# Patient Record
Sex: FEMALE | Race: WHITE | NOT HISPANIC OR LATINO | Employment: OTHER | URBAN - METROPOLITAN AREA
[De-identification: names, ages, dates, MRNs, and addresses within clinical notes are randomized per-mention and may not be internally consistent; named-entity substitution may affect disease eponyms.]

---

## 2017-02-21 DIAGNOSIS — I48.0 PAF (PAROXYSMAL ATRIAL FIBRILLATION) (HCC): ICD-10-CM

## 2017-02-21 RX ORDER — DILTIAZEM HYDROCHLORIDE 120 MG/1
120 CAPSULE, COATED, EXTENDED RELEASE ORAL DAILY
Qty: 30 CAP | Refills: 11 | OUTPATIENT
Start: 2017-02-21 | End: 2017-06-28 | Stop reason: SDUPTHER

## 2017-02-28 ENCOUNTER — HOSPITAL ENCOUNTER (OUTPATIENT)
Dept: LAB | Facility: MEDICAL CENTER | Age: 82
End: 2017-02-28
Attending: FAMILY MEDICINE
Payer: MEDICARE

## 2017-02-28 LAB
ALBUMIN SERPL BCP-MCNC: 4.1 G/DL (ref 3.2–4.9)
ALBUMIN/GLOB SERPL: 1.1 G/DL
ALP SERPL-CCNC: 90 U/L (ref 30–99)
ALT SERPL-CCNC: 12 U/L (ref 2–50)
ANION GAP SERPL CALC-SCNC: 9 MMOL/L (ref 0–11.9)
AST SERPL-CCNC: 19 U/L (ref 12–45)
BASOPHILS # BLD AUTO: 0.1 K/UL (ref 0–0.12)
BASOPHILS NFR BLD AUTO: 1.2 % (ref 0–1.8)
BILIRUB SERPL-MCNC: 1 MG/DL (ref 0.1–1.5)
BUN SERPL-MCNC: 22 MG/DL (ref 8–22)
CALCIUM SERPL-MCNC: 9.9 MG/DL (ref 8.5–10.5)
CHLORIDE SERPL-SCNC: 101 MMOL/L (ref 96–112)
CHOLEST SERPL-MCNC: 188 MG/DL (ref 100–199)
CO2 SERPL-SCNC: 27 MMOL/L (ref 20–33)
CREAT SERPL-MCNC: 1.16 MG/DL (ref 0.5–1.4)
CREAT UR-MCNC: 134.4 MG/DL
EOSINOPHIL # BLD: 0.27 K/UL (ref 0–0.51)
EOSINOPHIL NFR BLD AUTO: 3.3 % (ref 0–6.9)
ERYTHROCYTE [DISTWIDTH] IN BLOOD BY AUTOMATED COUNT: 44.7 FL (ref 35.9–50)
EST. AVERAGE GLUCOSE BLD GHB EST-MCNC: 128 MG/DL
GLOBULIN SER CALC-MCNC: 3.6 G/DL (ref 1.9–3.5)
GLUCOSE SERPL-MCNC: 121 MG/DL (ref 65–99)
HBA1C MFR BLD: 6.1 % (ref 0–5.6)
HCT VFR BLD AUTO: 46.2 % (ref 37–47)
HDLC SERPL-MCNC: 54 MG/DL
HGB BLD-MCNC: 15 G/DL (ref 12–16)
IMM GRANULOCYTES # BLD AUTO: 0.04 K/UL (ref 0–0.11)
IMM GRANULOCYTES NFR BLD AUTO: 0.5 % (ref 0–0.9)
LDLC SERPL CALC-MCNC: 108 MG/DL
LYMPHOCYTES # BLD: 1.66 K/UL (ref 1–4.8)
LYMPHOCYTES NFR BLD AUTO: 20.4 % (ref 22–41)
MCH RBC QN AUTO: 28.9 PG (ref 27–33)
MCHC RBC AUTO-ENTMCNC: 32.5 G/DL (ref 33.6–35)
MCV RBC AUTO: 89 FL (ref 81.4–97.8)
MICROALBUMIN UR-MCNC: 1.5 MG/DL
MICROALBUMIN/CREAT UR: 11 MG/G (ref 0–30)
MONOCYTES # BLD: 0.54 K/UL (ref 0–0.85)
MONOCYTES NFR BLD AUTO: 6.6 % (ref 0–13.4)
NEUTROPHILS # BLD: 5.52 K/UL (ref 2–7.15)
NEUTROPHILS NFR BLD AUTO: 68 % (ref 44–72)
NRBC # BLD AUTO: 0 K/UL
NRBC BLD-RTO: 0 /100 WBC
PLATELET # BLD AUTO: 270 K/UL (ref 164–446)
PMV BLD AUTO: 9 FL (ref 9–12.9)
POTASSIUM SERPL-SCNC: 4.8 MMOL/L (ref 3.6–5.5)
PROT SERPL-MCNC: 7.7 G/DL (ref 6–8.2)
RBC # BLD AUTO: 5.19 M/UL (ref 4.2–5.4)
SODIUM SERPL-SCNC: 137 MMOL/L (ref 135–145)
TRIGL SERPL-MCNC: 130 MG/DL (ref 0–149)
TSH SERPL DL<=0.005 MIU/L-ACNC: 1.04 UIU/ML (ref 0.3–3.7)
WBC # BLD AUTO: 8.1 K/UL (ref 4.8–10.8)

## 2017-02-28 PROCEDURE — 82570 ASSAY OF URINE CREATININE: CPT

## 2017-02-28 PROCEDURE — 80053 COMPREHEN METABOLIC PANEL: CPT

## 2017-02-28 PROCEDURE — 82043 UR ALBUMIN QUANTITATIVE: CPT

## 2017-02-28 PROCEDURE — 80061 LIPID PANEL: CPT

## 2017-02-28 PROCEDURE — 36415 COLL VENOUS BLD VENIPUNCTURE: CPT

## 2017-02-28 PROCEDURE — 84443 ASSAY THYROID STIM HORMONE: CPT

## 2017-02-28 PROCEDURE — 83036 HEMOGLOBIN GLYCOSYLATED A1C: CPT

## 2017-02-28 PROCEDURE — 85025 COMPLETE CBC W/AUTO DIFF WBC: CPT

## 2017-04-04 ENCOUNTER — HOSPITAL ENCOUNTER (OUTPATIENT)
Dept: RADIOLOGY | Facility: MEDICAL CENTER | Age: 82
End: 2017-04-04
Attending: FAMILY MEDICINE
Payer: MEDICARE

## 2017-04-04 DIAGNOSIS — Z12.31 VISIT FOR SCREENING MAMMOGRAM: ICD-10-CM

## 2017-04-04 PROCEDURE — 77063 BREAST TOMOSYNTHESIS BI: CPT

## 2017-05-04 ENCOUNTER — HOSPITAL ENCOUNTER (OUTPATIENT)
Dept: LAB | Facility: MEDICAL CENTER | Age: 82
End: 2017-05-04
Attending: FAMILY MEDICINE
Payer: MEDICARE

## 2017-05-04 ENCOUNTER — HOSPITAL ENCOUNTER (OUTPATIENT)
Dept: LAB | Facility: MEDICAL CENTER | Age: 82
End: 2017-05-04
Attending: INTERNAL MEDICINE
Payer: MEDICARE

## 2017-05-04 LAB
EST. AVERAGE GLUCOSE BLD GHB EST-MCNC: 123 MG/DL
GFR SERPL CREATININE-BSD FRML MDRD: 55 ML/MIN/1.73 M 2
HBA1C MFR BLD: 5.9 % (ref 0–5.6)
IRON SERPL-MCNC: 40 UG/DL (ref 40–170)

## 2017-05-04 PROCEDURE — 85025 COMPLETE CBC W/AUTO DIFF WBC: CPT

## 2017-05-04 PROCEDURE — 80061 LIPID PANEL: CPT

## 2017-05-04 PROCEDURE — 83036 HEMOGLOBIN GLYCOSYLATED A1C: CPT

## 2017-05-04 PROCEDURE — 82306 VITAMIN D 25 HYDROXY: CPT

## 2017-05-04 PROCEDURE — 83540 ASSAY OF IRON: CPT

## 2017-05-04 PROCEDURE — 80053 COMPREHEN METABOLIC PANEL: CPT

## 2017-05-04 PROCEDURE — 81001 URINALYSIS AUTO W/SCOPE: CPT

## 2017-05-04 PROCEDURE — 84550 ASSAY OF BLOOD/URIC ACID: CPT

## 2017-05-04 PROCEDURE — 83735 ASSAY OF MAGNESIUM: CPT

## 2017-05-04 PROCEDURE — 84156 ASSAY OF PROTEIN URINE: CPT

## 2017-05-04 PROCEDURE — 82728 ASSAY OF FERRITIN: CPT

## 2017-05-04 PROCEDURE — 83550 IRON BINDING TEST: CPT

## 2017-05-04 PROCEDURE — 83970 ASSAY OF PARATHORMONE: CPT

## 2017-05-04 PROCEDURE — 82570 ASSAY OF URINE CREATININE: CPT

## 2017-05-04 PROCEDURE — 84100 ASSAY OF PHOSPHORUS: CPT

## 2017-05-05 LAB
25(OH)D3 SERPL-MCNC: 10 NG/ML (ref 30–100)
ALBUMIN SERPL BCP-MCNC: 3.7 G/DL (ref 3.2–4.9)
ALBUMIN/GLOB SERPL: 0.9 G/DL
ALP SERPL-CCNC: 95 U/L (ref 30–99)
ALT SERPL-CCNC: 7 U/L (ref 2–50)
ANION GAP SERPL CALC-SCNC: 9 MMOL/L (ref 0–11.9)
APPEARANCE UR: CLEAR
AST SERPL-CCNC: 12 U/L (ref 12–45)
BACTERIA #/AREA URNS HPF: ABNORMAL /HPF
BASOPHILS # BLD AUTO: 0.7 % (ref 0–1.8)
BASOPHILS # BLD: 0.06 K/UL (ref 0–0.12)
BILIRUB SERPL-MCNC: 0.7 MG/DL (ref 0.1–1.5)
BILIRUB UR QL STRIP.AUTO: NEGATIVE
BUN SERPL-MCNC: 14 MG/DL (ref 8–22)
CALCIUM SERPL-MCNC: 9.4 MG/DL (ref 8.5–10.5)
CHLORIDE SERPL-SCNC: 99 MMOL/L (ref 96–112)
CHOLEST SERPL-MCNC: 144 MG/DL (ref 100–199)
CO2 SERPL-SCNC: 27 MMOL/L (ref 20–33)
COLOR UR: YELLOW
CREAT SERPL-MCNC: 0.96 MG/DL (ref 0.5–1.4)
CREAT UR-MCNC: 112.8 MG/DL
EOSINOPHIL # BLD AUTO: 0.16 K/UL (ref 0–0.51)
EOSINOPHIL NFR BLD: 1.8 % (ref 0–6.9)
EPI CELLS #/AREA URNS HPF: ABNORMAL /HPF
ERYTHROCYTE [DISTWIDTH] IN BLOOD BY AUTOMATED COUNT: 43.3 FL (ref 35.9–50)
EST. AVERAGE GLUCOSE BLD GHB EST-MCNC: 123 MG/DL
FERRITIN SERPL-MCNC: 125 NG/ML (ref 10–291)
GLOBULIN SER CALC-MCNC: 3.9 G/DL (ref 1.9–3.5)
GLUCOSE SERPL-MCNC: 101 MG/DL (ref 65–99)
GLUCOSE UR STRIP.AUTO-MCNC: NEGATIVE MG/DL
HBA1C MFR BLD: 5.9 % (ref 0–5.6)
HCT VFR BLD AUTO: 40.7 % (ref 37–47)
HDLC SERPL-MCNC: 42 MG/DL
HGB BLD-MCNC: 13.1 G/DL (ref 12–16)
IMM GRANULOCYTES # BLD AUTO: 0.07 K/UL (ref 0–0.11)
IMM GRANULOCYTES NFR BLD AUTO: 0.8 % (ref 0–0.9)
IRON SATN MFR SERPL: 11 % (ref 15–55)
IRON SERPL-MCNC: 35 UG/DL (ref 40–170)
KETONES UR STRIP.AUTO-MCNC: NEGATIVE MG/DL
LDLC SERPL CALC-MCNC: 84 MG/DL
LEUKOCYTE ESTERASE UR QL STRIP.AUTO: ABNORMAL
LYMPHOCYTES # BLD AUTO: 1.18 K/UL (ref 1–4.8)
LYMPHOCYTES NFR BLD: 13.3 % (ref 22–41)
MAGNESIUM SERPL-MCNC: 1.9 MG/DL (ref 1.5–2.5)
MCH RBC QN AUTO: 28.6 PG (ref 27–33)
MCHC RBC AUTO-ENTMCNC: 32.2 G/DL (ref 33.6–35)
MCV RBC AUTO: 88.9 FL (ref 81.4–97.8)
MICRO URNS: ABNORMAL
MONOCYTES # BLD AUTO: 0.6 K/UL (ref 0–0.85)
MONOCYTES NFR BLD AUTO: 6.8 % (ref 0–13.4)
NEUTROPHILS # BLD AUTO: 6.79 K/UL (ref 2–7.15)
NEUTROPHILS NFR BLD: 76.6 % (ref 44–72)
NITRITE UR QL STRIP.AUTO: NEGATIVE
NRBC # BLD AUTO: 0 K/UL
NRBC BLD AUTO-RTO: 0 /100 WBC
PH UR STRIP.AUTO: 6 [PH]
PHOSPHATE SERPL-MCNC: 2.9 MG/DL (ref 2.5–4.5)
PLATELET # BLD AUTO: 450 K/UL (ref 164–446)
PMV BLD AUTO: 9 FL (ref 9–12.9)
POTASSIUM SERPL-SCNC: 4 MMOL/L (ref 3.6–5.5)
PROT SERPL-MCNC: 7.6 G/DL (ref 6–8.2)
PROT UR QL STRIP: NEGATIVE MG/DL
PROT UR-MCNC: 11.1 MG/DL (ref 0–15)
PROT/CREAT UR: 98 MG/G (ref 10–107)
PTH-INTACT SERPL-MCNC: 61 PG/ML (ref 14–72)
RBC # BLD AUTO: 4.58 M/UL (ref 4.2–5.4)
RBC # URNS HPF: ABNORMAL /HPF
RBC UR QL AUTO: NEGATIVE
SODIUM SERPL-SCNC: 135 MMOL/L (ref 135–145)
SP GR UR STRIP.AUTO: 1.01
TIBC SERPL-MCNC: 305 UG/DL (ref 250–450)
TRIGL SERPL-MCNC: 91 MG/DL (ref 0–149)
URATE SERPL-MCNC: 7.3 MG/DL (ref 1.9–8.2)
WBC # BLD AUTO: 8.9 K/UL (ref 4.8–10.8)
WBC #/AREA URNS HPF: ABNORMAL /HPF

## 2017-05-25 ENCOUNTER — OFFICE VISIT (OUTPATIENT)
Dept: CARDIOLOGY | Facility: MEDICAL CENTER | Age: 82
End: 2017-05-25
Payer: MEDICARE

## 2017-05-25 VITALS
HEIGHT: 62 IN | DIASTOLIC BLOOD PRESSURE: 74 MMHG | BODY MASS INDEX: 38.64 KG/M2 | HEART RATE: 82 BPM | WEIGHT: 210 LBS | SYSTOLIC BLOOD PRESSURE: 130 MMHG

## 2017-05-25 DIAGNOSIS — R06.02 SOB (SHORTNESS OF BREATH): ICD-10-CM

## 2017-05-25 DIAGNOSIS — E11.9 TYPE 2 DIABETES MELLITUS WITHOUT COMPLICATION, UNSPECIFIED LONG TERM INSULIN USE STATUS: ICD-10-CM

## 2017-05-25 DIAGNOSIS — I48.91 ATRIAL FIBRILLATION, UNSPECIFIED TYPE (HCC): ICD-10-CM

## 2017-05-25 DIAGNOSIS — I48.0 PAROXYSMAL ATRIAL FIBRILLATION (HCC): ICD-10-CM

## 2017-05-25 DIAGNOSIS — I10 ESSENTIAL HYPERTENSION: ICD-10-CM

## 2017-05-25 DIAGNOSIS — R06.09 DYSPNEA ON EXERTION: ICD-10-CM

## 2017-05-25 PROCEDURE — 99214 OFFICE O/P EST MOD 30 MIN: CPT | Performed by: INTERNAL MEDICINE

## 2017-05-25 PROCEDURE — 1101F PT FALLS ASSESS-DOCD LE1/YR: CPT | Mod: 8P | Performed by: INTERNAL MEDICINE

## 2017-05-25 PROCEDURE — G8419 CALC BMI OUT NRM PARAM NOF/U: HCPCS | Performed by: INTERNAL MEDICINE

## 2017-05-25 PROCEDURE — G8432 DEP SCR NOT DOC, RNG: HCPCS | Performed by: INTERNAL MEDICINE

## 2017-05-25 PROCEDURE — 1036F TOBACCO NON-USER: CPT | Performed by: INTERNAL MEDICINE

## 2017-05-25 PROCEDURE — 4040F PNEUMOC VAC/ADMIN/RCVD: CPT | Mod: 8P | Performed by: INTERNAL MEDICINE

## 2017-05-25 RX ORDER — WARFARIN SODIUM 5 MG/1
5 TABLET ORAL DAILY
Qty: 30 TAB | Refills: 3 | Status: SHIPPED | OUTPATIENT
Start: 2017-05-25 | End: 2017-06-30

## 2017-05-25 NOTE — MR AVS SNAPSHOT
"        Saumya Lopez Soo   2017 10:30 AM   Office Visit   MRN: [id number]    Department:  [facility]   Dept Phone:  [phone]    Description:  Female : 1930   Provider:  Grant Woodard M.D.           Reason for Visit     Follow-Up           Allergies as of 2017     Allergen Noted Reactions    Pcn [Penicillins] 2014         You were diagnosed with     Dyspnea on exertion   [337238]       Paroxysmal atrial fibrillation (CMS-HCC)   [277245]       Essential hypertension   [5679524]       Type 2 diabetes mellitus without complication, unspecified long term insulin use status (CMS-HCC)   [7770886]         Vital Signs     Blood Pressure Pulse Height Weight Body Mass Index Smoking Status    130/74 mmHg 82 1.575 m (5' 2\") 95.255 kg (210 lb) 38.40 kg/m2 Never Smoker       Basic Information     Date Of Birth Sex Race Ethnicity Preferred Language    1930 Female White Non- English      Problem List              ICD-10-CM Priority Class Noted - Resolved    DM2 (diabetes mellitus, type 2) (CMS-HCC) E11.9   2014 - Present    SOB (shortness of breath) R06.02   2014 - Present    AF (atrial fibrillation) (CMS-HCC) I48.91   2014 - Present    HTN (hypertension) I10   2014 - Present    Dyspnea on exertion R06.09   2016 - Present      Health Maintenance        Date Due Completion Dates    DIABETES MONOFILAMENT / LE EXAM 1931 ---    RETINAL SCREENING 1948 ---    IMM DTaP/Tdap/Td Vaccine (1 - Tdap) 1949 ---    PAP SMEAR 1951 ---    COLONOSCOPY 1980 ---    IMM ZOSTER VACCINE 1990 ---    IMM PNEUMOCOCCAL 65+ (ADULT) LOW/MEDIUM RISK SERIES (1 of 2 - PCV13) 1995 ---    A1C SCREENING 2017, 2017, 2017, 10/25/2016, 2016, 3/30/2016, 10/17/2015, 2015, 10/15/2014, 4/10/2014, 2013, 2013, 2012, 2012, 2012, 2012, 10/5/2011, 2011    URINE ACR / MICROALBUMIN 2018 " 2/28/2017, 10/25/2016, 10/17/2015, 10/15/2014, 11/22/2013    BONE DENSITY 3/21/2018 3/21/2013    MAMMOGRAM 4/4/2018 4/4/2017, 3/16/2016, 3/12/2015, 3/10/2014, 3/4/2013, 2/14/2012, 2/7/2011    FASTING LIPID PROFILE 5/4/2018 5/4/2017, 2/28/2017, 10/25/2016, 3/30/2016, 10/17/2015, 10/15/2014, 11/22/2013, 11/30/2012, 10/5/2011    SERUM CREATININE 5/4/2018 5/4/2017, 2/28/2017, 10/25/2016, 10/25/2016, 3/30/2016, 3/30/2016, 10/17/2015, 10/17/2015, 9/1/2015, 5/1/2015, 4/2/2015, 10/15/2014, 4/10/2014, 11/22/2013, 11/30/2012, 9/5/2012, 10/5/2011, 6/8/2011            Current Immunizations     No immunizations on file.      Below and/or attached are the medications your provider expects you to take. Review all of your home medications and newly ordered medications with your provider and/or pharmacist. Follow medication instructions as directed by your provider and/or pharmacist. Please keep your medication list with you and share with your provider. Update the information when medications are discontinued, doses are changed, or new medications (including over-the-counter products) are added; and carry medication information at all times in the event of emergency situations     Allergies:  PCN - (reactions not documented)               Medications  Valid as of: May 25, 2017 - 11:10 AM    Generic Name Brand Name Tablet Size Instructions for use    Atorvastatin Calcium (Tab) LIPITOR 20 MG         Clobetasol Propionate (Ointment) TEMOVATE 0.05 %         Cyclobenzaprine HCl (Tab) FLEXERIL 5 MG Take 1 Tab by mouth 3 times a day as needed for Muscle Spasms.        DilTIAZem HCl Coated Beads (CAPSULE SR 24 HR) CARDIZEM  MG Take 1 Cap by mouth every day.        GlipiZIDE (TABLET SR 24 HR) GLUCOTROL 2.5 MG         Glucose Blood (Strip) ONE TOUCH ULTRA TEST          Latanoprost (Solution) XALATAN 0.005 %         Levothyroxine Sodium (Tab) SYNTHROID 150 MCG Take 150 mcg by mouth every day.        Losartan Potassium (Tab) COZAAR 100  MG Take 100 mg by mouth every day.        Neomycin-Polymyxin-Dexameth (Ointment) MAXITROL 3.5-95457-3.1         Omeprazole   Take  by mouth.        Spironolactone-HCTZ (Tab) ALDACTAZIDE 25-25 MG Take  by mouth. Taking 1/2 tablet in the am        Warfarin Sodium (Tab) COUMADIN 5 MG Take 1 Tab by mouth every day.        .                 Medicines prescribed today were sent to:     [facility] [address] [place] [address] - [address] [address] [facility] [address]    Phone: [phone] Fax: [phone]    Open 24 Hours?: No      Medication refill instructions:       If your prescription bottle indicates you have medication refills left, it is not necessary to call your provider’s office. Please contact your pharmacy and they will refill your medication.    If your prescription bottle indicates you do not have any refills left, you may request refills at any time through one of the following ways: The online [employer] system (except Urgent Care), by calling your provider’s office, or by asking your pharmacy to contact your provider’s office with a refill request. Medication refills are processed only during regular business hours and may not be available until the next business day. Your provider may request additional information or to have a follow-up visit with you prior to refilling your medication.   *Please Note: Medication refills are assigned a new Rx number when refilled electronically. Your pharmacy may indicate that no refills were authorized even though a new prescription for the same medication is available at the pharmacy. Please request the medicine by name with the pharmacy before contacting your provider for a refill.        Your To Do List     Future Labs/Procedures Complete By Expires    HOLTER MONITOR STUDY  As directed 5/25/2018      Referral     A referral request has been sent to our patient care coordination department. Please allow 3-5 business days for us to process this  request and contact you either by phone or mail. If you do not hear from us by the 5th business day, please call us at [phone].           MyChart Status: Patient Declined

## 2017-05-25 NOTE — PROGRESS NOTES
Subjective:   Saumya Vann is a 89 y.o. female who presents today for followup of exertional dyspnea and PAF. She has had exertional dyspnea over the past 3 years associated with fatigue. She has a history of HTN, diabetes mellitus, CKD . Her echo is relatively unremarkable aside from LVH and grade 1 diastolic function. EKG from 4/2014 showed sinus rhythm at 58 beats per minute. Last stress test was 2 years ago and was unremarkable. PFTs per her primary doctor are unremarkable. She denies any exertional chest pain, PND or orthopnea.     She continues to have episodic paroxysms of atrial fibrillation, now much more frequent. She is taking her Cardizem every other day. She has declined anticoagulation despite education. She was tried on amiodarone which caused profound fatigue. She's been off it for some time now and feels slightly better with regard to both her dyspnea and fatigue. We discussed stress testing for her exertional dyspnea but she declined this . States she feels about the same over the past 6 months but continues to have NYHA class II dyspnea on exertion as well as more frequent palpitations.    Past medical history:  HTN  HLP  Type II diabetes mellitus  Chronic kidney  LVH  Hypothyroidism on replacement  PAF    Family History   Problem Relation Age of Onset   • Cancer Other      History   Smoking status   • Never Smoker    Smokeless tobacco   • Not on file     Allergies   Allergen Reactions   • Pcn [Penicillins]      Outpatient Encounter Prescriptions as of 5/25/2017   Medication Sig Dispense Refill   • OMEPRAZOLE PO Take  by mouth.     • warfarin (COUMADIN) 5 MG Tab Take 1 Tab by mouth every day. 30 Tab 3   • diltiazem CD (CARDIZEM CD) 120 MG CAPSULE SR 24 HR Take 1 Cap by mouth every day. 30 Cap 11   • clobetasol (TEMOVATE) 0.05 % Ointment      • ONE TOUCH ULTRA TEST strip      • latanoprost (XALATAN) 0.005 % Solution      • spironolactone/hctz (ALDACTAZIDE) 25-25 MG TABS Take  by mouth.  "Taking 1/2 tablet in the am     • levothyroxine (SYNTHROID) 150 MCG TABS Take 150 mcg by mouth every day.     • losartan (COZAAR) 100 MG TABS Take 100 mg by mouth every day.     • cyclobenzaprine (FLEXERIL) 5 MG tablet Take 1 Tab by mouth 3 times a day as needed for Muscle Spasms. 30 Tab 0   • atorvastatin (LIPITOR) 20 MG Tab      • glipiZIDE SR (GLUCOTROL) 2.5 MG TABLET SR 24 HR      • neomycin-polymixin-dexamethasone (MAXITROL) 3.5-14479-2.1 Ointment ophthalmic ointment      • [DISCONTINUED] aspirin 81 MG EC tablet Take  by mouth.       No facility-administered encounter medications on file as of 5/25/2017.     Review of Systems   All other systems reviewed and are negative.       Objective:   /74 mmHg  Pulse 82  Ht 1.575 m (5' 2\")  Wt 95.255 kg (210 lb)  BMI 38.40 kg/m2    Physical Exam   Constitutional: She is oriented to person, place, and time. She appears well-developed and well-nourished. No distress.   HENT:   Head: Normocephalic and atraumatic.   Mouth/Throat: Oropharynx is clear and moist. No oropharyngeal exudate.   Eyes: Conjunctivae are normal. Pupils are equal, round, and reactive to light. No scleral icterus.   Neck: Normal range of motion. Neck supple. No JVD present. No thyromegaly present.   Cardiovascular: Normal rate, normal heart sounds and intact distal pulses.  An irregular rhythm present. Frequent extrasystoles are present. PMI is not displaced.  Exam reveals no gallop and no friction rub.    No murmur heard.  Pulses:       Carotid pulses are 2+ on the right side, and 2+ on the left side.       Femoral pulses are 2+ on the right side, and 2+ on the left side.       Popliteal pulses are 1+ on the right side, and 1+ on the left side.        Dorsalis pedis pulses are 2+ on the right side, and 2+ on the left side.        Posterior tibial pulses are 2+ on the right side, and 2+ on the left side.   Pulmonary/Chest: Effort normal and breath sounds normal. She has no wheezes. She has no " rales.   Abdominal: Soft. Bowel sounds are normal. She exhibits no distension. There is no tenderness.   Musculoskeletal: She exhibits no edema or tenderness.   Neurological: She is alert and oriented to person, place, and time. No cranial nerve deficit.   Skin: Skin is warm and dry. No rash noted. She is not diaphoretic. No erythema.   Psychiatric: She has a normal mood and affect. Her behavior is normal.   ECHO CONCLUSIONS (4/27/2016):  Normal left ventricular size and systolic function.  Left ventricular ejection fraction is visually estimated to be 55%.  Moderate concentric left ventricular hypertrophy.  Normal regional wall motion.  Mild mitral regurgitation.  Mild tricuspid regurgitation.  Estimated right ventricular systolic pressure is 25 mmHg.  Normal inferior vena cava size and inspiratory collapse.    ECHO (11/10/2014):  I have personally reviewed the echocardiogram this visit and reviewed the findings with the patient. It shows:   Grade 1 diastolic dysfunction, EF 60-65%, moderate LVH, left atrial enlargement    PFTs as above      Assessment:     1. Dyspnea on exertion  PET SCAN MYOCARDIAL PERFUSION    HOLTER MONITOR STUDY    REFERRAL TO ANTICOAGULATION MONITORING   2. Paroxysmal atrial fibrillation (CMS-HCC)  HOLTER MONITOR STUDY    warfarin (COUMADIN) 5 MG Tab    REFERRAL TO ANTICOAGULATION MONITORING   3. Essential hypertension     4. Type 2 diabetes mellitus without complication, unspecified long term insulin use status (CMS-HCC)  PET SCAN MYOCARDIAL PERFUSION         Medical Decision Making:  Today's Assessment / Status / Plan:     Stress and echo are normal aside from grade 1 diastolic dysfunction. We decreased her Cardizem to every other day to see if this would help her exertional dyspnea if it was due to a chronotropic. She felt no different but has had less good control of her paroxysmal A. fib. She brings logs with heart rates in the 125 is intermittently especially after the days she does not  take her Cardizem.     Plan:    1. Xarelto 20 mg daily for elevated stroke risk. She cannot afford this on her Coumadin. She is willing to do this. Enroll in Coumadin clinic.  2. Increase Cardizem to daily from every other day  3. Pharmacologic PET stress test. This would be a more accurate test on a regular MPI 2nd due to her body habitus.  4. 48 hour Holter monitor  5. Should her stress test be unremarkable and she continued to have PAF, flecainide would be recommended      Follow-up 6 months

## 2017-06-05 ENCOUNTER — ANTICOAGULATION VISIT (OUTPATIENT)
Dept: MEDICAL GROUP | Facility: PHYSICIAN GROUP | Age: 82
End: 2017-06-05
Payer: MEDICARE

## 2017-06-05 ENCOUNTER — TELEPHONE (OUTPATIENT)
Dept: VASCULAR LAB | Facility: MEDICAL CENTER | Age: 82
End: 2017-06-05

## 2017-06-05 VITALS — DIASTOLIC BLOOD PRESSURE: 80 MMHG | SYSTOLIC BLOOD PRESSURE: 116 MMHG | HEART RATE: 88 BPM

## 2017-06-05 DIAGNOSIS — Z79.01 CHRONIC ANTICOAGULATION: ICD-10-CM

## 2017-06-05 DIAGNOSIS — I48.91 ATRIAL FIBRILLATION, UNSPECIFIED TYPE (HCC): ICD-10-CM

## 2017-06-05 LAB — INR PPP: 1.3 (ref 2–3.5)

## 2017-06-05 PROCEDURE — G8419 CALC BMI OUT NRM PARAM NOF/U: HCPCS | Performed by: NURSE PRACTITIONER

## 2017-06-05 PROCEDURE — 4040F PNEUMOC VAC/ADMIN/RCVD: CPT | Mod: 8P | Performed by: NURSE PRACTITIONER

## 2017-06-05 PROCEDURE — 99999 PR NO CHARGE: CPT | Performed by: NURSE PRACTITIONER

## 2017-06-05 PROCEDURE — 85610 PROTHROMBIN TIME: CPT | Performed by: NURSE PRACTITIONER

## 2017-06-05 NOTE — PROGRESS NOTES
Anticoagulation Summary as of 6/5/2017     INR goal 2.0-3.0   Selected INR 1.3! (6/5/2017)   Maintenance plan 2.5 mg (5 mg x 0.5) every day   Weekly total 17.5 mg   Plan last modified Rizwan Lipscomb, PHARMD (6/5/2017)   Next INR check 6/9/2017   Target end date Indefinite    Indications   Chronic anticoagulation [Z79.01]  AF (atrial fibrillation) (CMS-HCC) [I48.91]         Anticoagulation Episode Summary     INR check location     Preferred lab     Send INR reminders to     Comments       Anticoagulation Care Providers     Provider Role Specialty Phone number    Grant Woodard M.D. Referring Interventional Cardiology [phone]    [facility] Responsible  [phone]        Current Outpatient Prescriptions on File Prior to Visit   Medication Sig Dispense Refill   • OMEPRAZOLE PO Take  by mouth.     • warfarin (COUMADIN) 5 MG Tab Take 1 Tab by mouth every day. 30 Tab 3   • diltiazem CD (CARDIZEM CD) 120 MG CAPSULE SR 24 HR Take 1 Cap by mouth every day. 30 Cap 11   • cyclobenzaprine (FLEXERIL) 5 MG tablet Take 1 Tab by mouth 3 times a day as needed for Muscle Spasms. 30 Tab 0   • atorvastatin (LIPITOR) 20 MG Tab      • clobetasol (TEMOVATE) 0.05 % Ointment      • ONE TOUCH ULTRA TEST strip      • glipiZIDE SR (GLUCOTROL) 2.5 MG TABLET SR 24 HR      • latanoprost (XALATAN) 0.005 % Solution      • neomycin-polymixin-dexamethasone (MAXITROL) 3.5-35197-1.1 Ointment ophthalmic ointment      • spironolactone/hctz (ALDACTAZIDE) 25-25 MG TABS Take  by mouth. Taking 1/2 tablet in the am     • levothyroxine (SYNTHROID) 150 MCG TABS Take 150 mcg by mouth every day.     • losartan (COZAAR) 100 MG TABS Take 100 mg by mouth every day.       No current facility-administered medications on file prior to visit.     Lab Results   Component Value Date/Time    SODIUM 135 05/04/2017 10:08 AM    POTASSIUM 4.0 05/04/2017 10:08 AM    CHLORIDE 99 05/04/2017 10:08 AM    CO2 27 05/04/2017 10:08 AM    GLUCOSE 101*  05/04/2017 10:08 AM    BUN 14 05/04/2017 10:08 AM    CREATININE 0.96 05/04/2017 10:08 AM      No past medical history on file.    Anticoagulation Patient Findings    Pt is new to warfarin and new to [facility].  Discussed indication for warfarin therapy and INR goal range. Explained our services, hours of operation, warfarin therapy, potential SE, potential DI.. Discussed diet at length, with an emphasis on foods rich in vitamin K.  Discussed monitoring parameters, such as blood in urine, blood in stool, discussed what to do if a dose is missed, or suspected as missed.  Emphasized importance of compliance.  Discussed lifestyle choices of ETOH & smoking and its impact on therapy.  Added [facility] to care team    Patient new referral with history of nonvalvular atrial fibrillation.   [id number] = at least 5 (>75, female, HTN, DM).  Denies history of VTE or stroke.  Currently taking ASA daily, instructed her to continue until INR >2.0, then will discontinue.  Verified current warfarin regimen. Med rec completed.  INR subtherapeutic at 1.3.  Instructed patient to take 5mg X 3, then resume current warfarin regimen.  Follow up in 4 days per availability.    Rizwan Lipscomb, PHARMD    CC Dr Michael Bloch

## 2017-06-05 NOTE — MR AVS SNAPSHOT
Saumya Vann   2017 11:15 AM   Anticoagulation Visit   MRN: [id number]    Department:  [facility]   Dept Phone:  [phone]    Description:  Female : 1930   Provider:  Rizwan Lipscomb, PHARMD           Allergies as of 2017     Allergen Noted Reactions    Pcn [Penicillins] 2014         Vital Signs     Blood Pressure Pulse Smoking Status             116/80 mmHg 88 Never Smoker          Basic Information     Date Of Birth Sex Race Ethnicity Preferred Language    1930 Female White Non- English      Your appointments     2017 10:00 AM   HOLTER MONITOR 48 HRS with [facility]   [facility] (--)    [address]  [facility] [id number] [phone]            2017  2:15 PM   Anti-Coag Routine with [place] PHARMACIST   [facility] ([address] [id number] [phone]            2017  9:00 AM   Nm [address] with [facility] NM CARDIAC PET   [facility] ([facility])    [address] [facility] [id number] [phone]            2017 10:30 AM   FOLLOW UP with Grant Woodard M.D.   [facility] (--)    [address]  [facility] [id number] [phone]              Problem List              ICD-10-CM Priority Class Noted - Resolved    DM2 (diabetes mellitus, type 2) (CMS-HCC) E11.9   2014 - Present    SOB (shortness of breath) R06.02   2014 - Present    AF (atrial fibrillation) (CMS-HCC) I48.91   2014 - Present    HTN (hypertension) I10   2014 - Present    Dyspnea on exertion R06.09   2016 - Present      Health Maintenance        Date Due Completion Dates    DIABETES MONOFILAMENT / LE EXAM 1931 ---    RETINAL SCREENING 1948 ---    IMM DTaP/Tdap/Td Vaccine (1 - Tdap) 1949 ---    PAP SMEAR 1951 ---    COLONOSCOPY 12/22/1980 ---    IMM ZOSTER VACCINE 12/22/1990 ---    IMM PNEUMOCOCCAL 65+ (ADULT) LOW/MEDIUM RISK SERIES (1 of 2 - PCV13) 12/22/1995 ---    A1C SCREENING 11/4/2017 5/4/2017, 5/4/2017, 2/28/2017, 10/25/2016, 8/26/2016, 3/30/2016, 10/17/2015, 4/2/2015, 10/15/2014, 4/10/2014, 11/22/2013, 6/19/2013, 11/30/2012, 8/1/2012, 4/13/2012, 1/24/2012, 10/5/2011, 7/8/2011    URINE ACR / MICROALBUMIN 2/28/2018 2/28/2017, 10/25/2016, 10/17/2015, 10/15/2014, 11/22/2013    BONE DENSITY 3/21/2018 3/21/2013    MAMMOGRAM 4/4/2018 4/4/2017, 3/16/2016, 3/12/2015, 3/10/2014, 3/4/2013, 2/14/2012, 2/7/2011    FASTING LIPID PROFILE 5/4/2018 5/4/2017, 2/28/2017, 10/25/2016, 3/30/2016, 10/17/2015, 10/15/2014, 11/22/2013, 11/30/2012, 10/5/2011    SERUM CREATININE 5/4/2018 5/4/2017, 2/28/2017, 10/25/2016, 10/25/2016, 3/30/2016, 3/30/2016, 10/17/2015, 10/17/2015, 9/1/2015, 5/1/2015, 4/2/2015, 10/15/2014, 4/10/2014, 11/22/2013, 11/30/2012, 9/5/2012, 10/5/2011, 6/8/2011            Results     POCT Protime      Component    INR    1.3    Comment:     [id number] 3/2018 ic valid                        Current Immunizations     No immunizations on file.      Below and/or attached are the medications your provider expects you to take. Review all of your home medications and newly ordered medications with your provider and/or pharmacist. Follow medication instructions as directed by your provider and/or pharmacist. Please keep your medication list with you and share with your provider. Update the information when medications are discontinued, doses are changed, or new medications (including over-the-counter products) are added; and carry medication information at all times in the event of emergency situations     Allergies:  PCN - (reactions not documented)               Medications  Valid as of: June 05, 2017 - 11:38 AM    Generic Name Brand Name Tablet Size Instructions for use    Atorvastatin Calcium (Tab) LIPITOR 20 MG         Clobetasol  Propionate (Ointment) TEMOVATE 0.05 %         Cyclobenzaprine HCl (Tab) FLEXERIL 5 MG Take 1 Tab by mouth 3 times a day as needed for Muscle Spasms.        DilTIAZem HCl Coated Beads (CAPSULE SR 24 HR) CARDIZEM  MG Take 1 Cap by mouth every day.        GlipiZIDE (TABLET SR 24 HR) GLUCOTROL 2.5 MG         Glucose Blood (Strip) ONE TOUCH ULTRA TEST          Latanoprost (Solution) XALATAN 0.005 %         Levothyroxine Sodium (Tab) SYNTHROID 150 MCG Take 150 mcg by mouth every day.        Losartan Potassium (Tab) COZAAR 100 MG Take 100 mg by mouth every day.        Neomycin-Polymyxin-Dexameth (Ointment) MAXITROL 3.5-74513-8.1         Omeprazole   Take  by mouth.        Spironolactone-HCTZ (Tab) ALDACTAZIDE 25-25 MG Take  by mouth. Taking 1/2 tablet in the am        Warfarin Sodium (Tab) COUMADIN 5 MG Take 1 Tab by mouth every day.        .                 Medicines prescribed today were sent to:     [facility] [address] [place] [address]    Phone: [phone] Fax: [phone]    Open 24 Hours?: No      Medication refill instructions:       If your prescription bottle indicates you have medication refills left, it is not necessary to call your provider’s office. Please contact your pharmacy and they will refill your medication.    If your prescription bottle indicates you do not have any refills left, you may request refills at any time through one of the following ways: The online [employer] system (except Urgent Care), by calling your provider’s office, or by asking your pharmacy to contact your provider’s office with a refill request. Medication refills are processed only during regular business hours and may not be available until the next business day. Your provider may request additional information or to have a follow-up visit with you prior to refilling your medication.   *Please Note: Medication refills are assigned a new Rx number when  refilled electronically. Your pharmacy may indicate that no refills were authorized even though a new prescription for the same medication is available at the pharmacy. Please request the medicine by name with the pharmacy before contacting your provider for a refill.        Warfarin Dosing Calendar   June 2017 Details    Sun Mon Tue Wed Thu Fri Sat         1               2               3                 4               5   1.3   5 mg   See details      6      5 mg         7      5 mg         8      2.5 mg         9      2.5 mg         10                 11               12               13               14               15               16               17                 18               19               20               21               22               23               24                 25               26               27               28               29               30                 Date Details   06/05 This INR check   INR: 1.3   [id number] 3/2018 ic valid       Date of next INR:  6/9/2017         How to take your warfarin dose     To take:  2.5 mg Take 0.5 of a 5 mg tablet.    To take:  5 mg Take 1 of the 5 mg tablets.              MyChart Status: Patient Declined

## 2017-06-06 NOTE — TELEPHONE ENCOUNTER
Initial anticoagulation clinic note and most recent cardiology note reviewed  Patient with atrial fibrillation andCHADS-VASC score = approximately 5    Will continue with indefinite anticoagulation with warfarin as recommended by cardiology.  Agree with plans for anticoagulation as outlined by anticoagulation clinical staff.  Will defer management of rhythm and rate control as well as all other cardiovascular issues to cardiology.  Will defer management of all other medical issues to cardiologist and PCP    Pending any further recommendations from cardiology, we will plan to discontinue aspirin once INR therapeutic    Michael J Bloch, MD  Anticoagulation Clinic    cc: Bebe Santamaria

## 2017-06-07 ENCOUNTER — NON-PROVIDER VISIT (OUTPATIENT)
Dept: CARDIOLOGY | Facility: MEDICAL CENTER | Age: 82
End: 2017-06-07
Payer: MEDICARE

## 2017-06-07 DIAGNOSIS — I48.0 PAROXYSMAL ATRIAL FIBRILLATION (HCC): ICD-10-CM

## 2017-06-07 DIAGNOSIS — I49.3 PVC (PREMATURE VENTRICULAR CONTRACTION): ICD-10-CM

## 2017-06-07 DIAGNOSIS — R06.09 DYSPNEA ON EXERTION: ICD-10-CM

## 2017-06-09 ENCOUNTER — ANTICOAGULATION VISIT (OUTPATIENT)
Dept: MEDICAL GROUP | Facility: PHYSICIAN GROUP | Age: 82
End: 2017-06-09
Payer: MEDICARE

## 2017-06-09 VITALS — DIASTOLIC BLOOD PRESSURE: 81 MMHG | HEART RATE: 83 BPM | SYSTOLIC BLOOD PRESSURE: 119 MMHG

## 2017-06-09 DIAGNOSIS — I48.91 ATRIAL FIBRILLATION, UNSPECIFIED TYPE (HCC): ICD-10-CM

## 2017-06-09 DIAGNOSIS — Z79.01 CHRONIC ANTICOAGULATION: ICD-10-CM

## 2017-06-09 LAB — INR PPP: 2.4 (ref 2–3.5)

## 2017-06-09 PROCEDURE — 99999 PR NO CHARGE: CPT | Performed by: NURSE PRACTITIONER

## 2017-06-09 PROCEDURE — 85610 PROTHROMBIN TIME: CPT | Performed by: NURSE PRACTITIONER

## 2017-06-09 NOTE — PROGRESS NOTES
Anticoagulation Summary as of 6/9/2017     INR goal 2.0-3.0   Selected INR 2.4 (6/9/2017)   Maintenance plan 5 mg (5 mg x 1) on Tue, Thu, Sat; 2.5 mg (5 mg x 0.5) all other days   Weekly total 25 mg   Plan last modified Rizwan Lipscomb PHARMD (6/9/2017)   Next INR check 6/16/2017   Target end date Indefinite    Indications   Chronic anticoagulation [Z79.01]  AF (atrial fibrillation) (CMS-HCC) [I48.91]         Anticoagulation Episode Summary     INR check location     Preferred lab     Send INR reminders to     Comments       Anticoagulation Care Providers     Provider Role Specialty Phone number    Grant Woodard M.D. Referring Interventional Cardiology [phone]    [facility] Responsible  [phone]        Anticoagulation Patient Findings   Negatives Missed Doses, Extra Doses, Medication Changes, Antibiotic Use, Diet Changes, Dental/Other Procedures, Hospitalization, Bleeding Gums, Nose Bleeds, Blood in Urine, Blood in Stool, Any Bruising, Other Complaints        Patient is therapeutic today with INR of 2.4.  Pt denies any unusual s/s of bleeding, bruising, clotting or any changes to diet or medications.  Instructed patient to increase weekly warfarin regimen as detailed above.  Follow up in 1 weeks.    Rizwan Lipscomb, LISHAD

## 2017-06-09 NOTE — MR AVS SNAPSHOT
Saumyaayanna Vann   2017 2:15 PM   Anticoagulation Visit   MRN: [id number]    Department:  [facility]   Dept Phone:  [phone]    Description:  Female : 1930   Provider:  Rizwan Lipscomb, LISHAD           Allergies as of 2017     Allergen Noted Reactions    Pcn [Penicillins] 2014         You were diagnosed with     Chronic anticoagulation   [434703]       Atrial fibrillation, unspecified type (CMS-HCC)   [8093911]         Vital Signs     Blood Pressure Pulse Smoking Status             119/81 mmHg 83 Never Smoker          Basic Information     Date Of Birth Sex Race Ethnicity Preferred Language    1930 Female White Non- English      Your appointments     2017 11:15 AM   Anti-Coag Routine with [place] PHARMACIST   [facility] [address] [address]   [phone]            2017  9:00 AM   Nm 60 with [facility] NM CARDIAC PET   [facility]    [address] [facility] [address]   [phone]            2017 10:30 AM   FOLLOW UP with Grant Woodard M.D.   [facility] (--)    [address], [address]  [facility] [address] [phone]              Problem List              ICD-10-CM Priority Class Noted - Resolved    DM2 (diabetes mellitus, type 2) (CMS-HCC) E11.9   2014 - Present    SOB (shortness of breath) R06.02   2014 - Present    AF (atrial fibrillation) (CMS-HCC) I48.91   2014 - Present    HTN (hypertension) I10   2014 - Present    Dyspnea on exertion R06.09   2016 - Present    Chronic anticoagulation Z79.01   2017 - Present      Health Maintenance        Date Due Completion Dates    DIABETES MONOFILAMENT / LE EXAM 1931 ---    RETINAL SCREENING 1948 ---    IMM DTaP/Tdap/Td Vaccine (1 - Tdap) 1949 ---    PAP SMEAR 1951 ---    COLONOSCOPY 12/22/1980 ---    IMM ZOSTER VACCINE 12/22/1990 ---    IMM PNEUMOCOCCAL 65+ (ADULT) LOW/MEDIUM RISK SERIES (1 of 2 - PCV13) 12/22/1995 ---    A1C SCREENING 11/4/2017 5/4/2017, 5/4/2017, 2/28/2017, 10/25/2016, 8/26/2016, 3/30/2016, 10/17/2015, 4/2/2015, 10/15/2014, 4/10/2014, 11/22/2013, 6/19/2013, 11/30/2012, 8/1/2012, 4/13/2012, 1/24/2012, 10/5/2011, 7/8/2011    URINE ACR / MICROALBUMIN 2/28/2018 2/28/2017, 10/25/2016, 10/17/2015, 10/15/2014, 11/22/2013    BONE DENSITY 3/21/2018 3/21/2013    MAMMOGRAM 4/4/2018 4/4/2017, 3/16/2016, 3/12/2015, 3/10/2014, 3/4/2013, 2/14/2012, 2/7/2011    FASTING LIPID PROFILE 5/4/2018 5/4/2017, 2/28/2017, 10/25/2016, 3/30/2016, 10/17/2015, 10/15/2014, 11/22/2013, 11/30/2012, 10/5/2011    SERUM CREATININE 5/4/2018 5/4/2017, 2/28/2017, 10/25/2016, 10/25/2016, 3/30/2016, 3/30/2016, 10/17/2015, 10/17/2015, 9/1/2015, 5/1/2015, 4/2/2015, 10/15/2014, 4/10/2014, 11/22/2013, 11/30/2012, 9/5/2012, 10/5/2011, 6/8/2011            Results     POCT Protime      Component    INR    2.4    Comment:     [id number] 3/2018 ic valid                        Current Immunizations     No immunizations on file.      Below and/or attached are the medications your provider expects you to take. Review all of your home medications and newly ordered medications with your provider and/or pharmacist. Follow medication instructions as directed by your provider and/or pharmacist. Please keep your medication list with you and share with your provider. Update the information when medications are discontinued, doses are changed, or new medications (including over-the-counter products) are added; and carry medication information at all times in the event of emergency situations     Allergies:  PCN - (reactions not documented)               Medications  Valid as of: June 09, 2017 -  2:33 PM    Generic Name Brand Name Tablet Size Instructions for use    Atorvastatin Calcium (Tab) LIPITOR 20 MG         Clobetasol  Propionate (Ointment) TEMOVATE 0.05 %         Cyclobenzaprine HCl (Tab) FLEXERIL 5 MG Take 1 Tab by mouth 3 times a day as needed for Muscle Spasms.        DilTIAZem HCl Coated Beads (CAPSULE SR 24 HR) CARDIZEM  MG Take 1 Cap by mouth every day.        GlipiZIDE (TABLET SR 24 HR) GLUCOTROL 2.5 MG         Glucose Blood (Strip) ONE TOUCH ULTRA TEST          Latanoprost (Solution) XALATAN 0.005 %         Levothyroxine Sodium (Tab) SYNTHROID 150 MCG Take 150 mcg by mouth every day.        Losartan Potassium (Tab) COZAAR 100 MG Take 100 mg by mouth every day.        Neomycin-Polymyxin-Dexameth (Ointment) MAXITROL 3.5-40536-3.1         Omeprazole   Take  by mouth.        Spironolactone-HCTZ (Tab) ALDACTAZIDE 25-25 MG Take  by mouth. Taking 1/2 tablet in the am        Warfarin Sodium (Tab) COUMADIN 5 MG Take 1 Tab by mouth every day.        .                 Medicines prescribed today were sent to:     [facility] [address] [place] [address]    Phone: [phone] Fax: [phone]    Open 24 Hours?: No      Medication refill instructions:       If your prescription bottle indicates you have medication refills left, it is not necessary to call your provider’s office. Please contact your pharmacy and they will refill your medication.    If your prescription bottle indicates you do not have any refills left, you may request refills at any time through one of the following ways: The online [employer] system (except Urgent Care), by calling your provider’s office, or by asking your pharmacy to contact your provider’s office with a refill request. Medication refills are processed only during regular business hours and may not be available until the next business day. Your provider may request additional information or to have a follow-up visit with you prior to refilling your medication.   *Please Note: Medication refills are assigned a new Rx number when  refilled electronically. Your pharmacy may indicate that no refills were authorized even though a new prescription for the same medication is available at the pharmacy. Please request the medicine by name with the pharmacy before contacting your provider for a refill.        Warfarin Dosing Calendar   June 2017 Details    Sun Mon Tue Wed Thu Fri Sat         1               2               3                 4               5               6               7               8               9   2.4   2.5 mg   See details      10      5 mg           11      2.5 mg         12      2.5 mg         13      5 mg         14      2.5 mg         15      5 mg         16      2.5 mg         17                 18               19               20               21               22               23               24                 25               26               27               28               29               30                 Date Details   06/09 This INR check   INR: 2.4   [id number] 3/2018 ic valid       Date of next INR:  6/16/2017         How to take your warfarin dose     To take:  2.5 mg Take 0.5 of a 5 mg tablet.    To take:  5 mg Take 1 of the 5 mg tablets.              MyChart Status: Patient Declined

## 2017-06-13 DIAGNOSIS — I48.0 PAROXYSMAL ATRIAL FIBRILLATION (HCC): ICD-10-CM

## 2017-06-13 DIAGNOSIS — R06.09 DYSPNEA ON EXERTION: ICD-10-CM

## 2017-06-14 LAB — EKG IMPRESSION: NORMAL

## 2017-06-14 PROCEDURE — 93224 XTRNL ECG REC UP TO 48 HRS: CPT | Performed by: INTERNAL MEDICINE

## 2017-06-15 ENCOUNTER — TELEPHONE (OUTPATIENT)
Dept: CARDIOLOGY | Facility: MEDICAL CENTER | Age: 82
End: 2017-06-15

## 2017-06-15 NOTE — TELEPHONE ENCOUNTER
S/w pt about holter monitor results. She reports that she currently is taking Cardizem 120mg daily as well as her coumadin. Informed her to please increase Cardizem to twice daily per Dr. Woodard. Pt states understanding. She will get PET scan done on Monday 6/19 and we will FU after. Pt will call if any issues with increasing medication.   She was very appreciative of call.     TANA Mathew RN to FU next week. Thank you.

## 2017-06-15 NOTE — TELEPHONE ENCOUNTER
----- Message from Bridget Marie R.N. sent at 6/15/2017  8:54 AM PDT -----      ----- Message -----     From: Grant Woodard M.D.     Sent: 6/15/2017   8:30 AM       To: Quincy Mckenzie L.P.N.    She was in AF for the entire holter, likely why she has been feeling unwell. Rates are only not well controlled. I suggest she complete her testing and consider antiarrythmic therapy with cardioversion.     Please make sure she is taking her anticoagulation and her Cardizem every day. Whatever she is taking for cardizem, have her increase it (if qod then increase to daily, if daily make it BID, etc). Shuoldnt proceed with cardioversion until all other testing is completed so appropriate antiarrythmic therapy can be discussed. She could f/u with EP if im still gone.    Thanks    TW  ----- Message -----     From: Quincy Mckenzie L.P.N.     Sent: 6/13/2017   5:07 PM       To: Grant Woodard M.D.    Please interp Holter   jl

## 2017-06-16 ENCOUNTER — ANTICOAGULATION VISIT (OUTPATIENT)
Dept: MEDICAL GROUP | Facility: PHYSICIAN GROUP | Age: 82
End: 2017-06-16
Payer: MEDICARE

## 2017-06-16 VITALS — HEART RATE: 82 BPM | DIASTOLIC BLOOD PRESSURE: 87 MMHG | SYSTOLIC BLOOD PRESSURE: 116 MMHG

## 2017-06-16 DIAGNOSIS — Z79.01 CHRONIC ANTICOAGULATION: ICD-10-CM

## 2017-06-16 DIAGNOSIS — I48.0 PAROXYSMAL ATRIAL FIBRILLATION (HCC): ICD-10-CM

## 2017-06-16 LAB — INR PPP: 2.2 (ref 2–3.5)

## 2017-06-16 PROCEDURE — 85610 PROTHROMBIN TIME: CPT | Performed by: FAMILY MEDICINE

## 2017-06-16 PROCEDURE — 99999 PR NO CHARGE: CPT | Performed by: FAMILY MEDICINE

## 2017-06-16 NOTE — PROGRESS NOTES
Anticoagulation Summary as of 6/16/2017     INR goal 2.0-3.0   Selected INR 2.2 (6/16/2017)   Maintenance plan 5 mg (5 mg x 1) on Tue, Thu, Sat; 2.5 mg (5 mg x 0.5) all other days   Weekly total 25 mg   Plan last modified Rizwan Lipscomb PHARMD (6/9/2017)   Next INR check 6/30/2017   Target end date Indefinite    Indications   Chronic anticoagulation [Z79.01]  AF (atrial fibrillation) (CMS-HCC) [I48.91]         Anticoagulation Episode Summary     INR check location     Preferred lab     Send INR reminders to     Comments       Anticoagulation Care Providers     Provider Role Specialty Phone number    Grant Woodard M.D. Referring Interventional Cardiology [phone]    [facility] Responsible  [phone]        Anticoagulation Patient Findings   Negatives Missed Doses, Extra Doses, Medication Changes, Antibiotic Use, Diet Changes, Dental/Other Procedures, Hospitalization, Bleeding Gums, Nose Bleeds, Blood in Urine, Blood in Stool, Any Bruising, Other Complaints        Patient is therapeutic today with INR of 2.2.  Pt denies any unusual s/s of bleeding, bruising, clotting or any changes to diet or medications.  Pt is to continue with current warfarin dosing regimen.  Follow up in 2 weeks.    Rizwan Lipscomb, LISHAD

## 2017-06-16 NOTE — MR AVS SNAPSHOT
Saumyaayanna Vann   2017 11:15 AM   Anticoagulation Visit   MRN: [id number]    Department:  [facility]   Dept Phone:  [phone]    Description:  Female : 1930   Provider:  Rizwan Lipscomb, LISHAD           Allergies as of 2017     Allergen Noted Reactions    Pcn [Penicillins] 2014         You were diagnosed with     Chronic anticoagulation   [418604]       Paroxysmal atrial fibrillation (CMS-HCC)   [731939]         Vital Signs     Blood Pressure Pulse Smoking Status             116/87 mmHg 82 Never Smoker          Basic Information     Date Of Birth Sex Race Ethnicity Preferred Language    1930 Female White Non- English      Your appointments     2017  9:00 AM   Nm 60 with [facility] NM CARDIAC PET   [facility] [place] [facility]    [address] [facility] [address] [phone]            2017  1:30 PM   Anti-Coag Routine with [place] PHARMACIST   [facility]    [address] [facility] [address] [phone]            2017 10:30 AM   FOLLOW UP with Grant Woodard M.D.   [facility] (--)    [address]  [facility] [address] [phone]              Problem List              ICD-10-CM Priority Class Noted - Resolved    DM2 (diabetes mellitus, type 2) (CMS-HCC) E11.9   2014 - Present    SOB (shortness of breath) R06.02   2014 - Present    AF (atrial fibrillation) (CMS-HCC) I48.91   2014 - Present    HTN (hypertension) I10   2014 - Present    Dyspnea on exertion R06.09   2016 - Present    Chronic anticoagulation Z79.01   2017 - Present      Health Maintenance        Date Due Completion Dates    DIABETES MONOFILAMENT / LE EXAM 1931 ---    RETINAL SCREENING 1948 ---    IMM DTaP/Tdap/Td Vaccine (1 - Tdap) 1949 ---    PAP SMEAR 1951 ---    COLONOSCOPY  12/22/1980 ---    IMM ZOSTER VACCINE 12/22/1990 ---    IMM PNEUMOCOCCAL 65+ (ADULT) LOW/MEDIUM RISK SERIES (1 of 2 - PCV13) 12/22/1995 ---    A1C SCREENING 11/4/2017 5/4/2017, 5/4/2017, 2/28/2017, 10/25/2016, 8/26/2016, 3/30/2016, 10/17/2015, 4/2/2015, 10/15/2014, 4/10/2014, 11/22/2013, 6/19/2013, 11/30/2012, 8/1/2012, 4/13/2012, 1/24/2012, 10/5/2011, 7/8/2011    URINE ACR / MICROALBUMIN 2/28/2018 2/28/2017, 10/25/2016, 10/17/2015, 10/15/2014, 11/22/2013    BONE DENSITY 3/21/2018 3/21/2013    MAMMOGRAM 4/4/2018 4/4/2017, 3/16/2016, 3/12/2015, 3/10/2014, 3/4/2013, 2/14/2012, 2/7/2011    FASTING LIPID PROFILE 5/4/2018 5/4/2017, 2/28/2017, 10/25/2016, 3/30/2016, 10/17/2015, 10/15/2014, 11/22/2013, 11/30/2012, 10/5/2011    SERUM CREATININE 5/4/2018 5/4/2017, 2/28/2017, 10/25/2016, 10/25/2016, 3/30/2016, 3/30/2016, 10/17/2015, 10/17/2015, 9/1/2015, 5/1/2015, 4/2/2015, 10/15/2014, 4/10/2014, 11/22/2013, 11/30/2012, 9/5/2012, 10/5/2011, 6/8/2011            Results     POCT Protime      Component    INR    2.2    Comment:     [id number] 3/2018 ic valid                        Current Immunizations     No immunizations on file.      Below and/or attached are the medications your provider expects you to take. Review all of your home medications and newly ordered medications with your provider and/or pharmacist. Follow medication instructions as directed by your provider and/or pharmacist. Please keep your medication list with you and share with your provider. Update the information when medications are discontinued, doses are changed, or new medications (including over-the-counter products) are added; and carry medication information at all times in the event of emergency situations     Allergies:  PCN - (reactions not documented)               Medications  Valid as of: June 16, 2017 - 11:25 AM    Generic Name Brand Name Tablet Size Instructions for use    Atorvastatin Calcium (Tab) LIPITOR 20 MG         Clobetasol Propionate  (Ointment) TEMOVATE 0.05 %         Cyclobenzaprine HCl (Tab) FLEXERIL 5 MG Take 1 Tab by mouth 3 times a day as needed for Muscle Spasms.        DilTIAZem HCl Coated Beads (CAPSULE SR 24 HR) CARDIZEM  MG Take 1 Cap by mouth every day.        GlipiZIDE (TABLET SR 24 HR) GLUCOTROL 2.5 MG         Glucose Blood (Strip) ONE TOUCH ULTRA TEST          Latanoprost (Solution) XALATAN 0.005 %         Levothyroxine Sodium (Tab) SYNTHROID 150 MCG Take 150 mcg by mouth every day.        Losartan Potassium (Tab) COZAAR 100 MG Take 100 mg by mouth every day.        Neomycin-Polymyxin-Dexameth (Ointment) MAXITROL 3.5-17080-5.1         Omeprazole   Take  by mouth.        Spironolactone-HCTZ (Tab) ALDACTAZIDE 25-25 MG Take  by mouth. Taking 1/2 tablet in the am        Warfarin Sodium (Tab) COUMADIN 5 MG Take 1 Tab by mouth every day.        .                 Medicines prescribed today were sent to:     [facility] [address] [place] [address]    Phone: [phone] Fax: [phone]    Open 24 Hours?: No      Medication refill instructions:       If your prescription bottle indicates you have medication refills left, it is not necessary to call your provider’s office. Please contact your pharmacy and they will refill your medication.    If your prescription bottle indicates you do not have any refills left, you may request refills at any time through one of the following ways: The online [employer] system (except Urgent Care), by calling your provider’s office, or by asking your pharmacy to contact your provider’s office with a refill request. Medication refills are processed only during regular business hours and may not be available until the next business day. Your provider may request additional information or to have a follow-up visit with you prior to refilling your medication.   *Please Note: Medication refills are assigned a new Rx number when refilled  electronically. Your pharmacy may indicate that no refills were authorized even though a new prescription for the same medication is available at the pharmacy. Please request the medicine by name with the pharmacy before contacting your provider for a refill.        Warfarin Dosing Calendar   June 2017 Details    Sun Mon Tue Wed Thu Fri Sat         1               2               3                 4               5               6               7               8               9               10                 11               12               13               14               15               16   2.2   2.5 mg   See details      17      5 mg           18      2.5 mg         19      2.5 mg         20      5 mg         21      2.5 mg         22      5 mg         23      2.5 mg         24      5 mg           25      2.5 mg         26      2.5 mg         27      5 mg         28      2.5 mg         29      5 mg         30      2.5 mg           Date Details   06/16 This INR check   INR: 2.2   [id number] 3/2018 ic valid       Date of next INR:  6/30/2017         How to take your warfarin dose     To take:  2.5 mg Take 0.5 of a 5 mg tablet.    To take:  5 mg Take 1 of the 5 mg tablets.              MyChart Status: Patient Declined

## 2017-06-19 ENCOUNTER — HOSPITAL ENCOUNTER (OUTPATIENT)
Dept: RADIOLOGY | Facility: MEDICAL CENTER | Age: 82
End: 2017-06-19
Attending: INTERNAL MEDICINE
Payer: MEDICARE

## 2017-06-19 DIAGNOSIS — R06.09 DYSPNEA ON EXERTION: ICD-10-CM

## 2017-06-19 DIAGNOSIS — R06.02 SOB (SHORTNESS OF BREATH): ICD-10-CM

## 2017-06-19 DIAGNOSIS — I48.91 ATRIAL FIBRILLATION, UNSPECIFIED TYPE (HCC): ICD-10-CM

## 2017-06-19 DIAGNOSIS — I10 ESSENTIAL HYPERTENSION: ICD-10-CM

## 2017-06-19 PROCEDURE — 700111 HCHG RX REV CODE 636 W/ 250 OVERRIDE (IP)

## 2017-06-19 PROCEDURE — A9555 RB82 RUBIDIUM: HCPCS

## 2017-06-20 NOTE — PROCEDURES
REFERRING PHYSICIAN:  Grant Woodard MD    AGE:  86    GENDER:  Female   HEIGHT:  62 inches     WEIGHT:  210 pounds      BMI:  38    INDICATIONS:  Paroxysmal atrial fibrillation.    MEDICATIONS:      PROCEDURE:  The patient reviewed and signed the acknowledgement for testing   form.  The patient was in a fasting state and was properly prepared for   testing.  An intravenous line was inserted and a flush of normal saline   followed to insure line patency.    A transmission scan was acquired for attenuation correction using the internal   Germanium sources.  The patient was then administered 30.0 mCi at 9:25 of   Rubidium-82.  Approximately 90 seconds after the infusion, resting imagine   were obtained with ECG-gating.  Following the resting series, the patient   administered 50.0 mg at 9:35 of dipyridamole over four minutes.  The blood   pressure, heart rate and ECG were monitored and recorded.  After the   dipyridamole infusion was completed, another transmission scan for attenuation   correction was obtained.  The patient was then administered 30.1 mCi at 9:40   of Rubidium-82.  Approximately 90 seconds after the infusion, Peak stress   images were obtained with ECG-gating.    CLINICAL RESPONSE:  Resting blood pressure was 110/63 with a heart rate of 90.    Immediately post-dipyridamole infusion the blood pressure was 108/62 with a   heart rate of 102.  After a recovery period the blood pressure was 110/43 with   a heart rate of 90.    The patient experienced headache.    Aminophylline 100 mg injected at 9:50 IV site right hand was administered   following the scan.    Resting ejection fraction 60%.    Stress ejection fraction 69%.    ELECTROCARDIOGRAPHIC FINDINGS:      SCINTOGRAPHIC FINDINGS:  The QC data for the scan was reviewed and was within   acceptable limits.    GATED WALL MOTION FINDINGS:      CONCLUSIONS AND IMPRESSIONS:  Overall, there is no evidence of myocardial   ischemia based on EKG tracing  along with myocardial PET scan images.  The   rhythm is atrial fibrillation throughout the stress test.  No malignant   arrhythmias noted.       ____________________________________     MD BONG Aleman / CRISTI    DD:  06/19/2017 17:40:35  DT:  06/19/2017 19:57:12    D#:  [id number]  Job#:  [id number]

## 2017-06-28 DIAGNOSIS — I48.0 PAF (PAROXYSMAL ATRIAL FIBRILLATION) (HCC): ICD-10-CM

## 2017-06-28 RX ORDER — DILTIAZEM HYDROCHLORIDE 120 MG/1
120 CAPSULE, COATED, EXTENDED RELEASE ORAL 2 TIMES DAILY
Qty: 60 CAP | Refills: 11 | OUTPATIENT
Start: 2017-06-28 | End: 2018-06-18 | Stop reason: SDUPTHER

## 2017-06-30 ENCOUNTER — ANTICOAGULATION VISIT (OUTPATIENT)
Dept: MEDICAL GROUP | Facility: PHYSICIAN GROUP | Age: 82
End: 2017-06-30
Payer: MEDICARE

## 2017-06-30 VITALS — HEART RATE: 66 BPM | SYSTOLIC BLOOD PRESSURE: 103 MMHG | DIASTOLIC BLOOD PRESSURE: 77 MMHG

## 2017-06-30 DIAGNOSIS — I48.0 PAROXYSMAL ATRIAL FIBRILLATION (HCC): ICD-10-CM

## 2017-06-30 DIAGNOSIS — Z79.01 CHRONIC ANTICOAGULATION: ICD-10-CM

## 2017-06-30 LAB — INR PPP: 3.5 (ref 2–3.5)

## 2017-06-30 PROCEDURE — 99999 PR NO CHARGE: CPT | Performed by: FAMILY MEDICINE

## 2017-06-30 PROCEDURE — 85610 PROTHROMBIN TIME: CPT | Performed by: FAMILY MEDICINE

## 2017-06-30 RX ORDER — WARFARIN SODIUM 5 MG/1
TABLET ORAL
Qty: 90 TAB | Refills: 1 | Status: SHIPPED | OUTPATIENT
Start: 2017-06-30 | End: 2018-06-26

## 2017-06-30 NOTE — MR AVS SNAPSHOT
Saumya Jessica Vann   2017 1:30 PM   Anticoagulation Visit   MRN: [id number]    Department:  [facility]   Dept Phone:  [phone]    Description:  Female : 1930   Provider:  Rizwan Lipscomb PHARMD           Allergies as of 2017     Allergen Noted Reactions    Pcn [Penicillins] 2014         You were diagnosed with     Chronic anticoagulation   [505025]       Paroxysmal atrial fibrillation (CMS-HCC)   [081985]         Vital Signs     Blood Pressure Pulse Smoking Status             103/77 mmHg 66 Never Smoker          Basic Information     Date Of Birth Sex Race Ethnicity Preferred Language    1930 Female White Non- English      Your appointments     2017  1:30 PM   Anti-Coag Routine with Rizwan Lipscomb PHARMD   [facility]    [address] [facility] [address]   [phone]            2017 11:30 AM   Anti-Coag Routine with Coila PHARMACIST   [facility]    [address] [facility] [address]   [phone]            2017 10:30 AM   FOLLOW UP with Grant Woodard M.D.   [facility] (--)    [address] , [facility] [address]  [facility] [address]   [phone]              Problem List              ICD-10-CM Priority Class Noted - Resolved    DM2 (diabetes mellitus, type 2) (CMS-HCC) E11.9   2014 - Present    SOB (shortness of breath) R06.02   2014 - Present    AF (atrial fibrillation) (CMS-HCC) I48.91   2014 - Present    HTN (hypertension) I10   2014 - Present    Dyspnea on exertion R06.09   2016 - Present    Chronic anticoagulation Z79.01   2017 - Present      Health Maintenance        Date Due Completion Dates    DIABETES MONOFILAMENT / LE EXAM 1931 ---    RETINAL SCREENING 1948 ---    IMM DTaP/Tdap/Td Vaccine (1 - Tdap) 1949 ---    PAP SMEAR 1951 ---    COLONOSCOPY  12/22/1980 ---    IMM ZOSTER VACCINE 12/22/1990 ---    IMM PNEUMOCOCCAL 65+ (ADULT) LOW/MEDIUM RISK SERIES (1 of 2 - PCV13) 12/22/1995 ---    A1C SCREENING 11/4/2017 5/4/2017, 5/4/2017, 2/28/2017, 10/25/2016, 8/26/2016, 3/30/2016, 10/17/2015, 4/2/2015, 10/15/2014, 4/10/2014, 11/22/2013, 6/19/2013, 11/30/2012, 8/1/2012, 4/13/2012, 1/24/2012, 10/5/2011, 7/8/2011    BONE DENSITY 3/21/2018 3/21/2013    MAMMOGRAM 4/4/2018 4/4/2017, 3/16/2016, 3/12/2015, 3/10/2014, 3/4/2013, 2/14/2012, 2/7/2011    FASTING LIPID PROFILE 5/4/2018 5/4/2017, 2/28/2017, 10/25/2016, 3/30/2016, 10/17/2015, 10/15/2014, 11/22/2013, 11/30/2012, 10/5/2011    URINE ACR / MICROALBUMIN 5/4/2018 5/4/2017, 2/28/2017, 10/25/2016, 3/30/2016, 10/17/2015, 10/17/2015, 9/1/2015, 10/15/2014, 11/22/2013, 11/16/2011, 6/8/2011    SERUM CREATININE 5/4/2018 5/4/2017, 2/28/2017, 10/25/2016, 10/25/2016, 3/30/2016, 3/30/2016, 10/17/2015, 10/17/2015, 9/1/2015, 5/1/2015, 4/2/2015, 10/15/2014, 4/10/2014, 11/22/2013, 11/30/2012, 9/5/2012, 10/5/2011, 6/8/2011            Results     POCT Protime      Component    INR    3.5    Comment:     [id number] 3/2018 ic valid                        Current Immunizations     No immunizations on file.      Below and/or attached are the medications your provider expects you to take. Review all of your home medications and newly ordered medications with your provider and/or pharmacist. Follow medication instructions as directed by your provider and/or pharmacist. Please keep your medication list with you and share with your provider. Update the information when medications are discontinued, doses are changed, or new medications (including over-the-counter products) are added; and carry medication information at all times in the event of emergency situations     Allergies:  PCN - (reactions not documented)               Medications  Valid as of: June 30, 2017 -  1:27 PM    Generic Name Brand Name Tablet Size Instructions for use     Atorvastatin Calcium (Tab) LIPITOR 20 MG         Clobetasol Propionate (Ointment) TEMOVATE 0.05 %         Cyclobenzaprine HCl (Tab) FLEXERIL 5 MG Take 1 Tab by mouth 3 times a day as needed for Muscle Spasms.        DilTIAZem HCl Coated Beads (CAPSULE SR 24 HR) CARDIZEM  MG Take 1 Cap by mouth 2 Times a Day.        GlipiZIDE (TABLET SR 24 HR) GLUCOTROL 2.5 MG         Glucose Blood (Strip) ONE TOUCH ULTRA TEST          Latanoprost (Solution) XALATAN 0.005 %         Levothyroxine Sodium (Tab) SYNTHROID 150 MCG Take 150 mcg by mouth every day.        Losartan Potassium (Tab) COZAAR 100 MG Take 100 mg by mouth every day.        Neomycin-Polymyxin-Dexameth (Ointment) MAXITROL 3.5-03521-7.1         Omeprazole   Take  by mouth.        Spironolactone-HCTZ (Tab) ALDACTAZIDE 25-25 MG Take  by mouth. Taking 1/2 tablet in the am        Warfarin Sodium (Tab) COUMADIN 5 MG Take 1 Tab by mouth every day.        .                 Medicines prescribed today were sent to:     [facility] [address] [place] [address]    Phone: [phone] Fax: [phone]    Open 24 Hours?: No      Medication refill instructions:       If your prescription bottle indicates you have medication refills left, it is not necessary to call your provider’s office. Please contact your pharmacy and they will refill your medication.    If your prescription bottle indicates you do not have any refills left, you may request refills at any time through one of the following ways: The online [employer] system (except Urgent Care), by calling your provider’s office, or by asking your pharmacy to contact your provider’s office with a refill request. Medication refills are processed only during regular business hours and may not be available until the next business day. Your provider may request additional information or to have a follow-up visit with you prior to refilling your medication.   *Please  Note: Medication refills are assigned a new Rx number when refilled electronically. Your pharmacy may indicate that no refills were authorized even though a new prescription for the same medication is available at the pharmacy. Please request the medicine by name with the pharmacy before contacting your provider for a refill.        Warfarin Dosing Calendar   June 2017 Details    Sun Mon Tue Wed Thu Fri Sat         1               2               3                 4               5               6               7               8               9               10                 11               12               13               14               15               16               17                 18               19               20               21               22               23               24                 25               26               27               28               29               30   3.5   Hold   See details        Date Details   06/30 This INR check   INR: 3.5   [id number] 3/2018 ic valid               How to take your warfarin dose     Hold Do not take your warfarin dose. See the Details table to the right for additional instructions.                Warfarin Dosing Calendar   July 2017 Details    Sun Mon Tue Wed Thu Fri Sat           1      5 mg           2      2.5 mg         3      2.5 mg         4      5 mg         5      2.5 mg         6      5 mg         7      2.5 mg         8                 9               10               11               12               13               14               15                 16               17               18               19               20               21               22                 23               24               25               26               27               28               29                 30               31                     Date Details   No additional details    Date of next INR:  7/7/2017         How to take your warfarin dose      To take:  2.5 mg Take 0.5 of a 5 mg tablet.    To take:  5 mg Take 1 of the 5 mg tablets.              MyChart Status: Patient Declined

## 2017-06-30 NOTE — PROGRESS NOTES
Anticoagulation Summary as of 6/30/2017     INR goal 2.0-3.0   Selected INR 3.5! (6/30/2017)   Maintenance plan 5 mg (5 mg x 1) on Tue, Thu, Sat; 2.5 mg (5 mg x 0.5) all other days   Weekly total 25 mg   Plan last modified Rizwan Lipscomb PHARMD (6/9/2017)   Next INR check 7/7/2017   Target end date Indefinite    Indications   Chronic anticoagulation [Z79.01]  AF (atrial fibrillation) (CMS-HCC) [I48.91]         Anticoagulation Episode Summary     INR check location     Preferred lab     Send INR reminders to     Comments       Anticoagulation Care Providers     Provider Role Specialty Phone number    Grant Woodard M.D. Referring Interventional Cardiology [phone]    [facility] Anticoagulation Services Responsible  [phone]        Anticoagulation Patient Findings   Negatives Missed Doses, Extra Doses, Medication Changes, Antibiotic Use, Diet Changes, Dental/Other Procedures, Hospitalization, Bleeding Gums, Nose Bleeds, Blood in Urine, Blood in Stool, Any Bruising, Other Complaints        Patient is supratherapeutic today with INR of 3.5.  Pt denies any unusual s/s of bleeding, bruising, clotting or any changes to diet or medications.  Instructed patient to HOLD X 1, then continue with current warfarin dosing regimen.  Follow up in 1 weeks.    Rizwan Lipscomb PHARMD

## 2017-07-07 ENCOUNTER — ANTICOAGULATION VISIT (OUTPATIENT)
Dept: MEDICAL GROUP | Facility: PHYSICIAN GROUP | Age: 82
End: 2017-07-07
Payer: MEDICARE

## 2017-07-07 VITALS — DIASTOLIC BLOOD PRESSURE: 77 MMHG | HEART RATE: 85 BPM | SYSTOLIC BLOOD PRESSURE: 118 MMHG

## 2017-07-07 DIAGNOSIS — Z79.01 CHRONIC ANTICOAGULATION: ICD-10-CM

## 2017-07-07 DIAGNOSIS — I48.0 PAROXYSMAL ATRIAL FIBRILLATION (HCC): ICD-10-CM

## 2017-07-07 LAB — INR PPP: 2.7 (ref 2–3.5)

## 2017-07-07 PROCEDURE — 85610 PROTHROMBIN TIME: CPT | Performed by: FAMILY MEDICINE

## 2017-07-07 PROCEDURE — 99999 PR NO CHARGE: CPT | Performed by: FAMILY MEDICINE

## 2017-07-07 NOTE — PROGRESS NOTES
Anticoagulation Summary as of 7/7/2017     INR goal 2.0-3.0   Selected INR 2.7 (7/7/2017)   Maintenance plan 5 mg (5 mg x 1) on Tue, Thu; 2.5 mg (5 mg x 0.5) all other days   Weekly total 22.5 mg   Plan last modified Rizwan Lipscomb PHARMD (7/7/2017)   Next INR check 7/21/2017   Target end date Indefinite    Indications   Chronic anticoagulation [Z79.01]  AF (atrial fibrillation) (CMS-HCC) [I48.91]         Anticoagulation Episode Summary     INR check location     Preferred lab     Send INR reminders to     Comments       Anticoagulation Care Providers     Provider Role Specialty Phone number    Grant Woodard M.D. Referring Interventional Cardiology [phone]    [facility]ticoagulation [facility] Responsible  [phone]        Anticoagulation Patient Findings   Negatives Missed Doses, Extra Doses, Medication Changes, Antibiotic Use, Diet Changes, Dental/Other Procedures, Hospitalization, Bleeding Gums, Nose Bleeds, Blood in Urine, Blood in Stool, Any Bruising, Other Complaints        Patient is therapeutic today with INR of 2.7.  Pt denies any unusual s/s of bleeding, bruising, clotting or any changes to diet or medications.  INR rebounded to high end of range despite held dose last week, instructed patient to decrease weekly warfarin regimen slightly to reflect.  Follow up in 2 weeks.    LISHA RomanoD

## 2017-07-07 NOTE — MR AVS SNAPSHOT
Saumya Vann   2017 11:30 AM   Anticoagulation Visit   MRN: [id number]    Department:  [facility]   Dept Phone:  [phone]    Description:  Female : 1930   Provider:  Rizwan Lipscomb, LISHAD           Allergies as of 2017     Allergen Noted Reactions    Pcn [Penicillins] 2014         You were diagnosed with     Chronic anticoagulation   [651120]       Paroxysmal atrial fibrillation (CMS-[facility])   [918044]         Vital Signs     Blood Pressure Pulse Smoking Status             118/77 mmHg 85 Never Smoker          Basic Information     Date Of Birth Sex Race Ethnicity Preferred Language    1930 Female White Non- English      Your appointments     2017 11:30 AM   Anti-Coag Routine with Lawrence PHARMACIST   [facility]    [address] [place] [address]   [phone]            2017 10:30 AM   FOLLOW UP with Grant Woodard M.D.   [facility] (--)    [address]  [facility] [address] [phone]              Problem List              ICD-10-CM Priority Class Noted - Resolved    DM2 (diabetes mellitus, type 2) (CMS-[facility]) E11.9   2014 - Present    SOB (shortness of breath) R06.02   2014 - Present    AF (atrial fibrillation) (CMS-HCC) I48.91   2014 - Present    HTN (hypertension) I10   2014 - Present    Dyspnea on exertion R06.09   2016 - Present    Chronic anticoagulation Z79.01   2017 - Present      Health Maintenance        Date Due Completion Dates    DIABETES MONOFILAMENT / LE EXAM 1931 ---    RETINAL SCREENING 1948 ---    IMM DTaP/Tdap/Td Vaccine (1 - Tdap) 1949 ---    PAP SMEAR 1951 ---    COLONOSCOPY 1980 ---    IMM ZOSTER VACCINE 1990 ---    IMM PNEUMOCOCCAL 65+ (ADULT) LOW/MEDIUM RISK SERIES (1 of 2 - PCV13) 1995 ---    IMM INFLUENZA (1) 2017 ---    A1C SCREENING  11/4/2017 5/4/2017, 5/4/2017, 2/28/2017, 10/25/2016, 8/26/2016, 3/30/2016, 10/17/2015, 4/2/2015, 10/15/2014, 4/10/2014, 11/22/2013, 6/19/2013, 11/30/2012, 8/1/2012, 4/13/2012, 1/24/2012, 10/5/2011, 7/8/2011    BONE DENSITY 3/21/2018 3/21/2013    MAMMOGRAM 4/4/2018 4/4/2017, 3/16/2016, 3/12/2015, 3/10/2014, 3/4/2013, 2/14/2012, 2/7/2011    FASTING LIPID PROFILE 5/4/2018 5/4/2017, 2/28/2017, 10/25/2016, 3/30/2016, 10/17/2015, 10/15/2014, 11/22/2013, 11/30/2012, 10/5/2011    URINE ACR / MICROALBUMIN 5/4/2018 5/4/2017, 2/28/2017, 10/25/2016, 3/30/2016, 10/17/2015, 10/17/2015, 9/1/2015, 10/15/2014, 11/22/2013, 11/16/2011, 6/8/2011    SERUM CREATININE 5/4/2018 5/4/2017, 2/28/2017, 10/25/2016, 10/25/2016, 3/30/2016, 3/30/2016, 10/17/2015, 10/17/2015, 9/1/2015, 5/1/2015, 4/2/2015, 10/15/2014, 4/10/2014, 11/22/2013, 11/30/2012, 9/5/2012, 10/5/2011, 6/8/2011            Results     POCT Protime      Component    INR    2.7    Comment:     [id number] 5/2018 ic valid                        Current Immunizations     No immunizations on file.      Below and/or attached are the medications your provider expects you to take. Review all of your home medications and newly ordered medications with your provider and/or pharmacist. Follow medication instructions as directed by your provider and/or pharmacist. Please keep your medication list with you and share with your provider. Update the information when medications are discontinued, doses are changed, or new medications (including over-the-counter products) are added; and carry medication information at all times in the event of emergency situations     Allergies:  PCN - (reactions not documented)               Medications  Valid as of: July 07, 2017 - 11:31 AM    Generic Name Brand Name Tablet Size Instructions for use    Atorvastatin Calcium (Tab) LIPITOR 20 MG         Clobetasol Propionate (Ointment) TEMOVATE 0.05 %         Cyclobenzaprine HCl (Tab) FLEXERIL 5 MG Take 1 Tab by mouth  3 times a day as needed for Muscle Spasms.        DilTIAZem HCl Coated Beads (CAPSULE SR 24 HR) CARDIZEM  MG Take 1 Cap by mouth 2 Times a Day.        GlipiZIDE (TABLET SR 24 HR) GLUCOTROL 2.5 MG         Glucose Blood (Strip) ONE TOUCH ULTRA TEST          Latanoprost (Solution) XALATAN 0.005 %         Levothyroxine Sodium (Tab) SYNTHROID 150 MCG Take 150 mcg by mouth every day.        Losartan Potassium (Tab) COZAAR 100 MG Take 100 mg by mouth every day.        Neomycin-Polymyxin-Dexameth (Ointment) MAXITROL 3.5-83495-9.1         Omeprazole   Take  by mouth.        Spironolactone-HCTZ (Tab) ALDACTAZIDE 25-25 MG Take  by mouth. Taking 1/2 tablet in the am        Warfarin Sodium (Tab) COUMADIN 5 MG Take one-half to one tablet by mouth one time daily or as directed by coumadin clinic        .                 Medicines prescribed today were sent to:     [facility] [address] [place] [address]    Phone: [phone] Fax: [phone]    Open 24 Hours?: No      Medication refill instructions:       If your prescription bottle indicates you have medication refills left, it is not necessary to call your provider’s office. Please contact your pharmacy and they will refill your medication.    If your prescription bottle indicates you do not have any refills left, you may request refills at any time through one of the following ways: The online [employer] system (except Urgent Care), by calling your provider’s office, or by asking your pharmacy to contact your provider’s office with a refill request. Medication refills are processed only during regular business hours and may not be available until the next business day. Your provider may request additional information or to have a follow-up visit with you prior to refilling your medication.   *Please Note: Medication refills are assigned a new Rx number when refilled electronically. Your pharmacy may indicate  that no refills were authorized even though a new prescription for the same medication is available at the pharmacy. Please request the medicine by name with the pharmacy before contacting your provider for a refill.        Warfarin Dosing Calendar   July 2017 Details    Sun Mon Tue Wed Thu Fri Sat           1                 2               3               4               5               6               7   2.7   2.5 mg   See details      8      2.5 mg           9      2.5 mg         10      2.5 mg         11      5 mg         12      2.5 mg         13      5 mg         14      2.5 mg         15      2.5 mg           16      2.5 mg         17      2.5 mg         18      5 mg         19      2.5 mg         20      5 mg         21      2.5 mg         22                 23               24               25               26               27               28               29                 30               31                     Date Details   07/07 This INR check   INR: 2.7   [id number] 5/2018 ic valid       Date of next INR:  7/21/2017         How to take your warfarin dose     To take:  2.5 mg Take 0.5 of a 5 mg tablet.    To take:  5 mg Take 1 of the 5 mg tablets.              MyChart Status: Patient Declined

## 2017-07-08 NOTE — TELEPHONE ENCOUNTER
Dr. Woodard reviews and advises Normal PET scan.  Should f/u in clinic to discuss cardioversion or schedule with EP if not available.   Appt made with Dr. Woodard on 7/26/17.  Patient is thankful for the call and help. mandy

## 2017-07-21 ENCOUNTER — ANTICOAGULATION VISIT (OUTPATIENT)
Dept: MEDICAL GROUP | Facility: PHYSICIAN GROUP | Age: 82
End: 2017-07-21
Payer: MEDICARE

## 2017-07-21 VITALS — HEART RATE: 85 BPM | DIASTOLIC BLOOD PRESSURE: 88 MMHG | SYSTOLIC BLOOD PRESSURE: 118 MMHG

## 2017-07-21 DIAGNOSIS — I48.0 PAROXYSMAL ATRIAL FIBRILLATION (HCC): ICD-10-CM

## 2017-07-21 DIAGNOSIS — Z79.01 CHRONIC ANTICOAGULATION: ICD-10-CM

## 2017-07-21 LAB — INR PPP: 2.7 (ref 2–3.5)

## 2017-07-21 PROCEDURE — 85610 PROTHROMBIN TIME: CPT | Performed by: FAMILY MEDICINE

## 2017-07-21 NOTE — PROGRESS NOTES
Anticoagulation Summary as of 7/21/2017     INR goal 2.0-3.0   Selected INR 2.7 (7/21/2017)   Maintenance plan 5 mg (5 mg x 1) on Tue; 2.5 mg (5 mg x 0.5) all other days   Weekly total 20 mg   Plan last modified Rizwan Lipscomb PHARMD (7/21/2017)   Next INR check 8/11/2017   Target end date Indefinite    Indications   Chronic anticoagulation [Z79.01]  AF (atrial fibrillation) (CMS-[facility]) [I48.91]         Anticoagulation Episode Summary     INR check location     Preferred lab     Send INR reminders to     Comments       Anticoagulation Care Providers     Provider Role Specialty Phone number    Grant Woodard M.D. Referring Interventional Cardiology [phone]    [facility] Anticoagulation Services Responsible  [phone]        Anticoagulation Patient Findings   Negatives Missed Doses, Extra Doses, Medication Changes, Antibiotic Use, Diet Changes, Dental/Other Procedures, Hospitalization, Bleeding Gums, Nose Bleeds, Blood in Urine, Blood in Stool, Any Bruising, Other Complaints        Patient is therapeutic today with INR of 2.7.  Pt denies any unusual s/s of bleeding, bruising, clotting or any changes to diet or medications.  Pt is to continue with current warfarin dosing regimen.  Follow up in 2 weeks.    Rizwan Lipscomb, GIUSEPPE

## 2017-07-21 NOTE — MR AVS SNAPSHOT
Saumya Vann   2017 11:30 AM   Anticoagulation Visit   MRN: [id number]    Department:  [facility]   Dept Phone:  [phone]    Description:  Female : 1930   Provider:  Rizwan Lipscomb PHARMD           Allergies as of 2017     Allergen Noted Reactions    Pcn [Penicillins] 2014         You were diagnosed with     Chronic anticoagulation   [339760]       Paroxysmal atrial fibrillation (CMS-[facility])   [728741]         Vital Signs     Blood Pressure Pulse Smoking Status             118/88 mmHg 85 Never Smoker          Basic Information     Date Of Birth Sex Race Ethnicity Preferred Language    1930 Female White Non- English      Your appointments     2017 11:30 AM   Anti-Coag Routine with Rizwan Lipscomb PHARMD   [address] [address]   [phone]            2017 10:00 AM   FOLLOW UP with Grant Woodard M.D.   [facility] (--)    [address], [address]  [place] [address] [phone]            Aug 11, 2017 11:00 AM   Anti-Coag Routine with [place] PHARMACIST   [address] [address]   [phone]            2017 10:30 AM   FOLLOW UP with Grant Woodard M.D.   [facility] (--)    [address], [address]  [place] [address]   [phone]              Problem List              ICD-10-CM Priority Class Noted - Resolved    DM2 (diabetes mellitus, type 2) (CMS-[facility]) E11.9   2014 - Present    SOB (shortness of breath) R06.02   2014 - Present    AF (atrial fibrillation) (CMS-HCC) I48.91   2014 - Present    HTN (hypertension) I10   2014 - Present    Dyspnea on exertion R06.09   2016 - Present    Chronic anticoagulation Z79.01   2017 - Present      Health Maintenance        Date Due  Completion Dates    DIABETES MONOFILAMENT / LE EXAM 6/22/1931 ---    RETINAL SCREENING 12/22/1948 ---    IMM DTaP/Tdap/Td Vaccine (1 - Tdap) 12/22/1949 ---    PAP SMEAR 12/22/1951 ---    COLONOSCOPY 12/22/1980 ---    IMM ZOSTER VACCINE 12/22/1990 ---    IMM PNEUMOCOCCAL 65+ (ADULT) LOW/MEDIUM RISK SERIES (1 of 2 - PCV13) 12/22/1995 ---    IMM INFLUENZA (1) 9/1/2017 ---    A1C SCREENING 11/4/2017 5/4/2017, 5/4/2017, 2/28/2017, 10/25/2016, 8/26/2016, 3/30/2016, 10/17/2015, 4/2/2015, 10/15/2014, 4/10/2014, 11/22/2013, 6/19/2013, 11/30/2012, 8/1/2012, 4/13/2012, 1/24/2012, 10/5/2011, 7/8/2011    BONE DENSITY 3/21/2018 3/21/2013    MAMMOGRAM 4/4/2018 4/4/2017, 3/16/2016, 3/12/2015, 3/10/2014, 3/4/2013, 2/14/2012, 2/7/2011    FASTING LIPID PROFILE 5/4/2018 5/4/2017, 2/28/2017, 10/25/2016, 3/30/2016, 10/17/2015, 10/15/2014, 11/22/2013, 11/30/2012, 10/5/2011    URINE ACR / MICROALBUMIN 5/4/2018 5/4/2017, 2/28/2017, 10/25/2016, 3/30/2016, 10/17/2015, 10/17/2015, 9/1/2015, 10/15/2014, 11/22/2013, 11/16/2011, 6/8/2011    SERUM CREATININE 5/4/2018 5/4/2017, 2/28/2017, 10/25/2016, 10/25/2016, 3/30/2016, 3/30/2016, 10/17/2015, 10/17/2015, 9/1/2015, 5/1/2015, 4/2/2015, 10/15/2014, 4/10/2014, 11/22/2013, 11/30/2012, 9/5/2012, 10/5/2011, 6/8/2011            Results     POCT Protime      Component    INR    2.7    Comment:     [id number] 5/2018 ic valid                        Current Immunizations     No immunizations on file.      Below and/or attached are the medications your provider expects you to take. Review all of your home medications and newly ordered medications with your provider and/or pharmacist. Follow medication instructions as directed by your provider and/or pharmacist. Please keep your medication list with you and share with your provider. Update the information when medications are discontinued, doses are changed, or new medications (including over-the-counter products) are added; and carry medication information at  all times in the event of emergency situations     Allergies:  PCN - (reactions not documented)               Medications  Valid as of: July 21, 2017 - 11:07 AM    Generic Name Brand Name Tablet Size Instructions for use    Atorvastatin Calcium (Tab) LIPITOR 20 MG         Clobetasol Propionate (Ointment) TEMOVATE 0.05 %         Cyclobenzaprine HCl (Tab) FLEXERIL 5 MG Take 1 Tab by mouth 3 times a day as needed for Muscle Spasms.        DilTIAZem HCl Coated Beads (CAPSULE SR 24 HR) CARDIZEM  MG Take 1 Cap by mouth 2 Times a Day.        GlipiZIDE (TABLET SR 24 HR) GLUCOTROL 2.5 MG         Glucose Blood (Strip) ONE TOUCH ULTRA TEST          Latanoprost (Solution) XALATAN 0.005 %         Levothyroxine Sodium (Tab) SYNTHROID 150 MCG Take 150 mcg by mouth every day.        Losartan Potassium (Tab) COZAAR 100 MG Take 100 mg by mouth every day.        Neomycin-Polymyxin-Dexameth (Ointment) MAXITROL 3.5-45239-9.1         Omeprazole   Take  by mouth.        Spironolactone-HCTZ (Tab) ALDACTAZIDE 25-25 MG Take  by mouth. Taking 1/2 tablet in the am        Warfarin Sodium (Tab) COUMADIN 5 MG Take one-half to one tablet by mouth one time daily or as directed by coumadin clinic        .                 Medicines prescribed today were sent to:     [facility] [address] [place] [address]    Phone: [phone] Fax: [phone]    Open 24 Hours?: No      Medication refill instructions:       If your prescription bottle indicates you have medication refills left, it is not necessary to call your provider’s office. Please contact your pharmacy and they will refill your medication.    If your prescription bottle indicates you do not have any refills left, you may request refills at any time through one of the following ways: The online [employer] system (except Urgent Care), by calling your provider’s office, or by asking your pharmacy to contact your provider’s office  with a refill request. Medication refills are processed only during regular business hours and may not be available until the next business day. Your provider may request additional information or to have a follow-up visit with you prior to refilling your medication.   *Please Note: Medication refills are assigned a new Rx number when refilled electronically. Your pharmacy may indicate that no refills were authorized even though a new prescription for the same medication is available at the pharmacy. Please request the medicine by name with the pharmacy before contacting your provider for a refill.        Warfarin Dosing Calendar   July 2017 Details    Sun Mon Tue Wed Thu Fri Sat           1                 2               3               4               5               6               7               8                 9               10               11               12               13               14               15                 16               17               18               19               20               21   2.7   2.5 mg   See details      22      2.5 mg           23      2.5 mg         24      2.5 mg         25      5 mg         26      2.5 mg         27      2.5 mg         28      2.5 mg         29      2.5 mg           30      2.5 mg         31      2.5 mg               Date Details   07/21 This INR check   INR: 2.7   [id number] 5/2018 ic valid               How to take your warfarin dose     To take:  2.5 mg Take 0.5 of a 5 mg tablet.    To take:  5 mg Take 1 of the 5 mg tablets.           Warfarin Dosing Calendar   August 2017 Details    Sun Mon Tue Wed Thu Fri Sat       1      5 mg         2      2.5 mg         3      2.5 mg         4      2.5 mg         5      2.5 mg           6      2.5 mg         7      2.5 mg         8      5 mg         9      2.5 mg         10      2.5 mg         11      2.5 mg         12                 13               14               15               16               17                18               19                 20               21               22               23               24               25               26                 27               28               29               30               31                  Date Details   No additional details    Date of next INR:  8/11/2017         How to take your warfarin dose     To take:  2.5 mg Take 0.5 of a 5 mg tablet.    To take:  5 mg Take 1 of the 5 mg tablets.              MyChart Status: Patient Declined

## 2017-07-26 ENCOUNTER — OFFICE VISIT (OUTPATIENT)
Dept: CARDIOLOGY | Facility: MEDICAL CENTER | Age: 82
End: 2017-07-26
Payer: MEDICARE

## 2017-07-26 VITALS
HEART RATE: 86 BPM | BODY MASS INDEX: 37.73 KG/M2 | HEIGHT: 62 IN | WEIGHT: 205 LBS | DIASTOLIC BLOOD PRESSURE: 70 MMHG | SYSTOLIC BLOOD PRESSURE: 120 MMHG

## 2017-07-26 DIAGNOSIS — Z79.01 CHRONIC ANTICOAGULATION: ICD-10-CM

## 2017-07-26 DIAGNOSIS — I48.0 PAROXYSMAL ATRIAL FIBRILLATION (HCC): ICD-10-CM

## 2017-07-26 DIAGNOSIS — I10 ESSENTIAL HYPERTENSION: ICD-10-CM

## 2017-07-26 DIAGNOSIS — R06.02 SOB (SHORTNESS OF BREATH): ICD-10-CM

## 2017-07-26 DIAGNOSIS — E11.9 TYPE 2 DIABETES MELLITUS WITHOUT COMPLICATION, UNSPECIFIED LONG TERM INSULIN USE STATUS: ICD-10-CM

## 2017-07-26 PROCEDURE — 99215 OFFICE O/P EST HI 40 MIN: CPT | Performed by: INTERNAL MEDICINE

## 2017-07-26 NOTE — PROGRESS NOTES
Subjective:   Saumya Vnan is a 89 y.o. female who presents today for followup of exertional dyspnea and PAF. She has had exertional dyspnea over the past 3 years associated with fatigue. She has a history of HTN, diabetes mellitus, CKD . Her echo is relatively unremarkable aside from LVH and grade 1 diastolic function. PFTs per her primary doctor are unremarkable. She denies any exertional chest pain, PND or orthopnea.     She had continued symptomatic PAF with poorly controlled rates. She underwent PET stress test which was normal. We increased her diltiazem to twice daily which has dramatically improved her symptomology. She feels much improved and is still having paroxysms of A. fib but they are not as bothersome. We discussed suppressive therapy with a rhythm control strategy and she would like to hold off on this. She started Coumadin and is tolerating this well.    Past medical history:  HTN  HLP  Type II diabetes mellitus  Chronic kidney  LVH  Hypothyroidism on replacement  PAF    Family History   Problem Relation Age of Onset   • Cancer Other      History   Smoking status   • Never Smoker    Smokeless tobacco   • Not on file     Allergies   Allergen Reactions   • Pcn [Penicillins]      Outpatient Encounter Prescriptions as of 7/26/2017   Medication Sig Dispense Refill   • warfarin (COUMADIN) 5 MG Tab Take one-half to one tablet by mouth one time daily or as directed by coumadin clinic 90 Tab 1   • diltiazem CD (CARDIZEM CD) 120 MG CAPSULE SR 24 HR Take 1 Cap by mouth 2 Times a Day. 60 Cap 11   • OMEPRAZOLE PO Take  by mouth.     • cyclobenzaprine (FLEXERIL) 5 MG tablet Take 1 Tab by mouth 3 times a day as needed for Muscle Spasms. 30 Tab 0   • atorvastatin (LIPITOR) 20 MG Tab      • clobetasol (TEMOVATE) 0.05 % Ointment      • ONE TOUCH ULTRA TEST strip      • glipiZIDE SR (GLUCOTROL) 2.5 MG TABLET SR 24 HR      • latanoprost (XALATAN) 0.005 % Solution      • neomycin-polymixin-dexamethasone  "(MAXITROL) 3.5-69897-1.1 Ointment ophthalmic ointment      • spironolactone/hctz (ALDACTAZIDE) 25-25 MG TABS Take  by mouth. Taking 1/2 tablet in the am     • levothyroxine (SYNTHROID) 150 MCG TABS Take 150 mcg by mouth every day.     • losartan (COZAAR) 100 MG TABS Take 100 mg by mouth every day.       No facility-administered encounter medications on file as of 7/26/2017.     Review of Systems   All other systems reviewed and are negative.       Objective:   /70 mmHg  Pulse 86  Ht 1.575 m (5' 2\")  Wt 92.987 kg (205 lb)  BMI 37.49 kg/m2    Physical Exam   Constitutional: She is oriented to person, place, and time. She appears well-developed and well-nourished. No distress.   HENT:   Head: Normocephalic and atraumatic.   Mouth/Throat: Oropharynx is clear and moist. No oropharyngeal exudate.   Eyes: Conjunctivae are normal. Pupils are equal, round, and reactive to light. No scleral icterus.   Neck: Normal range of motion. Neck supple. No JVD present. No thyromegaly present.   Cardiovascular: Normal rate, normal heart sounds and intact distal pulses.  An irregularly irregular rhythm present. Frequent extrasystoles are present. PMI is not displaced.  Exam reveals no gallop and no friction rub.    No murmur heard.  Pulses:       Carotid pulses are 2+ on the right side, and 2+ on the left side.       Femoral pulses are 2+ on the right side, and 2+ on the left side.       Popliteal pulses are 1+ on the right side, and 1+ on the left side.        Dorsalis pedis pulses are 2+ on the right side, and 2+ on the left side.        Posterior tibial pulses are 2+ on the right side, and 2+ on the left side.   Pulmonary/Chest: Effort normal and breath sounds normal. She has no wheezes. She has no rales.   Abdominal: Soft. Bowel sounds are normal. She exhibits no distension. There is no tenderness.   Musculoskeletal: She exhibits no edema or tenderness.   Neurological: She is alert and oriented to person, place, and time. " No cranial nerve deficit.   Skin: Skin is warm and dry. No rash noted. She is not diaphoretic. No erythema.   Psychiatric: She has a normal mood and affect. Her behavior is normal.     LABS:  Lab Results   Component Value Date/Time    CHOLESTEROL, 05/04/2017 10:08 AM    LDL 84 05/04/2017 10:08 AM    HDL 42 05/04/2017 10:08 AM    TRIGLYCERIDES 91 05/04/2017 10:08 AM       Lab Results   Component Value Date/Time    WBC 8.9 05/04/2017 10:08 AM    RBC 4.58 05/04/2017 10:08 AM    HEMOGLOBIN 13.1 05/04/2017 10:08 AM    HEMATOCRIT 40.7 05/04/2017 10:08 AM    MCV 88.9 05/04/2017 10:08 AM    NEUTROPHILS-POLYS 76.60* 05/04/2017 10:08 AM    LYMPHOCYTES 13.30* 05/04/2017 10:08 AM    MONOCYTES 6.80 05/04/2017 10:08 AM    EOSINOPHILS 1.80 05/04/2017 10:08 AM    BASOPHILS 0.70 05/04/2017 10:08 AM    HYPOCHROMIA 1+ 04/10/2014 03:03 PM     Lab Results   Component Value Date/Time    SODIUM 135 05/04/2017 10:08 AM    POTASSIUM 4.0 05/04/2017 10:08 AM    CHLORIDE 99 05/04/2017 10:08 AM    CO2 27 05/04/2017 10:08 AM    GLUCOSE 101* 05/04/2017 10:08 AM    BUN 14 05/04/2017 10:08 AM    CREATININE 0.96 05/04/2017 10:08 AM     Lab Results   Component Value Date    HBA1C 5.9* 05/04/2017      Lab Results   Component Value Date/Time    ALKALINE PHOSPHATASE 95 05/04/2017 10:08 AM    AST(SGOT) 12 05/04/2017 10:08 AM    ALT(SGPT) 7 05/04/2017 10:08 AM    TOTAL BILIRUBIN 0.7 05/04/2017 10:08 AM      No results found for: BNPBTYPENAT   No results found for: TSH  Lab Results   Component Value Date/Time    INR 2.7 07/21/2017 11:01 AM        STRESS (6/2017):  CONCLUSIONS AND IMPRESSIONS:  Overall, there is no evidence of myocardial    ischemia based on EKG tracing along with myocardial PET scan images.  The    rhythm is atrial fibrillation throughout the stress test.  No malignant    arrhythmias noted.    ECHO CONCLUSIONS (4/27/2016):  Normal left ventricular size and systolic function.  Left ventricular ejection fraction is visually estimated  to be 55%.  Moderate concentric left ventricular hypertrophy.  Normal regional wall motion.  Mild mitral regurgitation.  Mild tricuspid regurgitation.  Estimated right ventricular systolic pressure is 25 mmHg.  Normal inferior vena cava size and inspiratory collapse.    ECHO (11/10/2014):  I have personally reviewed the echocardiogram this visit and reviewed the findings with the patient. It shows:   Grade 1 diastolic dysfunction, EF 60-65%, moderate LVH, left atrial enlargement    PFTs as above      Assessment:     1. Paroxysmal atrial fibrillation (CMS-HCC)     2. Essential hypertension     3. Chronic anticoagulation     4. SOB (shortness of breath)     5. Type 2 diabetes mellitus without complication, unspecified long term insulin use status (CMS-HCC)           Medical Decision Making:  Today's Assessment / Status / Plan:     She is doing well. Rate control is better and she remains in atrial fibrillation. We discussed rhythm control strategy with flecainide now that her stress is normal and she has an intolerance to amiodarone. She would like to continue with the current strategy of rate control as she feels so much better. She is tolerating her oral anti-coagulation well.    Plan:    1. Continue rate control strategy with twice a day calcium channel blocker  2. Continue anticoagulation  3. We discussed flecainide with cardioversion and she would like to continue with a rate control strategy for the time being.      Follow-up 6 months

## 2017-07-26 NOTE — MR AVS SNAPSHOT
"        Saumya Lopez Soo   2017 10:00 AM   Office Visit   MRN: [id number]    Department:  [facility]   Dept Phone:  [phone]    Description:  Female : 1930   Provider:  Grant Woodard M.D.           Reason for Visit     Follow-Up           Allergies as of 2017     Allergen Noted Reactions    Pcn [Penicillins] 2014         Vital Signs     Blood Pressure Pulse Height Weight Body Mass Index Smoking Status    120/70 mmHg 86 1.575 m (5' 2\") 92.987 kg (205 lb) 37.49 kg/m2 Never Smoker       Basic Information     Date Of Birth Sex Race Ethnicity Preferred Language    1930 Female White Non- English      Your appointments     Aug 11, 2017 11:00 AM   Anti-Coag Routine with [place] PHARMACIST   [facility] [place]    [address] [facility] [address]   [phone]            2017 10:30 AM   FOLLOW UP with Grant Woodard M.D.   [facility] (--)    [address], [facility] [address]  [facility] [id number] [phone]              Problem List              ICD-10-CM Priority Class Noted - Resolved    DM2 (diabetes mellitus, type 2) (CMS-HCC) E11.9   2014 - Present    SOB (shortness of breath) R06.02   2014 - Present    AF (atrial fibrillation) (CMS-HCC) I48.91   2014 - Present    HTN (hypertension) I10   2014 - Present    Dyspnea on exertion R06.09   2016 - Present    Chronic anticoagulation Z79.01   2017 - Present      Health Maintenance        Date Due Completion Dates    DIABETES MONOFILAMENT / LE EXAM 1931 ---    RETINAL SCREENING 1948 ---    IMM DTaP/Tdap/Td Vaccine (1 - Tdap) 1949 ---    PAP SMEAR 1951 ---    COLONOSCOPY 1980 ---    IMM ZOSTER VACCINE 1990 ---    IMM PNEUMOCOCCAL 65+ (ADULT) LOW/MEDIUM RISK SERIES (1 of 2 - PCV13) 1995 ---    IMM INFLUENZA (1) 2017 ---    A1C SCREENING 2017, 2017, " 2/28/2017, 10/25/2016, 8/26/2016, 3/30/2016, 10/17/2015, 4/2/2015, 10/15/2014, 4/10/2014, 11/22/2013, 6/19/2013, 11/30/2012, 8/1/2012, 4/13/2012, 1/24/2012, 10/5/2011, 7/8/2011    BONE DENSITY 3/21/2018 3/21/2013    MAMMOGRAM 4/4/2018 4/4/2017, 3/16/2016, 3/12/2015, 3/10/2014, 3/4/2013, 2/14/2012, 2/7/2011    FASTING LIPID PROFILE 5/4/2018 5/4/2017, 2/28/2017, 10/25/2016, 3/30/2016, 10/17/2015, 10/15/2014, 11/22/2013, 11/30/2012, 10/5/2011    URINE ACR / MICROALBUMIN 5/4/2018 5/4/2017, 2/28/2017, 10/25/2016, 3/30/2016, 10/17/2015, 10/17/2015, 9/1/2015, 10/15/2014, 11/22/2013, 11/16/2011, 6/8/2011    SERUM CREATININE 5/4/2018 5/4/2017, 2/28/2017, 10/25/2016, 10/25/2016, 3/30/2016, 3/30/2016, 10/17/2015, 10/17/2015, 9/1/2015, 5/1/2015, 4/2/2015, 10/15/2014, 4/10/2014, 11/22/2013, 11/30/2012, 9/5/2012, 10/5/2011, 6/8/2011            Current Immunizations     No immunizations on file.      Below and/or attached are the medications your provider expects you to take. Review all of your home medications and newly ordered medications with your provider and/or pharmacist. Follow medication instructions as directed by your provider and/or pharmacist. Please keep your medication list with you and share with your provider. Update the information when medications are discontinued, doses are changed, or new medications (including over-the-counter products) are added; and carry medication information at all times in the event of emergency situations     Allergies:  PCN - (reactions not documented)               Medications  Valid as of: July 26, 2017 - 10:16 AM    Generic Name Brand Name Tablet Size Instructions for use    Atorvastatin Calcium (Tab) LIPITOR 20 MG         Clobetasol Propionate (Ointment) TEMOVATE 0.05 %         Cyclobenzaprine HCl (Tab) FLEXERIL 5 MG Take 1 Tab by mouth 3 times a day as needed for Muscle Spasms.        DilTIAZem HCl Coated Beads (CAPSULE SR 24 HR) CARDIZEM  MG Take 1 Cap by mouth 2 Times a  Day.        GlipiZIDE (TABLET SR 24 HR) GLUCOTROL 2.5 MG         Glucose Blood (Strip) ONE TOUCH ULTRA TEST          Latanoprost (Solution) XALATAN 0.005 %         Levothyroxine Sodium (Tab) SYNTHROID 150 MCG Take 150 mcg by mouth every day.        Losartan Potassium (Tab) COZAAR 100 MG Take 100 mg by mouth every day.        Neomycin-Polymyxin-Dexameth (Ointment) MAXITROL 3.5-34244-4.1         Omeprazole   Take  by mouth.        Spironolactone-HCTZ (Tab) ALDACTAZIDE 25-25 MG Take  by mouth. Taking 1/2 tablet in the am        Warfarin Sodium (Tab) COUMADIN 5 MG Take one-half to one tablet by mouth one time daily or as directed by coumadin clinic        .                 Medicines prescribed today were sent to:     [facility] [address] [place] [address]    Phone: [phone] Fax: [phone]    Open 24 Hours?: No      Medication refill instructions:       If your prescription bottle indicates you have medication refills left, it is not necessary to call your provider’s office. Please contact your pharmacy and they will refill your medication.    If your prescription bottle indicates you do not have any refills left, you may request refills at any time through one of the following ways: The online [employer] system (except Urgent Care), by calling your provider’s office, or by asking your pharmacy to contact your provider’s office with a refill request. Medication refills are processed only during regular business hours and may not be available until the next business day. Your provider may request additional information or to have a follow-up visit with you prior to refilling your medication.   *Please Note: Medication refills are assigned a new Rx number when refilled electronically. Your pharmacy may indicate that no refills were authorized even though a new prescription for the same medication is available at the pharmacy. Please request the medicine by  name with the pharmacy before contacting your provider for a refill.           MyChart Status: Patient Declined

## 2017-08-11 ENCOUNTER — ANTICOAGULATION VISIT (OUTPATIENT)
Dept: MEDICAL GROUP | Facility: PHYSICIAN GROUP | Age: 82
End: 2017-08-11
Payer: MEDICARE

## 2017-08-11 DIAGNOSIS — Z79.01 CHRONIC ANTICOAGULATION: ICD-10-CM

## 2017-08-11 DIAGNOSIS — I48.0 PAROXYSMAL ATRIAL FIBRILLATION (HCC): ICD-10-CM

## 2017-08-11 LAB — INR PPP: 1.3 (ref 2–3.5)

## 2017-08-11 PROCEDURE — 85610 PROTHROMBIN TIME: CPT | Performed by: FAMILY MEDICINE

## 2017-08-11 NOTE — PROGRESS NOTES
Anticoagulation Summary as of 8/11/2017     INR goal 2.0-3.0   Selected INR 1.3! (8/11/2017)   Maintenance plan 5 mg (5 mg x 1) on Tue; 2.5 mg (5 mg x 0.5) all other days   Weekly total 20 mg   Plan last modified LISHA RomanoD (7/21/2017)   Next INR check 8/18/2017   Target end date Indefinite    Indications   Chronic anticoagulation [Z79.01]  AF (atrial fibrillation) (CMS-HCC) [I48.91]         Anticoagulation Episode Summary     INR check location     Preferred lab     Send INR reminders to     Comments       Anticoagulation Care Providers     Provider Role Specialty Phone number    Grant Woodard M.D. Referring Interventional Cardiology [phone]    [facility] Responsible  [phone]        Anticoagulation Patient Findings   Positives Missed Doses    Negatives Extra Doses, Medication Changes, Antibiotic Use, Diet Changes, Dental/Other Procedures, Hospitalization, Bleeding Gums, Nose Bleeds, Blood in Urine, Blood in Stool, Any Bruising, Other Complaints        Patient is subtherapeutic today with INR of 1.3.  Pt denies any unusual s/s of bleeding, bruising, clotting or any changes to diet or medications.  Missed dose on Tuesday.  Instructed patient to bolus with 7.5mg X 1, then continue with current warfarin dosing regimen.  Patient declines BP/vitals check today.  Follow up in 1 weeks.    Rizwan Lipscomb, LISHAD

## 2017-08-11 NOTE — MR AVS SNAPSHOT
Saumya Vann   2017 11:00 AM   Anticoagulation Visit   MRN: [id number]    Department:  [facility]   Dept Phone:  [phone]    Description:  Female : 1930   Provider:  Rizwan Lipscomb, PHARMD           Allergies as of 2017     Allergen Noted Reactions    Pcn [Penicillins] 2014         You were diagnosed with     Chronic anticoagulation   [054092]       Paroxysmal atrial fibrillation (CMS-HCC)   [566966]         Vital Signs     Smoking Status                   Never Smoker            Basic Information     Date Of Birth Sex Race Ethnicity Preferred Language    1930 Female White Non- English      Your appointments     Aug 18, 2017 11:00 AM   Anti-Coag Routine with [place] PHARMACIST   [facility] ([address] [id number] [phone]            2018 11:00 AM   FOLLOW UP with Grant Woodard M.D.   [facility] (--)    [address] , [facility] [address]  [facility] [id number] [phone]              Problem List              ICD-10-CM Priority Class Noted - Resolved    DM2 (diabetes mellitus, type 2) (CMS-HCC) E11.9   2014 - Present    SOB (shortness of breath) R06.02   2014 - Present    AF (atrial fibrillation) (CMS-HCC) I48.91   2014 - Present    HTN (hypertension) I10   2014 - Present    Dyspnea on exertion R06.09   2016 - Present    Chronic anticoagulation Z79.01   2017 - Present      Health Maintenance        Date Due Completion Dates    DIABETES MONOFILAMENT / LE EXAM 1931 ---    RETINAL SCREENING 1948 ---    IMM DTaP/Tdap/Td Vaccine (1 - Tdap) 1949 ---    PAP SMEAR 1951 ---    COLONOSCOPY 1980 ---    IMM ZOSTER VACCINE 1990 ---    IMM PNEUMOCOCCAL 65+ (ADULT) LOW/MEDIUM RISK SERIES (1 of 2 - PCV13) 1995 ---    IMM INFLUENZA (1) 2017 ---    A1C SCREENING 2017,  5/4/2017, 2/28/2017, 10/25/2016, 8/26/2016, 3/30/2016, 10/17/2015, 4/2/2015, 10/15/2014, 4/10/2014, 11/22/2013, 6/19/2013, 11/30/2012, 8/1/2012, 4/13/2012, 1/24/2012, 10/5/2011, 7/8/2011    BONE DENSITY 3/21/2018 3/21/2013    MAMMOGRAM 4/4/2018 4/4/2017, 3/16/2016, 3/12/2015, 3/10/2014, 3/4/2013, 2/14/2012, 2/7/2011    FASTING LIPID PROFILE 5/4/2018 5/4/2017, 2/28/2017, 10/25/2016, 3/30/2016, 10/17/2015, 10/15/2014, 11/22/2013, 11/30/2012, 10/5/2011    URINE ACR / MICROALBUMIN 5/4/2018 5/4/2017, 2/28/2017, 10/25/2016, 3/30/2016, 10/17/2015, 10/17/2015, 9/1/2015, 10/15/2014, 11/22/2013, 11/16/2011, 6/8/2011    SERUM CREATININE 5/4/2018 5/4/2017, 2/28/2017, 10/25/2016, 10/25/2016, 3/30/2016, 3/30/2016, 10/17/2015, 10/17/2015, 9/1/2015, 5/1/2015, 4/2/2015, 10/15/2014, 4/10/2014, 11/22/2013, 11/30/2012, 9/5/2012, 10/5/2011, 6/8/2011            Results     POCT Protime      Component    INR    1.3    Comment:     [id number] 5/2018 ic valid                        Current Immunizations     No immunizations on file.      Below and/or attached are the medications your provider expects you to take. Review all of your home medications and newly ordered medications with your provider and/or pharmacist. Follow medication instructions as directed by your provider and/or pharmacist. Please keep your medication list with you and share with your provider. Update the information when medications are discontinued, doses are changed, or new medications (including over-the-counter products) are added; and carry medication information at all times in the event of emergency situations     Allergies:  PCN - (reactions not documented)               Medications  Valid as of: August 11, 2017 - 11:08 AM    Generic Name Brand Name Tablet Size Instructions for use    Atorvastatin Calcium (Tab) LIPITOR 20 MG         Clobetasol Propionate (Ointment) TEMOVATE 0.05 %         Cyclobenzaprine HCl (Tab) FLEXERIL 5 MG Take 1 Tab by mouth 3 times a day as  needed for Muscle Spasms.        DilTIAZem HCl Coated Beads (CAPSULE SR 24 HR) CARDIZEM  MG Take 1 Cap by mouth 2 Times a Day.        GlipiZIDE (TABLET SR 24 HR) GLUCOTROL 2.5 MG         Glucose Blood (Strip) ONE TOUCH ULTRA TEST          Latanoprost (Solution) XALATAN 0.005 %         Levothyroxine Sodium (Tab) SYNTHROID 150 MCG Take 150 mcg by mouth every day.        Losartan Potassium (Tab) COZAAR 100 MG Take 100 mg by mouth every day.        Neomycin-Polymyxin-Dexameth (Ointment) MAXITROL 3.5-86185-9.1         Omeprazole   Take  by mouth.        Spironolactone-HCTZ (Tab) ALDACTAZIDE 25-25 MG Take  by mouth. Taking 1/2 tablet in the am        Warfarin Sodium (Tab) COUMADIN 5 MG Take one-half to one tablet by mouth one time daily or as directed by coumadin clinic        .                 Medicines prescribed today were sent to:     [facility] [address] [place] [address]    Phone: [phone] Fax: [phone]    Open 24 Hours?: No      Medication refill instructions:       If your prescription bottle indicates you have medication refills left, it is not necessary to call your provider’s office. Please contact your pharmacy and they will refill your medication.    If your prescription bottle indicates you do not have any refills left, you may request refills at any time through one of the following ways: The online [employer] system (except Urgent Care), by calling your provider’s office, or by asking your pharmacy to contact your provider’s office with a refill request. Medication refills are processed only during regular business hours and may not be available until the next business day. Your provider may request additional information or to have a follow-up visit with you prior to refilling your medication.   *Please Note: Medication refills are assigned a new Rx number when refilled electronically. Your pharmacy may indicate that no refills  were authorized even though a new prescription for the same medication is available at the pharmacy. Please request the medicine by name with the pharmacy before contacting your provider for a refill.        Warfarin Dosing Calendar   August 2017 Details    Sun Mon Tue Wed Thu Fri Sat       1               2               3               4               5                 6               7               8               9               10               11   1.3   7.5 mg   See details      12      2.5 mg           13      2.5 mg         14      2.5 mg         15      5 mg         16      2.5 mg         17      2.5 mg         18      2.5 mg         19                 20               21               22               23               24               25               26                 27               28               29               30               31                  Date Details   08/11 This INR check   INR: 1.3   [id number] 5/2018 ic valid       Date of next INR:  8/18/2017         How to take your warfarin dose     To take:  2.5 mg Take 0.5 of a 5 mg tablet.    To take:  5 mg Take 1 of the 5 mg tablets.    To take:  7.5 mg Take 1.5 of the 5 mg tablets.              MyChart Status: Patient Declined

## 2017-08-18 ENCOUNTER — ANTICOAGULATION VISIT (OUTPATIENT)
Dept: MEDICAL GROUP | Facility: PHYSICIAN GROUP | Age: 82
End: 2017-08-18
Payer: MEDICARE

## 2017-08-18 DIAGNOSIS — Z79.01 CHRONIC ANTICOAGULATION: ICD-10-CM

## 2017-08-18 DIAGNOSIS — I48.0 PAROXYSMAL ATRIAL FIBRILLATION (HCC): ICD-10-CM

## 2017-08-18 LAB — INR PPP: 1.6 (ref 2–3.5)

## 2017-08-18 PROCEDURE — 85610 PROTHROMBIN TIME: CPT | Performed by: FAMILY MEDICINE

## 2017-08-18 PROCEDURE — 99999 PR NO CHARGE: CPT | Performed by: FAMILY MEDICINE

## 2017-08-18 NOTE — MR AVS SNAPSHOT
Saumya Vann   2017 11:00 AM   Anticoagulation Visit   MRN: [id number]    Department:  [facility]   Dept Phone:  [phone]    Description:  Female : 1930   Provider:  Rizwan Lipscomb, LISHAD           Allergies as of 2017     Allergen Noted Reactions    Pcn [Penicillins] 2014         You were diagnosed with     Chronic anticoagulation   [082510]       Paroxysmal atrial fibrillation (CMS-HCC)   [601566]         Vital Signs     Smoking Status                   Never Smoker            Basic Information     Date Of Birth Sex Race Ethnicity Preferred Language    1930 Female White Non- English      Your appointments     Aug 25, 2017  1:15 PM   Anti-Coag Routine with [place] PHARMACIST   [facility] ([address] [id number] [phone]            2018 11:00 AM   FOLLOW UP with Grant Woodard M.D.   [facility] (--)    [address] , [facility] [address]  [facility] [id number] [phone]              Problem List              ICD-10-CM Priority Class Noted - Resolved    DM2 (diabetes mellitus, type 2) (CMS-HCC) E11.9   2014 - Present    SOB (shortness of breath) R06.02   2014 - Present    AF (atrial fibrillation) (CMS-HCC) I48.91   2014 - Present    HTN (hypertension) I10   2014 - Present    Dyspnea on exertion R06.09   2016 - Present    Chronic anticoagulation Z79.01   2017 - Present      Health Maintenance        Date Due Completion Dates    DIABETES MONOFILAMENT / LE EXAM 1931 ---    RETINAL SCREENING 1948 ---    IMM DTaP/Tdap/Td Vaccine (1 - Tdap) 1949 ---    PAP SMEAR 1951 ---    COLONOSCOPY 1980 ---    IMM ZOSTER VACCINE 1990 ---    IMM PNEUMOCOCCAL 65+ (ADULT) LOW/MEDIUM RISK SERIES (1 of 2 - PCV13) 1995 ---    IMM INFLUENZA (1) 2017 ---    A1C SCREENING 2017,  5/4/2017, 2/28/2017, 10/25/2016, 8/26/2016, 3/30/2016, 10/17/2015, 4/2/2015, 10/15/2014, 4/10/2014, 11/22/2013, 6/19/2013, 11/30/2012, 8/1/2012, 4/13/2012, 1/24/2012, 10/5/2011, 7/8/2011    BONE DENSITY 3/21/2018 3/21/2013    MAMMOGRAM 4/4/2018 4/4/2017, 3/16/2016, 3/12/2015, 3/10/2014, 3/4/2013, 2/14/2012, 2/7/2011    FASTING LIPID PROFILE 5/4/2018 5/4/2017, 2/28/2017, 10/25/2016, 3/30/2016, 10/17/2015, 10/15/2014, 11/22/2013, 11/30/2012, 10/5/2011    URINE ACR / MICROALBUMIN 5/4/2018 5/4/2017, 2/28/2017, 10/25/2016, 3/30/2016, 10/17/2015, 10/17/2015, 9/1/2015, 10/15/2014, 11/22/2013, 11/16/2011, 6/8/2011    SERUM CREATININE 5/4/2018 5/4/2017, 2/28/2017, 10/25/2016, 10/25/2016, 3/30/2016, 3/30/2016, 10/17/2015, 10/17/2015, 9/1/2015, 5/1/2015, 4/2/2015, 10/15/2014, 4/10/2014, 11/22/2013, 11/30/2012, 9/5/2012, 10/5/2011, 6/8/2011            Results     POCT Protime      Component    INR    1.6    Comment:     [id number] 6/2018 ic valid                        Current Immunizations     No immunizations on file.      Below and/or attached are the medications your provider expects you to take. Review all of your home medications and newly ordered medications with your provider and/or pharmacist. Follow medication instructions as directed by your provider and/or pharmacist. Please keep your medication list with you and share with your provider. Update the information when medications are discontinued, doses are changed, or new medications (including over-the-counter products) are added; and carry medication information at all times in the event of emergency situations     Allergies:  PCN - (reactions not documented)               Medications  Valid as of: August 18, 2017 - 11:05 AM    Generic Name Brand Name Tablet Size Instructions for use    Atorvastatin Calcium (Tab) LIPITOR 20 MG         Clobetasol Propionate (Ointment) TEMOVATE 0.05 %         Cyclobenzaprine HCl (Tab) FLEXERIL 5 MG Take 1 Tab by mouth 3 times a day as  needed for Muscle Spasms.        DilTIAZem HCl Coated Beads (CAPSULE SR 24 HR) CARDIZEM  MG Take 1 Cap by mouth 2 Times a Day.        GlipiZIDE (TABLET SR 24 HR) GLUCOTROL 2.5 MG         Glucose Blood (Strip) ONE TOUCH ULTRA TEST          Latanoprost (Solution) XALATAN 0.005 %         Levothyroxine Sodium (Tab) SYNTHROID 150 MCG Take 150 mcg by mouth every day.        Losartan Potassium (Tab) COZAAR 100 MG Take 100 mg by mouth every day.        Neomycin-Polymyxin-Dexameth (Ointment) MAXITROL 3.5-83479-4.1         Omeprazole   Take  by mouth.        Spironolactone-HCTZ (Tab) ALDACTAZIDE 25-25 MG Take  by mouth. Taking 1/2 tablet in the am        Warfarin Sodium (Tab) COUMADIN 5 MG Take one-half to one tablet by mouth one time daily or as directed by coumadin clinic        .                 Medicines prescribed today were sent to:     [facility] [address] [place] [address]    Phone: [phone] Fax: [phone]    Open 24 Hours?: No      Medication refill instructions:       If your prescription bottle indicates you have medication refills left, it is not necessary to call your provider’s office. Please contact your pharmacy and they will refill your medication.    If your prescription bottle indicates you do not have any refills left, you may request refills at any time through one of the following ways: The online [employer] system (except Urgent Care), by calling your provider’s office, or by asking your pharmacy to contact your provider’s office with a refill request. Medication refills are processed only during regular business hours and may not be available until the next business day. Your provider may request additional information or to have a follow-up visit with you prior to refilling your medication.   *Please Note: Medication refills are assigned a new Rx number when refilled electronically. Your pharmacy may indicate that no refills  were authorized even though a new prescription for the same medication is available at the pharmacy. Please request the medicine by name with the pharmacy before contacting your provider for a refill.        Warfarin Dosing Calendar   August 2017 Details    Sun Mon Tue Wed Thu Fri Sat       1               2               3               4               5                 6               7               8               9               10               11               12                 13               14               15               16               17               18   1.6   7.5 mg   See details      19      5 mg           20      2.5 mg         21      2.5 mg         22      5 mg         23      2.5 mg         24      2.5 mg         25      2.5 mg         26                 27               28               29               30               31                  Date Details   08/18 This INR check   INR: 1.6   [id number] 6/2018 ic valid       Date of next INR:  8/25/2017         How to take your warfarin dose     To take:  2.5 mg Take 0.5 of a 5 mg tablet.    To take:  5 mg Take 1 of the 5 mg tablets.    To take:  7.5 mg Take 1.5 of the 5 mg tablets.              MyChart Status: Patient Declined

## 2017-08-18 NOTE — PROGRESS NOTES
Anticoagulation Summary as of 8/18/2017     INR goal 2.0-3.0   Selected INR 1.6! (8/18/2017)   Maintenance plan 5 mg (5 mg x 1) on Tue, Sat; 2.5 mg (5 mg x 0.5) all other days   Weekly total 22.5 mg   Plan last modified Rizwan Lipscomb PHARMD (8/18/2017)   Next INR check 8/25/2017   Target end date Indefinite    Indications   Chronic anticoagulation [Z79.01]  AF (atrial fibrillation) (CMS-HCC) [I48.91]         Anticoagulation Episode Summary     INR check location     Preferred lab     Send INR reminders to     Comments       Anticoagulation Care Providers     Provider Role Specialty Phone number    Grant Woodard M.D. Referring Interventional Cardiology [phone]    [facility] Responsible  [phone]        Anticoagulation Patient Findings   Negatives Missed Doses, Extra Doses, Medication Changes, Antibiotic Use, Diet Changes, Dental/Other Procedures, Hospitalization, Bleeding Gums, Nose Bleeds, Blood in Urine, Blood in Stool, Any Bruising, Other Complaints        Patient is remains subtherapeutic today with INR of 1.6.  Pt denies any unusual s/s of bleeding, bruising, clotting or any changes to diet or medications.  Instructed patient to bolus with 7.5mg X 1, then increase weekly warfarin regimen as detailed above.  Patient declines BP/vitals check today.  Follow up in 1 weeks.    Rizwan Lipscomb PHARMD

## 2017-08-25 ENCOUNTER — ANTICOAGULATION VISIT (OUTPATIENT)
Dept: MEDICAL GROUP | Facility: PHYSICIAN GROUP | Age: 82
End: 2017-08-25
Payer: MEDICARE

## 2017-08-25 DIAGNOSIS — I48.0 PAROXYSMAL ATRIAL FIBRILLATION (HCC): ICD-10-CM

## 2017-08-25 DIAGNOSIS — Z79.01 CHRONIC ANTICOAGULATION: ICD-10-CM

## 2017-08-25 LAB — INR PPP: 2.2 (ref 2–3.5)

## 2017-08-25 PROCEDURE — 85610 PROTHROMBIN TIME: CPT | Performed by: FAMILY MEDICINE

## 2017-08-25 PROCEDURE — 99999 PR NO CHARGE: CPT | Performed by: FAMILY MEDICINE

## 2017-08-25 NOTE — MR AVS SNAPSHOT
Saumyaayanna Vann   2017 1:15 PM   Anticoagulation Visit   MRN: [id number]    Department:  [facility]   Dept Phone:  [phone]    Description:  Female : 1930   Provider:  Rizwan Lipscomb PHARMD           Allergies as of 2017     Allergen Noted Reactions    Pcn [Penicillins] 2014         You were diagnosed with     Chronic anticoagulation   [057822]       Paroxysmal atrial fibrillation (CMS-HCC)   [133128]         Vital Signs     Smoking Status                   Never Smoker            Basic Information     Date Of Birth Sex Race Ethnicity Preferred Language    1930 Female White Non- English      Your appointments     Aug 25, 2017  1:15 PM   Anti-Coag Routine with Rizwan Lipscomb PHARMD   [facility]    [address] [facility] [address]   [phone]            Sep 01, 2017 10:45 AM   Anti-Coag Routine with [place] PHARMACIST   [facility]    [address] [facility] [address]   [phone]            2018 11:00 AM   FOLLOW UP with Grant Woodard M.D.   [facility] (--)    [address], [facility] [address]  [facility] [address] [phone]              Problem List              ICD-10-CM Priority Class Noted - Resolved    DM2 (diabetes mellitus, type 2) (CMS-HCC) E11.9   2014 - Present    SOB (shortness of breath) R06.02   2014 - Present    AF (atrial fibrillation) (CMS-HCC) I48.91   2014 - Present    HTN (hypertension) I10   2014 - Present    Dyspnea on exertion R06.09   2016 - Present    Chronic anticoagulation Z79.01   2017 - Present      Health Maintenance        Date Due Completion Dates    DIABETES MONOFILAMENT / LE EXAM 1931 ---    RETINAL SCREENING 1948 ---    IMM DTaP/Tdap/Td Vaccine (1 - Tdap) 1949 ---    PAP SMEAR 1951 ---    COLONOSCOPY 1980 ---    IMM ZOSTER  VACCINE 12/22/1990 ---    IMM PNEUMOCOCCAL 65+ (ADULT) LOW/MEDIUM RISK SERIES (1 of 2 - PCV13) 12/22/1995 ---    IMM INFLUENZA (1) 9/1/2017 ---    A1C SCREENING 11/4/2017 5/4/2017, 5/4/2017, 2/28/2017, 10/25/2016, 8/26/2016, 3/30/2016, 10/17/2015, 4/2/2015, 10/15/2014, 4/10/2014, 11/22/2013, 6/19/2013, 11/30/2012, 8/1/2012, 4/13/2012, 1/24/2012, 10/5/2011, 7/8/2011    BONE DENSITY 3/21/2018 3/21/2013    MAMMOGRAM 4/4/2018 4/4/2017, 3/16/2016, 3/12/2015, 3/10/2014, 3/4/2013, 2/14/2012, 2/7/2011    FASTING LIPID PROFILE 5/4/2018 5/4/2017, 2/28/2017, 10/25/2016, 3/30/2016, 10/17/2015, 10/15/2014, 11/22/2013, 11/30/2012, 10/5/2011    URINE ACR / MICROALBUMIN 5/4/2018 5/4/2017, 2/28/2017, 10/25/2016, 3/30/2016, 10/17/2015, 10/17/2015, 9/1/2015, 10/15/2014, 11/22/2013, 11/16/2011, 6/8/2011    SERUM CREATININE 5/4/2018 5/4/2017, 2/28/2017, 10/25/2016, 10/25/2016, 3/30/2016, 3/30/2016, 10/17/2015, 10/17/2015, 9/1/2015, 5/1/2015, 4/2/2015, 10/15/2014, 4/10/2014, 11/22/2013, 11/30/2012, 9/5/2012, 10/5/2011, 6/8/2011            Results     POCT Protime      Component    INR    2.2    Comment:     [id number] 6/2018 ic valid                        Current Immunizations     No immunizations on file.      Below and/or attached are the medications your provider expects you to take. Review all of your home medications and newly ordered medications with your provider and/or pharmacist. Follow medication instructions as directed by your provider and/or pharmacist. Please keep your medication list with you and share with your provider. Update the information when medications are discontinued, doses are changed, or new medications (including over-the-counter products) are added; and carry medication information at all times in the event of emergency situations     Allergies:  PCN - (reactions not documented)               Medications  Valid as of: August 25, 2017 -  1:11 PM    Generic Name Brand Name Tablet Size Instructions for use     Atorvastatin Calcium (Tab) LIPITOR 20 MG         Clobetasol Propionate (Ointment) TEMOVATE 0.05 %         Cyclobenzaprine HCl (Tab) FLEXERIL 5 MG Take 1 Tab by mouth 3 times a day as needed for Muscle Spasms.        DilTIAZem HCl Coated Beads (CAPSULE SR 24 HR) CARDIZEM  MG Take 1 Cap by mouth 2 Times a Day.        GlipiZIDE (TABLET SR 24 HR) GLUCOTROL 2.5 MG         Glucose Blood (Strip) ONE TOUCH ULTRA TEST          Latanoprost (Solution) XALATAN 0.005 %         Levothyroxine Sodium (Tab) SYNTHROID 150 MCG Take 150 mcg by mouth every day.        Losartan Potassium (Tab) COZAAR 100 MG Take 100 mg by mouth every day.        Neomycin-Polymyxin-Dexameth (Ointment) MAXITROL 3.5-50931-0.1         Omeprazole   Take  by mouth.        Spironolactone-HCTZ (Tab) ALDACTAZIDE 25-25 MG Take  by mouth. Taking 1/2 tablet in the am        Warfarin Sodium (Tab) COUMADIN 5 MG Take one-half to one tablet by mouth one time daily or as directed by coumadin clinic        .                 Medicines prescribed today were sent to:     [facility] [address] [place] [address] [address] [address]    Phone: [phone] Fax: [phone]    Open 24 Hours?: No      Medication refill instructions:       If your prescription bottle indicates you have medication refills left, it is not necessary to call your provider’s office. Please contact your pharmacy and they will refill your medication.    If your prescription bottle indicates you do not have any refills left, you may request refills at any time through one of the following ways: The online [employer] system (except Urgent Care), by calling your provider’s office, or by asking your pharmacy to contact your provider’s office with a refill request. Medication refills are processed only during regular business hours and may not be available until the next business day. Your provider may request additional information or to have a follow-up visit  with you prior to refilling your medication.   *Please Note: Medication refills are assigned a new Rx number when refilled electronically. Your pharmacy may indicate that no refills were authorized even though a new prescription for the same medication is available at the pharmacy. Please request the medicine by name with the pharmacy before contacting your provider for a refill.        Warfarin Dosing Calendar   August 2017 Details    Sun Mon Tue Wed Thu Fri Sat       1               2               3               4               5                 6               7               8               9               10               11               12                 13               14               15               16               17               18               19                 20               21               22               23               24               25   2.2   2.5 mg   See details      26      5 mg           27      2.5 mg         28      2.5 mg         29      5 mg         30      2.5 mg         31      2.5 mg            Date Details   08/25 This INR check   INR: 2.2   [id number] 6/2018 ic valid               How to take your warfarin dose     To take:  2.5 mg Take 0.5 of a 5 mg tablet.    To take:  5 mg Take 1 of the 5 mg tablets.           Warfarin Dosing Calendar   September 2017 Details    Sun Mon Tue Wed Thu Fri Sat          1      2.5 mg         2                 3               4               5               6               7               8               9                 10               11               12               13               14               15               16                 17               18               19               20               21               22               23                 24               25               26               27               28               29               30                Date Details   No additional details    Date of next INR:   9/1/2017         How to take your warfarin dose     To take:  2.5 mg Take 0.5 of a 5 mg tablet.              MyChart Status: Patient Declined

## 2017-08-25 NOTE — PROGRESS NOTES
Anticoagulation Summary as of 8/25/2017     INR goal 2.0-3.0   Selected INR 2.2 (8/25/2017)   Maintenance plan 5 mg (5 mg x 1) on Tue, Sat; 2.5 mg (5 mg x 0.5) all other days   Weekly total 22.5 mg   Plan last modified Rizwan Lipscomb PHARMD (8/18/2017)   Next INR check 9/1/2017   Target end date Indefinite    Indications   Chronic anticoagulation [Z79.01]  AF (atrial fibrillation) (CMS-HCC) [I48.91]         Anticoagulation Episode Summary     INR check location     Preferred lab     Send INR reminders to     Comments       Anticoagulation Care Providers     Provider Role Specialty Phone number    Grant Woodard M.D. Referring Interventional Cardiology [phone]    [facility]ticoagulation [facility] Responsible  [phone]        Anticoagulation Patient Findings   Negatives Missed Doses, Extra Doses, Medication Changes, Antibiotic Use, Diet Changes, Dental/Other Procedures, Hospitalization, Bleeding Gums, Nose Bleeds, Blood in Urine, Blood in Stool, Any Bruising, Other Complaints        Patient is therapeutic today with INR of 2.2.  Pt denies any unusual s/s of bleeding, bruising, clotting or any changes to diet or medications.  Pt is to continue with current warfarin dosing regimen.  Patient declines BP/vitals check today.  Follow up in 1 weeks.    Rizwan Lipscomb PHARMD

## 2017-09-01 ENCOUNTER — ANTICOAGULATION VISIT (OUTPATIENT)
Dept: MEDICAL GROUP | Facility: PHYSICIAN GROUP | Age: 82
End: 2017-09-01
Payer: MEDICARE

## 2017-09-01 VITALS — HEART RATE: 78 BPM | DIASTOLIC BLOOD PRESSURE: 75 MMHG | SYSTOLIC BLOOD PRESSURE: 113 MMHG

## 2017-09-01 DIAGNOSIS — I48.0 PAROXYSMAL ATRIAL FIBRILLATION (HCC): ICD-10-CM

## 2017-09-01 DIAGNOSIS — Z79.01 CHRONIC ANTICOAGULATION: ICD-10-CM

## 2017-09-01 LAB — INR PPP: 2 (ref 2–3.5)

## 2017-09-01 PROCEDURE — 85610 PROTHROMBIN TIME: CPT | Performed by: FAMILY MEDICINE

## 2017-09-01 PROCEDURE — 99999 PR NO CHARGE: CPT | Performed by: FAMILY MEDICINE

## 2017-09-01 NOTE — PROGRESS NOTES
Anticoagulation Summary  As of 9/1/2017    INR goal:   2.0-3.0   TTR:   59.3 % (2.6 mo)   Today's INR:   2.0   Maintenance plan:   5 mg (5 mg x 1) on Tue, Sat; 2.5 mg (5 mg x 0.5) all other days   Weekly total:   22.5 mg   Plan last modified:   Rizwan Lipscomb PharmD (8/18/2017)   Next INR check:   9/15/2017   Target end date:   Indefinite    Indications    Chronic anticoagulation [Z79.01]  AF (atrial fibrillation) (CMS-[facility]) [I48.91]             Anticoagulation Episode Summary     INR check location:       Preferred lab:       Send INR reminders to:       Comments:         Anticoagulation Care Providers     Provider Role Specialty Phone number    Grant Woodard M.D. Referring Interventional Cardiology [phone]    [facility] Anticoagulation Services Responsible  [phone]        Anticoagulation Patient Findings  Patient Findings     Negatives:   Signs/symptoms of thrombosis, Signs/symptoms of bleeding, Laboratory test error suspected, Change in health, Change in alcohol use, Change in activity, Upcoming invasive procedure, Emergency department visit, Upcoming dental procedure, Missed doses, Extra doses, Change in medications, Change in diet/appetite, Hospital admission, Bruising, Other complaints        Patient is therapeutic today with INR of 2.0.  Pt denies any unusual s/s of bleeding, bruising, clotting or any changes to diet or medications.  Pt is to continue with current warfarin dosing regimen.  Follow up in 2 weeks.    Rizwan Lipscomb PharmD

## 2017-09-15 ENCOUNTER — ANTICOAGULATION VISIT (OUTPATIENT)
Dept: MEDICAL GROUP | Facility: PHYSICIAN GROUP | Age: 82
End: 2017-09-15
Payer: MEDICARE

## 2017-09-15 DIAGNOSIS — I48.0 PAROXYSMAL ATRIAL FIBRILLATION (HCC): ICD-10-CM

## 2017-09-15 DIAGNOSIS — Z79.01 CHRONIC ANTICOAGULATION: ICD-10-CM

## 2017-09-15 LAB — INR PPP: 1.9 (ref 2–3.5)

## 2017-09-15 PROCEDURE — 85610 PROTHROMBIN TIME: CPT | Performed by: FAMILY MEDICINE

## 2017-09-15 PROCEDURE — 99211 OFF/OP EST MAY X REQ PHY/QHP: CPT | Performed by: FAMILY MEDICINE

## 2017-09-15 NOTE — PROGRESS NOTES
Anticoagulation Summary  As of 9/15/2017    INR goal:   2.0-3.0   TTR:   50.3 % (3.1 mo)   Today's INR:   1.9!   Maintenance plan:   5 mg (5 mg x 1) on Mon, Wed, Fri; 2.5 mg (5 mg x 0.5) all other days   Weekly total:   25 mg   Plan last modified:   Rizwan Lipscomb PharmD (9/15/2017)   Next INR check:   9/29/2017   Target end date:   Indefinite    Indications    Chronic anticoagulation [Z79.01]  AF (atrial fibrillation) (CMS-[facility]) [I48.91]             Anticoagulation Episode Summary     INR check location:       Preferred lab:       Send INR reminders to:       Comments:         Anticoagulation Care Providers     Provider Role Specialty Phone number    Grant Woodard M.D. Referring Interventional Cardiology [phone]    [facility]ticoagulation [facility] Responsible  [phone]        Anticoagulation Patient Findings  Patient Findings     Negatives:   Signs/symptoms of thrombosis, Signs/symptoms of bleeding, Laboratory test error suspected, Change in health, Change in alcohol use, Change in activity, Upcoming invasive procedure, Emergency department visit, Upcoming dental procedure, Missed doses, Extra doses, Change in medications, Change in diet/appetite, Hospital admission, Bruising, Other complaints        HPI:   Saumya Vann seen in clinic today, on anticoagulation therapy with warfarin for atrial fibrillation.  Changes to current medical/health status since last appt: NONE  Denies signs/symptoms of bleeding and/or thrombosis since the last appt.    Denies any interval changes to diet  Denies any interval changes to medications since last appt.   Denies any complications or cost restrictions with current therapy.      Vitals:  /71  HR 87      Asssessment:  INR slighlty subtherapeutic at 1.9      Plan:  INR has been trending downward, will have patient increase weekly warfarin regimen as detailed above.     Follow up in 2 weeks due to clinic availability.    Rizwan Lipscomb PharmD

## 2017-09-29 ENCOUNTER — ANTICOAGULATION VISIT (OUTPATIENT)
Dept: MEDICAL GROUP | Facility: PHYSICIAN GROUP | Age: 82
End: 2017-09-29
Payer: MEDICARE

## 2017-09-29 VITALS — HEART RATE: 88 BPM | SYSTOLIC BLOOD PRESSURE: 122 MMHG | DIASTOLIC BLOOD PRESSURE: 82 MMHG

## 2017-09-29 DIAGNOSIS — I48.0 PAROXYSMAL ATRIAL FIBRILLATION (HCC): ICD-10-CM

## 2017-09-29 DIAGNOSIS — Z79.01 CHRONIC ANTICOAGULATION: ICD-10-CM

## 2017-09-29 LAB — INR PPP: 2.6 (ref 2–3.5)

## 2017-09-29 PROCEDURE — 85610 PROTHROMBIN TIME: CPT | Performed by: FAMILY MEDICINE

## 2017-09-29 PROCEDURE — 99999 PR NO CHARGE: CPT | Performed by: FAMILY MEDICINE

## 2017-09-29 NOTE — PROGRESS NOTES
Anticoagulation Summary  As of 9/29/2017    INR goal:   2.0-3.0   TTR:   55.0 % (3.5 mo)   Today's INR:   2.6   Maintenance plan:   5 mg (5 mg x 1) on Mon, Wed, Fri; 2.5 mg (5 mg x 0.5) all other days   Weekly total:   25 mg   Plan last modified:   Rizwan Lipscomb, Bernadette (9/15/2017)   Next INR check:   10/20/2017   Target end date:   Indefinite    Indications    Chronic anticoagulation [Z79.01]  AF (atrial fibrillation) (CMS-[facility]) [I48.91]             Anticoagulation Episode Summary     INR check location:       Preferred lab:       Send INR reminders to:       Comments:         Anticoagulation Care Providers     Provider Role Specialty Phone number    Grant Woodard M.D. Referring Interventional Cardiology [phone]    [facility] Anticoagulation Services Responsible  [phone]        Anticoagulation Patient Findings  Patient Findings     Negatives:   Signs/symptoms of thrombosis, Signs/symptoms of bleeding, Laboratory test error suspected, Change in health, Change in alcohol use, Change in activity, Upcoming invasive procedure, Emergency department visit, Upcoming dental procedure, Missed doses, Extra doses, Change in medications, Change in diet/appetite, Hospital admission, Bruising, Other complaints        HPI:   Saumya Vann seen in clinic today, on anticoagulation therapy with warfarin for atrial fibrillation  Changes to current medical/health status since last appt: NONE  Denies signs/symptoms of bleeding and/or thrombosis since the last appt.    Denies any interval changes to diet  Denies any interval changes to medications since last appt.   Denies any complications or cost restrictions with current therapy.      Vitals:  /82  HR 88      Asssessment:  INR therapeutic at 2.6      Plan:  Pt is to continue with current warfarin dosing regimen.     Follow up in 3 weeks.    Rizwan Lipscomb, MaynorD

## 2017-10-20 ENCOUNTER — ANTICOAGULATION VISIT (OUTPATIENT)
Dept: MEDICAL GROUP | Facility: PHYSICIAN GROUP | Age: 82
End: 2017-10-20
Payer: MEDICARE

## 2017-10-20 VITALS — HEART RATE: 81 BPM | SYSTOLIC BLOOD PRESSURE: 113 MMHG | DIASTOLIC BLOOD PRESSURE: 71 MMHG

## 2017-10-20 DIAGNOSIS — Z79.01 CHRONIC ANTICOAGULATION: ICD-10-CM

## 2017-10-20 DIAGNOSIS — I48.0 PAROXYSMAL ATRIAL FIBRILLATION (HCC): ICD-10-CM

## 2017-10-20 LAB — INR PPP: 2.4 (ref 2–3.5)

## 2017-10-20 PROCEDURE — 99211 OFF/OP EST MAY X REQ PHY/QHP: CPT | Performed by: FAMILY MEDICINE

## 2017-10-20 PROCEDURE — 85610 PROTHROMBIN TIME: CPT | Performed by: FAMILY MEDICINE

## 2017-10-20 NOTE — PROGRESS NOTES
Anticoagulation Summary  As of 10/20/2017    INR goal:   2.0-3.0   TTR:   62.4 % (4.2 mo)   Today's INR:   2.4   Maintenance plan:   5 mg (5 mg x 1) on Mon, Wed, Fri; 2.5 mg (5 mg x 0.5) all other days   Weekly total:   25 mg   Plan last modified:   Rizwan Lipscomb, Bernadette (9/15/2017)   Next INR check:   11/17/2017   Target end date:   Indefinite    Indications    Chronic anticoagulation [Z79.01]  AF (atrial fibrillation) (CMS-[facility]) [I48.91]             Anticoagulation Episode Summary     INR check location:       Preferred lab:       Send INR reminders to:       Comments:         Anticoagulation Care Providers     Provider Role Specialty Phone number    Grant Woodard M.D. Referring Interventional Cardiology [phone]    [facility] Anticoagulation [facility] Responsible  [phone]        Anticoagulation Patient Findings  Patient Findings     Negatives:   Signs/symptoms of thrombosis, Signs/symptoms of bleeding, Laboratory test error suspected, Change in health, Change in alcohol use, Change in activity, Upcoming invasive procedure, Emergency department visit, Upcoming dental procedure, Missed doses, Extra doses, Change in medications, Change in diet/appetite, Hospital admission, Bruising, Other complaints        HPI:   Saumya Vann seen in clinic today, on anticoagulation therapy with warfarin for atrial fibrillation  Changes to current medical/health status since last appt: NONE  Denies signs/symptoms of bleeding and/or thrombosis since the last appt.    Denies any interval changes to diet  Denies any interval changes to medications since last appt.   Denies any complications or cost restrictions with current therapy.      Vitals:  /71  HR 81      Asssessment:  INR therapeutic at 2.4      Plan:  Pt is to continue with current warfarin dosing regimen.     Follow up in 4 weeks.    Rizwan Lipscomb, MaynorD

## 2017-10-22 ENCOUNTER — OFFICE VISIT (OUTPATIENT)
Dept: URGENT CARE | Facility: PHYSICIAN GROUP | Age: 82
End: 2017-10-22
Payer: MEDICARE

## 2017-10-22 ENCOUNTER — HOSPITAL ENCOUNTER (OUTPATIENT)
Dept: RADIOLOGY | Facility: MEDICAL CENTER | Age: 82
End: 2017-10-22
Attending: FAMILY MEDICINE
Payer: MEDICARE

## 2017-10-22 VITALS
HEIGHT: 62 IN | RESPIRATION RATE: 16 BRPM | OXYGEN SATURATION: 96 % | DIASTOLIC BLOOD PRESSURE: 62 MMHG | TEMPERATURE: 97.7 F | SYSTOLIC BLOOD PRESSURE: 114 MMHG | HEART RATE: 83 BPM | WEIGHT: 215 LBS | BODY MASS INDEX: 39.56 KG/M2

## 2017-10-22 DIAGNOSIS — M79.671 RIGHT FOOT PAIN: ICD-10-CM

## 2017-10-22 DIAGNOSIS — S93.401A SPRAIN OF RIGHT ANKLE, UNSPECIFIED LIGAMENT, INITIAL ENCOUNTER: ICD-10-CM

## 2017-10-22 PROCEDURE — 99203 OFFICE O/P NEW LOW 30 MIN: CPT | Performed by: FAMILY MEDICINE

## 2017-10-22 PROCEDURE — 73610 X-RAY EXAM OF ANKLE: CPT | Mod: RT

## 2017-10-22 PROCEDURE — 73630 X-RAY EXAM OF FOOT: CPT | Mod: RT

## 2017-10-22 ASSESSMENT — ENCOUNTER SYMPTOMS
SORE THROAT: 0
PALPITATIONS: 0
DIARRHEA: 0
CHILLS: 0
VOMITING: 0
SHORTNESS OF BREATH: 0
EYE DISCHARGE: 0
SPUTUM PRODUCTION: 0
NAUSEA: 0
ABDOMINAL PAIN: 0
FEVER: 0
TREMORS: 0
COUGH: 0

## 2017-10-22 NOTE — PATIENT INSTRUCTIONS
Ankle Sprain  An ankle sprain is an injury to the strong, fibrous tissues (ligaments) that hold the bones of your ankle joint together.   CAUSES  An ankle sprain is usually caused by a fall or by twisting your ankle. Ankle sprains most commonly occur when you step on the outer edge of your foot, and your ankle turns inward. People who participate in sports are more prone to these types of injuries.   SYMPTOMS   · Pain in your ankle. The pain may be present at rest or only when you are trying to stand or walk.  · Swelling.  · Bruising. Bruising may develop immediately or within 1 to 2 days after your injury.  · Difficulty standing or walking, particularly when turning corners or changing directions.  DIAGNOSIS   Your caregiver will ask you details about your injury and perform a physical exam of your ankle to determine if you have an ankle sprain. During the physical exam, your caregiver will press on and apply pressure to specific areas of your foot and ankle. Your caregiver will try to move your ankle in certain ways. An X-ray exam may be done to be sure a bone was not broken or a ligament did not separate from one of the bones in your ankle (avulsion fracture).   TREATMENT   Certain types of braces can help stabilize your ankle. Your caregiver can make a recommendation for this. Your caregiver may recommend the use of medicine for pain. If your sprain is severe, your caregiver may refer you to a surgeon who helps to restore function to parts of your skeletal system (orthopedist) or a physical therapist.  HOME CARE INSTRUCTIONS   · Apply ice to your injury for 1-2 days or as directed by your caregiver. Applying ice helps to reduce inflammation and pain.  ¨ Put ice in a plastic bag.  ¨ Place a towel between your skin and the bag.  ¨ Leave the ice on for 15-20 minutes at a time, every 2 hours while you are awake.  · Only take over-the-counter or prescription medicines for pain, discomfort, or fever as directed by  your caregiver.  · Elevate your injured ankle above the level of your heart as much as possible for 2-3 days.  · If your caregiver recommends crutches, use them as instructed. Gradually put weight on the affected ankle. Continue to use crutches or a cane until you can walk without feeling pain in your ankle.  · If you have a plaster splint, wear the splint as directed by your caregiver. Do not rest it on anything harder than a pillow for the first 24 hours. Do not put weight on it. Do not get it wet. You may take it off to take a shower or bath.  · You may have been given an elastic bandage to wear around your ankle to provide support. If the elastic bandage is too tight (you have numbness or tingling in your foot or your foot becomes cold and blue), adjust the bandage to make it comfortable.  · If you have an air splint, you may blow more air into it or let air out to make it more comfortable. You may take your splint off at night and before taking a shower or bath. Wiggle your toes in the splint several times per day to decrease swelling.  SEEK MEDICAL CARE IF:   · You have rapidly increasing bruising or swelling.  · Your toes feel extremely cold or you lose feeling in your foot.  · Your pain is not relieved with medicine.  SEEK IMMEDIATE MEDICAL CARE IF:  · Your toes are numb or blue.  · You have severe pain that is increasing.  MAKE SURE YOU:   · Understand these instructions.  · Will watch your condition.  · Will get help right away if you are not doing well or get worse.     This information is not intended to replace advice given to you by your health care provider. Make sure you discuss any questions you have with your health care provider.     Document Released: 12/18/2006 Document Revised: 01/08/2016 Document Reviewed: 12/29/2012  [employer] Interactive Patient Education ©2016 [employer]

## 2017-10-22 NOTE — PROGRESS NOTES
"Subjective:      Saumya Vann is a 86 y.o. female who presents with Ankle Pain (right foot/ankle pain x2 days; no injury, poss gout)            Subjective:    Saumya Vann is a 86 y.o. female who presents with right foot pain. Onset of the symptoms was 2 days ago. Inciting event: none known. Current symptoms include unable to bear weight swellingworse with activity. Aggravating factors: standing, walking, any weight bearing. Symptoms have gradually worsened. Patient has had no prior foot problems. Evaluation to date: none. Treatment to date: none.      }          Review of Systems   Constitutional: Negative for chills, fever and malaise/fatigue.   HENT: Positive for congestion. Negative for ear pain and sore throat.    Eyes: Negative for discharge.   Respiratory: Negative for cough, sputum production and shortness of breath.    Cardiovascular: Negative for chest pain and palpitations.   Gastrointestinal: Negative for abdominal pain, diarrhea, nausea and vomiting.   Genitourinary: Negative for dysuria.   Musculoskeletal: Positive for joint pain.        Right foot and ankle pain with selling    Skin: Negative for itching and rash.   Neurological: Negative for tremors.          Objective:     /62   Pulse 83   Temp 36.5 °C (97.7 °F)   Resp 16   Ht 1.575 m (5' 2\")   Wt 97.5 kg (215 lb)   SpO2 96%   BMI 39.32 kg/m²      Physical Exam   Constitutional: She is oriented to person, place, and time. She appears well-developed and well-nourished.   HENT:   Head: Normocephalic.   Right Ear: External ear normal.   Left Ear: External ear normal.   Eyes: EOM are normal. Pupils are equal, round, and reactive to light. Right eye exhibits no discharge. Left eye exhibits no discharge.   Neck: Normal range of motion. Neck supple.   Cardiovascular: Normal rate, regular rhythm and normal heart sounds.    Pulmonary/Chest: Effort normal and breath sounds normal. No respiratory distress.   Abdominal: Bowel sounds are " normal.   Lymphadenopathy:     She has no cervical adenopathy.   Neurological: She is alert and oriented to person, place, and time. She has normal reflexes.   Skin: Skin is warm and dry.          {Right foot:  soft tissue swelling and tenderness over the lateral malleolus, reduced range of motion, no tenderness of the medial malleolus, ankle joint is intact, remainder of foot and ankle exam is normal  Left foot:  normal exam, no swelling, tenderness, instability; ligaments intact, FROM all ankle/foot joints       Assessment/Plan:     Imaging:  X-ray Foot: No evidence of fracture or dislocation.  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    Xray : Ankle  No evidence of fracture or dislocation.  Lateral soft tissue swelling is noted.    Assessment:    Right ankle sprain     Plan:    Natural history and expected course discussed. Questions answered.  RICE therapy.  OTC analgesics as needed.  Night-time foot splint prescribed.  Follow-up in 1 weeks.

## 2017-11-08 ENCOUNTER — HOSPITAL ENCOUNTER (OUTPATIENT)
Dept: LAB | Facility: MEDICAL CENTER | Age: 82
End: 2017-11-08
Attending: FAMILY MEDICINE
Payer: MEDICARE

## 2017-11-08 LAB
EST. AVERAGE GLUCOSE BLD GHB EST-MCNC: 143 MG/DL
HBA1C MFR BLD: 6.6 % (ref 0–5.6)

## 2017-11-08 PROCEDURE — 36415 COLL VENOUS BLD VENIPUNCTURE: CPT

## 2017-11-08 PROCEDURE — 83036 HEMOGLOBIN GLYCOSYLATED A1C: CPT

## 2017-11-17 ENCOUNTER — ANTICOAGULATION VISIT (OUTPATIENT)
Dept: MEDICAL GROUP | Facility: PHYSICIAN GROUP | Age: 82
End: 2017-11-17
Payer: MEDICARE

## 2017-11-17 DIAGNOSIS — I48.0 PAROXYSMAL ATRIAL FIBRILLATION (HCC): ICD-10-CM

## 2017-11-17 DIAGNOSIS — Z79.01 CHRONIC ANTICOAGULATION: ICD-10-CM

## 2017-11-17 LAB — INR PPP: 2.5 (ref 2–3.5)

## 2017-11-17 PROCEDURE — 85610 PROTHROMBIN TIME: CPT | Performed by: FAMILY MEDICINE

## 2017-11-17 PROCEDURE — 99999 PR NO CHARGE: CPT | Performed by: FAMILY MEDICINE

## 2017-11-17 NOTE — PROGRESS NOTES
Anticoagulation Summary  As of 11/17/2017    INR goal:   2.0-3.0   TTR:   69.1 % (5.2 mo)   Today's INR:   2.5   Maintenance plan:   5 mg (5 mg x 1) on Mon, Wed, Fri; 2.5 mg (5 mg x 0.5) all other days   Weekly total:   25 mg   Plan last modified:   Rizwan Lipscomb, PharmD (9/15/2017)   Next INR check:   12/29/2017   Target end date:   Indefinite    Indications    Chronic anticoagulation [Z79.01]  AF (atrial fibrillation) (CMS-HCC) [I48.91]             Anticoagulation Episode Summary     INR check location:       Preferred lab:       Send INR reminders to:       Comments:         Anticoagulation Care Providers     Provider Role Specialty Phone number    Grant Woodard M.D. Referring Interventional Cardiology [phone]    [facility] Anticoagulation Services Responsible  [phone]        Anticoagulation Patient Findings  Patient Findings     Negatives:   Signs/symptoms of thrombosis, Signs/symptoms of bleeding, Laboratory test error suspected, Change in health, Change in alcohol use, Change in activity, Upcoming invasive procedure, Emergency department visit, Upcoming dental procedure, Missed doses, Extra doses, Change in medications, Change in diet/appetite, Hospital admission, Bruising, Other complaints        HPI:   Saumya Vann seen in clinic today, on anticoagulation therapy with warfarin for stroke prevention due to history of atrial fibrillation  CHADS-VASc = at least 5  (unadjusted ischemic stroke risk = 7.2%)  Does patient have any changes to current medical/health status since last appt (Y/N):  NO  Does patient have any signs/symptoms of bleeding and/or thrombosis since the last appt (Y/N):  NO  Does patient have any interval changes to diet or medications since last appt (Y/N):  NO  Are there any complications or cost restrictions with current therapy (Y/N):  NO      Vitals: Patient declines BP/vitals check today    Asssessment:  INR therapeutic at 2.5, therefore decreasing her risk of stroke secondary  to a-fib.      Plan:  Pt is to continue with current warfarin dosing regimen as this has kept INR in therapeutic range for some time, thus reducing her risk of stroke or bleeding event.     Follow up:  Because warfarin is a high risk medication and current CHEST guidelines recommend regular monitoring intervals (few days up to 12 weeks), will have patient return to clinic in 6 weeks to recheck INR.    Rizwan Lipscomb, PharmD

## 2017-12-29 ENCOUNTER — ANTICOAGULATION VISIT (OUTPATIENT)
Dept: MEDICAL GROUP | Facility: PHYSICIAN GROUP | Age: 82
End: 2017-12-29
Payer: MEDICARE

## 2017-12-29 VITALS — HEART RATE: 89 BPM | SYSTOLIC BLOOD PRESSURE: 107 MMHG | DIASTOLIC BLOOD PRESSURE: 75 MMHG

## 2017-12-29 DIAGNOSIS — Z79.01 CHRONIC ANTICOAGULATION: ICD-10-CM

## 2017-12-29 DIAGNOSIS — I48.0 PAROXYSMAL ATRIAL FIBRILLATION (HCC): ICD-10-CM

## 2017-12-29 LAB — INR PPP: 2.9 (ref 2–3.5)

## 2017-12-29 PROCEDURE — 99999 PR NO CHARGE: CPT | Performed by: FAMILY MEDICINE

## 2017-12-29 PROCEDURE — 85610 PROTHROMBIN TIME: CPT | Performed by: FAMILY MEDICINE

## 2017-12-29 NOTE — PROGRESS NOTES
Anticoagulation Summary  As of 12/29/2017    INR goal:   2.0-3.0   TTR:   75.7 % (6.6 mo)   Today's INR:   2.9   Maintenance plan:   5 mg (5 mg x 1) on Mon, Wed, Fri; 2.5 mg (5 mg x 0.5) all other days   Weekly total:   25 mg   Plan last modified:   Rizwan Lipscomb, PharmD (9/15/2017)   Next INR check:   2/23/2018   Target end date:   Indefinite    Indications    Chronic anticoagulation [Z79.01]  AF (atrial fibrillation) (CMS-[facility]) [I48.91]             Anticoagulation Episode Summary     INR check location:       Preferred lab:       Send INR reminders to:       Comments:         Anticoagulation Care Providers     Provider Role Specialty Phone number    Grant Woodard M.D. Referring Interventional Cardiology [phone]    [facility] Anticoagulation Services Responsible  [phone]        Anticoagulation Patient Findings  Patient Findings     Negatives:   Signs/symptoms of thrombosis, Signs/symptoms of bleeding, Laboratory test error suspected, Change in health, Change in alcohol use, Change in activity, Upcoming invasive procedure, Emergency department visit, Upcoming dental procedure, Missed doses, Extra doses, Change in medications, Change in diet/appetite, Hospital admission, Bruising, Other complaints        HPI:   Saumya Vann seen in clinic today, on anticoagulation therapy with warfarin for stroke prevention due to history of atrial fibrillation    Patient's previous INR was therapeutic at 2.5 on 11-17-17, at which time patient was instructed to continue with current warfarin regimen.  She returns to clinic today to recheck INR to ensure it is therapeutic and thus preventing possible clotting and/or bleeding/bruising complications.    CHADS-VASc = at least 5  (unadjusted ischemic stroke risk/year:  7.2%, which is moderate/high risk)    Does patient have any changes to current medical/health status since last appt (Y/N):  NO  Does patient have any signs/symptoms of bleeding and/or thrombosis since the last  "appt (Y/N):  NO  Does patient have any interval changes to diet or medications since last appt (Y/N):  NO  Are there any complications or cost restrictions with current therapy (Y/N):  NO      Vitals:  /75  HR 89    Weight  declines   Height   5' 2\"     Asssessment:      INR remains therapeutic at 2.9, therefore decreasing risk of stroke secondary to a-fib and/or bleeding complications due to warfarin   Reason(s) for out of range INR today:  n/a      Plan:  Pt is to continue with current warfarin dosing regimen as this dose has maintained patient in therapeutic range for some time     Follow up:  Because warfarin is a high risk medication and current CHEST guidelines recommend regular monitoring intervals (few days up to 12 weeks), will have patient return to clinic in 8 weeks to recheck INR.    Rizwan Lipscomb, PharmD    "

## 2018-01-09 ENCOUNTER — HOSPITAL ENCOUNTER (OUTPATIENT)
Dept: LAB | Facility: MEDICAL CENTER | Age: 83
End: 2018-01-09
Attending: INTERNAL MEDICINE
Payer: MEDICARE

## 2018-01-09 LAB
APPEARANCE UR: CLEAR
BACTERIA #/AREA URNS HPF: ABNORMAL /HPF
BASOPHILS # BLD AUTO: 0.6 % (ref 0–1.8)
BASOPHILS # BLD: 0.05 K/UL (ref 0–0.12)
BILIRUB UR QL STRIP.AUTO: NEGATIVE
COLOR UR: ABNORMAL
EOSINOPHIL # BLD AUTO: 0.15 K/UL (ref 0–0.51)
EOSINOPHIL NFR BLD: 1.7 % (ref 0–6.9)
EPI CELLS #/AREA URNS HPF: ABNORMAL /HPF
ERYTHROCYTE [DISTWIDTH] IN BLOOD BY AUTOMATED COUNT: 46.4 FL (ref 35.9–50)
GLUCOSE UR STRIP.AUTO-MCNC: NEGATIVE MG/DL
HCT VFR BLD AUTO: 43.5 % (ref 37–47)
HGB BLD-MCNC: 14.1 G/DL (ref 12–16)
HYALINE CASTS #/AREA URNS LPF: ABNORMAL /LPF
IMM GRANULOCYTES # BLD AUTO: 0.06 K/UL (ref 0–0.11)
IMM GRANULOCYTES NFR BLD AUTO: 0.7 % (ref 0–0.9)
KETONES UR STRIP.AUTO-MCNC: NEGATIVE MG/DL
LEUKOCYTE ESTERASE UR QL STRIP.AUTO: ABNORMAL
LYMPHOCYTES # BLD AUTO: 1.01 K/UL (ref 1–4.8)
LYMPHOCYTES NFR BLD: 11.3 % (ref 22–41)
MCH RBC QN AUTO: 29 PG (ref 27–33)
MCHC RBC AUTO-ENTMCNC: 32.4 G/DL (ref 33.6–35)
MCV RBC AUTO: 89.5 FL (ref 81.4–97.8)
MICRO URNS: ABNORMAL
MONOCYTES # BLD AUTO: 0.61 K/UL (ref 0–0.85)
MONOCYTES NFR BLD AUTO: 6.8 % (ref 0–13.4)
NEUTROPHILS # BLD AUTO: 7.04 K/UL (ref 2–7.15)
NEUTROPHILS NFR BLD: 78.9 % (ref 44–72)
NITRITE UR QL STRIP.AUTO: NEGATIVE
NRBC # BLD AUTO: 0 K/UL
NRBC BLD-RTO: 0 /100 WBC
PH UR STRIP.AUTO: 6 [PH]
PLATELET # BLD AUTO: 330 K/UL (ref 164–446)
PMV BLD AUTO: 9 FL (ref 9–12.9)
PROT UR QL STRIP: NEGATIVE MG/DL
RBC # BLD AUTO: 4.86 M/UL (ref 4.2–5.4)
RBC # URNS HPF: ABNORMAL /HPF
RBC UR QL AUTO: NEGATIVE
SP GR UR STRIP.AUTO: 1.02
UROBILINOGEN UR STRIP.AUTO-MCNC: 0.2 MG/DL
WBC # BLD AUTO: 8.9 K/UL (ref 4.8–10.8)
WBC #/AREA URNS HPF: ABNORMAL /HPF

## 2018-01-09 PROCEDURE — 82570 ASSAY OF URINE CREATININE: CPT

## 2018-01-09 PROCEDURE — 84100 ASSAY OF PHOSPHORUS: CPT

## 2018-01-09 PROCEDURE — 85025 COMPLETE CBC W/AUTO DIFF WBC: CPT

## 2018-01-09 PROCEDURE — 81001 URINALYSIS AUTO W/SCOPE: CPT

## 2018-01-09 PROCEDURE — 84156 ASSAY OF PROTEIN URINE: CPT

## 2018-01-09 PROCEDURE — 36415 COLL VENOUS BLD VENIPUNCTURE: CPT

## 2018-01-09 PROCEDURE — 80053 COMPREHEN METABOLIC PANEL: CPT

## 2018-01-10 LAB
ALBUMIN SERPL BCP-MCNC: 3.9 G/DL (ref 3.2–4.9)
ALBUMIN/GLOB SERPL: 1.2 G/DL
ALP SERPL-CCNC: 83 U/L (ref 30–99)
ALT SERPL-CCNC: 8 U/L (ref 2–50)
ANION GAP SERPL CALC-SCNC: 9 MMOL/L (ref 0–11.9)
AST SERPL-CCNC: 16 U/L (ref 12–45)
BILIRUB SERPL-MCNC: 0.8 MG/DL (ref 0.1–1.5)
BUN SERPL-MCNC: 17 MG/DL (ref 8–22)
CALCIUM SERPL-MCNC: 9.5 MG/DL (ref 8.5–10.5)
CHLORIDE SERPL-SCNC: 101 MMOL/L (ref 96–112)
CO2 SERPL-SCNC: 27 MMOL/L (ref 20–33)
CREAT SERPL-MCNC: 0.98 MG/DL (ref 0.5–1.4)
CREAT UR-MCNC: 196.7 MG/DL
GLOBULIN SER CALC-MCNC: 3.3 G/DL (ref 1.9–3.5)
GLUCOSE SERPL-MCNC: 100 MG/DL (ref 65–99)
PHOSPHATE SERPL-MCNC: 2.7 MG/DL (ref 2.5–4.5)
POTASSIUM SERPL-SCNC: 4 MMOL/L (ref 3.6–5.5)
PROT SERPL-MCNC: 7.2 G/DL (ref 6–8.2)
PROT UR-MCNC: 16.2 MG/DL (ref 0–15)
PROT/CREAT UR: 82 MG/G (ref 10–107)
SODIUM SERPL-SCNC: 137 MMOL/L (ref 135–145)

## 2018-02-02 ENCOUNTER — OFFICE VISIT (OUTPATIENT)
Dept: CARDIOLOGY | Facility: MEDICAL CENTER | Age: 83
End: 2018-02-02
Payer: MEDICARE

## 2018-02-02 VITALS
OXYGEN SATURATION: 95 % | BODY MASS INDEX: 38.83 KG/M2 | HEART RATE: 94 BPM | SYSTOLIC BLOOD PRESSURE: 124 MMHG | WEIGHT: 211 LBS | DIASTOLIC BLOOD PRESSURE: 82 MMHG | HEIGHT: 62 IN

## 2018-02-02 DIAGNOSIS — I48.0 PAROXYSMAL ATRIAL FIBRILLATION (HCC): ICD-10-CM

## 2018-02-02 DIAGNOSIS — R06.09 DYSPNEA ON EXERTION: ICD-10-CM

## 2018-02-02 DIAGNOSIS — E11.9 TYPE 2 DIABETES MELLITUS WITHOUT COMPLICATION, UNSPECIFIED LONG TERM INSULIN USE STATUS: ICD-10-CM

## 2018-02-02 DIAGNOSIS — Z79.01 CHRONIC ANTICOAGULATION: ICD-10-CM

## 2018-02-02 PROCEDURE — 99215 OFFICE O/P EST HI 40 MIN: CPT | Performed by: INTERNAL MEDICINE

## 2018-02-02 RX ORDER — OMEPRAZOLE 20 MG/1
20 CAPSULE, DELAYED RELEASE ORAL EVERY EVENING
COMMUNITY

## 2018-02-02 NOTE — PROGRESS NOTES
Subjective:   Saumya Vann is a 89 y.o. female who presents today for followup of exertional dyspnea and PAF. She has had exertional dyspnea over the past 3 years associated with fatigue. She has a history of HTN, diabetes mellitus, CKD . Her echo is relatively unremarkable aside from LVH and grade 1 diastolic function. PFTs per her primary doctor are unremarkable. She denies any exertional chest pain, PND or orthopnea.     She had continued symptomatic PAF with poorly controlled rates. She underwent PET stress test which was normal. We increased her diltiazem to twice daily which has dramatically improved her symptomology. She feels much improved and is still having paroxysms of A. fib but they are not as bothersome. We discussed suppressive therapy with a rhythm control strategy and she would like to hold off on this. She started Coumadin and is tolerating this well.    In the interim she has had a severe cold/flulike illness and is currently on antibiotics is recovering slowly from this. She has intermittent palpitations persist but are not significantly burdensome. We discussed digoxin therapy. Blood pressures are on the low side and heart rates are reasonable    Family History   Problem Relation Age of Onset   • Cancer Other      History   Smoking Status   • Never Smoker   Smokeless Tobacco   • Never Used     Allergies   Allergen Reactions   • Pcn [Penicillins]      Outpatient Encounter Prescriptions as of 2/2/2018   Medication Sig Dispense Refill   • omeprazole (PRILOSEC) 20 MG delayed-release capsule Take 20 mg by mouth every day.     • Bevacizumab (AVASTIN IV) by Intravenous route.     • warfarin (COUMADIN) 5 MG Tab Take one-half to one tablet by mouth one time daily or as directed by coumadin clinic 90 Tab 1   • diltiazem CD (CARDIZEM CD) 120 MG CAPSULE SR 24 HR Take 1 Cap by mouth 2 Times a Day. 60 Cap 11   • ONE TOUCH ULTRA TEST strip      • latanoprost (XALATAN) 0.005 % Solution      •  "spironolactone/hctz (ALDACTAZIDE) 25-25 MG TABS Take  by mouth. Taking 1/2 tablet in the am     • levothyroxine (SYNTHROID) 150 MCG TABS Take 150 mcg by mouth every day.     • losartan (COZAAR) 100 MG TABS Take 100 mg by mouth every day.     • OMEPRAZOLE PO Take  by mouth.     • cyclobenzaprine (FLEXERIL) 5 MG tablet Take 1 Tab by mouth 3 times a day as needed for Muscle Spasms. 30 Tab 0   • atorvastatin (LIPITOR) 20 MG Tab      • clobetasol (TEMOVATE) 0.05 % Ointment      • glipiZIDE SR (GLUCOTROL) 2.5 MG TABLET SR 24 HR      • neomycin-polymixin-dexamethasone (MAXITROL) 3.5-51687-0.1 Ointment ophthalmic ointment        No facility-administered encounter medications on file as of 2/2/2018.      Review of Systems   All other systems reviewed and are negative.       Objective:   /82   Pulse 94   Ht 1.575 m (5' 2\")   Wt 95.7 kg (211 lb)   SpO2 95%   BMI 38.59 kg/m²     Physical Exam   Constitutional: She is oriented to person, place, and time. She appears well-developed and well-nourished. No distress.   HENT:   Head: Normocephalic and atraumatic.   Mouth/Throat: Oropharynx is clear and moist. No oropharyngeal exudate.   Eyes: Conjunctivae are normal. Pupils are equal, round, and reactive to light. No scleral icterus.   Neck: Normal range of motion. Neck supple. No JVD present. No thyromegaly present.   Cardiovascular: Normal rate, normal heart sounds and intact distal pulses.  An irregularly irregular rhythm present. Frequent extrasystoles are present. PMI is not displaced.  Exam reveals no gallop and no friction rub.    No murmur heard.  Pulses:       Carotid pulses are 2+ on the right side, and 2+ on the left side.       Femoral pulses are 2+ on the right side, and 2+ on the left side.       Popliteal pulses are 1+ on the right side, and 1+ on the left side.        Dorsalis pedis pulses are 2+ on the right side, and 2+ on the left side.        Posterior tibial pulses are 2+ on the right side, and 2+ on " the left side.   Pulmonary/Chest: Effort normal and breath sounds normal. She has no wheezes. She has no rales.   Abdominal: Soft. Bowel sounds are normal. She exhibits no distension. There is no tenderness.   Musculoskeletal: She exhibits no edema or tenderness.   Neurological: She is alert and oriented to person, place, and time. No cranial nerve deficit.   Skin: Skin is warm and dry. No rash noted. She is not diaphoretic. No erythema.   Psychiatric: She has a normal mood and affect. Her behavior is normal.     LABS:  Lab Results   Component Value Date/Time    CHOLSTRLTOT 144 05/04/2017 10:08 AM    LDL 84 05/04/2017 10:08 AM    HDL 42 05/04/2017 10:08 AM    TRIGLYCERIDE 91 05/04/2017 10:08 AM       Lab Results   Component Value Date/Time    WBC 8.9 01/09/2018 11:12 AM    RBC 4.86 01/09/2018 11:12 AM    HEMOGLOBIN 14.1 01/09/2018 11:12 AM    HEMATOCRIT 43.5 01/09/2018 11:12 AM    MCV 89.5 01/09/2018 11:12 AM    NEUTSPOLYS 78.90 (H) 01/09/2018 11:12 AM    LYMPHOCYTES 11.30 (L) 01/09/2018 11:12 AM    MONOCYTES 6.80 01/09/2018 11:12 AM    EOSINOPHILS 1.70 01/09/2018 11:12 AM    BASOPHILS 0.60 01/09/2018 11:12 AM    HYPOCHROMIA 1+ 04/10/2014 03:03 PM     Lab Results   Component Value Date/Time    SODIUM 137 01/09/2018 11:12 AM    POTASSIUM 4.0 01/09/2018 11:12 AM    CHLORIDE 101 01/09/2018 11:12 AM    CO2 27 01/09/2018 11:12 AM    GLUCOSE 100 (H) 01/09/2018 11:12 AM    BUN 17 01/09/2018 11:12 AM    CREATININE 0.98 01/09/2018 11:12 AM     Lab Results   Component Value Date    HBA1C 6.6 (H) 11/08/2017      Lab Results   Component Value Date/Time    ALKPHOSPHAT 83 01/09/2018 11:12 AM    ASTSGOT 16 01/09/2018 11:12 AM    ALTSGPT 8 01/09/2018 11:12 AM    TBILIRUBIN 0.8 01/09/2018 11:12 AM      No results found for: BNPBTYPENAT   No results found for: TSH  Lab Results   Component Value Date/Time    INR 2.9 12/29/2017 11:45 AM        STRESS (6/2017):  CONCLUSIONS AND IMPRESSIONS:  Overall, there is no evidence of myocardial     ischemia based on EKG tracing along with myocardial PET scan images.  The    rhythm is atrial fibrillation throughout the stress test.  No malignant    arrhythmias noted.    ECHO CONCLUSIONS (4/27/2016):  Normal left ventricular size and systolic function.  Left ventricular ejection fraction is visually estimated to be 55%.  Moderate concentric left ventricular hypertrophy.  Normal regional wall motion.  Mild mitral regurgitation.  Mild tricuspid regurgitation.  Estimated right ventricular systolic pressure is 25 mmHg.  Normal inferior vena cava size and inspiratory collapse.    ECHO (11/10/2014):  I have personally reviewed the echocardiogram this visit and reviewed the findings with the patient. It shows:   Grade 1 diastolic dysfunction, EF 60-65%, moderate LVH, left atrial enlargement    PFTs as above      Assessment:     1. Paroxysmal atrial fibrillation (CMS-HCC)     2. Dyspnea on exertion     3. Chronic anticoagulation     4. Type 2 diabetes mellitus without complication, unspecified long term insulin use status (CMS-HCC)           Medical Decision Making:  Today's Assessment / Status / Plan:     Doing well. Recovering from her viral respiratory illness. Palpitations are under control. Junction would be the next BX therapy 0.125 mg every other day followed by daily if tolerable. She would like to hold off on this for now.    Plan:    1. Continue rate control strategy with twice a day calcium channel blocker  2. Continue anticoagulation  3. We discussed flecainide with cardioversion and she would like to continue with a rate control strategy for the time being.      Follow-up 6 months

## 2018-02-23 ENCOUNTER — ANTICOAGULATION VISIT (OUTPATIENT)
Dept: MEDICAL GROUP | Facility: PHYSICIAN GROUP | Age: 83
End: 2018-02-23
Payer: MEDICARE

## 2018-02-23 DIAGNOSIS — Z79.01 CHRONIC ANTICOAGULATION: ICD-10-CM

## 2018-02-23 LAB — INR PPP: 3 (ref 2–3.5)

## 2018-02-23 PROCEDURE — 85610 PROTHROMBIN TIME: CPT | Performed by: FAMILY MEDICINE

## 2018-02-23 PROCEDURE — 99999 PR NO CHARGE: CPT | Performed by: FAMILY MEDICINE

## 2018-02-23 NOTE — PROGRESS NOTES
Anticoagulation Summary  As of 2/23/2018    INR goal:   2.0-3.0   TTR:   81.1 % (8.4 mo)   Today's INR:   3.0   Maintenance plan:   5 mg (5 mg x 1) on Mon, Wed, Fri; 2.5 mg (5 mg x 0.5) all other days   Weekly total:   25 mg   Plan last modified:   Rizwan Lipscomb, PharmD (9/15/2017)   Next INR check:   4/20/2018   Target end date:   Indefinite    Indications    Chronic anticoagulation [Z79.01]  AF (atrial fibrillation) (CMS-[facility]) [I48.91]             Anticoagulation Episode Summary     INR check location:       Preferred lab:       Send INR reminders to:       Comments:         Anticoagulation Care Providers     Provider Role Specialty Phone number    Grant Woodard M.D. Referring Interventional Cardiology [phone]    [facility] Anticoagulation Services Responsible  [phone]        Anticoagulation Patient Findings  Patient Findings     Negatives:   Signs/symptoms of thrombosis, Signs/symptoms of bleeding, Laboratory test error suspected, Change in health, Change in alcohol use, Change in activity, Upcoming invasive procedure, Emergency department visit, Upcoming dental procedure, Missed doses, Extra doses, Change in medications, Change in diet/appetite, Hospital admission, Bruising, Other complaints        HPI:   Saumya Vann seen in clinic today, on anticoagulation therapy with warfarin for stroke prevention due to history of atrial fibrillation.    Patient's previous INR was therapeutic at 2.9 on 12-29-17, at which time patient was instructed to continue with current warfarin regimen.  She returns to clinic today to recheck INR to ensure it is therapeutic and thus preventing possible clotting and/or bleeding/bruising complications.    CHADS-VASc = at least 5  (unadjusted ischemic stroke risk/year:  7.2%, which is moderate risk)    Does patient have any changes to current medical/health status since last appt (Y/N):  NO  Does patient have any signs/symptoms of bleeding and/or thrombosis since the last appt  (Y/N):  No  Does patient have any interval changes to diet or medications since last appt (Y/N):  NO  Are there any complications or cost restrictions with current therapy (Y/N):  NO      Vitals:  Patient declines vitals at this visit, she is cold and does not wish to remove layers.    Asssessment:      INR remains therapeutic at 3.0, therefore decreasing patient's risk of stroke and/or bleeding complications.   Reason(s) for out of range INR today:  n/a      Plan:  Pt is to continue with current warfarin dosing regimen as this has maintained INR in therapeutic range for some time.     Follow up:  Because warfarin is a high risk medication and current CHEST guidelines recommend regular monitoring intervals (few days up to 12 weeks), will have patient return to clinic in 8 weeks to recheck INR.    Rizwan Lipscomb, PharmD

## 2018-04-06 ENCOUNTER — APPOINTMENT (OUTPATIENT)
Dept: RADIOLOGY | Facility: MEDICAL CENTER | Age: 83
End: 2018-04-06
Attending: FAMILY MEDICINE
Payer: MEDICARE

## 2018-04-18 ENCOUNTER — HOSPITAL ENCOUNTER (OUTPATIENT)
Dept: RADIOLOGY | Facility: MEDICAL CENTER | Age: 83
End: 2018-04-18
Attending: FAMILY MEDICINE
Payer: MEDICARE

## 2018-04-18 DIAGNOSIS — Z12.31 VISIT FOR SCREENING MAMMOGRAM: ICD-10-CM

## 2018-04-18 PROCEDURE — 77063 BREAST TOMOSYNTHESIS BI: CPT

## 2018-04-20 ENCOUNTER — ANTICOAGULATION VISIT (OUTPATIENT)
Dept: MEDICAL GROUP | Facility: PHYSICIAN GROUP | Age: 83
End: 2018-04-20
Payer: MEDICARE

## 2018-04-20 VITALS — DIASTOLIC BLOOD PRESSURE: 84 MMHG | SYSTOLIC BLOOD PRESSURE: 108 MMHG | HEART RATE: 85 BPM

## 2018-04-20 DIAGNOSIS — Z79.01 CHRONIC ANTICOAGULATION: ICD-10-CM

## 2018-04-20 LAB — INR PPP: 3.5 (ref 2–3.5)

## 2018-04-20 PROCEDURE — 85610 PROTHROMBIN TIME: CPT | Performed by: INTERNAL MEDICINE

## 2018-04-20 PROCEDURE — 99211 OFF/OP EST MAY X REQ PHY/QHP: CPT | Performed by: INTERNAL MEDICINE

## 2018-04-20 NOTE — PROGRESS NOTES
Anticoagulation Summary  As of 4/20/2018    INR goal:   2.0-3.0   TTR:   66.4 % (10.3 mo)   Today's INR:   3.5!   Maintenance plan:   5 mg (5 mg x 1) on Mon, Wed; 2.5 mg (5 mg x 0.5) all other days   Weekly total:   22.5 mg   Plan last modified:   Rizwan Lipscomb, PharmD (4/20/2018)   Next INR check:   5/11/2018   Target end date:   Indefinite    Indications    Chronic anticoagulation [Z79.01]  AF (atrial fibrillation) (CMS-[facility]) [I48.91]             Anticoagulation Episode Summary     INR check location:       Preferred lab:       Send INR reminders to:       Comments:         Anticoagulation Care Providers     Provider Role Specialty Phone number    Grant Woodard M.D. Referring Interventional Cardiology [phone]    [facility] Anticoagulation Services Responsible  [phone]        Anticoagulation Patient Findings  Patient Findings     Negatives:   Signs/symptoms of thrombosis, Signs/symptoms of bleeding, Laboratory test error suspected, Change in health, Change in alcohol use, Change in activity, Upcoming invasive procedure, Emergency department visit, Upcoming dental procedure, Missed doses, Extra doses, Change in medications, Change in diet/appetite, Hospital admission, Bruising, Other complaints        HPI:   Saumya Vann seen in clinic today, on anticoagulation therapy with warfarin for stroke prevention due to history of atrial fibrillation.    Patient's previous INR was therapeutic at 3.0 on 2-23-18, at which time patient was instructed to continue with current warfarin regimen.  She returns to clinic today to recheck INR to ensure it is therapeutic and thus preventing possible clotting and/or bleeding/bruising complications.    CHADS-VASc = at least 5  (unadjusted ischemic stroke risk/year:  7.2%, which is moderate risk)    Does patient have any changes to current medical/health status since last appt (Y/N):  NO  Does patient have any signs/symptoms of bleeding and/or thrombosis since the last appt  "(Y/N):  NO  Does patient have any interval changes to diet or medications since last appt (Y/N):  NO  Are there any complications or cost restrictions with current therapy (Y/N):  NO      Vitals:  /84  HR 85    Weight  declines   Height   5' 2\"     Asssessment:      INR supratherapeutic at 3.5, therefore increasing patient's risk of bleeding complications due to warfarin   Reason(s) for out of range INR today:  No identifiable causes for high INR      Plan:  INR's having been trending up with each visit, will have patient decrease weekly warfarin regimen by ~10% as detailed above in order to bring INR back to therapeutic range.     Follow up:  Because warfarin is a high risk medication and current CHEST guidelines recommend regular monitoring intervals (few days up to 12 weeks), will have patient return to clinic in 3 weeks to recheck INR.    Rizwan Lipscomb, PharmD    "

## 2018-05-11 ENCOUNTER — ANTICOAGULATION VISIT (OUTPATIENT)
Dept: MEDICAL GROUP | Facility: PHYSICIAN GROUP | Age: 83
End: 2018-05-11
Payer: MEDICARE

## 2018-05-11 DIAGNOSIS — Z79.01 CHRONIC ANTICOAGULATION: Primary | ICD-10-CM

## 2018-05-11 LAB — INR PPP: 2.8 (ref 2–3.5)

## 2018-05-11 PROCEDURE — 85610 PROTHROMBIN TIME: CPT | Performed by: INTERNAL MEDICINE

## 2018-05-11 NOTE — PROGRESS NOTES
Anticoagulation Summary  As of 5/11/2018    INR goal:   2.0-3.0   TTR:   64.0 % (11 mo)   Today's INR:   2.8   Warfarin maintenance plan:   5 mg (5 mg x 1) on Mon, Wed; 2.5 mg (5 mg x 0.5) all other days   Weekly warfarin total:   22.5 mg   Plan last modified:   Rizwan Lipscomb, PharmD (4/20/2018)   Next INR check:   6/4/2018   Target end date:   Indefinite    Indications    Chronic anticoagulation [Z79.01]  AF (atrial fibrillation) ([facility]) [I48.91]             Anticoagulation Episode Summary     INR check location:       Preferred lab:       Send INR reminders to:       Comments:         Anticoagulation Care Providers     Provider Role Specialty Phone number    Grant Woodard M.D. Referring Interventional Cardiology [phone]    [facility] Anticoagulation Services Responsible  [phone]        Anticoagulation Patient Findings  Patient Findings     Negatives:   Signs/symptoms of thrombosis, Signs/symptoms of bleeding, Laboratory test error suspected, Change in health, Change in alcohol use, Change in activity, Upcoming invasive procedure, Emergency department visit, Upcoming dental procedure, Missed doses, Extra doses, Change in medications, Change in diet/appetite, Hospital admission, Bruising, Other complaints        HPI:   Saumya Vann seen in clinic today, on anticoagulation therapy with warfarin for stroke prevention due to history of atrial fibrillation.    Patient's previous INR was supratherapeutic at 3.5 on 4-20-18, at which time patient was instructed to decrease weekly warfarin regimen.  She returns to clinic today to recheck INR to ensure it is therapeutic and thus preventing possible clotting and/or bleeding/bruising complications.    CHADS-VASc = at least 5  (unadjusted ischemic stroke risk/year:  7.2%, which is moderate risk)    Does patient have any changes to current medical/health status since last appt (Y/N):  NO  Does patient have any signs/symptoms of bleeding and/or thrombosis since the last  "appt (Y/N):  NO  Does patient have any interval changes to diet or medications since last appt (Y/N):  NO  Are there any complications or cost restrictions with current therapy (Y/N):  NO      Vitals:  /78  HR 84    Weight  declines   Height   5' 2\"     Asssessment:      INR therapeutic at 2.8, therefore decreasing patient's risk of stroke and/or bleeding complications.   Reason(s) for out of range INR today:  n/a      Plan:  Pt is to continue with current warfarin dosing regimen in order to maintain INR in therapeutic range.     Follow up:  Because warfarin is a high risk medication and current CHEST guidelines recommend regular monitoring intervals (few days up to 12 weeks), will have patient return to clinic in 3 weeks to recheck INR.    Rizwan Lipscomb, PharmD    "

## 2018-06-04 ENCOUNTER — ANTICOAGULATION VISIT (OUTPATIENT)
Dept: MEDICAL GROUP | Facility: PHYSICIAN GROUP | Age: 83
End: 2018-06-04
Payer: MEDICARE

## 2018-06-04 VITALS — SYSTOLIC BLOOD PRESSURE: 121 MMHG | HEART RATE: 85 BPM | DIASTOLIC BLOOD PRESSURE: 80 MMHG

## 2018-06-04 DIAGNOSIS — Z79.01 CHRONIC ANTICOAGULATION: Primary | ICD-10-CM

## 2018-06-04 DIAGNOSIS — I48.0 PAROXYSMAL ATRIAL FIBRILLATION (HCC): ICD-10-CM

## 2018-06-04 LAB — INR PPP: 2.7 (ref 2–3.5)

## 2018-06-04 PROCEDURE — 85610 PROTHROMBIN TIME: CPT | Performed by: INTERNAL MEDICINE

## 2018-06-04 PROCEDURE — 99999 PR NO CHARGE: CPT | Performed by: INTERNAL MEDICINE

## 2018-06-04 NOTE — PROGRESS NOTES
Anticoagulation Summary  As of 6/4/2018    INR goal:   2.0-3.0   TTR:   66.4 % (11.8 mo)   Today's INR:   2.7   Warfarin maintenance plan:   5 mg (5 mg x 1) on Mon, Wed; 2.5 mg (5 mg x 0.5) all other days   Weekly warfarin total:   22.5 mg   Plan last modified:   Rizwan Lipscomb, PharmD (4/20/2018)   Next INR check:   7/2/2018   Target end date:   Indefinite    Indications    Chronic anticoagulation [Z79.01]  AF (atrial fibrillation) ([facility]) [I48.91]             Anticoagulation Episode Summary     INR check location:       Preferred lab:       Send INR reminders to:       Comments:         Anticoagulation Care Providers     Provider Role Specialty Phone number    Grant Woodard M.D. Referring Interventional Cardiology [phone]    [facility] Anticoagulation Services Responsible  [phone]        Anticoagulation Patient Findings  Patient Findings     Negatives:   Signs/symptoms of thrombosis, Signs/symptoms of bleeding, Laboratory test error suspected, Change in health, Change in alcohol use, Change in activity, Upcoming invasive procedure, Emergency department visit, Upcoming dental procedure, Missed doses, Extra doses, Change in medications, Change in diet/appetite, Hospital admission, Bruising, Other complaints        HPI:   Saumya Vann seen in clinic today, on anticoagulation therapy with warfarin for stroke prevention due to history of atrial fibrillation.    Patient's previous INR was therapeutic at 2.8 on 5-11-18, at which time patient was instructed to continue with current warfarin regimen.  She returns to clinic today to recheck INR to ensure it is therapeutic and thus preventing possible clotting and/or bleeding/bruising complications.    CHADS-VASc = at least 5  (unadjusted ischemic stroke risk/year:  7.2%, which is moderate risk)    Does patient have any changes to current medical/health status since last appt (Y/N):  No  Does patient have any signs/symptoms of bleeding and/or thrombosis since the  "last appt (Y/N):  NO  Does patient have any interval changes to diet or medications since last appt (Y/N):  NO  Are there any complications or cost restrictions with current therapy (Y/N):  NO      Vitals:  /80  HR 85    Weight  declines   Height   5' 2\"     Asssessment:      INR remains therapeutic at 2.7, therefore decreasing patient's risk of stroke and/or bleeding complications.   Reason(s) for out of range INR today:  n/a      Plan:  Pt is to continue with current warfarin dosing regimen in order to maintain INR in therapeutic range.     Follow up:  Because warfarin is a high risk medication and current CHEST guidelines recommend regular monitoring intervals (few days up to 12 weeks), will have patient return to clinic in 4 weeks to recheck INR.    Rizwan Lipscomb, PharmD    "

## 2018-06-08 ENCOUNTER — HOSPITAL ENCOUNTER (OUTPATIENT)
Dept: LAB | Facility: MEDICAL CENTER | Age: 83
End: 2018-06-08
Attending: FAMILY MEDICINE
Payer: MEDICARE

## 2018-06-08 LAB
ALBUMIN SERPL BCP-MCNC: 4.1 G/DL (ref 3.2–4.9)
ALBUMIN/GLOB SERPL: 1.2 G/DL
ALP SERPL-CCNC: 88 U/L (ref 30–99)
ALT SERPL-CCNC: 10 U/L (ref 2–50)
ANION GAP SERPL CALC-SCNC: 9 MMOL/L (ref 0–11.9)
AST SERPL-CCNC: 10 U/L (ref 12–45)
BASOPHILS # BLD AUTO: 0.7 % (ref 0–1.8)
BASOPHILS # BLD: 0.06 K/UL (ref 0–0.12)
BILIRUB SERPL-MCNC: 1.1 MG/DL (ref 0.1–1.5)
BUN SERPL-MCNC: 17 MG/DL (ref 8–22)
CALCIUM SERPL-MCNC: 9.4 MG/DL (ref 8.5–10.5)
CHLORIDE SERPL-SCNC: 100 MMOL/L (ref 96–112)
CHOLEST SERPL-MCNC: 202 MG/DL (ref 100–199)
CO2 SERPL-SCNC: 29 MMOL/L (ref 20–33)
CREAT SERPL-MCNC: 0.98 MG/DL (ref 0.5–1.4)
CREAT UR-MCNC: 178 MG/DL
EOSINOPHIL # BLD AUTO: 0.11 K/UL (ref 0–0.51)
EOSINOPHIL NFR BLD: 1.4 % (ref 0–6.9)
ERYTHROCYTE [DISTWIDTH] IN BLOOD BY AUTOMATED COUNT: 45.4 FL (ref 35.9–50)
EST. AVERAGE GLUCOSE BLD GHB EST-MCNC: 134 MG/DL
GLOBULIN SER CALC-MCNC: 3.4 G/DL (ref 1.9–3.5)
GLUCOSE SERPL-MCNC: 110 MG/DL (ref 65–99)
HBA1C MFR BLD: 6.3 % (ref 0–5.6)
HCT VFR BLD AUTO: 44.5 % (ref 37–47)
HDLC SERPL-MCNC: 49 MG/DL
HGB BLD-MCNC: 14.4 G/DL (ref 12–16)
IMM GRANULOCYTES # BLD AUTO: 0.06 K/UL (ref 0–0.11)
IMM GRANULOCYTES NFR BLD AUTO: 0.7 % (ref 0–0.9)
LDLC SERPL CALC-MCNC: 120 MG/DL
LYMPHOCYTES # BLD AUTO: 1.31 K/UL (ref 1–4.8)
LYMPHOCYTES NFR BLD: 16.1 % (ref 22–41)
MCH RBC QN AUTO: 28.7 PG (ref 27–33)
MCHC RBC AUTO-ENTMCNC: 32.4 G/DL (ref 33.6–35)
MCV RBC AUTO: 88.8 FL (ref 81.4–97.8)
MICROALBUMIN UR-MCNC: 1.4 MG/DL
MICROALBUMIN/CREAT UR: 8 MG/G (ref 0–30)
MONOCYTES # BLD AUTO: 0.71 K/UL (ref 0–0.85)
MONOCYTES NFR BLD AUTO: 8.7 % (ref 0–13.4)
NEUTROPHILS # BLD AUTO: 5.89 K/UL (ref 2–7.15)
NEUTROPHILS NFR BLD: 72.4 % (ref 44–72)
NRBC # BLD AUTO: 0 K/UL
NRBC BLD-RTO: 0 /100 WBC
PLATELET # BLD AUTO: 283 K/UL (ref 164–446)
PMV BLD AUTO: 9.3 FL (ref 9–12.9)
POTASSIUM SERPL-SCNC: 3.8 MMOL/L (ref 3.6–5.5)
PROT SERPL-MCNC: 7.5 G/DL (ref 6–8.2)
RBC # BLD AUTO: 5.01 M/UL (ref 4.2–5.4)
SODIUM SERPL-SCNC: 138 MMOL/L (ref 135–145)
TRIGL SERPL-MCNC: 164 MG/DL (ref 0–149)
TSH SERPL DL<=0.005 MIU/L-ACNC: 1.42 UIU/ML (ref 0.38–5.33)
WBC # BLD AUTO: 8.1 K/UL (ref 4.8–10.8)

## 2018-06-08 PROCEDURE — 83036 HEMOGLOBIN GLYCOSYLATED A1C: CPT

## 2018-06-08 PROCEDURE — 80053 COMPREHEN METABOLIC PANEL: CPT

## 2018-06-08 PROCEDURE — 82570 ASSAY OF URINE CREATININE: CPT

## 2018-06-08 PROCEDURE — 80061 LIPID PANEL: CPT

## 2018-06-08 PROCEDURE — 84443 ASSAY THYROID STIM HORMONE: CPT

## 2018-06-08 PROCEDURE — 82043 UR ALBUMIN QUANTITATIVE: CPT

## 2018-06-08 PROCEDURE — 85025 COMPLETE CBC W/AUTO DIFF WBC: CPT

## 2018-06-08 PROCEDURE — 36415 COLL VENOUS BLD VENIPUNCTURE: CPT

## 2018-06-18 DIAGNOSIS — I48.0 PAF (PAROXYSMAL ATRIAL FIBRILLATION) (HCC): ICD-10-CM

## 2018-06-18 RX ORDER — DILTIAZEM HYDROCHLORIDE 120 MG/1
120 CAPSULE, COATED, EXTENDED RELEASE ORAL 2 TIMES DAILY
Qty: 180 CAP | Refills: 3 | Status: SHIPPED | OUTPATIENT
Start: 2018-06-18 | End: 2019-06-18 | Stop reason: SDUPTHER

## 2018-06-20 DIAGNOSIS — I48.0 PAROXYSMAL ATRIAL FIBRILLATION (HCC): ICD-10-CM

## 2018-06-26 DIAGNOSIS — I48.0 PAROXYSMAL ATRIAL FIBRILLATION (HCC): ICD-10-CM

## 2018-06-26 RX ORDER — WARFARIN SODIUM 5 MG/1
TABLET ORAL
Qty: 90 TAB | Refills: 1 | Status: SHIPPED | OUTPATIENT
Start: 2018-06-26 | End: 2019-01-21

## 2018-07-02 ENCOUNTER — ANTICOAGULATION VISIT (OUTPATIENT)
Dept: MEDICAL GROUP | Facility: PHYSICIAN GROUP | Age: 83
End: 2018-07-02
Payer: MEDICARE

## 2018-07-02 VITALS — SYSTOLIC BLOOD PRESSURE: 126 MMHG | HEART RATE: 78 BPM | DIASTOLIC BLOOD PRESSURE: 76 MMHG

## 2018-07-02 DIAGNOSIS — Z79.01 CHRONIC ANTICOAGULATION: Primary | ICD-10-CM

## 2018-07-02 DIAGNOSIS — I48.0 PAROXYSMAL ATRIAL FIBRILLATION (HCC): ICD-10-CM

## 2018-07-02 LAB — INR PPP: 2.1 (ref 2–3.5)

## 2018-07-02 PROCEDURE — 99999 PR NO CHARGE: CPT | Performed by: NURSE PRACTITIONER

## 2018-07-02 PROCEDURE — 85610 PROTHROMBIN TIME: CPT | Performed by: NURSE PRACTITIONER

## 2018-07-02 NOTE — PROGRESS NOTES
Anticoagulation Summary  As of 7/2/2018    INR goal:   2.0-3.0   TTR:   68.9 % (1 y)   Today's INR:   2.1   Warfarin maintenance plan:   5 mg (5 mg x 1) on Mon, Wed; 2.5 mg (5 mg x 0.5) all other days   Weekly warfarin total:   22.5 mg   Plan last modified:   Rizwan Lipscomb, PharmD (4/20/2018)   Next INR check:   8/13/2018   Target end date:   Indefinite    Indications    Chronic anticoagulation [Z79.01]  AF (atrial fibrillation) ([facility]) [I48.91]             Anticoagulation Episode Summary     INR check location:       Preferred lab:       Send INR reminders to:       Comments:         Anticoagulation Care Providers     Provider Role Specialty Phone number    Grant Woodard M.D. Referring Interventional Cardiology [phone]    [facility] Anticoagulation Services Responsible  [phone]        Anticoagulation Patient Findings  Patient Findings     Negatives:   Signs/symptoms of thrombosis, Signs/symptoms of bleeding, Laboratory test error suspected, Change in health, Change in alcohol use, Change in activity, Upcoming invasive procedure, Emergency department visit, Upcoming dental procedure, Missed doses, Extra doses, Change in medications, Change in diet/appetite, Hospital admission, Bruising, Other complaints        HPI:   Saumya Vann seen in clinic today, on anticoagulation therapy with warfarin for stroke prevention due to history of atrial fibrillation.    Patient's previous INR was therapeutic at 2.7 on 6-4-18, at which time patient was instructed to continue with current warfarin regimen.  She returns to clinic today to recheck INR to ensure it is therapeutic and thus preventing possible clotting and/or bleeding/bruising complications.    CHADS-VASc = at least 5  (unadjusted ischemic stroke risk/year:  7.2%, which is moderate risk)    Does patient have any changes to current medical/health status since last appt (Y/N):  NO  Does patient have any signs/symptoms of bleeding and/or thrombosis since the last  "appt (Y/N):  NO  Does patient have any interval changes to diet or medications since last appt (Y/N):  NO  Are there any complications or cost restrictions with current therapy (Y/N):  NO      Vitals:  /76  HR 78    Weight  declines   Height   5' 2\"     Asssessment:      INR remains therapeutic at 2.1, therefore decreasing patient's risk of stroke and/or bleeding complications.   Reason(s) for out of range INR today:  n/a      Plan:  Pt is to continue with current warfarin dosing regimen in order to maintain INR in therapeutic range.     Follow up:  Because warfarin is a high risk medication and current CHEST guidelines recommend regular monitoring intervals (few days up to 12 weeks), will have patient return to clinic in 6 weeks to recheck INR.    Rizwan Lipscomb, PharmD    "

## 2018-08-13 ENCOUNTER — ANTICOAGULATION VISIT (OUTPATIENT)
Dept: MEDICAL GROUP | Facility: PHYSICIAN GROUP | Age: 83
End: 2018-08-13
Payer: MEDICARE

## 2018-08-13 VITALS — HEART RATE: 98 BPM | DIASTOLIC BLOOD PRESSURE: 75 MMHG | SYSTOLIC BLOOD PRESSURE: 98 MMHG

## 2018-08-13 DIAGNOSIS — I48.0 PAROXYSMAL ATRIAL FIBRILLATION (HCC): ICD-10-CM

## 2018-08-13 DIAGNOSIS — Z79.01 CHRONIC ANTICOAGULATION: Primary | ICD-10-CM

## 2018-08-13 LAB — INR PPP: 1.7 (ref 2–3.5)

## 2018-08-13 PROCEDURE — 85610 PROTHROMBIN TIME: CPT | Performed by: INTERNAL MEDICINE

## 2018-08-13 PROCEDURE — 99211 OFF/OP EST MAY X REQ PHY/QHP: CPT | Performed by: INTERNAL MEDICINE

## 2018-08-13 NOTE — PROGRESS NOTES
Anticoagulation Summary  As of 8/13/2018    INR goal:   2.0-3.0   TTR:   64.6 % (1.2 y)   Today's INR:   1.7!   Warfarin maintenance plan:   5 mg (5 mg x 1) on Mon, Wed; 2.5 mg (5 mg x 0.5) all other days   Weekly warfarin total:   22.5 mg   Plan last modified:   Rizwan Lipscomb, PharmD (4/20/2018)   Next INR check:   8/27/2018   Target end date:   Indefinite    Indications    Chronic anticoagulation [Z79.01]  AF (atrial fibrillation) ([facility]) [I48.91]             Anticoagulation Episode Summary     INR check location:       Preferred lab:       Send INR reminders to:       Comments:         Anticoagulation Care Providers     Provider Role Specialty Phone number    Grant Woodard M.D. Referring Interventional Cardiology [phone]    [facility] Anticoagulation Services Responsible  [phone]        Anticoagulation Patient Findings  Patient Findings     Negatives:   Signs/symptoms of thrombosis, Signs/symptoms of bleeding, Laboratory test error suspected, Change in health, Change in alcohol use, Change in activity, Upcoming invasive procedure, Emergency department visit, Upcoming dental procedure, Missed doses, Extra doses, Change in medications, Change in diet/appetite, Hospital admission, Bruising, Other complaints        HPI:   Saumya Vann seen in clinic today, on anticoagulation therapy with warfarin for stroke prevention due to history of atrial fibrillation.    Patient's previous INR was therapeutic at 2.1 on 7-2-18, at which time patient was instructed to continue with current warfarin regimen.  She returns to clinic today to recheck INR to ensure it is therapeutic and thus preventing possible clotting and/or bleeding/bruising complications.    CHADS-VASc = at least 5  (unadjusted ischemic stroke risk/year:  7.2%, which is moderate risk)    Does patient have any changes to current medical/health status since last appt (Y/N):  NO  Does patient have any signs/symptoms of bleeding and/or thrombosis since the  "last appt (Y/N):  NO  Does patient have any interval changes to diet or medications since last appt (Y/N):  NO  Are there any complications or cost restrictions with current therapy (Y/N):  NO      Vitals:  BP 98/75  HR 98    Weight  declines   Height   5' 2\"     Asssessment:      INR subtherapeutic at 1.7, therefore increasing patient's risk of stroke due to a-fib   Reason(s) for out of range INR today:  May have taken lower than instructed dose last week.      Plan:  Instructed patient to bolus with 7.5mg X 1, then resume current warfarin regimen in order to bring INR to therapeutic range.     Follow up:  Because warfarin is a high risk medication and current CHEST guidelines recommend regular monitoring intervals (few days up to 12 weeks), will have patient return to clinic in 2 weeks to recheck INR.    Rizwan Lipscomb, PharmD    "

## 2018-08-14 ENCOUNTER — PATIENT OUTREACH (OUTPATIENT)
Dept: HEALTH INFORMATION MANAGEMENT | Facility: OTHER | Age: 83
End: 2018-08-14

## 2018-08-14 ENCOUNTER — OFFICE VISIT (OUTPATIENT)
Dept: CARDIOLOGY | Facility: MEDICAL CENTER | Age: 83
End: 2018-08-14
Payer: MEDICARE

## 2018-08-14 VITALS
HEART RATE: 96 BPM | HEIGHT: 62 IN | OXYGEN SATURATION: 94 % | BODY MASS INDEX: 38.87 KG/M2 | SYSTOLIC BLOOD PRESSURE: 110 MMHG | DIASTOLIC BLOOD PRESSURE: 62 MMHG | WEIGHT: 211.2 LBS

## 2018-08-14 DIAGNOSIS — I48.19 PERSISTENT ATRIAL FIBRILLATION (HCC): ICD-10-CM

## 2018-08-14 DIAGNOSIS — R53.83 OTHER FATIGUE: ICD-10-CM

## 2018-08-14 DIAGNOSIS — Z79.01 CHRONIC ANTICOAGULATION: ICD-10-CM

## 2018-08-14 DIAGNOSIS — E11.9 TYPE 2 DIABETES MELLITUS WITHOUT COMPLICATION, UNSPECIFIED WHETHER LONG TERM INSULIN USE (HCC): ICD-10-CM

## 2018-08-14 DIAGNOSIS — I10 ESSENTIAL HYPERTENSION: ICD-10-CM

## 2018-08-14 PROCEDURE — 99215 OFFICE O/P EST HI 40 MIN: CPT | Performed by: INTERNAL MEDICINE

## 2018-08-14 NOTE — PROGRESS NOTES
Subjective:   Saumya Vann is a 87 y.o. female who presents today for followup of PAF chronic anticoagulation. She has had exertional dyspnea over the past 3 years associated with fatigue. She has a history of HTN, diabetes mellitus, CKD . Her echo is relatively unremarkable aside from LVH and grade 1 diastolic function. PFTs per her primary doctor are unremarkable. She denies any exertional chest pain, PND or orthopnea.     She had continued symptomatic PAF with poorly controlled rates. She underwent PET stress test which was normal. We increased her diltiazem to twice daily which has dramatically improved her symptomology. She feels much improved and is still having paroxysms of A. fib but they are not as bothersome.  She now complains of some fatigue associated with low blood pressures.  She brings a log showing some blood pressures in the 90s systolic.  Her atrial for ablation has not been bothering her and she has been taking her medications as directed.      Family History   Problem Relation Age of Onset   • Cancer Other      History   Smoking Status   • Never Smoker   Smokeless Tobacco   • Never Used     Allergies   Allergen Reactions   • Pcn [Penicillins]      Outpatient Encounter Prescriptions as of 8/14/2018   Medication Sig Dispense Refill   • warfarin (COUMADIN) 5 MG Tab Take 1/2-1 tablet by mouth daily as directed by Anticoagulation Program 90 Tab 1   • DILTIAZem CD (CARDIZEM CD) 120 MG CAPSULE SR 24 HR Take 1 Cap by mouth 2 Times a Day. 180 Cap 3   • omeprazole (PRILOSEC) 20 MG delayed-release capsule Take 20 mg by mouth every day.     • Bevacizumab (AVASTIN IV) by Intravenous route.     • clobetasol (TEMOVATE) 0.05 % Ointment      • ONE TOUCH ULTRA TEST strip      • latanoprost (XALATAN) 0.005 % Solution      • neomycin-polymixin-dexamethasone (MAXITROL) 3.5-52904-9.1 Ointment ophthalmic ointment      • spironolactone/hctz (ALDACTAZIDE) 25-25 MG TABS Take  by mouth. Taking 1/2 tablet in the am    "  • levothyroxine (SYNTHROID) 150 MCG TABS Take 150 mcg by mouth every day.     • losartan (COZAAR) 100 MG TABS Take 100 mg by mouth every day.     • OMEPRAZOLE PO Take  by mouth.     • cyclobenzaprine (FLEXERIL) 5 MG tablet Take 1 Tab by mouth 3 times a day as needed for Muscle Spasms. 30 Tab 0   • atorvastatin (LIPITOR) 20 MG Tab      • glipiZIDE SR (GLUCOTROL) 2.5 MG TABLET SR 24 HR        No facility-administered encounter medications on file as of 8/14/2018.      Review of Systems   All other systems reviewed and are negative.       Objective:   /62   Pulse 96   Ht 1.575 m (5' 2\")   Wt 95.8 kg (211 lb 3.2 oz)   SpO2 94%   BMI 38.63 kg/m²     Physical Exam   Constitutional: She is oriented to person, place, and time. She appears well-developed and well-nourished. No distress.   HENT:   Head: Normocephalic and atraumatic.   Mouth/Throat: Oropharynx is clear and moist. No oropharyngeal exudate.   Eyes: Pupils are equal, round, and reactive to light. Conjunctivae are normal. No scleral icterus.   Neck: Normal range of motion. Neck supple. No JVD present. No thyromegaly present.   Cardiovascular: Normal rate, normal heart sounds and intact distal pulses.  An irregularly irregular rhythm present. Frequent extrasystoles are present. PMI is not displaced.  Exam reveals no gallop and no friction rub.    No murmur heard.  Pulses:       Carotid pulses are 2+ on the right side, and 2+ on the left side.       Femoral pulses are 2+ on the right side, and 2+ on the left side.       Popliteal pulses are 1+ on the right side, and 1+ on the left side.        Dorsalis pedis pulses are 2+ on the right side, and 2+ on the left side.        Posterior tibial pulses are 2+ on the right side, and 2+ on the left side.   Pulmonary/Chest: Effort normal and breath sounds normal. She has no wheezes. She has no rales.   Abdominal: Soft. Bowel sounds are normal. She exhibits no distension. There is no tenderness.   Musculoskeletal: " She exhibits no edema or tenderness.   Neurological: She is alert and oriented to person, place, and time. No cranial nerve deficit.   Skin: Skin is warm and dry. No rash noted. She is not diaphoretic. No erythema.   Psychiatric: She has a normal mood and affect. Her behavior is normal.   No changes since prior physical exam dated 2/2/2018    LABS:  Lab Results   Component Value Date/Time    CHOLSTRLTOT 202 (H) 06/08/2018 10:34 AM     (H) 06/08/2018 10:34 AM    HDL 49 06/08/2018 10:34 AM    TRIGLYCERIDE 164 (H) 06/08/2018 10:34 AM       Lab Results   Component Value Date/Time    WBC 8.1 06/08/2018 10:34 AM    RBC 5.01 06/08/2018 10:34 AM    HEMOGLOBIN 14.4 06/08/2018 10:34 AM    HEMATOCRIT 44.5 06/08/2018 10:34 AM    MCV 88.8 06/08/2018 10:34 AM    NEUTSPOLYS 72.40 (H) 06/08/2018 10:34 AM    LYMPHOCYTES 16.10 (L) 06/08/2018 10:34 AM    MONOCYTES 8.70 06/08/2018 10:34 AM    EOSINOPHILS 1.40 06/08/2018 10:34 AM    BASOPHILS 0.70 06/08/2018 10:34 AM    HYPOCHROMIA 1+ 04/10/2014 03:03 PM     Lab Results   Component Value Date/Time    SODIUM 138 06/08/2018 10:34 AM    POTASSIUM 3.8 06/08/2018 10:34 AM    CHLORIDE 100 06/08/2018 10:34 AM    CO2 29 06/08/2018 10:34 AM    GLUCOSE 110 (H) 06/08/2018 10:34 AM    BUN 17 06/08/2018 10:34 AM    CREATININE 0.98 06/08/2018 10:34 AM     Lab Results   Component Value Date    HBA1C 6.3 (H) 06/08/2018      Lab Results   Component Value Date/Time    ALKPHOSPHAT 88 06/08/2018 10:34 AM    ASTSGOT 10 (L) 06/08/2018 10:34 AM    ALTSGPT 10 06/08/2018 10:34 AM    TBILIRUBIN 1.1 06/08/2018 10:34 AM      No results found for: BNPBTYPENAT   No results found for: TSH  Lab Results   Component Value Date/Time    INR 1.7 08/13/2018 11:02 AM        STRESS (6/2017):  CONCLUSIONS AND IMPRESSIONS:  Overall, there is no evidence of myocardial    ischemia based on EKG tracing along with myocardial PET scan images.  The    rhythm is atrial fibrillation throughout the stress test.  No malignant     arrhythmias noted.    ECHO CONCLUSIONS (4/27/2016):  Normal left ventricular size and systolic function.  Left ventricular ejection fraction is visually estimated to be 55%.  Moderate concentric left ventricular hypertrophy.  Normal regional wall motion.  Mild mitral regurgitation.  Mild tricuspid regurgitation.  Estimated right ventricular systolic pressure is 25 mmHg.  Normal inferior vena cava size and inspiratory collapse.    ECHO (11/10/2014):  I have personally reviewed the echocardiogram this visit and reviewed the findings with the patient. It shows:   Grade 1 diastolic dysfunction, EF 60-65%, moderate LVH, left atrial enlargement    PFTs as above    Today I reviewed the medical, surgical, social and family histories with the patient. As per HPI, otherwise noncontributory.      Assessment:     1. Persistent atrial fibrillation (HCC)     2. Chronic anticoagulation     3. Type 2 diabetes mellitus without complication, unspecified whether long term insulin use (HCC)     4. Essential hypertension     5. Other fatigue           Medical Decision Making:  Today's Assessment / Status / Plan:     Doing well.  Related to relatively lower blood pressures.  Atrial for ablation is well-controlled on her twice daily calcium channel blocker.  She is on high-dose losartan as well.  As her blood pressures are quite low will recommend cutting her losartan in half for several weeks monitoring her blood pressure.  If this is improving the situation and her blood pressures are not too high than cutting it out altogether would be reasonable.  She should continue her anticoagulation and other medications.    Plan:    1. Continue rate control strategy with twice a day calcium channel blocker  2. Continue anticoagulation  3. We discussed flecainide with cardioversion as well as digoxin and she would like to continue with a rate control strategy for the time being.  4.  Decrease losartan to 50 mg daily and titrate further as  above.      Follow-up 6 months      Physical Exam   Constitutional: She is oriented to person, place, and time. She appears well-developed and well-nourished. No distress.   HENT:   Head: Normocephalic and atraumatic.   Mouth/Throat: Oropharynx is clear and moist. No oropharyngeal exudate.   Eyes: Pupils are equal, round, and reactive to light. Conjunctivae are normal. No scleral icterus.   Neck: Normal range of motion. Neck supple. No JVD present. No thyromegaly present.   Cardiovascular: Normal rate, normal heart sounds and intact distal pulses.  An irregularly irregular rhythm present. Frequent extrasystoles are present. PMI is not displaced.  Exam reveals no gallop and no friction rub.    No murmur heard.  Pulses:       Carotid pulses are 2+ on the right side, and 2+ on the left side.       Femoral pulses are 2+ on the right side, and 2+ on the left side.       Popliteal pulses are 1+ on the right side, and 1+ on the left side.        Dorsalis pedis pulses are 2+ on the right side, and 2+ on the left side.        Posterior tibial pulses are 2+ on the right side, and 2+ on the left side.   Pulmonary/Chest: Effort normal and breath sounds normal. She has no wheezes. She has no rales.   Abdominal: Soft. Bowel sounds are normal. She exhibits no distension. There is no tenderness.   Musculoskeletal: She exhibits no edema or tenderness.   Neurological: She is alert and oriented to person, place, and time. No cranial nerve deficit.   Skin: Skin is warm and dry. No rash noted. She is not diaphoretic. No erythema.   Psychiatric: She has a normal mood and affect. Her behavior is normal.   No changes as above

## 2018-08-14 NOTE — PROGRESS NOTES
Outcome: No answer/ No VM    Please transfer to Patient Outreach Team at [phone] when patient returns call.    Web[employer] Checked & Epic Updated:  yes    [facility] Verified: yes    Attempt # 1

## 2018-08-27 ENCOUNTER — HOSPITAL ENCOUNTER (OUTPATIENT)
Dept: LAB | Facility: MEDICAL CENTER | Age: 83
End: 2018-08-27
Attending: INTERNAL MEDICINE
Payer: MEDICARE

## 2018-08-27 ENCOUNTER — ANTICOAGULATION VISIT (OUTPATIENT)
Dept: MEDICAL GROUP | Facility: PHYSICIAN GROUP | Age: 83
End: 2018-08-27
Payer: MEDICARE

## 2018-08-27 VITALS — DIASTOLIC BLOOD PRESSURE: 86 MMHG | SYSTOLIC BLOOD PRESSURE: 124 MMHG | HEART RATE: 89 BPM

## 2018-08-27 DIAGNOSIS — I48.19 PERSISTENT ATRIAL FIBRILLATION (HCC): ICD-10-CM

## 2018-08-27 DIAGNOSIS — Z79.01 CHRONIC ANTICOAGULATION: Primary | ICD-10-CM

## 2018-08-27 LAB
25(OH)D3 SERPL-MCNC: 14 NG/ML (ref 30–100)
ALBUMIN SERPL BCP-MCNC: 4 G/DL (ref 3.2–4.9)
ALBUMIN/GLOB SERPL: 1.3 G/DL
ALP SERPL-CCNC: 84 U/L (ref 30–99)
ALT SERPL-CCNC: 11 U/L (ref 2–50)
ANION GAP SERPL CALC-SCNC: 11 MMOL/L (ref 0–11.9)
APPEARANCE UR: ABNORMAL
AST SERPL-CCNC: 19 U/L (ref 12–45)
BACTERIA #/AREA URNS HPF: ABNORMAL /HPF
BASOPHILS # BLD AUTO: 1 % (ref 0–1.8)
BASOPHILS # BLD: 0.08 K/UL (ref 0–0.12)
BILIRUB SERPL-MCNC: 1 MG/DL (ref 0.1–1.5)
BILIRUB UR QL STRIP.AUTO: NEGATIVE
BUN SERPL-MCNC: 16 MG/DL (ref 8–22)
CALCIUM SERPL-MCNC: 9.5 MG/DL (ref 8.5–10.5)
CHLORIDE SERPL-SCNC: 99 MMOL/L (ref 96–112)
CHOLEST SERPL-MCNC: 192 MG/DL (ref 100–199)
CO2 SERPL-SCNC: 27 MMOL/L (ref 20–33)
COLOR UR: YELLOW
CREAT SERPL-MCNC: 1.15 MG/DL (ref 0.5–1.4)
CREAT UR-MCNC: 421.7 MG/DL
EOSINOPHIL # BLD AUTO: 0.09 K/UL (ref 0–0.51)
EOSINOPHIL NFR BLD: 1.1 % (ref 0–6.9)
EPI CELLS #/AREA URNS HPF: ABNORMAL /HPF
ERYTHROCYTE [DISTWIDTH] IN BLOOD BY AUTOMATED COUNT: 47.2 FL (ref 35.9–50)
EST. AVERAGE GLUCOSE BLD GHB EST-MCNC: 134 MG/DL
FERRITIN SERPL-MCNC: 58.1 NG/ML (ref 10–291)
GLOBULIN SER CALC-MCNC: 3.2 G/DL (ref 1.9–3.5)
GLUCOSE SERPL-MCNC: 115 MG/DL (ref 65–99)
GLUCOSE UR STRIP.AUTO-MCNC: NEGATIVE MG/DL
HBA1C MFR BLD: 6.3 % (ref 0–5.6)
HCT VFR BLD AUTO: 45.3 % (ref 37–47)
HDLC SERPL-MCNC: 50 MG/DL
HGB BLD-MCNC: 14.9 G/DL (ref 12–16)
HYALINE CASTS #/AREA URNS LPF: >20 /LPF
IMM GRANULOCYTES # BLD AUTO: 0.03 K/UL (ref 0–0.11)
IMM GRANULOCYTES NFR BLD AUTO: 0.4 % (ref 0–0.9)
INR PPP: 2.7 (ref 2–3.5)
IRON SATN MFR SERPL: 31 % (ref 15–55)
IRON SERPL-MCNC: 111 UG/DL (ref 40–170)
KETONES UR STRIP.AUTO-MCNC: ABNORMAL MG/DL
LDLC SERPL CALC-MCNC: 113 MG/DL
LEUKOCYTE ESTERASE UR QL STRIP.AUTO: ABNORMAL
LYMPHOCYTES # BLD AUTO: 1.27 K/UL (ref 1–4.8)
LYMPHOCYTES NFR BLD: 16.2 % (ref 22–41)
MAGNESIUM SERPL-MCNC: 1.8 MG/DL (ref 1.5–2.5)
MCH RBC QN AUTO: 29.6 PG (ref 27–33)
MCHC RBC AUTO-ENTMCNC: 32.9 G/DL (ref 33.6–35)
MCV RBC AUTO: 89.9 FL (ref 81.4–97.8)
MICRO URNS: ABNORMAL
MONOCYTES # BLD AUTO: 0.54 K/UL (ref 0–0.85)
MONOCYTES NFR BLD AUTO: 6.9 % (ref 0–13.4)
NEUTROPHILS # BLD AUTO: 5.83 K/UL (ref 2–7.15)
NEUTROPHILS NFR BLD: 74.4 % (ref 44–72)
NITRITE UR QL STRIP.AUTO: NEGATIVE
NRBC # BLD AUTO: 0 K/UL
NRBC BLD-RTO: 0 /100 WBC
PH UR STRIP.AUTO: 7 [PH]
PHOSPHATE SERPL-MCNC: 3 MG/DL (ref 2.5–4.5)
PLATELET # BLD AUTO: 290 K/UL (ref 164–446)
PMV BLD AUTO: 9.4 FL (ref 9–12.9)
POTASSIUM SERPL-SCNC: 3.6 MMOL/L (ref 3.6–5.5)
PROT SERPL-MCNC: 7.2 G/DL (ref 6–8.2)
PROT UR QL STRIP: 30 MG/DL
PROT UR-MCNC: 21.2 MG/DL (ref 0–15)
PROT/CREAT UR: 50 MG/G (ref 10–107)
PTH-INTACT SERPL-MCNC: 69.2 PG/ML (ref 14–72)
RBC # BLD AUTO: 5.04 M/UL (ref 4.2–5.4)
RBC # URNS HPF: ABNORMAL /HPF
RBC UR QL AUTO: NEGATIVE
RENAL EPI CELLS #/AREA URNS HPF: ABNORMAL /HPF
SODIUM SERPL-SCNC: 137 MMOL/L (ref 135–145)
SP GR UR STRIP.AUTO: 1.02
TIBC SERPL-MCNC: 363 UG/DL (ref 250–450)
TRIGL SERPL-MCNC: 143 MG/DL (ref 0–149)
URATE SERPL-MCNC: 8 MG/DL (ref 1.9–8.2)
UROBILINOGEN UR STRIP.AUTO-MCNC: 0.2 MG/DL
WBC # BLD AUTO: 7.8 K/UL (ref 4.8–10.8)
WBC #/AREA URNS HPF: ABNORMAL /HPF

## 2018-08-27 PROCEDURE — 80061 LIPID PANEL: CPT

## 2018-08-27 PROCEDURE — 82728 ASSAY OF FERRITIN: CPT

## 2018-08-27 PROCEDURE — 84156 ASSAY OF PROTEIN URINE: CPT

## 2018-08-27 PROCEDURE — 83036 HEMOGLOBIN GLYCOSYLATED A1C: CPT

## 2018-08-27 PROCEDURE — 82306 VITAMIN D 25 HYDROXY: CPT

## 2018-08-27 PROCEDURE — 99999 PR NO CHARGE: CPT | Performed by: NURSE PRACTITIONER

## 2018-08-27 PROCEDURE — 81001 URINALYSIS AUTO W/SCOPE: CPT

## 2018-08-27 PROCEDURE — 82570 ASSAY OF URINE CREATININE: CPT

## 2018-08-27 PROCEDURE — 83970 ASSAY OF PARATHORMONE: CPT

## 2018-08-27 PROCEDURE — 36415 COLL VENOUS BLD VENIPUNCTURE: CPT

## 2018-08-27 PROCEDURE — 85025 COMPLETE CBC W/AUTO DIFF WBC: CPT

## 2018-08-27 PROCEDURE — 85610 PROTHROMBIN TIME: CPT | Performed by: NURSE PRACTITIONER

## 2018-08-27 PROCEDURE — 83735 ASSAY OF MAGNESIUM: CPT

## 2018-08-27 PROCEDURE — 83550 IRON BINDING TEST: CPT

## 2018-08-27 PROCEDURE — 84550 ASSAY OF BLOOD/URIC ACID: CPT

## 2018-08-27 PROCEDURE — 83540 ASSAY OF IRON: CPT

## 2018-08-27 PROCEDURE — 80053 COMPREHEN METABOLIC PANEL: CPT

## 2018-08-27 PROCEDURE — 84100 ASSAY OF PHOSPHORUS: CPT

## 2018-08-27 NOTE — PROGRESS NOTES
Anticoagulation Summary  As of 8/27/2018    INR goal:   2.0-3.0   TTR:   64.7 % (1.2 y)   Today's INR:   2.7   Warfarin maintenance plan:   5 mg (5 mg x 1) on Mon, Wed; 2.5 mg (5 mg x 0.5) all other days   Weekly warfarin total:   22.5 mg   Plan last modified:   Rizwan Lipscomb, PharmD (4/20/2018)   Next INR check:   9/24/2018   Target end date:   Indefinite    Indications    Chronic anticoagulation [Z79.01]  AF (atrial fibrillation) ([facility]) [I48.91]             Anticoagulation Episode Summary     INR check location:       Preferred lab:       Send INR reminders to:       Comments:         Anticoagulation Care Providers     Provider Role Specialty Phone number    Grant Woodard M.D. Referring Interventional Cardiology [phone]    [facility] Anticoagulation Services Responsible  [phone]        Anticoagulation Patient Findings  Patient Findings     Negatives:   Signs/symptoms of thrombosis, Signs/symptoms of bleeding, Laboratory test error suspected, Change in health, Change in alcohol use, Change in activity, Upcoming invasive procedure, Emergency department visit, Upcoming dental procedure, Missed doses, Extra doses, Change in medications, Change in diet/appetite, Hospital admission, Bruising, Other complaints        HPI:   Saumya Vann seen in clinic today, on anticoagulation therapy with warfarin for stroke prevention due to history of atrial fibrillation.    Patient's previous INR was subtherapeutic at 1.7 on 8-13-18, at which time patient was instructed to bolus with one dose, then resume current warfarin regimen.  She returns to clinic today to recheck INR to ensure it is therapeutic and thus preventing possible clotting and/or bleeding/bruising complications.    CHADS-VASc = at least 5  (unadjusted ischemic stroke risk/year:  7.2%, which is moderate risk)    Does patient have any changes to current medical/health status since last appt (Y/N):  NO  Does patient have any signs/symptoms of bleeding and/or  "thrombosis since the last appt (Y/N):  NO  Does patient have any interval changes to diet or medications since last appt (Y/N):  NO  Are there any complications or cost restrictions with current therapy (Y/N):  NO      Vitals:  /86  HR 89    Weight  declines   Height   5' 2\"     Asssessment:      INR therapeutic at 2.7, therefore decreasing patient's risk of stroke and/or bleeding complications   Reason(s) for out of range INR today:  n/a      Plan:  Pt is to continue with current warfarin dosing regimen in order maintain INR in therapeutic range.     Follow up:  Because warfarin is a high risk medication and current CHEST guidelines recommend regular monitoring intervals (few days up to 12 weeks), will have patient return to clinic in 4 weeks to recheck INR.    Rizwan Lipscomb, PharmD    "

## 2018-09-24 ENCOUNTER — ANTICOAGULATION VISIT (OUTPATIENT)
Dept: MEDICAL GROUP | Facility: PHYSICIAN GROUP | Age: 83
End: 2018-09-24
Payer: MEDICARE

## 2018-09-24 VITALS — SYSTOLIC BLOOD PRESSURE: 115 MMHG | DIASTOLIC BLOOD PRESSURE: 78 MMHG | HEART RATE: 75 BPM

## 2018-09-24 DIAGNOSIS — Z79.01 CHRONIC ANTICOAGULATION: Primary | ICD-10-CM

## 2018-09-24 DIAGNOSIS — I48.19 PERSISTENT ATRIAL FIBRILLATION (HCC): ICD-10-CM

## 2018-09-24 LAB — INR PPP: 3.7 (ref 2–3.5)

## 2018-09-24 PROCEDURE — 99211 OFF/OP EST MAY X REQ PHY/QHP: CPT | Performed by: INTERNAL MEDICINE

## 2018-09-24 PROCEDURE — 85610 PROTHROMBIN TIME: CPT | Performed by: INTERNAL MEDICINE

## 2018-09-24 NOTE — PROGRESS NOTES
Anticoagulation Summary  As of 9/24/2018    INR goal:   2.0-3.0   TTR:   62.6 % (1.3 y)   Today's INR:   3.7!   Warfarin maintenance plan:   5 mg (5 mg x 1) on Mon, Wed; 2.5 mg (5 mg x 0.5) all other days   Weekly warfarin total:   22.5 mg   Plan last modified:   Rizwan Lipscomb, PharmD (4/20/2018)   Next INR check:   10/8/2018   Target end date:   Indefinite    Indications    Chronic anticoagulation [Z79.01]  AF (atrial fibrillation) ([facility]) [I48.91]             Anticoagulation Episode Summary     INR check location:       Preferred lab:       Send INR reminders to:       Comments:         Anticoagulation Care Providers     Provider Role Specialty Phone number    Grant Woodard M.D. Referring Interventional Cardiology [phone]    [facility] Anticoagulation Services Responsible  [phone]        Anticoagulation Patient Findings  Patient Findings     Negatives:   Signs/symptoms of thrombosis, Signs/symptoms of bleeding, Laboratory test error suspected, Change in health, Change in alcohol use, Change in activity, Upcoming invasive procedure, Emergency department visit, Upcoming dental procedure, Missed doses, Extra doses, Change in medications, Change in diet/appetite, Hospital admission, Bruising, Other complaints        HPI:   Saumya Vann seen in clinic today, on anticoagulation therapy with warfarin for stroke prevention due to history of atrial fibrillation.    Patient's previous INR was therapeutic at 2.7 on 8-27-18, at which time patient was instructed to continue with current warfarin regimen.  She returns to clinic today to recheck INR to ensure it is therapeutic and thus preventing possible clotting and/or bleeding/bruising complications.    CHADS-VASc = at least 5  (unadjusted ischemic stroke risk/year:  7.2%, which is moderate risk)    Does patient have any changes to current medical/health status since last appt (Y/N):  NO  Does patient have any signs/symptoms of bleeding and/or thrombosis since the  last appt (Y/N):  No  Does patient have any interval changes to diet or medications since last appt (Y/N):  NO  Are there any complications or cost restrictions with current therapy (Y/N):  NO      Vitals:  /78  HR 75     Asssessment:      INR supratherapeutic at 3.7, therefore increasing patient's risk of bleeding complications due to warfarin.   Reason(s) for out of range INR today:  No identifiable causes for high INR      Plan:  Instructed patient to HOLD X 1, then resume current warfarin regimen in order to bring INR back to therapeutic range.     Follow up:  Because warfarin is a high risk medication and current CHEST guidelines recommend regular monitoring intervals (few days up to 12 weeks), will have patient return to clinic in 2 weeks to recheck INR.    Rizwan Lipscomb, PharmD

## 2018-10-08 ENCOUNTER — ANTICOAGULATION VISIT (OUTPATIENT)
Dept: MEDICAL GROUP | Facility: PHYSICIAN GROUP | Age: 83
End: 2018-10-08
Payer: MEDICARE

## 2018-10-08 VITALS — HEART RATE: 75 BPM | DIASTOLIC BLOOD PRESSURE: 84 MMHG | SYSTOLIC BLOOD PRESSURE: 109 MMHG

## 2018-10-08 DIAGNOSIS — I48.19 PERSISTENT ATRIAL FIBRILLATION (HCC): ICD-10-CM

## 2018-10-08 DIAGNOSIS — Z79.01 CHRONIC ANTICOAGULATION: Primary | ICD-10-CM

## 2018-10-08 LAB — INR PPP: 2.6 (ref 2–3.5)

## 2018-10-08 PROCEDURE — 99999 PR NO CHARGE: CPT | Performed by: INTERNAL MEDICINE

## 2018-10-08 PROCEDURE — 85610 PROTHROMBIN TIME: CPT | Performed by: INTERNAL MEDICINE

## 2018-10-08 NOTE — PROGRESS NOTES
"Anticoagulation Summary  As of 10/8/2018    INR goal:   2.0-3.0   TTR:   62.6 % (1.3 y)   Today's INR:      Warfarin maintenance plan:   5 mg (5 mg x 1) on Mon, Wed; 2.5 mg (5 mg x 0.5) all other days   Weekly warfarin total:   22.5 mg   Plan last modified:   Rizwan Lipscomb, PharmD (4/20/2018)   Next INR check:      Target end date:   Indefinite    Indications    Chronic anticoagulation [Z79.01]  AF (atrial fibrillation) ([facility]) [I48.91]             Anticoagulation Episode Summary     INR check location:       Preferred lab:       Send INR reminders to:       Comments:         Anticoagulation Care Providers     Provider Role Specialty Phone number    Grant Woodard M.D. Referring Interventional Cardiology [phone]    [facility] Anticoagulation Services Responsible  [phone]        Anticoagulation Patient Findings    HPI:   Saumya Vann seen in clinic today, on anticoagulation therapy with warfarin for stroke prevention due to history of atrial fibrillation.    Patient's previous INR was supratherapeutic at 3.7 on 9-24-18, at which time patient was instructed to hold one dose, then resume current warfarin regimen.  She returns to clinic today to recheck INR to ensure it is therapeutic and thus preventing possible clotting and/or bleeding/bruising complications.    CHADS-VASc = at least 5  (unadjusted ischemic stroke risk/year:  7.2%, which is moderate risk)    Does patient have any changes to current medical/health status since last appt (Y/N):  NO  Does patient have any signs/symptoms of bleeding and/or thrombosis since the last appt (Y/N):  NO  Does patient have any interval changes to diet or medications since last appt (Y/N):  NO  Are there any complications or cost restrictions with current therapy (Y/N):  NO      Vitals:  /84  HR 75    Weight  declines   Height   5' 2\"     Asssessment:      INR therapeutic at 2.6, therefore decreasing patient's risk of stroke and/or bleeding complications. "   Reason(s) for out of range INR today:  n/a      Plan:  Pt is to continue with current warfarin dosing regimen in order to maintain INR in therapeutic range.     Follow up:  Because warfarin is a high risk medication and current CHEST guidelines recommend regular monitoring intervals (few days up to 12 weeks), will have patient return to clinic in 2 weeks to recheck INR.    Rizwan Lipscomb, PharmD

## 2018-10-22 ENCOUNTER — ANTICOAGULATION VISIT (OUTPATIENT)
Dept: MEDICAL GROUP | Facility: PHYSICIAN GROUP | Age: 83
End: 2018-10-22
Payer: MEDICARE

## 2018-10-22 VITALS — DIASTOLIC BLOOD PRESSURE: 93 MMHG | HEART RATE: 83 BPM | SYSTOLIC BLOOD PRESSURE: 112 MMHG

## 2018-10-22 DIAGNOSIS — I48.19 PERSISTENT ATRIAL FIBRILLATION (HCC): ICD-10-CM

## 2018-10-22 DIAGNOSIS — Z79.01 CHRONIC ANTICOAGULATION: Primary | ICD-10-CM

## 2018-10-22 LAB — INR PPP: 2 (ref 2–3.5)

## 2018-10-22 PROCEDURE — 99999 PR NO CHARGE: CPT | Performed by: INTERNAL MEDICINE

## 2018-10-22 PROCEDURE — 85610 PROTHROMBIN TIME: CPT | Performed by: INTERNAL MEDICINE

## 2018-10-22 NOTE — PROGRESS NOTES
Anticoagulation Summary  As of 10/22/2018    INR goal:   2.0-3.0   TTR:   63.0 % (1.4 y)   Today's INR:   2.0   Warfarin maintenance plan:   5 mg (5 mg x 1) on Mon, Wed; 2.5 mg (5 mg x 0.5) all other days   Weekly warfarin total:   22.5 mg   Plan last modified:   Rizwan Lipscomb, PharmD (4/20/2018)   Next INR check:   11/19/2018   Target end date:   Indefinite    Indications    Chronic anticoagulation [Z79.01]  AF (atrial fibrillation) ([facility]) [I48.91]             Anticoagulation Episode Summary     INR check location:       Preferred lab:       Send INR reminders to:       Comments:         Anticoagulation Care Providers     Provider Role Specialty Phone number    Grant Woodard M.D. Referring Interventional Cardiology [phone]    [facility] Anticoagulation Services Responsible  [phone]        Anticoagulation Patient Findings  Patient Findings     Negatives:   Signs/symptoms of thrombosis, Signs/symptoms of bleeding, Laboratory test error suspected, Change in health, Change in alcohol use, Change in activity, Upcoming invasive procedure, Emergency department visit, Upcoming dental procedure, Missed doses, Extra doses, Change in medications, Change in diet/appetite, Hospital admission, Bruising, Other complaints        HPI:   Saumya Vann seen in clinic today, on anticoagulation therapy with warfarin for stroke prevention due to history of atrial fibrillation.    Patient's previous INR was therapeutic at 2.6 on 10-8-18, at which time patient was instructed to continue with current warfarin regimen.  She returns to clinic today to recheck INR to ensure it is therapeutic and thus preventing possible clotting and/or bleeding/bruising complications.    CHADS-VASc = at least 5  (unadjusted ischemic stroke risk/year:  7.2%, which is moderate risk)    Does patient have any changes to current medical/health status since last appt (Y/N):  NO  Does patient have any signs/symptoms of bleeding and/or thrombosis since the  "last appt (Y/N):  NO  Does patient have any interval changes to diet or medications since last appt (Y/N):  NO  Are there any complications or cost restrictions with current therapy (Y/N):  NO      Vitals:  /93  HR 83    Weight  declines   Height   5' 2\"     Asssessment:      INR remains therapeutic at 2.0, therefore decreasing patient's risk of stroke and/or bleeding complications.   Reason(s) for out of range INR today:  n/a      Plan:  Pt is to continue with current warfarin dosing regimen in order to maintain INR in therapeutic range.     Follow up:  Because warfarin is a high risk medication and current CHEST guidelines recommend regular monitoring intervals (few days up to 12 weeks), will have patient return to clinic in 4 weeks to recheck INR.    Rizwan Lipscomb, PharmD    "

## 2018-11-07 ENCOUNTER — HOSPITAL ENCOUNTER (OUTPATIENT)
Dept: LAB | Facility: MEDICAL CENTER | Age: 83
End: 2018-11-07
Attending: FAMILY MEDICINE
Payer: MEDICARE

## 2018-11-07 LAB
ANION GAP SERPL CALC-SCNC: 10 MMOL/L (ref 0–11.9)
BUN SERPL-MCNC: 23 MG/DL (ref 8–22)
CALCIUM SERPL-MCNC: 10 MG/DL (ref 8.5–10.5)
CHLORIDE SERPL-SCNC: 98 MMOL/L (ref 96–112)
CO2 SERPL-SCNC: 29 MMOL/L (ref 20–33)
CREAT SERPL-MCNC: 1.03 MG/DL (ref 0.5–1.4)
EST. AVERAGE GLUCOSE BLD GHB EST-MCNC: 146 MG/DL
GLUCOSE SERPL-MCNC: 107 MG/DL (ref 65–99)
HBA1C MFR BLD: 6.7 % (ref 0–5.6)
POTASSIUM SERPL-SCNC: 3.4 MMOL/L (ref 3.6–5.5)
SODIUM SERPL-SCNC: 137 MMOL/L (ref 135–145)

## 2018-11-07 PROCEDURE — 83036 HEMOGLOBIN GLYCOSYLATED A1C: CPT

## 2018-11-07 PROCEDURE — 36415 COLL VENOUS BLD VENIPUNCTURE: CPT

## 2018-11-07 PROCEDURE — 80048 BASIC METABOLIC PNL TOTAL CA: CPT

## 2018-11-19 ENCOUNTER — ANTICOAGULATION VISIT (OUTPATIENT)
Dept: MEDICAL GROUP | Facility: PHYSICIAN GROUP | Age: 83
End: 2018-11-19
Payer: MEDICARE

## 2018-11-19 VITALS — HEART RATE: 76 BPM | DIASTOLIC BLOOD PRESSURE: 82 MMHG | SYSTOLIC BLOOD PRESSURE: 126 MMHG

## 2018-11-19 DIAGNOSIS — I48.19 PERSISTENT ATRIAL FIBRILLATION (HCC): ICD-10-CM

## 2018-11-19 DIAGNOSIS — Z79.01 CHRONIC ANTICOAGULATION: Primary | ICD-10-CM

## 2018-11-19 LAB — INR PPP: 3.3 (ref 2–3.5)

## 2018-11-19 PROCEDURE — 99211 OFF/OP EST MAY X REQ PHY/QHP: CPT | Performed by: INTERNAL MEDICINE

## 2018-11-19 PROCEDURE — 85610 PROTHROMBIN TIME: CPT | Performed by: INTERNAL MEDICINE

## 2018-11-19 NOTE — PROGRESS NOTES
Anticoagulation Summary  As of 11/19/2018    INR goal:   2.0-3.0   TTR:   63.7 % (1.4 y)   Today's INR:   3.3!   Warfarin maintenance plan:   5 mg (5 mg x 1) on Mon, Wed; 2.5 mg (5 mg x 0.5) all other days   Weekly warfarin total:   22.5 mg   Plan last modified:   Rizwan Lipscomb, PharmD (4/20/2018)   Next INR check:   12/3/2018   Target end date:   Indefinite    Indications    Chronic anticoagulation [Z79.01]  AF (atrial fibrillation) ([facility]) [I48.91]             Anticoagulation Episode Summary     INR check location:       Preferred lab:       Send INR reminders to:       Comments:         Anticoagulation Care Providers     Provider Role Specialty Phone number    Grant Woodard M.D. Referring Interventional Cardiology [phone]    [facility] Anticoagulation Services Responsible  [phone]        Anticoagulation Patient Findings  Patient Findings     Negatives:   Signs/symptoms of thrombosis, Signs/symptoms of bleeding, Laboratory test error suspected, Change in health, Change in alcohol use, Change in activity, Upcoming invasive procedure, Emergency department visit, Upcoming dental procedure, Missed doses, Extra doses, Change in medications, Change in diet/appetite, Hospital admission, Bruising, Other complaints        HPI:   Saumya Vann seen in clinic today, on anticoagulation therapy with warfarin for stroke prevention due to history of atrial fibrillation.    Patient's previous INR was therapeutic at 2.0 on 10-22-18, at which time patient was instructed to continue with current warfarin regimen.  She returns to clinic today to recheck INR to ensure it is therapeutic and thus preventing possible clotting and/or bleeding/bruising complications.    CHADS-VASc = at least 5  (unadjusted ischemic stroke risk/year:  7.2%, which is moderate risk)    Does patient have any changes to current medical/health status since last appt (Y/N):  NO  Does patient have any signs/symptoms of bleeding and/or thrombosis since the  "last appt (Y/N):  NO  Does patient have any interval changes to diet or medications since last appt (Y/N):  NO  Are there any complications or cost restrictions with current therapy (Y/N):  NO      Vitals:  /82  HR 76    Weight  declines   Height   5' 2\"     Asssessment:      INR supratherapeutic at 3.3, therefore increasing patient's risk of bleeding complications due to warfarin.   Reason(s) for out of range INR today:  No identifiable causes for high INR.      Plan:  Instructed patient to decrease today's dose to 2.5mg, then resume current warfarin regimen in order to bring INR to therapeutic range.     Follow up:  Because warfarin is a high risk medication and current CHEST guidelines recommend regular monitoring intervals (few days up to 12 weeks), will have patient return to clinic in 2 weeks to recheck INR.    Rizwan Lipscomb, PharmD    "

## 2018-12-03 ENCOUNTER — ANTICOAGULATION VISIT (OUTPATIENT)
Dept: MEDICAL GROUP | Facility: PHYSICIAN GROUP | Age: 83
End: 2018-12-03
Payer: MEDICARE

## 2018-12-03 VITALS — DIASTOLIC BLOOD PRESSURE: 80 MMHG | HEART RATE: 77 BPM | SYSTOLIC BLOOD PRESSURE: 106 MMHG

## 2018-12-03 DIAGNOSIS — Z79.01 CHRONIC ANTICOAGULATION: Primary | ICD-10-CM

## 2018-12-03 DIAGNOSIS — I48.19 PERSISTENT ATRIAL FIBRILLATION (HCC): ICD-10-CM

## 2018-12-03 LAB — INR PPP: 2.1 (ref 2–3.5)

## 2018-12-03 PROCEDURE — 85610 PROTHROMBIN TIME: CPT | Performed by: INTERNAL MEDICINE

## 2018-12-03 PROCEDURE — 99999 PR NO CHARGE: CPT | Performed by: INTERNAL MEDICINE

## 2018-12-03 NOTE — PROGRESS NOTES
Anticoagulation Summary  As of 12/3/2018    INR goal:   2.0-3.0   TTR:   64.0 % (1.5 y)   Today's INR:   2.1   Warfarin maintenance plan:   5 mg (5 mg x 1) on Mon, Wed; 2.5 mg (5 mg x 0.5) all other days   Weekly warfarin total:   22.5 mg   Plan last modified:   Rizwan Lipscomb, PharmD (4/20/2018)   Next INR check:   12/24/2018   Target end date:   Indefinite    Indications    Chronic anticoagulation [Z79.01]  AF (atrial fibrillation) ([facility]) [I48.91]             Anticoagulation Episode Summary     INR check location:       Preferred lab:       Send INR reminders to:       Comments:         Anticoagulation Care Providers     Provider Role Specialty Phone number    Grant Woodard M.D. Referring Interventional Cardiology [phone]    [facility] Anticoagulation Services Responsible  [phone]        Anticoagulation Patient Findings  Patient Findings     Negatives:   Signs/symptoms of thrombosis, Signs/symptoms of bleeding, Laboratory test error suspected, Change in health, Change in alcohol use, Change in activity, Upcoming invasive procedure, Emergency department visit, Upcoming dental procedure, Missed doses, Extra doses, Change in medications, Change in diet/appetite, Hospital admission, Bruising, Other complaints        HPI:   Saumya Vann seen in clinic today, on anticoagulation therapy with warfarin for stroke prevention due to history of atrial fibrillation.    Patient's previous INR was supratherapeutic at 3.3 on 11-19-18, at which time patient was instructed to decrease one dose, then resume current warfarin regimen.  She returns to clinic today to recheck INR to ensure it is therapeutic and thus preventing possible clotting and/or bleeding/bruising complications.    CHADS-VASc = at least 5  (unadjusted ischemic stroke risk/year:  7.2%, which is moderate risk)    Does patient have any changes to current medical/health status since last appt (Y/N):  NO  Does patient have any signs/symptoms of bleeding and/or  "thrombosis since the last appt (Y/N):  NO  Does patient have any interval changes to diet or medications since last appt (Y/N):  NO  Are there any complications or cost restrictions with current therapy (Y/N):  NO      Vitals:  /80  HR 77    Weight  declines   Height   5' 2\"     Asssessment:      INR therapeutic at 2.1, therefore decreasing patient's stroke and bleeding risk   Reason(s) for out of range INR today:  n/a      Plan:  Pt is to continue with current warfarin dosing regimen in order to maintain INR in therapeutic range.     Follow up:  Because warfarin is a high risk medication and current CHEST guidelines recommend regular monitoring intervals (few days up to 12 weeks), will have patient return to clinic in 3 weeks to recheck INR.    Rizwan Lipscomb, PharmD    "

## 2018-12-20 ENCOUNTER — HOSPITAL ENCOUNTER (OUTPATIENT)
Dept: LAB | Facility: MEDICAL CENTER | Age: 83
End: 2018-12-20
Attending: FAMILY MEDICINE
Payer: MEDICARE

## 2018-12-20 LAB — POTASSIUM SERPL-SCNC: 3.7 MMOL/L (ref 3.6–5.5)

## 2018-12-20 PROCEDURE — 36415 COLL VENOUS BLD VENIPUNCTURE: CPT

## 2018-12-20 PROCEDURE — 84132 ASSAY OF SERUM POTASSIUM: CPT

## 2018-12-24 ENCOUNTER — ANTICOAGULATION VISIT (OUTPATIENT)
Dept: MEDICAL GROUP | Facility: PHYSICIAN GROUP | Age: 83
End: 2018-12-24
Payer: MEDICARE

## 2018-12-24 VITALS — DIASTOLIC BLOOD PRESSURE: 81 MMHG | SYSTOLIC BLOOD PRESSURE: 113 MMHG | HEART RATE: 61 BPM

## 2018-12-24 DIAGNOSIS — Z79.01 CHRONIC ANTICOAGULATION: Primary | ICD-10-CM

## 2018-12-24 DIAGNOSIS — I48.19 PERSISTENT ATRIAL FIBRILLATION (HCC): ICD-10-CM

## 2018-12-24 LAB — INR PPP: 2.3 (ref 2–3.5)

## 2018-12-24 PROCEDURE — 85610 PROTHROMBIN TIME: CPT | Performed by: INTERNAL MEDICINE

## 2018-12-24 NOTE — PROGRESS NOTES
"Anticoagulation Summary  As of 12/24/2018    INR goal:   2.0-3.0   TTR:   65.4 % (1.5 y)   Today's INR:   2.3   Warfarin maintenance plan:   5 mg (5 mg x 1) on Mon, Wed; 2.5 mg (5 mg x 0.5) all other days   Weekly warfarin total:   22.5 mg   Plan last modified:   Rizwan Lipscomb, PharmD (4/20/2018)   Next INR check:   1/21/2019   Target end date:   Indefinite    Indications    Chronic anticoagulation [Z79.01]  AF (atrial fibrillation) ([facility]) [I48.91]             Anticoagulation Episode Summary     INR check location:       Preferred lab:       Send INR reminders to:       Comments:         Anticoagulation Care Providers     Provider Role Specialty Phone number    Grant Woodard M.D. Referring Interventional Cardiology [phone]    [facility] Anticoagulation Services Responsible  [phone]        Anticoagulation Patient Findings    HPI:   Saumya Vann seen in clinic today, on anticoagulation therapy with warfarin for stroke prevention due to history of atrial fibrillation.    Patient's previous INR was therapeutic at 2.1 on 12-3-18, at which time patient was instructed to continue with current warfarin regimen.  She returns to clinic today to recheck INR to ensure it is therapeutic and thus preventing possible clotting and/or bleeding/bruising complications.    CHADS-VASc = at least 5  (unadjusted ischemic stroke risk/year:  7.2%, which is moderate risk)    Does patient have any changes to current medical/health status since last appt (Y/N):  NO  Does patient have any signs/symptoms of bleeding and/or thrombosis since the last appt (Y/N):  NO  Does patient have any interval changes to diet or medications since last appt (Y/N):  NO  Are there any complications or cost restrictions with current therapy (Y/N):  NO      Vitals:  /81  HR 61    Weight  declines   Height   5' 2\"     Asssessment:      INR remains therapeutic at 2.3, therefore decreasing patient's risk of stroke and/or bleeding complications. "   Reason(s) for out of range INR today:  n/a      Plan:  Pt is to continue with current warfarin dosing regimen in order to maintain INR in therapeutic range.     Follow up:  Because warfarin is a high risk medication and current CHEST guidelines recommend regular monitoring intervals (few days up to 12 weeks), will have patient return to clinic in 4 weeks to recheck INR.    Rizwan Lipscomb, PharmD

## 2019-01-21 ENCOUNTER — ANTICOAGULATION VISIT (OUTPATIENT)
Dept: MEDICAL GROUP | Facility: PHYSICIAN GROUP | Age: 84
End: 2019-01-21
Payer: MEDICARE

## 2019-01-21 ENCOUNTER — OFFICE VISIT (OUTPATIENT)
Dept: URGENT CARE | Facility: PHYSICIAN GROUP | Age: 84
End: 2019-01-21
Payer: MEDICARE

## 2019-01-21 VITALS — DIASTOLIC BLOOD PRESSURE: 89 MMHG | SYSTOLIC BLOOD PRESSURE: 123 MMHG | HEART RATE: 100 BPM

## 2019-01-21 VITALS
TEMPERATURE: 97.4 F | WEIGHT: 202 LBS | RESPIRATION RATE: 20 BRPM | OXYGEN SATURATION: 95 % | BODY MASS INDEX: 37.17 KG/M2 | SYSTOLIC BLOOD PRESSURE: 124 MMHG | HEART RATE: 99 BPM | HEIGHT: 62 IN | DIASTOLIC BLOOD PRESSURE: 82 MMHG

## 2019-01-21 DIAGNOSIS — R05.9 COUGH: ICD-10-CM

## 2019-01-21 DIAGNOSIS — I48.19 PERSISTENT ATRIAL FIBRILLATION (HCC): ICD-10-CM

## 2019-01-21 DIAGNOSIS — Z79.01 CHRONIC ANTICOAGULATION: Primary | ICD-10-CM

## 2019-01-21 DIAGNOSIS — J01.00 ACUTE NON-RECURRENT MAXILLARY SINUSITIS: ICD-10-CM

## 2019-01-21 LAB — INR PPP: 2.1 (ref 2–3.5)

## 2019-01-21 PROCEDURE — 85610 PROTHROMBIN TIME: CPT | Performed by: INTERNAL MEDICINE

## 2019-01-21 PROCEDURE — 99211 OFF/OP EST MAY X REQ PHY/QHP: CPT | Performed by: INTERNAL MEDICINE

## 2019-01-21 PROCEDURE — 99214 OFFICE O/P EST MOD 30 MIN: CPT | Performed by: FAMILY MEDICINE

## 2019-01-21 RX ORDER — WARFARIN SODIUM 5 MG/1
TABLET ORAL
Qty: 90 TAB | Refills: 1 | Status: SHIPPED | OUTPATIENT
Start: 2019-01-21 | End: 2019-09-03

## 2019-01-21 RX ORDER — DOXYCYCLINE HYCLATE 100 MG
100 TABLET ORAL 2 TIMES DAILY
Qty: 20 TAB | Refills: 0 | Status: SHIPPED | OUTPATIENT
Start: 2019-01-21 | End: 2019-01-31

## 2019-01-21 ASSESSMENT — ENCOUNTER SYMPTOMS
SHORTNESS OF BREATH: 0
NAUSEA: 1
ABDOMINAL PAIN: 0
SENSORY CHANGE: 0
EYE DISCHARGE: 0
DIARRHEA: 0
FOCAL WEAKNESS: 0
WEIGHT LOSS: 0
VOMITING: 0
EYE REDNESS: 0
WHEEZING: 0

## 2019-01-21 NOTE — PROGRESS NOTES
"Subjective:      Saumya Vann is a 88 y.o. female who presents with Sinus Problem (headche, sinus pressure, nausea, runny nose x 4days)            3-4 days sinus pressure/drainage. Mild cough. ST. No fever/chills. No myalgia. No PMH sinusitis. OTC robitussind DM with some improvement of moderate to severe sx's. No other aggravating or alleviating factors.          Review of Systems   Constitutional: Negative for malaise/fatigue and weight loss.   HENT: Positive for ear pain. Negative for ear discharge.    Eyes: Negative for discharge and redness.   Respiratory: Negative for shortness of breath and wheezing.    Cardiovascular: Negative for chest pain and leg swelling.   Gastrointestinal: Positive for nausea. Negative for abdominal pain, diarrhea and vomiting.   Musculoskeletal: Negative for joint pain.   Skin: Negative for itching and rash.   Neurological: Negative for sensory change and focal weakness.       .  Medications, Allergies, and current problem list reviewed today in Epic     Objective:     /82 (BP Location: Left arm, Patient Position: Sitting, BP Cuff Size: Adult)   Pulse 99   Temp 36.3 °C (97.4 °F) (Temporal)   Resp 20   Ht 1.575 m (5' 2\")   Wt 91.6 kg (202 lb)   SpO2 95%   BMI 36.95 kg/m²      Physical Exam   Constitutional: She is oriented to person, place, and time. She appears well-developed and well-nourished. No distress.   HENT:   Head: Normocephalic and atraumatic.   Tender maxillary sinus bilateral, +PND   Eyes: Conjunctivae are normal.   Neck: Neck supple.   Cardiovascular: Normal rate, regular rhythm and normal heart sounds.    Pulmonary/Chest: Effort normal and breath sounds normal. She has no wheezes.   Lymphadenopathy:     She has no cervical adenopathy.   Neurological: She is alert and oriented to person, place, and time. She exhibits normal muscle tone.   Skin: Skin is warm and dry. No rash noted.               Assessment/Plan:     1. Cough  doxycycline (VIBRAMYCIN) 100 " MG Tab   2. Acute non-recurrent maxillary sinusitis  doxycycline (VIBRAMYCIN) 100 MG Tab     Differential diagnosis, natural history, supportive care, and indications for immediate follow-up discussed at length.     Nasal saline, decongestant, nasal CS    Frequent INR checks if start abx.

## 2019-01-21 NOTE — PROGRESS NOTES
Anticoagulation Summary  As of 1/21/2019    INR goal:   2.0-3.0   TTR:   67.0 % (1.6 y)   Today's INR:   2.1   Warfarin maintenance plan:   5 mg (5 mg x 1) on Mon, Wed; 2.5 mg (5 mg x 0.5) all other days   Weekly warfarin total:   22.5 mg   Plan last modified:   Rizwan Lipscomb, PharmD (4/20/2018)   Next INR check:   3/4/2019   Target end date:   Indefinite    Indications    Chronic anticoagulation [Z79.01]  AF (atrial fibrillation) ([facility]) [I48.91]             Anticoagulation Episode Summary     INR check location:       Preferred lab:       Send INR reminders to:       Comments:         Anticoagulation Care Providers     Provider Role Specialty Phone number    Grant Woodard M.D. Referring Interventional Cardiology [phone]    [facility] Anticoagulation Services Responsible  [phone]        Anticoagulation Patient Findings  Patient Findings     Negatives:   Signs/symptoms of thrombosis, Signs/symptoms of bleeding, Laboratory test error suspected, Change in health, Change in alcohol use, Change in activity, Upcoming invasive procedure, Emergency department visit, Upcoming dental procedure, Missed doses, Extra doses, Change in medications, Change in diet/appetite, Hospital admission, Bruising, Other complaints        HPI:   Saumya Vann seen in clinic today, on anticoagulation therapy with warfarin for stroke prevention due to history of atrial fibrillation.    Patient's previous INR was therapeutic at 2.3 on 12-24-18, at which time patient was instructed to continue with current warfarin regimen.  She returns to clinic today to recheck INR to ensure it is therapeutic and thus preventing possible clotting and/or bleeding/bruising complications.    CHADS-VASc = at least 5  (unadjusted ischemic stroke risk/year:  7.2%, which is moderate risk)    Does patient have any changes to current medical/health status since last appt (Y/N):  NO  Does patient have any signs/symptoms of bleeding and/or thrombosis since the  "last appt (Y/N):  NO  Does patient have any interval changes to diet or medications since last appt (Y/N):  NO  Are there any complications or cost restrictions with current therapy (Y/N):  NO      Vitals:  /89      Weight  declines   Height   5' 2\"     Asssessment:      INR remains therapeutic at 2.1, therefore decreasing patient's risk of stroke and/or bleeding complications.   Reason(s) for out of range INR today:  n/a      Plan:  Pt is to continue with current warfarin dosing regimen in order to maintain INR in therapeutic range.     Follow up:  Because warfarin is a high risk medication and current CHEST guidelines recommend regular monitoring intervals (few days up to 12 weeks), will have patient return to clinic in 6 weeks to recheck INR.    Rizwan Lipscomb, PharmD    "

## 2019-01-29 ENCOUNTER — HOSPITAL ENCOUNTER (OUTPATIENT)
Dept: LAB | Facility: MEDICAL CENTER | Age: 84
End: 2019-01-29
Attending: FAMILY MEDICINE
Payer: MEDICARE

## 2019-01-29 LAB
ALBUMIN SERPL BCP-MCNC: 3.8 G/DL (ref 3.2–4.9)
ALBUMIN/GLOB SERPL: 0.9 G/DL
ALP SERPL-CCNC: 88 U/L (ref 30–99)
ALT SERPL-CCNC: 8 U/L (ref 2–50)
ANION GAP SERPL CALC-SCNC: 12 MMOL/L (ref 0–11.9)
APPEARANCE UR: ABNORMAL
AST SERPL-CCNC: 16 U/L (ref 12–45)
BACTERIA #/AREA URNS HPF: ABNORMAL /HPF
BASOPHILS # BLD AUTO: 0.7 % (ref 0–1.8)
BASOPHILS # BLD: 0.07 K/UL (ref 0–0.12)
BILIRUB SERPL-MCNC: 0.4 MG/DL (ref 0.1–1.5)
BILIRUB UR QL STRIP.AUTO: NEGATIVE
BUN SERPL-MCNC: 18 MG/DL (ref 8–22)
CALCIUM SERPL-MCNC: 9.1 MG/DL (ref 8.5–10.5)
CAOX CRY #/AREA URNS HPF: ABNORMAL /HPF
CHLORIDE SERPL-SCNC: 98 MMOL/L (ref 96–112)
CO2 SERPL-SCNC: 27 MMOL/L (ref 20–33)
COLOR UR: ABNORMAL
CREAT SERPL-MCNC: 1.18 MG/DL (ref 0.5–1.4)
EOSINOPHIL # BLD AUTO: 0.05 K/UL (ref 0–0.51)
EOSINOPHIL NFR BLD: 0.5 % (ref 0–6.9)
EPI CELLS #/AREA URNS HPF: ABNORMAL /HPF
ERYTHROCYTE [DISTWIDTH] IN BLOOD BY AUTOMATED COUNT: 46.1 FL (ref 35.9–50)
FASTING STATUS PATIENT QL REPORTED: NORMAL
GLOBULIN SER CALC-MCNC: 4.1 G/DL (ref 1.9–3.5)
GLUCOSE SERPL-MCNC: 99 MG/DL (ref 65–99)
GLUCOSE UR STRIP.AUTO-MCNC: NEGATIVE MG/DL
HCT VFR BLD AUTO: 46.9 % (ref 37–47)
HGB BLD-MCNC: 15.1 G/DL (ref 12–16)
HYALINE CASTS #/AREA URNS LPF: ABNORMAL /LPF
IMM GRANULOCYTES # BLD AUTO: 0.21 K/UL (ref 0–0.11)
IMM GRANULOCYTES NFR BLD AUTO: 2 % (ref 0–0.9)
KETONES UR STRIP.AUTO-MCNC: ABNORMAL MG/DL
LEUKOCYTE ESTERASE UR QL STRIP.AUTO: ABNORMAL
LYMPHOCYTES # BLD AUTO: 1.17 K/UL (ref 1–4.8)
LYMPHOCYTES NFR BLD: 11.1 % (ref 22–41)
MCH RBC QN AUTO: 29 PG (ref 27–33)
MCHC RBC AUTO-ENTMCNC: 32.2 G/DL (ref 33.6–35)
MCV RBC AUTO: 90.2 FL (ref 81.4–97.8)
MICRO URNS: ABNORMAL
MONOCYTES # BLD AUTO: 0.62 K/UL (ref 0–0.85)
MONOCYTES NFR BLD AUTO: 5.9 % (ref 0–13.4)
NEUTROPHILS # BLD AUTO: 8.42 K/UL (ref 2–7.15)
NEUTROPHILS NFR BLD: 79.8 % (ref 44–72)
NITRITE UR QL STRIP.AUTO: NEGATIVE
NRBC # BLD AUTO: 0 K/UL
NRBC BLD-RTO: 0 /100 WBC
PH UR STRIP.AUTO: 5 [PH]
PLATELET # BLD AUTO: 456 K/UL (ref 164–446)
PMV BLD AUTO: 9.2 FL (ref 9–12.9)
POTASSIUM SERPL-SCNC: 3.2 MMOL/L (ref 3.6–5.5)
PROT SERPL-MCNC: 7.9 G/DL (ref 6–8.2)
PROT UR QL STRIP: 30 MG/DL
RBC # BLD AUTO: 5.2 M/UL (ref 4.2–5.4)
RBC # URNS HPF: ABNORMAL /HPF
RBC UR QL AUTO: NEGATIVE
SODIUM SERPL-SCNC: 137 MMOL/L (ref 135–145)
SP GR UR STRIP.AUTO: 1.03
UROBILINOGEN UR STRIP.AUTO-MCNC: 0.2 MG/DL
WBC # BLD AUTO: 10.5 K/UL (ref 4.8–10.8)
WBC #/AREA URNS HPF: ABNORMAL /HPF

## 2019-01-29 PROCEDURE — 80053 COMPREHEN METABOLIC PANEL: CPT

## 2019-01-29 PROCEDURE — 81001 URINALYSIS AUTO W/SCOPE: CPT

## 2019-01-29 PROCEDURE — 87086 URINE CULTURE/COLONY COUNT: CPT

## 2019-01-29 PROCEDURE — 85025 COMPLETE CBC W/AUTO DIFF WBC: CPT

## 2019-01-29 PROCEDURE — 36415 COLL VENOUS BLD VENIPUNCTURE: CPT

## 2019-01-31 LAB
BACTERIA UR CULT: NORMAL
SIGNIFICANT IND 70042: NORMAL
SITE SITE: NORMAL
SOURCE SOURCE: NORMAL

## 2019-02-12 ENCOUNTER — OFFICE VISIT (OUTPATIENT)
Dept: CARDIOLOGY | Facility: MEDICAL CENTER | Age: 84
End: 2019-02-12
Payer: MEDICARE

## 2019-02-12 VITALS
HEART RATE: 90 BPM | WEIGHT: 205.4 LBS | BODY MASS INDEX: 37.8 KG/M2 | SYSTOLIC BLOOD PRESSURE: 120 MMHG | OXYGEN SATURATION: 96 % | HEIGHT: 62 IN | DIASTOLIC BLOOD PRESSURE: 64 MMHG

## 2019-02-12 DIAGNOSIS — Z79.01 CHRONIC ANTICOAGULATION: ICD-10-CM

## 2019-02-12 DIAGNOSIS — I48.19 PERSISTENT ATRIAL FIBRILLATION (HCC): ICD-10-CM

## 2019-02-12 DIAGNOSIS — E11.9 TYPE 2 DIABETES MELLITUS WITHOUT COMPLICATION, UNSPECIFIED WHETHER LONG TERM INSULIN USE (HCC): ICD-10-CM

## 2019-02-12 DIAGNOSIS — I10 ESSENTIAL HYPERTENSION: ICD-10-CM

## 2019-02-12 PROCEDURE — 99214 OFFICE O/P EST MOD 30 MIN: CPT | Performed by: INTERNAL MEDICINE

## 2019-02-12 RX ORDER — SPIRONOLACTONE 25 MG/1
25 TABLET ORAL DAILY
COMMUNITY
Start: 2019-01-30 | End: 2020-08-24 | Stop reason: SDUPTHER

## 2019-02-12 RX ORDER — CIPROFLOXACIN 250 MG/1
TABLET, FILM COATED ORAL
COMMUNITY
Start: 2019-01-31 | End: 2019-02-12

## 2019-02-12 NOTE — PROGRESS NOTES
Subjective:   Saumya Vann is a 87 y.o. female who presents today for followup of PAF chronic anticoagulation. She has had exertional dyspnea over the past 3 years associated with fatigue. She has a history of HTN, diabetes mellitus, CKD . Her echo is relatively unremarkable aside from LVH and grade 1 diastolic function. PFTs per her primary doctor are unremarkable. She denies any exertional chest pain, PND or orthopnea.     She had continued symptomatic PAF with poorly controlled rates. She underwent PET stress test which was normal. We increased her diltiazem to twice daily which has dramatically improved her symptomology.  She returns today without any issues regarding her cardiac status.  Heart rates are well controlled in atrial fibrillation.  Tolerating her anticoagulation well.  She complains mostly of a tremor not feeling well after a recent upper respiratory infection.      Family History   Problem Relation Age of Onset   • Cancer Other      History   Smoking Status   • Never Smoker   Smokeless Tobacco   • Never Used     Allergies   Allergen Reactions   • Pcn [Penicillins]      Outpatient Encounter Prescriptions as of 2/12/2019   Medication Sig Dispense Refill   • spironolactone (ALDACTONE) 25 MG Tab      • warfarin (COUMADIN) 5 MG Tab Take one-half to one tablet by mouth one time daily or as directed by coumadin clinic 90 Tab 1   • DILTIAZem CD (CARDIZEM CD) 120 MG CAPSULE SR 24 HR Take 1 Cap by mouth 2 Times a Day. 180 Cap 3   • omeprazole (PRILOSEC) 20 MG delayed-release capsule Take 20 mg by mouth every day.     • Bevacizumab (AVASTIN IV) by Intravenous route.     • clobetasol (TEMOVATE) 0.05 % Ointment      • latanoprost (XALATAN) 0.005 % Solution      • neomycin-polymixin-dexamethasone (MAXITROL) 3.5-71688-4.1 Ointment ophthalmic ointment      • levothyroxine (SYNTHROID) 150 MCG TABS Take 150 mcg by mouth every day.     • [DISCONTINUED] ciprofloxacin (CIPRO) 250 MG Tab      • [DISCONTINUED]  "OMEPRAZOLE PO Take  by mouth.     • [DISCONTINUED] cyclobenzaprine (FLEXERIL) 5 MG tablet Take 1 Tab by mouth 3 times a day as needed for Muscle Spasms. (Patient not taking: Reported on 1/21/2019) 30 Tab 0   • [DISCONTINUED] atorvastatin (LIPITOR) 20 MG Tab      • [DISCONTINUED] ONE TOUCH ULTRA TEST strip      • [DISCONTINUED] glipiZIDE SR (GLUCOTROL) 2.5 MG TABLET SR 24 HR      • [DISCONTINUED] spironolactone/hctz (ALDACTAZIDE) 25-25 MG TABS Take  by mouth. Taking 1/2 tablet in the am     • [DISCONTINUED] losartan (COZAAR) 100 MG TABS Take 100 mg by mouth every day.       No facility-administered encounter medications on file as of 2/12/2019.      Review of Systems   All other systems reviewed and are negative.       Objective:   /64 (BP Location: Left arm, Patient Position: Sitting, BP Cuff Size: Adult)   Pulse 90   Ht 1.575 m (5' 2\")   Wt 93.2 kg (205 lb 6.4 oz)   SpO2 96%   BMI 37.57 kg/m²     Physical Exam   Constitutional: She is oriented to person, place, and time. She appears well-developed and well-nourished. No distress.   HENT:   Head: Normocephalic and atraumatic.   Mouth/Throat: Oropharynx is clear and moist. No oropharyngeal exudate.   Eyes: Pupils are equal, round, and reactive to light. Conjunctivae are normal. No scleral icterus.   Neck: Normal range of motion. Neck supple. No JVD present. No thyromegaly present.   Cardiovascular: Normal rate, normal heart sounds and intact distal pulses.  An irregularly irregular rhythm present. Frequent extrasystoles are present. PMI is not displaced.  Exam reveals no gallop and no friction rub.    No murmur heard.  Pulses:       Carotid pulses are 2+ on the right side, and 2+ on the left side.       Femoral pulses are 2+ on the right side, and 2+ on the left side.       Popliteal pulses are 1+ on the right side, and 1+ on the left side.        Dorsalis pedis pulses are 2+ on the right side, and 2+ on the left side.        Posterior tibial pulses are 2+ " on the right side, and 2+ on the left side.   Pulmonary/Chest: Effort normal and breath sounds normal. She has no wheezes. She has no rales.   Abdominal: Soft. Bowel sounds are normal. She exhibits no distension. There is no tenderness.   Musculoskeletal: She exhibits no edema or tenderness.   Neurological: She is alert and oriented to person, place, and time. No cranial nerve deficit.   Skin: Skin is warm and dry. No rash noted. She is not diaphoretic. No erythema.   Psychiatric: She has a normal mood and affect. Her behavior is normal.   No changes since prior evaluation 8/14/2018    LABS:  Lab Results   Component Value Date/Time    CHOLSTRLTOT 192 08/27/2018 11:17 AM     (H) 08/27/2018 11:17 AM    HDL 50 08/27/2018 11:17 AM    TRIGLYCERIDE 143 08/27/2018 11:17 AM       Lab Results   Component Value Date/Time    WBC 10.5 01/29/2019 12:13 PM    RBC 5.20 01/29/2019 12:13 PM    HEMOGLOBIN 15.1 01/29/2019 12:13 PM    HEMATOCRIT 46.9 01/29/2019 12:13 PM    MCV 90.2 01/29/2019 12:13 PM    NEUTSPOLYS 79.80 (H) 01/29/2019 12:13 PM    LYMPHOCYTES 11.10 (L) 01/29/2019 12:13 PM    MONOCYTES 5.90 01/29/2019 12:13 PM    EOSINOPHILS 0.50 01/29/2019 12:13 PM    BASOPHILS 0.70 01/29/2019 12:13 PM    HYPOCHROMIA 1+ 04/10/2014 03:03 PM     Lab Results   Component Value Date/Time    SODIUM 137 01/29/2019 12:13 PM    POTASSIUM 3.2 (L) 01/29/2019 12:13 PM    CHLORIDE 98 01/29/2019 12:13 PM    CO2 27 01/29/2019 12:13 PM    GLUCOSE 99 01/29/2019 12:13 PM    BUN 18 01/29/2019 12:13 PM    CREATININE 1.18 01/29/2019 12:13 PM     Lab Results   Component Value Date    HBA1C 6.7 (H) 11/07/2018      Lab Results   Component Value Date/Time    ALKPHOSPHAT 88 01/29/2019 12:13 PM    ASTSGOT 16 01/29/2019 12:13 PM    ALTSGPT 8 01/29/2019 12:13 PM    TBILIRUBIN 0.4 01/29/2019 12:13 PM      No results found for: BNPBTYPENAT   No results found for: TSH  Lab Results   Component Value Date/Time    INR 2.1 01/21/2019 11:36 AM        STRESS  (6/2017):  CONCLUSIONS AND IMPRESSIONS:  Overall, there is no evidence of myocardial    ischemia based on EKG tracing along with myocardial PET scan images.  The    rhythm is atrial fibrillation throughout the stress test.  No malignant    arrhythmias noted.    ECHO CONCLUSIONS (4/27/2016):  Normal left ventricular size and systolic function.  Left ventricular ejection fraction is visually estimated to be 55%.  Moderate concentric left ventricular hypertrophy.  Normal regional wall motion.  Mild mitral regurgitation.  Mild tricuspid regurgitation.  Estimated right ventricular systolic pressure is 25 mmHg.  Normal inferior vena cava size and inspiratory collapse.    ECHO (11/10/2014):  I have personally reviewed the echocardiogram this visit and reviewed the findings with the patient. It shows:   Grade 1 diastolic dysfunction, EF 60-65%, moderate LVH, left atrial enlargement    PFTs as above    Today I reviewed the medical, surgical, social and family histories with the patient. As per HPI, otherwise noncontributory.      Assessment:     1. Persistent atrial fibrillation (HCC)     2. Essential hypertension     3. Type 2 diabetes mellitus without complication, unspecified whether long term insulin use (HCC)     4. Chronic anticoagulation           Medical Decision Making:  Today's Assessment / Status / Plan:     Off losartan on the higher dose of diltiazem with excellent blood pressures and heart rate control.  She has no cardiovascular symptoms at this time.  Tolerating her anticoagulation well.  Recommend she continue this for stroke prevention.  Most of her concerns today were with tremor and I recommend she follow-up with her PCP regarding this.    Continue current oral anticoagulation and cardiac medications  Follow-up with PCP regarding tremor    Follow-up 6 months      Physical Exam   Constitutional: She is oriented to person, place, and time. She appears well-developed and well-nourished. No distress.   HENT:    Head: Normocephalic and atraumatic.   Mouth/Throat: Oropharynx is clear and moist. No oropharyngeal exudate.   Eyes: Pupils are equal, round, and reactive to light. Conjunctivae are normal. No scleral icterus.   Neck: Normal range of motion. Neck supple. No JVD present. No thyromegaly present.   Cardiovascular: Normal rate, normal heart sounds and intact distal pulses.  An irregularly irregular rhythm present. Frequent extrasystoles are present. PMI is not displaced.  Exam reveals no gallop and no friction rub.    No murmur heard.  Pulses:       Carotid pulses are 2+ on the right side, and 2+ on the left side.       Femoral pulses are 2+ on the right side, and 2+ on the left side.       Popliteal pulses are 1+ on the right side, and 1+ on the left side.        Dorsalis pedis pulses are 2+ on the right side, and 2+ on the left side.        Posterior tibial pulses are 2+ on the right side, and 2+ on the left side.   Pulmonary/Chest: Effort normal and breath sounds normal. She has no wheezes. She has no rales.   Abdominal: Soft. Bowel sounds are normal. She exhibits no distension. There is no tenderness.   Musculoskeletal: She exhibits no edema or tenderness.   Neurological: She is alert and oriented to person, place, and time. No cranial nerve deficit.   Skin: Skin is warm and dry. No rash noted. She is not diaphoretic. No erythema.   Psychiatric: She has a normal mood and affect. Her behavior is normal.   No changes since prior evaluation 8/14/2018

## 2019-02-27 ENCOUNTER — HOSPITAL ENCOUNTER (OUTPATIENT)
Dept: LAB | Facility: MEDICAL CENTER | Age: 84
End: 2019-02-27
Attending: NURSE PRACTITIONER
Payer: MEDICARE

## 2019-02-27 LAB
25(OH)D3 SERPL-MCNC: 19 NG/ML (ref 30–100)
ALBUMIN SERPL BCP-MCNC: 3.9 G/DL (ref 3.2–4.9)
ALBUMIN/GLOB SERPL: 1.2 G/DL
ALP SERPL-CCNC: 87 U/L (ref 30–99)
ALT SERPL-CCNC: 6 U/L (ref 2–50)
ANION GAP SERPL CALC-SCNC: 9 MMOL/L (ref 0–11.9)
APPEARANCE UR: CLEAR
AST SERPL-CCNC: 14 U/L (ref 12–45)
BACTERIA #/AREA URNS HPF: NEGATIVE /HPF
BASOPHILS # BLD AUTO: 0.9 % (ref 0–1.8)
BASOPHILS # BLD: 0.07 K/UL (ref 0–0.12)
BILIRUB SERPL-MCNC: 0.8 MG/DL (ref 0.1–1.5)
BILIRUB UR QL STRIP.AUTO: NEGATIVE
BUN SERPL-MCNC: 16 MG/DL (ref 8–22)
CALCIUM SERPL-MCNC: 9.3 MG/DL (ref 8.5–10.5)
CHLORIDE SERPL-SCNC: 101 MMOL/L (ref 96–112)
CO2 SERPL-SCNC: 27 MMOL/L (ref 20–33)
COLOR UR: YELLOW
CREAT SERPL-MCNC: 1.05 MG/DL (ref 0.5–1.4)
CREAT UR-MCNC: 29.6 MG/DL
EOSINOPHIL # BLD AUTO: 0.08 K/UL (ref 0–0.51)
EOSINOPHIL NFR BLD: 1 % (ref 0–6.9)
EPI CELLS #/AREA URNS HPF: NEGATIVE /HPF
ERYTHROCYTE [DISTWIDTH] IN BLOOD BY AUTOMATED COUNT: 50.4 FL (ref 35.9–50)
FASTING STATUS PATIENT QL REPORTED: NORMAL
GLOBULIN SER CALC-MCNC: 3.3 G/DL (ref 1.9–3.5)
GLUCOSE SERPL-MCNC: 113 MG/DL (ref 65–99)
GLUCOSE UR STRIP.AUTO-MCNC: NEGATIVE MG/DL
HCT VFR BLD AUTO: 47.1 % (ref 37–47)
HGB BLD-MCNC: 14.6 G/DL (ref 12–16)
HYALINE CASTS #/AREA URNS LPF: NORMAL /LPF
IMM GRANULOCYTES # BLD AUTO: 0.05 K/UL (ref 0–0.11)
IMM GRANULOCYTES NFR BLD AUTO: 0.6 % (ref 0–0.9)
KETONES UR STRIP.AUTO-MCNC: NEGATIVE MG/DL
LEUKOCYTE ESTERASE UR QL STRIP.AUTO: ABNORMAL
LYMPHOCYTES # BLD AUTO: 1.3 K/UL (ref 1–4.8)
LYMPHOCYTES NFR BLD: 16 % (ref 22–41)
MAGNESIUM SERPL-MCNC: 1.8 MG/DL (ref 1.5–2.5)
MCH RBC QN AUTO: 28.5 PG (ref 27–33)
MCHC RBC AUTO-ENTMCNC: 31 G/DL (ref 33.6–35)
MCV RBC AUTO: 91.8 FL (ref 81.4–97.8)
MICRO URNS: ABNORMAL
MONOCYTES # BLD AUTO: 0.54 K/UL (ref 0–0.85)
MONOCYTES NFR BLD AUTO: 6.6 % (ref 0–13.4)
NEUTROPHILS # BLD AUTO: 6.1 K/UL (ref 2–7.15)
NEUTROPHILS NFR BLD: 74.9 % (ref 44–72)
NITRITE UR QL STRIP.AUTO: NEGATIVE
NRBC # BLD AUTO: 0 K/UL
NRBC BLD-RTO: 0 /100 WBC
PH UR STRIP.AUTO: 6.5 [PH]
PHOSPHATE SERPL-MCNC: 3 MG/DL (ref 2.5–4.5)
PLATELET # BLD AUTO: 318 K/UL (ref 164–446)
PMV BLD AUTO: 9.2 FL (ref 9–12.9)
POTASSIUM SERPL-SCNC: 3.7 MMOL/L (ref 3.6–5.5)
PROT SERPL-MCNC: 7.2 G/DL (ref 6–8.2)
PROT UR QL STRIP: NEGATIVE MG/DL
PROT UR-MCNC: <4 MG/DL (ref 0–15)
PROT/CREAT UR: NORMAL MG/G (ref 10–107)
RBC # BLD AUTO: 5.13 M/UL (ref 4.2–5.4)
RBC # URNS HPF: NORMAL /HPF
RBC UR QL AUTO: NEGATIVE
SODIUM SERPL-SCNC: 137 MMOL/L (ref 135–145)
SP GR UR STRIP.AUTO: 1.01
UROBILINOGEN UR STRIP.AUTO-MCNC: 0.2 MG/DL
WBC # BLD AUTO: 8.1 K/UL (ref 4.8–10.8)
WBC #/AREA URNS HPF: NORMAL /HPF

## 2019-02-27 PROCEDURE — 82306 VITAMIN D 25 HYDROXY: CPT

## 2019-02-27 PROCEDURE — 83735 ASSAY OF MAGNESIUM: CPT

## 2019-02-27 PROCEDURE — 80053 COMPREHEN METABOLIC PANEL: CPT

## 2019-02-27 PROCEDURE — 82570 ASSAY OF URINE CREATININE: CPT

## 2019-02-27 PROCEDURE — 36415 COLL VENOUS BLD VENIPUNCTURE: CPT

## 2019-02-27 PROCEDURE — 85025 COMPLETE CBC W/AUTO DIFF WBC: CPT

## 2019-02-27 PROCEDURE — 84100 ASSAY OF PHOSPHORUS: CPT

## 2019-02-27 PROCEDURE — 81001 URINALYSIS AUTO W/SCOPE: CPT

## 2019-02-27 PROCEDURE — 84156 ASSAY OF PROTEIN URINE: CPT

## 2019-03-04 ENCOUNTER — ANTICOAGULATION VISIT (OUTPATIENT)
Dept: MEDICAL GROUP | Facility: PHYSICIAN GROUP | Age: 84
End: 2019-03-04
Payer: MEDICARE

## 2019-03-04 VITALS — SYSTOLIC BLOOD PRESSURE: 110 MMHG | DIASTOLIC BLOOD PRESSURE: 87 MMHG | HEART RATE: 81 BPM

## 2019-03-04 DIAGNOSIS — I48.19 PERSISTENT ATRIAL FIBRILLATION (HCC): ICD-10-CM

## 2019-03-04 DIAGNOSIS — Z79.01 CHRONIC ANTICOAGULATION: ICD-10-CM

## 2019-03-04 LAB — INR PPP: 3 (ref 2–3.5)

## 2019-03-04 PROCEDURE — 85610 PROTHROMBIN TIME: CPT | Performed by: NURSE PRACTITIONER

## 2019-03-04 PROCEDURE — 93793 ANTICOAG MGMT PT WARFARIN: CPT | Performed by: INTERNAL MEDICINE

## 2019-03-04 NOTE — PROGRESS NOTES
Anticoagulation Summary  As of 3/4/2019    INR goal:   2.0-3.0   TTR:   69.2 % (1.7 y)   INR used for dosing:   3.0 (3/4/2019)   Warfarin maintenance plan:   5 mg (5 mg x 1) every Mon, Wed; 2.5 mg (5 mg x 0.5) all other days   Weekly warfarin total:   22.5 mg   Plan last modified:   Rizwan Lipscomb, PharmD (4/20/2018)   Next INR check:   4/29/2019   Target end date:   Indefinite    Indications    Chronic anticoagulation [Z79.01]  AF (atrial fibrillation) ([facility]) [I48.91]             Anticoagulation Episode Summary     INR check location:       Preferred lab:       Send INR reminders to:       Comments:         Anticoagulation Care Providers     Provider Role Specialty Phone number    Grant Woodard M.D. Referring Interventional Cardiology [phone]    [facility] Anticoagulation Services Responsible  [phone]        Anticoagulation Patient Findings  Patient Findings     Negatives:   Signs/symptoms of thrombosis, Signs/symptoms of bleeding, Laboratory test error suspected, Change in health, Change in alcohol use, Change in activity, Upcoming invasive procedure, Emergency department visit, Upcoming dental procedure, Missed doses, Extra doses, Change in medications, Change in diet/appetite, Hospital admission, Bruising, Other complaints        HPI:   Saumya Vann seen in clinic today, on anticoagulation therapy with warfarin for stroke prevention due to history of atrial fibrillation.    Patient's previous INR was therapeutic at 2.1 on 1-21-19, at which time patient was instructed to continue with current warfarin regimen.  She returns to clinic today to recheck INR to ensure it is therapeutic and thus preventing possible clotting and/or bleeding/bruising complications.    CHADS-VASc = at least 5  (unadjusted ischemic stroke risk/year:  7.2%, which is moderate risk)    Does patient have any changes to current medical/health status since last appt (Y/N):  NO  Does patient have any signs/symptoms of bleeding and/or  "thrombosis since the last appt (Y/N):  NO  Does patient have any interval changes to diet or medications since last appt (Y/N):  NO  Are there any complications or cost restrictions with current therapy (Y/N):  NO      Vitals:  /87  HR 81    Weight  declines   Height   5' 2\"     Asssessment:      INR therapeutic at 3.0, therefore decreasing patient's risk of stroke and/or bleeding complications.   Reason(s) for out of range INR today:  n/a      Plan:  Pt is to continue with current warfarin dosing regimen in order to maintain INR in therapeutic range.     Follow up:  Because warfarin is a high risk medication and current CHEST guidelines recommend regular monitoring intervals (few days up to 12 weeks), will have patient return to clinic in 8 weeks to recheck INR.    Rizwan Lipscomb, PharmD    "

## 2019-04-19 ENCOUNTER — HOSPITAL ENCOUNTER (OUTPATIENT)
Dept: RADIOLOGY | Facility: MEDICAL CENTER | Age: 84
End: 2019-04-19
Attending: FAMILY MEDICINE
Payer: MEDICARE

## 2019-04-19 DIAGNOSIS — Z12.31 VISIT FOR SCREENING MAMMOGRAM: ICD-10-CM

## 2019-04-19 PROCEDURE — 77063 BREAST TOMOSYNTHESIS BI: CPT

## 2019-04-29 ENCOUNTER — ANTICOAGULATION VISIT (OUTPATIENT)
Dept: MEDICAL GROUP | Facility: PHYSICIAN GROUP | Age: 84
End: 2019-04-29
Payer: MEDICARE

## 2019-04-29 VITALS — DIASTOLIC BLOOD PRESSURE: 80 MMHG | SYSTOLIC BLOOD PRESSURE: 114 MMHG | HEART RATE: 78 BPM

## 2019-04-29 DIAGNOSIS — I48.19 PERSISTENT ATRIAL FIBRILLATION (HCC): ICD-10-CM

## 2019-04-29 DIAGNOSIS — Z79.01 CHRONIC ANTICOAGULATION: ICD-10-CM

## 2019-04-29 LAB — INR PPP: 2.1 (ref 2–3.5)

## 2019-04-29 PROCEDURE — 85610 PROTHROMBIN TIME: CPT | Performed by: INTERNAL MEDICINE

## 2019-04-29 PROCEDURE — 93793 ANTICOAG MGMT PT WARFARIN: CPT | Performed by: INTERNAL MEDICINE

## 2019-04-29 NOTE — PROGRESS NOTES
Anticoagulation Summary  As of 2019    INR goal:   2.0-3.0   TTR:   71.7 % (1.9 y)   INR used for dosin.10 (2019)   Warfarin maintenance plan:   5 mg (5 mg x 1) every Mon, Wed; 2.5 mg (5 mg x 0.5) all other days   Weekly warfarin total:   22.5 mg   Plan last modified:   Rizwan Lipscomb, PharmD (2018)   Next INR check:   2019   Target end date:   Indefinite    Indications    Chronic anticoagulation [Z79.01]  AF (atrial fibrillation) ([facility]) [I48.91]             Anticoagulation Episode Summary     INR check location:       Preferred lab:       Send INR reminders to:       Comments:         Anticoagulation Care Providers     Provider Role Specialty Phone number    Grant Woodard M.D. Referring Interventional Cardiology [phone]    [facility] Anticoagulation Services Responsible  [phone]        Anticoagulation Patient Findings  Patient Findings     Negatives:   Signs/symptoms of thrombosis, Signs/symptoms of bleeding, Laboratory test error suspected, Change in health, Change in alcohol use, Change in activity, Upcoming invasive procedure, Emergency department visit, Upcoming dental procedure, Missed doses, Extra doses, Change in medications, Change in diet/appetite, Hospital admission, Bruising, Other complaints        HPI:   Saumya Vann seen in clinic today, on anticoagulation therapy with warfarin for stroke prevention due to history of atrial fibrillation.    Patient's previous INR was therapeutic at 3.0 on 3-8-19, at which time patient was instructed to continue with current warfarin regimen.  Sh returns to clinic today to recheck INR to ensure it is therapeutic and thus preventing possible clotting and/or bleeding/bruising complications.    CHADS-VASc = at least 5  (unadjusted ischemic stroke risk/year:  7.2%, which is moderate risk)    Does patient have any changes to current medical/health status since last appt (Y/N):  NO  Does patient have any signs/symptoms of bleeding and/or  "thrombosis since the last appt (Y/N):  NO  Does patient have any interval changes to diet or medications since last appt (Y/N):  NO  Are there any complications or cost restrictions with current therapy (Y/N):  NO      Vitals:  /80  HR 78    Weight  declines   Height   5' 2\"     Asssessment:      INR remains therapeutic at 2.1, therefore decreasing patient's risk of stroke and/or bleeding complications.   Reason(s) for out of range INR today:  n/a      Plan:  Pt is to continue with current warfarin dosing regimen in order to maintain INR in therapeutic range.     Follow up:  Because warfarin is a high risk medication and current CHEST guidelines recommend regular monitoring intervals (few days up to 12 weeks), will have patient return to clinic in 10 weeks to recheck INR.    Rizwan Lipscomb, PharmD    "

## 2019-05-16 ENCOUNTER — HOSPITAL ENCOUNTER (OUTPATIENT)
Dept: LAB | Facility: MEDICAL CENTER | Age: 84
End: 2019-05-16
Attending: FAMILY MEDICINE
Payer: MEDICARE

## 2019-05-16 LAB
ALBUMIN SERPL BCP-MCNC: 4.1 G/DL (ref 3.2–4.9)
ALBUMIN/GLOB SERPL: 1.2 G/DL
ALP SERPL-CCNC: 85 U/L (ref 30–99)
ALT SERPL-CCNC: 11 U/L (ref 2–50)
ANION GAP SERPL CALC-SCNC: 7 MMOL/L (ref 0–11.9)
AST SERPL-CCNC: 17 U/L (ref 12–45)
BASOPHILS # BLD AUTO: 0.7 % (ref 0–1.8)
BASOPHILS # BLD: 0.07 K/UL (ref 0–0.12)
BILIRUB SERPL-MCNC: 0.6 MG/DL (ref 0.1–1.5)
BUN SERPL-MCNC: 14 MG/DL (ref 8–22)
CALCIUM SERPL-MCNC: 9.2 MG/DL (ref 8.5–10.5)
CHLORIDE SERPL-SCNC: 102 MMOL/L (ref 96–112)
CHOLEST SERPL-MCNC: 180 MG/DL (ref 100–199)
CO2 SERPL-SCNC: 29 MMOL/L (ref 20–33)
CREAT SERPL-MCNC: 1.01 MG/DL (ref 0.5–1.4)
CREAT UR-MCNC: 186.2 MG/DL
EOSINOPHIL # BLD AUTO: 0.09 K/UL (ref 0–0.51)
EOSINOPHIL NFR BLD: 0.9 % (ref 0–6.9)
ERYTHROCYTE [DISTWIDTH] IN BLOOD BY AUTOMATED COUNT: 49 FL (ref 35.9–50)
EST. AVERAGE GLUCOSE BLD GHB EST-MCNC: 137 MG/DL
FASTING STATUS PATIENT QL REPORTED: NORMAL
GLOBULIN SER CALC-MCNC: 3.3 G/DL (ref 1.9–3.5)
GLUCOSE SERPL-MCNC: 98 MG/DL (ref 65–99)
HBA1C MFR BLD: 6.4 % (ref 0–5.6)
HCT VFR BLD AUTO: 48.3 % (ref 37–47)
HDLC SERPL-MCNC: 55 MG/DL
HGB BLD-MCNC: 15.6 G/DL (ref 12–16)
IMM GRANULOCYTES # BLD AUTO: 0.06 K/UL (ref 0–0.11)
IMM GRANULOCYTES NFR BLD AUTO: 0.6 % (ref 0–0.9)
LDLC SERPL CALC-MCNC: 97 MG/DL
LYMPHOCYTES # BLD AUTO: 1.31 K/UL (ref 1–4.8)
LYMPHOCYTES NFR BLD: 12.9 % (ref 22–41)
MCH RBC QN AUTO: 29.5 PG (ref 27–33)
MCHC RBC AUTO-ENTMCNC: 32.3 G/DL (ref 33.6–35)
MCV RBC AUTO: 91.5 FL (ref 81.4–97.8)
MICROALBUMIN UR-MCNC: 1.3 MG/DL
MICROALBUMIN/CREAT UR: 7 MG/G (ref 0–30)
MONOCYTES # BLD AUTO: 0.69 K/UL (ref 0–0.85)
MONOCYTES NFR BLD AUTO: 6.8 % (ref 0–13.4)
NEUTROPHILS # BLD AUTO: 7.93 K/UL (ref 2–7.15)
NEUTROPHILS NFR BLD: 78.1 % (ref 44–72)
NRBC # BLD AUTO: 0 K/UL
NRBC BLD-RTO: 0 /100 WBC
PLATELET # BLD AUTO: 291 K/UL (ref 164–446)
PMV BLD AUTO: 9 FL (ref 9–12.9)
POTASSIUM SERPL-SCNC: 4.2 MMOL/L (ref 3.6–5.5)
PROT SERPL-MCNC: 7.4 G/DL (ref 6–8.2)
RBC # BLD AUTO: 5.28 M/UL (ref 4.2–5.4)
SODIUM SERPL-SCNC: 138 MMOL/L (ref 135–145)
TRIGL SERPL-MCNC: 138 MG/DL (ref 0–149)
TSH SERPL DL<=0.005 MIU/L-ACNC: 1.53 UIU/ML (ref 0.38–5.33)
WBC # BLD AUTO: 10.2 K/UL (ref 4.8–10.8)

## 2019-05-16 PROCEDURE — 80053 COMPREHEN METABOLIC PANEL: CPT

## 2019-05-16 PROCEDURE — 80061 LIPID PANEL: CPT

## 2019-05-16 PROCEDURE — 36415 COLL VENOUS BLD VENIPUNCTURE: CPT

## 2019-05-16 PROCEDURE — 84443 ASSAY THYROID STIM HORMONE: CPT

## 2019-05-16 PROCEDURE — 82570 ASSAY OF URINE CREATININE: CPT

## 2019-05-16 PROCEDURE — 83036 HEMOGLOBIN GLYCOSYLATED A1C: CPT

## 2019-05-16 PROCEDURE — 85025 COMPLETE CBC W/AUTO DIFF WBC: CPT

## 2019-05-16 PROCEDURE — 82043 UR ALBUMIN QUANTITATIVE: CPT

## 2019-06-18 DIAGNOSIS — I48.0 PAF (PAROXYSMAL ATRIAL FIBRILLATION) (HCC): ICD-10-CM

## 2019-06-18 DIAGNOSIS — Z79.01 CHRONIC ANTICOAGULATION: ICD-10-CM

## 2019-06-19 RX ORDER — DILTIAZEM HYDROCHLORIDE 120 MG/1
CAPSULE, EXTENDED RELEASE ORAL
Qty: 200 CAP | Refills: 2 | Status: SHIPPED | OUTPATIENT
Start: 2019-06-19 | End: 2019-11-15

## 2019-07-01 ENCOUNTER — HOSPITAL ENCOUNTER (OUTPATIENT)
Dept: RADIOLOGY | Facility: MEDICAL CENTER | Age: 84
End: 2019-07-01
Attending: FAMILY MEDICINE
Payer: MEDICARE

## 2019-07-01 DIAGNOSIS — N95.8 POSTARTIFICIAL MENOPAUSAL SYNDROME: ICD-10-CM

## 2019-07-01 DIAGNOSIS — M81.0 SENILE OSTEOPOROSIS: ICD-10-CM

## 2019-07-01 PROCEDURE — 77080 DXA BONE DENSITY AXIAL: CPT

## 2019-07-17 ENCOUNTER — TELEPHONE (OUTPATIENT)
Dept: VASCULAR LAB | Facility: MEDICAL CENTER | Age: 84
End: 2019-07-17

## 2019-07-17 NOTE — TELEPHONE ENCOUNTER
Attempted to reach patient to reschedule upcoming appt from med group to regional as patient does not have [facility]-PCP (appt placed by central scheduling).  No answer to call, no VM.  Will attempt to reach at a later time.  Rizwan Lipscomb, PharmD, BCACP

## 2019-07-19 ENCOUNTER — ANTICOAGULATION VISIT (OUTPATIENT)
Dept: MEDICAL GROUP | Facility: PHYSICIAN GROUP | Age: 84
End: 2019-07-19
Payer: MEDICARE

## 2019-07-19 ENCOUNTER — TELEPHONE (OUTPATIENT)
Dept: VASCULAR LAB | Facility: MEDICAL CENTER | Age: 84
End: 2019-07-19

## 2019-07-19 DIAGNOSIS — Z79.01 CHRONIC ANTICOAGULATION: ICD-10-CM

## 2019-07-19 DIAGNOSIS — I48.19 PERSISTENT ATRIAL FIBRILLATION (HCC): ICD-10-CM

## 2019-07-19 LAB — INR PPP: 2.7 (ref 2–3.5)

## 2019-07-19 PROCEDURE — 85610 PROTHROMBIN TIME: CPT | Performed by: INTERNAL MEDICINE

## 2019-07-19 PROCEDURE — 93793 ANTICOAG MGMT PT WARFARIN: CPT | Performed by: FAMILY MEDICINE

## 2019-07-19 RX ORDER — ALENDRONATE SODIUM 70 MG/1
70 TABLET ORAL
COMMUNITY

## 2019-07-19 NOTE — PROGRESS NOTES
Anticoagulation Summary  As of 2019    INR goal:   2.0-3.0   TTR:   74.7 % (2.1 y)   INR used for dosin.70 (2019)   Warfarin maintenance plan:   5 mg (5 mg x 1) every Mon, Wed; 2.5 mg (5 mg x 0.5) all other days   Weekly warfarin total:   22.5 mg   Plan last modified:   Rizwan Lipscomb, PharmD (2018)   Next INR check:   2019   Target end date:   Indefinite    Indications    Chronic anticoagulation [Z79.01]  AF (atrial fibrillation) ([facility]) [I48.91]             Anticoagulation Episode Summary     INR check location:       Preferred lab:       Send INR reminders to:       Comments:         Anticoagulation Care Providers     Provider Role Specialty Phone number    Grant Woodard M.D. Referring Interventional Cardiology [phone]    [facility] Anticoagulation Services Responsible  [phone]        Anticoagulation Patient Findings  Patient Findings     Negatives:   Signs/symptoms of thrombosis, Signs/symptoms of bleeding, Laboratory test error suspected, Change in health, Change in alcohol use, Change in activity, Upcoming invasive procedure, Emergency department visit, Upcoming dental procedure, Missed doses, Extra doses, Change in medications, Change in diet/appetite, Hospital admission, Bruising, Other complaints        HPI:   Saumya Vann seen in clinic today, on anticoagulation therapy with warfarin for stroke prevention due to history of atrial fibrillation.    Patient's previous INR was therapeutic at 2.1 on 19, at which time patient was instructed to continue with current warfarin regimen.  She returns to clinic today to recheck INR to ensure it is therapeutic and thus preventing possible clotting and/or bleeding/bruising complications.    CHADS-VASc = at least 5  (unadjusted ischemic stroke risk/year:  7.2%, which is moderate risk)    Does patient have any changes to current medical/health status since last appt (Y/N):  NO  Does patient have any signs/symptoms of bleeding and/or  "thrombosis since the last appt (Y/N):  NO  Does patient have any interval changes to diet or medications since last appt (Y/N):  NO  Are there any complications or cost restrictions with current therapy (Y/N):  NO     Does patient have [facility] PCP? NO, Dr Bebe Santamaria (If not, please document discussion that patient must be seen at [facility])       Vitals:  /83  HR 80    Weight  declines   Height   5' 2\"     Asssessment:      INR remains therapeutic at 2.7, therefore decreasing patient's risk of stroke and/or bleeding complications.   Reason(s) for out of range INR today:  n/a      Plan:  Pt is to continue with current warfarin dosing regimen in order to maintain INR in therapeutic range.  Patient informed that she can no longer be seen in medical groups as she does not have [facility] based PCP.  [employer] home meter application sent out per patient request.     Follow up:  Because warfarin is a high risk medication and current CHEST guidelines recommend regular monitoring intervals (few days up to 12 weeks), will have patient return to clinic in 12 weeks to recheck INR.    Rizwan Lipscomb, PharmD, BCACP    "

## 2019-07-19 NOTE — TELEPHONE ENCOUNTER
Home INR monitoring application sent to [employer].    Luis Yu. [facility]  [place]

## 2019-08-12 ENCOUNTER — HOSPITAL ENCOUNTER (OUTPATIENT)
Dept: LAB | Facility: MEDICAL CENTER | Age: 84
End: 2019-08-12
Attending: INTERNAL MEDICINE
Payer: MEDICARE

## 2019-08-12 LAB
25(OH)D3 SERPL-MCNC: 12 NG/ML (ref 30–100)
ALBUMIN SERPL BCP-MCNC: 4.1 G/DL (ref 3.2–4.9)
ALBUMIN/GLOB SERPL: 1.1 G/DL
ALP SERPL-CCNC: 104 U/L (ref 30–99)
ALT SERPL-CCNC: 11 U/L (ref 2–50)
ANION GAP SERPL CALC-SCNC: 11 MMOL/L (ref 0–11.9)
AST SERPL-CCNC: 18 U/L (ref 12–45)
BILIRUB SERPL-MCNC: 1 MG/DL (ref 0.1–1.5)
BUN SERPL-MCNC: 18 MG/DL (ref 8–22)
CALCIUM SERPL-MCNC: 9.6 MG/DL (ref 8.5–10.5)
CHLORIDE SERPL-SCNC: 100 MMOL/L (ref 96–112)
CO2 SERPL-SCNC: 27 MMOL/L (ref 20–33)
CREAT SERPL-MCNC: 1.1 MG/DL (ref 0.5–1.4)
EST. AVERAGE GLUCOSE BLD GHB EST-MCNC: 123 MG/DL
FASTING STATUS PATIENT QL REPORTED: NORMAL
GLOBULIN SER CALC-MCNC: 3.6 G/DL (ref 1.9–3.5)
GLUCOSE SERPL-MCNC: 111 MG/DL (ref 65–99)
HBA1C MFR BLD: 5.9 % (ref 0–5.6)
POTASSIUM SERPL-SCNC: 4.2 MMOL/L (ref 3.6–5.5)
PROT SERPL-MCNC: 7.7 G/DL (ref 6–8.2)
SODIUM SERPL-SCNC: 138 MMOL/L (ref 135–145)

## 2019-08-12 PROCEDURE — 36415 COLL VENOUS BLD VENIPUNCTURE: CPT

## 2019-08-12 PROCEDURE — 83036 HEMOGLOBIN GLYCOSYLATED A1C: CPT

## 2019-08-12 PROCEDURE — 82306 VITAMIN D 25 HYDROXY: CPT

## 2019-08-12 PROCEDURE — 80053 COMPREHEN METABOLIC PANEL: CPT

## 2019-08-19 ENCOUNTER — OFFICE VISIT (OUTPATIENT)
Dept: CARDIOLOGY | Facility: MEDICAL CENTER | Age: 84
End: 2019-08-19
Payer: MEDICARE

## 2019-08-19 VITALS
BODY MASS INDEX: 36.95 KG/M2 | HEIGHT: 62 IN | OXYGEN SATURATION: 95 % | SYSTOLIC BLOOD PRESSURE: 130 MMHG | HEART RATE: 92 BPM | WEIGHT: 200.8 LBS | DIASTOLIC BLOOD PRESSURE: 72 MMHG

## 2019-08-19 DIAGNOSIS — Z79.01 CHRONIC ANTICOAGULATION: ICD-10-CM

## 2019-08-19 DIAGNOSIS — E11.9 TYPE 2 DIABETES MELLITUS WITHOUT COMPLICATION, UNSPECIFIED WHETHER LONG TERM INSULIN USE (HCC): ICD-10-CM

## 2019-08-19 DIAGNOSIS — I48.19 PERSISTENT ATRIAL FIBRILLATION (HCC): ICD-10-CM

## 2019-08-19 PROCEDURE — 99215 OFFICE O/P EST HI 40 MIN: CPT | Performed by: INTERNAL MEDICINE

## 2019-08-19 RX ORDER — BLOOD-GLUCOSE METER
KIT MISCELLANEOUS
Refills: 2 | COMMUNITY
Start: 2019-05-25 | End: 2019-11-15

## 2019-08-19 NOTE — PROGRESS NOTES
Subjective:   Saumya Vann is a 88 y.o. female who presents today for followup of PAF chronic anticoagulation. She has had exertional dyspnea over the past 3 years associated with fatigue. She has a history of HTN, diabetes mellitus, CKD . Her echo is relatively unremarkable aside from LVH and grade 1 diastolic function. PFTs per her primary doctor are unremarkable. She denies any exertional chest pain, PND or orthopnea.     Her symptoms have improved with improved rate control and she has had a normal PET scan.  She is concerned about losing her ability to check her home INRs and is planning on switching to a direct oral anticoagulant for this reason.  She has no bleeding issues however.  Her renal function is stable.      Family History   Problem Relation Age of Onset   • Cancer Other      Social History     Tobacco Use   Smoking Status Never Smoker   Smokeless Tobacco Never Used     Allergies   Allergen Reactions   • Pcn [Penicillins]      Outpatient Encounter Medications as of 8/19/2019   Medication Sig Dispense Refill   • FREESTYLE LITE strip USE 1 STRIP ONCE DAILY TO CHECK GLUCOSE  2   • alendronate (FOSAMAX) 70 MG Tab Take 70 mg by mouth every 7 days.     • CARTIA  MG CAPSULE SR 24 HR TAKE 1 CAPSULE BY MOUTH TWICE DAILY 200 Cap 2   • spironolactone (ALDACTONE) 25 MG Tab      • warfarin (COUMADIN) 5 MG Tab Take one-half to one tablet by mouth one time daily or as directed by coumadin clinic 90 Tab 1   • omeprazole (PRILOSEC) 20 MG delayed-release capsule Take 20 mg by mouth every day.     • Bevacizumab (AVASTIN IV) by Intravenous route.     • clobetasol (TEMOVATE) 0.05 % Ointment      • latanoprost (XALATAN) 0.005 % Solution      • levothyroxine (SYNTHROID) 150 MCG TABS Take 150 mcg by mouth every day.     • neomycin-polymixin-dexamethasone (MAXITROL) 3.5-20367-9.1 Ointment ophthalmic ointment        No facility-administered encounter medications on file as of 8/19/2019.      Review of Systems  "  All other systems reviewed and are negative.       Objective:   /72 (BP Location: Left arm, Patient Position: Sitting, BP Cuff Size: Adult)   Pulse 92   Ht 1.575 m (5' 2\")   Wt 91.1 kg (200 lb 12.8 oz)   SpO2 95%   BMI 36.73 kg/m²     Physical Exam   Constitutional: She is oriented to person, place, and time. She appears well-developed and well-nourished. No distress.   HENT:   Head: Normocephalic and atraumatic.   Mouth/Throat: Oropharynx is clear and moist. No oropharyngeal exudate.   Eyes: Pupils are equal, round, and reactive to light. Conjunctivae are normal. No scleral icterus.   Neck: Normal range of motion. Neck supple. No JVD present. No thyromegaly present.   Cardiovascular: Normal rate, normal heart sounds and intact distal pulses. An irregularly irregular rhythm present. Frequent extrasystoles are present. PMI is not displaced. Exam reveals no gallop and no friction rub.   No murmur heard.  Pulses:       Carotid pulses are 2+ on the right side, and 2+ on the left side.       Femoral pulses are 2+ on the right side, and 2+ on the left side.       Popliteal pulses are 1+ on the right side, and 1+ on the left side.        Dorsalis pedis pulses are 2+ on the right side, and 2+ on the left side.        Posterior tibial pulses are 2+ on the right side, and 2+ on the left side.   Pulmonary/Chest: Effort normal and breath sounds normal. She has no wheezes. She has no rales.   Abdominal: Soft. Bowel sounds are normal. She exhibits no distension. There is no tenderness.   Musculoskeletal: She exhibits no edema or tenderness.   Neurological: She is alert and oriented to person, place, and time. No cranial nerve deficit.   Skin: Skin is warm and dry. No rash noted. She is not diaphoretic. No erythema.   Psychiatric: She has a normal mood and affect. Her behavior is normal.   No changes since prior evaluation 2/12/2019    LABS:  Lab Results   Component Value Date/Time    CHOLSTRLTOT 180 05/16/2019 10:37 " AM    LDL 97 05/16/2019 10:37 AM    HDL 55 05/16/2019 10:37 AM    TRIGLYCERIDE 138 05/16/2019 10:37 AM       Lab Results   Component Value Date/Time    WBC 10.2 05/16/2019 10:37 AM    RBC 5.28 05/16/2019 10:37 AM    HEMOGLOBIN 15.6 05/16/2019 10:37 AM    HEMATOCRIT 48.3 (H) 05/16/2019 10:37 AM    MCV 91.5 05/16/2019 10:37 AM    NEUTSPOLYS 78.10 (H) 05/16/2019 10:37 AM    LYMPHOCYTES 12.90 (L) 05/16/2019 10:37 AM    MONOCYTES 6.80 05/16/2019 10:37 AM    EOSINOPHILS 0.90 05/16/2019 10:37 AM    BASOPHILS 0.70 05/16/2019 10:37 AM    HYPOCHROMIA 1+ 04/10/2014 03:03 PM     Lab Results   Component Value Date/Time    SODIUM 138 08/12/2019 10:08 AM    POTASSIUM 4.2 08/12/2019 10:08 AM    CHLORIDE 100 08/12/2019 10:08 AM    CO2 27 08/12/2019 10:08 AM    GLUCOSE 111 (H) 08/12/2019 10:08 AM    BUN 18 08/12/2019 10:08 AM    CREATININE 1.10 08/12/2019 10:08 AM     Lab Results   Component Value Date    HBA1C 5.9 (H) 08/12/2019      Lab Results   Component Value Date/Time    ALKPHOSPHAT 104 (H) 08/12/2019 10:08 AM    ASTSGOT 18 08/12/2019 10:08 AM    ALTSGPT 11 08/12/2019 10:08 AM    TBILIRUBIN 1.0 08/12/2019 10:08 AM      No results found for: BNPBTYPENAT   No results found for: TSH  Lab Results   Component Value Date/Time    INR 2.70 07/19/2019 10:17 AM        STRESS (6/2017):  CONCLUSIONS AND IMPRESSIONS:  Overall, there is no evidence of myocardial    ischemia based on EKG tracing along with myocardial PET scan images.  The    rhythm is atrial fibrillation throughout the stress test.  No malignant    arrhythmias noted.    ECHO CONCLUSIONS (4/27/2016):  Normal left ventricular size and systolic function.  Left ventricular ejection fraction is visually estimated to be 55%.  Moderate concentric left ventricular hypertrophy.  Normal regional wall motion.  Mild mitral regurgitation.  Mild tricuspid regurgitation.  Estimated right ventricular systolic pressure is 25 mmHg.  Normal inferior vena cava size and inspiratory  collapse.    ECHO (11/10/2014):  I have personally reviewed the echocardiogram this visit and reviewed the findings with the patient. It shows:   Grade 1 diastolic dysfunction, EF 60-65%, moderate LVH, left atrial enlargement    PFTs as above    Today I reviewed the medical, surgical, social and family histories with the patient. As per HPI, otherwise noncontributory.      Assessment:     1. Persistent atrial fibrillation (HCC)     2. Chronic anticoagulation     3. Type 2 diabetes mellitus without complication, unspecified whether long term insulin use (HCC)           Medical Decision Making:  Today's Assessment / Status / Plan:     Excellent blood pressure and heart rate control.  Tolerating her anticoagulate and well.  She would be an excellent candidate for direct oral anticoagulant should this be affordable to her and she plans on switching for the above reasons.  She will choose between Xarelto and Eliquis based on price.  We discussed the renal function monitoring requirements and attendant bleeding risks and benefits.  Recommend she continue her other medical therapy.    Follow-up 6 months  Continue oral anti-coagulation and transition to direct oral anticoagulant of her choice in the near term.

## 2019-09-03 ENCOUNTER — TELEPHONE (OUTPATIENT)
Dept: VASCULAR LAB | Facility: MEDICAL CENTER | Age: 84
End: 2019-09-03

## 2019-09-03 ENCOUNTER — TELEPHONE (OUTPATIENT)
Dept: CARDIOLOGY | Facility: MEDICAL CENTER | Age: 84
End: 2019-09-03

## 2019-09-03 DIAGNOSIS — I48.19 PERSISTENT ATRIAL FIBRILLATION (HCC): ICD-10-CM

## 2019-09-03 NOTE — TELEPHONE ENCOUNTER
RICKIE/Adrian      Patient told Dr. Woodard she would call him back about starting Xarelto or Eliquis. She wants to discuss starting Xarelto. She can be reached at [phone].

## 2019-09-03 NOTE — TELEPHONE ENCOUNTER
Called pt. She would like to start Xarelto. She is asking how she should transition from coumadin to Xarelto. Advised pt to contact coumadin clinic to discuss best way to transition. Rx sent to [facility] as requested. Message sent to coumadin clinic as well.

## 2019-09-09 ENCOUNTER — ANTICOAGULATION MONITORING (OUTPATIENT)
Dept: VASCULAR LAB | Facility: MEDICAL CENTER | Age: 84
End: 2019-09-09

## 2019-09-09 ENCOUNTER — HOSPITAL ENCOUNTER (OUTPATIENT)
Dept: LAB | Facility: MEDICAL CENTER | Age: 84
End: 2019-09-09
Attending: NURSE PRACTITIONER
Payer: MEDICARE

## 2019-09-09 DIAGNOSIS — Z79.01 CHRONIC ANTICOAGULATION: ICD-10-CM

## 2019-09-09 LAB
INR PPP: 2.17 (ref 0.87–1.13)
PROTHROMBIN TIME: 24.9 SEC (ref 12–14.6)

## 2019-09-09 PROCEDURE — 36415 COLL VENOUS BLD VENIPUNCTURE: CPT

## 2019-09-09 PROCEDURE — 85610 PROTHROMBIN TIME: CPT

## 2019-09-09 NOTE — PROGRESS NOTES
Received call from patient who wants to transition from warfarin to Xarelto and needs further instructions. Pt unable to be seen in clinic because she has a non-[facility] PCP. I put in a standing PT/INR order. Instructed pt to get labs drawn today and if INR < 3 she can switch to Xarelto.    Will f/u as soon as we receive results.    Mandy Clark, PharmD

## 2019-09-10 ENCOUNTER — ANTICOAGULATION MONITORING (OUTPATIENT)
Dept: VASCULAR LAB | Facility: MEDICAL CENTER | Age: 84
End: 2019-09-10

## 2019-09-10 DIAGNOSIS — I48.91 ATRIAL FIBRILLATION, UNSPECIFIED TYPE (HCC): ICD-10-CM

## 2019-09-10 DIAGNOSIS — Z79.01 CHRONIC ANTICOAGULATION: ICD-10-CM

## 2019-09-12 NOTE — PROGRESS NOTES
Anticoagulation Summary  As of 9/10/2019    INR goal:   2.0-3.0   TTR:   76.3 % (2.2 y)   INR used for dosing:   No new INR was available at the time of this encounter.   Warfarin maintenance plan:   No maintenance plan   Plan last modified:   Tatum Lewis, PharmD (9/12/2019)   Next INR check:   12/5/2019   Target end date:   Indefinite    Indications    Chronic anticoagulation [Z79.01]  AF (atrial fibrillation) (HCC) [I48.91]             Anticoagulation Episode Summary     INR check location:       Preferred lab:       Send INR reminders to:       Comments:         Anticoagulation Care Providers     Provider Role Specialty Phone number    Grant Woodard M.D. Referring Interventional Cardiology [phone]    [facility] Anticoagulation Services Responsible  [phone]        Anticoagulation Patient Findings      Spoke with patient.  INR is therapeutic.   Pt denies any unusual s/s of bleeding, bruising, clotting or any changes to diet or medications. Denies any etoh, cranberries, supplements, or illness.     Pt will STOP warfarin tonight and start Xarelto 20mg daily tonight with/after dinner.    Lab Results   Component Value Date/Time    CREATININE 1.10 08/12/2019 10:08 AM      CrCl ~ 51ml/min.    Pt agrees to repeat CBC and BMP prior to Xarelto refill and to call clinic if any issues.     Tatum Lewis, MaynorD

## 2019-10-12 ENCOUNTER — APPOINTMENT (OUTPATIENT)
Dept: RADIOLOGY | Facility: MEDICAL CENTER | Age: 84
End: 2019-10-12
Attending: EMERGENCY MEDICINE
Payer: MEDICARE

## 2019-10-12 ENCOUNTER — HOSPITAL ENCOUNTER (EMERGENCY)
Facility: MEDICAL CENTER | Age: 84
End: 2019-10-12
Attending: EMERGENCY MEDICINE
Payer: MEDICARE

## 2019-10-12 VITALS
BODY MASS INDEX: 37.76 KG/M2 | OXYGEN SATURATION: 99 % | WEIGHT: 200 LBS | TEMPERATURE: 97 F | DIASTOLIC BLOOD PRESSURE: 73 MMHG | RESPIRATION RATE: 16 BRPM | HEART RATE: 95 BPM | HEIGHT: 61 IN | SYSTOLIC BLOOD PRESSURE: 143 MMHG

## 2019-10-12 DIAGNOSIS — S42.292A OTHER CLOSED DISPLACED FRACTURE OF PROXIMAL END OF LEFT HUMERUS, INITIAL ENCOUNTER: ICD-10-CM
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
## 2019-10-12 PROCEDURE — 96374 THER/PROPH/DIAG INJ IV PUSH: CPT

## 2019-10-12 PROCEDURE — 73030 X-RAY EXAM OF SHOULDER: CPT | Mod: LT

## 2019-10-12 PROCEDURE — 96375 TX/PRO/DX INJ NEW DRUG ADDON: CPT

## 2019-10-12 PROCEDURE — 99285 EMERGENCY DEPT VISIT HI MDM: CPT

## 2019-10-12 PROCEDURE — 700111 HCHG RX REV CODE 636 W/ 250 OVERRIDE (IP): Performed by: EMERGENCY MEDICINE

## 2019-10-12 PROCEDURE — 73200 CT UPPER EXTREMITY W/O DYE: CPT | Mod: LT

## 2019-10-12 RX ORDER — OXYCODONE HYDROCHLORIDE AND ACETAMINOPHEN 5; 325 MG/1; MG/1
1 TABLET ORAL EVERY 4 HOURS PRN
Qty: 12 TAB | Refills: 0 | Status: SHIPPED | OUTPATIENT
Start: 2019-10-12 | End: 2019-10-15

## 2019-10-12 RX ORDER — ONDANSETRON 2 MG/ML
4 INJECTION INTRAMUSCULAR; INTRAVENOUS ONCE
Status: COMPLETED | OUTPATIENT
Start: 2019-10-12 | End: 2019-10-12

## 2019-10-12 RX ORDER — ONDANSETRON 4 MG/1
4 TABLET, ORALLY DISINTEGRATING ORAL EVERY 8 HOURS PRN
Qty: 10 TAB | Refills: 0 | Status: SHIPPED | OUTPATIENT
Start: 2019-10-12 | End: 2019-11-15

## 2019-10-12 RX ADMIN — ONDANSETRON 4 MG: 2 INJECTION INTRAMUSCULAR; INTRAVENOUS at 18:52

## 2019-10-12 RX ADMIN — FENTANYL CITRATE 50 MCG: 50 INJECTION INTRAMUSCULAR; INTRAVENOUS at 18:55

## 2019-10-12 NOTE — ED TRIAGE NOTES
Name and  verified for triage    Pt here via medic from home with c/o mechanical fall. States she was walking into living room when she tripped on the carpet- landed on bilateral knees and then fell onto left shoulder. C/o severe pain to shoulder. States was laying on floor for one hour until son called EMS. Denies hitting head or LOC. Takes xarelto

## 2019-10-13 NOTE — ED PROVIDER NOTES
"ED Provider Note    CHIEF COMPLAINT  Chief Complaint   Patient presents with   • Shoulder Injury   • Fall       HPI  Saumya Vann is a 88 y.o. female who presents for evaluation of a left shoulder injury, status post ground-level trip and fall, landing on her knees and ultimately onto her left arm.  Pain is localized to the left shoulder.  She did not strike her head, no headache, no neck pain, no back pain, chest pain or abdominal pain.  She has no weakness, numbness or tingling.  The patient has history of A. fib and diabetes, she is anticoagulated on Xarelto.    REVIEW OF SYSTEMS  Negative for headache, neck pain, focal weakness, focal numbness, focal tingling, chest pain, back pain, abdominal pain. All other systems are negative.     PAST MEDICAL HISTORY  Past Medical History:   Diagnosis Date   • A-fib (HCC)    • Diabetes (HCC)    • Hypertension    • Hypothyroid        FAMILY HISTORY  Family History   Problem Relation Age of Onset   • Cancer Other        SOCIAL HISTORY  Social History     Tobacco Use   • Smoking status: Never Smoker   • Smokeless tobacco: Never Used   Substance Use Topics   • Alcohol use: No   • Drug use: No       SURGICAL HISTORY  Past Surgical History:   Procedure Laterality Date   • APPENDECTOMY     • CHOLECYSTECTOMY     • HYSTERECTOMY, TOTAL ABDOMINAL     • KNEE REPLACEMENT, TOTAL Bilateral    • OTHER ORTHOPEDIC SURGERY     • US-NEEDLE CORE BX-BREAST PANEL         CURRENT MEDICATIONS  I personally reviewed the medication list in the charting documentation.     ALLERGIES  Allergies   Allergen Reactions   • Pcn [Penicillins]        MEDICAL RECORD  I have reviewed patient's medical record and pertinent results are listed above.      PHYSICAL EXAM  VITAL SIGNS: /85   Pulse 68   Temp 36.1 °C (97 °F) (Temporal)   Resp 16   Ht 1.549 m (5' 1\")   Wt 90.7 kg (200 lb)   SpO2 93%   BMI 37.79 kg/m²    Constitutional: Elderly and frail-appearing, lying still in no acute distress  HENT: " Mucus membranes moist.  Oropharynx is clear. Head is atraumatic.  Neck: Nontender, comfortable full range of motion.  Cardiovascular: Regular heart rate and rhythm.   Thorax & Lungs: Chest is nontender.    Abdomen: Soft, with no tenderness  Skin: Warm, dry. No rash appreciated  Extremities/Musculoskeletal: Tenderness of the left proximal humeral region with obvious edema.  There is essentially no active range of motion secondary to pain, distally she is neurovascularly intact with a normal radial pulse as well as sensation and motor function in the median, ulnar and radial nerve distributions.  Neurologic: Alert & oriented. CN II-XII grossly intact. No focal deficits observed.   Psychiatric: Normal affect appropriate for the clinical situation.    DIAGNOSTIC STUDIES / PROCEDURES    RADIOLOGY  CT-SHOULDER W/O LEFT   Final Result      Acute comminuted impacted mildly angulated fracture of the left humeral neck.      DX-SHOULDER 2+ LEFT   Final Result      Acute impacted fracture of the left proximal humerus is noted on limited images with deformity at the fracture site.            COURSE & MEDICAL DECISION MAKING  I have reviewed any medical record information, laboratory studies and radiographic results as noted above.    Encounter Summary: This is a 88 y.o. female with ground-level fall, isolated left shoulder injury, x-ray reveals a proximal humerus fracture, neurovascularly intact, will consult orthopedics, patient states her pain is well controlled if she does not attempt to move her arm. ------- x-ray confirms the presence of a proximal humerus fracture.  Due to the patient's lack of mobility on the shoulder the x-rays were not adequate to exclude a dislocation of the humeral head, I discussed the case with orthopedic surgeon Dr. Kingston who does request additional imaging to help rule out a dislocated humeral head, with the inability to move her into an appropriate position for more adequate x-rays, a CT scan  was then obtained which revealed a located humeral head.  Patient will be placed in a sling and will follow up with Dr. Kingston, will prescribe Percocet and Zofran, strict return instructions have been discussed.  I also discussed the need for stool softener and/or laxatives while using Percocet to avoid constipation peer    In prescribing controlled substances to this patient, I certify that I have obtained and reviewed the medical history of Saumya Vann. I have also made a good dean effort to obtain applicable records from other providers who have treated the patient.    I have conducted a physical exam and documented it. I have reviewed Ms. Vann’s prescription history as maintained by the [place] Prescription Monitoring Program.     I have assessed the patient’s risk for abuse, dependency, and addiction using the validated Opioid Risk Tool    Given the above, I believe the benefits of controlled substance therapy outweigh the risks. The reasons for prescribing controlled substances include my professional opinion that controlled substances are a reasonable choice for this patient in the event other methods are ineffective inadequately controlling pain. Accordingly, I have discussed the risk and benefits, treatment plan, and alternative therapies with the patient.         DISPOSITION: Discharged home in stable condition      FINAL IMPRESSION  1. Other closed displaced fracture of proximal end of left humerus, initial encounter    2. Fall, initial encounter           This dictation was created using voice recognition software. The accuracy of the dictation is limited to the abilities of the software. I expect there may be some errors of grammar and possibly content. The nursing notes were reviewed and certain aspects of this information were incorporated into this note.    Electronically signed by: Alban Salomon, 10/12/2019 5:59 PM

## 2019-10-13 NOTE — ED NOTES
Pt given discharge instructions. CMS intact in sling. Medication education provided. Pt aware not to drive after taking narcotics. PIV removed, dressing applied. Signed copy in chart. Pt states all belongings in possession. Pt off unit via wheelchair.

## 2019-11-15 ENCOUNTER — HOSPITAL ENCOUNTER (OUTPATIENT)
Facility: MEDICAL CENTER | Age: 84
End: 2019-11-18
Attending: EMERGENCY MEDICINE | Admitting: INTERNAL MEDICINE
Payer: MEDICARE

## 2019-11-15 ENCOUNTER — APPOINTMENT (OUTPATIENT)
Dept: RADIOLOGY | Facility: MEDICAL CENTER | Age: 84
End: 2019-11-15
Attending: EMERGENCY MEDICINE
Payer: MEDICARE

## 2019-11-15 DIAGNOSIS — R53.1 WEAKNESS: ICD-10-CM

## 2019-11-15 DIAGNOSIS — M48.56XA NON-TRAUMATIC COMPRESSION FRACTURE OF FOURTH LUMBAR VERTEBRA, INITIAL ENCOUNTER: ICD-10-CM

## 2019-11-15 LAB
ANION GAP SERPL CALC-SCNC: 12 MMOL/L (ref 0–11.9)
APPEARANCE UR: ABNORMAL
BACTERIA #/AREA URNS HPF: ABNORMAL /HPF
BASOPHILS # BLD AUTO: 0.5 % (ref 0–1.8)
BASOPHILS # BLD: 0.06 K/UL (ref 0–0.12)
BILIRUB UR QL STRIP.AUTO: NEGATIVE
BUN SERPL-MCNC: 16 MG/DL (ref 8–22)
CALCIUM SERPL-MCNC: 8.7 MG/DL (ref 8.5–10.5)
CHLORIDE SERPL-SCNC: 100 MMOL/L (ref 96–112)
CO2 SERPL-SCNC: 28 MMOL/L (ref 20–33)
COLOR UR: ABNORMAL
CREAT SERPL-MCNC: 0.96 MG/DL (ref 0.5–1.4)
EOSINOPHIL # BLD AUTO: 0.01 K/UL (ref 0–0.51)
EOSINOPHIL NFR BLD: 0.1 % (ref 0–6.9)
EPI CELLS #/AREA URNS HPF: ABNORMAL /HPF
ERYTHROCYTE [DISTWIDTH] IN BLOOD BY AUTOMATED COUNT: 51.9 FL (ref 35.9–50)
GLUCOSE SERPL-MCNC: 115 MG/DL (ref 65–99)
GLUCOSE UR STRIP.AUTO-MCNC: NEGATIVE MG/DL
HCT VFR BLD AUTO: 43.3 % (ref 37–47)
HGB BLD-MCNC: 13.8 G/DL (ref 12–16)
HYALINE CASTS #/AREA URNS LPF: ABNORMAL /LPF
IMM GRANULOCYTES # BLD AUTO: 0.08 K/UL (ref 0–0.11)
IMM GRANULOCYTES NFR BLD AUTO: 0.7 % (ref 0–0.9)
INR PPP: 2.16 (ref 0.87–1.13)
KETONES UR STRIP.AUTO-MCNC: 15 MG/DL
LEUKOCYTE ESTERASE UR QL STRIP.AUTO: ABNORMAL
LYMPHOCYTES # BLD AUTO: 1.04 K/UL (ref 1–4.8)
LYMPHOCYTES NFR BLD: 8.7 % (ref 22–41)
MCH RBC QN AUTO: 29.2 PG (ref 27–33)
MCHC RBC AUTO-ENTMCNC: 31.9 G/DL (ref 33.6–35)
MCV RBC AUTO: 91.5 FL (ref 81.4–97.8)
MICRO URNS: ABNORMAL
MONOCYTES # BLD AUTO: 0.92 K/UL (ref 0–0.85)
MONOCYTES NFR BLD AUTO: 7.7 % (ref 0–13.4)
NEUTROPHILS # BLD AUTO: 9.81 K/UL (ref 2–7.15)
NEUTROPHILS NFR BLD: 82.3 % (ref 44–72)
NITRITE UR QL STRIP.AUTO: NEGATIVE
NRBC # BLD AUTO: 0 K/UL
NRBC BLD-RTO: 0 /100 WBC
PH UR STRIP.AUTO: 5 [PH] (ref 5–8)
PLATELET # BLD AUTO: 309 K/UL (ref 164–446)
PMV BLD AUTO: 8.6 FL (ref 9–12.9)
POTASSIUM SERPL-SCNC: 4 MMOL/L (ref 3.6–5.5)
PROT UR QL STRIP: 30 MG/DL
PROTHROMBIN TIME: 24.8 SEC (ref 12–14.6)
RBC # BLD AUTO: 4.73 M/UL (ref 4.2–5.4)
RBC # URNS HPF: ABNORMAL /HPF
RBC UR QL AUTO: NEGATIVE
SODIUM SERPL-SCNC: 140 MMOL/L (ref 135–145)
SP GR UR STRIP.AUTO: 1.03
UROBILINOGEN UR STRIP.AUTO-MCNC: 1 MG/DL
WBC # BLD AUTO: 11.9 K/UL (ref 4.8–10.8)
WBC #/AREA URNS HPF: ABNORMAL /HPF

## 2019-11-15 PROCEDURE — 87086 URINE CULTURE/COLONY COUNT: CPT

## 2019-11-15 PROCEDURE — 72192 CT PELVIS W/O DYE: CPT

## 2019-11-15 PROCEDURE — 99220 PR INITIAL OBSERVATION CARE,LEVL III: CPT | Performed by: INTERNAL MEDICINE

## 2019-11-15 PROCEDURE — 96375 TX/PRO/DX INJ NEW DRUG ADDON: CPT

## 2019-11-15 PROCEDURE — 82306 VITAMIN D 25 HYDROXY: CPT

## 2019-11-15 PROCEDURE — 85025 COMPLETE CBC W/AUTO DIFF WBC: CPT

## 2019-11-15 PROCEDURE — 96365 THER/PROPH/DIAG IV INF INIT: CPT

## 2019-11-15 PROCEDURE — G0378 HOSPITAL OBSERVATION PER HR: HCPCS

## 2019-11-15 PROCEDURE — 85610 PROTHROMBIN TIME: CPT

## 2019-11-15 PROCEDURE — 99285 EMERGENCY DEPT VISIT HI MDM: CPT

## 2019-11-15 PROCEDURE — 80048 BASIC METABOLIC PNL TOTAL CA: CPT

## 2019-11-15 PROCEDURE — 72131 CT LUMBAR SPINE W/O DYE: CPT

## 2019-11-15 PROCEDURE — 700111 HCHG RX REV CODE 636 W/ 250 OVERRIDE (IP): Performed by: EMERGENCY MEDICINE

## 2019-11-15 PROCEDURE — 700105 HCHG RX REV CODE 258: Performed by: EMERGENCY MEDICINE

## 2019-11-15 PROCEDURE — 81001 URINALYSIS AUTO W/SCOPE: CPT

## 2019-11-15 RX ORDER — DILTIAZEM HYDROCHLORIDE 120 MG/1
120 CAPSULE, COATED, EXTENDED RELEASE ORAL 2 TIMES DAILY
Status: DISCONTINUED | OUTPATIENT
Start: 2019-11-15 | End: 2019-11-18 | Stop reason: HOSPADM

## 2019-11-15 RX ORDER — ERGOCALCIFEROL 1.25 MG/1
50000 CAPSULE ORAL
Refills: 11 | COMMUNITY
Start: 2019-09-03 | End: 2022-04-10 | Stop reason: ALTCHOICE

## 2019-11-15 RX ORDER — DIAZEPAM 2 MG/1
2 TABLET ORAL DAILY
Status: ON HOLD | COMMUNITY
End: 2019-11-18

## 2019-11-15 RX ORDER — POLYETHYLENE GLYCOL 3350 17 G/17G
1 POWDER, FOR SOLUTION ORAL
Status: DISCONTINUED | OUTPATIENT
Start: 2019-11-15 | End: 2019-11-18 | Stop reason: HOSPADM

## 2019-11-15 RX ORDER — ACETAMINOPHEN 325 MG/1
650 TABLET ORAL EVERY 6 HOURS PRN
Status: DISCONTINUED | OUTPATIENT
Start: 2019-11-15 | End: 2019-11-18 | Stop reason: HOSPADM

## 2019-11-15 RX ORDER — ENALAPRILAT 1.25 MG/ML
1.25 INJECTION INTRAVENOUS EVERY 6 HOURS PRN
Status: DISCONTINUED | OUTPATIENT
Start: 2019-11-15 | End: 2019-11-18 | Stop reason: HOSPADM

## 2019-11-15 RX ORDER — AMOXICILLIN 250 MG
2 CAPSULE ORAL 2 TIMES DAILY
Status: DISCONTINUED | OUTPATIENT
Start: 2019-11-15 | End: 2019-11-18 | Stop reason: HOSPADM

## 2019-11-15 RX ORDER — BISACODYL 10 MG
10 SUPPOSITORY, RECTAL RECTAL
Status: DISCONTINUED | OUTPATIENT
Start: 2019-11-15 | End: 2019-11-18 | Stop reason: HOSPADM

## 2019-11-15 RX ORDER — OMEPRAZOLE 20 MG/1
20 CAPSULE, DELAYED RELEASE ORAL DAILY
Status: DISCONTINUED | OUTPATIENT
Start: 2019-11-16 | End: 2019-11-18 | Stop reason: HOSPADM

## 2019-11-15 RX ORDER — ONDANSETRON 4 MG/1
4 TABLET, ORALLY DISINTEGRATING ORAL PRN
COMMUNITY
End: 2022-01-28

## 2019-11-15 RX ORDER — ACETAMINOPHEN 500 MG
500-1000 TABLET ORAL EVERY 6 HOURS PRN
Qty: 30 TAB | Refills: 0 | Status: SHIPPED | OUTPATIENT
Start: 2019-11-15 | End: 2019-11-15 | Stop reason: CLARIF

## 2019-11-15 RX ORDER — ONDANSETRON 2 MG/ML
4 INJECTION INTRAMUSCULAR; INTRAVENOUS EVERY 4 HOURS PRN
Status: DISCONTINUED | OUTPATIENT
Start: 2019-11-15 | End: 2019-11-18 | Stop reason: HOSPADM

## 2019-11-15 RX ORDER — HYDROMORPHONE HYDROCHLORIDE 1 MG/ML
0.5 INJECTION, SOLUTION INTRAMUSCULAR; INTRAVENOUS; SUBCUTANEOUS
Status: DISCONTINUED | OUTPATIENT
Start: 2019-11-15 | End: 2019-11-18 | Stop reason: HOSPADM

## 2019-11-15 RX ORDER — DILTIAZEM HYDROCHLORIDE 120 MG/1
120 CAPSULE, COATED, EXTENDED RELEASE ORAL 2 TIMES DAILY
COMMUNITY
End: 2020-06-17

## 2019-11-15 RX ORDER — ALENDRONATE SODIUM 70 MG/1
70 TABLET ORAL
Status: DISCONTINUED | OUTPATIENT
Start: 2019-11-15 | End: 2019-11-18 | Stop reason: HOSPADM

## 2019-11-15 RX ORDER — LEVOTHYROXINE SODIUM 0.15 MG/1
150 TABLET ORAL DAILY
Status: DISCONTINUED | OUTPATIENT
Start: 2019-11-16 | End: 2019-11-18 | Stop reason: HOSPADM

## 2019-11-15 RX ORDER — LATANOPROST 50 UG/ML
1 SOLUTION/ DROPS OPHTHALMIC EVERY EVENING
Status: DISCONTINUED | OUTPATIENT
Start: 2019-11-15 | End: 2019-11-18 | Stop reason: HOSPADM

## 2019-11-15 RX ORDER — ONDANSETRON 4 MG/1
4 TABLET, ORALLY DISINTEGRATING ORAL EVERY 4 HOURS PRN
Status: DISCONTINUED | OUTPATIENT
Start: 2019-11-15 | End: 2019-11-18 | Stop reason: HOSPADM

## 2019-11-15 RX ORDER — OXYCODONE HYDROCHLORIDE 10 MG/1
10 TABLET ORAL
Status: DISCONTINUED | OUTPATIENT
Start: 2019-11-15 | End: 2019-11-18 | Stop reason: HOSPADM

## 2019-11-15 RX ORDER — SPIRONOLACTONE 25 MG/1
25 TABLET ORAL
Status: DISCONTINUED | OUTPATIENT
Start: 2019-11-16 | End: 2019-11-18 | Stop reason: HOSPADM

## 2019-11-15 RX ORDER — TRAMADOL HYDROCHLORIDE 50 MG/1
50 TABLET ORAL EVERY 6 HOURS PRN
Status: DISCONTINUED | OUTPATIENT
Start: 2019-11-15 | End: 2019-11-18 | Stop reason: HOSPADM

## 2019-11-15 RX ORDER — OXYCODONE HYDROCHLORIDE 5 MG/1
5 TABLET ORAL
Status: DISCONTINUED | OUTPATIENT
Start: 2019-11-15 | End: 2019-11-18 | Stop reason: HOSPADM

## 2019-11-15 RX ADMIN — FENTANYL CITRATE 25 MCG: 50 INJECTION, SOLUTION INTRAMUSCULAR; INTRAVENOUS at 16:14

## 2019-11-15 RX ADMIN — CEFTRIAXONE SODIUM 1 G: 1 INJECTION, POWDER, FOR SOLUTION INTRAMUSCULAR; INTRAVENOUS at 20:00

## 2019-11-15 ASSESSMENT — ENCOUNTER SYMPTOMS
NAUSEA: 0
SHORTNESS OF BREATH: 0
VOMITING: 0
MYALGIAS: 0
DIARRHEA: 0
DEPRESSION: 0
HEADACHES: 0
PALPITATIONS: 0
DIZZINESS: 0
BACK PAIN: 1
SPUTUM PRODUCTION: 0
CONSTIPATION: 0
FEVER: 0
FALLS: 0
CHILLS: 0
LOSS OF CONSCIOUSNESS: 0
WEAKNESS: 1
COUGH: 0
ABDOMINAL PAIN: 0
TINGLING: 0
STRIDOR: 0

## 2019-11-15 ASSESSMENT — LIFESTYLE VARIABLES
EVER_SMOKED: NEVER
DO YOU DRINK ALCOHOL: NO

## 2019-11-15 NOTE — ED NOTES
Patient assisted into a hospital gown, provided with warm blankets, given her call light   Vital Signs stable   A&Ox4  Skin wpd  GCS 15  Respirations even and unlabored   No acute distress noted at this time   Triage completed, awaiting ED MD evaluation   Hourly rounding completed  Patient is up to date on current plan of care  Patient is resting comfortably in bed   Patient denies any need for use of the restroom, denies need for repositioning, denies need for any comfort care at this time

## 2019-11-15 NOTE — ED TRIAGE NOTES
87 y/o female bib wheelchair to triage with c/o bilateral hip pain R>L along with low back pain. Pt states she has had the pain for about one week. She denies any new injury but states that she fell and broke her arm 4 weeks ago.

## 2019-11-15 NOTE — ED PROVIDER NOTES
ED Provider Note    Scribed for Rodrigo Mack M.D. by Rodrigo Mack. 11/15/2019,  3:58 PM.    CHIEF COMPLAINT  Chief Complaint   Patient presents with   • Hip Pain   • Low Back Pain       HPI  Saumya Vann is a 88 y.o. female who presents to the Emergency Department for about 5 days of right-sided low back pain and bilateral hip pain.  She says that she fell and broke her arm 3 weeks ago, and is being followed by the [facility].  This pain that she presents for today, and the back and her hips, is a new pain, and was not present after the ground-level fall when she broke her arm.  Rather, she says that last weekend, she bent over to  a piece of paper, as she straightened up, felt the pain in the right side of her back.  It has been persistent, and is worse after periods of stillness, such as waking up in the morning.  She has had a very hard time getting around at home, and only has help at home in the very early morning hours.  She has not had any bowel or bladder incontinence.  She does not have any saddle anesthesia, history of cancer, and has been afebrile.  There are no red flags for back pain.    REVIEW OF SYSTEMS  See HPI for further details. All other systems are negative.     PAST MEDICAL HISTORY   has a past medical history of A-fib (HCC), Diabetes (HCC), Hypertension, and Hypothyroid.    SOCIAL HISTORY  Social History     Tobacco Use   • Smoking status: Never Smoker   • Smokeless tobacco: Never Used   Substance and Sexual Activity   • Alcohol use: No   • Drug use: No   • Sexual activity: Not on file     Social History     Substance and Sexual Activity   Drug Use No       SURGICAL HISTORY   has a past surgical history that includes us-needle core bx-breast panel; appendectomy; cholecystectomy; hysterectomy, total abdominal; other orthopedic surgery; and knee replacement, total (Bilateral).    CURRENT MEDICATIONS  Home Medications     Reviewed by Chayito Sidhu  "([facility]) on 11/15/19 at 2002  Med List Status: Complete   Medication Last Dose Status   alendronate (FOSAMAX) 70 MG Tab 11/7/2019 Active   B Complex Vitamins (VITAMIN B COMPLEX PO) 11/15/2019 Active   diazePAM (VALIUM) 2 MG Tab 11/15/2019 Active   DILTIAZem CD (CARTIA XT) 120 MG CAPSULE SR 24 HR 11/15/2019 Active   latanoprost (XALATAN) 0.005 % Solution 11/14/2019 Active   levothyroxine (SYNTHROID) 150 MCG TABS 11/15/2019 Active   Multiple Vitamins-Minerals (ICAPS AREDS 2) Cap 11/15/2019 Active   omeprazole (PRILOSEC) 20 MG delayed-release capsule 11/15/2019 Active   ondansetron (ZOFRAN ODT) 4 MG TABLET DISPERSIBLE 11/15/2019 Active   rivaroxaban (XARELTO) 20 MG Tab tablet 11/14/2019 Active   spironolactone (ALDACTONE) 25 MG Tab 11/15/2019 Active   vitamin D, Ergocalciferol, (DRISDOL) 65215 units Cap capsule 11/1/2019 Active                ALLERGIES  Allergies   Allergen Reactions   • Pcn [Penicillins] Rash     Rash         PHYSICAL EXAM  VITAL SIGNS: /79   Pulse 91   Temp 36.3 °C (97.3 °F) (Temporal)   Resp 16   Ht 1.575 m (5' 2\")   Wt 90.7 kg (200 lb)   SpO2 (!) 86%   BMI 36.58 kg/m²   Pulse ox interpretation: I interpret this pulse ox as normal.  Constitutional: Alert in no apparent distress.  HENT: No signs of trauma, Bilateral external ears normal, Nose normal.   Eyes: Conjunctiva normal, Non-icteric.   Neck: Normal range of motion, Supple, No stridor.   Lymphatic: No lymphadenopathy noted.   Cardiovascular: Regular rate and rhythm, no murmurs.   Thorax & Lungs: Normal breath sounds, No respiratory distress, No wheezing, No chest tenderness.   Abdomen: Bowel sounds normal, Soft, No tenderness, No masses, No pulsatile masses. No peritoneal signs.  Skin: Warm, Dry, No erythema, No rash.   Back: No midline bony tenderness.  There is right-sided lumbar paraspinous muscle tenderness.  Extremities: Intact distal pulses, No edema, No cyanosis.  Musculoskeletal: Good range of motion in all major " joints. No or major deformities noted.   Neurologic: Alert , Normal motor function, Normal sensory function, No focal deficits noted.   Psychiatric: Affect normal, Judgment normal, Mood normal.     DIAGNOSTIC STUDIES / PROCEDURES      LABS  Labs Reviewed   URINALYSIS,CULTURE IF INDICATED - Abnormal; Notable for the following components:       Result Value    Character Cloudy (*)     Ketones 15 (*)     Protein 30 (*)     Leukocyte Esterase Moderate (*)     All other components within normal limits   CBC WITH DIFFERENTIAL - Abnormal; Notable for the following components:    WBC 11.9 (*)     MCHC 31.9 (*)     RDW 51.9 (*)     MPV 8.6 (*)     Neutrophils-Polys 82.30 (*)     Lymphocytes 8.70 (*)     Neutrophils (Absolute) 9.81 (*)     Monos (Absolute) 0.92 (*)     All other components within normal limits    Narrative:     Indicate which anticoagulants the patient is on:->UNKNOWN   BASIC METABOLIC PANEL - Abnormal; Notable for the following components:    Glucose 115 (*)     Anion Gap 12.0 (*)     All other components within normal limits    Narrative:     Indicate which anticoagulants the patient is on:->UNKNOWN   PROTHROMBIN TIME - Abnormal; Notable for the following components:    PT 24.8 (*)     INR 2.16 (*)     All other components within normal limits    Narrative:     Indicate which anticoagulants the patient is on:->UNKNOWN   ESTIMATED GFR - Abnormal; Notable for the following components:    GFR If Non  55 (*)     All other components within normal limits    Narrative:     Indicate which anticoagulants the patient is on:->UNKNOWN   URINE MICROSCOPIC (W/UA) - Abnormal; Notable for the following components:    WBC  (*)     Bacteria Few (*)     Hyaline Cast 3-5 (*)     All other components within normal limits   URINE CULTURE(NEW)     All labs reviewed by me.    RADIOLOGY  CT-LSPINE W/O PLUS RECONS   Final Result      1.  Superior endplate mild comminuted compression fracture involving the L4  vertebral body is identified. Fracture line does not extend to the spinal canal or posterior elements. No spinal canal narrowing is caused by the fracture.      2.  No other fracture identified.      3.  Mild anterolisthesis at L4-L5.      4.  Severe degenerative disc disease and facet arthropathy causing spinal stenosis and neural foraminal narrowing.      CT-PELVIS W/O PLUS RECONS   Final Result      Mild compression fracture of the superior endplate of L4 is again noted. Degenerative changes in the lower lumbar spine.      Degenerative changes of the hips bilaterally.      Demineralization which limits evaluation.      Atherosclerotic plaque.      Colonic diverticulosis.           The radiologist's interpretation of all radiological studies have been reviewed by me.    COURSE & MEDICAL DECISION MAKING  Nursing notes, VS, PMSFHx reviewed in chart.     3:58 PM Patient seen and examined at bedside.  This patient does not have midline tenderness, and has been ambulatory, and has not fallen, despite onset of pain while straightening up from a bent over position.  However, with her ground-level fall recently, and advanced age, she is at risk for injury from minimal or occult trauma.  Differential diagnosis includes but is not limited to lumbar spine fracture, disc rupture, low back strain, hip fracture, musculoskeletal pain. Ordered for CTs of the lumbar spine and pelvis, and screening laboratory tests in case of a fracture requiring surgery to evaluate. Patient will be treated with 25 mcg of fentanyl, every 15 minutes as needed for up to 3 doses for her symptoms.     6:32 PM this patient's imaging results show what is likely a subacute mild compression fracture of the superior endplate of L4.  This is in addition to severe degenerative disc disease and facet arthropathy causing spinal stenosis and neural foraminal narrowing.  There is no acute hip fracture, though there is degenerative change bilaterally.  Given these  findings, the patient has been medicated, she would be appropriate for discharge if she can ambulate with minimal assistance, since she does have family at home.  We will try to walk her in the hallway.    7:09 PM this patient is having a hard time even rising to a seated position to get out of bed.  She cannot walk independently.  Given her advanced age, severe pain, limited home resources, and small acute fracture, I think she needs to be admitted for pain control, and I paged the hospitalist.  Her urinalysis also shows evidence of infection, so I will order a dose of ceftriaxone as we await urinary culture results.    7:20 PM I paged and spoken with Dr. Taylor, who agrees to admit for pain control, consideration of TLSO bracing, and likely PT/OT.    DISPOSITION:  Patient will be admitted to Dr. Taylor in stable condition.      FINAL IMPRESSION  1. Lumbar compression fracture  2. Low back pain  3. Unable to walk

## 2019-11-16 PROBLEM — K21.9 GERD (GASTROESOPHAGEAL REFLUX DISEASE): Status: ACTIVE | Noted: 2019-11-16

## 2019-11-16 PROBLEM — E55.9 VITAMIN D DEFICIENCY: Status: ACTIVE | Noted: 2019-11-16

## 2019-11-16 PROBLEM — E03.9 HYPOTHYROIDISM: Status: ACTIVE | Noted: 2019-11-16

## 2019-11-16 PROBLEM — M48.56XA COMPRESSION FRACTURE OF LUMBAR SPINE, NON-TRAUMATIC (HCC): Status: ACTIVE | Noted: 2019-11-16

## 2019-11-16 LAB
25(OH)D3 SERPL-MCNC: 14 NG/ML (ref 30–100)
ANION GAP SERPL CALC-SCNC: 8 MMOL/L (ref 0–11.9)
BUN SERPL-MCNC: 15 MG/DL (ref 8–22)
CALCIUM SERPL-MCNC: 8.1 MG/DL (ref 8.5–10.5)
CHLORIDE SERPL-SCNC: 102 MMOL/L (ref 96–112)
CO2 SERPL-SCNC: 28 MMOL/L (ref 20–33)
CREAT SERPL-MCNC: 0.8 MG/DL (ref 0.5–1.4)
ERYTHROCYTE [DISTWIDTH] IN BLOOD BY AUTOMATED COUNT: 51.7 FL (ref 35.9–50)
GLUCOSE SERPL-MCNC: 113 MG/DL (ref 65–99)
HCT VFR BLD AUTO: 39.5 % (ref 37–47)
HGB BLD-MCNC: 12.7 G/DL (ref 12–16)
MCH RBC QN AUTO: 28.9 PG (ref 27–33)
MCHC RBC AUTO-ENTMCNC: 32.2 G/DL (ref 33.6–35)
MCV RBC AUTO: 89.8 FL (ref 81.4–97.8)
PLATELET # BLD AUTO: 275 K/UL (ref 164–446)
PMV BLD AUTO: 8.9 FL (ref 9–12.9)
POTASSIUM SERPL-SCNC: 3.5 MMOL/L (ref 3.6–5.5)
RBC # BLD AUTO: 4.4 M/UL (ref 4.2–5.4)
SODIUM SERPL-SCNC: 138 MMOL/L (ref 135–145)
WBC # BLD AUTO: 9.6 K/UL (ref 4.8–10.8)

## 2019-11-16 PROCEDURE — 80048 BASIC METABOLIC PNL TOTAL CA: CPT

## 2019-11-16 PROCEDURE — A9270 NON-COVERED ITEM OR SERVICE: HCPCS | Performed by: HOSPITALIST

## 2019-11-16 PROCEDURE — 85027 COMPLETE CBC AUTOMATED: CPT

## 2019-11-16 PROCEDURE — G0378 HOSPITAL OBSERVATION PER HR: HCPCS

## 2019-11-16 PROCEDURE — 700102 HCHG RX REV CODE 250 W/ 637 OVERRIDE(OP): Performed by: HOSPITALIST

## 2019-11-16 PROCEDURE — 99226 PR SUBSEQUENT OBSERVATION CARE,LEVEL III: CPT | Performed by: HOSPITALIST

## 2019-11-16 PROCEDURE — 700102 HCHG RX REV CODE 250 W/ 637 OVERRIDE(OP): Performed by: INTERNAL MEDICINE

## 2019-11-16 PROCEDURE — 97535 SELF CARE MNGMENT TRAINING: CPT

## 2019-11-16 PROCEDURE — 97162 PT EVAL MOD COMPLEX 30 MIN: CPT

## 2019-11-16 PROCEDURE — A9270 NON-COVERED ITEM OR SERVICE: HCPCS | Performed by: INTERNAL MEDICINE

## 2019-11-16 PROCEDURE — L0458 TLSO 2MOD SYMPHIS-XIPHO PRE: HCPCS

## 2019-11-16 PROCEDURE — L0150 CERV SEMI-RIG ADJ MOLDED CHN: HCPCS

## 2019-11-16 PROCEDURE — 306598 HCHG EXT PANELS 5.5 BACK SUPP

## 2019-11-16 RX ORDER — POTASSIUM CHLORIDE 20 MEQ/1
20 TABLET, EXTENDED RELEASE ORAL ONCE
Status: COMPLETED | OUTPATIENT
Start: 2019-11-16 | End: 2019-11-16

## 2019-11-16 RX ORDER — ERGOCALCIFEROL 1.25 MG/1
50000 CAPSULE ORAL
Status: DISCONTINUED | OUTPATIENT
Start: 2019-11-16 | End: 2019-11-18 | Stop reason: HOSPADM

## 2019-11-16 RX ADMIN — LEVOTHYROXINE SODIUM 150 MCG: 150 TABLET ORAL at 06:13

## 2019-11-16 RX ADMIN — DILTIAZEM HYDROCHLORIDE 120 MG: 120 CAPSULE, COATED, EXTENDED RELEASE ORAL at 06:13

## 2019-11-16 RX ADMIN — POLYETHYLENE GLYCOL 3350 1 PACKET: 17 POWDER, FOR SOLUTION ORAL at 00:23

## 2019-11-16 RX ADMIN — OMEPRAZOLE 20 MG: 20 CAPSULE, DELAYED RELEASE ORAL at 06:14

## 2019-11-16 RX ADMIN — SENNOSIDES AND DOCUSATE SODIUM 2 TABLET: 8.6; 5 TABLET ORAL at 06:14

## 2019-11-16 RX ADMIN — LATANOPROST 1 DROP: 50 SOLUTION OPHTHALMIC at 17:10

## 2019-11-16 RX ADMIN — ERGOCALCIFEROL 50000 UNITS: 1.25 CAPSULE ORAL at 17:09

## 2019-11-16 RX ADMIN — RIVAROXABAN 20 MG: 20 TABLET, FILM COATED ORAL at 17:10

## 2019-11-16 RX ADMIN — DILTIAZEM HYDROCHLORIDE 120 MG: 120 CAPSULE, COATED, EXTENDED RELEASE ORAL at 17:09

## 2019-11-16 RX ADMIN — POTASSIUM CHLORIDE 20 MEQ: 1500 TABLET, EXTENDED RELEASE ORAL at 09:08

## 2019-11-16 RX ADMIN — OXYCODONE HYDROCHLORIDE 10 MG: 10 TABLET ORAL at 00:23

## 2019-11-16 RX ADMIN — SENNOSIDES AND DOCUSATE SODIUM 2 TABLET: 8.6; 5 TABLET ORAL at 17:09

## 2019-11-16 RX ADMIN — SPIRONOLACTONE 25 MG: 25 TABLET ORAL at 06:13

## 2019-11-16 ASSESSMENT — ENCOUNTER SYMPTOMS
HEADACHES: 0
PHOTOPHOBIA: 0
SPUTUM PRODUCTION: 0
ORTHOPNEA: 0
DEPRESSION: 0
HEMOPTYSIS: 0
DOUBLE VISION: 0
NAUSEA: 0
HEARTBURN: 0
CHILLS: 0
FEVER: 0
SINUS PAIN: 0
COUGH: 0
DIZZINESS: 0
VOMITING: 0
STRIDOR: 0
BLURRED VISION: 0
PALPITATIONS: 0
TINGLING: 0
NECK PAIN: 0
BRUISES/BLEEDS EASILY: 0
MYALGIAS: 0
BACK PAIN: 0

## 2019-11-16 ASSESSMENT — LIFESTYLE VARIABLES
ON A TYPICAL DAY WHEN YOU DRINK ALCOHOL HOW MANY DRINKS DO YOU HAVE: 0
TOTAL SCORE: 0
TOTAL SCORE: 0
HAVE PEOPLE ANNOYED YOU BY CRITICIZING YOUR DRINKING: NO
CONSUMPTION TOTAL: NEGATIVE
ALCOHOL_USE: NO
DOES PATIENT WANT TO STOP DRINKING: NO
HOW MANY TIMES IN THE PAST YEAR HAVE YOU HAD 5 OR MORE DRINKS IN A DAY: 0
HAVE YOU EVER FELT YOU SHOULD CUT DOWN ON YOUR DRINKING: NO
TOTAL SCORE: 0
EVER HAD A DRINK FIRST THING IN THE MORNING TO STEADY YOUR NERVES TO GET RID OF A HANGOVER: NO
AVERAGE NUMBER OF DAYS PER WEEK YOU HAVE A DRINK CONTAINING ALCOHOL: 0
SUBSTANCE_ABUSE: 0
EVER FELT BAD OR GUILTY ABOUT YOUR DRINKING: NO

## 2019-11-16 ASSESSMENT — COGNITIVE AND FUNCTIONAL STATUS - GENERAL
MOVING FROM LYING ON BACK TO SITTING ON SIDE OF FLAT BED: UNABLE
WALKING IN HOSPITAL ROOM: A LOT
HELP NEEDED FOR BATHING: A LOT
WALKING IN HOSPITAL ROOM: A LOT
TURNING FROM BACK TO SIDE WHILE IN FLAT BAD: UNABLE
EATING MEALS: A LOT
MOVING TO AND FROM BED TO CHAIR: A LOT
SUGGESTED CMS G CODE MODIFIER MOBILITY: CL
CLIMB 3 TO 5 STEPS WITH RAILING: A LOT
DAILY ACTIVITIY SCORE: 10
MOVING FROM LYING ON BACK TO SITTING ON SIDE OF FLAT BED: A LOT
CLIMB 3 TO 5 STEPS WITH RAILING: A LOT
TURNING FROM BACK TO SIDE WHILE IN FLAT BAD: A LITTLE
SUGGESTED CMS G CODE MODIFIER DAILY ACTIVITY: CL
STANDING UP FROM CHAIR USING ARMS: A LOT
DRESSING REGULAR LOWER BODY CLOTHING: TOTAL
MOVING TO AND FROM BED TO CHAIR: UNABLE
SUGGESTED CMS G CODE MODIFIER MOBILITY: CM
MOBILITY SCORE: 13
STANDING UP FROM CHAIR USING ARMS: A LOT
PERSONAL GROOMING: A LOT
TOILETING: A LOT
MOBILITY SCORE: 9
DRESSING REGULAR UPPER BODY CLOTHING: TOTAL

## 2019-11-16 ASSESSMENT — PATIENT HEALTH QUESTIONNAIRE - PHQ9
2. FEELING DOWN, DEPRESSED, IRRITABLE, OR HOPELESS: NOT AT ALL
1. LITTLE INTEREST OR PLEASURE IN DOING THINGS: NOT AT ALL
SUM OF ALL RESPONSES TO PHQ9 QUESTIONS 1 AND 2: 0

## 2019-11-16 ASSESSMENT — CHA2DS2 SCORE
DIABETES: YES
SEX: FEMALE
CHA2DS2 VASC SCORE: 5
CHF OR LEFT VENTRICULAR DYSFUNCTION: NO
PRIOR STROKE OR TIA OR THROMBOEMBOLISM: NO
AGE 75 OR GREATER: YES
AGE 65 TO 74: NO
VASCULAR DISEASE: NO
HYPERTENSION: YES

## 2019-11-16 ASSESSMENT — GAIT ASSESSMENTS: GAIT LEVEL OF ASSIST: UNABLE TO PARTICIPATE

## 2019-11-16 NOTE — ASSESSMENT & PLAN NOTE
CT showed Superior endplate mild comminuted compression fracture involving the L4 vertebral body is identified. Fracture line does not extend to the spinal canal or posterior elements. No spinal canal narrowing is caused by the fracture, continue pain management.   LSO brace to be fit today  PT/OT will need SNF.

## 2019-11-16 NOTE — ED NOTES
Sleeping after CT, wakes easily     Hourly rounding completed  Patient is up to date on current plan of care  Patient is resting comfortably in bed   Patient denies any need for use of the restroom, denies need for repositioning, denies need for any comfort care at this time

## 2019-11-16 NOTE — ED NOTES
Patient grossly failed ambulation trial, was unable to even sit upright in bed on her own, with assistance this caused an extreme amount of pain. She uses ambulates can cares for herself independently.  ERP aware, will attempt to admit

## 2019-11-16 NOTE — CARE PLAN
Problem: Communication  Goal: The ability to communicate needs accurately and effectively will improve  Outcome: PROGRESSING AS EXPECTED  Intervention: [place] patient and significant other/support system to call light to alert staff of needs  Flowsheets (Taken 11/16/2019 0257)  Oriented to:: Location of bathroom; Visiting Policy; Unit Routine; Call Light & Bedside Controls; Bedside Rail Policy; Patient Rights and Responsibilities; Bedside Report     Problem: Pain Management  Goal: Pain level will decrease to patient's comfort goal  Outcome: PROGRESSING AS EXPECTED  Intervention: Follow pain managment plan developed in collaboration with patient and Interdisciplinary Team  Note:   Patient educated regarding pharmacological and non-pharmacological pain management. Review of PRN pain management, medication schedule, pain scale, and reassessment.

## 2019-11-16 NOTE — ED NOTES
Medication reconciliation updated and complete per pt at bedside & pt home pharmacy  Allergies have been verified and updated   No oral ABX within the last 14 days  Pt home pharmacy:[facility]

## 2019-11-16 NOTE — ED NOTES
Patient aware of plan to admit to floor. Patient denies need for pain medication at this time and is in room with eyes closed.    Call bell in reach. No family at bedside.

## 2019-11-16 NOTE — PROGRESS NOTES
2320: Report taken from BRIAN Stoo.    2330: Patient arrived on unit via gurney. 3 staff assist and slide board to move patient into bed. Patient A&O x4. Pain assessed. Skin assessed.

## 2019-11-16 NOTE — ED NOTES
Hand off report given to Charles RN   Patient chart and current status reviewed with oncoming RN and all questions answered  This RN's primary care of patient terminated at this time

## 2019-11-16 NOTE — H&P
[facility] Medicine History & Physical Note    Date of Service  11/15/2019    Primary Care Physician  Bebe Santamaria M.D.    Consultants  None    Code Status  Full    Chief Complaint  Back pain    History of Presenting Illness  88 y.o. female who presented 11/15/2019 with back pain.  She states on Saturday, she leaned over to  a piece of paper and was unable to get back up and noted significant weakness as well as some mild lower back pain.  She was able to make it to a chair however her symptoms persisted.  Her pain has now worsened as has her weakness.  She is unable to get out of bed.  Because of this, she presented to the emergency department.  She describes the pain as right side of her lower back, sharp, 10/10 at its worse.  It also seems to involve the hip and the pelvis.  She denied actually falling this time.  4 weeks ago however she did fall, causing a left arm fracture for which she has a senna sling.  I did discuss the case including labs and imaging with the ER physician.    Review of Systems  Review of Systems   Constitutional: Negative for chills, fever and malaise/fatigue.   HENT: Negative for congestion.    Respiratory: Negative for cough, sputum production, shortness of breath and stridor.    Cardiovascular: Negative for chest pain, palpitations and leg swelling.   Gastrointestinal: Negative for abdominal pain, constipation, diarrhea, nausea and vomiting.   Genitourinary: Negative for dysuria and urgency.   Musculoskeletal: Positive for back pain and joint pain. Negative for falls and myalgias.   Neurological: Positive for weakness. Negative for dizziness, tingling, loss of consciousness and headaches.   Psychiatric/Behavioral: Negative for depression and suicidal ideas.   All other systems reviewed and are negative.      Past Medical History   has a past medical history of A-fib (HCC), Diabetes (HCC), Hypertension, and Hypothyroid.    Surgical History   has a past surgical history that  includes us-needle core bx-breast panel; appendectomy; cholecystectomy; hysterectomy, total abdominal; other orthopedic surgery; and knee replacement, total (Bilateral).     Family History  family history includes Cancer in an other family member.     Social History   reports that she has never smoked. She has never used smokeless tobacco. She reports that she does not drink alcohol or use drugs.    Allergies  Allergies   Allergen Reactions   • Pcn [Penicillins]        Medications  Prior to Admission Medications   Prescriptions Last Dose Informant Patient Reported? Taking?   Bevacizumab (AVASTIN IV)   Yes No   Sig: by Intravenous route.   CARTIA  MG CAPSULE SR 24 HR   No No   Sig: TAKE 1 CAPSULE BY MOUTH TWICE DAILY   FREESTYLE LITE strip   Yes No   Sig: USE 1 STRIP ONCE DAILY TO CHECK GLUCOSE   alendronate (FOSAMAX) 70 MG Tab   Yes No   Sig: Take 70 mg by mouth every 7 days.   clobetasol (TEMOVATE) 0.05 % Ointment   Yes No   latanoprost (XALATAN) 0.005 % Solution   Yes No   levothyroxine (SYNTHROID) 150 MCG TABS   Yes No   Sig: Take 150 mcg by mouth every day.   neomycin-polymixin-dexamethasone (MAXITROL) 3.5-81869-3.1 Ointment ophthalmic ointment   Yes No   omeprazole (PRILOSEC) 20 MG delayed-release capsule   Yes No   Sig: Take 20 mg by mouth every day.   ondansetron (ZOFRAN ODT) 4 MG TABLET DISPERSIBLE   No No   Sig: Take 1 Tab by mouth every 8 hours as needed.   rivaroxaban (XARELTO) 20 MG Tab tablet   No No   Sig: Take 1 Tab by mouth with dinner.   spironolactone (ALDACTONE) 25 MG Tab   Yes No   vitamin D, Ergocalciferol, (DRISDOL) 27950 units Cap capsule   Yes No   Sig: Take 50,000 Units by mouth Q30 DAYS.      Facility-Administered Medications: None       Physical Exam  Temp:  [36.3 °C (97.3 °F)] 36.3 °C (97.3 °F)  Pulse:  [83-95] 86  Resp:  [16] 16  BP: (142-147)/(73-85) 142/79  SpO2:  [86 %-98 %] 86 %    Physical Exam  Vitals signs and nursing note reviewed.   Constitutional:       General: She is  not in acute distress.     Appearance: She is well-developed. She is not ill-appearing or diaphoretic.   HENT:      Head: Normocephalic and atraumatic.      Right Ear: External ear normal.      Left Ear: External ear normal.      Nose: Nose normal. No congestion or rhinorrhea.      Mouth/Throat:      Mouth: Mucous membranes are moist.      Pharynx: No oropharyngeal exudate or posterior oropharyngeal erythema.   Eyes:      General: No scleral icterus.        Right eye: No discharge.         Left eye: No discharge.      Extraocular Movements: Extraocular movements intact.   Neck:      Musculoskeletal: Normal range of motion and neck supple.      Trachea: No tracheal deviation.   Cardiovascular:      Rate and Rhythm: Normal rate and regular rhythm.      Heart sounds: No murmur. No friction rub. No gallop.    Pulmonary:      Effort: Pulmonary effort is normal. No respiratory distress.      Breath sounds: Normal breath sounds. No stridor. No wheezing or rales.   Chest:      Chest wall: No tenderness.   Abdominal:      General: Bowel sounds are normal. There is no distension.      Palpations: Abdomen is soft.      Tenderness: There is no tenderness. There is no guarding or rebound.   Musculoskeletal: Normal range of motion.      Lumbar back: She exhibits tenderness.      Right lower leg: Edema present.      Left lower leg: Edema present.   Lymphadenopathy:      Cervical: No cervical adenopathy.   Skin:     General: Skin is warm and dry.      Coloration: Skin is not jaundiced.      Findings: No erythema or rash.   Neurological:      General: No focal deficit present.      Mental Status: She is alert and oriented to person, place, and time.      Cranial Nerves: No cranial nerve deficit.      Motor: Weakness present.   Psychiatric:         Mood and Affect: Mood normal.         Behavior: Behavior normal.         Thought Content: Thought content normal.         Judgment: Judgment normal.         Laboratory:  Recent Labs      11/15/19  1605   WBC 11.9*   RBC 4.73   HEMOGLOBIN 13.8   HEMATOCRIT 43.3   MCV 91.5   MCH 29.2   MCHC 31.9*   RDW 51.9*   PLATELETCT 309   MPV 8.6*     Recent Labs     11/15/19  1605   SODIUM 140   POTASSIUM 4.0   CHLORIDE 100   CO2 28   GLUCOSE 115*   BUN 16   CREATININE 0.96   CALCIUM 8.7     Recent Labs     11/15/19  1605   GLUCOSE 115*     Recent Labs     11/15/19  1605   INR 2.16*     No results for input(s): NTPROBNP in the last 72 hours.      No results for input(s): TROPONINT in the last 72 hours.    Urinalysis:    Recent Labs     11/15/19  1810   SPECGRAVITY 1.033   GLUCOSEUR Negative   KETONES 15*   NITRITE Negative   LEUKESTERAS Moderate*   WBCURINE *   RBCURINE 0-2   BACTERIA Few*   EPITHELCELL Few        Imaging:  CT-LSPINE W/O PLUS RECONS   Final Result      1.  Superior endplate mild comminuted compression fracture involving the L4 vertebral body is identified. Fracture line does not extend to the spinal canal or posterior elements. No spinal canal narrowing is caused by the fracture.      2.  No other fracture identified.      3.  Mild anterolisthesis at L4-L5.      4.  Severe degenerative disc disease and facet arthropathy causing spinal stenosis and neural foraminal narrowing.      CT-PELVIS W/O PLUS RECONS   Final Result      Mild compression fracture of the superior endplate of L4 is again noted. Degenerative changes in the lower lumbar spine.      Degenerative changes of the hips bilaterally.      Demineralization which limits evaluation.      Atherosclerotic plaque.      Colonic diverticulosis.               Assessment/Plan:  I anticipate this patient is appropriate for observation status at this time.    Compression fracture of lumbar spine, non-traumatic (HCC)- (present on admission)  Assessment & Plan  -I did personally review the CT of the lumbar spine, noted compression fracture  -Symptomatic care with narcotics as well as tramadol  -Obtain PT/OT    GERD (gastroesophageal reflux  disease)- (present on admission)  Assessment & Plan  -Continue home PPI    Hypothyroidism- (present on admission)  Assessment & Plan  -continue home synthroid at 150 mcg/day  -most recent TSH was about 6 months ago and was normal at 1.53    HTN (hypertension)- (present on admission)  Assessment & Plan  -controlled on home diltiazem, continue  -start prn enalapril  -adjust as needed    AF (atrial fibrillation) ([facility])- (present on admission)  Assessment & Plan  -Currently in sinus rhythm on diltiazem  -Continue diltiazem and Xarelto    DM2 (diabetes mellitus, type 2) ([facility])- (present on admission)  Assessment & Plan  -Diet controlled  -Most recent hemoglobin A1c was in August and was 5.9  -No need for coverage at this time      VTE prophylaxis: Xarelto

## 2019-11-16 NOTE — ED NOTES
Assisted with urine collection via bed pan  Total sheet change  Warm blankets applied  Boosted in Bed for comfort     Hourly rounding completed  Patient is up to date on current plan of care  Patient is resting comfortably in bed   Patient denies any need for use of the restroom, denies need for repositioning, denies need for any comfort care at this time

## 2019-11-16 NOTE — ASSESSMENT & PLAN NOTE
Continue diltiazem and Xarelto   Pt lying in bed sleeping/snoring at this time. No signs/symptoms of pain or distress. Family updated up on pt arrival to recovery room.

## 2019-11-16 NOTE — PROGRESS NOTES
[facility] Medicine Daily Progress Note    Date of Service  11/16/2019    Chief Complaint  88 y.o. female admitted 11/15/2019 with L4 compression fracture      Interval Problem Update  Patient is resting in bed, pain is stable worse with movement, no focal weakness, no numbness or tingling.     Consultants/Specialty  None     Code Status  Full code     Disposition  Tbd.     Review of Systems  Review of Systems   Constitutional: Negative for chills and fever.   HENT: Negative for congestion and sinus pain.    Eyes: Negative for blurred vision, double vision and photophobia.   Respiratory: Negative for cough, hemoptysis, sputum production and stridor.    Cardiovascular: Negative for chest pain, palpitations and orthopnea.   Gastrointestinal: Negative for heartburn, nausea and vomiting.   Genitourinary: Negative for dysuria, frequency and urgency.   Musculoskeletal: Negative for back pain, myalgias and neck pain.   Skin: Negative for itching.   Neurological: Negative for dizziness, tingling and headaches.   Endo/Heme/Allergies: Does not bruise/bleed easily.   Psychiatric/Behavioral: Negative for depression, substance abuse and suicidal ideas.        Physical Exam  Temp:  [36.3 °C (97.3 °F)-36.9 °C (98.4 °F)] 36.9 °C (98.4 °F)  Pulse:  [76-95] 81  Resp:  [16-18] 17  BP: (112-151)/(66-86) 131/75  SpO2:  [86 %-98 %] 96 %    Physical Exam  Vitals signs and nursing note reviewed.   Constitutional:       General: She is not in acute distress.  HENT:      Head: Normocephalic and atraumatic.      Nose: Nose normal.      Mouth/Throat:      Mouth: Mucous membranes are dry.   Eyes:      General: No scleral icterus.        Right eye: No discharge.         Left eye: No discharge.      Pupils: Pupils are equal, round, and reactive to light.   Neck:      Musculoskeletal: Normal range of motion and neck supple. No neck rigidity.   Cardiovascular:      Rate and Rhythm: Normal rate and regular rhythm.      Pulses: Normal pulses.    Pulmonary:      Effort: Pulmonary effort is normal. No respiratory distress.      Breath sounds: No wheezing or rales.   Abdominal:      General: Bowel sounds are normal. There is no distension.      Palpations: Abdomen is soft.      Tenderness: There is no tenderness. There is no guarding.   Musculoskeletal: Normal range of motion.      Comments: Back tenderness.    Neurological:      General: No focal deficit present.      Mental Status: She is alert.      Cranial Nerves: No cranial nerve deficit.   Psychiatric:         Mood and Affect: Mood normal.         Fluids    Intake/Output Summary (Last 24 hours) at 11/16/2019 1302  Last data filed at 11/16/2019 0900  Gross per 24 hour   Intake 180 ml   Output --   Net 180 ml       Laboratory  Recent Labs     11/15/19  1605 11/16/19  0300   WBC 11.9* 9.6   RBC 4.73 4.40   HEMOGLOBIN 13.8 12.7   HEMATOCRIT 43.3 39.5   MCV 91.5 89.8   MCH 29.2 28.9   MCHC 31.9* 32.2*   RDW 51.9* 51.7*   PLATELETCT 309 275   MPV 8.6* 8.9*     Recent Labs     11/15/19  1605 11/16/19  0300   SODIUM 140 138   POTASSIUM 4.0 3.5*   CHLORIDE 100 102   CO2 28 28   GLUCOSE 115* 113*   BUN 16 15   CREATININE 0.96 0.80   CALCIUM 8.7 8.1*     Recent Labs     11/15/19  1605   INR 2.16*               Imaging  CT-LSPINE W/O PLUS RECONS   Final Result      1.  Superior endplate mild comminuted compression fracture involving the L4 vertebral body is identified. Fracture line does not extend to the spinal canal or posterior elements. No spinal canal narrowing is caused by the fracture.      2.  No other fracture identified.      3.  Mild anterolisthesis at L4-L5.      4.  Severe degenerative disc disease and facet arthropathy causing spinal stenosis and neural foraminal narrowing.      CT-PELVIS W/O PLUS RECONS   Final Result      Mild compression fracture of the superior endplate of L4 is again noted. Degenerative changes in the lower lumbar spine.      Degenerative changes of the hips bilaterally.       Demineralization which limits evaluation.      Atherosclerotic plaque.      Colonic diverticulosis.              Assessment/Plan  Compression fracture of lumbar spine, non-traumatic ([facility])- (present on admission)  Assessment & Plan  CT showed Superior endplate mild comminuted compression fracture involving the L4 vertebral body is identified. Fracture line does not extend to the spinal canal or posterior elements. No spinal canal narrowing is caused by the fracture, continue pain management.   Will get her TLSO brace  PT/OTeval.     Vitamin D deficiency- (present on admission)  Assessment & Plan  Started on supplement 50,000 qweek.     GERD (gastroesophageal reflux disease)- (present on admission)  Assessment & Plan  Continue home PPI.    Hypothyroidism- (present on admission)  Assessment & Plan  Continue supplement    HTN (hypertension)- (present on admission)  Assessment & Plan  controlled on home diltiazem, continue  Continue  prn enalapril      AF (atrial fibrillation) ([facility])- (present on admission)  Assessment & Plan  Continue diltiazem and Xarelto    DM2 (diabetes mellitus, type 2) ([facility])- (present on admission)  Assessment & Plan  Most recent hemoglobin A1c was in August and was 5.9  Continue SSI.        VTE prophylaxis: scd's.

## 2019-11-16 NOTE — PROGRESS NOTES
2 RN skin check complete.    Skin assessed. MASD to the abdominal fold. Skin care provided. Interdry placed. Left arm bruised, green and brown in color, d/t left arm fracture. CMS intact. Sling in place. No further skin issues noted.      Following interventions in place:   Patient repositioned every 2 hours.   Pillows in use for support/positioning.   Pillows in use to float heels.   PO intake encouraged.   Pressure redistribution mattress.     Draw sheet used for repositioning.

## 2019-11-17 LAB
BACTERIA UR CULT: ABNORMAL
BACTERIA UR CULT: ABNORMAL
SIGNIFICANT IND 70042: ABNORMAL
SITE SITE: ABNORMAL
SOURCE SOURCE: ABNORMAL

## 2019-11-17 PROCEDURE — G0378 HOSPITAL OBSERVATION PER HR: HCPCS

## 2019-11-17 PROCEDURE — 90662 IIV NO PRSV INCREASED AG IM: CPT | Performed by: HOSPITALIST

## 2019-11-17 PROCEDURE — A9270 NON-COVERED ITEM OR SERVICE: HCPCS | Performed by: INTERNAL MEDICINE

## 2019-11-17 PROCEDURE — 99225 PR SUBSEQUENT OBSERVATION CARE,LEVEL II: CPT | Performed by: HOSPITALIST

## 2019-11-17 PROCEDURE — 700111 HCHG RX REV CODE 636 W/ 250 OVERRIDE (IP): Performed by: HOSPITALIST

## 2019-11-17 PROCEDURE — 306605 HCHG EXT PANELS 3.5 BACK SUPP

## 2019-11-17 PROCEDURE — L0637 LSO SC R ANT/POS PNL PRE CST: HCPCS

## 2019-11-17 PROCEDURE — 700102 HCHG RX REV CODE 250 W/ 637 OVERRIDE(OP): Performed by: HOSPITALIST

## 2019-11-17 PROCEDURE — 90471 IMMUNIZATION ADMIN: CPT

## 2019-11-17 PROCEDURE — A9270 NON-COVERED ITEM OR SERVICE: HCPCS | Performed by: HOSPITALIST

## 2019-11-17 PROCEDURE — 700102 HCHG RX REV CODE 250 W/ 637 OVERRIDE(OP): Performed by: INTERNAL MEDICINE

## 2019-11-17 RX ORDER — GABAPENTIN 100 MG/1
100 CAPSULE ORAL EVERY EVENING
Status: DISCONTINUED | OUTPATIENT
Start: 2019-11-17 | End: 2019-11-18 | Stop reason: HOSPADM

## 2019-11-17 RX ADMIN — DILTIAZEM HYDROCHLORIDE 120 MG: 120 CAPSULE, COATED, EXTENDED RELEASE ORAL at 17:51

## 2019-11-17 RX ADMIN — INFLUENZA A VIRUS A/MICHIGAN/45/2015 X-275 (H1N1) ANTIGEN (FORMALDEHYDE INACTIVATED), INFLUENZA A VIRUS A/SINGAPORE/INFIMH-16-0019/2016 IVR-186 (H3N2) ANTIGEN (FORMALDEHYDE INACTIVATED), AND INFLUENZA B VIRUS B/MARYLAND/15/2016 BX-69A (A B/COLORADO/6/2017-LIKE VIRUS) ANTIGEN (FORMALDEHYDE INACTIVATED) 0.5 ML: 60; 60; 60 INJECTION, SUSPENSION INTRAMUSCULAR at 18:01

## 2019-11-17 RX ADMIN — LATANOPROST 1 DROP: 50 SOLUTION OPHTHALMIC at 17:53

## 2019-11-17 RX ADMIN — MAGNESIUM HYDROXIDE 30 ML: 400 SUSPENSION ORAL at 21:14

## 2019-11-17 RX ADMIN — OMEPRAZOLE 20 MG: 20 CAPSULE, DELAYED RELEASE ORAL at 04:32

## 2019-11-17 RX ADMIN — LEVOTHYROXINE SODIUM 150 MCG: 150 TABLET ORAL at 04:32

## 2019-11-17 RX ADMIN — RIVAROXABAN 20 MG: 20 TABLET, FILM COATED ORAL at 17:53

## 2019-11-17 RX ADMIN — SPIRONOLACTONE 25 MG: 25 TABLET ORAL at 04:32

## 2019-11-17 RX ADMIN — GABAPENTIN 100 MG: 100 CAPSULE ORAL at 17:53

## 2019-11-17 RX ADMIN — OXYCODONE HYDROCHLORIDE 10 MG: 10 TABLET ORAL at 17:52

## 2019-11-17 RX ADMIN — SENNOSIDES AND DOCUSATE SODIUM 2 TABLET: 8.6; 5 TABLET ORAL at 04:32

## 2019-11-17 RX ADMIN — SENNOSIDES AND DOCUSATE SODIUM 2 TABLET: 8.6; 5 TABLET ORAL at 17:51

## 2019-11-17 RX ADMIN — DILTIAZEM HYDROCHLORIDE 120 MG: 120 CAPSULE, COATED, EXTENDED RELEASE ORAL at 04:32

## 2019-11-17 ASSESSMENT — ENCOUNTER SYMPTOMS
HEADACHES: 0
NECK PAIN: 0
PHOTOPHOBIA: 0
ORTHOPNEA: 0
HEARTBURN: 0
STRIDOR: 0
NAUSEA: 0
BRUISES/BLEEDS EASILY: 0
MYALGIAS: 0
TINGLING: 0
DEPRESSION: 0
WEIGHT LOSS: 0
ABDOMINAL PAIN: 0
FEVER: 0
DIZZINESS: 0
BLURRED VISION: 0
SPUTUM PRODUCTION: 0
COUGH: 0

## 2019-11-17 ASSESSMENT — LIFESTYLE VARIABLES: SUBSTANCE_ABUSE: 0

## 2019-11-17 NOTE — DISCHARGE PLANNING
Received Choice form at 1230  Agency/Facility Name: [facility]. [facility]. [facility] 3. [facility]  Referral sent per Choice form @ 1300     Agency/Facility Name: [facility]  Spoke To: Alma  Outcome: Accepted. [facility] will call in morning to set up transport. PASRR needed

## 2019-11-17 NOTE — DISCHARGE PLANNING
Anticipated Discharge Disposition:   SNF    Action:    Pt admitted with compression fx of LS at L-4.  Pt has fractured left arm. Pt receiving O22LNC.  Does not use oxygen at home.  Pt stated she felt pain in her lower spine after reaching down for a piece of paper.  Pt also stated she fell at home 4 weeks ago and broke her left arm.  It is currently in sling.  Pt stated she lives in one story house with two steps to get inside.  Her grandson lives with her.  He works during the day and is home evenings and nights.  He assists her with cooking, cleaning, laundry, transportation, shopping.  His name is Rizwan Virk.  Pt uses a cane or walker.  Pt has macular degeneration.  Explained SNF to patient, her son, Fish, and grandson Rizwan.  Choices obtained for [facility], [facility], and [facility].  Form signed by Fish.  Form faxed to [facility].    Barriers to Discharge:    SNF acceptance    Plan:    F/U SNF referrals.    Care Transition Team Assessment    Information Source  Orientation : Oriented x 4  Information Given By: Patient  Who is responsible for making decisions for patient? : Patient    Readmission Evaluation  Is this a readmission?: No    Elopement Risk  Legal Hold: No  Ambulatory or Self Mobile in Wheelchair: No-Not an Elopement Risk  Disoriented: No  Psychiatric Symptoms: None  History of Wandering: No  Elopement this Admit: No  Vocalizing Wanting to Leave: No  Displays Behaviors, Body Language Wanting to Leave: No-Not at Risk for Elopement  Elopement Risk: Not at Risk for Elopement    Interdisciplinary Discharge Planning  Lives with - Patient's Self Care Capacity: Adult Children  Patient or legal guardian wants to designate a caregiver (see row info): No  Housing / Facility: 1 Story House  Prior Services: Intermittent Physical Support for ADL Per Family    Discharge Preparedness  What is your plan after discharge?: Skilled nursing facility  What are your discharge supports?: Child  Prior Functional Level:  Ambulatory, Needs Assist with Activities of Daily Living, Needs Assist with Medication Management  Difficulity with ADLs: Bathing, Brushing teeth, Dressing, Eating, Toileting, Walking  Difficulity with IADLs: Cooking, Driving, Keeping track of finances, Laundry, Managing medication, Shopping, Using the telephone or computer    Functional Assesment  Prior Functional Level: Ambulatory, Needs Assist with Activities of Daily Living, Needs Assist with Medication Management    Finances  Financial Barriers to Discharge: No  Prescription Coverage: Yes    Vision / Hearing Impairment  Right Eye Vision: Impaired, Wears Glasses  Left Eye Vision: Impaired, Wears Glasses         Advance Directive  Advance Directive?: None    Domestic Abuse  Have you ever been the victim of abuse or violence?: No  Physical Abuse or Sexual Abuse: No  Verbal Abuse or Emotional Abuse: No  Possible Abuse Reported to:: Not Applicable         Discharge Risks or Barriers  Discharge risks or barriers?: No    Anticipated Discharge Information  Anticipated discharge disposition: SNF

## 2019-11-17 NOTE — PROGRESS NOTES
XX-large LSO at bed side, nursing will call traction when patient is ready to get out of bed at ext. [phone] to fit LSO

## 2019-11-17 NOTE — CARE PLAN
Problem: Pain Management  Goal: Pain level will decrease to patient's comfort goal  Outcome: PROGRESSING AS EXPECTED  Intervention: Educate and implement non-pharmacologic comfort measures. Examples: relaxation, distration, play therapy, activity therapy, massage, etc.  Flowsheets (Taken 11/16/2019 9673)  Intervention: Relaxation Technique; Repositioned; Rest; Medication (see MAR)  Note:   Pain assessment q 2 hours, medicated as needed, comfort measures provided.

## 2019-11-17 NOTE — PROGRESS NOTES
[facility] Medicine Daily Progress Note    Date of Service  11/17/2019    Chief Complaint  88 y.o. female admitted 11/15/2019 with L4 compression fracture      Interval Problem Update  Patient is resting in bed, pain with movement, no numbness or tingling, no focal weakness no urinary or bowel problems.   Discussed with patient will start low dose gabapentin today for pain  LSO brace and SNF.     Consultants/Specialty  None     Code Status  Full code     Disposition  SNF    Review of Systems  Review of Systems   Constitutional: Negative for fever and weight loss.   HENT: Negative for congestion.    Eyes: Negative for blurred vision and photophobia.   Respiratory: Negative for cough, sputum production and stridor.    Cardiovascular: Negative for chest pain and orthopnea.   Gastrointestinal: Negative for abdominal pain, heartburn and nausea.   Genitourinary: Negative for dysuria, hematuria and urgency.   Musculoskeletal: Negative for joint pain, myalgias and neck pain.   Skin: Negative for itching.   Neurological: Negative for dizziness, tingling and headaches.   Endo/Heme/Allergies: Does not bruise/bleed easily.   Psychiatric/Behavioral: Negative for depression, substance abuse and suicidal ideas.        Physical Exam  Temp:  [36.5 °C (97.7 °F)-37.2 °C (99 °F)] 36.5 °C (97.7 °F)  Pulse:  [65-89] 82  Resp:  [16-18] 16  BP: (124-141)/(71-77) 125/71  SpO2:  [96 %-98 %] 98 %    Physical Exam  Vitals signs and nursing note reviewed.   Constitutional:       General: She is not in acute distress.  HENT:      Head: Normocephalic and atraumatic.      Nose: Nose normal.      Mouth/Throat:      Mouth: Mucous membranes are dry.   Eyes:      General: No scleral icterus.     Pupils: Pupils are equal, round, and reactive to light.   Neck:      Musculoskeletal: Normal range of motion and neck supple. No neck rigidity.   Cardiovascular:      Rate and Rhythm: Normal rate and regular rhythm.      Pulses: Normal pulses.   Pulmonary:       Effort: Pulmonary effort is normal. No respiratory distress.      Breath sounds: No wheezing.   Abdominal:      General: Bowel sounds are normal. There is no distension.      Palpations: Abdomen is soft.      Tenderness: There is no tenderness.   Musculoskeletal: Normal range of motion.      Comments: Back tenderness.    Neurological:      General: No focal deficit present.      Mental Status: She is alert.      Cranial Nerves: No cranial nerve deficit.   Psychiatric:         Mood and Affect: Mood normal.         Fluids    Intake/Output Summary (Last 24 hours) at 11/17/2019 1247  Last data filed at 11/16/2019 1948  Gross per 24 hour   Intake 100 ml   Output --   Net 100 ml       Laboratory  Recent Labs     11/15/19  1605 11/16/19  0300   WBC 11.9* 9.6   RBC 4.73 4.40   HEMOGLOBIN 13.8 12.7   HEMATOCRIT 43.3 39.5   MCV 91.5 89.8   MCH 29.2 28.9   MCHC 31.9* 32.2*   RDW 51.9* 51.7*   PLATELETCT 309 275   MPV 8.6* 8.9*     Recent Labs     11/15/19  1605 11/16/19  0300   SODIUM 140 138   POTASSIUM 4.0 3.5*   CHLORIDE 100 102   CO2 28 28   GLUCOSE 115* 113*   BUN 16 15   CREATININE 0.96 0.80   CALCIUM 8.7 8.1*     Recent Labs     11/15/19  1605   INR 2.16*               Imaging  CT-LSPINE W/O PLUS RECONS   Final Result      1.  Superior endplate mild comminuted compression fracture involving the L4 vertebral body is identified. Fracture line does not extend to the spinal canal or posterior elements. No spinal canal narrowing is caused by the fracture.      2.  No other fracture identified.      3.  Mild anterolisthesis at L4-L5.      4.  Severe degenerative disc disease and facet arthropathy causing spinal stenosis and neural foraminal narrowing.      CT-PELVIS W/O PLUS RECONS   Final Result      Mild compression fracture of the superior endplate of L4 is again noted. Degenerative changes in the lower lumbar spine.      Degenerative changes of the hips bilaterally.      Demineralization which limits evaluation.       Atherosclerotic plaque.      Colonic diverticulosis.              Assessment/Plan  Compression fracture of lumbar spine, non-traumatic (HCC)- (present on admission)  Assessment & Plan  CT showed Superior endplate mild comminuted compression fracture involving the L4 vertebral body is identified. Fracture line does not extend to the spinal canal or posterior elements. No spinal canal narrowing is caused by the fracture, continue pain management.   LSO brace to be fit today  PT/OT will need SNF.     Vitamin D deficiency- (present on admission)  Assessment & Plan  Continue on supplement 50,000 qweek.     GERD (gastroesophageal reflux disease)- (present on admission)  Assessment & Plan  Continue home PPI.    Hypothyroidism- (present on admission)  Assessment & Plan  Continue supplement    HTN (hypertension)- (present on admission)  Assessment & Plan  controlled on home diltiazem, continue  Continue  prn enalapril      AF (atrial fibrillation) ([facility])- (present on admission)  Assessment & Plan  Continue diltiazem and Xarelto    DM2 (diabetes mellitus, type 2) ([facility])- (present on admission)  Assessment & Plan  Most recent hemoglobin A1c was in August and was 5.9  Continue SSI.        VTE prophylaxis: scd's.       Discussed case and plan of care with nurse staff.

## 2019-11-17 NOTE — THERAPY
"Physical Therapy Evaluation completed.   Bed Mobility:  Supine to Sit: Maximal Assist  Transfers: Sit to Stand: Moderate Assist; min A for sidestepping with HHA; pain limiting as well  Gait: Level Of Assist: Unable to Participate       Plan of Care: Will benefit from Physical Therapy 4 times per week  Discharge Recommendations: Equipment: Will Continue to Assess for Equipment Needs. See below    Pt presents to PT with impaired ROM, functional strength, balance and general locomotion associated with recent trauma including subacute humeral fracture as well as acute lumbar fracture. She was able to demonstrate bed mobility with max A, sit<>stand with mod A and initiatoin of pre-gait and sidestepping with min A (once standing). She is notably limited by current pain and inability to effectivley compensate for current deficits with compensatory strategies without significant cueing and/or physical assist. She would currently require continued skilled PT/placement prior to dc to home given current objective findings, age, PLOF, recent co-morbidities (including acute and subacute fractures and WB limitations), environmental barriers, and current level of assist needed. (noted pt does have [facility] per chart as well and spoke with pt/grandson re: dc planning and recommendations).Anticipate as pain optimally managed/subsides she will be able to progress quicker with PT. Also spoke with nsg re: concerns with possible transition to LSO (v current TLSO) given location of fracture and limitations that TLSO will encounter given pt's body habitus and additional co-morbidities.       See \"Rehab Therapy-Acute\" Patient Summary Report for complete documentation.     "

## 2019-11-18 VITALS
DIASTOLIC BLOOD PRESSURE: 70 MMHG | HEIGHT: 62 IN | WEIGHT: 194 LBS | HEART RATE: 94 BPM | SYSTOLIC BLOOD PRESSURE: 138 MMHG | RESPIRATION RATE: 17 BRPM | BODY MASS INDEX: 35.7 KG/M2 | TEMPERATURE: 97.8 F | OXYGEN SATURATION: 94 %

## 2019-11-18 LAB
MAGNESIUM SERPL-MCNC: 1.9 MG/DL (ref 1.5–2.5)
POTASSIUM SERPL-SCNC: 4 MMOL/L (ref 3.6–5.5)

## 2019-11-18 PROCEDURE — A9270 NON-COVERED ITEM OR SERVICE: HCPCS | Performed by: INTERNAL MEDICINE

## 2019-11-18 PROCEDURE — A6213 FOAM DRG >16<=48 SQ IN W/BDR: HCPCS | Performed by: HOSPITALIST

## 2019-11-18 PROCEDURE — 700102 HCHG RX REV CODE 250 W/ 637 OVERRIDE(OP): Performed by: INTERNAL MEDICINE

## 2019-11-18 PROCEDURE — G0378 HOSPITAL OBSERVATION PER HR: HCPCS

## 2019-11-18 PROCEDURE — 84132 ASSAY OF SERUM POTASSIUM: CPT

## 2019-11-18 PROCEDURE — 700101 HCHG RX REV CODE 250: Performed by: HOSPITALIST

## 2019-11-18 PROCEDURE — 36415 COLL VENOUS BLD VENIPUNCTURE: CPT

## 2019-11-18 PROCEDURE — 83735 ASSAY OF MAGNESIUM: CPT

## 2019-11-18 PROCEDURE — 99217 PR OBSERVATION CARE DISCHARGE: CPT | Performed by: HOSPITALIST

## 2019-11-18 RX ORDER — METHYLPREDNISOLONE 4 MG/1
TABLET ORAL
Qty: 21 TAB | Refills: 0
Start: 2019-11-18 | End: 2020-02-03

## 2019-11-18 RX ORDER — ENEMA 19; 7 G/133ML; G/133ML
1 ENEMA RECTAL ONCE
Status: COMPLETED | OUTPATIENT
Start: 2019-11-18 | End: 2019-11-18

## 2019-11-18 RX ORDER — CYCLOBENZAPRINE HCL 5 MG
5-10 TABLET ORAL 3 TIMES DAILY PRN
Qty: 30 TAB | Refills: 0
Start: 2019-11-18 | End: 2020-08-24

## 2019-11-18 RX ORDER — ACETAMINOPHEN 325 MG/1
650 TABLET ORAL EVERY 6 HOURS PRN
Qty: 30 TAB | Refills: 0
Start: 2019-11-18 | End: 2021-03-18

## 2019-11-18 RX ORDER — GABAPENTIN 100 MG/1
100 CAPSULE ORAL 2 TIMES DAILY
Start: 2019-11-18 | End: 2021-03-18

## 2019-11-18 RX ORDER — OXYCODONE HYDROCHLORIDE 5 MG/1
5 TABLET ORAL EVERY 6 HOURS PRN
Qty: 12 TAB | Refills: 0 | Status: SHIPPED | OUTPATIENT
Start: 2019-11-18 | End: 2019-11-21

## 2019-11-18 RX ADMIN — SODIUM PHOSPHATE 133 ML: 7; 19 ENEMA RECTAL at 10:44

## 2019-11-18 RX ADMIN — OXYCODONE HYDROCHLORIDE 10 MG: 10 TABLET ORAL at 10:19

## 2019-11-18 RX ADMIN — LEVOTHYROXINE SODIUM 150 MCG: 150 TABLET ORAL at 04:40

## 2019-11-18 RX ADMIN — DILTIAZEM HYDROCHLORIDE 120 MG: 120 CAPSULE, COATED, EXTENDED RELEASE ORAL at 04:40

## 2019-11-18 RX ADMIN — OMEPRAZOLE 20 MG: 20 CAPSULE, DELAYED RELEASE ORAL at 04:40

## 2019-11-18 RX ADMIN — SENNOSIDES AND DOCUSATE SODIUM 2 TABLET: 8.6; 5 TABLET ORAL at 04:40

## 2019-11-18 RX ADMIN — SPIRONOLACTONE 25 MG: 25 TABLET ORAL at 04:40

## 2019-11-18 RX ADMIN — BISACODYL 10 MG: 10 SUPPOSITORY RECTAL at 09:35

## 2019-11-18 NOTE — PROGRESS NOTES
2100 Bed locked and in lowest position. Bed alarm on. Pt educated to call for assistance. Call light within reach. All needs met at this time.

## 2019-11-18 NOTE — PROGRESS NOTES
Confirmed with pt that she has a Deroyal off the shelf  XXL LSO back support brace at the bedside to wear as needed. Pt as well had a TLSO at bedside. Nursing stated that she only needs the LSO so we credited her charges on the TLSO. Any questions you can contact the ortho tech team at ext # [phone].

## 2019-11-18 NOTE — CARE PLAN
Problem: Communication  Goal: The ability to communicate needs accurately and effectively will improve  Outcome: PROGRESSING AS EXPECTED  Intervention: Educate patient and significant other/support system about the plan of care, procedures, treatments, medications and allow for questions  Note:   Plan of care discussed with patient. All questions answered. Plan for today go to SNF. Fleets enema.       Problem: Safety  Goal: Will remain free from falls  Outcome: PROGRESSING AS EXPECTED  Note:   high fall risk. Bed in lowest and locked position. Upper side rails up. Yellow fall risk band on. Pt calls appropriately and doesn't attempt to get out of bed without staff assistance. Bed and chair alarm on.

## 2019-11-18 NOTE — PROGRESS NOTES
2 RN skin check completed with Mena TORRES    Skin assessed under devices:  · SCDs- skin intact  · Pulse ox-skin intact  · Left upper arm sling- left elbow red, excoriated, open. Left arm bruised and swollen. Cleansed with NS and mepilex applied. Wound consult placed and picture taken.      Coccyx and heels pink and blanching.   Ears reddened but blanching    Following interventions in place  · Waffle cushion  · Q2hr turns  · Mepilex to left elbow  · Frequent skin and incontinence checks.  · Float heels in bed.

## 2019-11-18 NOTE — DISCHARGE SUMMARY
Discharge Summary    CHIEF COMPLAINT ON ADMISSION  Chief Complaint   Patient presents with   • Hip Pain   • Low Back Pain       Reason for Admission  Right Hip pain     CODE STATUS  Full Code    HPI & HOSPITAL COURSE  Please see original H&P for specific information, patient was admitted due to back pain after leaning over to  a piece of paper, pain gradually got worse she had to come to the emergency room, CT lumbar spine showed Superior endplate mild comminuted compression fracture involving the L4 vertebral body is identified. Fracture line does not extend to the spinal canal or posterior elements. No spinal canal narrowing is caused by the fracture, conservative management was recommended, LSO brace, started on gabapentin twice a day, physical therapy and occupational therapy evaluated patient and recommended skilled nursing placement to continue with therapy, patient is doing better she is stable to be discharged to skilled nursing, pain is more stable, she is alert oriented follows commands, she is tolerating diet, she expressed understanding of her discharge plan and agree with teeth all questions answered.  Patient has vitamin D deficiency she is started on supplements.  Start Medrol Dosepak       Therefore, she is discharged in good and stable condition to skilled nursing facility.          FOLLOW UP ITEMS POST DISCHARGE  Primary care physician after skilled nursing    DISCHARGE DIAGNOSES  Active Problems:    Compression fracture of lumbar spine, non-traumatic (HCC) POA: Yes    DM2 (diabetes mellitus, type 2) (HCC) POA: Yes    AF (atrial fibrillation) (HCC) POA: Yes    HTN (hypertension) POA: Yes    Hypothyroidism POA: Yes    GERD (gastroesophageal reflux disease) POA: Yes    Vitamin D deficiency POA: Yes  Resolved Problems:    * No resolved hospital problems. *      FOLLOW UP  No future appointments.  Bebe Santamaria M.D.  [address]  [place] [address]  [phone]    Schedule an  appointment as soon as possible for a visit in 1 week  If symptoms worsen, As needed      MEDICATIONS ON DISCHARGE     Medication List      START taking these medications      Instructions   acetaminophen 325 MG Tabs  Commonly known as:  TYLENOL   Take 2 Tabs by mouth every 6 hours as needed (Mild Pain; (Pain scale 1-3); Temp greater than 100.5 F).  Dose:  650 mg     cyclobenzaprine 5 MG tablet  Commonly known as:  FLEXERIL   Take 1-2 Tabs by mouth 3 times a day as needed.  Dose:  5-10 mg     gabapentin 100 MG Caps  Commonly known as:  NEURONTIN   Take 1 Cap by mouth 2 Times a Day.  Dose:  100 mg     methylPREDNISolone 4 MG Tbpk  Commonly known as:  MEDROL DOSEPAK   Follow schedule on package instructions.     oxyCODONE immediate-release 5 MG Tabs  Commonly known as:  ROXICODONE   Take 1 Tab by mouth every 6 hours as needed for up to 3 days.  Dose:  5 mg        CONTINUE taking these medications      Instructions   alendronate 70 MG Tabs  Commonly known as:  FOSAMAX   Take 70 mg by mouth every 7 days.  Dose:  70 mg     CARTIA  MG Cp24  Generic drug:  DILTIAZem CD   Take 120 mg by mouth 2 Times a Day.  Dose:  120 mg     ICAPS AREDS 2 Caps   Take 1 Cap by mouth 2 Times a Day.  Dose:  1 Cap     latanoprost 0.005 % Soln  Commonly known as:  XALATAN   Place 1 Drop in left eye every bedtime.  Dose:  1 Drop     levothyroxine 150 MCG Tabs  Commonly known as:  SYNTHROID   Take 150 mcg by mouth every day.  Dose:  150 mcg     omeprazole 20 MG delayed-release capsule  Commonly known as:  PRILOSEC   Take 20 mg by mouth every day.  Dose:  20 mg     ondansetron 4 MG Tbdp  Commonly known as:  ZOFRAN ODT   Take 4 mg by mouth 2 Times a Day.  Dose:  4 mg     spironolactone 25 MG Tabs  Commonly known as:  ALDACTONE   Take 25 mg by mouth every day.  Dose:  25 mg     VITAMIN B COMPLEX PO   Take 1 Tab by mouth 2 Times a Day.  Dose:  1 Tab     vitamin D (Ergocalciferol) 75419 units Caps capsule  Commonly known as:  DRISDOL   Take  50,000 Units by mouth Q30 DAYS.  Dose:  50,000 Units     XARELTO 20 MG Tabs tablet  Generic drug:  rivaroxaban   Take 20 mg by mouth with dinner.  Dose:  20 mg        STOP taking these medications    diazePAM 2 MG Tabs  Commonly known as:  VALIUM            Allergies  Allergies   Allergen Reactions   • Pcn [Penicillins] Rash     Rash         DIET  Orders Placed This Encounter   Procedures   • Diet Order Regular     Standing Status:   Standing     Number of Occurrences:   1     Order Specific Question:   Diet:     Answer:   Regular [1]       ACTIVITY  As tolerated.  Weight bearing as tolerated    LINES, DRAINS, AND WOUNDS  This is an automated list. Peripheral IVs will be removed prior to discharge.       Wound 11/18/19 Elbow Left elbow.  (Active)   Wound Image   11/18/2019  8:50 AM   Site Assessment Red;Pink;Excoriated 11/18/2019  7:54 AM   Funmilayo-wound Assessment Excoriated;Pink;Clean 11/18/2019  7:54 AM   Margins Undefined edges 11/18/2019  7:54 AM   Wound Length (cm) 1 cm 11/18/2019  7:54 AM   Wound Width (cm) 2 cm 11/18/2019  7:54 AM   Wound Surface Area (cm^2) 2 cm^2 11/18/2019  7:54 AM   Drainage Amount None 11/18/2019  7:54 AM   Treatments Cleansed 11/18/2019  7:54 AM   Cleansing Normal Saline Irrigation 11/18/2019  7:54 AM   Dressing Options Mepilex 11/18/2019  7:54 AM                  MENTAL STATUS ON TRANSFER  Level of Consciousness: Alert  Orientation : Oriented x 4  Speech: Speech Clear    CONSULTATIONS  None    PROCEDURES  None    LABORATORY  Lab Results   Component Value Date    SODIUM 138 11/16/2019    POTASSIUM 4.0 11/18/2019    CHLORIDE 102 11/16/2019    CO2 28 11/16/2019    GLUCOSE 113 (H) 11/16/2019    BUN 15 11/16/2019    CREATININE 0.80 11/16/2019        Lab Results   Component Value Date    WBC 9.6 11/16/2019    HEMOGLOBIN 12.7 11/16/2019    HEMATOCRIT 39.5 11/16/2019    PLATELETCT 275 11/16/2019        Total time of the discharge process exceeds 34 minutes.

## 2019-11-18 NOTE — PROGRESS NOTES
RN MOBILITY NOTE - 11/17/19    Surgery patient?: no  Date of surgery: n/a  Ambulated 50 ft on day of surgery? (N/A if today is not date of surgery): n/a  Number of times ambulated 50 feet or greater today: 0  Patient has been up to chair, edge of bed or HOB 90 degrees for all meals?: 3/3  Goal met? (goal is ambulating at least 50 feet 2 times on day shift, one time on night shift): no  If patient did not meet mobility goal, why?: pt refused

## 2019-11-18 NOTE — PROGRESS NOTES
0800  Received report from Abril TORRES     1000  Patient a & o x 4    • Clarified order that LSO brace when edge of bed; no TLSO brace; Pt has left sling in place  • Pt denies numbness/tingling  • SpO2 92% on RA  • Voiding via bedpan.    • BM on 11/14/19. Abd soft, Bowel sounds hyperactive, denies nausea. Will give suppository  • Diet regular  • Pt states pain minimal in bed but 8/10 to when moving  • No drain  • New open area noted on left elbow skin; wound consult placed and mepilex applied   • SCDs on  • Patient hasn't been ambulating   • Patient oriented to room, surroundings and call bell. Bed alarm on. Bed in lowest position, locked and upper side rails up. Patient demonstrated correct use of call bell. Call bell/belongings within reach. Patient educated on hourly rounding.   • Plan go to SNF today and suppository.    1254   DC paperwork discussed with patient. All questions answered. Report given to miky TORRES at [facility] at 1255. Pt will be dc'd with cobra, chart, alberto instructions and script for oxy. Transport for 2pm via ambulance.

## 2019-11-18 NOTE — DISCHARGE PLANNING
Agency/Facility Name: [facility]  Spoke To: Alma  Outcome: Setting up transport for 1400, will call if that changes. DC Summary needed

## 2020-01-24 ENCOUNTER — PATIENT OUTREACH (OUTPATIENT)
Dept: HEALTH INFORMATION MANAGEMENT | Facility: OTHER | Age: 85
End: 2020-01-24

## 2020-01-27 ENCOUNTER — HOME HEALTH ADMISSION (OUTPATIENT)
Dept: HOME HEALTH SERVICES | Facility: HOME HEALTHCARE | Age: 85
End: 2020-01-27
Payer: MEDICARE

## 2020-02-02 ENCOUNTER — HOME CARE VISIT (OUTPATIENT)
Dept: HOME HEALTH SERVICES | Facility: HOME HEALTHCARE | Age: 85
End: 2020-02-02
Payer: MEDICARE

## 2020-02-02 VITALS
BODY MASS INDEX: 35.48 KG/M2 | HEART RATE: 81 BPM | HEIGHT: 62 IN | DIASTOLIC BLOOD PRESSURE: 62 MMHG | OXYGEN SATURATION: 92 % | TEMPERATURE: 97.4 F | RESPIRATION RATE: 16 BRPM | SYSTOLIC BLOOD PRESSURE: 118 MMHG

## 2020-02-02 PROCEDURE — 665001 SOC-HOME HEALTH

## 2020-02-02 PROCEDURE — G0493 RN CARE EA 15 MIN HH/HOSPICE: HCPCS

## 2020-02-02 SDOH — ECONOMIC STABILITY: HOUSING INSECURITY: HOME SAFETY: PT WEARING LSO BRACE WHEN OOB

## 2020-02-02 ASSESSMENT — ENCOUNTER SYMPTOMS
VOMITING: DENIES
SHORTNESS OF BREATH: T
NAUSEA: DENIES

## 2020-02-02 ASSESSMENT — PATIENT HEALTH QUESTIONNAIRE - PHQ9
CLINICAL INTERPRETATION OF PHQ2 SCORE: 0
2. FEELING DOWN, DEPRESSED, IRRITABLE, OR HOPELESS: 00
1. LITTLE INTEREST OR PLEASURE IN DOING THINGS: 00

## 2020-02-03 ENCOUNTER — ANTICOAGULATION MONITORING (OUTPATIENT)
Dept: MEDICAL GROUP | Facility: PHYSICIAN GROUP | Age: 85
End: 2020-02-03

## 2020-02-03 ENCOUNTER — HOME CARE VISIT (OUTPATIENT)
Dept: HOME HEALTH SERVICES | Facility: HOME HEALTHCARE | Age: 85
End: 2020-02-03
Payer: MEDICARE

## 2020-02-03 DIAGNOSIS — Z79.01 CHRONIC ANTICOAGULATION: ICD-10-CM

## 2020-02-03 DIAGNOSIS — I48.91 ATRIAL FIBRILLATION, UNSPECIFIED TYPE (HCC): ICD-10-CM

## 2020-02-03 NOTE — PROGRESS NOTES
Received referral from [facility]. Medications reviewed. No clinically significant interactions noted.     Continue to monitor potassium levels.    Saud Lemon, PharmD, MS, BCACP, [facility]    This note was created using voice recognition software (Dragon). The accuracy of the dictation is limited by the abilities of the software. I have reviewed the note prior to signing, however some errors in grammar and context are still possible. If you have any questions related to this note please do not hesitate to contact our office.

## 2020-02-04 ENCOUNTER — HOME CARE VISIT (OUTPATIENT)
Dept: HOME HEALTH SERVICES | Facility: HOME HEALTHCARE | Age: 85
End: 2020-02-04
Payer: MEDICARE

## 2020-02-04 VITALS
DIASTOLIC BLOOD PRESSURE: 66 MMHG | RESPIRATION RATE: 17 BRPM | OXYGEN SATURATION: 93 % | TEMPERATURE: 97 F | SYSTOLIC BLOOD PRESSURE: 122 MMHG | HEART RATE: 81 BPM

## 2020-02-04 PROCEDURE — G0151 HHCP-SERV OF PT,EA 15 MIN: HCPCS

## 2020-02-04 ASSESSMENT — BALANCE ASSESSMENTS
STANDING BALANCE: 1 - STEADY BUT WIDE STANCE AND USES CANE OR OTHER SUPPORT
BALANCE SCORE: 6
ARISES: 1 - ABLE, USES ARMS TO HELP
SITTING DOWN: 1 - USES ARMS OR NOT SMOOTH MOTION
EYES CLOSED AT MAXIMUM POSITION NUDGED: 0 - UNSTEADY
NUDGED SCORE: 0
IMMEDIATE STANDING BALANCE FIRST 5 SECONDS: 1 - STEADY BUT USES WALKER OR OTHER SUPPORT
ARISING SCORE: 1
ATTEMPTS TO ARISE: 1 - ABLE, REQUIRES MORE THAN ONE ATTEMPT
TURNING 360 DEGREES STEPS: 0 - DISCONTINUOUS STEPS
SITTING BALANCE: 1 - STEADY, SAFE
NUDGED: 0 - BEGINS TO FALL

## 2020-02-04 ASSESSMENT — GAIT ASSESSMENTS
TRUNK SCORE: 0
PATH: 1 - MILD/MODERATE DEVIATION OR USES WALKING AID
PATH SCORE: 1
STEP SYMMETRY: 1 - RIGHT AND LEFT STEP LENGTH APPEAR EQUAL
WALKING STANCE: 0 - HEELS APART
STEP CONTINUITY: 1 - STEPS APPEAR CONTINUOUS
TRUNK: 0 - MARKED SWAY OR USES WALKING AID
GAIT SCORE: 8
BALANCE AND GAIT SCORE: 14
INITIATION OF GAIT IMMEDIATELY AFTER GO: 1 - NO HESITANCY

## 2020-02-04 ASSESSMENT — ENCOUNTER SYMPTOMS
MUSCLE WEAKNESS: 1
DEBILITATING PAIN: 1

## 2020-02-04 ASSESSMENT — ACTIVITIES OF DAILY LIVING (ADL)
GROOMING_COMMENTS: SEE OT EVAL
AMBULATION ASSISTANCE ON FLAT SURFACES: 1
BATHING_COMMENTS: SEE OT EVAL
CURRENT_FUNCTION: STAND BY ASSIST
AMBULATION ASSISTANCE: 1
FEEDING_COMMENTS: SEE OT EVAL
PHYSICAL TRANSFERS ASSESSED: 1
PHYSICAL_TRANSFERS_DEVICES: WITH FWW
AMBULATION ASSISTANCE: CONTACT GUARD ASSIST

## 2020-02-06 ENCOUNTER — HOME CARE VISIT (OUTPATIENT)
Dept: HOME HEALTH SERVICES | Facility: HOME HEALTHCARE | Age: 85
End: 2020-02-06
Payer: MEDICARE

## 2020-02-06 PROCEDURE — G0152 HHCP-SERV OF OT,EA 15 MIN: HCPCS

## 2020-02-07 ENCOUNTER — HOME CARE VISIT (OUTPATIENT)
Dept: HOME HEALTH SERVICES | Facility: HOME HEALTHCARE | Age: 85
End: 2020-02-07
Payer: MEDICARE

## 2020-02-09 VITALS
RESPIRATION RATE: 16 BRPM | HEART RATE: 78 BPM | OXYGEN SATURATION: 98 % | SYSTOLIC BLOOD PRESSURE: 129 MMHG | DIASTOLIC BLOOD PRESSURE: 84 MMHG | TEMPERATURE: 97.6 F

## 2020-02-09 ASSESSMENT — ACTIVITIES OF DAILY LIVING (ADL)
FEEDING: INDEPENDENT
PREPARING MEALS: INDEPENDENT
FEEDING_WITHIN_DEFINED_LIMITS: 1
TELEPHONE USE ASSESSED: 1
AMBULATION ASSISTANCE: SUPERVISION
DRESSING_LB_CURRENT_FUNCTION: SUPERVISION
LIGHT HOUSEKEEPING: NEEDS ASSISTANCE
HOUSEKEEPING ASSESSED: 1
GROOMING_CURRENT_FUNCTION: INDEPENDENT
FEEDING ASSESSED: 1
DRESSING_UB_CURRENT_FUNCTION: INDEPENDENT
TOILETING: INDEPENDENT
CURRENT_FUNCTION: INDEPENDENT
USING THE TELPHONE: INDEPENDENT
PHYSICAL_TRANSFERS_DEVICES: FWW
PHYSICAL TRANSFERS ASSESSED: 1
GROOMING_WITHIN_DEFINED_LIMITS: 1
TOILETING: 1
AMBULATION ASSISTANCE: 1
GROOMING ASSESSED: 1

## 2020-02-10 ENCOUNTER — HOME CARE VISIT (OUTPATIENT)
Dept: HOME HEALTH SERVICES | Facility: HOME HEALTHCARE | Age: 85
End: 2020-02-10
Payer: MEDICARE

## 2020-02-10 VITALS
TEMPERATURE: 98.5 F | SYSTOLIC BLOOD PRESSURE: 135 MMHG | HEART RATE: 70 BPM | OXYGEN SATURATION: 93 % | RESPIRATION RATE: 16 BRPM | DIASTOLIC BLOOD PRESSURE: 84 MMHG

## 2020-02-10 PROCEDURE — G0151 HHCP-SERV OF PT,EA 15 MIN: HCPCS

## 2020-02-10 ASSESSMENT — ENCOUNTER SYMPTOMS: DEBILITATING PAIN: 1

## 2020-02-11 ASSESSMENT — ACTIVITIES OF DAILY LIVING (ADL)
OASIS_M1830: 03
AMBULATION ASSISTANCE ON FLAT SURFACES: 1
AMBULATION_DISTANCE/DURATION_TOLERATED: 60 FT

## 2020-02-12 ENCOUNTER — HOSPITAL ENCOUNTER (OUTPATIENT)
Dept: RADIOLOGY | Facility: MEDICAL CENTER | Age: 85
End: 2020-02-12
Attending: FAMILY MEDICINE
Payer: MEDICARE

## 2020-02-12 DIAGNOSIS — M47.816 LUMBAR SPONDYLOSIS: ICD-10-CM

## 2020-02-12 PROCEDURE — 72148 MRI LUMBAR SPINE W/O DYE: CPT

## 2020-02-13 ENCOUNTER — HOME CARE VISIT (OUTPATIENT)
Dept: HOME HEALTH SERVICES | Facility: HOME HEALTHCARE | Age: 85
End: 2020-02-13
Payer: MEDICARE

## 2020-02-13 PROCEDURE — G0151 HHCP-SERV OF PT,EA 15 MIN: HCPCS

## 2020-02-14 ENCOUNTER — HOME CARE VISIT (OUTPATIENT)
Dept: HOME HEALTH SERVICES | Facility: HOME HEALTHCARE | Age: 85
End: 2020-02-14
Payer: MEDICARE

## 2020-02-14 PROCEDURE — G0152 HHCP-SERV OF OT,EA 15 MIN: HCPCS

## 2020-02-16 VITALS
OXYGEN SATURATION: 99 % | TEMPERATURE: 99.1 F | RESPIRATION RATE: 17 BRPM | SYSTOLIC BLOOD PRESSURE: 140 MMHG | HEART RATE: 74 BPM | DIASTOLIC BLOOD PRESSURE: 74 MMHG

## 2020-02-16 ASSESSMENT — ACTIVITIES OF DAILY LIVING (ADL)
AMBULATION_DISTANCE/DURATION_TOLERATED: 50 FT X 2
AMBULATION ASSISTANCE ON FLAT SURFACES: 1

## 2020-02-16 ASSESSMENT — ENCOUNTER SYMPTOMS: DEBILITATING PAIN: 1

## 2020-02-17 VITALS
SYSTOLIC BLOOD PRESSURE: 116 MMHG | RESPIRATION RATE: 17 BRPM | DIASTOLIC BLOOD PRESSURE: 76 MMHG | TEMPERATURE: 98 F | HEART RATE: 85 BPM | OXYGEN SATURATION: 99 %

## 2020-02-17 ASSESSMENT — ENCOUNTER SYMPTOMS: DEBILITATING PAIN: 1

## 2020-02-18 ENCOUNTER — HOME CARE VISIT (OUTPATIENT)
Dept: HOME HEALTH SERVICES | Facility: HOME HEALTHCARE | Age: 85
End: 2020-02-18
Payer: MEDICARE

## 2020-02-18 PROCEDURE — G0151 HHCP-SERV OF PT,EA 15 MIN: HCPCS

## 2020-02-19 VITALS
DIASTOLIC BLOOD PRESSURE: 74 MMHG | HEART RATE: 56 BPM | TEMPERATURE: 98.1 F | OXYGEN SATURATION: 95 % | RESPIRATION RATE: 17 BRPM | SYSTOLIC BLOOD PRESSURE: 132 MMHG

## 2020-02-19 ASSESSMENT — ACTIVITIES OF DAILY LIVING (ADL)
AMBULATION_DISTANCE/DURATION_TOLERATED: 100 FT
DRESSING_UB_CURRENT_FUNCTION: INDEPENDENT
AMBULATION ASSISTANCE: 1
GROOMING_CURRENT_FUNCTION: INDEPENDENT
BATHING_CURRENT_FUNCTION: SUPERVISION
AMBULATION ASSISTANCE: SUPERVISION
DRESSING_LB_CURRENT_FUNCTION: INDEPENDENT
GROOMING ASSESSED: 1
AMBULATION ASSISTANCE ON FLAT SURFACES: 1
TOILETING: 1
BATHING ASSESSED: 1
TOILETING: INDEPENDENT

## 2020-02-19 ASSESSMENT — ENCOUNTER SYMPTOMS: DEBILITATING PAIN: 1

## 2020-02-19 NOTE — PROGRESS NOTES
ASHLEYI: Dr. Santamaria and [place] Care Team    OCCUPATIONAL THERAPY DISCHARGE SUMMARY  SOC: 2/6/2020  DC DATE: 2/14/2020  LAST VISIT: 2/14/2020  DIAGNOSIS: L4 compression fracture  VISIT NOTE: Pt was seen for last OT visit today. Vitals WNL, with no c/o pain. No reported falls since last seen by OT, and no new/changes in medication noted. Followed through on previously recommended DMEs/AEs for safer functional transfers and ADL task performance,  with good/fair carryover from pt. She is utilizing reacher and FWW more, however, home safety modification made by evaluating OT was not being followed well. OT moved bathroom carts to allow for safer transfers using FWW to/from toilet and shower, but pt requested grandson to move it back in place where it was. Pt was recommended to install grab bars by shower and in front of toilet instead, to allow for safe and independent bathroom t ransfers. Pt verbalized good understanding and agreement to OT recommendations made. Pt declined need for further HH OT visits.  REASON FOR DISCHARGE:  -PATIENT REQUESTED DISCHARGE  SUMMARY OF CARE  Pt was instructed in safety techniques for ADLs, and functional mob/ADL transfers, energy conservation and activity pacing techniques, as well as fall prevention strategies in order to regain independence and maximize safety in home environm ent. Pt demo good understanding of techniques and education provided, with good/fair compliance to overall OT teachings.   D/C HH OT, effective 2/14/2020, per patient's request.

## 2020-02-20 ENCOUNTER — HOME CARE VISIT (OUTPATIENT)
Dept: HOME HEALTH SERVICES | Facility: HOME HEALTHCARE | Age: 85
End: 2020-02-20
Payer: MEDICARE

## 2020-02-20 PROCEDURE — G0151 HHCP-SERV OF PT,EA 15 MIN: HCPCS

## 2020-02-21 ENCOUNTER — PATIENT OUTREACH (OUTPATIENT)
Dept: HEALTH INFORMATION MANAGEMENT | Facility: OTHER | Age: 85
End: 2020-02-21

## 2020-02-23 VITALS
DIASTOLIC BLOOD PRESSURE: 66 MMHG | HEART RATE: 60 BPM | SYSTOLIC BLOOD PRESSURE: 124 MMHG | TEMPERATURE: 97.7 F | OXYGEN SATURATION: 96 % | RESPIRATION RATE: 18 BRPM

## 2020-02-23 ASSESSMENT — BALANCE ASSESSMENTS
ARISING SCORE: 1
NUDGED: 1 - STAGGERS, GRABS, CATCHES SELF
STANDING BALANCE: 1 - STEADY BUT WIDE STANCE AND USES CANE OR OTHER SUPPORT
ARISES: 1 - ABLE, USES ARMS TO HELP
BALANCE SCORE: 10
ATTEMPTS TO ARISE: 2 - ABLE TO RISE, ONE ATTEMPT
SITTING DOWN: 1 - USES ARMS OR NOT SMOOTH MOTION
IMMEDIATE STANDING BALANCE FIRST 5 SECONDS: 1 - STEADY BUT USES WALKER OR OTHER SUPPORT
TURNING 360 DEGREES STEPS: 0 - DISCONTINUOUS STEPS
SITTING BALANCE: 1 - STEADY, SAFE
NUDGED SCORE: 1
EYES CLOSED AT MAXIMUM POSITION NUDGED: 1 - STEADY

## 2020-02-23 ASSESSMENT — GAIT ASSESSMENTS
PATH SCORE: 1
BALANCE AND GAIT SCORE: 18
TRUNK SCORE: 0
STEP SYMMETRY: 1 - RIGHT AND LEFT STEP LENGTH APPEAR EQUAL
PATH: 1 - MILD/MODERATE DEVIATION OR USES WALKING AID
STEP CONTINUITY: 1 - STEPS APPEAR CONTINUOUS
GAIT SCORE: 8
WALKING STANCE: 0 - HEELS APART
INITIATION OF GAIT IMMEDIATELY AFTER GO: 1 - NO HESITANCY
TRUNK: 0 - MARKED SWAY OR USES WALKING AID

## 2020-02-23 ASSESSMENT — ACTIVITIES OF DAILY LIVING (ADL)
AMBULATION ASSISTANCE ON FLAT SURFACES: 1
AMBULATION ASSISTANCE ON UNEVEN SURFACES: 1
AMBULATION_DISTANCE/DURATION_TOLERATED: 220 FT
OASIS_M1830: 02
HOME_HEALTH_OASIS: 01

## 2020-02-23 ASSESSMENT — PATIENT HEALTH QUESTIONNAIRE - PHQ9: CLINICAL INTERPRETATION OF PHQ2 SCORE: 0

## 2020-03-02 NOTE — PROGRESS NOTES
An 88-year-old female was a local transfer admission to [facility] from 11/18/2019 to 1/23/2020 to treat Wedge compression fracture of first lumbar vertebra, initial encounter for closed fracture. The patient was discharged Home. The patient's medical condition included: Orthopedic (Knee, Hip, Shoulder, Foot, Wrist, Hand, Spine, Back). The patient was not under clinical case management.    The patient was ordered to follow-up with Specialist, Home Health, and PCP. The patient had the following appointments:     1) 2/2/2020 @ 4:00 [facility] Care - Appointment Kept     2) 2/5/2020 @ 2:00 Brandy Santamaria), general/family practice - CONFIRMED AS KEPT     3) [APPOINTMENT DATE] Orthopedic Specialist, orthopedic surgery - PATIENT DECLINED ASSISTANCE W/ SCHEDULING  The patient has no future appointments scheduled.    [facility] Educated patient on community services. As a result, Patient adhered with Discharge Orders, Patient attended follow up appointments  , and Patient made a full recovery post-op or post-discharge.

## 2020-03-23 ENCOUNTER — TELEPHONE (OUTPATIENT)
Dept: CARDIOLOGY | Facility: MEDICAL CENTER | Age: 85
End: 2020-03-23

## 2020-03-23 ENCOUNTER — TELEPHONE (OUTPATIENT)
Dept: VASCULAR LAB | Facility: MEDICAL CENTER | Age: 85
End: 2020-03-23

## 2020-03-23 NOTE — TELEPHONE ENCOUNTER
[facility]ticoagulation Services    Pt will be having L4 kyphoplasty on 3/26/2020. Pt is on Xarelto for atrial fibrillation and cleared by cardiology.     Instructed pt to hold 48 hrs prior, then resume OAC right after procedure.    Mandy Clark, MaynorD

## 2020-03-24 NOTE — TELEPHONE ENCOUNTER
Clearance form reviewed and signed by FUAD. Faxed to [facility] at [phone], attn: [email or web address]. Received receipt for confirmation.

## 2020-03-27 ENCOUNTER — ANTICOAGULATION MONITORING (OUTPATIENT)
Dept: VASCULAR LAB | Facility: MEDICAL CENTER | Age: 85
End: 2020-03-27

## 2020-03-27 DIAGNOSIS — Z79.01 CHRONIC ANTICOAGULATION: ICD-10-CM

## 2020-03-27 DIAGNOSIS — I48.91 ATRIAL FIBRILLATION, UNSPECIFIED TYPE (HCC): ICD-10-CM

## 2020-03-28 NOTE — PROGRESS NOTES
Target end date:Indefinite     Indication: Atrial fibrillation     Drug: Xarelto 20mg     CHADsVASC = at least 5    Health Status Since Last Assessment   Patient denies any new relevant medical problems, ED visits or hospitalizations   Patient denies any embolic events (stroke/tia/systemic embolism)    Adherence with DOAC Therapy   Pt has NO missed any doses in the average week    Bleeding Risk Assessment     Denies Epistaxis   Pt denies any excessive or unusual bleeding/hematomas.  Pt denies any GI bleeds or hematemesis.  Pt denies any concerning daily headache or sub dural hematoma symptoms.     Pt denies any hematuria or abnormal vaginal bleeding.   Latest Hemoglobin will be updating next week   ETOH overuse Negative     Creatinine Clearance/Renal Function     Latest ClCr will be updating next week.    Hepatic function   Latest LFTs WNL - will need updating next week.   Pt denies any history of liver dysfunction      Drug Interactions   Platelets: will be updating   ASA/other antiplatelets Negative   NSAID Negative   Other drug interactions Diltiazem - no renal function issues identified, DDI not clinically relevant in this setting.   X Verified no anticonvulsant or azole therapy, education provided for future use.    Final Assessment and Recommendations:   Patient appears stable from the anticoagulation staindpoint.     Benefits of continued DOAC therapy outweigh risks for this patient   Recommend pt continue with current DOAC therapy Xarelto 20mg one time daily (with dose adjustment)     Other Actions: cmp/ cbc hemogram ordered prior to next visit    Follow up:   Will follow up with patient 3 months.      Rizwan Lipscomb, PharmD, BCACP

## 2020-03-30 ENCOUNTER — HOSPITAL ENCOUNTER (OUTPATIENT)
Dept: LAB | Facility: MEDICAL CENTER | Age: 85
End: 2020-03-30
Attending: NURSE PRACTITIONER
Payer: MEDICARE

## 2020-03-30 LAB
ALBUMIN SERPL BCP-MCNC: 4.1 G/DL (ref 3.2–4.9)
ALBUMIN/GLOB SERPL: 1.5 G/DL
ALP SERPL-CCNC: 111 U/L (ref 30–99)
ALT SERPL-CCNC: 14 U/L (ref 2–50)
ANION GAP SERPL CALC-SCNC: 13 MMOL/L (ref 7–16)
APPEARANCE UR: CLEAR
AST SERPL-CCNC: 22 U/L (ref 12–45)
BACTERIA #/AREA URNS HPF: NEGATIVE /HPF
BASOPHILS # BLD AUTO: 0.6 % (ref 0–1.8)
BASOPHILS # BLD: 0.06 K/UL (ref 0–0.12)
BILIRUB SERPL-MCNC: 0.4 MG/DL (ref 0.1–1.5)
BILIRUB UR QL STRIP.AUTO: NEGATIVE
BUN SERPL-MCNC: 14 MG/DL (ref 8–22)
CALCIUM SERPL-MCNC: 8.8 MG/DL (ref 8.5–10.5)
CHLORIDE SERPL-SCNC: 99 MMOL/L (ref 96–112)
CO2 SERPL-SCNC: 26 MMOL/L (ref 20–33)
COLOR UR: YELLOW
CREAT SERPL-MCNC: 0.73 MG/DL (ref 0.5–1.4)
CREAT UR-MCNC: 50.56 MG/DL
EOSINOPHIL # BLD AUTO: 0.1 K/UL (ref 0–0.51)
EOSINOPHIL NFR BLD: 1.1 % (ref 0–6.9)
EPI CELLS #/AREA URNS HPF: NEGATIVE /HPF
ERYTHROCYTE [DISTWIDTH] IN BLOOD BY AUTOMATED COUNT: 50.3 FL (ref 35.9–50)
FASTING STATUS PATIENT QL REPORTED: NORMAL
GLOBULIN SER CALC-MCNC: 2.7 G/DL (ref 1.9–3.5)
GLUCOSE SERPL-MCNC: 100 MG/DL (ref 65–99)
GLUCOSE UR STRIP.AUTO-MCNC: NEGATIVE MG/DL
HCT VFR BLD AUTO: 44.1 % (ref 37–47)
HGB BLD-MCNC: 14.1 G/DL (ref 12–16)
HYALINE CASTS #/AREA URNS LPF: NORMAL /LPF
IMM GRANULOCYTES # BLD AUTO: 0.07 K/UL (ref 0–0.11)
IMM GRANULOCYTES NFR BLD AUTO: 0.8 % (ref 0–0.9)
KETONES UR STRIP.AUTO-MCNC: NEGATIVE MG/DL
LEUKOCYTE ESTERASE UR QL STRIP.AUTO: ABNORMAL
LYMPHOCYTES # BLD AUTO: 1.3 K/UL (ref 1–4.8)
LYMPHOCYTES NFR BLD: 13.9 % (ref 22–41)
MAGNESIUM SERPL-MCNC: 1.4 MG/DL (ref 1.5–2.5)
MCH RBC QN AUTO: 29 PG (ref 27–33)
MCHC RBC AUTO-ENTMCNC: 32 G/DL (ref 33.6–35)
MCV RBC AUTO: 90.6 FL (ref 81.4–97.8)
MICRO URNS: ABNORMAL
MONOCYTES # BLD AUTO: 0.62 K/UL (ref 0–0.85)
MONOCYTES NFR BLD AUTO: 6.7 % (ref 0–13.4)
NEUTROPHILS # BLD AUTO: 7.17 K/UL (ref 2–7.15)
NEUTROPHILS NFR BLD: 76.9 % (ref 44–72)
NITRITE UR QL STRIP.AUTO: NEGATIVE
NRBC # BLD AUTO: 0 K/UL
NRBC BLD-RTO: 0 /100 WBC
PH UR STRIP.AUTO: 6 [PH] (ref 5–8)
PLATELET # BLD AUTO: 325 K/UL (ref 164–446)
PMV BLD AUTO: 9.1 FL (ref 9–12.9)
POTASSIUM SERPL-SCNC: 3.8 MMOL/L (ref 3.6–5.5)
PROT SERPL-MCNC: 6.8 G/DL (ref 6–8.2)
PROT UR QL STRIP: NEGATIVE MG/DL
PROT UR-MCNC: 7 MG/DL (ref 0–15)
PROT/CREAT UR: 138 MG/G (ref 10–107)
RBC # BLD AUTO: 4.87 M/UL (ref 4.2–5.4)
RBC # URNS HPF: NORMAL /HPF
RBC UR QL AUTO: NEGATIVE
SODIUM SERPL-SCNC: 138 MMOL/L (ref 135–145)
SP GR UR STRIP.AUTO: 1.01
URATE SERPL-MCNC: 7 MG/DL (ref 1.9–8.2)
UROBILINOGEN UR STRIP.AUTO-MCNC: 0.2 MG/DL
WBC # BLD AUTO: 9.3 K/UL (ref 4.8–10.8)
WBC #/AREA URNS HPF: NORMAL /HPF

## 2020-03-30 PROCEDURE — 84550 ASSAY OF BLOOD/URIC ACID: CPT

## 2020-03-30 PROCEDURE — 80053 COMPREHEN METABOLIC PANEL: CPT

## 2020-03-30 PROCEDURE — 83735 ASSAY OF MAGNESIUM: CPT

## 2020-03-30 PROCEDURE — 36415 COLL VENOUS BLD VENIPUNCTURE: CPT

## 2020-03-30 PROCEDURE — 82306 VITAMIN D 25 HYDROXY: CPT

## 2020-03-30 PROCEDURE — 85025 COMPLETE CBC W/AUTO DIFF WBC: CPT

## 2020-03-30 PROCEDURE — 81001 URINALYSIS AUTO W/SCOPE: CPT

## 2020-03-30 PROCEDURE — 83970 ASSAY OF PARATHORMONE: CPT

## 2020-03-31 LAB
25(OH)D3 SERPL-MCNC: 21 NG/ML (ref 30–100)
PTH-INTACT SERPL-MCNC: 111 PG/ML (ref 14–72)

## 2020-04-14 ENCOUNTER — APPOINTMENT (OUTPATIENT)
Dept: CARDIOLOGY | Facility: MEDICAL CENTER | Age: 85
End: 2020-04-14
Attending: PAIN MEDICINE
Payer: MEDICARE

## 2020-05-16 ENCOUNTER — HOSPITAL ENCOUNTER (OUTPATIENT)
Dept: LAB | Facility: MEDICAL CENTER | Age: 85
End: 2020-05-16
Attending: FAMILY MEDICINE
Payer: MEDICARE

## 2020-05-16 LAB
ALBUMIN SERPL BCP-MCNC: 3.8 G/DL (ref 3.2–4.9)
ALBUMIN/GLOB SERPL: 1.3 G/DL
ALP SERPL-CCNC: 145 U/L (ref 30–99)
ALT SERPL-CCNC: 29 U/L (ref 2–50)
ANION GAP SERPL CALC-SCNC: 16 MMOL/L (ref 7–16)
AST SERPL-CCNC: 45 U/L (ref 12–45)
BASOPHILS # BLD AUTO: 0.6 % (ref 0–1.8)
BASOPHILS # BLD: 0.06 K/UL (ref 0–0.12)
BILIRUB SERPL-MCNC: 0.8 MG/DL (ref 0.1–1.5)
BUN SERPL-MCNC: 21 MG/DL (ref 8–22)
CALCIUM SERPL-MCNC: 9 MG/DL (ref 8.5–10.5)
CHLORIDE SERPL-SCNC: 96 MMOL/L (ref 96–112)
CHOLEST SERPL-MCNC: 166 MG/DL (ref 100–199)
CO2 SERPL-SCNC: 24 MMOL/L (ref 20–33)
CREAT SERPL-MCNC: 1.03 MG/DL (ref 0.5–1.4)
CREAT UR-MCNC: 235.56 MG/DL
EOSINOPHIL # BLD AUTO: 0.07 K/UL (ref 0–0.51)
EOSINOPHIL NFR BLD: 0.7 % (ref 0–6.9)
ERYTHROCYTE [DISTWIDTH] IN BLOOD BY AUTOMATED COUNT: 48.8 FL (ref 35.9–50)
FASTING STATUS PATIENT QL REPORTED: NORMAL
GLOBULIN SER CALC-MCNC: 2.9 G/DL (ref 1.9–3.5)
GLUCOSE SERPL-MCNC: 123 MG/DL (ref 65–99)
HCT VFR BLD AUTO: 43.5 % (ref 37–47)
HDLC SERPL-MCNC: 69 MG/DL
HGB BLD-MCNC: 14.1 G/DL (ref 12–16)
IMM GRANULOCYTES # BLD AUTO: 0.06 K/UL (ref 0–0.11)
IMM GRANULOCYTES NFR BLD AUTO: 0.6 % (ref 0–0.9)
LDLC SERPL CALC-MCNC: 81 MG/DL
LYMPHOCYTES # BLD AUTO: 0.78 K/UL (ref 1–4.8)
LYMPHOCYTES NFR BLD: 7.8 % (ref 22–41)
MCH RBC QN AUTO: 29.6 PG (ref 27–33)
MCHC RBC AUTO-ENTMCNC: 32.4 G/DL (ref 33.6–35)
MCV RBC AUTO: 91.4 FL (ref 81.4–97.8)
MICROALBUMIN UR-MCNC: 3.3 MG/DL
MICROALBUMIN/CREAT UR: 14 MG/G (ref 0–30)
MONOCYTES # BLD AUTO: 0.61 K/UL (ref 0–0.85)
MONOCYTES NFR BLD AUTO: 6.1 % (ref 0–13.4)
NEUTROPHILS # BLD AUTO: 8.44 K/UL (ref 2–7.15)
NEUTROPHILS NFR BLD: 84.2 % (ref 44–72)
NRBC # BLD AUTO: 0 K/UL
NRBC BLD-RTO: 0 /100 WBC
PLATELET # BLD AUTO: 350 K/UL (ref 164–446)
PMV BLD AUTO: 8.8 FL (ref 9–12.9)
POTASSIUM SERPL-SCNC: 4.3 MMOL/L (ref 3.6–5.5)
PROT SERPL-MCNC: 6.7 G/DL (ref 6–8.2)
RBC # BLD AUTO: 4.76 M/UL (ref 4.2–5.4)
SODIUM SERPL-SCNC: 136 MMOL/L (ref 135–145)
TRIGL SERPL-MCNC: 81 MG/DL (ref 0–149)
TSH SERPL DL<=0.005 MIU/L-ACNC: 6.45 UIU/ML (ref 0.38–5.33)
WBC # BLD AUTO: 10 K/UL (ref 4.8–10.8)

## 2020-05-16 PROCEDURE — 83036 HEMOGLOBIN GLYCOSYLATED A1C: CPT

## 2020-05-16 PROCEDURE — 82570 ASSAY OF URINE CREATININE: CPT

## 2020-05-16 PROCEDURE — 36415 COLL VENOUS BLD VENIPUNCTURE: CPT

## 2020-05-16 PROCEDURE — 85025 COMPLETE CBC W/AUTO DIFF WBC: CPT

## 2020-05-16 PROCEDURE — 82043 UR ALBUMIN QUANTITATIVE: CPT

## 2020-05-16 PROCEDURE — 84443 ASSAY THYROID STIM HORMONE: CPT

## 2020-05-16 PROCEDURE — 80053 COMPREHEN METABOLIC PANEL: CPT

## 2020-05-16 PROCEDURE — 80061 LIPID PANEL: CPT

## 2020-05-17 LAB
EST. AVERAGE GLUCOSE BLD GHB EST-MCNC: 126 MG/DL
HBA1C MFR BLD: 6 % (ref 0–5.6)

## 2020-06-05 DIAGNOSIS — Z79.01 CHRONIC ANTICOAGULATION: ICD-10-CM

## 2020-06-11 ENCOUNTER — ANTICOAGULATION MONITORING (OUTPATIENT)
Dept: VASCULAR LAB | Facility: MEDICAL CENTER | Age: 85
End: 2020-06-11

## 2020-06-11 DIAGNOSIS — I48.91 ATRIAL FIBRILLATION, UNSPECIFIED TYPE (HCC): ICD-10-CM

## 2020-06-11 DIAGNOSIS — Z79.01 CHRONIC ANTICOAGULATION: ICD-10-CM

## 2020-06-11 NOTE — PROGRESS NOTES
Cockcroft & Gault (Actual body weight): 51.4 (ml/min)  Pt is taking Xarelto 20 mg daily  due to at fib for indef duration.    Pt is being monitored by card. And as such ac clinic is just seeing that labs are ordered and crtcl is wnl. No bleeding issues noted in last visit. Next lab due in 6 months Dec. Continue to defer to card. The patient instructed to go to the ER for falls with a head injury,  blood in urine or stool or any bleeding that last longer than 20 min.    NAHEED Islas.

## 2020-06-17 DIAGNOSIS — I10 ESSENTIAL HYPERTENSION: Primary | ICD-10-CM

## 2020-06-17 RX ORDER — DILTIAZEM HYDROCHLORIDE 120 MG/1
CAPSULE, COATED, EXTENDED RELEASE ORAL
Qty: 180 CAP | Refills: 0 | Status: SHIPPED | OUTPATIENT
Start: 2020-06-17 | End: 2020-08-24 | Stop reason: SDUPTHER

## 2020-08-14 ENCOUNTER — TELEPHONE (OUTPATIENT)
Dept: CARDIOLOGY | Facility: MEDICAL CENTER | Age: 85
End: 2020-08-14

## 2020-08-14 NOTE — TELEPHONE ENCOUNTER
Pt scheduled for minimally invasive lumbar decompression on 08/21/20 with [facility]. Clearance form reviewed and completed by RICKIE. Faxed to [phone], received receipt for confirmation.

## 2020-08-20 ENCOUNTER — OFFICE VISIT (OUTPATIENT)
Dept: URGENT CARE | Facility: PHYSICIAN GROUP | Age: 85
End: 2020-08-20
Payer: MEDICARE

## 2020-08-20 ENCOUNTER — HOSPITAL ENCOUNTER (OUTPATIENT)
Dept: RADIOLOGY | Facility: MEDICAL CENTER | Age: 85
End: 2020-08-20
Attending: PHYSICIAN ASSISTANT
Payer: MEDICARE

## 2020-08-20 VITALS
HEIGHT: 61 IN | HEART RATE: 85 BPM | WEIGHT: 178 LBS | OXYGEN SATURATION: 95 % | TEMPERATURE: 97 F | DIASTOLIC BLOOD PRESSURE: 76 MMHG | SYSTOLIC BLOOD PRESSURE: 98 MMHG | BODY MASS INDEX: 33.61 KG/M2 | RESPIRATION RATE: 16 BRPM

## 2020-08-20 DIAGNOSIS — S30.0XXA TRAUMATIC HEMATOMA OF BUTTOCK, INITIAL ENCOUNTER: ICD-10-CM

## 2020-08-20 DIAGNOSIS — Z98.890 HISTORY OF KYPHOPLASTY: ICD-10-CM

## 2020-08-20 DIAGNOSIS — S30.0XXA CONTUSION OF SACRUM, INITIAL ENCOUNTER: ICD-10-CM

## 2020-08-20 DIAGNOSIS — I48.20 CHRONIC ATRIAL FIBRILLATION (HCC): ICD-10-CM
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
## 2020-08-20 DIAGNOSIS — M25.551 ACUTE HIP PAIN, RIGHT: ICD-10-CM

## 2020-08-20 DIAGNOSIS — M54.41 ACUTE MIDLINE LOW BACK PAIN WITH RIGHT-SIDED SCIATICA: ICD-10-CM

## 2020-08-20 DIAGNOSIS — M81.0 OSTEOPOROSIS, UNSPECIFIED OSTEOPOROSIS TYPE, UNSPECIFIED PATHOLOGICAL FRACTURE PRESENCE: ICD-10-CM

## 2020-08-20 DIAGNOSIS — M79.604 ACUTE LEG PAIN, RIGHT: ICD-10-CM

## 2020-08-20 DIAGNOSIS — Z79.01 CHRONIC ANTICOAGULATION: ICD-10-CM

## 2020-08-20 PROCEDURE — 72100 X-RAY EXAM L-S SPINE 2/3 VWS: CPT

## 2020-08-20 PROCEDURE — 73501 X-RAY EXAM HIP UNI 1 VIEW: CPT | Mod: RT

## 2020-08-20 PROCEDURE — 99214 OFFICE O/P EST MOD 30 MIN: CPT | Performed by: PHYSICIAN ASSISTANT

## 2020-08-20 ASSESSMENT — ENCOUNTER SYMPTOMS
FALLS: 1
FOCAL WEAKNESS: 0
FEVER: 0
TINGLING: 0
BACK PAIN: 1
SENSORY CHANGE: 0

## 2020-08-20 ASSESSMENT — FIBROSIS 4 INDEX: FIB4 SCORE: 2.12

## 2020-08-20 NOTE — PROGRESS NOTES
"Subjective:   Saumya Vann  is a 89 y.o. female who presents for Fall (Fell, Has bruising on tailbone area, Pain radiating down R. Leg x 2 wks ago)    Ms. Vann presents to urgent care with pain in the right buttock, low back , right hip and leg following a fall 2 weeks ago. She reports that she was coming down on a step when she lost her balance and fell. Her son was with her and was able to break some of her fall but she did land fairly hard on the buttock. She had immediate pain in the right buttock, low back, right hip and right leg which has persisted. She also has a large bruise on the right buttock and the family has noted that this is becoming hard. They are concerned about a possible \"blood clot\".     Patient has a rather complex medical history of fall in October 2018 in which she sustained a proximal humerus fracture.2 weeks later while bending over to  a piece of paper, she developed sudden and severe low back pain. Imaging revealed a blow out fracture of L-4 for which she has undergone a kyphoplasty.  She is currently under the care of spine specialist and is actually scheduled for surgery related to spinal stenosis tomorrow morning.    Patient did reach out to the spinal specialist and they encouraged her to present to the emergency department for immediate evaluation today prior to her surgical intervention tomorrow.    Patient is on chronic anticoagulation with Xarelto due to chronic atrial fibrillation.  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  She has recently resumed alendronate.      Fall  Pertinent negatives include no fever or tingling.     Review of Systems   Constitutional: Negative for fever.   Musculoskeletal: Positive for back pain, falls and joint pain.   Neurological: Negative for tingling, sensory change and focal weakness.   All other systems reviewed and are negative.    Allergies   Allergen Reactions   • Pcn [Penicillins] Rash     Rash       " "    Reviewed past medical, surgical , social and family history.  Reviewed prescription and over-the-counter medications with patient and electronic health record today.     Objective:   BP (!) 98/76 (BP Location: Left arm, Patient Position: Sitting, BP Cuff Size: Adult)   Pulse 85   Temp 36.1 °C (97 °F) (Temporal)   Resp 16   Ht 1.549 m (5' 1\")   Wt 80.7 kg (178 lb)   SpO2 95%   BMI 33.63 kg/m²   Physical Exam  Vitals signs reviewed.   Constitutional:       General: She is not in acute distress.     Appearance: She is well-developed. She is not ill-appearing or toxic-appearing.   HENT:      Head: Normocephalic and atraumatic.      Right Ear: External ear normal.      Left Ear: External ear normal.      Nose: Nose normal.      Mouth/Throat:      Lips: Pink. No lesions.      Mouth: Mucous membranes are moist.      Pharynx: Oropharynx is clear. Uvula midline. No oropharyngeal exudate.   Eyes:      General: Lids are normal.      Extraocular Movements: Extraocular movements intact.      Conjunctiva/sclera: Conjunctivae normal.      Pupils: Pupils are equal, round, and reactive to light.   Neck:      Musculoskeletal: Normal range of motion and neck supple.   Cardiovascular:      Rate and Rhythm: Normal rate. Rhythm irregularly irregular.      Heart sounds: Normal heart sounds. No murmur. No friction rub. No gallop.    Pulmonary:      Effort: Pulmonary effort is normal. No respiratory distress.      Breath sounds: Normal breath sounds.   Abdominal:      General: Bowel sounds are normal. There is no distension.      Palpations: Abdomen is soft. There is no mass.      Tenderness: There is no abdominal tenderness. There is no guarding or rebound.   Musculoskeletal:         General: No deformity.      Lumbar back: She exhibits decreased range of motion, tenderness, bony tenderness, edema and pain.        Back:       Comments: Lumbar spine tender to palpation along the midline, approximate 10 cm firm tender hematoma " just superior and to the right of the gluteal cleft.    Left hip with tenderness to palpation.    Lymphadenopathy:      Head:      Right side of head: No submental, submandibular or tonsillar adenopathy.      Left side of head: No submental, submandibular or tonsillar adenopathy.      Cervical: No cervical adenopathy.      Upper Body:      Right upper body: No supraclavicular adenopathy.      Left upper body: No supraclavicular adenopathy.   Skin:     General: Skin is warm and dry.      Findings: No rash.   Neurological:      Mental Status: She is alert and oriented to person, place, and time.      Cranial Nerves: Cranial nerves are intact. No cranial nerve deficit.      Sensory: Sensation is intact. No sensory deficit.      Motor: Motor function is intact.      Coordination: Coordination is intact. Coordination normal.      Gait: Gait is intact.   Psychiatric:         Attention and Perception: Attention normal.         Mood and Affect: Mood and affect normal.         Speech: Speech normal.         Behavior: Behavior normal. Behavior is cooperative.         Thought Content: Thought content normal.         Judgment: Judgment normal.           Assessment/Plan:   1. Fall (on) (from) other stairs and steps, initial encounter  - DX-LUMBAR SPINE-2 OR 3 VIEWS; Future  - DX-HIP-UNILATERAL-WITH PELVIS-1 VIEW RIGHT; Future    2. Contusion of sacrum, initial encounter  - DX-LUMBAR SPINE-2 OR 3 VIEWS; Future  - DX-HIP-UNILATERAL-WITH PELVIS-1 VIEW RIGHT; Future    3. Traumatic hematoma of buttock, initial encounter  - DX-LUMBAR SPINE-2 OR 3 VIEWS; Future  - DX-HIP-UNILATERAL-WITH PELVIS-1 VIEW RIGHT; Future    4. Acute midline low back pain with right-sided sciatica  - DX-LUMBAR SPINE-2 OR 3 VIEWS; Future    5. Acute hip pain, right  - DX-HIP-UNILATERAL-WITH PELVIS-1 VIEW RIGHT; Future    6. Acute leg pain, right  - DX-HIP-UNILATERAL-WITH PELVIS-1 VIEW RIGHT; Future    7. History of kyphoplasty  - DX-LUMBAR SPINE-2 OR 3 VIEWS;  Future    8. Chronic atrial fibrillation (HCC)    9. Chronic anticoagulation    10. Osteoporosis, unspecified osteoporosis type, unspecified pathological fracture presence    Xray is obtained, read by radiologist and reviewed by myself with findings as follows:  I  Lumbar spine  MPRESSION:        1. Osteopenia. No acute fracture or traumatic listhesis.  2. Augmented L4 vertebral body.  3. Moderate to severe lower lumbar spondylosis, with grade 1 degenerative listhesis at L2-L5 levels.    Right hip and pelvis:     FINDINGS:     BONE MINERALIZATION: Decreased. L4 vertebral body segmentation.  JOINTS: No significant hip osteoarthrosis. Lumbosacral spondylosis. No erosions.  FRACTURE: None.  DISLOCATION: None.  SOFT TISSUES: No mass.     IMPRESSION:     No fracture or dislocation.    Recommend reaching out to surgeon to notify of hematoma presents prior to surgery tomorrow.  Reassurance provided.  Reviewed with family that hematoma is likely secondary to chronic anticoagulation with Xarelto.    Continue all chronic medications.        Upon entering exam room I ensured patient was wearing a mask.  This provider wore appropriate PPE throughout entire visit.  Patient wore mask entire visit except for a brief period while examining oropharynx.      Differential diagnosis, natural history, supportive care, and indications for immediate follow-up discussed.     Red flag warning symptoms and strict ER/follow-up precautions given.  The patient demonstrated a good understanding and agreed with the treatment plan.  Please note that this note was created using voice recognition speech to text software. Every effort has been made to correct obvious errors.  However, I expect there are errors of grammar and possibly context that were not discovered prior to finalizing the note  LA Salazar PA-C

## 2020-08-20 NOTE — PATIENT INSTRUCTIONS
Approximate 10 cm firm tender hematoma midline and to the right buttock just superior to gluteal cleft

## 2020-08-23 NOTE — PROGRESS NOTES
Chief Complaint   Patient presents with   • Follow-Up     Persistent Atrail Fibrillation       Subjective:   Saumya Vann is a 89 y.o. female patient of Dr. Grant Woodard  who presents today with her son Jian for annual follow up.     Patient was last seen by Dr. Woodard on 8/19/2019.  She was interested in changing to a direct oral anticoagulant and was felt to be an excellent candidate.  Xarelto or Eliquis was recommended.  She was advised to follow-up in 6 months.    Past medical history significant for PAF, chronic oral anticoagulation, hypertension, DM, CKD, LVH and grade 1 diastolic dysfunction.    Today patient states that she had a fall in October of last year that resulted in a fractured arm and fell again a couple weeks ago against her bed.  On 8/21/2020 patient had some sort of a minimally invasive spine procedure due to right lower leg pain and weakness.  The hope was that this procedure would help improve quality of life and ambulation safety.  Otherwise patient denies having significant concerns or complaints discussed today.    Past Medical History:   Diagnosis Date   • A-fib (HCC)    • Diabetes (HCC)    • Hypertension    • Hypothyroid    • Macular degeneration      Past Surgical History:   Procedure Laterality Date   • APPENDECTOMY     • CHOLECYSTECTOMY     • HYSTERECTOMY, TOTAL ABDOMINAL     • KNEE REPLACEMENT, TOTAL Bilateral    • OTHER ORTHOPEDIC SURGERY     • US-NEEDLE CORE BX-BREAST PANEL       Family History   Problem Relation Age of Onset   • Cancer Other      Social History     Socioeconomic History   • Marital status: Single     Spouse name: Not on file   • Number of children: Not on file   • Years of education: Not on file   • Highest education level: Not on file   Occupational History   • Not on file   Social Needs   • Financial resource strain: Not on file   • Food insecurity     Worry: Not on file     Inability: Not on file   • Transportation needs     Medical: Not on file      Non-medical: Not on file   Tobacco Use   • Smoking status: Never Smoker   • Smokeless tobacco: Never Used   Substance and Sexual Activity   • Alcohol use: No   • Drug use: No   • Sexual activity: Not on file   Lifestyle   • Physical activity     Days per week: Not on file     Minutes per session: Not on file   • Stress: Not on file   Relationships   • Social connections     Talks on phone: Not on file     Gets together: Not on file     Attends Spiritism service: Not on file     Active member of club or organization: Not on file     Attends meetings of clubs or organizations: Not on file     Relationship status: Not on file   • Intimate partner violence     Fear of current or ex partner: Not on file     Emotionally abused: Not on file     Physically abused: Not on file     Forced sexual activity: Not on file   Other Topics Concern   • Not on file   Social History Narrative   • Not on file     Allergies   Allergen Reactions   • Pcn [Penicillins] Rash     Rash       Outpatient Encounter Medications as of 8/24/2020   Medication Sig Dispense Refill   • diazePAM (VALIUM) 5 MG Tab      • Diclofenac Sodium 1 % Gel      • DILTIAZem CD (CARDIZEM CD) 120 MG CAPSULE SR 24 HR TAKE 1 CAPSULE BY MOUTH TWICE DAILY 180 Cap 3   • spironolactone (ALDACTONE) 25 MG Tab Take 1 Tab by mouth every day. 90 Tab 3   • rivaroxaban (XARELTO) 20 MG Tab tablet Take 1 Tab by mouth with dinner. 90 Tab 1   • polyvinyl alcohol-povidone (REFRESH) 1.4-0.6 % ophthalmic solution Place 1 Drop in both eyes as needed. Indications: Dry Eyes caused by Deficiency of Tears     • Neomycin-Polymyxin-Dexameth 0.1 % Ointment Place 1 Dose in both eyes 2 Times a Day.     • acetaminophen (TYLENOL) 325 MG Tab Take 2 Tabs by mouth every 6 hours as needed (Mild Pain; (Pain scale 1-3); Temp greater than 100.5 F). 30 Tab 0   • gabapentin (NEURONTIN) 100 MG Cap Take 1 Cap by mouth 2 Times a Day.     • vitamin D, Ergocalciferol, (DRISDOL) 14052 units Cap capsule Take 50,000  "Units by mouth Q30 DAYS.  11   • ondansetron (ZOFRAN ODT) 4 MG TABLET DISPERSIBLE Take 4 mg by mouth 2 times a day as needed. Indications: Nausea and Vomiting     • B Complex Vitamins (VITAMIN B COMPLEX PO) Take 1 Tab by mouth 2 Times a Day.     • Multiple Vitamins-Minerals (ICAPS AREDS 2) Cap Take 1 Cap by mouth 2 Times a Day.     • alendronate (FOSAMAX) 70 MG Tab Take 70 mg by mouth every 7 days.     • omeprazole (PRILOSEC) 20 MG delayed-release capsule Take 20 mg by mouth every day.     • latanoprost (XALATAN) 0.005 % Solution Place 1 Drop in left eye every bedtime.     • levothyroxine (SYNTHROID) 150 MCG TABS Take 150 mcg by mouth every day.     • [DISCONTINUED] DILTIAZem CD (CARDIZEM CD) 120 MG CAPSULE SR 24 HR TAKE 1 CAPSULE BY MOUTH TWICE DAILY 180 Cap 0   • [DISCONTINUED] cyclobenzaprine (FLEXERIL) 5 MG tablet Take 1-2 Tabs by mouth 3 times a day as needed. (Patient taking differently: Take 1-2 Tabs by mouth 3 times a day as needed for Muscle Spasms.) 30 Tab 0   • [DISCONTINUED] rivaroxaban (XARELTO) 20 MG Tab tablet Take 20 mg by mouth with dinner.     • [DISCONTINUED] spironolactone (ALDACTONE) 25 MG Tab Take 25 mg by mouth every day.       No facility-administered encounter medications on file as of 8/24/2020.      Review of Systems   Constitutional: Negative for malaise/fatigue and weight loss.   Respiratory: Negative for shortness of breath.    Cardiovascular: Negative for chest pain, palpitations, orthopnea, claudication, leg swelling and PND.   Neurological: Negative for dizziness and weakness.   All other systems reviewed and are negative.       Objective:   /70 (BP Location: Left arm, Patient Position: Sitting, BP Cuff Size: Adult)   Pulse 68   Ht 1.575 m (5' 2\")   Wt 85.6 kg (188 lb 12.8 oz)   SpO2 96%   BMI 34.53 kg/m²     Physical Exam   Constitutional: She is oriented to person, place, and time. She appears well-developed and well-nourished. No distress.   HENT:   Head: Normocephalic. "   Eyes: EOM are normal.   Neck: No JVD present.   Cardiovascular: Normal rate, regular rhythm and normal heart sounds.   Pulmonary/Chest: Breath sounds normal. No respiratory distress.   Abdominal: Soft. There is no abdominal tenderness.   Musculoskeletal:         General: No edema.   Neurological: She is alert and oriented to person, place, and time.   Skin: Skin is warm and dry.   Psychiatric: She has a normal mood and affect. Her behavior is normal.        Echocardiogram 4/27/2016:  CONCLUSIONS  Normal left ventricular size and systolic function.  Left ventricular ejection fraction is visually estimated to be 55%.  Moderate concentric left ventricular hypertrophy.  Normal regional wall motion.  Mild mitral regurgitation.  Mild tricuspid regurgitation.  Estimated right ventricular systolic pressure  is 25 mmHg.  Normal inferior vena cava size and inspiratory collapse.    NM Cardiac PET 6/19/17:  SCINTOGRAPHIC FINDINGS:  The QC data for the scan was reviewed and was within acceptable limits.     GATED WALL MOTION FINDINGS:       CONCLUSIONS AND IMPRESSIONS:  Overall, there is no evidence of myocardial   ischemia based on EKG tracing along with myocardial PET scan images.  The   rhythm is atrial fibrillation throughout the stress test.  No malignant arrhythmias noted.       Assessment:     1. Essential hypertension  DILTIAZem CD (CARDIZEM CD) 120 MG CAPSULE SR 24 HR    Basic Metabolic Panel   2. Paroxysmal atrial fibrillation (HCC)     3. Chronic anticoagulation     4. Renal insufficiency         Medical Decision Making:  Today's Assessment / Status / Plan:     Persistent A-Fib:  -Remains asymptomatic, rate controlled.  -Continue diltiazem 120 mg daily.  -OAC with Xarelto 20 mg daily, my concern regarding patient's increased risk for fall was discussed with both patient and patient's son.  They both feel that these falls were due to leg weakness and that this recent procedure with physical therapy will help improve  her strength and balance.  Neither are interested in interrupting chronic oral anticoagulation at this time. Son tells me that she lives with family and has lots of support. Both feel that a stroke is worse than possible life threatening bleeding from a fall or in general.   -Drop in CrCl in May, will repeat to ensure that Xarelto dosing does not need to be reduced to 15 mg daily.      HTN:  -Remains stable.  -Continue diltiazem as above and spironolactone 25 mg daily.    Patient states an EKG was just completed at [facility], will records from her stay there.     Patient will follow-up with Dr. Grant Woodard as previously scheduled below or earlier if needed.  Encouraged patient to contact our office should any questions or concerns arise in the meantime.  Patient understands and agrees the plan of care.    Future Appointments   Date Time Provider Department Center   12/14/2020 10:15 AM Grant Woodard M.D. [facility] None     Collaborating Provider: Dr. David Tapia

## 2020-08-24 ENCOUNTER — OFFICE VISIT (OUTPATIENT)
Dept: CARDIOLOGY | Facility: MEDICAL CENTER | Age: 85
End: 2020-08-24
Payer: MEDICARE

## 2020-08-24 VITALS
HEART RATE: 68 BPM | OXYGEN SATURATION: 96 % | HEIGHT: 62 IN | DIASTOLIC BLOOD PRESSURE: 70 MMHG | BODY MASS INDEX: 34.74 KG/M2 | SYSTOLIC BLOOD PRESSURE: 132 MMHG | WEIGHT: 188.8 LBS

## 2020-08-24 DIAGNOSIS — N28.9 RENAL INSUFFICIENCY: ICD-10-CM

## 2020-08-24 DIAGNOSIS — I48.0 PAROXYSMAL ATRIAL FIBRILLATION (HCC): ICD-10-CM

## 2020-08-24 DIAGNOSIS — I10 ESSENTIAL HYPERTENSION: ICD-10-CM

## 2020-08-24 DIAGNOSIS — Z79.01 CHRONIC ANTICOAGULATION: ICD-10-CM

## 2020-08-24 PROCEDURE — 99214 OFFICE O/P EST MOD 30 MIN: CPT | Performed by: NURSE PRACTITIONER

## 2020-08-24 RX ORDER — DIAZEPAM 5 MG/1
2-120 TABLET ORAL PRN
COMMUNITY
Start: 2020-08-11 | End: 2022-01-28

## 2020-08-24 RX ORDER — SPIRONOLACTONE 25 MG/1
25 TABLET ORAL DAILY
Qty: 90 TAB | Refills: 3 | Status: SHIPPED | OUTPATIENT
Start: 2020-08-24 | End: 2021-03-18

## 2020-08-24 RX ORDER — DILTIAZEM HYDROCHLORIDE 120 MG/1
CAPSULE, COATED, EXTENDED RELEASE ORAL
Qty: 180 CAP | Refills: 3 | Status: SHIPPED | OUTPATIENT
Start: 2020-08-24 | End: 2021-08-18

## 2020-08-24 ASSESSMENT — ENCOUNTER SYMPTOMS
DIZZINESS: 0
PND: 0
ORTHOPNEA: 0
SHORTNESS OF BREATH: 0
WEIGHT LOSS: 0
CLAUDICATION: 0
WEAKNESS: 0
PALPITATIONS: 0

## 2020-08-24 ASSESSMENT — FIBROSIS 4 INDEX: FIB4 SCORE: 2.12

## 2020-10-01 ENCOUNTER — APPOINTMENT (OUTPATIENT)
Dept: URGENT CARE | Facility: PHYSICIAN GROUP | Age: 85
End: 2020-10-01
Payer: MEDICARE

## 2020-10-03 ENCOUNTER — OFFICE VISIT (OUTPATIENT)
Dept: URGENT CARE | Facility: PHYSICIAN GROUP | Age: 85
End: 2020-10-03
Payer: MEDICARE

## 2020-10-03 ENCOUNTER — TELEPHONE (OUTPATIENT)
Dept: URGENT CARE | Facility: PHYSICIAN GROUP | Age: 85
End: 2020-10-03

## 2020-10-03 ENCOUNTER — HOSPITAL ENCOUNTER (OUTPATIENT)
Dept: RADIOLOGY | Facility: MEDICAL CENTER | Age: 85
End: 2020-10-03
Attending: PHYSICIAN ASSISTANT
Payer: MEDICARE

## 2020-10-03 VITALS
TEMPERATURE: 97.2 F | RESPIRATION RATE: 20 BRPM | BODY MASS INDEX: 30.91 KG/M2 | DIASTOLIC BLOOD PRESSURE: 70 MMHG | HEIGHT: 62 IN | OXYGEN SATURATION: 100 % | SYSTOLIC BLOOD PRESSURE: 130 MMHG | HEART RATE: 102 BPM | WEIGHT: 168 LBS

## 2020-10-03 DIAGNOSIS — M79.604 PAIN OF RIGHT LOWER EXTREMITY: ICD-10-CM

## 2020-10-03 PROCEDURE — 93971 EXTREMITY STUDY: CPT | Mod: RT

## 2020-10-03 PROCEDURE — 99214 OFFICE O/P EST MOD 30 MIN: CPT | Performed by: PHYSICIAN ASSISTANT

## 2020-10-03 ASSESSMENT — ENCOUNTER SYMPTOMS
ABDOMINAL PAIN: 0
COUGH: 0
CHILLS: 0
SHORTNESS OF BREATH: 0
SORE THROAT: 0
HEADACHES: 0
MYALGIAS: 0
VOMITING: 0
DIARRHEA: 0
NAUSEA: 0
FEVER: 0
CONSTIPATION: 0
EYE PAIN: 0

## 2020-10-03 ASSESSMENT — FIBROSIS 4 INDEX: FIB4 SCORE: 2.12

## 2020-10-03 ASSESSMENT — PAIN SCALES - GENERAL: PAINLEVEL: 9=SEVERE PAIN

## 2020-10-03 NOTE — PROGRESS NOTES
Subjective:   Saumya Vann is a 89 y.o. female who presents for Leg Pain (R leg pain, x6 weeks,  pt wans to make sure it not a poss blood clot)      Is an 89-year-old on Xarelto who presents complaining of progressive right leg pain from groin to knee that is been ongoing for last 6 weeks.  She presents with a family member to be evaluated for a blood clot as she is very concerned about this.  She is not noticed any shortness of breath or chest pain.  She describes the pain as being a dull pain along the anterior surface of the right thigh which is worse with activity and abates with sitting still.  She is currently seeing a pain specialist and being treated for back pain with some nerve pain (sciatica?)  That results and tingling along the same leg      Review of Systems   Constitutional: Negative for chills, fever and malaise/fatigue.   HENT: Negative for congestion, ear pain and sore throat.    Eyes: Negative for pain.   Respiratory: Negative for cough and shortness of breath.    Cardiovascular: Negative for chest pain.   Gastrointestinal: Negative for abdominal pain, constipation, diarrhea, nausea and vomiting.   Genitourinary: Negative for dysuria.   Musculoskeletal: Negative for myalgias.   Skin: Negative for rash.   Neurological: Negative for headaches.       Medications:    • acetaminophen Tabs  • alendronate Tabs  • diazePAM Tabs  • Diclofenac Sodium Gel  • DILTIAZem CD Cp24  • gabapentin Caps  • ICaps Areds 2 Caps  • latanoprost Soln  • levothyroxine Tabs  • Neomycin-Polymyxin-Dexameth Oint  • omeprazole  • ondansetron Tbdp  • polyvinyl alcohol-povidone  • rivaroxaban Tabs  • spironolactone Tabs  • VITAMIN B COMPLEX PO  • vitamin D (Ergocalciferol) Caps    Allergies: Pcn [penicillins]    Problem List: Saumya Vann has DM2 (diabetes mellitus, type 2) ([facility]); SOB (shortness of breath); AF (atrial fibrillation) (HCC); HTN (hypertension); Dyspnea on exertion; Chronic anticoagulation; Hypothyroidism;  "GERD (gastroesophageal reflux disease); Compression fracture of lumbar spine, non-traumatic (HCC); Vitamin D deficiency; and Renal insufficiency on their problem list.    Surgical History:  Past Surgical History:   Procedure Laterality Date   • APPENDECTOMY     • CHOLECYSTECTOMY     • HYSTERECTOMY, TOTAL ABDOMINAL     • KNEE REPLACEMENT, TOTAL Bilateral    • OTHER ORTHOPEDIC SURGERY     • US-NEEDLE CORE BX-BREAST PANEL         Past Social Hx: Saumya Vann  reports that she has never smoked. She has never used smokeless tobacco. She reports that she does not drink alcohol or use drugs.     Past Family Hx:  Saumya Vann family history includes Cancer in an other family member.     Problem list, medications, and allergies reviewed by myself today in Epic.     Objective:     /70   Pulse (!) 102   Temp 36.2 °C (97.2 °F) (Temporal)   Resp 20   Ht 1.562 m (5' 1.5\")   Wt 76.2 kg (168 lb)   SpO2 100%   BMI 31.23 kg/m²     Physical Exam  Vitals signs reviewed.   Constitutional:       Appearance: Normal appearance.   HENT:      Head: Normocephalic and atraumatic.      Right Ear: External ear normal.      Left Ear: External ear normal.      Nose: Nose normal.      Mouth/Throat:      Mouth: Mucous membranes are moist.   Eyes:      Conjunctiva/sclera: Conjunctivae normal.   Cardiovascular:      Rate and Rhythm: Normal rate.   Pulmonary:      Effort: Pulmonary effort is normal.   Musculoskeletal:         General: Tenderness (right thigh) present. No swelling or deformity.      Right lower leg: No edema.      Left lower leg: No edema.   Skin:     General: Skin is warm and dry.      Capillary Refill: Capillary refill takes less than 2 seconds.   Neurological:      Mental Status: She is alert and oriented to person, place, and time.         Assessment/Plan:     Diagnosis and associated orders:     1. Pain of right lower extremity  US-EXTREMITY VENOUS LOWER UNILAT RIGHT      Comments/MDM:     • Will call with " results           Differential diagnosis, natural history, supportive care, and indications for immediate follow-up discussed.    Advised the patient to follow-up with the primary care physician for recheck, reevaluation, and consideration of further management.    Please note that this dictation was created using voice recognition software. I have made a reasonable attempt to correct obvious errors, but I expect that there are errors of grammar and possibly content that I did not discover before finalizing the note.    This note was electronically signed by Kelechi Welsh PA-C

## 2020-10-03 NOTE — TELEPHONE ENCOUNTER
Patient informed of negative ultrasound.  Suggested speaking with her pain specialist on Tuesday during her upcoming appointment.  Talked about ER precautions including continuously escalating pain or unable to tolerate pain at home and patient demonstrated verbal instructions.  She was very appreciative of the call.    Kelechi Welsh P.A.-C.

## 2020-10-15 ENCOUNTER — APPOINTMENT (OUTPATIENT)
Dept: RADIOLOGY | Facility: MEDICAL CENTER | Age: 85
End: 2020-10-15
Attending: PHYSICIAN ASSISTANT
Payer: MEDICARE

## 2020-10-21 ENCOUNTER — HOSPITAL ENCOUNTER (OUTPATIENT)
Dept: RADIOLOGY | Facility: MEDICAL CENTER | Age: 85
End: 2020-10-21
Attending: PHYSICIAN ASSISTANT
Payer: MEDICARE

## 2020-10-21 DIAGNOSIS — M43.04 SPONDYLOLYSIS OF THORACIC REGION: ICD-10-CM

## 2020-10-21 PROCEDURE — 72070 X-RAY EXAM THORAC SPINE 2VWS: CPT

## 2020-10-21 PROCEDURE — 72146 MRI CHEST SPINE W/O DYE: CPT

## 2020-11-05 ENCOUNTER — HOSPITAL ENCOUNTER (OUTPATIENT)
Dept: LAB | Facility: MEDICAL CENTER | Age: 85
End: 2020-11-05
Attending: NURSE PRACTITIONER
Payer: MEDICARE

## 2020-11-05 ENCOUNTER — HOSPITAL ENCOUNTER (OUTPATIENT)
Dept: LAB | Facility: MEDICAL CENTER | Age: 85
End: 2020-11-05
Attending: FAMILY MEDICINE
Payer: MEDICARE

## 2020-11-05 DIAGNOSIS — I10 ESSENTIAL HYPERTENSION: ICD-10-CM

## 2020-11-05 LAB
ANION GAP SERPL CALC-SCNC: 14 MMOL/L (ref 7–16)
BUN SERPL-MCNC: 13 MG/DL (ref 8–22)
CALCIUM SERPL-MCNC: 9 MG/DL (ref 8.5–10.5)
CHLORIDE SERPL-SCNC: 98 MMOL/L (ref 96–112)
CO2 SERPL-SCNC: 27 MMOL/L (ref 20–33)
CREAT SERPL-MCNC: 0.8 MG/DL (ref 0.5–1.4)
GLUCOSE SERPL-MCNC: 111 MG/DL (ref 65–99)
POTASSIUM SERPL-SCNC: 3.7 MMOL/L (ref 3.6–5.5)
SODIUM SERPL-SCNC: 139 MMOL/L (ref 135–145)

## 2020-11-05 PROCEDURE — 80048 BASIC METABOLIC PNL TOTAL CA: CPT

## 2020-11-05 PROCEDURE — 84443 ASSAY THYROID STIM HORMONE: CPT

## 2020-11-05 PROCEDURE — 36415 COLL VENOUS BLD VENIPUNCTURE: CPT

## 2020-11-06 LAB — TSH SERPL DL<=0.005 MIU/L-ACNC: 4.35 UIU/ML (ref 0.38–5.33)

## 2020-11-12 ENCOUNTER — APPOINTMENT (OUTPATIENT)
Dept: RADIOLOGY | Facility: MEDICAL CENTER | Age: 85
End: 2020-11-12
Attending: PHYSICIAN ASSISTANT
Payer: MEDICARE

## 2020-11-12 DIAGNOSIS — G95.11 ACUTE INFARCTION OF SPINAL CORD (HCC): ICD-10-CM

## 2020-11-12 PROCEDURE — 72158 MRI LUMBAR SPINE W/O & W/DYE: CPT

## 2020-11-12 PROCEDURE — 72157 MRI CHEST SPINE W/O & W/DYE: CPT

## 2020-11-12 PROCEDURE — A9576 INJ PROHANCE MULTIPACK: HCPCS | Performed by: PHYSICIAN ASSISTANT

## 2020-11-12 PROCEDURE — 700117 HCHG RX CONTRAST REV CODE 255: Performed by: PHYSICIAN ASSISTANT

## 2020-11-12 RX ADMIN — GADOTERIDOL 17 ML: 279.3 INJECTION, SOLUTION INTRAVENOUS at 14:21

## 2020-12-14 ENCOUNTER — OFFICE VISIT (OUTPATIENT)
Dept: CARDIOLOGY | Facility: MEDICAL CENTER | Age: 85
End: 2020-12-14
Payer: MEDICARE

## 2020-12-14 VITALS
BODY MASS INDEX: 33.16 KG/M2 | DIASTOLIC BLOOD PRESSURE: 78 MMHG | SYSTOLIC BLOOD PRESSURE: 116 MMHG | WEIGHT: 180.2 LBS | RESPIRATION RATE: 16 BRPM | HEIGHT: 62 IN | OXYGEN SATURATION: 96 % | HEART RATE: 95 BPM

## 2020-12-14 DIAGNOSIS — I10 ESSENTIAL HYPERTENSION: ICD-10-CM

## 2020-12-14 DIAGNOSIS — I48.0 PAROXYSMAL ATRIAL FIBRILLATION (HCC): ICD-10-CM

## 2020-12-14 DIAGNOSIS — R29.6 FREQUENT FALLS: ICD-10-CM

## 2020-12-14 DIAGNOSIS — Z79.01 ON CONTINUOUS ORAL ANTICOAGULATION: ICD-10-CM

## 2020-12-14 DIAGNOSIS — N28.9 RENAL INSUFFICIENCY: ICD-10-CM

## 2020-12-14 DIAGNOSIS — E11.9 TYPE 2 DIABETES MELLITUS WITHOUT COMPLICATION, UNSPECIFIED WHETHER LONG TERM INSULIN USE (HCC): ICD-10-CM

## 2020-12-14 PROCEDURE — 99215 OFFICE O/P EST HI 40 MIN: CPT | Performed by: INTERNAL MEDICINE

## 2020-12-14 RX ORDER — HYDROCODONE BITARTRATE AND ACETAMINOPHEN 5; 325 MG/1; MG/1
1 TABLET ORAL EVERY 4 HOURS PRN
COMMUNITY
Start: 2020-09-17 | End: 2023-09-09

## 2020-12-14 ASSESSMENT — FIBROSIS 4 INDEX: FIB4 SCORE: 2.12

## 2020-12-14 NOTE — PROGRESS NOTES
Subjective:   Saumya Vann is a 89 y.o. female who presents today for followup of PAF chronic anticoagulation. She has had exertional dyspnea over the past 3 years associated with fatigue. She has a history of HTN, diabetes mellitus, CKD . Her echo is relatively unremarkable aside from LVH and grade 1 diastolic function. PFTs per her primary doctor are unremarkable. She denies any exertional chest pain, PND or orthopnea.     Since last visit she has switched to a direct oral anticoagulant, and has unfortunately sustained several falls resulting in mechanical trauma with fractures.  She walks with a walker now but has fallen just within the past week or 2 despite the consistent use of her walker.  She has had no major bleeding events at this time.  She has significant hip and leg pain that only ibuprofen can improve and she has been taking this occasionally and wishes she could take it more often.    Family History   Problem Relation Age of Onset   • Cancer Other      Social History     Tobacco Use   Smoking Status Never Smoker   Smokeless Tobacco Never Used     Allergies   Allergen Reactions   • Pcn [Penicillins] Rash     Rash       Outpatient Encounter Medications as of 12/14/2020   Medication Sig Dispense Refill   • HYDROcodone-acetaminophen (NORCO) 5-325 MG Tab per tablet      • Cyclobenzaprine HCl (FLEXERIL PO) Take  by mouth.     • rivaroxaban (XARELTO) 20 MG Tab tablet Take 1 Tab by mouth with dinner. 90 Tab 1   • vitamin D, Ergocalciferol, (DRISDOL) 25132 units Cap capsule Take 50,000 Units by mouth Q30 DAYS.  11   • B Complex Vitamins (VITAMIN B COMPLEX PO) Take 1 Tab by mouth 2 Times a Day.     • Multiple Vitamins-Minerals (ICAPS AREDS 2) Cap Take 1 Cap by mouth 2 Times a Day.     • alendronate (FOSAMAX) 70 MG Tab Take 70 mg by mouth every 7 days.     • omeprazole (PRILOSEC) 20 MG delayed-release capsule Take 20 mg by mouth every day.     • latanoprost (XALATAN) 0.005 % Solution Place 1 Drop in left  "eye every bedtime.     • levothyroxine (SYNTHROID) 150 MCG TABS Take 150 mcg by mouth every day.     • Rivaroxaban (XARELTO PO) NO DISPENSE     • diazePAM (VALIUM) 5 MG Tab      • Diclofenac Sodium 1 % Gel      • DILTIAZem CD (CARDIZEM CD) 120 MG CAPSULE SR 24 HR TAKE 1 CAPSULE BY MOUTH TWICE DAILY (Patient not taking: Reported on 12/14/2020) 180 Cap 3   • spironolactone (ALDACTONE) 25 MG Tab Take 1 Tab by mouth every day. (Patient not taking: Reported on 12/14/2020) 90 Tab 3   • polyvinyl alcohol-povidone (REFRESH) 1.4-0.6 % ophthalmic solution Place 1 Drop in both eyes as needed. Indications: Dry Eyes caused by Deficiency of Tears     • Neomycin-Polymyxin-Dexameth 0.1 % Ointment Place 1 Dose in both eyes 2 Times a Day.     • acetaminophen (TYLENOL) 325 MG Tab Take 2 Tabs by mouth every 6 hours as needed (Mild Pain; (Pain scale 1-3); Temp greater than 100.5 F). (Patient not taking: Reported on 12/14/2020) 30 Tab 0   • gabapentin (NEURONTIN) 100 MG Cap Take 1 Cap by mouth 2 Times a Day. (Patient not taking: Reported on 12/14/2020)     • ondansetron (ZOFRAN ODT) 4 MG TABLET DISPERSIBLE Take 4 mg by mouth 2 times a day as needed. Indications: Nausea and Vomiting       No facility-administered encounter medications on file as of 12/14/2020.      Review of Systems   All other systems reviewed and are negative.       Objective:   /78 (BP Location: Left arm, Patient Position: Sitting, BP Cuff Size: Adult)   Pulse 95   Resp 16   Ht 1.575 m (5' 2\")   Wt 81.7 kg (180 lb 3.2 oz)   SpO2 96%   BMI 32.96 kg/m²     Physical Exam   Constitutional: She is oriented to person, place, and time. She appears well-developed and well-nourished. No distress.   Frail  Walker unsteady gait       HENT:   Head: Normocephalic and atraumatic.   Mouth/Throat: Oropharynx is clear and moist. No oropharyngeal exudate.   Eyes: Pupils are equal, round, and reactive to light. Conjunctivae are normal. No scleral icterus.   Neck: Normal range " of motion. Neck supple. No JVD present. No thyromegaly present.   Cardiovascular: Normal rate, normal heart sounds and intact distal pulses. An irregularly irregular rhythm present. Frequent extrasystoles are present. PMI is not displaced. Exam reveals no gallop and no friction rub.   No murmur heard.  Pulses:       Carotid pulses are 2+ on the right side and 2+ on the left side.       Femoral pulses are 2+ on the right side and 2+ on the left side.       Popliteal pulses are 1+ on the right side and 1+ on the left side.        Dorsalis pedis pulses are 2+ on the right side and 2+ on the left side.        Posterior tibial pulses are 2+ on the right side and 2+ on the left side.   Pulmonary/Chest: Effort normal and breath sounds normal. She has no wheezes. She has no rales.   Abdominal: Soft. Bowel sounds are normal. She exhibits no distension. There is no abdominal tenderness.   Musculoskeletal:         General: No tenderness or edema.   Neurological: She is alert and oriented to person, place, and time. No cranial nerve deficit.   Skin: Skin is warm and dry. No rash noted. She is not diaphoretic. No erythema.   Psychiatric: She has a normal mood and affect. Her behavior is normal.   Appears more frail and unsteady than prior visits.    LABS:  Lab Results   Component Value Date/Time    CHOLSTRLTOT 166 05/16/2020 09:58 AM    LDL 81 05/16/2020 09:58 AM    HDL 69 05/16/2020 09:58 AM    TRIGLYCERIDE 81 05/16/2020 09:58 AM       Lab Results   Component Value Date/Time    WBC 10.0 05/16/2020 09:58 AM    RBC 4.76 05/16/2020 09:58 AM    HEMOGLOBIN 14.1 05/16/2020 09:58 AM    HEMATOCRIT 43.5 05/16/2020 09:58 AM    MCV 91.4 05/16/2020 09:58 AM    NEUTSPOLYS 84.20 (H) 05/16/2020 09:58 AM    LYMPHOCYTES 7.80 (L) 05/16/2020 09:58 AM    MONOCYTES 6.10 05/16/2020 09:58 AM    EOSINOPHILS 0.70 05/16/2020 09:58 AM    BASOPHILS 0.60 05/16/2020 09:58 AM    HYPOCHROMIA 1+ 04/10/2014 03:03 PM     Lab Results   Component Value Date/Time     SODIUM 139 11/05/2020 01:36 PM    POTASSIUM 3.7 11/05/2020 01:36 PM    CHLORIDE 98 11/05/2020 01:36 PM    CO2 27 11/05/2020 01:36 PM    GLUCOSE 111 (H) 11/05/2020 01:36 PM    BUN 13 11/05/2020 01:36 PM    CREATININE 0.80 11/05/2020 01:36 PM     Lab Results   Component Value Date    HBA1C 6.0 (H) 05/16/2020      Lab Results   Component Value Date/Time    ALKPHOSPHAT 145 (H) 05/16/2020 09:58 AM    ASTSGOT 45 05/16/2020 09:58 AM    ALTSGPT 29 05/16/2020 09:58 AM    TBILIRUBIN 0.8 05/16/2020 09:58 AM      No results found for: BNPBTYPENAT   No results found for: TSH  Lab Results   Component Value Date/Time    PROTHROMBTM 24.8 (H) 11/15/2019 04:05 PM    INR 2.16 (H) 11/15/2019 04:05 PM      STRESS (6/2017):  CONCLUSIONS AND IMPRESSIONS:  Overall, there is no evidence of myocardial    ischemia based on EKG tracing along with myocardial PET scan images.  The    rhythm is atrial fibrillation throughout the stress test.  No malignant    arrhythmias noted.    ECHO CONCLUSIONS (4/27/2016):  Normal left ventricular size and systolic function.  Left ventricular ejection fraction is visually estimated to be 55%.  Moderate concentric left ventricular hypertrophy.  Normal regional wall motion.  Mild mitral regurgitation.  Mild tricuspid regurgitation.  Estimated right ventricular systolic pressure is 25 mmHg.  Normal inferior vena cava size and inspiratory collapse.    ECHO (11/10/2014):  I have personally reviewed the echocardiogram this visit and reviewed the findings with the patient. It shows:   Grade 1 diastolic dysfunction, EF 60-65%, moderate LVH, left atrial enlargement    PFTs as above      Assessment:     1. Essential hypertension  REFERRAL TO CARDIOLOGY    EC-ECHOCARDIOGRAM COMPLETE W/O CONT   2. Paroxysmal atrial fibrillation (HCC)  REFERRAL TO CARDIOLOGY    EC-ECHOCARDIOGRAM COMPLETE W/O CONT   3. Type 2 diabetes mellitus without complication, unspecified whether long term insulin use (HCC)     4. Renal  insufficiency     5. Frequent falls  REFERRAL TO CARDIOLOGY    EC-ECHOCARDIOGRAM COMPLETE W/O CONT   6. On continuous oral anticoagulation  REFERRAL TO CARDIOLOGY         Medical Decision Making:  Today's Assessment / Status / Plan:     She has excellent blood pressure and heart rate control.  She has minimal symptoms from her atrial fibrillation.  She is having difficulty tolerating her oral anticoagulation due to limitation of the only effective pain medicine for her in the form of nonsteroidal anti-inflammatories as well as her increasingly frequent falls resulting in mechanical trauma.  Therefore I do not think she is a good long-term candidate for oral anticoagulation at this time and recommend consideration for a watchman left atrial occluder device.  Recommend echocardiogram to ensure there have been no structural changes prior.    1.  Referral to Dr. Wolfe for consideration of watchman left atrial occluder  2.  Continue use of a gait appliance and continue oral anticoagulation for the time being  3.  Echocardiogram    Follow-up after she meets with Dr. Wolfe and has an echocardiogram.

## 2021-01-05 ENCOUNTER — TELEPHONE (OUTPATIENT)
Dept: CARDIOLOGY | Facility: MEDICAL CENTER | Age: 86
End: 2021-01-05

## 2021-01-05 NOTE — TELEPHONE ENCOUNTER
Received cardiac clearance request from [facility]. Pt scheduled for L L5-S3 Facet Joint Ablation and R L3-S1 Facet Joint Ablation on 01/11/21 and 01/25/21. Cardiac clearance and instructions on holding Xarelto requested.    Clearance form reviewed and completed by TW. Faxed to [facility] at [phone], attn: Denia/Astrid, received receipt for confirmation.    [facility]  [phone] P  [phone] F

## 2021-01-11 ENCOUNTER — HOSPITAL ENCOUNTER (OUTPATIENT)
Dept: LAB | Facility: MEDICAL CENTER | Age: 86
End: 2021-01-11
Attending: FAMILY MEDICINE
Payer: MEDICARE

## 2021-01-11 DIAGNOSIS — Z23 NEED FOR VACCINATION: ICD-10-CM

## 2021-01-11 LAB — TSH SERPL DL<=0.005 MIU/L-ACNC: 1.27 UIU/ML (ref 0.38–5.33)

## 2021-01-11 PROCEDURE — 36415 COLL VENOUS BLD VENIPUNCTURE: CPT

## 2021-01-11 PROCEDURE — 84443 ASSAY THYROID STIM HORMONE: CPT

## 2021-01-19 ENCOUNTER — OFFICE VISIT (OUTPATIENT)
Dept: CARDIOLOGY | Facility: MEDICAL CENTER | Age: 86
End: 2021-01-19
Payer: MEDICARE

## 2021-01-19 ENCOUNTER — HOSPITAL ENCOUNTER (OUTPATIENT)
Dept: CARDIOLOGY | Facility: MEDICAL CENTER | Age: 86
End: 2021-01-19
Attending: INTERNAL MEDICINE
Payer: MEDICARE

## 2021-01-19 ENCOUNTER — TELEPHONE (OUTPATIENT)
Dept: CARDIOLOGY | Facility: MEDICAL CENTER | Age: 86
End: 2021-01-19

## 2021-01-19 VITALS
OXYGEN SATURATION: 99 % | RESPIRATION RATE: 16 BRPM | HEART RATE: 101 BPM | DIASTOLIC BLOOD PRESSURE: 78 MMHG | HEIGHT: 62 IN | WEIGHT: 176 LBS | BODY MASS INDEX: 32.39 KG/M2 | SYSTOLIC BLOOD PRESSURE: 138 MMHG

## 2021-01-19 DIAGNOSIS — I48.0 PAROXYSMAL ATRIAL FIBRILLATION (HCC): ICD-10-CM

## 2021-01-19 DIAGNOSIS — Z79.01 CHRONIC ANTICOAGULATION: ICD-10-CM

## 2021-01-19 DIAGNOSIS — I48.11 LONGSTANDING PERSISTENT ATRIAL FIBRILLATION (HCC): ICD-10-CM

## 2021-01-19 DIAGNOSIS — Z91.81 PERSONAL HISTORY OF FALL: ICD-10-CM

## 2021-01-19 DIAGNOSIS — R29.6 FREQUENT FALLS: ICD-10-CM

## 2021-01-19 DIAGNOSIS — I10 ESSENTIAL HYPERTENSION: ICD-10-CM

## 2021-01-19 PROCEDURE — 93306 TTE W/DOPPLER COMPLETE: CPT

## 2021-01-19 PROCEDURE — 99204 OFFICE O/P NEW MOD 45 MIN: CPT | Mod: 25 | Performed by: INTERNAL MEDICINE

## 2021-01-19 PROCEDURE — 93000 ELECTROCARDIOGRAM COMPLETE: CPT | Performed by: INTERNAL MEDICINE

## 2021-01-19 ASSESSMENT — FIBROSIS 4 INDEX: FIB4 SCORE: 2.15

## 2021-01-19 NOTE — TELEPHONE ENCOUNTER
Leah,    Can you please enter the orders for this procedure?    Corey w/[facility]    Thank You,  Margo

## 2021-01-19 NOTE — TELEPHONE ENCOUNTER
----- Message from Rachelle Wolfe M.D. sent at 1/19/2021  2:44 PM PST -----  Let's schedule WATCHMAN. No meds to hold.    Elmo Bush

## 2021-01-19 NOTE — PROGRESS NOTES
Arrhythmia Clinic Note (New Patient)    DOS: 1/19/2021    Referring physician: Grant Woodard     Chief complaint/Reason for consult: ?WATCHMAN    HPI:  Patient is a 89 yo F. She has a history of minimally symptomatic long standing persistent AF (I think probably for ~5 years), HTN, T2DM. She has been on chronic anticoagulation with xarelto. Recently has been less stable on her feet. Suffered ground level fall resulting in fractures. This was not the first fall in the last year. Otherwise no other major bleeding on the xarelto. She otherwise has been using a walker. She also wishes to take the occasional NSAID but is worried about the bleeding risk with xarelto. Referred for consideration of WATCHMAN.    ROS (+ in BOLD):  Constitutional: Fevers/chills/fatigue/weightloss  HEENT: Blurry vision/eye pain/sore throat/hearing loss  Respiratory: Shortness of breath/cough  Cardiovascular: Chest pain/palpitations/edema/orthopnea/syncope  GI: Nausea/vomitting/diarrhea  MSK: Arthralgias/myagias/muscle weakness  Skin: Rash/sores  Neurological: Numbness/tremors/vertigo  Endocrine: Excessive thirst/polyuria/cold intolerance/heat intolerance  Psych: Depression/anxiety    Past Medical History:   Diagnosis Date   • A-fib (HCC)    • Diabetes (HCC)    • Hypertension    • Hypothyroid    • Macular degeneration        Past Surgical History:   Procedure Laterality Date   • APPENDECTOMY     • CHOLECYSTECTOMY     • HYSTERECTOMY, TOTAL ABDOMINAL     • KNEE REPLACEMENT, TOTAL Bilateral    • OTHER ORTHOPEDIC SURGERY     • US-NEEDLE CORE BX-BREAST PANEL         Social History     Socioeconomic History   • Marital status: Single     Spouse name: Not on file   • Number of children: Not on file   • Years of education: Not on file   • Highest education level: Not on file   Occupational History   • Not on file   Social Needs   • Financial resource strain: Not on file   • Food insecurity     Worry: Not on file     Inability: Not on file   •  Transportation needs     Medical: Not on file     Non-medical: Not on file   Tobacco Use   • Smoking status: Never Smoker   • Smokeless tobacco: Never Used   Substance and Sexual Activity   • Alcohol use: No   • Drug use: No   • Sexual activity: Not on file   Lifestyle   • Physical activity     Days per week: Not on file     Minutes per session: Not on file   • Stress: Not on file   Relationships   • Social connections     Talks on phone: Not on file     Gets together: Not on file     Attends Synagogue service: Not on file     Active member of club or organization: Not on file     Attends meetings of clubs or organizations: Not on file     Relationship status: Not on file   • Intimate partner violence     Fear of current or ex partner: Not on file     Emotionally abused: Not on file     Physically abused: Not on file     Forced sexual activity: Not on file   Other Topics Concern   • Not on file   Social History Narrative   • Not on file       Family History   Problem Relation Age of Onset   • Cancer Other        Allergies   Allergen Reactions   • Pcn [Penicillins] Rash     Rash         Current Outpatient Medications   Medication Sig Dispense Refill   • HYDROcodone-acetaminophen (NORCO) 5-325 MG Tab per tablet      • Cyclobenzaprine HCl (FLEXERIL PO) Take  by mouth.     • diazePAM (VALIUM) 5 MG Tab      • Diclofenac Sodium 1 % Gel      • DILTIAZem CD (CARDIZEM CD) 120 MG CAPSULE SR 24 HR TAKE 1 CAPSULE BY MOUTH TWICE DAILY 180 Cap 3   • spironolactone (ALDACTONE) 25 MG Tab Take 1 Tab by mouth every day. (Patient taking differently: Take 25 mg by mouth every 48 hours. Every other day) 90 Tab 3   • rivaroxaban (XARELTO) 20 MG Tab tablet Take 1 Tab by mouth with dinner. 90 Tab 1   • polyvinyl alcohol-povidone (REFRESH) 1.4-0.6 % ophthalmic solution Place 1 Drop in both eyes as needed. Indications: Dry Eyes caused by Deficiency of Tears     • Neomycin-Polymyxin-Dexameth 0.1 % Ointment Place 1 Dose in both eyes 2 Times a  "Day.     • vitamin D, Ergocalciferol, (DRISDOL) 91662 units Cap capsule Take 50,000 Units by mouth Q30 DAYS.  11   • ondansetron (ZOFRAN ODT) 4 MG TABLET DISPERSIBLE Take 4 mg by mouth 2 times a day as needed. Indications: Nausea and Vomiting     • B Complex Vitamins (VITAMIN B COMPLEX PO) Take 1 Tab by mouth 2 Times a Day.     • Multiple Vitamins-Minerals (ICAPS AREDS 2) Cap Take 1 Cap by mouth 2 Times a Day.     • alendronate (FOSAMAX) 70 MG Tab Take 70 mg by mouth every 7 days.     • omeprazole (PRILOSEC) 20 MG delayed-release capsule Take 20 mg by mouth every day.     • latanoprost (XALATAN) 0.005 % Solution Place 1 Drop in left eye every bedtime.     • levothyroxine (SYNTHROID) 150 MCG TABS Take 150 mcg by mouth every day.     • Rivaroxaban (XARELTO PO) NO DISPENSE     • acetaminophen (TYLENOL) 325 MG Tab Take 2 Tabs by mouth every 6 hours as needed (Mild Pain; (Pain scale 1-3); Temp greater than 100.5 F). (Patient not taking: Reported on 12/14/2020) 30 Tab 0   • gabapentin (NEURONTIN) 100 MG Cap Take 1 Cap by mouth 2 Times a Day. (Patient not taking: Reported on 12/14/2020)       No current facility-administered medications for this visit.        Physical Exam:  Vitals:    01/19/21 1242   BP: 138/78   BP Location: Right arm   Patient Position: Sitting   BP Cuff Size: Adult   Pulse: (!) 101   Resp: 16   SpO2: 99%   Weight: 79.8 kg (176 lb)   Height: 1.575 m (5' 2\")     General appearance: NAD, conversant  Eyes: anicteric sclerae, no lid-lag; PERRLA  HENT: Atraumatic; moist mucous membranes, no ulcerations  Neck: Trachea midline; FROM, supple, no thyromegaly  Lungs: CTA, with normal respiratory effort and no intercostal retractions  CV: Irregularly irregular, no MRGs, no JVD  Abdomen: Soft, non-tender; normal bowel sounds, no HSM  Extremities: No peripheral edema. No clubbing or cyanosis.  Skin: Normal temperature, turgor and texture; no rash or ulcers  Psych: Appropriate affect, alert and oriented to person, " place and time    Data:  Labs reviewed    Prior echo/stress reviewed:  Preserved LV function    EKG interpreted by me:  AF    Impression/Plan:  1. Longstanding persistent atrial fibrillation (HCC)     2. Chronic anticoagulation     3. Personal history of fall       -I agree indefinite anticoagulation for her not ideal  -CHADSVasc is at least 5  -Risks/benefits/alternatives of WATCHMAN discussed including necessity for short term anticoagulation post device  -All questions were answered and she would like to proceed    Rachelle Wolfe MD

## 2021-01-20 LAB
LV EJECT FRACT  99904: 50
LV EJECT FRACT MOD 2C 99903: 47.33
LV EJECT FRACT MOD 4C 99902: 40.87
LV EJECT FRACT MOD BP 99901: 45.6

## 2021-01-20 PROCEDURE — 93306 TTE W/DOPPLER COMPLETE: CPT | Mod: 26 | Performed by: INTERNAL MEDICINE

## 2021-01-20 NOTE — TELEPHONE ENCOUNTER
Patient scheduled for watchman w/[facility] on 4-28-21 with Dr. Wolfe. Patient has been instructed to check in at 1:00 for 3:00 case time. Message sent to authorizations. Watchman rep aware of this date.

## 2021-02-01 LAB — EKG IMPRESSION: NORMAL

## 2021-02-10 ENCOUNTER — IMMUNIZATION (OUTPATIENT)
Dept: FAMILY PLANNING/WOMEN'S HEALTH CLINIC | Facility: IMMUNIZATION CENTER | Age: 86
End: 2021-02-10
Payer: MEDICARE

## 2021-02-10 DIAGNOSIS — Z23 ENCOUNTER FOR VACCINATION: Primary | ICD-10-CM

## 2021-02-10 PROCEDURE — 0001A PFIZER SARS-COV-2 VACCINE: CPT

## 2021-02-10 PROCEDURE — 91300 PFIZER SARS-COV-2 VACCINE: CPT

## 2021-02-14 DIAGNOSIS — I48.0 PAROXYSMAL ATRIAL FIBRILLATION (HCC): ICD-10-CM

## 2021-02-17 RX ORDER — RIVAROXABAN 20 MG/1
TABLET, FILM COATED ORAL
Qty: 100 TABLET | Refills: 1 | Status: ON HOLD | OUTPATIENT
Start: 2021-02-17 | End: 2021-04-29

## 2021-03-03 ENCOUNTER — IMMUNIZATION (OUTPATIENT)
Dept: FAMILY PLANNING/WOMEN'S HEALTH CLINIC | Facility: IMMUNIZATION CENTER | Age: 86
End: 2021-03-03
Attending: INTERNAL MEDICINE
Payer: MEDICARE

## 2021-03-03 DIAGNOSIS — Z23 ENCOUNTER FOR VACCINATION: Primary | ICD-10-CM

## 2021-03-03 PROCEDURE — 91300 PFIZER SARS-COV-2 VACCINE: CPT | Performed by: INTERNAL MEDICINE

## 2021-03-03 PROCEDURE — 0002A PFIZER SARS-COV-2 VACCINE: CPT | Performed by: INTERNAL MEDICINE

## 2021-03-10 ENCOUNTER — OFFICE VISIT (OUTPATIENT)
Dept: CARDIOLOGY | Facility: MEDICAL CENTER | Age: 86
End: 2021-03-10
Payer: MEDICARE

## 2021-03-10 VITALS
BODY MASS INDEX: 33.09 KG/M2 | HEART RATE: 84 BPM | SYSTOLIC BLOOD PRESSURE: 132 MMHG | DIASTOLIC BLOOD PRESSURE: 76 MMHG | RESPIRATION RATE: 16 BRPM | HEIGHT: 62 IN | WEIGHT: 179.8 LBS | OXYGEN SATURATION: 98 %

## 2021-03-10 DIAGNOSIS — N28.9 RENAL INSUFFICIENCY: ICD-10-CM

## 2021-03-10 DIAGNOSIS — Z79.01 CHRONIC ANTICOAGULATION: ICD-10-CM

## 2021-03-10 DIAGNOSIS — Z91.81 PERSONAL HISTORY OF FALL: ICD-10-CM

## 2021-03-10 DIAGNOSIS — I48.11 LONGSTANDING PERSISTENT ATRIAL FIBRILLATION (HCC): ICD-10-CM

## 2021-03-10 DIAGNOSIS — E11.9 TYPE 2 DIABETES MELLITUS WITHOUT COMPLICATION, UNSPECIFIED WHETHER LONG TERM INSULIN USE (HCC): ICD-10-CM

## 2021-03-10 PROCEDURE — 99214 OFFICE O/P EST MOD 30 MIN: CPT | Performed by: INTERNAL MEDICINE

## 2021-03-10 RX ORDER — DIGOXIN 125 MCG
125 TABLET ORAL DAILY
Qty: 90 TABLET | Refills: 3 | Status: ON HOLD | OUTPATIENT
Start: 2021-03-10 | End: 2021-04-28

## 2021-03-10 ASSESSMENT — FIBROSIS 4 INDEX: FIB4 SCORE: 2.15

## 2021-03-10 NOTE — PROGRESS NOTES
Subjective:   Saumya Vann is a 89 y.o. female who presents today for followup of PAF chronic anticoagulation. She has had exertional dyspnea over the past 3 years associated with fatigue. She has a history of HTN, diabetes mellitus, CKD . Her echo is relatively unremarkable aside from LVH and grade 1 diastolic function. PFTs per her primary doctor are unremarkable. She denies any exertional chest pain, PND or orthopnea.     Since last visit she has seen Dr. Wolfe who agrees she is a good candidate for left atrial appendage occluder watchman device in lieu of her anticoagulation due to frequent falls and trauma.  Overall she feels unchanged from a medical perspective but is nervous regarding the procedure so we spent the majority of the time today discussing this.    Family History   Problem Relation Age of Onset   • Cancer Other      Social History     Tobacco Use   Smoking Status Never Smoker   Smokeless Tobacco Never Used     Allergies   Allergen Reactions   • Pcn [Penicillins] Rash     Rash       Outpatient Encounter Medications as of 3/10/2021   Medication Sig Dispense Refill   • digoxin (LANOXIN) 125 MCG Tab Take 1 tablet by mouth every day. 90 tablet 3   • XARELTO 20 MG Tab tablet TAKE 1 TABLET BY MOUTH WITH DINNER 100 tablet 1   • HYDROcodone-acetaminophen (NORCO) 5-325 MG Tab per tablet      • Cyclobenzaprine HCl (FLEXERIL PO) Take  by mouth.     • diazePAM (VALIUM) 5 MG Tab      • Diclofenac Sodium 1 % Gel      • DILTIAZem CD (CARDIZEM CD) 120 MG CAPSULE SR 24 HR TAKE 1 CAPSULE BY MOUTH TWICE DAILY 180 Cap 3   • spironolactone (ALDACTONE) 25 MG Tab Take 1 Tab by mouth every day. (Patient taking differently: Take 25 mg by mouth every 48 hours. Every other day) 90 Tab 3   • polyvinyl alcohol-povidone (REFRESH) 1.4-0.6 % ophthalmic solution Place 1 Drop in both eyes as needed. Indications: Dry Eyes caused by Deficiency of Tears     • Neomycin-Polymyxin-Dexameth 0.1 % Ointment Place 1 Dose in both eyes 2  "Times a Day.     • vitamin D, Ergocalciferol, (DRISDOL) 93211 units Cap capsule Take 50,000 Units by mouth Q30 DAYS.  11   • ondansetron (ZOFRAN ODT) 4 MG TABLET DISPERSIBLE Take 4 mg by mouth 2 times a day as needed. Indications: Nausea and Vomiting     • B Complex Vitamins (VITAMIN B COMPLEX PO) Take 1 Tab by mouth 2 Times a Day.     • Multiple Vitamins-Minerals (ICAPS AREDS 2) Cap Take 1 Cap by mouth 2 Times a Day.     • alendronate (FOSAMAX) 70 MG Tab Take 70 mg by mouth every 7 days.     • omeprazole (PRILOSEC) 20 MG delayed-release capsule Take 20 mg by mouth every day.     • latanoprost (XALATAN) 0.005 % Solution Place 1 Drop in left eye every bedtime.     • levothyroxine (SYNTHROID) 150 MCG TABS Take 150 mcg by mouth every day.     • Rivaroxaban (XARELTO PO) NO DISPENSE     • acetaminophen (TYLENOL) 325 MG Tab Take 2 Tabs by mouth every 6 hours as needed (Mild Pain; (Pain scale 1-3); Temp greater than 100.5 F). (Patient not taking: Reported on 12/14/2020) 30 Tab 0   • gabapentin (NEURONTIN) 100 MG Cap Take 1 Cap by mouth 2 Times a Day. (Patient not taking: Reported on 12/14/2020)       No facility-administered encounter medications on file as of 3/10/2021.     Review of Systems   All other systems reviewed and are negative.       Objective:   /76 (BP Location: Left arm, Patient Position: Sitting, BP Cuff Size: Adult)   Pulse 84   Resp 16   Ht 1.575 m (5' 2\")   Wt 81.6 kg (179 lb 12.8 oz)   SpO2 98%   BMI 32.89 kg/m²     Physical Exam   Constitutional: She is oriented to person, place, and time. She appears well-developed and well-nourished. No distress.   Frail  Walker unsteady gait       HENT:   Head: Normocephalic and atraumatic.   Mouth/Throat: Oropharynx is clear and moist. No oropharyngeal exudate.   Eyes: Pupils are equal, round, and reactive to light. Conjunctivae are normal. No scleral icterus.   Neck: No JVD present. No thyromegaly present.   Cardiovascular: Normal rate, normal heart " sounds and intact distal pulses. An irregularly irregular rhythm present. Frequent extrasystoles are present. PMI is not displaced. Exam reveals no gallop and no friction rub.   No murmur heard.  Pulses:       Carotid pulses are 2+ on the right side and 2+ on the left side.       Femoral pulses are 2+ on the right side and 2+ on the left side.       Popliteal pulses are 1+ on the right side and 1+ on the left side.        Dorsalis pedis pulses are 2+ on the right side and 2+ on the left side.        Posterior tibial pulses are 2+ on the right side and 2+ on the left side.   Pulmonary/Chest: Effort normal and breath sounds normal. She has no wheezes. She has no rales.   Abdominal: Soft. Bowel sounds are normal. She exhibits no distension. There is no abdominal tenderness.   Musculoskeletal:         General: No tenderness or edema.      Cervical back: Normal range of motion and neck supple.   Neurological: She is alert and oriented to person, place, and time. No cranial nerve deficit.   Skin: Skin is warm and dry. No rash noted. She is not diaphoretic. No erythema.   Psychiatric: She has a normal mood and affect. Her behavior is normal.   Appears more frail and unsteady than prior visits.    LABS:  Lab Results   Component Value Date/Time    CHOLSTRLTOT 166 05/16/2020 09:58 AM    LDL 81 05/16/2020 09:58 AM    HDL 69 05/16/2020 09:58 AM    TRIGLYCERIDE 81 05/16/2020 09:58 AM       Lab Results   Component Value Date/Time    WBC 10.0 05/16/2020 09:58 AM    RBC 4.76 05/16/2020 09:58 AM    HEMOGLOBIN 14.1 05/16/2020 09:58 AM    HEMATOCRIT 43.5 05/16/2020 09:58 AM    MCV 91.4 05/16/2020 09:58 AM    NEUTSPOLYS 84.20 (H) 05/16/2020 09:58 AM    LYMPHOCYTES 7.80 (L) 05/16/2020 09:58 AM    MONOCYTES 6.10 05/16/2020 09:58 AM    EOSINOPHILS 0.70 05/16/2020 09:58 AM    BASOPHILS 0.60 05/16/2020 09:58 AM    HYPOCHROMIA 1+ 04/10/2014 03:03 PM     Lab Results   Component Value Date/Time    SODIUM 139 11/05/2020 01:36 PM    POTASSIUM  3.7 11/05/2020 01:36 PM    CHLORIDE 98 11/05/2020 01:36 PM    CO2 27 11/05/2020 01:36 PM    GLUCOSE 111 (H) 11/05/2020 01:36 PM    BUN 13 11/05/2020 01:36 PM    CREATININE 0.80 11/05/2020 01:36 PM     Lab Results   Component Value Date    HBA1C 6.0 (H) 05/16/2020      Lab Results   Component Value Date/Time    ALKPHOSPHAT 145 (H) 05/16/2020 09:58 AM    ASTSGOT 45 05/16/2020 09:58 AM    ALTSGPT 29 05/16/2020 09:58 AM    TBILIRUBIN 0.8 05/16/2020 09:58 AM      No results found for: BNPBTYPENAT   No results found for: TSH  Lab Results   Component Value Date/Time    PROTHROMBTM 24.8 (H) 11/15/2019 04:05 PM    INR 2.16 (H) 11/15/2019 04:05 PM      STRESS (6/2017):  CONCLUSIONS AND IMPRESSIONS:  Overall, there is no evidence of myocardial    ischemia based on EKG tracing along with myocardial PET scan images.  The    rhythm is atrial fibrillation throughout the stress test.  No malignant    arrhythmias noted.    ECHO CONCLUSIONS (4/27/2016):  Normal left ventricular size and systolic function.  Left ventricular ejection fraction is visually estimated to be 55%.  Moderate concentric left ventricular hypertrophy.  Normal regional wall motion.  Mild mitral regurgitation.  Mild tricuspid regurgitation.  Estimated right ventricular systolic pressure is 25 mmHg.  Normal inferior vena cava size and inspiratory collapse.    ECHO (11/10/2014):  I have personally reviewed the echocardiogram this visit and reviewed the findings with the patient. It shows:   Grade 1 diastolic dysfunction, EF 60-65%, moderate LVH, left atrial enlargement    PFTs as above      Assessment:     1. Longstanding persistent atrial fibrillation (HCC)  digoxin (LANOXIN) 125 MCG Tab   2. Chronic anticoagulation     3. Personal history of fall     4. Type 2 diabetes mellitus without complication, unspecified whether long term insulin use (HCC)     5. Renal insufficiency           Medical Decision Making:  Today's Assessment / Status / Plan:     She has good  blood pressure control but feels her heart rate is racing when she is active associate with more dyspnea over the past few months.  She is actually taking her Cardizem twice a day rather than daily as listed.  This is 120 mg twice daily.  We discussed that her heart rate may be contributing to some of her symptomology.  We discussed the watchman left atrial closure device as well as her current anticoagulation strategy at length as well.  She plans to continue to the procedure and have a place so that she can stop her DOAC.    1.  Add digoxin 0.125 mg daily  2.  Continue current anticoagulant dosing until watchman device is placed and then post procedure duration of anticoagulation per electrophysiology.  3.  Follow-up in 3 months to assess how her rate control is doing on the new medication and to assess her renal function.

## 2021-03-18 ENCOUNTER — OFFICE VISIT (OUTPATIENT)
Dept: URGENT CARE | Facility: PHYSICIAN GROUP | Age: 86
End: 2021-03-18
Payer: MEDICARE

## 2021-03-18 VITALS
HEART RATE: 98 BPM | HEIGHT: 62 IN | WEIGHT: 175 LBS | DIASTOLIC BLOOD PRESSURE: 76 MMHG | BODY MASS INDEX: 32.2 KG/M2 | RESPIRATION RATE: 16 BRPM | SYSTOLIC BLOOD PRESSURE: 118 MMHG | TEMPERATURE: 97.4 F | OXYGEN SATURATION: 95 %

## 2021-03-18 DIAGNOSIS — H00.014 HORDEOLUM OF LEFT UPPER EYELID, UNSPECIFIED HORDEOLUM TYPE: ICD-10-CM

## 2021-03-18 PROCEDURE — 99214 OFFICE O/P EST MOD 30 MIN: CPT | Performed by: PHYSICIAN ASSISTANT

## 2021-03-18 RX ORDER — ERYTHROMYCIN 5 MG/G
1 OINTMENT OPHTHALMIC 4 TIMES DAILY
Qty: 3.5 G | Refills: 0 | Status: SHIPPED | OUTPATIENT
Start: 2021-03-18 | End: 2022-01-28

## 2021-03-18 ASSESSMENT — ENCOUNTER SYMPTOMS
EYE ITCHING: 0
EYE REDNESS: 0
BLURRED VISION: 1
PHOTOPHOBIA: 0
FEVER: 0

## 2021-03-18 ASSESSMENT — FIBROSIS 4 INDEX: FIB4 SCORE: 2.15

## 2021-03-18 NOTE — PROGRESS NOTES
Subjective:   Saumya Vann is a 89 y.o. female who presents for Stye (Pt reports a stey on the outside of her OS. Onset 2 weeks. )        Eye Problem   The left eye is affected. This is a new problem. The current episode started 1 to 4 weeks ago. The problem occurs constantly. The problem has been gradually worsening. There was no injury mechanism. The pain is mild (pain of eyelid/lesion). There is no known exposure to pink eye. She does not wear contacts. Associated symptoms include blurred vision (baseline secondary to macular degeneration). Pertinent negatives include no eye redness, fever, itching, photophobia or recent URI. Treatments tried: warm damp wash cloth. The treatment provided no relief.     Review of Systems   Constitutional: Negative for fever.   Eyes: Positive for blurred vision (baseline secondary to macular degeneration). Negative for photophobia, redness and itching.       PMH:  has a past medical history of A-fib (HCC), Diabetes (HCC), Hypertension, Hypothyroid, and Macular degeneration.  MEDS:   Current Outpatient Medications:   •  erythromycin 5 MG/GM Ointment, Apply 1 Application to left eye 4 times a day., Disp: 3.5 g, Rfl: 0  •  digoxin (LANOXIN) 125 MCG Tab, Take 1 tablet by mouth every day., Disp: 90 tablet, Rfl: 3  •  XARELTO 20 MG Tab tablet, TAKE 1 TABLET BY MOUTH WITH DINNER, Disp: 100 tablet, Rfl: 1  •  Rivaroxaban (XARELTO PO), NO DISPENSE, Disp: , Rfl:   •  HYDROcodone-acetaminophen (NORCO) 5-325 MG Tab per tablet, , Disp: , Rfl:   •  Cyclobenzaprine HCl (FLEXERIL PO), Take  by mouth., Disp: , Rfl:   •  diazePAM (VALIUM) 5 MG Tab, , Disp: , Rfl:   •  Diclofenac Sodium 1 % Gel, , Disp: , Rfl:   •  DILTIAZem CD (CARDIZEM CD) 120 MG CAPSULE SR 24 HR, TAKE 1 CAPSULE BY MOUTH TWICE DAILY, Disp: 180 Cap, Rfl: 3  •  spironolactone (ALDACTONE) 25 MG Tab, Take 1 Tab by mouth every day. (Patient taking differently: Take 25 mg by mouth every 48 hours. Every other day), Disp: 90 Tab,  Rfl: 3  •  polyvinyl alcohol-povidone (REFRESH) 1.4-0.6 % ophthalmic solution, Place 1 Drop in both eyes as needed. Indications: Dry Eyes caused by Deficiency of Tears, Disp: , Rfl:   •  Neomycin-Polymyxin-Dexameth 0.1 % Ointment, Place 1 Dose in both eyes 2 Times a Day., Disp: , Rfl:   •  vitamin D, Ergocalciferol, (DRISDOL) 32562 units Cap capsule, Take 50,000 Units by mouth Q30 DAYS., Disp: , Rfl: 11  •  ondansetron (ZOFRAN ODT) 4 MG TABLET DISPERSIBLE, Take 4 mg by mouth 2 times a day as needed. Indications: Nausea and Vomiting, Disp: , Rfl:   •  B Complex Vitamins (VITAMIN B COMPLEX PO), Take 1 Tab by mouth 2 Times a Day., Disp: , Rfl:   •  Multiple Vitamins-Minerals (ICAPS AREDS 2) Cap, Take 1 Cap by mouth 2 Times a Day., Disp: , Rfl:   •  alendronate (FOSAMAX) 70 MG Tab, Take 70 mg by mouth every 7 days., Disp: , Rfl:   •  omeprazole (PRILOSEC) 20 MG delayed-release capsule, Take 20 mg by mouth every day., Disp: , Rfl:   •  latanoprost (XALATAN) 0.005 % Solution, Place 1 Drop in left eye every bedtime., Disp: , Rfl:   •  levothyroxine (SYNTHROID) 150 MCG TABS, Take 150 mcg by mouth every day., Disp: , Rfl:   •  acetaminophen (TYLENOL) 325 MG Tab, Take 2 Tabs by mouth every 6 hours as needed (Mild Pain; (Pain scale 1-3); Temp greater than 100.5 F). (Patient not taking: Reported on 12/14/2020), Disp: 30 Tab, Rfl: 0  •  gabapentin (NEURONTIN) 100 MG Cap, Take 1 Cap by mouth 2 Times a Day. (Patient not taking: Reported on 12/14/2020), Disp: , Rfl:   ALLERGIES:   Allergies   Allergen Reactions   • Pcn [Penicillins] Rash     Rash       SURGHX:   Past Surgical History:   Procedure Laterality Date   • APPENDECTOMY     • CHOLECYSTECTOMY     • HYSTERECTOMY, TOTAL ABDOMINAL     • KNEE REPLACEMENT, TOTAL Bilateral    • OTHER ORTHOPEDIC SURGERY     • US-NEEDLE CORE BX-BREAST PANEL       SOCHX:  reports that she has never smoked. She has never used smokeless tobacco. She reports that she does not drink alcohol and does not  "use drugs.  FH: Family history was reviewed, no pertinent findings to report   Objective:   /76 (BP Location: Left arm, Patient Position: Sitting, BP Cuff Size: Adult)   Pulse 98   Temp 36.3 °C (97.4 °F) (Temporal)   Resp 16   Ht 1.575 m (5' 2\")   Wt 79.4 kg (175 lb)   SpO2 95%   BMI 32.01 kg/m²   Physical Exam  Vitals reviewed.   Constitutional:       General: She is not in acute distress.     Appearance: Normal appearance. She is well-developed. She is not toxic-appearing.   HENT:      Head: Normocephalic and atraumatic.      Right Ear: External ear normal.      Left Ear: External ear normal.      Nose: Nose normal.   Eyes:      General: Gaze aligned appropriately.        Comments: Tender, slightly red nodule at medial left upper lid margin.   Cardiovascular:      Rate and Rhythm: Normal rate and regular rhythm.   Pulmonary:      Effort: Pulmonary effort is normal. No respiratory distress.      Breath sounds: No stridor.   Musculoskeletal:      Cervical back: Neck supple.   Skin:     General: Skin is warm and dry.      Capillary Refill: Capillary refill takes less than 2 seconds.   Neurological:      Mental Status: She is alert and oriented to person, place, and time.      Comments: CN2-12 grossly intact   Psychiatric:         Speech: Speech normal.         Behavior: Behavior normal.           Assessment/Plan:   1. Hordeolum of left upper eyelid, unspecified hordeolum type  - erythromycin 5 MG/GM Ointment; Apply 1 Application to left eye 4 times a day.  Dispense: 3.5 g; Refill: 0    Physical exam consistent with hordeolum.  I am a bit concerned that this might require drainage to resolve.  I would like patient to contact her ophthalmologist tomorrow to schedule an appointment to reevaluate.  In the interim patient started on warm compresses-I would like her to use a damp washcloth and microwavable heat pack.  Apply compress for 10 minutes 4 times a day.  Immediately after applying compress I would " like her to apply the antibiotic ointment as prescribed.  Follow-up with ophthalmologist for reevaluation and additional management, as indicated.    Differential diagnosis, natural history, supportive care, and indications for immediate follow-up discussed.

## 2021-04-02 NOTE — TELEPHONE ENCOUNTER
Spoke with patient - arrival time is now 11:30. Per patient's request - I also LM on Rizwan's (patients grandson) voicemail with this information. Requested a call back with any questions.

## 2021-04-05 ENCOUNTER — TELEPHONE (OUTPATIENT)
Dept: CARDIOLOGY | Facility: MEDICAL CENTER | Age: 86
End: 2021-04-05

## 2021-04-05 ENCOUNTER — PATIENT MESSAGE (OUTPATIENT)
Dept: CARDIOLOGY | Facility: MEDICAL CENTER | Age: 86
End: 2021-04-05

## 2021-04-05 NOTE — TELEPHONE ENCOUNTER
Prescription Question    Sonya Thompson, Luis Ass't routed conversation to You 7 hours ago (8:14 AM)   Saumya Vann Troy L, M.D. 8 hours ago (7:37 AM)   IE  Good morning,       This is Rizwan Saumya Soo's grandson. We started her on the Digoxin on March 11th right after our visit with you on March 10thShe felt better and less shakes the first few day but since has been feeling nauseous most of the time, shakey, appears a little more confused and just not her normal self lately. She doesn't feel well at all. Her blood pressure [id number] pulse 69 this am. I thought maybe it was her second dose of Phizer vaccine in March 4th but were both pretty sure it isn't. Can she stop taking the Digoxin to see if she starts feeling better?       Thank you for your time this morning.   Rizwan.      Received [employer] message above from pt's grandson.

## 2021-04-05 NOTE — TELEPHONE ENCOUNTER
Shinkatie Wolfe M.D.  You 2 minutes ago (4:55 PM)     Yes try stopping        You  You; Rachelle Wolfe M.D. 1 hour ago (3:46 PM)     To Dr. Wolfe, pt had a FV with you in January, last saw TW on 03/10 and he started her on Digoxin. Could you please advise in the mean time while TW and care team out of the office this week? Thank you!        MyChart reply sent.

## 2021-04-12 NOTE — TELEPHONE ENCOUNTER
Prescription Question    Lusi Davis Ass't routed conversation to You 3 days ago   Soo SaumyaGrant Flower M.D. 3 days ago   WILLIAN Quiroz  Saumya stopped taking the Digoxin and is feeling much better. She is much more clear headed and with it now. Her appetite has improved and her energy is up.  She still has a tiny bit of nausea but each day has been better. Thank you for your help.       Received above update from pt's grandson.

## 2021-04-23 ENCOUNTER — PRE-ADMISSION TESTING (OUTPATIENT)
Dept: ADMISSIONS | Facility: MEDICAL CENTER | Age: 86
DRG: 274 | End: 2021-04-23
Attending: INTERNAL MEDICINE
Payer: MEDICARE

## 2021-04-23 DIAGNOSIS — Z01.812 PRE-OPERATIVE LABORATORY EXAMINATION: ICD-10-CM

## 2021-04-23 DIAGNOSIS — Z01.810 PRE-OPERATIVE CARDIOVASCULAR EXAMINATION: ICD-10-CM

## 2021-04-23 LAB
ANION GAP SERPL CALC-SCNC: 13 MMOL/L (ref 7–16)
APTT PPP: 37.5 SEC (ref 24.7–36)
BASOPHILS # BLD AUTO: 0.4 % (ref 0–1.8)
BASOPHILS # BLD: 0.05 K/UL (ref 0–0.12)
BUN SERPL-MCNC: 16 MG/DL (ref 8–22)
CALCIUM SERPL-MCNC: 9.8 MG/DL (ref 8.5–10.5)
CHLORIDE SERPL-SCNC: 99 MMOL/L (ref 96–112)
CO2 SERPL-SCNC: 26 MMOL/L (ref 20–33)
CREAT SERPL-MCNC: 0.9 MG/DL (ref 0.5–1.4)
EKG IMPRESSION: NORMAL
EOSINOPHIL # BLD AUTO: 0.01 K/UL (ref 0–0.51)
EOSINOPHIL NFR BLD: 0.1 % (ref 0–6.9)
ERYTHROCYTE [DISTWIDTH] IN BLOOD BY AUTOMATED COUNT: 49.2 FL (ref 35.9–50)
GLUCOSE SERPL-MCNC: 107 MG/DL (ref 65–99)
HCT VFR BLD AUTO: 47.2 % (ref 37–47)
HGB BLD-MCNC: 15.3 G/DL (ref 12–16)
IMM GRANULOCYTES # BLD AUTO: 0.18 K/UL (ref 0–0.11)
IMM GRANULOCYTES NFR BLD AUTO: 1.5 % (ref 0–0.9)
INR PPP: 1.91 (ref 0.87–1.13)
LYMPHOCYTES # BLD AUTO: 1.19 K/UL (ref 1–4.8)
LYMPHOCYTES NFR BLD: 9.7 % (ref 22–41)
MCH RBC QN AUTO: 30.4 PG (ref 27–33)
MCHC RBC AUTO-ENTMCNC: 32.4 G/DL (ref 33.6–35)
MCV RBC AUTO: 93.8 FL (ref 81.4–97.8)
MONOCYTES # BLD AUTO: 0.71 K/UL (ref 0–0.85)
MONOCYTES NFR BLD AUTO: 5.8 % (ref 0–13.4)
NEUTROPHILS # BLD AUTO: 10.17 K/UL (ref 2–7.15)
NEUTROPHILS NFR BLD: 82.5 % (ref 44–72)
NRBC # BLD AUTO: 0 K/UL
NRBC BLD-RTO: 0 /100 WBC
PLATELET # BLD AUTO: 345 K/UL (ref 164–446)
PMV BLD AUTO: 9 FL (ref 9–12.9)
POTASSIUM SERPL-SCNC: 4.5 MMOL/L (ref 3.6–5.5)
PROTHROMBIN TIME: 22.4 SEC (ref 12–14.6)
RBC # BLD AUTO: 5.03 M/UL (ref 4.2–5.4)
SARS-COV-2 RNA RESP QL NAA+PROBE: NOTDETECTED
SODIUM SERPL-SCNC: 138 MMOL/L (ref 135–145)
SPECIMEN SOURCE: NORMAL
WBC # BLD AUTO: 12.3 K/UL (ref 4.8–10.8)

## 2021-04-23 PROCEDURE — C9803 HOPD COVID-19 SPEC COLLECT: HCPCS

## 2021-04-23 PROCEDURE — 85610 PROTHROMBIN TIME: CPT

## 2021-04-23 PROCEDURE — 93010 ELECTROCARDIOGRAM REPORT: CPT | Performed by: INTERNAL MEDICINE

## 2021-04-23 PROCEDURE — 85730 THROMBOPLASTIN TIME PARTIAL: CPT

## 2021-04-23 PROCEDURE — 36415 COLL VENOUS BLD VENIPUNCTURE: CPT

## 2021-04-23 PROCEDURE — 93005 ELECTROCARDIOGRAM TRACING: CPT

## 2021-04-23 PROCEDURE — U0005 INFEC AGEN DETEC AMPLI PROBE: HCPCS

## 2021-04-23 PROCEDURE — 85025 COMPLETE CBC W/AUTO DIFF WBC: CPT

## 2021-04-23 PROCEDURE — U0003 INFECTIOUS AGENT DETECTION BY NUCLEIC ACID (DNA OR RNA); SEVERE ACUTE RESPIRATORY SYNDROME CORONAVIRUS 2 (SARS-COV-2) (CORONAVIRUS DISEASE [COVID-19]), AMPLIFIED PROBE TECHNIQUE, MAKING USE OF HIGH THROUGHPUT TECHNOLOGIES AS DESCRIBED BY CMS-2020-01-R: HCPCS

## 2021-04-23 PROCEDURE — 80048 BASIC METABOLIC PNL TOTAL CA: CPT

## 2021-04-23 RX ORDER — SPIRONOLACTONE 25 MG/1
25 TABLET ORAL DAILY
Status: ON HOLD | COMMUNITY
End: 2024-01-05

## 2021-04-23 ASSESSMENT — FIBROSIS 4 INDEX: FIB4 SCORE: 2.15

## 2021-04-26 ENCOUNTER — PATIENT MESSAGE (OUTPATIENT)
Dept: HEALTH INFORMATION MANAGEMENT | Facility: OTHER | Age: 86
End: 2021-04-26

## 2021-04-28 ENCOUNTER — APPOINTMENT (OUTPATIENT)
Dept: CARDIOLOGY | Facility: MEDICAL CENTER | Age: 86
DRG: 274 | End: 2021-04-28
Attending: INTERNAL MEDICINE
Payer: MEDICARE

## 2021-04-28 ENCOUNTER — HOSPITAL ENCOUNTER (INPATIENT)
Facility: MEDICAL CENTER | Age: 86
LOS: 1 days | DRG: 274 | End: 2021-04-29
Attending: INTERNAL MEDICINE | Admitting: INTERNAL MEDICINE
Payer: MEDICARE

## 2021-04-28 ENCOUNTER — ANESTHESIA EVENT (OUTPATIENT)
Dept: CARDIOLOGY | Facility: MEDICAL CENTER | Age: 86
DRG: 274 | End: 2021-04-28
Payer: MEDICARE

## 2021-04-28 ENCOUNTER — ANESTHESIA (OUTPATIENT)
Dept: CARDIOLOGY | Facility: MEDICAL CENTER | Age: 86
DRG: 274 | End: 2021-04-28
Payer: MEDICARE

## 2021-04-28 DIAGNOSIS — I48.11 LONGSTANDING PERSISTENT ATRIAL FIBRILLATION (HCC): ICD-10-CM

## 2021-04-28 LAB
ACT BLD: 296 SEC (ref 74–137)
EKG IMPRESSION: NORMAL

## 2021-04-28 PROCEDURE — 700101 HCHG RX REV CODE 250: Performed by: INTERNAL MEDICINE

## 2021-04-28 PROCEDURE — 93005 ELECTROCARDIOGRAM TRACING: CPT | Performed by: INTERNAL MEDICINE

## 2021-04-28 PROCEDURE — 770020 HCHG ROOM/CARE - TELE (206)

## 2021-04-28 PROCEDURE — 93355 ECHO TRANSESOPHAGEAL (TEE): CPT

## 2021-04-28 PROCEDURE — 02L73DK OCCLUSION OF LEFT ATRIAL APPENDAGE WITH INTRALUMINAL DEVICE, PERCUTANEOUS APPROACH: ICD-10-PCS | Performed by: INTERNAL MEDICINE

## 2021-04-28 PROCEDURE — 93010 ELECTROCARDIOGRAM REPORT: CPT | Performed by: INTERNAL MEDICINE

## 2021-04-28 PROCEDURE — C1894 INTRO/SHEATH, NON-LASER: HCPCS

## 2021-04-28 PROCEDURE — B24BZZ4 ULTRASONOGRAPHY OF HEART WITH AORTA, TRANSESOPHAGEAL: ICD-10-PCS | Performed by: INTERNAL MEDICINE

## 2021-04-28 PROCEDURE — A9270 NON-COVERED ITEM OR SERVICE: HCPCS | Performed by: INTERNAL MEDICINE

## 2021-04-28 PROCEDURE — 700101 HCHG RX REV CODE 250: Performed by: ANESTHESIOLOGY

## 2021-04-28 PROCEDURE — 700102 HCHG RX REV CODE 250 W/ 637 OVERRIDE(OP): Performed by: INTERNAL MEDICINE

## 2021-04-28 PROCEDURE — A9270 NON-COVERED ITEM OR SERVICE: HCPCS | Performed by: ANESTHESIOLOGY

## 2021-04-28 PROCEDURE — 700111 HCHG RX REV CODE 636 W/ 250 OVERRIDE (IP): Mod: JW

## 2021-04-28 PROCEDURE — 700111 HCHG RX REV CODE 636 W/ 250 OVERRIDE (IP): Performed by: ANESTHESIOLOGY

## 2021-04-28 PROCEDURE — 700102 HCHG RX REV CODE 250 W/ 637 OVERRIDE(OP): Performed by: ANESTHESIOLOGY

## 2021-04-28 PROCEDURE — 33340 PERQ CLSR TCAT L ATR APNDGE: CPT | Mod: Q0 | Performed by: INTERNAL MEDICINE

## 2021-04-28 PROCEDURE — G0378 HOSPITAL OBSERVATION PER HR: HCPCS

## 2021-04-28 PROCEDURE — 85347 COAGULATION TIME ACTIVATED: CPT

## 2021-04-28 PROCEDURE — 700105 HCHG RX REV CODE 258: Performed by: INTERNAL MEDICINE

## 2021-04-28 PROCEDURE — 160002 HCHG RECOVERY MINUTES (STAT)

## 2021-04-28 RX ORDER — HYDROMORPHONE HYDROCHLORIDE 1 MG/ML
0.4 INJECTION, SOLUTION INTRAMUSCULAR; INTRAVENOUS; SUBCUTANEOUS
Status: DISCONTINUED | OUTPATIENT
Start: 2021-04-28 | End: 2021-04-28 | Stop reason: HOSPADM

## 2021-04-28 RX ORDER — LIDOCAINE HYDROCHLORIDE 40 MG/ML
SOLUTION TOPICAL
Status: COMPLETED
Start: 2021-04-28 | End: 2021-04-28

## 2021-04-28 RX ORDER — HALOPERIDOL 5 MG/ML
1 INJECTION INTRAMUSCULAR
Status: DISCONTINUED | OUTPATIENT
Start: 2021-04-28 | End: 2021-04-28 | Stop reason: HOSPADM

## 2021-04-28 RX ORDER — HYDROMORPHONE HYDROCHLORIDE 1 MG/ML
0.2 INJECTION, SOLUTION INTRAMUSCULAR; INTRAVENOUS; SUBCUTANEOUS
Status: DISCONTINUED | OUTPATIENT
Start: 2021-04-28 | End: 2021-04-28 | Stop reason: HOSPADM

## 2021-04-28 RX ORDER — HEPARIN SODIUM 200 [USP'U]/100ML
INJECTION, SOLUTION INTRAVENOUS
Status: COMPLETED
Start: 2021-04-28 | End: 2021-04-28

## 2021-04-28 RX ORDER — OXYCODONE HCL 5 MG/5 ML
5 SOLUTION, ORAL ORAL
Status: COMPLETED | OUTPATIENT
Start: 2021-04-28 | End: 2021-04-28

## 2021-04-28 RX ORDER — HYDROMORPHONE HYDROCHLORIDE 1 MG/ML
0.1 INJECTION, SOLUTION INTRAMUSCULAR; INTRAVENOUS; SUBCUTANEOUS
Status: DISCONTINUED | OUTPATIENT
Start: 2021-04-28 | End: 2021-04-28 | Stop reason: HOSPADM

## 2021-04-28 RX ORDER — DIPHENHYDRAMINE HYDROCHLORIDE 50 MG/ML
12.5 INJECTION INTRAMUSCULAR; INTRAVENOUS
Status: DISCONTINUED | OUTPATIENT
Start: 2021-04-28 | End: 2021-04-28 | Stop reason: HOSPADM

## 2021-04-28 RX ORDER — LIDOCAINE HYDROCHLORIDE 40 MG/ML
SOLUTION TOPICAL PRN
Status: DISCONTINUED | OUTPATIENT
Start: 2021-04-28 | End: 2021-04-28 | Stop reason: SURG

## 2021-04-28 RX ORDER — SODIUM CHLORIDE, SODIUM LACTATE, POTASSIUM CHLORIDE, CALCIUM CHLORIDE 600; 310; 30; 20 MG/100ML; MG/100ML; MG/100ML; MG/100ML
INJECTION, SOLUTION INTRAVENOUS CONTINUOUS
Status: ACTIVE | OUTPATIENT
Start: 2021-04-28 | End: 2021-04-28

## 2021-04-28 RX ORDER — HEPARIN SODIUM 1000 [USP'U]/ML
INJECTION, SOLUTION INTRAVENOUS; SUBCUTANEOUS
Status: COMPLETED
Start: 2021-04-28 | End: 2021-04-28

## 2021-04-28 RX ORDER — OXYCODONE HCL 5 MG/5 ML
10 SOLUTION, ORAL ORAL
Status: COMPLETED | OUTPATIENT
Start: 2021-04-28 | End: 2021-04-28

## 2021-04-28 RX ORDER — SPIRONOLACTONE 25 MG/1
25 TABLET ORAL DAILY
Status: DISCONTINUED | OUTPATIENT
Start: 2021-04-29 | End: 2021-04-29 | Stop reason: HOSPADM

## 2021-04-28 RX ORDER — SODIUM CHLORIDE 9 MG/ML
INJECTION, SOLUTION INTRAVENOUS CONTINUOUS
Status: DISCONTINUED | OUTPATIENT
Start: 2021-04-28 | End: 2021-04-29

## 2021-04-28 RX ORDER — MEPERIDINE HYDROCHLORIDE 25 MG/ML
6.25 INJECTION INTRAMUSCULAR; INTRAVENOUS; SUBCUTANEOUS
Status: DISCONTINUED | OUTPATIENT
Start: 2021-04-28 | End: 2021-04-28 | Stop reason: HOSPADM

## 2021-04-28 RX ORDER — ONDANSETRON 2 MG/ML
INJECTION INTRAMUSCULAR; INTRAVENOUS PRN
Status: DISCONTINUED | OUTPATIENT
Start: 2021-04-28 | End: 2021-04-28 | Stop reason: SURG

## 2021-04-28 RX ORDER — LEVOTHYROXINE SODIUM 0.15 MG/1
150 TABLET ORAL
Status: DISCONTINUED | OUTPATIENT
Start: 2021-04-29 | End: 2021-04-29 | Stop reason: HOSPADM

## 2021-04-28 RX ORDER — ONDANSETRON 2 MG/ML
4 INJECTION INTRAMUSCULAR; INTRAVENOUS
Status: COMPLETED | OUTPATIENT
Start: 2021-04-28 | End: 2021-04-28

## 2021-04-28 RX ORDER — CEFAZOLIN SODIUM 1 G/3ML
INJECTION, POWDER, FOR SOLUTION INTRAMUSCULAR; INTRAVENOUS PRN
Status: DISCONTINUED | OUTPATIENT
Start: 2021-04-28 | End: 2021-04-28 | Stop reason: SURG

## 2021-04-28 RX ORDER — OMEPRAZOLE 20 MG/1
20 CAPSULE, DELAYED RELEASE ORAL EVERY EVENING
Status: DISCONTINUED | OUTPATIENT
Start: 2021-04-28 | End: 2021-04-29 | Stop reason: HOSPADM

## 2021-04-28 RX ORDER — PROTAMINE SULFATE 10 MG/ML
INJECTION, SOLUTION INTRAVENOUS
Status: COMPLETED
Start: 2021-04-28 | End: 2021-04-28

## 2021-04-28 RX ORDER — DEXAMETHASONE SODIUM PHOSPHATE 4 MG/ML
INJECTION, SOLUTION INTRA-ARTICULAR; INTRALESIONAL; INTRAMUSCULAR; INTRAVENOUS; SOFT TISSUE PRN
Status: DISCONTINUED | OUTPATIENT
Start: 2021-04-28 | End: 2021-04-28 | Stop reason: SURG

## 2021-04-28 RX ORDER — HYDROCODONE BITARTRATE AND ACETAMINOPHEN 5; 325 MG/1; MG/1
1 TABLET ORAL EVERY 4 HOURS PRN
Status: DISCONTINUED | OUTPATIENT
Start: 2021-04-28 | End: 2021-04-29 | Stop reason: HOSPADM

## 2021-04-28 RX ORDER — SODIUM CHLORIDE, SODIUM LACTATE, POTASSIUM CHLORIDE, CALCIUM CHLORIDE 600; 310; 30; 20 MG/100ML; MG/100ML; MG/100ML; MG/100ML
INJECTION, SOLUTION INTRAVENOUS CONTINUOUS
Status: DISCONTINUED | OUTPATIENT
Start: 2021-04-28 | End: 2021-04-29

## 2021-04-28 RX ADMIN — ONDANSETRON 4 MG: 2 INJECTION INTRAMUSCULAR; INTRAVENOUS at 13:42

## 2021-04-28 RX ADMIN — HEPARIN SODIUM: 1000 INJECTION, SOLUTION INTRAVENOUS; SUBCUTANEOUS at 14:06

## 2021-04-28 RX ADMIN — ROCURONIUM BROMIDE 80 MG: 10 INJECTION, SOLUTION INTRAVENOUS at 13:38

## 2021-04-28 RX ADMIN — HEPARIN SODIUM: 200 INJECTION, SOLUTION INTRAVENOUS at 13:00

## 2021-04-28 RX ADMIN — PROPOFOL 200 MG: 10 INJECTION, EMULSION INTRAVENOUS at 13:42

## 2021-04-28 RX ADMIN — PROTAMINE SULFATE 40 MG: 10 INJECTION, SOLUTION INTRAVENOUS at 14:14

## 2021-04-28 RX ADMIN — SODIUM CHLORIDE: 9 INJECTION, SOLUTION INTRAVENOUS at 16:44

## 2021-04-28 RX ADMIN — RIVAROXABAN 15 MG: 15 TABLET, FILM COATED ORAL at 17:17

## 2021-04-28 RX ADMIN — OXYCODONE HYDROCHLORIDE 5 MG: 5 SOLUTION ORAL at 15:32

## 2021-04-28 RX ADMIN — SODIUM CHLORIDE, POTASSIUM CHLORIDE, SODIUM LACTATE AND CALCIUM CHLORIDE: 600; 310; 30; 20 INJECTION, SOLUTION INTRAVENOUS at 13:00

## 2021-04-28 RX ADMIN — LIDOCAINE HYDROCHLORIDE 4 ML: 40 SOLUTION TOPICAL at 13:41

## 2021-04-28 RX ADMIN — POVIDONE IODINE 15 ML: 100 SOLUTION TOPICAL at 12:45

## 2021-04-28 RX ADMIN — ONDANSETRON 4 MG: 2 INJECTION INTRAMUSCULAR; INTRAVENOUS at 15:32

## 2021-04-28 RX ADMIN — CEFAZOLIN 2 G: 330 INJECTION, POWDER, FOR SOLUTION INTRAMUSCULAR; INTRAVENOUS at 13:42

## 2021-04-28 RX ADMIN — SUGAMMADEX 200 MG: 100 INJECTION, SOLUTION INTRAVENOUS at 14:14

## 2021-04-28 RX ADMIN — OMEPRAZOLE 20 MG: 20 CAPSULE, DELAYED RELEASE ORAL at 17:17

## 2021-04-28 RX ADMIN — DEXAMETHASONE SODIUM PHOSPHATE 4 MG: 4 INJECTION, SOLUTION INTRA-ARTICULAR; INTRALESIONAL; INTRAMUSCULAR; INTRAVENOUS; SOFT TISSUE at 13:42

## 2021-04-28 ASSESSMENT — LIFESTYLE VARIABLES
AVERAGE NUMBER OF DAYS PER WEEK YOU HAVE A DRINK CONTAINING ALCOHOL: 0
TOTAL SCORE: 0
ON A TYPICAL DAY WHEN YOU DRINK ALCOHOL HOW MANY DRINKS DO YOU HAVE: 0
HOW MANY TIMES IN THE PAST YEAR HAVE YOU HAD 5 OR MORE DRINKS IN A DAY: 0
TOTAL SCORE: 0
HAVE PEOPLE ANNOYED YOU BY CRITICIZING YOUR DRINKING: NO
CONSUMPTION TOTAL: NEGATIVE
DOES PATIENT WANT TO STOP DRINKING: NO
EVER FELT BAD OR GUILTY ABOUT YOUR DRINKING: NO
HAVE YOU EVER FELT YOU SHOULD CUT DOWN ON YOUR DRINKING: NO
EVER HAD A DRINK FIRST THING IN THE MORNING TO STEADY YOUR NERVES TO GET RID OF A HANGOVER: NO
ALCOHOL_USE: NO
TOTAL SCORE: 0

## 2021-04-28 ASSESSMENT — COGNITIVE AND FUNCTIONAL STATUS - GENERAL
SUGGESTED CMS G CODE MODIFIER DAILY ACTIVITY: CH
MOBILITY SCORE: 24
SUGGESTED CMS G CODE MODIFIER MOBILITY: CH
DAILY ACTIVITIY SCORE: 24

## 2021-04-28 ASSESSMENT — PATIENT HEALTH QUESTIONNAIRE - PHQ9
SUM OF ALL RESPONSES TO PHQ9 QUESTIONS 1 AND 2: 0
1. LITTLE INTEREST OR PLEASURE IN DOING THINGS: NOT AT ALL
2. FEELING DOWN, DEPRESSED, IRRITABLE, OR HOPELESS: NOT AT ALL

## 2021-04-28 ASSESSMENT — CHA2DS2 SCORE
HYPERTENSION: YES
AGE 75 OR GREATER: YES
VASCULAR DISEASE: YES
CHF OR LEFT VENTRICULAR DYSFUNCTION: NO
SEX: MALE
AGE 65 TO 74: NO
PRIOR STROKE OR TIA OR THROMBOEMBOLISM: NO
DIABETES: YES
CHA2DS2 VASC SCORE: 5

## 2021-04-28 ASSESSMENT — FIBROSIS 4 INDEX: FIB4 SCORE: 2.18

## 2021-04-28 ASSESSMENT — PAIN DESCRIPTION - PAIN TYPE
TYPE: ACUTE PAIN
TYPE: CHRONIC PAIN

## 2021-04-28 ASSESSMENT — PAIN SCALES - GENERAL: PAIN_LEVEL: 3

## 2021-04-28 NOTE — OP REPORT
"[facility] Electrophysiology/Structural Heart Procedure Note     Procedure(s) Performed:   1) Watchman EZIO closure    Indication(s):  Persistent AF    Physician(s): Rachelle Wolfe M.D.     Resident/Assistant(s): None     Anesthesia: General endotracheal anesthetic with Dr. Chevy Li     Specimen(s) Removed: None     Estimated Blood Loss:  30cc     Complications:  None     Description of Procedure:   After informed written consent, the patient was brought to the cath lab in the fasting, non-sedated state. The patient was prepped and draped in the usual sterile fashion. Femoral venous access was obtained using the modified Seldinger technique.  In the right femoral vein, an 8 Fr sheath were inserted over 0.035” guidewire and exchanged for a 16 Fr outer sheath, through which an 8.5 Fr Van Wert trans-septal sheath was used for crossing the septum. ARBEN was used to identify the atrial septum, and left atrial appendage.  A Van Wert trans-septal needle was inserted to into the trans-septal sheath, and under ultrasound guidance a mid/mid trans-septal location was used to cross into the left atrium. Intravenous heparin was given to target an -300. A stiff exchange length 0.035\" wire was inserted into the left atrium/left pulmonary veins, over which we exchanged to the WATCHMAN delivery sheath. The WATCHMAN device was prepped and flushed. A pigtail catheter was advanced into the delivery sheath into the left atrium and then after counter clockwise torque maneuvered into the body of the left atrial appendage. Cine was taken to verify the location of the pigtail in the appendage and to assess for depth. The sheath was then advanced over the pigtail until sufficient depth was reached.  The pigtail was removed, and under wet to wet connection the WATCHMAN device was advanced through the delivery sheath until markers were aligned. The sheath was retracted slowly to deploy the FLX ball, which then was advanced out of the " sheath to deploy the device. After the device was deployed we assessed again for leak with contrast injected through the sheath as well as a tug test. We verified good position, anchoring, size, and seal with no/minimal leak with ARBEN. After all criteria were met we detached the device from the delivery system. At the end of the procedure, heparin was reversed with protamine, the catheter and sheaths were removed, and hemostasis was achieved by manual compression along with a figure of 8 silk suture. Following recovery from anesthesia, the patient was transferred to the PPU in good condition.        Fluoroscopy time: 4.7 minutes      Contrast used: 26     Device implanted:  Size 24 mm   Serial # [id number]  Max appendage pre-measurement 20.5 mm  Min (14.5%) compression     Impressions:    1. Successful EZIO closure      Recommendations:  1. Continue Xarelto   2. Admit to monitored bedrest.  3. Echo and removal of figure of 8 suture in the AM

## 2021-04-28 NOTE — ANESTHESIA PREPROCEDURE EVALUATION
"    Relevant Problems   PULMONARY   (+) Dyspnea on exertion   (+) SOB (shortness of breath)      CARDIAC   (+) AF (atrial fibrillation) (HCC)   (+) Dyspnea on exertion   (+) HTN (hypertension)      GI   (+) GERD (gastroesophageal reflux disease)         (+) Renal insufficiency      ENDO   (+) DM2 (diabetes mellitus, type 2) (HCC)   (+) Hypothyroidism     /65   Pulse 92   Temp 35.8 °C (96.5 °F) (Temporal)   Resp 20   Ht 1.575 m (5' 2\")   Wt 79.2 kg (174 lb 9.7 oz)   SpO2 98%   BMI 31.94 kg/m²     Physical Exam    Airway   Mallampati: II  TM distance: >3 FB  Neck ROM: full       Cardiovascular - normal exam  Rhythm: regular  Rate: normal  (-) murmur     Dental - normal exam           Pulmonary - normal exam  Breath sounds clear to auscultation     Abdominal    Neurological - normal exam                 Anesthesia Plan    ASA 3       Plan - general       Airway plan will be ETT  ARBEN Planned        Induction: intravenous    Postoperative Plan: Postoperative administration of opioids is intended.    Pertinent diagnostic labs and testing reviewed    Informed Consent:    Anesthetic plan and risks discussed with patient.    Use of blood products discussed with: patient whom consented to blood products.         "

## 2021-04-28 NOTE — PROGRESS NOTES
2 RN Skin Check    2 RN skin check complete with BRIAN Burrows.   Devices in place: PIV, Tele box, Oxygen tubging.  Skin assessed under devices: yes.  Confirmed pressure ulcers found on: N/A.  New potential pressure ulcers noted on N/A. Wound consult placed N/A.  The following interventions in place Pillows, Mepilex, Lotion and Soft oxygen tubing, grey foam pads for oxygen tubing, mepilex.    Generalized bruising.  Growth on right cheek.  Right groin site dressing CDI and suture in place.  Sacrum pink and blanching.  Pannus redness and excoriation (Pictures in LDA).  Bilateral lower extremity varicose veins.

## 2021-04-28 NOTE — ANESTHESIA POSTPROCEDURE EVALUATION
Patient: Saumya Vann    Procedure Summary     Date: 04/28/21 Room / Location: [facility] IMAGING  CATH LAB [facility]    Anesthesia Start: 1332 Anesthesia Stop: 1427    Procedure: CL-LEFT ATRIAL APPENDAGE CLOSURE Diagnosis:       Longstanding persistent atrial fibrillation (HCC)      Longstanding persistent atrial fibrillation      (See Associated Dx)    Scheduled Providers: Rachelle Wolfe M.D.; Chevy Li M.D. Responsible Provider: Chevy Li M.D.    Anesthesia Type: general ASA Status: 3          Final Anesthesia Type: general  Last vitals  BP   Blood Pressure : 140/68    Temp   35.8 °C (96.5 °F)    Pulse   94   Resp   20    SpO2   100 %      Anesthesia Post Evaluation    Patient location during evaluation: PACU  Patient participation: complete - patient participated  Level of consciousness: awake and alert  Pain score: 3    Airway patency: patent  Anesthetic complications: no  Cardiovascular status: hemodynamically stable  Respiratory status: acceptable  Hydration status: euvolemic    PONV: none          No complications documented.     Nurse Pain Score: 3 (NPRS)

## 2021-04-28 NOTE — ANESTHESIA TIME REPORT
Anesthesia Start and Stop Event Times     Date Time Event    4/28/2021 1332 Ready for Procedure     1332 Anesthesia Start     1427 Anesthesia Stop        Responsible Staff  04/28/21    Name Role Begin End    Chevy Li M.D. Anesth 1332 1427        Preop Diagnosis (Free Text):  Pre-op Diagnosis             Preop Diagnosis (Codes):    Post op Diagnosis  A-fib (HCC)      Premium Reason  Non-Premium    Comments: noé

## 2021-04-28 NOTE — OR NURSING
1427 Patient arrived from cath lab s/p watchman EZIO closure, patient sleeping no s/s of distress or discomfort. Report received from Dr Li anesthesiologist. Right groin access site covered with gauze and Tegaderm clean dry intact and soft.   1446 Patient tolerating po intake.   1450 Family updated on plan of care agreeable.   1540 Criteria met to discharge patient out of recovery.  1545 Report given to araceli TORRES T827 bed 1 all questions answered.    1601 Criteria met to discharge patient out of recovery.  1606 Patient transported to room with all her personal belongings. Checked surgical sites with BRIAN riddle and araceli no bleeding no hematoma. Patient not in any distress or discomfort. Tele monitor on.

## 2021-04-28 NOTE — PROGRESS NOTES
Received report from [facility] nurse. Patient is alert and oriented. Post op vitals initiated. Right groin site assessed, clean/dry/intact. Tele monitor placed, and monitor room notified, a flutter 84.

## 2021-04-28 NOTE — ANESTHESIA PROCEDURE NOTES
ARBEN    Date/Time: 4/28/2021 1:41 PM  Performed by: Chevy Li M.D.  Authorized by: Chevy Li M.D.     Start Time:4/28/2021 1:41 PM  Preanesthetic Checklist: patient identified, IV checked, site marked, risks and benefits discussed, surgical consent, monitors and equipment checked, pre-op evaluation and timeout performed    Indication for ARBEN: diagnostic   Patient Location: OR  Intubated: Yes  Bite Block: Yes  Heart Visualized: Yes  Insertion: atraumatic    **See FULL ARBEN report in patient's chart via CV Synapse**

## 2021-04-28 NOTE — ANESTHESIA PROCEDURE NOTES
Airway    Date/Time: 4/28/2021 1:41 PM  Performed by: Chevy Li M.D.  Authorized by: Chevy Li M.D.     Location:  OR  Urgency:  Elective  Difficult Airway: No    Indications for Airway Management:  Anesthesia      Spontaneous Ventilation: absent    Sedation Level:  Deep  Preoxygenated: Yes    Patient Position:  Sniffing  Mask Difficulty Assessment:  0 - not attempted  Final Airway Type:  Endotracheal airway  Final Endotracheal Airway:  ETT  Cuffed: Yes    Technique Used for Successful ETT Placement:  Direct laryngoscopy    Insertion Site:  Oral  Blade Type:  Izzy  Laryngoscope Blade/Videolaryngoscope Blade Size:  3  ETT Size (mm):  6.5  Measured from:  Teeth  ETT to Teeth (cm):  20  Placement Verified by: auscultation and capnometry    Cormack-Lehane Classification:  Grade I - full view of glottis  Number of Attempts at Approach:  1

## 2021-04-28 NOTE — H&P
"EP Pre-procedure History and Physical    Date of service: 4/28/2021    Reason for visit/Chief complaint: AF    HPI:   Patient is a 89 yo F. Chronic AF. Frequent falls. Here for EZIO closure.    Past Medical History:   Diagnosis Date   • A-fib (HCC)    • Breath shortness    • Cataract 04/23/2021    Surgically removed bilat   • Diabetes (HCC) 04/23/2021    Diet-controlled   • Dry cough 04/23/2021   • Glaucoma 04/23/2021    Left eye   • Hypertension    • Hypothyroid    • Macular degeneration    • Pain 04/23/2021    Back and Leg   • Urinary incontinence      Past Surgical History:   Procedure Laterality Date   • OTHER ORTHOPEDIC SURGERY  2002    2 Knee Replacements    • CATARACT EXTRACTION WITH IOL  2001   • NEUROMA EXCISION  1985    Right Foot   • CHOLECYSTECTOMY  1970   • APPENDECTOMY  1957   • OTHER  1952    Varicose veins   • APPENDECTOMY     • CHOLECYSTECTOMY     • HYSTERECTOMY, TOTAL ABDOMINAL     • KNEE REPLACEMENT, TOTAL Bilateral    • US-NEEDLE CORE BX-BREAST PANEL       Family History   Problem Relation Age of Onset   • Cancer Other        ROS: Negative for orthopnea, PND, palpitations, chest pain    Physical Exam:  Vitals:    04/23/21 1215 04/28/21 1315   BP:  138/65   Pulse:  92   Resp:  20   Temp:  35.8 °C (96.5 °F)   TempSrc:  Temporal   SpO2:  98%   Weight: 79.6 kg (175 lb 7.8 oz) 79.2 kg (174 lb 9.7 oz)   Height: 1.575 m (5' 2\") 1.575 m (5' 2\")     Gen: NAD, conversant  HEENT: PERRL, EOMI  LUNGS: CTA B, no w/r/r  CV: irregularly irregular, no m/r/g, no JVD  Abd: Soft, NT/ND, +BS  Ext: no edema, warm and well perfused    Labs reviewed    EKG interpreted by me:  AF    Impression/Recs:  1. Longstanding persistent atrial fibrillation (HCC)  CL-LEFT ATRIAL APPENDAGE CLOSURE    CL-LEFT ATRIAL APPENDAGE CLOSURE   -Risks/benefits/alternatives discussed  -Proceed with WATCHMAN implant    Rachelle Wolfe MD    "

## 2021-04-29 ENCOUNTER — APPOINTMENT (OUTPATIENT)
Dept: CARDIOLOGY | Facility: MEDICAL CENTER | Age: 86
DRG: 274 | End: 2021-04-29
Attending: INTERNAL MEDICINE
Payer: MEDICARE

## 2021-04-29 VITALS
TEMPERATURE: 97.8 F | OXYGEN SATURATION: 97 % | SYSTOLIC BLOOD PRESSURE: 133 MMHG | WEIGHT: 174.6 LBS | BODY MASS INDEX: 32.13 KG/M2 | HEIGHT: 62 IN | HEART RATE: 72 BPM | RESPIRATION RATE: 18 BRPM | DIASTOLIC BLOOD PRESSURE: 83 MMHG

## 2021-04-29 LAB
LV EJECT FRACT MOD 2C 99903: 38.07
LV EJECT FRACT MOD 4C 99902: 59.85
LV EJECT FRACT MOD BP 99901: 49.47

## 2021-04-29 PROCEDURE — A9270 NON-COVERED ITEM OR SERVICE: HCPCS | Performed by: INTERNAL MEDICINE

## 2021-04-29 PROCEDURE — 700102 HCHG RX REV CODE 250 W/ 637 OVERRIDE(OP): Performed by: INTERNAL MEDICINE

## 2021-04-29 PROCEDURE — 93308 TTE F-UP OR LMTD: CPT

## 2021-04-29 PROCEDURE — 99238 HOSP IP/OBS DSCHRG MGMT 30/<: CPT | Performed by: NURSE PRACTITIONER

## 2021-04-29 PROCEDURE — 93308 TTE F-UP OR LMTD: CPT | Mod: 26 | Performed by: INTERNAL MEDICINE

## 2021-04-29 RX ADMIN — LEVOTHYROXINE SODIUM 150 MCG: 0.15 TABLET ORAL at 08:18

## 2021-04-29 RX ADMIN — SPIRONOLACTONE 25 MG: 25 TABLET ORAL at 05:14

## 2021-04-29 NOTE — DISCHARGE INSTRUCTIONS
Discharge Instructions    Discharged to home by car with relative. Discharged via wheelchair, hospital escort: Yes.  Special equipment needed: Not Applicable    Be sure to schedule a follow-up appointment with your primary care doctor or any specialists as instructed.     Discharge Plan:   Diet Plan: Discussed  Activity Level: Discussed  Confirmed Follow up Appointment: Appointment Scheduled  Confirmed Symptoms Management: Discussed  Medication Reconciliation Updated: Yes    I understand that a diet low in cholesterol, fat, and sodium is recommended for good health. Unless I have been given specific instructions below for another diet, I accept this instruction as my diet prescription.   Other diet: -    Special Instructions:    · Is patient discharged on Warfarin / Coumadin?   No     Depression / Suicide Risk    As you are discharged from this [facility] facility, it is important to learn how to keep safe from harming yourself.    Recognize the warning signs:  · Abrupt changes in personality, positive or negative- including increase in energy   · Giving away possessions  · Change in eating patterns- significant weight changes-  positive or negative  · Change in sleeping patterns- unable to sleep or sleeping all the time   · Unwillingness or inability to communicate  · Depression  · Unusual sadness, discouragement and loneliness  · Talk of wanting to die  · Neglect of personal appearance   · Rebelliousness- reckless behavior  · Withdrawal from people/activities they love  · Confusion- inability to concentrate     If you or a loved one observes any of these behaviors or has concerns about self-harm, here's what you can do:  · Talk about it- your feelings and reasons for harming yourself  · Remove any means that you might use to hurt yourself (examples: pills, rope, extension cords, firearm)  · Get professional help from the community (Mental Health, Substance Abuse, psychological counseling)  · Do not be  alone:Call your Safe Contact- someone whom you trust who will be there for you.  · Call your local CRISIS HOTLINE 289-0913 or 070-537-8346  · Call your local Children's Mobile Crisis Response Team [place] (840) 648-3229 or www.ParaShoot  · Call the toll free National Suicide Prevention Hotlines   · National Suicide Prevention Lifeline 054-844-LJBG (5630)  · [employer] 800-SUICIDE (032-2739)    POST WATCHMAN INSTRUCTIONS  No lifting > 10 lbs x 1 week.  No baths or hot tubs x 1 week.  May shower on 4/30/2021 and take off groin dressing and leave uncovered.  Continue to monitor site daily for warmth, redness, discolored drainage    Please do not miss any doses of your blood thinner.  Please keep all follow up appointments scheduled for you.       Please walk and take deep breaths after discharge.  After discharge, if  experiences severe chest pain, shortness of breath, neurological changes, high fever, severe dizziness, trouble with catheter site needs to be seen in the emergency dept.       Left Atrial Appendage Closure Device Implantation    Left atrial appendage (EZIO) closure device implantation is a procedure that is done to place a small device in the EZIO of the heart. The left atrium is one of the heart's two upper chambers, and the EZIO is a small sac in the wall of the left atrium. The device closes the EZIO.  This procedure can help prevent a stroke caused by atrial fibrillation. Atrial fibrillation is a type of irregular or rapid heartbeat (arrhythmia). When the heart does not beat normally and the heartbeat is irregular, there is an increased risk of blood clots and stroke. A blood clot can form in the EZIO.  Tell a health care provider about:  · Any allergies you have.  · All medicines you are taking, including vitamins, herbs, eye drops, creams, and over-the-counter medicines.  · Any problems you or family members have had with anesthetic medicines.  · Any blood disorders you  have.  · Any surgeries you have had.  · Any medical conditions you have.  · Whether you are pregnant or may be pregnant.  What are the risks?  Generally, this is a safe procedure. However, problems may occur, including:  · Infection.  · Bleeding.  · Allergic reactions to medicines or dyes.  · Damage to nearby structures or organs.  · Heart attack.  · Stroke.  · Blood clots.  · Changes in heart rhythm.  · Device failure.  What happens before the procedure?  Medicines  · Ask your health care provider about:  ? Changing or stopping your regular medicines. This is especially important if you are taking diabetes medicines or blood thinners.  ? Taking medicines such as aspirin and ibuprofen. These medicines can thin your blood. Do not take these medicines unless your health care provider tells you to take them.  ? Taking over-the-counter medicines, vitamins, herbs, and supplements.  · You may be given antibiotic medicine to help prevent an infection.  Staying hydrated  Follow instructions from your health care provider about hydration, which may include:  · Up to 2 hours before the procedure - you may continue to drink clear liquids, such as water, clear fruit juice, black coffee, and plain tea.  Eating and drinking restrictions  Follow instructions from your health care provider about eating and drinking, which may include:  · 8 hours before the procedure - stop eating heavy meals or foods such as meat, fried foods, or fatty foods.  · 6 hours before the procedure - stop eating light meals or foods, such as toast or cereal.  · 6 hours before the procedure - stop drinking milk or drinks that contain milk.  · 2 hours before the procedure - stop drinking clear liquids.  General instructions  · Do not use any products that contain nicotine or tobacco, such as cigarettes and e-cigarettes. If you need help quitting, ask your health care provider.  · You will have blood tests and a physical exam.  · You will have a test called  an electrocardiogram (ECG) to check your heart's electrical patterns and rhythms.  · You may be asked to shower with a germ-killing soap.  · Plan to have someone take you home from the hospital or clinic.  · Plan to have a responsible adult care for you for at least 24 hours after you leave the hospital or clinic. This is important.  What happens during the procedure?  · To lower your risk of infection:  ? Your health care team will wash or sanitize their hands.  ? Hair may be removed from the surgical area.  ? Your skin will be washed with soap.  · An IV will be inserted into one of your veins.  · You will be given one or more of the following:  ? A medicine to help you relax (sedative).  ? A medicine to make you fall asleep (general anesthetic).  · A small incision will be made in your groin area.  · A small wire will be put through the incision and into a blood vessel.  · X-ray dye may be injected so X-rays can be used to guide the wire through the blood vessel.  · A long, thin tube (catheter) will be put over the small wire and moved up through the blood vessel to reach your heart.  · The closure device will be moved through the catheter until it reaches your heart.  · A small hole will be made in the septum (transseptal puncture). The septum is a thin tissue that separates the upper two chambers of the heart.  · The device will be placed so that it closes the EZIO. X-rays will be done to make sure the device is in the right place.  · The catheter and wire will be removed. The closure device will remain in your heart.  · A bandage (dressing) will be placed over the site where the catheter was inserted. Pressure will be applied to prevent any bleeding.  The procedure may vary among health care providers and hospitals.  What happens after the procedure?  · Your blood pressure, heart rate, breathing rate, and blood oxygen level will be monitored until the medicines you were given have worn off.  · You may have to  wear compression stockings. These stockings help to prevent blood clots and reduce swelling in your legs.  · Do not drive for 24 hours if you were given a sedative. Ask your health care provider when it is safe for you to drive.  · You may be given pain medicine.  · You may need to drink more fluids to wash (flush) the X-ray dye out of your body.  · Take over-the-counter and prescription medicines only as told by your health care provider. This is especially important if you were given blood thinners.  Summary  · Left atrial appendage (EZIO) closure device implantation is a surgery that is done to put a small device in one of the heart's upper chambers (left atrium). The device is placed in a small sac in the wall of the left atrium (left atrial appendage).  · The device closes the EZIO to prevent strokes and other problems.  · Follow instructions from your health care provider before and after the procedure.  This information is not intended to replace advice given to you by your health care provider. Make sure you discuss any questions you have with your health care provider.  Document Released: 05/01/2018 Document Revised: 11/30/2018 Document Reviewed: 05/01/2018  Elsevier Patient Education © 2020 [employer]

## 2021-04-29 NOTE — PROGRESS NOTES
Assumed care at 1900, bedside report received from Brenna TORRES. Pt. Is Afib on the monitor. Initial assessment completed, orders reviewed, call light within reach, bed alarm is in use, and hourly rounding in place. POC addressed with patient, no additional questions at this time. R groin site is CDI. Patient on bedrest till 2030

## 2021-04-29 NOTE — PROGRESS NOTES
Patient being discharged home. IV removed. Telemetry discontinued. Patient changed into own clothing. Belongings gathered. Discharge instructions discussed with patient. She verbalizes understanding. New medications and prescriptions reviewed. Patient picked up son. Taken down to vehicle by staff in wheelchair.

## 2021-04-29 NOTE — CARE PLAN
Problem: Safety  Goal: Will remain free from injury  Outcome: PROGRESSING AS EXPECTED  Note: Fall precautions in place. Bed in lowest position. Non-skid socks in place. Personal possessions within reach. Mobility sign on door. Bed-alarm on. Call light within reach. Pt educated regarding fall prevention and states understanding.     Goal: Will remain free from falls  Outcome: PROGRESSING AS EXPECTED  Note: Pt educated on the importance fall prevention methods, such as treaded sock and the bed alarm. Pt stated they will use the call light prior to any attempts of ambulation. Ambulatory ability assessed, treaded socks in place, bed locked and in low position, frequent trips to bathroom offered, and call light and phone within reach.        Problem: Safety:  Goal: Will show no signs and symptoms of excessive bleeding  Outcome: PROGRESSING AS EXPECTED  Note: Groin site is assessed regularly. Patient educated to mobilize slowly once off bedrest. Site is CDI

## 2021-04-29 NOTE — CARE PLAN
Problem: Communication  Goal: The ability to communicate needs accurately and effectively will improve  Outcome: PROGRESSING AS EXPECTED  Note: Oriented patient to room, updated white board. Call light in reach. All questions answered.     Problem: Safety  Goal: Will remain free from falls  Outcome: PROGRESSING AS EXPECTED  Note: Bed locked and in lowest position. Bed alarm on. Treaded socks provided. Call light and belongings within reach.  Patient educated to call for assistance. Pt verbalized understanding. Hourly rounding in place.

## 2021-04-29 NOTE — PROGRESS NOTES
2 RN Skin Check    2 RN skin check complete.   Devices in place: PIV, Telebox.  Skin assessed under devices: yes.  Confirmed pressure ulcers found on: NA.  New potential pressure ulcers noted on NA. Wound consult placed No.  The following interventions in place Pillows and Lotion, q2 turns.    General bruising on bilateral upper extremities  Pannus red   Right femoral site, CDI  Bilateral varicose veins on LE  Sacrum, Heels, Elbows, and ears intact

## 2021-04-29 NOTE — DISCHARGE SUMMARY
CHIEF COMPLAINT ON ADMISSION  Elective admission for WATCHMAN procedure    CODE STATUS  Full Code    HPI & HOSPITAL COURSE  This is a 89 y.o. year old female admitted for left atrial appendage closure due to patients need for long term use of nonsteroidal anti-inflammatories as well increasingly frequent falls resulting in mechanical trauma.     The patient has been seen and examined in EP rounds this AM.  Her monitored rhythm is atrial flutter 70-86.  Telemetry history has been reviewed and is significant for only occasioanl PVC's.  Patient's EKG, vital signs, laboratory work and echo have been reviewed and are stable.  The patient denies any chest pain, dyspnea, dizziness, paraesthesias, or other complaints.  His physical exam is without acute abnormality; specifically his right femoral access site is clean and dry.  The figure eight suture was removed.  There is no evidence of hematoma or bleeding.  He has been out of bed and ambulated without difficulty.     Echocardiogram Conclusions (4/29/2021):  CONCLUSIONS  Limited echo to evaluate for pericardial effusion.  Normal pericardium without effusion.     Therefore, she is discharged in good and stable condition with close outpatient follow-up.    PROCEDURES  JOSE FRANCISCO procedure, Dr. Wolfe, 4/28/2021:  Please see full procedure note for details.     Fluoroscopy time: 4.7 minutes       Contrast used: 26     Device implanted:  Size 24 mm   Serial # [id number]  Max appendage pre-measurement 20.5 mm  Min (14.5%) compression     Impressions:    1. Successful EZIO closure      Recommendations:  1. Continue Xarelto   2. Admit to monitored bedrest.  3. Echo and removal of figure of 8 suture in the AM    CONSULTATIONS  None    DISCHARGE PROBLEM LIST  Active Problems:    HTN (hypertension) POA: Yes    Chronic anticoagulation POA: Yes    Renal insufficiency POA: Yes  Resolved Problems:    * No resolved hospital problems. *      MEDICATIONS ON DISCHARGE   Saumya Vann   Home  Medication Instructions [facility]:[id number]    Printed on:04/29/21 1204   Medication Information                      alendronate (FOSAMAX) 70 MG Tab  Take 70 mg by mouth every Monday.             B Complex Vitamins (VITAMIN B COMPLEX PO)  Take 1 Tab by mouth 2 Times a Day.             B Complex-Biotin-FA (SUPER B-COMPLEX PO)  Take 1 tablet by mouth every day.             Cyclobenzaprine HCl (FLEXERIL PO)  Take  by mouth.             diazePAM (VALIUM) 5 MG Tab  Take 2-120 mg by mouth every day.             Diclofenac Sodium 1 % Gel               DILTIAZem CD (CARDIZEM CD) 120 MG CAPSULE SR 24 HR  TAKE 1 CAPSULE BY MOUTH TWICE DAILY             erythromycin 5 MG/GM Ointment  Apply 1 Application to left eye 4 times a day.             HYDROcodone-acetaminophen (NORCO) 5-325 MG Tab per tablet  as needed.             latanoprost (XALATAN) 0.005 % Solution  Place 1 Drop in left eye every bedtime.             levothyroxine (SYNTHROID) 150 MCG TABS  Take 150 mcg by mouth in the morning every Tue, Thurs, Sat, and Sun.             LEVOTHYROXINE SODIUM PO  Take 225 mcg by mouth every Monday, Wednesday, and Friday. Patient takes half a Tablet             Multiple Vitamins-Minerals (CENTRUM PO)  Take  by mouth every day. Centrum Multi vitamin             Multiple Vitamins-Minerals (ICAPS AREDS 2) Cap  Take 1 Cap by mouth 2 Times a Day.             Neomycin-Polymyxin-Dexameth 0.1 % Ointment  Place 1 Dose in both eyes 2 Times a Day.             omeprazole (PRILOSEC) 20 MG delayed-release capsule  Take 20 mg by mouth every evening.             ondansetron (ZOFRAN ODT) 4 MG TABLET DISPERSIBLE  Take 4 mg by mouth 2 times a day as needed. Indications: Nausea and Vomiting             polyvinyl alcohol-povidone (REFRESH) 1.4-0.6 % ophthalmic solution  Place 1 Drop in both eyes as needed. 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 Avastin or Lucentis 0.5 mg injections for  Retinal Vein Occlusion and Macular Degeneration.  Each eye is treated every other month.             rivaroxaban (XARELTO) 15 MG Tab tablet  Take 1 tablet by mouth every day at 6 PM.             spironolactone (ALDACTONE) 25 MG Tab  Take 25 mg by mouth every day.             vitamin D, Ergocalciferol, (DRISDOL) 46855 units Cap capsule  Take 50,000 Units by mouth every Saturday.                 DIET  Orders Placed This Encounter   Procedures   • Diet Order Diet: Cardiac     Standing Status:   Standing     Number of Occurrences:   1     Order Specific Question:   Diet:     Answer:   Cardiac [6]   • Discontinue Diet Tray     Standing Status:   Standing     Number of Occurrences:   1       ACTIVITY/POST WATCHMAN INSTRUCTIONS  No lifting > 10 lbs x 1 week.  No baths or hot tubs x 1 week.  May shower on 4/30/2021 and take off groin dressing and leave uncovered.  Continue to monitor site daily for warmth, redness, discolored drainage    Please do not miss any doses of your blood thinner.  Please keep all follow up appointments scheduled for you.       Please walk and take deep breaths after discharge.  After discharge, if  experiences severe chest pain, shortness of breath, neurological changes, high fever, severe dizziness, trouble with catheter site needs to be seen in the emergency dept.       LABORATORY  Lab Results   Component Value Date/Time    SODIUM 138 04/23/2021 01:13 PM    POTASSIUM 4.5 04/23/2021 01:13 PM    CHLORIDE 99 04/23/2021 01:13 PM    CO2 26 04/23/2021 01:13 PM    GLUCOSE 107 (H) 04/23/2021 01:13 PM    BUN 16 04/23/2021 01:13 PM    CREATININE 0.90 04/23/2021 01:13 PM        Lab Results   Component Value Date/Time    WBC 12.3 (H) 04/23/2021 01:13 PM    HEMOGLOBIN 15.3 04/23/2021 01:13 PM    HEMATOCRIT 47.2 (H) 04/23/2021 01:13 PM    PLATELETCT 345 04/23/2021 01:13 PM        FOLLOW UP  Future Appointments   Date Time Provider Department Center   5/5/2021 10:45 AM NON PROVIDER-[facility] None   5/27/2021   3:00 PM VIDA Alvarez. [facility] None   6/3/2021 11:00 AM Grant Woodard M.D. [facility] None     SPECIFIC OUTPATIENT FOLLOW-UP  Patient will be seen in EP clinic in about one week for procedure follow up.      The above discharge plan was discussed in detail with the patient and patient verbalized understanding and is in agreement with the discharge plan.     VIDA Khoury.   4/29/2021 12:02 PM

## 2021-05-03 LAB — LV EJECT FRACT  99904: 55

## 2021-05-04 ENCOUNTER — NON-PROVIDER VISIT (OUTPATIENT)
Dept: CARDIOLOGY | Facility: MEDICAL CENTER | Age: 86
End: 2021-05-04

## 2021-05-12 ENCOUNTER — HOSPITAL ENCOUNTER (OUTPATIENT)
Dept: LAB | Facility: MEDICAL CENTER | Age: 86
End: 2021-05-12
Attending: FAMILY MEDICINE
Payer: MEDICARE

## 2021-05-12 LAB
ALBUMIN SERPL BCP-MCNC: 4.1 G/DL (ref 3.2–4.9)
ALBUMIN/GLOB SERPL: 1.5 G/DL
ALP SERPL-CCNC: 90 U/L (ref 30–99)
ALT SERPL-CCNC: 14 U/L (ref 2–50)
ANION GAP SERPL CALC-SCNC: 11 MMOL/L (ref 7–16)
AST SERPL-CCNC: 17 U/L (ref 12–45)
BASOPHILS # BLD AUTO: 0.6 % (ref 0–1.8)
BASOPHILS # BLD: 0.07 K/UL (ref 0–0.12)
BILIRUB SERPL-MCNC: 0.7 MG/DL (ref 0.1–1.5)
BUN SERPL-MCNC: 12 MG/DL (ref 8–22)
CALCIUM SERPL-MCNC: 10 MG/DL (ref 8.5–10.5)
CHLORIDE SERPL-SCNC: 102 MMOL/L (ref 96–112)
CHOLEST SERPL-MCNC: 177 MG/DL (ref 100–199)
CO2 SERPL-SCNC: 28 MMOL/L (ref 20–33)
CREAT SERPL-MCNC: 0.71 MG/DL (ref 0.5–1.4)
CREAT UR-MCNC: 130.76 MG/DL
EOSINOPHIL # BLD AUTO: 0.03 K/UL (ref 0–0.51)
EOSINOPHIL NFR BLD: 0.3 % (ref 0–6.9)
ERYTHROCYTE [DISTWIDTH] IN BLOOD BY AUTOMATED COUNT: 49.6 FL (ref 35.9–50)
EST. AVERAGE GLUCOSE BLD GHB EST-MCNC: 126 MG/DL
FASTING STATUS PATIENT QL REPORTED: NORMAL
GLOBULIN SER CALC-MCNC: 2.8 G/DL (ref 1.9–3.5)
GLUCOSE SERPL-MCNC: 103 MG/DL (ref 65–99)
HBA1C MFR BLD: 6 % (ref 4–5.6)
HCT VFR BLD AUTO: 43.9 % (ref 37–47)
HDLC SERPL-MCNC: 63 MG/DL
HGB BLD-MCNC: 14.1 G/DL (ref 12–16)
IMM GRANULOCYTES # BLD AUTO: 0.12 K/UL (ref 0–0.11)
IMM GRANULOCYTES NFR BLD AUTO: 1.1 % (ref 0–0.9)
LDLC SERPL CALC-MCNC: 97 MG/DL
LYMPHOCYTES # BLD AUTO: 1.28 K/UL (ref 1–4.8)
LYMPHOCYTES NFR BLD: 11.8 % (ref 22–41)
MCH RBC QN AUTO: 30.6 PG (ref 27–33)
MCHC RBC AUTO-ENTMCNC: 32.1 G/DL (ref 33.6–35)
MCV RBC AUTO: 95.2 FL (ref 81.4–97.8)
MICROALBUMIN UR-MCNC: <1.2 MG/DL
MICROALBUMIN/CREAT UR: NORMAL MG/G (ref 0–30)
MONOCYTES # BLD AUTO: 0.64 K/UL (ref 0–0.85)
MONOCYTES NFR BLD AUTO: 5.9 % (ref 0–13.4)
NEUTROPHILS # BLD AUTO: 8.72 K/UL (ref 2–7.15)
NEUTROPHILS NFR BLD: 80.3 % (ref 44–72)
NRBC # BLD AUTO: 0 K/UL
NRBC BLD-RTO: 0 /100 WBC
PLATELET # BLD AUTO: 345 K/UL (ref 164–446)
PMV BLD AUTO: 9 FL (ref 9–12.9)
POTASSIUM SERPL-SCNC: 4.3 MMOL/L (ref 3.6–5.5)
PROT SERPL-MCNC: 6.9 G/DL (ref 6–8.2)
RBC # BLD AUTO: 4.61 M/UL (ref 4.2–5.4)
SODIUM SERPL-SCNC: 141 MMOL/L (ref 135–145)
TRIGL SERPL-MCNC: 84 MG/DL (ref 0–149)
WBC # BLD AUTO: 10.9 K/UL (ref 4.8–10.8)

## 2021-05-12 PROCEDURE — 85025 COMPLETE CBC W/AUTO DIFF WBC: CPT

## 2021-05-12 PROCEDURE — 84443 ASSAY THYROID STIM HORMONE: CPT

## 2021-05-12 PROCEDURE — 82570 ASSAY OF URINE CREATININE: CPT

## 2021-05-12 PROCEDURE — 80061 LIPID PANEL: CPT

## 2021-05-12 PROCEDURE — 83036 HEMOGLOBIN GLYCOSYLATED A1C: CPT

## 2021-05-12 PROCEDURE — 36415 COLL VENOUS BLD VENIPUNCTURE: CPT

## 2021-05-12 PROCEDURE — 82043 UR ALBUMIN QUANTITATIVE: CPT

## 2021-05-12 PROCEDURE — 80053 COMPREHEN METABOLIC PANEL: CPT

## 2021-05-13 LAB — TSH SERPL DL<=0.005 MIU/L-ACNC: 2.42 UIU/ML (ref 0.38–5.33)

## 2021-05-27 ENCOUNTER — TELEPHONE (OUTPATIENT)
Dept: CARDIOLOGY | Facility: MEDICAL CENTER | Age: 86
End: 2021-05-27

## 2021-05-27 ENCOUNTER — OFFICE VISIT (OUTPATIENT)
Dept: CARDIOLOGY | Facility: MEDICAL CENTER | Age: 86
End: 2021-05-27
Payer: MEDICARE

## 2021-05-27 VITALS
OXYGEN SATURATION: 99 % | DIASTOLIC BLOOD PRESSURE: 68 MMHG | SYSTOLIC BLOOD PRESSURE: 112 MMHG | WEIGHT: 176 LBS | HEIGHT: 62 IN | BODY MASS INDEX: 32.39 KG/M2 | HEART RATE: 83 BPM

## 2021-05-27 DIAGNOSIS — I48.91 ATRIAL FIBRILLATION, UNSPECIFIED TYPE (HCC): ICD-10-CM

## 2021-05-27 DIAGNOSIS — Z79.01 CHRONIC ANTICOAGULATION: ICD-10-CM

## 2021-05-27 DIAGNOSIS — Z95.818 PRESENCE OF WATCHMAN LEFT ATRIAL APPENDAGE CLOSURE DEVICE: ICD-10-CM

## 2021-05-27 LAB — EKG IMPRESSION: NORMAL

## 2021-05-27 PROCEDURE — 99214 OFFICE O/P EST MOD 30 MIN: CPT | Performed by: NURSE PRACTITIONER

## 2021-05-27 PROCEDURE — 93000 ELECTROCARDIOGRAM COMPLETE: CPT | Performed by: INTERNAL MEDICINE

## 2021-05-27 ASSESSMENT — ENCOUNTER SYMPTOMS
BLOOD IN STOOL: 0
COUGH: 0
PALPITATIONS: 0
SPUTUM PRODUCTION: 0
ABDOMINAL PAIN: 0
HEARTBURN: 0
BLURRED VISION: 1
STRIDOR: 0
SPEECH CHANGE: 0
VOMITING: 0
FEVER: 0
HEADACHES: 0
ORTHOPNEA: 0
CHILLS: 0
TREMORS: 0
SENSORY CHANGE: 0
SORE THROAT: 0
WEIGHT LOSS: 0
FOCAL WEAKNESS: 0
BACK PAIN: 1
HEMOPTYSIS: 0
LOSS OF CONSCIOUSNESS: 0
TINGLING: 0
WHEEZING: 0
SHORTNESS OF BREATH: 0
DIZZINESS: 0
DIARRHEA: 0
DOUBLE VISION: 0
PND: 0
NAUSEA: 0

## 2021-05-27 ASSESSMENT — FIBROSIS 4 INDEX: FIB4 SCORE: 1.19

## 2021-05-27 NOTE — PROGRESS NOTES
Cardiology/Electrophysiology Follow-up Note      Subjective:   Chief Complaint:   Chief Complaint   Patient presents with   • Atrial Fibrillation       Saumya Vann is a 89 y.o. female who presents today for follow up after Watchman implant by Dr. Wolfe 4/28/21 secondary to frequent falls on anticoagulation and need to use of antiinflammatories.     She is followed by Dr. Woodard for Cardiology and referred to Dr. Wolfe for consideration of Watchman device.  Past medical history also significant for permeant atrial fib, hypertension, hypothyroidism, and type two diabetes.    Today in follow up she states that she has been doing well overall since her watchman implant.  She states that she has had trouble with back pain and sciatica, which is being managed by her PCP.  She reports that she feels less palpitations from her Afib than she previously did.  She denies any evidence of bleeding on anticoagualtion.  States no recent falls.  She curently denies chest pain, dizziness, palpitations, pre syncope or syncope, dyspnea, PND, orthopnea, or lower extremity edema.        Patient endorses medication compliance        Past Medical History:   Diagnosis Date   • A-fib (HCC)    • Breath shortness    • Cataract 04/23/2021    Surgically removed bilat   • Diabetes (HCC) 04/23/2021    Diet-controlled   • Dry cough 04/23/2021   • Glaucoma 04/23/2021    Left eye   • Hypertension    • Hypothyroid    • Macular degeneration    • Pain 04/23/2021    Back and Leg   • Urinary incontinence      Past Surgical History:   Procedure Laterality Date   • OTHER ORTHOPEDIC SURGERY  2002    2 Knee Replacements    • CATARACT EXTRACTION WITH IOL  2001   • NEUROMA EXCISION  1985    Right Foot   • CHOLECYSTECTOMY  1970   • APPENDECTOMY  1957   • OTHER  1952    Varicose veins   • APPENDECTOMY     • CHOLECYSTECTOMY     • HYSTERECTOMY, TOTAL ABDOMINAL     • KNEE REPLACEMENT, TOTAL Bilateral    • US-NEEDLE CORE BX-BREAST PANEL       Family History    Problem Relation Age of Onset   • Cancer Other      Social History     Socioeconomic History   • Marital status:      Spouse name: Not on file   • Number of children: Not on file   • Years of education: Not on file   • Highest education level: Not on file   Occupational History   • Not on file   Tobacco Use   • Smoking status: Never Smoker   • Smokeless tobacco: Never Used   Vaping Use   • Vaping Use: Never used   Substance and Sexual Activity   • Alcohol use: No   • Drug use: No   • Sexual activity: Not on file   Other Topics Concern   • Not on file   Social History Narrative   • Not on file     Social Determinants of Health     Financial Resource Strain:    • Difficulty of Paying Living Expenses:    Food Insecurity:    • Worried About Running Out of Food in the Last Year:    • Ran Out of Food in the Last Year:    Transportation Needs:    • Lack of Transportation (Medical):    • Lack of Transportation (Non-Medical):    Physical Activity:    • Days of Exercise per Week:    • Minutes of Exercise per Session:    Stress:    • Feeling of Stress :    Social Connections:    • Frequency of Communication with Friends and Family:    • Frequency of Social Gatherings with Friends and Family:    • Attends Scientologist Services:    • Active Member of Clubs or Organizations:    • Attends Club or Organization Meetings:    • Marital Status:    Intimate Partner Violence:    • Fear of Current or Ex-Partner:    • Emotionally Abused:    • Physically Abused:    • Sexually Abused:      Allergies   Allergen Reactions   • Pcn [Penicillins] Rash     Rash         Current Outpatient Medications   Medication Sig Dispense Refill   • rivaroxaban (XARELTO) 15 MG Tab tablet Take 1 tablet by mouth every day at 6 PM. 90 tablet 3   • LEVOTHYROXINE SODIUM PO Take 225 mcg by mouth every Monday, Wednesday, and Friday. Patient takes half a Tablet     • spironolactone (ALDACTONE) 25 MG Tab Take 25 mg by mouth every day.     • B Complex-Biotin-FA  (SUPER B-COMPLEX PO) Take 1 tablet by mouth every day.     • Ranibizumab (LUCENTIS IO) Inject  into the eye. Avastin or Lucentis 0.5 mg injections for Retinal Vein Occlusion and Macular Degeneration.  Each eye is treated every other month.     • erythromycin 5 MG/GM Ointment Apply 1 Application to left eye 4 times a day. 3.5 g 0   • HYDROcodone-acetaminophen (NORCO) 5-325 MG Tab per tablet as needed.     • diazePAM (VALIUM) 5 MG Tab Take 2-120 mg by mouth as needed.     • DILTIAZem CD (CARDIZEM CD) 120 MG CAPSULE SR 24 HR TAKE 1 CAPSULE BY MOUTH TWICE DAILY (Patient taking differently: 120 mg 2 (two) times a day. TAKE 1 CAPSULE BY MOUTH TWICE DAILY.) 180 Cap 3   • polyvinyl alcohol-povidone (REFRESH) 1.4-0.6 % ophthalmic solution Place 1 Drop in both eyes as needed. Indications: Dry Eyes caused by Deficiency of Tears     • vitamin D, Ergocalciferol, (DRISDOL) 30321 units Cap capsule Take 50,000 Units by mouth every Saturday.  11   • ondansetron (ZOFRAN ODT) 4 MG TABLET DISPERSIBLE Take 4 mg by mouth 2 times a day as needed. Indications: Nausea and Vomiting     • B Complex Vitamins (VITAMIN B COMPLEX PO) Take 1 Tab by mouth 2 Times a Day.     • alendronate (FOSAMAX) 70 MG Tab Take 70 mg by mouth every Monday.     • omeprazole (PRILOSEC) 20 MG delayed-release capsule Take 20 mg by mouth every evening.     • latanoprost (XALATAN) 0.005 % Solution Place 1 Drop in left eye every bedtime.     • levothyroxine (SYNTHROID) 150 MCG TABS Take 150 mcg by mouth in the morning every Tue, Thurs, Sat, and Sun.       No current facility-administered medications for this visit.       Review of Systems   Constitutional: Negative for chills, fever, malaise/fatigue and weight loss.   HENT: Negative for congestion, sore throat and tinnitus.    Eyes: Positive for blurred vision. Negative for double vision.   Respiratory: Negative for cough, hemoptysis, sputum production, shortness of breath, wheezing and stridor.    Cardiovascular:  "Negative for chest pain, palpitations, orthopnea, leg swelling and PND.   Gastrointestinal: Negative for abdominal pain, blood in stool, diarrhea, heartburn, nausea and vomiting.   Musculoskeletal: Positive for back pain.        Leg pain    Skin: Negative for rash.   Neurological: Negative for dizziness, tingling, tremors, sensory change, speech change, focal weakness, loss of consciousness and headaches.     All others systems reviewed and negative.     Objective:     /68 (BP Location: Left arm, Patient Position: Sitting, BP Cuff Size: Adult)   Pulse 83   Ht 1.575 m (5' 2\")   Wt 79.8 kg (176 lb)   SpO2 99%  Body mass index is 32.19 kg/m².    Weight/BMI: Body mass index is 32.19 kg/m².  Wt Readings from Last 4 Encounters:   05/27/21 79.8 kg (176 lb)   04/28/21 79.2 kg (174 lb 9.7 oz)   03/18/21 79.4 kg (175 lb)   03/10/21 81.6 kg (179 lb 12.8 oz)       Physical Exam  HENT:      Head: Normocephalic and atraumatic.   Eyes:      Conjunctiva/sclera: Conjunctivae normal.      Pupils: Pupils are equal, round, and reactive to light.   Neck:      Vascular: No JVD.   Cardiovascular:      Rate and Rhythm: Normal rate. Rhythm irregularly irregular.      Heart sounds: Normal heart sounds. No murmur heard.   No friction rub. No gallop.    Pulmonary:      Effort: Pulmonary effort is normal. No respiratory distress.      Breath sounds: Normal breath sounds. No wheezing or rales.   Chest:      Chest wall: No tenderness.   Musculoskeletal:         General: Normal range of motion.      Cervical back: Normal range of motion and neck supple.   Skin:     General: Skin is warm and dry.      Findings: No erythema or rash.   Neurological:      Mental Status: She is alert and oriented to person, place, and time.   Psychiatric:         Mood and Affect: Mood and affect normal.         Behavior: Behavior normal.         Cognition and Memory: Memory normal.       Cardiac Imaging and Procedures Review:    EKG (5/27/21) reviewed by " myself:   Atrial Fib, ventricular rate controled.     Echo (1/29/21):   CONCLUSIONS  Normal left ventricular chamber size.  Left ventricular ejection fraction is visually estimated to be 50%.  Diastolic function is difficult to assess with atrial fibrillation.  Mild mitral regurgitation.  Mild tricuspid regurgitation.  Ascending aorta is dilated with a diameter of  3.8 cm.    LTD CONCLUSIONS 4/29/21  Limited echo to evaluate for pericardial effusion.  Normal pericardium without effusion.       EP Procedures:  WATCHMAN procedure, Dr. Wolfe, 4/28/2021:  Device implanted:  Size 24 mm   Serial # [id number]  Max appendage pre-measurement 20.5 mm  Min (14.5%) compression   Impressions:    1. Successful EZIO closure      Labs (personally reviewed and notable for):   Lab Results   Component Value Date/Time    SODIUM 141 05/12/2021 01:02 PM    POTASSIUM 4.3 05/12/2021 01:02 PM    CHLORIDE 102 05/12/2021 01:02 PM    CO2 28 05/12/2021 01:02 PM    GLUCOSE 103 (H) 05/12/2021 01:02 PM    BUN 12 05/12/2021 01:02 PM    CREATININE 0.71 05/12/2021 01:02 PM      Lab Results   Component Value Date/Time    WBC 10.9 (H) 05/12/2021 01:02 PM    RBC 4.61 05/12/2021 01:02 PM    HEMOGLOBIN 14.1 05/12/2021 01:02 PM    HEMATOCRIT 43.9 05/12/2021 01:02 PM    MCV 95.2 05/12/2021 01:02 PM    MCH 30.6 05/12/2021 01:02 PM    MCHC 32.1 (L) 05/12/2021 01:02 PM    MPV 9.0 05/12/2021 01:02 PM    NEUTSPOLYS 80.30 (H) 05/12/2021 01:02 PM    LYMPHOCYTES 11.80 (L) 05/12/2021 01:02 PM    MONOCYTES 5.90 05/12/2021 01:02 PM    EOSINOPHILS 0.30 05/12/2021 01:02 PM    BASOPHILS 0.60 05/12/2021 01:02 PM    HYPOCHROMIA 1+ 04/10/2014 03:03 PM      PT/INR:   Lab Results   Component Value Date/Time    PROTHROMBTM 22.4 (H) 04/23/2021 01:13 PM    INR 1.91 (H) 04/23/2021 01:13 PM       Assessment:     1. Atrial fibrillation, unspecified type (HCC)  EKG    EC-ARBEN W/O CONT    EC-ARBEN W/O CONT   2. Presence of Watchman left atrial appendage closure device: 24 mm palced 4/28/21   EC-ARBEN W/O CONT   3. Chronic anticoagulation         Medical Decision Making:  Today's Assessment / Status / Plan:   1. Permanent Atrail fibrillation:  - Ventricular rate well controled today.  - Reports no symptoms of RVR or palpitations on Diltiazem 120 mg PO BID.  To continue current dose.     2. S/P watchman:  - Placed secondary to frequent falls and need for NSAIDs.  She is due for her 45 day post watchman follow up ARBEN.  I have discussed with her and her son Bill today post watchman protocol and imagining procedure to include risks,benefits.  She is agreeable to proceed.   - Discussed anticipated medication changes if watchman found to be in good position without significant flow around device.     The risks, benefits, and alternatives to transesophageal echocardiogram with IV sedation were discussed with the patient in specific detail, including oropharyngeal and esophageal traumas including hoarseness and dysphagia after the procedure. Rare cases demonstrating serious or fatal complications associated with transesophageal echocardiogram have been reported in the adult population, including cardiac, pulmonary and bleeding complications in less than 1% of people. Patients with an identified intracardiac thrombus are at increased risk for embolic events and this appears to be reduced with anticoagulant therapy. The patient verbalized understandings about these  possible complications and wishes to proceed with this procedure      3. Chronic Anticoagulation:  - Reports that she is tolerating well at this time without evidence of bleeding.  As discussed in #2.     Plan reviewed in detail with the patient and she and her son verbalize understanding and is in agreement.   RTC as scheduled with Dr. Woodard fo routine cardiology follow up, sooner if clinical condition changes      Please note that this dictation was created using voice recognition software. I have made every reasonable attempt to correct obvious  errors, but I expect that there are errors of grammar and possibly content that I did not discover before finalizing the note.    VIDA Alvarez.

## 2021-05-27 NOTE — TELEPHONE ENCOUNTER
Saumya Madrid needs a 45 day post watchman follow up ARBEN, to be completed 2nd or 3 week of June.     Please call her nephew Gabe [phone] to arrange as he does most caregiving for her.    Thanks  beth

## 2021-06-02 NOTE — TELEPHONE ENCOUNTER
Spoke with Rizwan - patient is scheduled for post watchman ARBEN w/anesthesia on 6-17-21 with Dr. Holley. Patient has been instructed to check in at 7:00am for 9:00 case time. Message sent to pillo FAJARDO to Dr. Holley.

## 2021-06-03 ENCOUNTER — OFFICE VISIT (OUTPATIENT)
Dept: CARDIOLOGY | Facility: MEDICAL CENTER | Age: 86
End: 2021-06-03
Payer: MEDICARE

## 2021-06-03 VITALS
OXYGEN SATURATION: 98 % | RESPIRATION RATE: 14 BRPM | WEIGHT: 176 LBS | BODY MASS INDEX: 32.39 KG/M2 | HEART RATE: 93 BPM | HEIGHT: 62 IN | SYSTOLIC BLOOD PRESSURE: 118 MMHG | DIASTOLIC BLOOD PRESSURE: 72 MMHG

## 2021-06-03 DIAGNOSIS — Z79.01 CHRONIC ANTICOAGULATION: ICD-10-CM

## 2021-06-03 DIAGNOSIS — I48.21 PERMANENT ATRIAL FIBRILLATION (HCC): ICD-10-CM

## 2021-06-03 DIAGNOSIS — I10 ESSENTIAL HYPERTENSION: ICD-10-CM

## 2021-06-03 DIAGNOSIS — Z95.818 PRESENCE OF WATCHMAN LEFT ATRIAL APPENDAGE CLOSURE DEVICE: ICD-10-CM

## 2021-06-03 PROCEDURE — 99214 OFFICE O/P EST MOD 30 MIN: CPT | Performed by: INTERNAL MEDICINE

## 2021-06-03 ASSESSMENT — FIBROSIS 4 INDEX: FIB4 SCORE: 1.19

## 2021-06-03 NOTE — PROGRESS NOTES
Subjective:   Saumya Vann is a 89 y.o. female who presents today for followup of PAF chronic anticoagulation. She has had exertional dyspnea over the past 3 years associated with fatigue. She has a history of HTN, diabetes mellitus, CKD . Her echo is relatively unremarkable aside from LVH and grade 1 diastolic function. PFTs per her primary doctor are unremarkable. She denies any exertional chest pain, PND or orthopnea.     Since last visit she underwent watchman left atrial occluder placement which was uncomplicated procedure.  She is doing well and has no other changes.    Family History   Problem Relation Age of Onset   • Cancer Other      Social History     Tobacco Use   Smoking Status Never Smoker   Smokeless Tobacco Never Used     Allergies   Allergen Reactions   • Pcn [Penicillins] Rash     Rash       Outpatient Encounter Medications as of 6/3/2021   Medication Sig Dispense Refill   • rivaroxaban (XARELTO) 15 MG Tab tablet Take 1 tablet by mouth every day at 6 PM. 90 tablet 3   • LEVOTHYROXINE SODIUM PO Take 225 mcg by mouth every Monday, Wednesday, and Friday. Patient takes one and half a Tablet     • spironolactone (ALDACTONE) 25 MG Tab Take 25 mg by mouth every day.     • B Complex-Biotin-FA (SUPER B-COMPLEX PO) Take 1 tablet by mouth every day.     • Ranibizumab (LUCENTIS IO) Inject  into the eye. Avastin or Lucentis 0.5 mg injections for Retinal Vein Occlusion and Macular Degeneration.  Each eye is treated every other month.     • erythromycin 5 MG/GM Ointment Apply 1 Application to left eye 4 times a day. 3.5 g 0   • HYDROcodone-acetaminophen (NORCO) 5-325 MG Tab per tablet as needed.     • diazePAM (VALIUM) 5 MG Tab Take 2-120 mg by mouth as needed.     • DILTIAZem CD (CARDIZEM CD) 120 MG CAPSULE SR 24 HR TAKE 1 CAPSULE BY MOUTH TWICE DAILY (Patient taking differently: 120 mg 2 (two) times a day. TAKE 1 CAPSULE BY MOUTH TWICE DAILY.) 180 Cap 3   • polyvinyl alcohol-povidone (REFRESH) 1.4-0.6 %  "ophthalmic solution Place 1 Drop in both eyes as needed. Indications: Dry Eyes caused by Deficiency of Tears     • vitamin D, Ergocalciferol, (DRISDOL) 12055 units Cap capsule Take 50,000 Units by mouth every Saturday.  11   • ondansetron (ZOFRAN ODT) 4 MG TABLET DISPERSIBLE Take 4 mg by mouth 2 times a day as needed. Indications: Nausea and Vomiting     • B Complex Vitamins (VITAMIN B COMPLEX PO) Take 1 Tab by mouth 2 Times a Day.     • alendronate (FOSAMAX) 70 MG Tab Take 70 mg by mouth every Monday.     • omeprazole (PRILOSEC) 20 MG delayed-release capsule Take 20 mg by mouth every evening.     • latanoprost (XALATAN) 0.005 % Solution Place 1 Drop in left eye every bedtime.     • levothyroxine (SYNTHROID) 150 MCG TABS Take 150 mcg by mouth in the morning every Tue, Thurs, Sat, and Sun.       No facility-administered encounter medications on file as of 6/3/2021.     Review of Systems   All other systems reviewed and are negative.       Objective:   /72 (BP Location: Left arm, Patient Position: Sitting, BP Cuff Size: Adult)   Pulse 93   Resp 14   Ht 1.575 m (5' 2\")   Wt 79.8 kg (176 lb)   SpO2 98%   BMI 32.19 kg/m²     Physical Exam  Constitutional:       General: She is not in acute distress.     Appearance: She is well-developed. She is not diaphoretic.      Comments: Frail  Walker unsteady gait       HENT:      Head: Normocephalic and atraumatic.      Mouth/Throat:      Pharynx: No oropharyngeal exudate.   Eyes:      General: No scleral icterus.     Conjunctiva/sclera: Conjunctivae normal.      Pupils: Pupils are equal, round, and reactive to light.   Neck:      Thyroid: No thyromegaly.      Vascular: No JVD.   Cardiovascular:      Rate and Rhythm: Normal rate. Rhythm irregularly irregular. Frequent extrasystoles are present.     Chest Wall: PMI is not displaced.      Pulses:           Carotid pulses are 2+ on the right side and 2+ on the left side.       Femoral pulses are 2+ on the right side and " 2+ on the left side.       Popliteal pulses are 1+ on the right side and 1+ on the left side.        Dorsalis pedis pulses are 2+ on the right side and 2+ on the left side.        Posterior tibial pulses are 2+ on the right side and 2+ on the left side.      Heart sounds: Normal heart sounds. No murmur heard.   No friction rub. No gallop.    Pulmonary:      Effort: Pulmonary effort is normal.      Breath sounds: Normal breath sounds. No wheezing or rales.   Abdominal:      General: Bowel sounds are normal. There is no distension.      Palpations: Abdomen is soft.      Tenderness: There is no abdominal tenderness.   Musculoskeletal:         General: No tenderness.      Cervical back: Normal range of motion and neck supple.   Skin:     General: Skin is warm and dry.      Findings: No erythema or rash.   Neurological:      Mental Status: She is alert and oriented to person, place, and time.      Cranial Nerves: No cranial nerve deficit.   Psychiatric:         Behavior: Behavior normal.         LABS:  Lab Results   Component Value Date/Time    CHOLSTRLTOT 177 05/12/2021 01:02 PM    LDL 97 05/12/2021 01:02 PM    HDL 63 05/12/2021 01:02 PM    TRIGLYCERIDE 84 05/12/2021 01:02 PM       Lab Results   Component Value Date/Time    WBC 10.9 (H) 05/12/2021 01:02 PM    RBC 4.61 05/12/2021 01:02 PM    HEMOGLOBIN 14.1 05/12/2021 01:02 PM    HEMATOCRIT 43.9 05/12/2021 01:02 PM    MCV 95.2 05/12/2021 01:02 PM    NEUTSPOLYS 80.30 (H) 05/12/2021 01:02 PM    LYMPHOCYTES 11.80 (L) 05/12/2021 01:02 PM    MONOCYTES 5.90 05/12/2021 01:02 PM    EOSINOPHILS 0.30 05/12/2021 01:02 PM    BASOPHILS 0.60 05/12/2021 01:02 PM    HYPOCHROMIA 1+ 04/10/2014 03:03 PM     Lab Results   Component Value Date/Time    SODIUM 141 05/12/2021 01:02 PM    POTASSIUM 4.3 05/12/2021 01:02 PM    CHLORIDE 102 05/12/2021 01:02 PM    CO2 28 05/12/2021 01:02 PM    GLUCOSE 103 (H) 05/12/2021 01:02 PM    BUN 12 05/12/2021 01:02 PM    CREATININE 0.71 05/12/2021 01:02 PM      Lab Results   Component Value Date    HBA1C 6.0 (H) 05/12/2021      Lab Results   Component Value Date/Time    ALKPHOSPHAT 90 05/12/2021 01:02 PM    ASTSGOT 17 05/12/2021 01:02 PM    ALTSGPT 14 05/12/2021 01:02 PM    TBILIRUBIN 0.7 05/12/2021 01:02 PM      No results found for: BNPBTYPENAT   No results found for: TSH  Lab Results   Component Value Date/Time    PROTHROMBTM 22.4 (H) 04/23/2021 01:13 PM    INR 1.91 (H) 04/23/2021 01:13 PM      STRESS (6/2017):  CONCLUSIONS AND IMPRESSIONS:  Overall, there is no evidence of myocardial    ischemia based on EKG tracing along with myocardial PET scan images.  The    rhythm is atrial fibrillation throughout the stress test.  No malignant    arrhythmias noted.    ECHO CONCLUSIONS (4/27/2016):  Normal left ventricular size and systolic function.  Left ventricular ejection fraction is visually estimated to be 55%.  Moderate concentric left ventricular hypertrophy.  Normal regional wall motion.  Mild mitral regurgitation.  Mild tricuspid regurgitation.  Estimated right ventricular systolic pressure is 25 mmHg.  Normal inferior vena cava size and inspiratory collapse.    ECHO (11/10/2014):  I have personally reviewed the echocardiogram this visit and reviewed the findings with the patient. It shows:   Grade 1 diastolic dysfunction, EF 60-65%, moderate LVH, left atrial enlargement    PFTs as above      Assessment:     1. Permanent atrial fibrillation (HCC)     2. Essential hypertension     3. Presence of Watchman left atrial appendage closure device: 24 mm palced 4/28/21     4. Chronic anticoagulation           Medical Decision Making:  Today's Assessment / Status / Plan:   She did not feel well with digoxin.  Heart rates are well controlled now.  She stopped it and feels better.  She underwent  Watchman device.  This was uncomplicated and is doing well.  She will follow-up with Dr. Wolfe in electrophysiology who will discontinue her anticoagulation when appropriate.  She will  follow-up with me in 6 months.

## 2021-06-04 ENCOUNTER — PATIENT OUTREACH (OUTPATIENT)
Dept: HEALTH INFORMATION MANAGEMENT | Facility: OTHER | Age: 86
End: 2021-06-04

## 2021-06-04 NOTE — PROGRESS NOTES
Outreach-Comprehensive Health Assessment. Member declined, not interested due to health issues.  Attempt #2

## 2021-06-10 ENCOUNTER — TELEPHONE (OUTPATIENT)
Dept: CARDIOLOGY | Facility: MEDICAL CENTER | Age: 86
End: 2021-06-10

## 2021-06-10 NOTE — TELEPHONE ENCOUNTER
Per TW:    Post watchman, defere oac recs to EP.     The patient may proceed to planned surgical intervention with a low (<1%) risk of corby-procedural cardiovascular events.

## 2021-06-10 NOTE — TELEPHONE ENCOUNTER
Please advise if patient can proceed with a:  Epidural injection for the pain in her hip and leg with Dr. Ifeoma Leary-  Can she stop Xarelto 2 days prior?      She is having a Watchman ARBEN on 6/17.  Per EA- Ok to proceed with the procedure if ok with TW.

## 2021-06-14 ENCOUNTER — PRE-ADMISSION TESTING (OUTPATIENT)
Dept: ADMISSIONS | Facility: MEDICAL CENTER | Age: 86
End: 2021-06-14
Attending: INTERNAL MEDICINE
Payer: MEDICARE

## 2021-06-14 RX ORDER — FAMOTIDINE 40 MG/1
1 TABLET, FILM COATED ORAL 2 TIMES DAILY
COMMUNITY
Start: 2021-06-07 | End: 2022-01-28

## 2021-06-16 NOTE — OR NURSING
COVID-19 Pre-Surgery Screenin. Do you have an undiagnosed respiratory illness or symptoms such as coughing, sneezing or sore throat? Any loss of sense of smell or taste? no        2. Do you have an unexplained fever greater than 100.4 degrees Fahrenheit or 38 degrees Celsius? no  ?  3. Have you had direct exposure to a patient who tested positive for Covid-19? no    4. Patient informed of current visitation and mask policies by this RN. yes

## 2021-06-17 ENCOUNTER — APPOINTMENT (OUTPATIENT)
Dept: CARDIOLOGY | Facility: MEDICAL CENTER | Age: 86
End: 2021-06-17
Attending: NURSE PRACTITIONER
Payer: MEDICARE

## 2021-06-17 ENCOUNTER — TELEPHONE (OUTPATIENT)
Dept: CARDIOLOGY | Facility: MEDICAL CENTER | Age: 86
End: 2021-06-17

## 2021-06-17 ENCOUNTER — HOSPITAL ENCOUNTER (OUTPATIENT)
Facility: MEDICAL CENTER | Age: 86
End: 2021-06-17
Attending: INTERNAL MEDICINE | Admitting: INTERNAL MEDICINE
Payer: MEDICARE

## 2021-06-17 VITALS
HEART RATE: 65 BPM | SYSTOLIC BLOOD PRESSURE: 117 MMHG | BODY MASS INDEX: 32.5 KG/M2 | TEMPERATURE: 99 F | HEIGHT: 62 IN | DIASTOLIC BLOOD PRESSURE: 71 MMHG | OXYGEN SATURATION: 98 % | WEIGHT: 176.59 LBS | RESPIRATION RATE: 18 BRPM

## 2021-06-17 DIAGNOSIS — Z95.818 PRESENCE OF WATCHMAN LEFT ATRIAL APPENDAGE CLOSURE DEVICE: ICD-10-CM

## 2021-06-17 DIAGNOSIS — I48.91 ATRIAL FIBRILLATION, UNSPECIFIED TYPE (HCC): ICD-10-CM

## 2021-06-17 LAB — LV EJECT FRACT  99904: 60

## 2021-06-17 PROCEDURE — 700101 HCHG RX REV CODE 250: Performed by: INTERNAL MEDICINE

## 2021-06-17 PROCEDURE — 93312 ECHO TRANSESOPHAGEAL: CPT | Mod: 26 | Performed by: INTERNAL MEDICINE

## 2021-06-17 PROCEDURE — 93325 DOPPLER ECHO COLOR FLOW MAPG: CPT

## 2021-06-17 PROCEDURE — 93325 DOPPLER ECHO COLOR FLOW MAPG: CPT | Mod: 26 | Performed by: INTERNAL MEDICINE

## 2021-06-17 PROCEDURE — A9270 NON-COVERED ITEM OR SERVICE: HCPCS | Performed by: INTERNAL MEDICINE

## 2021-06-17 PROCEDURE — 160002 HCHG RECOVERY MINUTES (STAT)

## 2021-06-17 RX ADMIN — POVIDONE IODINE 15 ML: 100 SOLUTION TOPICAL at 07:45

## 2021-06-17 ASSESSMENT — FIBROSIS 4 INDEX: FIB4 SCORE: 1.19

## 2021-06-17 NOTE — DISCHARGE INSTRUCTIONS
ACTIVITY: Rest and take it easy for the first 24 hours.  A responsible adult is recommended to remain with you during that time.  It is normal to feel sleepy.  We encourage you to not do anything that requires balance, judgment or coordination.    MILD FLU-LIKE SYMPTOMS ARE NORMAL. YOU MAY EXPERIENCE GENERALIZED MUSCLE ACHES, THROAT IRRITATION, HEADACHE AND/OR SOME NAUSEA.    FOR 24 HOURS DO NOT:  Drive, operate machinery or run household appliances.  Drink beer or alcoholic beverages.   Make important decisions or sign legal documents.    SPECIAL INSTRUCTIONS: ENDOSCOPY HOME CARE INSTRUCTIONS    ARBEN - TRANSESOPHAGEAL ECHOCARDIOGRAM  1. Don't drive or drink alcohol for 24 hours. The medication you received will make you too drowsy.  2. If you begin to vomit bloody material, or develop black or bloody stools, call your doctor as soon as possible.  3. If you have any neck, chest, abdominal pain or temp of 100 degrees, call your doctor.  4. See your doctor in 1-2 weeks.  5. Additional instructions: Please wait until you speak with your cardiologist before stopping your blood thinners.  Take as prescribed until told otherwise.    You should call 911 if you develop problems with breathing or chest pain.  If any questions arise, call your doctor. If your doctor is not available, ou can also call the [place] HOTLINE open 24 hours/day, 7 days/week and speak to a nurse at [phone], or toll free [phone].      DIET: To avoid nausea, slowly advance diet as tolerated, avoiding spicy or greasy foods for the first day.  Add more substantial food to your diet according to your physician's instructions.  Babies can be fed formula or breast milk as soon as they are hungry.  INCREASE FLUIDS AND FIBER TO AVOID CONSTIPATION.    SURGICAL DRESSING/BATHING: N/A    FOLLOW-UP APPOINTMENT:  A follow-up appointment should be arranged with your doctor in 1-2 weeks; call to schedule.    You should CALL YOUR PHYSICIAN if you  develop:  Fever greater than 101 degrees F.  Pain not relieved by medication, or persistent nausea or vomiting.  Excessive bleeding (blood soaking through dressing) or unexpected drainage from the wound.  Extreme redness or swelling around the incision site, drainage of pus or foul smelling drainage.  Inability to urinate or empty your bladder within 8 hours.  Problems with breathing or chest pain.    You should call 911 if you develop problems with breathing or chest pain.  If you are unable to contact your doctor or surgical center, you should go to the nearest emergency room or urgent care center.  Physician's telephone #: CARDIOLOGY [phone]    If any questions arise, call your doctor.  If your doctor is not available, please feel free to call the Surgical Center at [phone]. The Contact Center is open Monday through Friday 7AM to 5PM and may speak to a nurse at [phone], or toll free at [phone].     A registered nurse may call you a few days after your surgery to see how you are doing after your procedure.    MEDICATIONS: Resume taking daily medication.  Take prescribed pain medication with food.  If no medication is prescribed, you may take non-aspirin pain medication if needed.  PAIN MEDICATION CAN BE VERY CONSTIPATING.  Take a stool softener or laxative such as senokot, pericolace, or milk of magnesia if needed.    If your physician has prescribed pain medication that includes Acetaminophen (Tylenol), do not take additional Acetaminophen (Tylenol) while taking the prescribed medication.    Depression / Suicide Risk    As you are discharged from this [facility] facility, it is important to learn how to keep safe from harming yourself.    Recognize the warning signs:  · Abrupt changes in personality, positive or negative- including increase in energy   · Giving away possessions  · Change in eating patterns- significant weight changes-  positive or negative  · Change in sleeping patterns-  unable to sleep or sleeping all the time   · Unwillingness or inability to communicate  · Depression  · Unusual sadness, discouragement and loneliness  · Talk of wanting to die  · Neglect of personal appearance   · Rebelliousness- reckless behavior  · Withdrawal from people/activities they love  · Confusion- inability to concentrate     If you or a loved one observes any of these behaviors or has concerns about self-harm, here's what you can do:  · Talk about it- your feelings and reasons for harming yourself  · Remove any means that you might use to hurt yourself (examples: pills, rope, extension cords, firearm)  · Get professional help from the community (Mental Health, Substance Abuse, psychological counseling)  · Do not be alone:Call your Safe Contact- someone whom you trust who will be there for you.  · Call your local CRISIS HOTLINE 615-8182 or 675-394-0885  · Call your local Children's Mobile Crisis Response Team [place] (181) 896-2719 or www.Rent.com  · Call the toll free National Suicide Prevention Hotlines   · National Suicide Prevention Lifeline 636-587-RGOL (4616)  · National Hope Line Network 800-SUICIDE (809-6461)

## 2021-06-17 NOTE — TELEPHONE ENCOUNTER
Called and notified patient of results and recommendations.    Med rec updated and aspirin sent to [facility]

## 2021-06-17 NOTE — TELEPHONE ENCOUNTER
ARBEN looks good in terms of the Watchman device.  Can stop the Xarelto and Start Aspirin 81 mg enteric coated daily.    Thank you  Brandi

## 2021-06-17 NOTE — OR NURSING
0923 Pt into PPU with procedural RN.  AAOx4.  VSS.  Denies chest pain and nausea.    0929 Family member called.  Update given and  arranged.    1017 IV d/c'd.  Pt and son given discharge instructions.  Emphasized importance of not stopping blood thinners until instructed by cardiologist.    1030 Pt wheeled out by RN.  All belongings with pt.

## 2021-11-19 ENCOUNTER — HOSPITAL ENCOUNTER (OUTPATIENT)
Dept: LAB | Facility: MEDICAL CENTER | Age: 86
End: 2021-11-19
Attending: FAMILY MEDICINE
Payer: MEDICARE

## 2021-11-19 LAB
EST. AVERAGE GLUCOSE BLD GHB EST-MCNC: 120 MG/DL
HBA1C MFR BLD: 5.8 % (ref 4–5.6)

## 2021-11-19 PROCEDURE — 36415 COLL VENOUS BLD VENIPUNCTURE: CPT

## 2021-11-19 PROCEDURE — 83036 HEMOGLOBIN GLYCOSYLATED A1C: CPT

## 2021-12-02 ENCOUNTER — OFFICE VISIT (OUTPATIENT)
Dept: CARDIOLOGY | Facility: MEDICAL CENTER | Age: 86
End: 2021-12-02
Payer: MEDICARE

## 2021-12-02 VITALS
HEART RATE: 96 BPM | DIASTOLIC BLOOD PRESSURE: 64 MMHG | WEIGHT: 173 LBS | HEIGHT: 62 IN | BODY MASS INDEX: 31.83 KG/M2 | OXYGEN SATURATION: 100 % | SYSTOLIC BLOOD PRESSURE: 120 MMHG

## 2021-12-02 DIAGNOSIS — I48.0 PAROXYSMAL ATRIAL FIBRILLATION (HCC): ICD-10-CM

## 2021-12-02 DIAGNOSIS — Z95.818 PRESENCE OF WATCHMAN LEFT ATRIAL APPENDAGE CLOSURE DEVICE: ICD-10-CM

## 2021-12-02 DIAGNOSIS — I10 PRIMARY HYPERTENSION: ICD-10-CM

## 2021-12-02 PROBLEM — Z79.01 CHRONIC ANTICOAGULATION: Status: RESOLVED | Noted: 2017-06-05 | Resolved: 2021-12-02

## 2021-12-02 PROCEDURE — 99214 OFFICE O/P EST MOD 30 MIN: CPT | Performed by: INTERNAL MEDICINE

## 2021-12-02 ASSESSMENT — FIBROSIS 4 INDEX: FIB4 SCORE: 1.19

## 2021-12-02 NOTE — PROGRESS NOTES
Subjective:   Saumya Vann is a 89 y.o. female who presents today for followup of PAF chronic anticoagulation. She has had exertional dyspnea over the past 3 years associated with fatigue. She has a history of HTN, diabetes mellitus, CKD . Her echo is relatively unremarkable aside from LVH and grade 1 diastolic function. PFTs per her primary doctor are unremarkable. She denies any exertional chest pain, PND or orthopnea.     Since last visit underwent ARBEN showing successful watchman placement and was told to discontinue her anticoagulants.  She does not bring her medications with her today or a list and is unsure what she is actually taking.  Notes bruising.  Otherwise no cardiac complaints.    Family History   Problem Relation Age of Onset   • Cancer Other      Social History     Tobacco Use   Smoking Status Never Smoker   Smokeless Tobacco Never Used     Allergies   Allergen Reactions   • Pcn [Penicillins] Rash     Rash       Outpatient Encounter Medications as of 12/2/2021   Medication Sig Dispense Refill   • DILTIAZem CD (CARDIZEM CD) 120 MG CAPSULE SR 24 HR TAKE 1 CAPSULE BY MOUTH TWICE DAILY 180 Capsule 1   • aspirin EC (ECOTRIN) 81 MG Tablet Delayed Response Take 1 tablet by mouth every day. 90 tablet 3   • famotidine (PEPCID) 40 MG Tab Take 1 tablet by mouth 2 times a day.     • LEVOTHYROXINE SODIUM PO Take 225 mcg by mouth every Monday, Wednesday, and Friday. Patient takes one and half a Tablet     • spironolactone (ALDACTONE) 25 MG Tab Take 25 mg by mouth every day.     • B Complex-Biotin-FA (SUPER B-COMPLEX PO) Take 1 tablet by mouth every day.     • Ranibizumab (LUCENTIS IO) Inject  into the eye. Avastin or Lucentis 0.5 mg injections for Retinal Vein Occlusion and Macular Degeneration.  Each eye is treated every other month.     • erythromycin 5 MG/GM Ointment Apply 1 Application to left eye 4 times a day. 3.5 g 0   • HYDROcodone-acetaminophen (NORCO) 5-325 MG Tab per tablet as needed.     •  "diazePAM (VALIUM) 5 MG Tab Take 2-120 mg by mouth as needed.     • polyvinyl alcohol-povidone (REFRESH) 1.4-0.6 % ophthalmic solution Place 1 Drop in both eyes as needed. Indications: Dry Eyes caused by Deficiency of Tears     • vitamin D, Ergocalciferol, (DRISDOL) 83528 units Cap capsule Take 50,000 Units by mouth every Saturday.  11   • ondansetron (ZOFRAN ODT) 4 MG TABLET DISPERSIBLE Take 4 mg by mouth as needed. Indications: Nausea and Vomiting     • B Complex Vitamins (VITAMIN B COMPLEX PO) Take 1 Tab by mouth 2 Times a Day.     • alendronate (FOSAMAX) 70 MG Tab Take 70 mg by mouth every Monday.     • omeprazole (PRILOSEC) 20 MG delayed-release capsule Take 20 mg by mouth every evening.     • latanoprost (XALATAN) 0.005 % Solution Place 1 Drop in left eye every bedtime.     • levothyroxine (SYNTHROID) 150 MCG TABS Take 150 mcg by mouth in the morning every Tue, Thurs, Sat, and Sun.       No facility-administered encounter medications on file as of 12/2/2021.     Review of Systems   All other systems reviewed and are negative.       Objective:   /64 (BP Location: Left arm, Patient Position: Sitting)   Pulse 96   Ht 1.575 m (5' 2\")   Wt 78.5 kg (173 lb)   SpO2 100%   BMI 31.64 kg/m²     Physical Exam  Constitutional:       General: She is not in acute distress.     Appearance: She is well-developed. She is not diaphoretic.      Comments: Frail  Walker unsteady gait       HENT:      Head: Normocephalic and atraumatic.      Mouth/Throat:      Pharynx: No oropharyngeal exudate.   Eyes:      General: No scleral icterus.     Conjunctiva/sclera: Conjunctivae normal.      Pupils: Pupils are equal, round, and reactive to light.   Neck:      Thyroid: No thyromegaly.      Vascular: No JVD.   Cardiovascular:      Rate and Rhythm: Normal rate and regular rhythm. Frequent extrasystoles are present.     Chest Wall: PMI is not displaced.      Pulses:           Carotid pulses are 2+ on the right side and 2+ on the " left side.       Femoral pulses are 2+ on the right side and 2+ on the left side.       Popliteal pulses are 1+ on the right side and 1+ on the left side.        Dorsalis pedis pulses are 2+ on the right side and 2+ on the left side.        Posterior tibial pulses are 2+ on the right side and 2+ on the left side.      Heart sounds: Normal heart sounds. No murmur heard.  No friction rub. No gallop.    Pulmonary:      Effort: Pulmonary effort is normal.      Breath sounds: Normal breath sounds. No wheezing or rales.   Abdominal:      General: Bowel sounds are normal. There is no distension.      Palpations: Abdomen is soft.      Tenderness: There is no abdominal tenderness.   Musculoskeletal:         General: No tenderness.      Cervical back: Normal range of motion and neck supple.   Skin:     General: Skin is warm and dry.      Findings: No erythema or rash.   Neurological:      Mental Status: She is alert and oriented to person, place, and time.      Cranial Nerves: No cranial nerve deficit.   Psychiatric:         Behavior: Behavior normal.         LABS:  Lab Results   Component Value Date/Time    CHOLSTRLTOT 177 05/12/2021 01:02 PM    LDL 97 05/12/2021 01:02 PM    HDL 63 05/12/2021 01:02 PM    TRIGLYCERIDE 84 05/12/2021 01:02 PM       Lab Results   Component Value Date/Time    WBC 10.9 (H) 05/12/2021 01:02 PM    RBC 4.61 05/12/2021 01:02 PM    HEMOGLOBIN 14.1 05/12/2021 01:02 PM    HEMATOCRIT 43.9 05/12/2021 01:02 PM    MCV 95.2 05/12/2021 01:02 PM    NEUTSPOLYS 80.30 (H) 05/12/2021 01:02 PM    LYMPHOCYTES 11.80 (L) 05/12/2021 01:02 PM    MONOCYTES 5.90 05/12/2021 01:02 PM    EOSINOPHILS 0.30 05/12/2021 01:02 PM    BASOPHILS 0.60 05/12/2021 01:02 PM    HYPOCHROMIA 1+ 04/10/2014 03:03 PM     Lab Results   Component Value Date/Time    SODIUM 141 05/12/2021 01:02 PM    POTASSIUM 4.3 05/12/2021 01:02 PM    CHLORIDE 102 05/12/2021 01:02 PM    CO2 28 05/12/2021 01:02 PM    GLUCOSE 103 (H) 05/12/2021 01:02 PM    BUN 12  05/12/2021 01:02 PM    CREATININE 0.71 05/12/2021 01:02 PM     Lab Results   Component Value Date    HBA1C 5.8 (H) 11/19/2021      Lab Results   Component Value Date/Time    ALKPHOSPHAT 90 05/12/2021 01:02 PM    ASTSGOT 17 05/12/2021 01:02 PM    ALTSGPT 14 05/12/2021 01:02 PM    TBILIRUBIN 0.7 05/12/2021 01:02 PM      No results found for: BNPBTYPENAT   No results found for: TSH  Lab Results   Component Value Date/Time    PROTHROMBTM 22.4 (H) 04/23/2021 01:13 PM    INR 1.91 (H) 04/23/2021 01:13 PM      STRESS (6/2017):  CONCLUSIONS AND IMPRESSIONS:  Overall, there is no evidence of myocardial    ischemia based on EKG tracing along with myocardial PET scan images.  The    rhythm is atrial fibrillation throughout the stress test.  No malignant    arrhythmias noted.    ECHO CONCLUSIONS (4/27/2016):  Normal left ventricular size and systolic function.  Left ventricular ejection fraction is visually estimated to be 55%.  Moderate concentric left ventricular hypertrophy.  Normal regional wall motion.  Mild mitral regurgitation.  Mild tricuspid regurgitation.  Estimated right ventricular systolic pressure is 25 mmHg.  Normal inferior vena cava size and inspiratory collapse.    ECHO (11/10/2014):  I have personally reviewed the echocardiogram this visit and reviewed the findings with the patient. It shows:   Grade 1 diastolic dysfunction, EF 60-65%, moderate LVH, left atrial enlargement    PFTs as above      Assessment:     1. Paroxysmal atrial fibrillation (HCC)     2. Primary hypertension     3. Presence of Watchman left atrial appendage closure device: 24 mm palced 4/28/21           Medical Decision Making:  Today's Assessment / Status / Plan:       Heart rates well.  Notes morning palpitations.  She checked her heart rate but she has not done that before when these episodes occur.  Never bothered her during the day.  Watchman left atrial occluder is present and confirmed effective.  She should be off her anticoagulant  and on aspirin 81 mg daily monotherapy.  She is unsure of her medications and will check when she gets home.  Continue other medical therapy and follow-up in 6 months.

## 2022-01-05 ENCOUNTER — APPOINTMENT (OUTPATIENT)
Dept: PHARMACY | Facility: MEDICAL CENTER | Age: 87
End: 2022-01-05
Payer: MEDICARE

## 2022-01-05 PROCEDURE — RXMED WILLOW AMBULATORY MEDICATION CHARGE: Performed by: INTERNAL MEDICINE

## 2022-01-06 ENCOUNTER — PHARMACY VISIT (OUTPATIENT)
Dept: PHARMACY | Facility: MEDICAL CENTER | Age: 87
End: 2022-01-06
Payer: COMMERCIAL

## 2022-01-26 ENCOUNTER — HOME HEALTH ADMISSION (OUTPATIENT)
Dept: HOME HEALTH SERVICES | Facility: HOME HEALTHCARE | Age: 87
End: 2022-01-26
Payer: MEDICARE

## 2022-01-28 ENCOUNTER — HOME CARE VISIT (OUTPATIENT)
Dept: HOME HEALTH SERVICES | Facility: HOME HEALTHCARE | Age: 87
End: 2022-01-28

## 2022-01-28 ENCOUNTER — HOSPITAL ENCOUNTER (INPATIENT)
Facility: MEDICAL CENTER | Age: 87
LOS: 3 days | DRG: 478 | End: 2022-02-02
Attending: EMERGENCY MEDICINE | Admitting: INTERNAL MEDICINE
Payer: MEDICARE

## 2022-01-28 ENCOUNTER — APPOINTMENT (OUTPATIENT)
Dept: RADIOLOGY | Facility: MEDICAL CENTER | Age: 87
DRG: 478 | End: 2022-01-28
Attending: EMERGENCY MEDICINE
Payer: MEDICARE

## 2022-01-28 DIAGNOSIS — M46.40 DISCITIS, UNSPECIFIED SPINAL REGION: Primary | ICD-10-CM

## 2022-01-28 DIAGNOSIS — M51.36 DEGENERATIVE DISC DISEASE, LUMBAR: ICD-10-CM

## 2022-01-28 DIAGNOSIS — R78.81 BACTEREMIA: ICD-10-CM

## 2022-01-28 DIAGNOSIS — M54.42 ACUTE LEFT-SIDED LOW BACK PAIN WITH LEFT-SIDED SCIATICA: ICD-10-CM

## 2022-01-28 DIAGNOSIS — S32.10XA CLOSED FRACTURE OF SACRUM, UNSPECIFIED PORTION OF SACRUM, INITIAL ENCOUNTER (HCC): ICD-10-CM

## 2022-01-28 DIAGNOSIS — I48.0 PAROXYSMAL ATRIAL FIBRILLATION (HCC): ICD-10-CM

## 2022-01-28 PROBLEM — M54.9 BACK PAIN: Status: ACTIVE | Noted: 2022-01-28

## 2022-01-28 PROBLEM — R07.9 CHEST PAIN: Status: ACTIVE | Noted: 2022-01-28

## 2022-01-28 LAB
ALBUMIN SERPL BCP-MCNC: 3.2 G/DL (ref 3.2–4.9)
ALBUMIN/GLOB SERPL: 1.3 G/DL
ALP SERPL-CCNC: 221 U/L (ref 30–99)
ALT SERPL-CCNC: 76 U/L (ref 2–50)
ANION GAP SERPL CALC-SCNC: 15 MMOL/L (ref 7–16)
ANISOCYTOSIS BLD QL SMEAR: ABNORMAL
ANISOCYTOSIS BLD QL SMEAR: ABNORMAL
APPEARANCE UR: CLEAR
AST SERPL-CCNC: 36 U/L (ref 12–45)
BACTERIA #/AREA URNS HPF: ABNORMAL /HPF
BASOPHILS # BLD AUTO: 0 % (ref 0–1.8)
BASOPHILS # BLD AUTO: 0 % (ref 0–1.8)
BASOPHILS # BLD: 0 K/UL (ref 0–0.12)
BASOPHILS # BLD: 0 K/UL (ref 0–0.12)
BILIRUB SERPL-MCNC: 0.5 MG/DL (ref 0.1–1.5)
BILIRUB UR QL STRIP.AUTO: NEGATIVE
BUN SERPL-MCNC: 16 MG/DL (ref 8–22)
BURR CELLS BLD QL SMEAR: NORMAL
CALCIUM SERPL-MCNC: 8.2 MG/DL (ref 8.5–10.5)
CHLORIDE SERPL-SCNC: 100 MMOL/L (ref 96–112)
CO2 SERPL-SCNC: 22 MMOL/L (ref 20–33)
COLOR UR: YELLOW
CREAT SERPL-MCNC: 0.7 MG/DL (ref 0.5–1.4)
CRP SERPL HS-MCNC: 11.21 MG/DL (ref 0–0.75)
EOSINOPHIL # BLD AUTO: 0.12 K/UL (ref 0–0.51)
EOSINOPHIL # BLD AUTO: 0.21 K/UL (ref 0–0.51)
EOSINOPHIL NFR BLD: 0.9 % (ref 0–6.9)
EOSINOPHIL NFR BLD: 1.8 % (ref 0–6.9)
EPI CELLS #/AREA URNS HPF: ABNORMAL /HPF
ERYTHROCYTE [DISTWIDTH] IN BLOOD BY AUTOMATED COUNT: 49.4 FL (ref 35.9–50)
ERYTHROCYTE [DISTWIDTH] IN BLOOD BY AUTOMATED COUNT: 49.7 FL (ref 35.9–50)
ERYTHROCYTE [SEDIMENTATION RATE] IN BLOOD BY WESTERGREN METHOD: 17 MM/HOUR (ref 0–25)
GLOBULIN SER CALC-MCNC: 2.5 G/DL (ref 1.9–3.5)
GLUCOSE SERPL-MCNC: 150 MG/DL (ref 65–99)
GLUCOSE UR STRIP.AUTO-MCNC: NEGATIVE MG/DL
HCT VFR BLD AUTO: 40.4 % (ref 37–47)
HCT VFR BLD AUTO: 41 % (ref 37–47)
HGB BLD-MCNC: 13.7 G/DL (ref 12–16)
HGB BLD-MCNC: 14 G/DL (ref 12–16)
HYALINE CASTS #/AREA URNS LPF: ABNORMAL /LPF
KETONES UR STRIP.AUTO-MCNC: NEGATIVE MG/DL
LEUKOCYTE ESTERASE UR QL STRIP.AUTO: ABNORMAL
LYMPHOCYTES # BLD AUTO: 1.16 K/UL (ref 1–4.8)
LYMPHOCYTES # BLD AUTO: 1.51 K/UL (ref 1–4.8)
LYMPHOCYTES NFR BLD: 11.5 % (ref 22–41)
LYMPHOCYTES NFR BLD: 9.8 % (ref 22–41)
MANUAL DIFF BLD: NORMAL
MANUAL DIFF BLD: NORMAL
MCH RBC QN AUTO: 30.4 PG (ref 27–33)
MCH RBC QN AUTO: 30.4 PG (ref 27–33)
MCHC RBC AUTO-ENTMCNC: 33.9 G/DL (ref 33.6–35)
MCHC RBC AUTO-ENTMCNC: 34.1 G/DL (ref 33.6–35)
MCV RBC AUTO: 89.1 FL (ref 81.4–97.8)
MCV RBC AUTO: 89.8 FL (ref 81.4–97.8)
METAMYELOCYTES NFR BLD MANUAL: 1.8 %
METAMYELOCYTES NFR BLD MANUAL: 3.5 %
MICRO URNS: ABNORMAL
MICROCYTES BLD QL SMEAR: ABNORMAL
MICROCYTES BLD QL SMEAR: ABNORMAL
MONOCYTES # BLD AUTO: 0.42 K/UL (ref 0–0.85)
MONOCYTES # BLD AUTO: 0.58 K/UL (ref 0–0.85)
MONOCYTES NFR BLD AUTO: 3.6 % (ref 0–13.4)
MONOCYTES NFR BLD AUTO: 4.4 % (ref 0–13.4)
MORPHOLOGY BLD-IMP: NORMAL
MORPHOLOGY BLD-IMP: NORMAL
MYELOCYTES NFR BLD MANUAL: 1.8 %
MYELOCYTES NFR BLD MANUAL: 4.4 %
NEUTROPHILS # BLD AUTO: 10.2 K/UL (ref 2–7.15)
NEUTROPHILS # BLD AUTO: 9.17 K/UL (ref 2–7.15)
NEUTROPHILS NFR BLD: 77.7 % (ref 44–72)
NEUTROPHILS NFR BLD: 77.9 % (ref 44–72)
NITRITE UR QL STRIP.AUTO: NEGATIVE
NRBC # BLD AUTO: 0 K/UL
NRBC # BLD AUTO: 0 K/UL
NRBC BLD-RTO: 0 /100 WBC
NRBC BLD-RTO: 0 /100 WBC
OVALOCYTES BLD QL SMEAR: NORMAL
PH UR STRIP.AUTO: 6 [PH] (ref 5–8)
PLATELET # BLD AUTO: 198 K/UL (ref 164–446)
PLATELET # BLD AUTO: 206 K/UL (ref 164–446)
PLATELET BLD QL SMEAR: NORMAL
PLATELET BLD QL SMEAR: NORMAL
PMV BLD AUTO: 9.7 FL (ref 9–12.9)
PMV BLD AUTO: 9.8 FL (ref 9–12.9)
POIKILOCYTOSIS BLD QL SMEAR: NORMAL
POIKILOCYTOSIS BLD QL SMEAR: NORMAL
POLYCHROMASIA BLD QL SMEAR: NORMAL
POTASSIUM SERPL-SCNC: 4 MMOL/L (ref 3.6–5.5)
PROMYELOCYTES NFR BLD MANUAL: 0.9 %
PROT SERPL-MCNC: 5.7 G/DL (ref 6–8.2)
PROT UR QL STRIP: NEGATIVE MG/DL
RBC # BLD AUTO: 4.5 M/UL (ref 4.2–5.4)
RBC # BLD AUTO: 4.6 M/UL (ref 4.2–5.4)
RBC # URNS HPF: ABNORMAL /HPF
RBC BLD AUTO: PRESENT
RBC BLD AUTO: PRESENT
RBC UR QL AUTO: NEGATIVE
SARS-COV+SARS-COV-2 AG RESP QL IA.RAPID: NOTDETECTED
SCHISTOCYTES BLD QL SMEAR: NORMAL
SODIUM SERPL-SCNC: 137 MMOL/L (ref 135–145)
SP GR UR STRIP.AUTO: 1.01
SPECIMEN SOURCE: NORMAL
UROBILINOGEN UR STRIP.AUTO-MCNC: 0.2 MG/DL
WAXY CASTS #/AREA URNS LPF: ABNORMAL /LPF
WBC # BLD AUTO: 11.8 K/UL (ref 4.8–10.8)
WBC # BLD AUTO: 13.1 K/UL (ref 4.8–10.8)
WBC #/AREA URNS HPF: ABNORMAL /HPF

## 2022-01-28 PROCEDURE — 99220 PR INITIAL OBSERVATION CARE,LEVL III: CPT | Performed by: INTERNAL MEDICINE

## 2022-01-28 PROCEDURE — 87077 CULTURE AEROBIC IDENTIFY: CPT

## 2022-01-28 PROCEDURE — 96365 THER/PROPH/DIAG IV INF INIT: CPT

## 2022-01-28 PROCEDURE — 700111 HCHG RX REV CODE 636 W/ 250 OVERRIDE (IP): Performed by: EMERGENCY MEDICINE

## 2022-01-28 PROCEDURE — 36415 COLL VENOUS BLD VENIPUNCTURE: CPT

## 2022-01-28 PROCEDURE — G0378 HOSPITAL OBSERVATION PER HR: HCPCS

## 2022-01-28 PROCEDURE — 96375 TX/PRO/DX INJ NEW DRUG ADDON: CPT

## 2022-01-28 PROCEDURE — 87186 SC STD MICRODIL/AGAR DIL: CPT

## 2022-01-28 PROCEDURE — 85007 BL SMEAR W/DIFF WBC COUNT: CPT

## 2022-01-28 PROCEDURE — 85652 RBC SED RATE AUTOMATED: CPT

## 2022-01-28 PROCEDURE — 86140 C-REACTIVE PROTEIN: CPT

## 2022-01-28 PROCEDURE — 85027 COMPLETE CBC AUTOMATED: CPT | Mod: 91

## 2022-01-28 PROCEDURE — 87150 DNA/RNA AMPLIFIED PROBE: CPT

## 2022-01-28 PROCEDURE — 81001 URINALYSIS AUTO W/SCOPE: CPT

## 2022-01-28 PROCEDURE — 87426 SARSCOV CORONAVIRUS AG IA: CPT

## 2022-01-28 PROCEDURE — 87040 BLOOD CULTURE FOR BACTERIA: CPT | Mod: 91

## 2022-01-28 PROCEDURE — 700105 HCHG RX REV CODE 258: Performed by: EMERGENCY MEDICINE

## 2022-01-28 PROCEDURE — 99285 EMERGENCY DEPT VISIT HI MDM: CPT

## 2022-01-28 PROCEDURE — 72148 MRI LUMBAR SPINE W/O DYE: CPT | Mod: MG

## 2022-01-28 PROCEDURE — 80053 COMPREHEN METABOLIC PANEL: CPT

## 2022-01-28 RX ORDER — LABETALOL HYDROCHLORIDE 5 MG/ML
10 INJECTION, SOLUTION INTRAVENOUS EVERY 4 HOURS PRN
Status: DISCONTINUED | OUTPATIENT
Start: 2022-01-28 | End: 2022-02-02 | Stop reason: HOSPADM

## 2022-01-28 RX ORDER — MORPHINE SULFATE 4 MG/ML
2 INJECTION INTRAVENOUS
Status: DISCONTINUED | OUTPATIENT
Start: 2022-01-28 | End: 2022-02-02 | Stop reason: HOSPADM

## 2022-01-28 RX ORDER — ALENDRONATE SODIUM 70 MG/1
70 TABLET ORAL
Status: DISCONTINUED | OUTPATIENT
Start: 2022-01-31 | End: 2022-02-02 | Stop reason: HOSPADM

## 2022-01-28 RX ORDER — CYCLOBENZAPRINE HCL 5 MG
10 TABLET ORAL NIGHTLY PRN
Status: SHIPPED | COMMUNITY
End: 2022-03-18

## 2022-01-28 RX ORDER — DILTIAZEM HYDROCHLORIDE 120 MG/1
120 CAPSULE, COATED, EXTENDED RELEASE ORAL 2 TIMES DAILY
Status: DISCONTINUED | OUTPATIENT
Start: 2022-01-28 | End: 2022-02-02 | Stop reason: HOSPADM

## 2022-01-28 RX ORDER — DEXTROSE MONOHYDRATE 25 G/50ML
50 INJECTION, SOLUTION INTRAVENOUS
Status: DISCONTINUED | OUTPATIENT
Start: 2022-01-28 | End: 2022-02-02 | Stop reason: HOSPADM

## 2022-01-28 RX ORDER — OMEPRAZOLE 20 MG/1
20 CAPSULE, DELAYED RELEASE ORAL EVERY EVENING
Status: DISCONTINUED | OUTPATIENT
Start: 2022-01-28 | End: 2022-01-28

## 2022-01-28 RX ORDER — LEVOTHYROXINE SODIUM 0.07 MG/1
225 TABLET ORAL
Status: DISCONTINUED | OUTPATIENT
Start: 2022-01-31 | End: 2022-01-28

## 2022-01-28 RX ORDER — SODIUM CHLORIDE 9 MG/ML
INJECTION, SOLUTION INTRAVENOUS CONTINUOUS
Status: DISCONTINUED | OUTPATIENT
Start: 2022-01-28 | End: 2022-01-29

## 2022-01-28 RX ORDER — KETOROLAC TROMETHAMINE 30 MG/ML
15 INJECTION, SOLUTION INTRAMUSCULAR; INTRAVENOUS ONCE
Status: COMPLETED | OUTPATIENT
Start: 2022-01-28 | End: 2022-01-28

## 2022-01-28 RX ORDER — LATANOPROST 50 UG/ML
1 SOLUTION/ DROPS OPHTHALMIC
Status: DISCONTINUED | OUTPATIENT
Start: 2022-01-28 | End: 2022-02-02 | Stop reason: HOSPADM

## 2022-01-28 RX ORDER — BISACODYL 10 MG
10 SUPPOSITORY, RECTAL RECTAL
Status: DISCONTINUED | OUTPATIENT
Start: 2022-01-28 | End: 2022-02-02

## 2022-01-28 RX ORDER — M-VIT,TX,IRON,MINS/CALC/FOLIC 27MG-0.4MG
1 TABLET ORAL DAILY
COMMUNITY
End: 2022-04-10 | Stop reason: ALTCHOICE

## 2022-01-28 RX ORDER — ACETAMINOPHEN 325 MG/1
650 TABLET ORAL EVERY 6 HOURS PRN
Status: DISCONTINUED | OUTPATIENT
Start: 2022-01-28 | End: 2022-02-02 | Stop reason: HOSPADM

## 2022-01-28 RX ORDER — OXYCODONE HYDROCHLORIDE 5 MG/1
5 TABLET ORAL
Status: DISCONTINUED | OUTPATIENT
Start: 2022-01-28 | End: 2022-02-02 | Stop reason: HOSPADM

## 2022-01-28 RX ORDER — ONDANSETRON 2 MG/ML
4 INJECTION INTRAMUSCULAR; INTRAVENOUS ONCE
Status: COMPLETED | OUTPATIENT
Start: 2022-01-28 | End: 2022-01-28

## 2022-01-28 RX ORDER — LEVOTHYROXINE SODIUM 0.15 MG/1
150 TABLET ORAL
Status: DISCONTINUED | OUTPATIENT
Start: 2022-01-29 | End: 2022-02-02 | Stop reason: HOSPADM

## 2022-01-28 RX ORDER — ONDANSETRON 2 MG/ML
4 INJECTION INTRAMUSCULAR; INTRAVENOUS EVERY 4 HOURS PRN
Status: DISCONTINUED | OUTPATIENT
Start: 2022-01-28 | End: 2022-02-02 | Stop reason: HOSPADM

## 2022-01-28 RX ORDER — AMOXICILLIN 250 MG
2 CAPSULE ORAL 2 TIMES DAILY
Status: DISCONTINUED | OUTPATIENT
Start: 2022-01-28 | End: 2022-02-02

## 2022-01-28 RX ORDER — POLYETHYLENE GLYCOL 3350 17 G/17G
1 POWDER, FOR SOLUTION ORAL
Status: DISCONTINUED | OUTPATIENT
Start: 2022-01-28 | End: 2022-02-02

## 2022-01-28 RX ORDER — OXYCODONE HYDROCHLORIDE 5 MG/1
2.5 TABLET ORAL
Status: DISCONTINUED | OUTPATIENT
Start: 2022-01-28 | End: 2022-02-02 | Stop reason: HOSPADM

## 2022-01-28 RX ORDER — ONDANSETRON 4 MG/1
4 TABLET, ORALLY DISINTEGRATING ORAL EVERY 4 HOURS PRN
Status: DISCONTINUED | OUTPATIENT
Start: 2022-01-28 | End: 2022-02-02 | Stop reason: HOSPADM

## 2022-01-28 RX ORDER — DICLOFENAC SODIUM 25 MG/1
50 TABLET, DELAYED RELEASE ORAL NIGHTLY
Status: SHIPPED | COMMUNITY
End: 2023-09-09 | Stop reason: SDUPTHER

## 2022-01-28 RX ADMIN — METHOCARBAMOL 1000 MG: 1000 INJECTION, SOLUTION INTRAMUSCULAR; INTRAVENOUS at 14:10

## 2022-01-28 RX ADMIN — KETOROLAC TROMETHAMINE 15 MG: 30 INJECTION, SOLUTION INTRAMUSCULAR at 13:39

## 2022-01-28 RX ADMIN — ONDANSETRON 4 MG: 2 INJECTION INTRAMUSCULAR; INTRAVENOUS at 13:41

## 2022-01-28 RX ADMIN — SODIUM CHLORIDE 1000 ML: 9 INJECTION, SOLUTION INTRAVENOUS at 13:37

## 2022-01-28 ASSESSMENT — ENCOUNTER SYMPTOMS
NERVOUS/ANXIOUS: 0
SPUTUM PRODUCTION: 0
PALPITATIONS: 0
COUGH: 0
VOMITING: 0
HEMOPTYSIS: 0
BLURRED VISION: 0
BACK PAIN: 1
FEVER: 0
POLYDIPSIA: 0
HALLUCINATIONS: 0
ORTHOPNEA: 0
FLANK PAIN: 0
SPEECH CHANGE: 0
WEIGHT LOSS: 0
NAUSEA: 0
HEARTBURN: 0
TREMORS: 0
BRUISES/BLEEDS EASILY: 0
FOCAL WEAKNESS: 0
DOUBLE VISION: 0
HEADACHES: 0
NECK PAIN: 0
PHOTOPHOBIA: 0
CHILLS: 0

## 2022-01-28 ASSESSMENT — LIFESTYLE VARIABLES
TOTAL SCORE: 0
DO YOU DRINK ALCOHOL: NO
SUBSTANCE_ABUSE: 0
EVER FELT BAD OR GUILTY ABOUT YOUR DRINKING: NO
HOW MANY TIMES IN THE PAST YEAR HAVE YOU HAD 5 OR MORE DRINKS IN A DAY: 0
TOTAL SCORE: 0
TOTAL SCORE: 0
CONSUMPTION TOTAL: NEGATIVE
EVER HAD A DRINK FIRST THING IN THE MORNING TO STEADY YOUR NERVES TO GET RID OF A HANGOVER: NO
ON A TYPICAL DAY WHEN YOU DRINK ALCOHOL HOW MANY DRINKS DO YOU HAVE: 0
HAVE YOU EVER FELT YOU SHOULD CUT DOWN ON YOUR DRINKING: NO
AVERAGE NUMBER OF DAYS PER WEEK YOU HAVE A DRINK CONTAINING ALCOHOL: 0
HAVE PEOPLE ANNOYED YOU BY CRITICIZING YOUR DRINKING: NO

## 2022-01-28 ASSESSMENT — FIBROSIS 4 INDEX
FIB4 SCORE: 1.82
FIB4 SCORE: 1.2

## 2022-01-28 NOTE — ED NOTES
Report received from Ba .Pt is A/Ox3, speaking in full sentences, follows commands and responds appropriately to questions. Resp are even and unlabored. Pure wick in place. Changed linens. NS gtt infusing. Family at bedside. Waiting for MR around 1700     This RN masked and in appropriate PPE during encounter.

## 2022-01-28 NOTE — ED PROVIDER NOTES
ED Provider Note     Scribed for Leigha Jenkins D.O. by Clint Beltre. 1/28/2022, 12:21 PM.     Primary care provider: Bebe Santamaria M.D.  Means of arrival: EMS         History obtained from: Patient  History limited by: None    CHIEF COMPLAINT  Chief Complaint   Patient presents with   • Hip Pain     L hip/buttocks/lower back pain, acute on chronic, mechanical injury 1 year ago while bending over, surgery shortly after, now acute pain X 5 days, pt denies incident for acute pain, pt ambulates at baseline with walker but is not able to ambulate now r/t pain        HPI  Saumya Vann is a 89 y.o. female who presents to the emergency Department for evaluation of acute on chronic left hip pain onset five days ago. Patient had a procedure on her lower back five days ago after an injury from one year ago. Four days ago patient had explosive diarrhea and was bent over on the toilet which worsened her pain. She admits to associated symptoms of weakness and pain radiating down to her lower extremities and toes, cough onset today, abdominal pain, and dark urine, but denies falls. She states her pain is worse when she is upright versus laying flat. She has not had a bowel movement since her diarrhea four days ago. Patient received an epidural steroid shot five days ago at pain management. She was also seen at pain management on 1/5/22 where she received several pain medications. No alleviating factors were reported. She lives with her grandson. Patient has a Watchman device in her heart to prevent blood clots after her history of falls. She sees a Cardiologist.     REVIEW OF SYSTEMS  Pertinent positives include right hip pain, low back pain, pain when bent over, pain in lower extremities and toes. Pertinent negatives include no falls.   See HPI for further details. All other systems are negative.    PAST MEDICAL HISTORY  Past Medical History:   Diagnosis Date   • A-fib (HCC)    • Breath shortness    • Cataract  04/23/2021    Surgically removed bilat   • Diabetes (HCC) 04/23/2021    Diet-controlled   • Dry cough 04/23/2021   • Glaucoma 04/23/2021    Left eye   • Hypertension    • Hypothyroid    • Macular degeneration    • Pain 04/23/2021    Back and Leg   • Urinary incontinence        FAMILY HISTORY  Family History   Problem Relation Age of Onset   • Cancer Other        SOCIAL HISTORY  Social History     Tobacco Use   • Smoking status: Never Smoker   • Smokeless tobacco: Never Used   Vaping Use   • Vaping Use: Never used   Substance Use Topics   • Alcohol use: No   • Drug use: No      Social History     Substance and Sexual Activity   Drug Use No       SURGICAL HISTORY  Past Surgical History:   Procedure Laterality Date   • OTHER ORTHOPEDIC SURGERY  2002    2 Knee Replacements    • CATARACT EXTRACTION WITH IOL  2001   • NEUROMA EXCISION  1985    Right Foot   • CHOLECYSTECTOMY  1970   • APPENDECTOMY  1957   • OTHER  1952    Varicose veins   • APPENDECTOMY     • CHOLECYSTECTOMY     • HYSTERECTOMY, TOTAL ABDOMINAL     • KNEE REPLACEMENT, TOTAL Bilateral    • US-NEEDLE CORE BX-BREAST PANEL         CURRENT MEDICATIONS    Current Facility-Administered Medications:   •  NS infusion, , Intravenous, Continuous, Leigha Jenkins D.O., Stopped at 01/28/22 1943    Current Outpatient Medications:   •  COVID-19 mRNA Vaccine, Pfizer, (PFIZER-BIONTECH COVID-19 VACC) 30 MCG/0.3ML Suspension injection, Inject  into the shoulder, thigh, or buttocks., Disp: 0.3 mL, Rfl: 0  •  DILTIAZem CD (CARDIZEM CD) 120 MG CAPSULE SR 24 HR, TAKE 1 CAPSULE BY MOUTH TWICE DAILY, Disp: 180 Capsule, Rfl: 1  •  aspirin EC (ECOTRIN) 81 MG Tablet Delayed Response, Take 1 tablet by mouth every day., Disp: 90 tablet, Rfl: 3  •  famotidine (PEPCID) 40 MG Tab, Take 1 tablet by mouth 2 times a day., Disp: , Rfl:   •  LEVOTHYROXINE SODIUM PO, Take 225 mcg by mouth every Monday, Wednesday, and Friday. Patient takes one and half a Tablet, Disp: , Rfl:   •  spironolactone  "(ALDACTONE) 25 MG Tab, Take 25 mg by mouth every day., Disp: , Rfl:   •  B Complex-Biotin-FA (SUPER B-COMPLEX PO), Take 1 tablet by mouth every day., Disp: , Rfl:   •  Ranibizumab (LUCENTIS IO), Inject  into the eye. Avastin or Lucentis 0.5 mg injections for Retinal Vein Occlusion and Macular Degeneration.  Each eye is treated every other month., Disp: , Rfl:   •  erythromycin 5 MG/GM Ointment, Apply 1 Application to left eye 4 times a day., Disp: 3.5 g, Rfl: 0  •  HYDROcodone-acetaminophen (NORCO) 5-325 MG Tab per tablet, as needed., Disp: , Rfl:   •  diazePAM (VALIUM) 5 MG Tab, Take 2-120 mg by mouth as needed., Disp: , Rfl:   •  polyvinyl alcohol-povidone (REFRESH) 1.4-0.6 % ophthalmic solution, Place 1 Drop in both eyes as needed. Indications: Dry Eyes caused by Deficiency of Tears, Disp: , Rfl:   •  vitamin D, Ergocalciferol, (DRISDOL) 76577 units Cap capsule, Take 50,000 Units by mouth every Saturday., Disp: , Rfl: 11  •  ondansetron (ZOFRAN ODT) 4 MG TABLET DISPERSIBLE, Take 4 mg by mouth as needed. Indications: Nausea and Vomiting, Disp: , Rfl:   •  B Complex Vitamins (VITAMIN B COMPLEX PO), Take 1 Tab by mouth 2 Times a Day., Disp: , Rfl:   •  alendronate (FOSAMAX) 70 MG Tab, Take 70 mg by mouth every Monday., Disp: , Rfl:   •  omeprazole (PRILOSEC) 20 MG delayed-release capsule, Take 20 mg by mouth every evening., Disp: , Rfl:   •  latanoprost (XALATAN) 0.005 % Solution, Place 1 Drop in left eye every bedtime., Disp: , Rfl:   •  levothyroxine (SYNTHROID) 150 MCG TABS, Take 150 mcg by mouth in the morning every Tue, Thurs, Sat, and Sun., Disp: , Rfl:     ALLERGIES  Allergies   Allergen Reactions   • Pcn [Penicillins] Rash     Rash         PHYSICAL EXAM  VITAL SIGNS: /73   Pulse 95   Temp 36.1 °C (97 °F) (Temporal)   Resp 18   Ht 1.626 m (5' 4\")   Wt 77.1 kg (170 lb) Comment: Estimated weight Via Pt  SpO2 95%   BMI 29.18 kg/m²     Constitutional: Patient is well developed, well nourished.  No " acute distress unless she moves.  HENT: Normocephalic, atraumatic.Nose normal with no mucosal edema or drainage. Oropharynx moist without erythema or exudates.  Eyes: PERRL, EOMI  Cardiovascular: Normal heart rate and Regular rhythm. No murmur  Thorax & Lungs: Clear and equal breath sounds with good excursion. No respiratory distress, no rhonchi, wheezing or rales.  Abdomen: Bowel sounds normal in all four quadrants. Soft,nontender, no rebound , guarding, palpable masses.   Skin: Warm, Dry, No contusions, abrasions, no rashes.   Back: No cervical, thoracic tenderness. No CVA tenderness.  Patient is very tender in the lumbosacral spine particularly in the sacral region greater on the left than the right.  Extremities: Tenderness in left SI joint, Peripheral pulses 4/4   Musculoskeletal: Normal range of motion in all major joints. No tenderness to palpation or major deformities noted.  Neurologic: Alert & oriented x 3, Normal motor function, Normal sensory function,  DTR's 4/4 bilaterally.  Psychiatric: Affect normal, Judgment normal, Mood normal.     DIAGNOSTICS/PROCEDURES    LABS  Results for orders placed or performed during the hospital encounter of 01/28/22   CBC WITH DIFFERENTIAL   Result Value Ref Range    WBC 13.1 (H) 4.8 - 10.8 K/uL    RBC 4.60 4.20 - 5.40 M/uL    Hemoglobin 14.0 12.0 - 16.0 g/dL    Hematocrit 41.0 37.0 - 47.0 %    MCV 89.1 81.4 - 97.8 fL    MCH 30.4 27.0 - 33.0 pg    MCHC 34.1 33.6 - 35.0 g/dL    RDW 49.4 35.9 - 50.0 fL    Platelet Count 198 164 - 446 K/uL    MPV 9.7 9.0 - 12.9 fL    Neutrophils-Polys 77.90 (H) 44.00 - 72.00 %    Lymphocytes 11.50 (L) 22.00 - 41.00 %    Monocytes 4.40 0.00 - 13.40 %    Eosinophils 0.90 0.00 - 6.90 %    Basophils 0.00 0.00 - 1.80 %    Nucleated RBC 0.00 /100 WBC    Neutrophils (Absolute) 10.20 (H) 2.00 - 7.15 K/uL    Lymphs (Absolute) 1.51 1.00 - 4.80 K/uL    Monos (Absolute) 0.58 0.00 - 0.85 K/uL    Eos (Absolute) 0.12 0.00 - 0.51 K/uL    Baso (Absolute)  0.00 0.00 - 0.12 K/uL    NRBC (Absolute) 0.00 K/uL    Anisocytosis 1+     Microcytosis 1+    COMP METABOLIC PANEL   Result Value Ref Range    Sodium 137 135 - 145 mmol/L    Potassium 4.0 3.6 - 5.5 mmol/L    Chloride 100 96 - 112 mmol/L    Co2 22 20 - 33 mmol/L    Anion Gap 15.0 7.0 - 16.0    Glucose 150 (H) 65 - 99 mg/dL    Bun 16 8 - 22 mg/dL    Creatinine 0.70 0.50 - 1.40 mg/dL    Calcium 8.2 (L) 8.5 - 10.5 mg/dL    AST(SGOT) 36 12 - 45 U/L    ALT(SGPT) 76 (H) 2 - 50 U/L    Alkaline Phosphatase 221 (H) 30 - 99 U/L    Total Bilirubin 0.5 0.1 - 1.5 mg/dL    Albumin 3.2 3.2 - 4.9 g/dL    Total Protein 5.7 (L) 6.0 - 8.2 g/dL    Globulin 2.5 1.9 - 3.5 g/dL    A-G Ratio 1.3 g/dL   URINALYSIS (UA)    Specimen: Blood   Result Value Ref Range    Color Yellow     Character Clear     Specific Gravity 1.011 <1.035    Ph 6.0 5.0 - 8.0    Glucose Negative Negative mg/dL    Ketones Negative Negative mg/dL    Protein Negative Negative mg/dL    Bilirubin Negative Negative    Urobilinogen, Urine 0.2 Negative    Nitrite Negative Negative    Leukocyte Esterase Trace (A) Negative    Occult Blood Negative Negative    Micro Urine Req Microscopic    ESTIMATED GFR   Result Value Ref Range    GFR If African American >60 >60 mL/min/1.73 m 2    GFR If Non African American >60 >60 mL/min/1.73 m 2   URINE MICROSCOPIC (W/UA)   Result Value Ref Range    WBC 0-2 /hpf    RBC 0-2 /hpf    Bacteria Few (A) None /hpf    Epithelial Cells Few /hpf    Hyaline Cast 3-5 (A) /lpf    Waxy Cast 0-2 (A) /lpf   DIFFERENTIAL MANUAL   Result Value Ref Range    Metamyelocytes 3.50 %    Myelocytes 1.80 %    Manual Diff Status PERFORMED    PERIPHERAL SMEAR REVIEW   Result Value Ref Range    Peripheral Smear Review see below    PLATELET ESTIMATE   Result Value Ref Range    Plt Estimation Normal    MORPHOLOGY   Result Value Ref Range    RBC Morphology Present     Polychromia 1+     Poikilocytosis 2+     Echinocytes 2+    Labs reviewed by me    EKG  12 Lead EKG  interpreted by me as above.       RADIOLOGY/PROCEDURES  MR-LUMBAR SPINE-W/O    (Results Pending)   Patient's MR of the lumbar spine was delayed significantly secondary to inpatient studies.  It was eventually performed and the read was given to me at 8:55 PM.  It was noted that the patient had increased T2 signal at L2-L3 disc with some fluid is well sacral insufficiency fracture.  Results and radiologist interpretation reviewed by me.     COURSE & MEDICAL DECISION MAKING  Pertinent Labs & Imaging studies reviewed. (See chart for details)    12:21 PM - Patient seen and evaluated at bedside. Patient's grandson was present at bedside. I informed patient and grandson of plan to obtain labs and imaging to rule out hematoma. Ordered for MR-lumbar-spine, urinalysis, CBC with diff, CMP to evaluate. Patient will be treated with Zofran 4mg injection, Robaxin 1,000 mg in  mL, toradol 15 mg injection, and NS infusion  for her symptoms. Differential diagnoses include, but are not limited to, epidural hematoma, epidural abscess, low back strain    2:23 PM Patient was reevaluated at bedside. She appears improved with medication.     I discussed patient's test results with her and her grandson and the plan will be to admit the patient into the hospital.  Additional blood work has been drawn.    I also spoke with Dr. Nguyễn on-call for spine tonight and he recommended patient be admitted with interventional radiology for possible biopsy of the disc in the morning.  In the meantime the patient is being treated for her pain.  She is currently comfortable.   I spoke with hospitalist  who will admit the patient.  She is currently in guarded condition.    HYDRATION: Based on the patient's presentation of Acute Diarrhea and Dehydration the patient was given IV fluids. IV Hydration was used because oral hydration was not adequate alone. Upon recheck following hydration, the patient was improved.            FINAL  IMPRESSION  1. Acute left-sided low back pain with left-sided sciatica    2. Closed fracture of sacrum, unspecified portion of sacrum, initial encounter ([facility])    3. Degenerative disc disease, lumbar         I, Clint Beltre (Scribsukhjinder), am scribing for, and in the presence of, Leigha Jenkins D.O..    Electronically signed by: Clint Beltre (Sandyibsukhjinder), 1/28/2022    I, Leigha Jenkins D.O. personally performed the services described in this documentation, as scribed by Clint Beltre in my presence, and it is both accurate and complete. C    The note accurately reflects work and decisions made by me.  Leigha Jenkins D.O.  1/28/2022  9:13 PM

## 2022-01-28 NOTE — ED NOTES
Blood & urine collected and to lab.  MRI screening completed.  Pt medicated as ordered.  Awaiting MRI, pt updated.

## 2022-01-28 NOTE — ED NOTES
Grandson at the bedside.  Pt resting comfortably at this time.  Awaiting MRI.  Per MRI tech pt will go at approx 1700.  Pt & family updated.

## 2022-01-29 LAB
ALBUMIN SERPL BCP-MCNC: 3.2 G/DL (ref 3.2–4.9)
ALBUMIN/GLOB SERPL: 1.5 G/DL
ALP SERPL-CCNC: 187 U/L (ref 30–99)
ALT SERPL-CCNC: 54 U/L (ref 2–50)
ANION GAP SERPL CALC-SCNC: 11 MMOL/L (ref 7–16)
ANISOCYTOSIS BLD QL SMEAR: ABNORMAL
APTT PPP: 30.6 SEC (ref 24.7–36)
AST SERPL-CCNC: 19 U/L (ref 12–45)
BASOPHILS # BLD AUTO: 0 % (ref 0–1.8)
BASOPHILS # BLD: 0 K/UL (ref 0–0.12)
BILIRUB SERPL-MCNC: 0.4 MG/DL (ref 0.1–1.5)
BUN SERPL-MCNC: 14 MG/DL (ref 8–22)
CALCIUM SERPL-MCNC: 7.5 MG/DL (ref 8.5–10.5)
CHLORIDE SERPL-SCNC: 104 MMOL/L (ref 96–112)
CO2 SERPL-SCNC: 23 MMOL/L (ref 20–33)
CREAT SERPL-MCNC: 0.67 MG/DL (ref 0.5–1.4)
EOSINOPHIL # BLD AUTO: 0.1 K/UL (ref 0–0.51)
EOSINOPHIL NFR BLD: 0.9 % (ref 0–6.9)
ERYTHROCYTE [DISTWIDTH] IN BLOOD BY AUTOMATED COUNT: 50.6 FL (ref 35.9–50)
GLOBULIN SER CALC-MCNC: 2.1 G/DL (ref 1.9–3.5)
GLUCOSE BLD-MCNC: 91 MG/DL (ref 65–99)
GLUCOSE BLD-MCNC: 94 MG/DL (ref 65–99)
GLUCOSE BLD-MCNC: 95 MG/DL (ref 65–99)
GLUCOSE BLD-MCNC: 98 MG/DL (ref 65–99)
GLUCOSE SERPL-MCNC: 117 MG/DL (ref 65–99)
HCT VFR BLD AUTO: 38.3 % (ref 37–47)
HGB BLD-MCNC: 12.8 G/DL (ref 12–16)
INR PPP: 1.37 (ref 0.87–1.13)
LYMPHOCYTES # BLD AUTO: 0.69 K/UL (ref 1–4.8)
LYMPHOCYTES NFR BLD: 6.1 % (ref 22–41)
MANUAL DIFF BLD: NORMAL
MCH RBC QN AUTO: 30.5 PG (ref 27–33)
MCHC RBC AUTO-ENTMCNC: 33.4 G/DL (ref 33.6–35)
MCV RBC AUTO: 91.2 FL (ref 81.4–97.8)
METAMYELOCYTES NFR BLD MANUAL: 4.3 %
MICROCYTES BLD QL SMEAR: ABNORMAL
MONOCYTES # BLD AUTO: 0.4 K/UL (ref 0–0.85)
MONOCYTES NFR BLD AUTO: 3.5 % (ref 0–13.4)
MORPHOLOGY BLD-IMP: NORMAL
MYELOCYTES NFR BLD MANUAL: 6.1 %
NEUTROPHILS # BLD AUTO: 8.94 K/UL (ref 2–7.15)
NEUTROPHILS NFR BLD: 79.1 % (ref 44–72)
NRBC # BLD AUTO: 0 K/UL
NRBC BLD-RTO: 0 /100 WBC
OVALOCYTES BLD QL SMEAR: NORMAL
PLATELET # BLD AUTO: 200 K/UL (ref 164–446)
PLATELET BLD QL SMEAR: NORMAL
PMV BLD AUTO: 9.3 FL (ref 9–12.9)
POIKILOCYTOSIS BLD QL SMEAR: NORMAL
POTASSIUM SERPL-SCNC: 3.9 MMOL/L (ref 3.6–5.5)
PROT SERPL-MCNC: 5.3 G/DL (ref 6–8.2)
PROTHROMBIN TIME: 16.5 SEC (ref 12–14.6)
RBC # BLD AUTO: 4.2 M/UL (ref 4.2–5.4)
RBC BLD AUTO: PRESENT
SODIUM SERPL-SCNC: 138 MMOL/L (ref 135–145)
WBC # BLD AUTO: 11.3 K/UL (ref 4.8–10.8)

## 2022-01-29 PROCEDURE — 82962 GLUCOSE BLOOD TEST: CPT | Mod: 91

## 2022-01-29 PROCEDURE — 85027 COMPLETE CBC AUTOMATED: CPT

## 2022-01-29 PROCEDURE — A9270 NON-COVERED ITEM OR SERVICE: HCPCS | Performed by: INTERNAL MEDICINE

## 2022-01-29 PROCEDURE — 700105 HCHG RX REV CODE 258: Performed by: EMERGENCY MEDICINE

## 2022-01-29 PROCEDURE — 99226 PR SUBSEQUENT OBSERVATION CARE,LEVEL III: CPT | Performed by: NURSE PRACTITIONER

## 2022-01-29 PROCEDURE — 85007 BL SMEAR W/DIFF WBC COUNT: CPT

## 2022-01-29 PROCEDURE — G0378 HOSPITAL OBSERVATION PER HR: HCPCS

## 2022-01-29 PROCEDURE — 85610 PROTHROMBIN TIME: CPT

## 2022-01-29 PROCEDURE — 700102 HCHG RX REV CODE 250 W/ 637 OVERRIDE(OP): Performed by: INTERNAL MEDICINE

## 2022-01-29 PROCEDURE — 85730 THROMBOPLASTIN TIME PARTIAL: CPT

## 2022-01-29 PROCEDURE — 80053 COMPREHEN METABOLIC PANEL: CPT

## 2022-01-29 RX ADMIN — LATANOPROST 1 DROP: 50 SOLUTION OPHTHALMIC at 00:02

## 2022-01-29 RX ADMIN — LATANOPROST 1 DROP: 50 SOLUTION OPHTHALMIC at 19:49

## 2022-01-29 RX ADMIN — DILTIAZEM HYDROCHLORIDE 120 MG: 120 CAPSULE, COATED, EXTENDED RELEASE ORAL at 05:45

## 2022-01-29 RX ADMIN — LEVOTHYROXINE SODIUM 150 MCG: 0.15 TABLET ORAL at 12:13

## 2022-01-29 RX ADMIN — SODIUM CHLORIDE: 9 INJECTION, SOLUTION INTRAVENOUS at 02:24

## 2022-01-29 RX ADMIN — DILTIAZEM HYDROCHLORIDE 120 MG: 120 CAPSULE, COATED, EXTENDED RELEASE ORAL at 17:45

## 2022-01-29 RX ADMIN — DILTIAZEM HYDROCHLORIDE 120 MG: 120 CAPSULE, COATED, EXTENDED RELEASE ORAL at 00:02

## 2022-01-29 RX ADMIN — DOCUSATE SODIUM 50 MG AND SENNOSIDES 8.6 MG 2 TABLET: 8.6; 5 TABLET, FILM COATED ORAL at 00:01

## 2022-01-29 RX ADMIN — SODIUM CHLORIDE: 9 INJECTION, SOLUTION INTRAVENOUS at 05:46

## 2022-01-29 RX ADMIN — LATANOPROST 1 DROP: 50 SOLUTION OPHTHALMIC at 17:45

## 2022-01-29 RX ADMIN — ASPIRIN 81 MG: 81 TABLET, COATED ORAL at 05:45

## 2022-01-29 RX ADMIN — DOCUSATE SODIUM 50 MG AND SENNOSIDES 8.6 MG 2 TABLET: 8.6; 5 TABLET, FILM COATED ORAL at 17:46

## 2022-01-29 ASSESSMENT — ENCOUNTER SYMPTOMS
VOMITING: 0
BACK PAIN: 1
WHEEZING: 0
WEIGHT LOSS: 0
DIAPHORESIS: 0
PALPITATIONS: 0
HEADACHES: 0
FOCAL WEAKNESS: 0
COUGH: 0
WEAKNESS: 0
CHILLS: 0
FEVER: 0
DIZZINESS: 0
SHORTNESS OF BREATH: 0
SENSORY CHANGE: 0
MYALGIAS: 1
NECK PAIN: 0
FALLS: 0
NAUSEA: 0

## 2022-01-29 ASSESSMENT — PAIN DESCRIPTION - PAIN TYPE
TYPE: ACUTE PAIN

## 2022-01-29 ASSESSMENT — PATIENT HEALTH QUESTIONNAIRE - PHQ9
1. LITTLE INTEREST OR PLEASURE IN DOING THINGS: NOT AT ALL
SUM OF ALL RESPONSES TO PHQ9 QUESTIONS 1 AND 2: 0
SUM OF ALL RESPONSES TO PHQ9 QUESTIONS 1 AND 2: 0
2. FEELING DOWN, DEPRESSED, IRRITABLE, OR HOPELESS: NOT AT ALL
1. LITTLE INTEREST OR PLEASURE IN DOING THINGS: NOT AT ALL
2. FEELING DOWN, DEPRESSED, IRRITABLE, OR HOPELESS: NOT AT ALL

## 2022-01-29 NOTE — HOSPITAL COURSE
89-year-old female with history of diabetes, hypothyroidism, hypertension, A. fib and chronic lower back pain presented 1/28 with acute on chronic left hip pain, also patient had lower back pain increasing with moving, for last 5 days prior to admission, patient had steroid injection 5 days ago for chronic lower back pain, labs showed leukocytosis 13, CRP was 11, MRI for lumbar spine showed possible discitis at L2/L3, ER physician discussed the case with the neurosurgeon Dr. Nguyễn however recommended IR biopsy, however it was not done, blood culture came back positive for staph capitis 2/2, ID was consulted and patient is on vancomycin, repeat blood culture on 1/30 still pending.    Patient was evaluated by PT and OT who recommended postacute placement.

## 2022-01-29 NOTE — ED NOTES
Pt is alert and oriented, speaking in full sentences, follows commands and responds appropriately to questions. Resp are even and unlabored. On cardiac monitor. No needs expressed at this point in time. RN called MRI, tech stated may take pt for image around 1915

## 2022-01-29 NOTE — PROGRESS NOTES
4 Eyes Skin Assessment Completed by BRIAN Shah and BRIAN Panda.    Head WDL  Ears WDL  Nose WDL  Mouth WDL  Neck WDL  Breast/Chest Redness  Shoulder Blades WDL  Spine WDL  (R) Arm/Elbow/Hand Bruising  (L) Arm/Elbow/Hand Bruising  Abdomen WDL  Groin Redness  Scrotum/Coccyx/Buttocks Redness, Blanching and Moisture Fissure  (R) Leg WDL  (L) Leg WDL  (R) Heel/Foot/Toe Redness and Blanching  (L) Heel/Foot/Toe Redness and Blanching          Devices In Places Blood Pressure Cuff and Pulse Ox      Interventions In Place Pillows    Possible Skin Injury Yes    Pictures Uploaded Into Epic Yes  Wound Consult Placed Yes  RN Wound Prevention Protocol Ordered Yes

## 2022-01-29 NOTE — PROGRESS NOTES
Hospital Medicine Daily Progress Note    Date of Service  1/29/2022    Chief Complaint  Saumya Vann is a 89 y.o. female admitted 1/28/2022 with acute back pain    Hospital Course  Saumya Vann is a 89 y.o. female with past medical history of type 2 diabetes, hypothyroid, chronic lower back pain, hypertension, A. fib, watchman device, who presented 1/28/2022 with complaints of acute on chronic left hip pain and lower back pain worsening with movements that started 5 days prior to admission. She had what sounds like epidural steroid injection 5 days ago for chronic lower back pain. Patient was treated symptomatically in ER, and evaluated with MRI of lumbar spine, that revealed possible discitis at L2 L3, as well as sacral insufficiency fracture.  ERP discussed case with neurosurgeon Dr. Nguyễn, who recommended IR guided biopsy of the disc to guide antibiotics treatment.    Interval Problem Update  1/29: Patient denies any pain while lying flat, but says pain is a 10/10 with any movement. LE sensation and strength intact. WBC 11.3 from 11.8. Afebrile. Blood cx negative. CRP is elevated at 11.21. NPO for possible transpedicular bone biopsy per NS recs . VSS on room air.     I have personally seen and examined the patient at bedside. I discussed the plan of care with patient, bedside RN, charge RN and .    Consultants/Specialty  neurosurgery    Code Status  Full Code    Disposition  Patient is not medically cleared for discharge.   Anticipate discharge to to home with organized home healthcare and close outpatient follow-up.  I have placed the appropriate orders for post-discharge needs.    Review of Systems  Review of Systems   Constitutional: Positive for malaise/fatigue. Negative for chills, diaphoresis, fever and weight loss.   Respiratory: Negative for cough, shortness of breath and wheezing.    Cardiovascular: Negative for chest pain, palpitations and leg swelling.   Gastrointestinal: Negative  for nausea and vomiting.   Musculoskeletal: Positive for back pain and myalgias. Negative for falls, joint pain and neck pain.   Neurological: Negative for dizziness, sensory change, focal weakness, weakness and headaches.        Physical Exam  Temp:  [36.1 °C (96.9 °F)-36.2 °C (97.1 °F)] 36.2 °C (97.1 °F)  Pulse:  [80-99] 95  Resp:  [15-27] 18  BP: (110-196)/(61-91) 146/81  SpO2:  [91 %-100 %] 93 %    Physical Exam  Vitals and nursing note reviewed.   Constitutional:       General: She is not in acute distress.     Appearance: She is not toxic-appearing.   HENT:      Head: Normocephalic.      Right Ear: External ear normal.      Left Ear: External ear normal.      Nose: Nose normal.      Mouth/Throat:      Mouth: Mucous membranes are moist.      Pharynx: Oropharynx is clear.   Eyes:      General:         Right eye: No discharge.         Left eye: No discharge.      Conjunctiva/sclera: Conjunctivae normal.   Cardiovascular:      Rate and Rhythm: Normal rate.      Pulses: Normal pulses.   Pulmonary:      Effort: Pulmonary effort is normal.      Breath sounds: Normal breath sounds.   Abdominal:      General: There is distension.      Palpations: Abdomen is soft.      Tenderness: There is no abdominal tenderness. There is no guarding.   Musculoskeletal:      Cervical back: Normal range of motion.      Right lower leg: No edema.      Left lower leg: No edema.      Comments: Lower extremity ROM limited due to pain    Skin:     General: Skin is warm and dry.      Coloration: Skin is pale.      Findings: Bruising (scattered to BLE ) present.   Neurological:      Mental Status: She is alert and oriented to person, place, and time.      Sensory: No sensory deficit.      Coordination: Coordination normal.   Psychiatric:         Mood and Affect: Mood normal.         Thought Content: Thought content normal.         Judgment: Judgment normal.         Fluids  No intake or output data in the 24 hours ending 01/29/22  1100    Laboratory  Recent Labs     01/28/22  1305 01/28/22  2037 01/29/22  0540   WBC 13.1* 11.8* 11.3*   RBC 4.60 4.50 4.20   HEMOGLOBIN 14.0 13.7 12.8   HEMATOCRIT 41.0 40.4 38.3   MCV 89.1 89.8 91.2   MCH 30.4 30.4 30.5   MCHC 34.1 33.9 33.4*   RDW 49.4 49.7 50.6*   PLATELETCT 198 206 200   MPV 9.7 9.8 9.3     Recent Labs     01/28/22  1305 01/29/22  0540   SODIUM 137 138   POTASSIUM 4.0 3.9   CHLORIDE 100 104   CO2 22 23   GLUCOSE 150* 117*   BUN 16 14   CREATININE 0.70 0.67   CALCIUM 8.2* 7.5*     Recent Labs     01/29/22  0540   APTT 30.6   INR 1.37*               Imaging  MR-LUMBAR SPINE-W/O   Final Result      1.  No evidence of epidural hematoma or other epidural fluid collection.   2.  Acute to subacute T12 inferior endplate compression fracture with less than 25% loss of height and no retropulsion.   3.  Acute to subacute S2 sacral insufficiency fracture or   4.  Diffusely increased T2 signal intensity in the disc spaces at L1-2, L2-3, L3-4 and L4-5 which was not present on the previous exam but is likely related to degenerative disease. This is felt less likely to be related to infection given absence of    changes in the adjacent vertebral body endplates however, this cannot entirely be excluded. The most prominent signal abnormality is in the L2-3 disc space. Follow-up on clinical basis is recommended.   5.  Advanced multilevel degenerative changes of the lumbar spine as described above.      IR-CONSULT AND TREAT    (Results Pending)        Assessment/Plan  * Discitis  Assessment & Plan  MRI of the lumbar spine:It was noted that the patient had increased T2 signal at L2-L3 disc with some fluid is well sacral insufficiency fracture.  Neurosurgery, Dr. Nguyễn, consulted. Recommended IR transpedicular biopsy given high suspicion for discitis   NPO for possible procedure today, however I am unsure if this will be done over the weekend  Not septic, minimal leukocytosis . Will hold off on empiric abx until  IR guided biopsy can be completed   Pain control  Fall precautions  PT/OT when appropriate   Serial neuro exams     GERD (gastroesophageal reflux disease)- (present on admission)  Assessment & Plan  Continue omeprazole    Hypothyroidism- (present on admission)  Assessment & Plan  Continue levothyroxine    AF (atrial fibrillation) ([facility])- (present on admission)  Assessment & Plan  Not on anticoagulation at home   Continue diltiazem       DM2 (diabetes mellitus, type 2) ([facility])- (present on admission)  Assessment & Plan  Insulin sliding scale ordered       VTE prophylaxis: SCDs/TEDs    I have performed a physical exam and reviewed and updated ROS and Plan today (1/29/2022). In review of yesterday's note (1/28/2022), there are no changes except as documented above.

## 2022-01-29 NOTE — CARE PLAN
Problem: Knowledge Deficit - Standard  Goal: Patient and family/care givers will demonstrate understanding of plan of care, disease process/condition, diagnostic tests and medications  Outcome: Progressing     Problem: Pain - Standard  Goal: Alleviation of pain or a reduction in pain to the patient’s comfort goal  Outcome: Progressing     Problem: Skin Integrity  Goal: Skin integrity is maintained or improved  Outcome: Progressing     Problem: Fall Risk  Goal: Patient will remain free from falls  Outcome: Progressing   The patient is Stable - Low risk of patient condition declining or worsening    Shift Goals  Clinical Goals: IR consult am, pain mgmt   Patient Goals: comfort and safety  Family Goals: comfort, safety     Progress made toward(s) clinical / shift goals:  patient updated on POC    Patient is not progressing towards the following goals:

## 2022-01-29 NOTE — PROGRESS NOTES
Images reviewed. May have early discitis, has diffuse spondylosis as well expected for her advanced age, recommend consulting IR for transpedicular bone biopsy to guide any antibiotics therapy. Also recommend informing patient's pain mgmt doctor that JORDAN's not be done on 89yo patients in the future.

## 2022-01-29 NOTE — H&P
Hospital Medicine History & Physical Note    Date of Service  1/28/2022    Primary Care Physician  Bebe Santamaria M.D.    Consultants  Dr Nguyễn        Code Status  Full Code    Chief Complaint  Chief Complaint   Patient presents with   • Hip Pain     L hip/buttocks/lower back pain, acute on chronic, mechanical injury 1 year ago while bending over, surgery shortly after, now acute pain X 5 days, pt denies incident for acute pain, pt ambulates at baseline with walker but is not able to ambulate now r/t pain        History of Presenting Illness  Saumya Vann is a 89 y.o. female with past medical history of type 2 diabetes, hypothyroid, chronic lower back pain, hypertension, A. fib, watchman device, who presented 1/28/2022 with complaints of acute on chronic left hip pain and lower back pain worsening with movements that started 5 days ago, up to 7 out of 10, lower back pain radiates down to the lower extremities.  Patient states she could not ambulate.  She had what sounds like epidural steroid injection 5 days ago for chronic lower back pain.  Patient was treated symptomatically in ER, and evaluated with MRI of lumbar spine, that revealed possible discitis at L2 L3, as well as sacral insufficiency fracture.  ERP discussed case with neurosurgeon Dr. Nguyễn, who recommended IR guided biopsy of the disc to guide antibiotics treatment.    I discussed the plan of care with patient.    Review of Systems  Review of Systems   Constitutional: Negative for chills, fever and weight loss.   HENT: Negative for ear pain, hearing loss and tinnitus.    Eyes: Negative for blurred vision, double vision and photophobia.   Respiratory: Negative for cough, hemoptysis and sputum production.    Cardiovascular: Negative for chest pain, palpitations and orthopnea.   Gastrointestinal: Negative for heartburn, nausea and vomiting.   Genitourinary: Negative for dysuria, flank pain, frequency and hematuria.   Musculoskeletal: Positive for back  pain and joint pain. Negative for neck pain.   Skin: Negative for itching and rash.   Neurological: Negative for tremors, speech change, focal weakness and headaches.   Endo/Heme/Allergies: Negative for environmental allergies and polydipsia. Does not bruise/bleed easily.   Psychiatric/Behavioral: Negative for hallucinations and substance abuse. The patient is not nervous/anxious.        Past Medical History   has a past medical history of A-fib (HCC), Breath shortness, Cataract (04/23/2021), Diabetes (HCC) (04/23/2021), Dry cough (04/23/2021), Glaucoma (04/23/2021), Hypertension, Hypothyroid, Macular degeneration, Pain (04/23/2021), and Urinary incontinence.    Surgical History   has a past surgical history that includes us-needle core bx-breast panel; appendectomy; cholecystectomy; hysterectomy, total abdominal; knee replacement, total (Bilateral); other (1952); appendectomy (1957); cholecystectomy (1970); neuroma excision (1985); other orthopedic surgery (2002); and cataract extraction with iol (2001).     Family History  family history includes Cancer in an other family member.   Family history reviewed with patient. There is no family history that is pertinent to the chief complaint.     Social History   reports that she has never smoked. She has never used smokeless tobacco. She reports that she does not drink alcohol and does not use drugs.    Allergies  Allergies   Allergen Reactions   • Pcn [Penicillins] Rash     Rash         Medications  Prior to Admission Medications   Prescriptions Last Dose Informant Patient Reported? Taking?   B Complex Vitamins (VITAMIN B COMPLEX PO)  Patient Yes No   Sig: Take 1 Tab by mouth 2 Times a Day.   B Complex-Biotin-FA (SUPER B-COMPLEX PO)  Patient Yes No   Sig: Take 1 tablet by mouth every day.   COVID-19 mRNA Vaccine, Pfizer, (PFIZER-[place] COVID-19 VACC) 30 MCG/0.3ML Suspension injection   No No   Sig: Inject  into the shoulder, thigh, or buttocks.   DILTIAZem CD  (CARDIZEM CD) 120 MG CAPSULE SR 24 HR   No No   Sig: TAKE 1 CAPSULE BY MOUTH TWICE DAILY   HYDROcodone-acetaminophen (NORCO) 5-325 MG Tab per tablet  Patient Yes No   Sig: as needed.   LEVOTHYROXINE SODIUM PO  Patient Yes No   Sig: Take 225 mcg by mouth every Monday, Wednesday, and Friday. Patient takes one and half a Tablet   Ranibizumab (LUCENTIS IO)  Patient Yes No   Sig: Inject  into the eye. Avastin or Lucentis 0.5 mg injections for Retinal Vein Occlusion and Macular Degeneration.  Each eye is treated every other month.   alendronate (FOSAMAX) 70 MG Tab  Patient Yes No   Sig: Take 70 mg by mouth every Monday.   aspirin EC (ECOTRIN) 81 MG Tablet Delayed Response   No No   Sig: Take 1 tablet by mouth every day.   diazePAM (VALIUM) 5 MG Tab  Patient Yes No   Sig: Take 2-120 mg by mouth as needed.   erythromycin 5 MG/GM Ointment  Patient No No   Sig: Apply 1 Application to left eye 4 times a day.   famotidine (PEPCID) 40 MG Tab  Patient Yes No   Sig: Take 1 tablet by mouth 2 times a day.   latanoprost (XALATAN) 0.005 % Solution  Patient Yes No   Sig: Place 1 Drop in left eye every bedtime.   levothyroxine (SYNTHROID) 150 MCG TABS  Patient Yes No   Sig: Take 150 mcg by mouth in the morning every Tue, Thurs, Sat, and Sun.   omeprazole (PRILOSEC) 20 MG delayed-release capsule  Patient Yes No   Sig: Take 20 mg by mouth every evening.   ondansetron (ZOFRAN ODT) 4 MG TABLET DISPERSIBLE  Patient Yes No   Sig: Take 4 mg by mouth as needed. Indications: Nausea and Vomiting   polyvinyl alcohol-povidone (REFRESH) 1.4-0.6 % ophthalmic solution  Patient Yes No   Sig: Place 1 Drop in both eyes as needed. Indications: Dry Eyes caused by Deficiency of Tears   spironolactone (ALDACTONE) 25 MG Tab  Patient Yes No   Sig: Take 25 mg by mouth every day.   vitamin D, Ergocalciferol, (DRISDOL) 26719 units Cap capsule  Patient Yes No   Sig: Take 50,000 Units by mouth every Saturday.      Facility-Administered Medications: None        Physical Exam  Temp:  [36.1 °C (97 °F)] 36.1 °C (97 °F)  Pulse:  [80-99] 80  Resp:  [15-27] 18  BP: (110-139)/(61-78) 129/78  SpO2:  [91 %-100 %] 100 %  Blood Pressure : 129/78   Temperature: 36.1 °C (97 °F)   Pulse: 80   Respiration: 18   Pulse Oximetry: 100 %       Physical Exam  Vitals and nursing note reviewed.   Constitutional:       General: She is not in acute distress.     Appearance: Normal appearance.   HENT:      Head: Normocephalic and atraumatic.      Nose: Nose normal.      Mouth/Throat:      Mouth: Mucous membranes are moist.   Eyes:      Extraocular Movements: Extraocular movements intact.      Pupils: Pupils are equal, round, and reactive to light.   Cardiovascular:      Rate and Rhythm: Normal rate and regular rhythm.   Pulmonary:      Effort: Pulmonary effort is normal.      Breath sounds: Normal breath sounds.   Abdominal:      General: Abdomen is flat. There is no distension.      Tenderness: There is no abdominal tenderness.   Musculoskeletal:         General: No swelling or deformity. Normal range of motion.      Cervical back: Normal range of motion and neck supple.      Lumbar back: Tenderness present.   Skin:     General: Skin is warm and dry.   Neurological:      General: No focal deficit present.      Mental Status: She is alert.      Comments: Generalized weakness   Psychiatric:         Mood and Affect: Mood normal.         Behavior: Behavior normal.         Laboratory:  Recent Labs     01/28/22  1305   WBC 13.1*   RBC 4.60   HEMOGLOBIN 14.0   HEMATOCRIT 41.0   MCV 89.1   MCH 30.4   MCHC 34.1   RDW 49.4   PLATELETCT 198   MPV 9.7     Recent Labs     01/28/22  1305   SODIUM 137   POTASSIUM 4.0   CHLORIDE 100   CO2 22   GLUCOSE 150*   BUN 16   CREATININE 0.70   CALCIUM 8.2*     Recent Labs     01/28/22  1305   ALTSGPT 76*   ASTSGOT 36   ALKPHOSPHAT 221*   TBILIRUBIN 0.5   GLUCOSE 150*         No results for input(s): NTPROBNP in the last 72 hours.      No results for input(s):  TROPONINT in the last 72 hours.    Imaging:  MR-LUMBAR SPINE-W/O    (Results Pending)   IR-CONSULT AND TREAT    (Results Pending)       X-Ray:  I have personally reviewed the images and compared with prior images.    Assessment/Plan:  I anticipate this patient is appropriate for observation status at this time.    Discitis  Assessment & Plan  MRI of the lumbar spine:It was noted that the patient had increased T2 signal at L2-L3 disc with some fluid is well sacral insufficiency fracture.  Will hold off on starting antibiotics until IR guided biopsy of the disc  IR consult ordered  N.p.o. at midnight  Coagulation studies  COVID-19 screen  Pain control  Fall precautions    GERD (gastroesophageal reflux disease)- (present on admission)  Assessment & Plan  Continue omeprazole    Hypothyroidism- (present on admission)  Assessment & Plan  Continue levothyroxine    DM2 (diabetes mellitus, type 2) ([facility])- (present on admission)  Assessment & Plan  Insulin sliding scale ordered      VTE prophylaxis: enoxaparin ppx

## 2022-01-29 NOTE — ASSESSMENT & PLAN NOTE
A1c around 5.8 couple months ago around target for her age  Not on medications   BG well controlled with diet alone   Will monitor on chemistries

## 2022-01-29 NOTE — PROGRESS NOTES
Patient up to floor from ER. I have assumed care of this patient. Patient slideboarded from gurney to bed. Patient has been assessed (see flow sheet) and plan of care has been discussed. Patient verbalizes understanding of plan and denies any further needs at this time. Patient is now resting in bed, bed is in the lowest position and locked, and the call light is within reach.

## 2022-01-29 NOTE — ED NOTES
Pt back from MRI. Pt is alert and oriented, speaking in full sentences, follows commands and responds appropriately to questions. Resp are even and unlabored. No needs expressed at this point in time. Family at bedside

## 2022-01-29 NOTE — CARE PLAN
The patient is Watcher - Medium risk of patient condition declining or worsening    Shift Goals  Clinical Goals: IR consult am, pain mgmt   Patient Goals: comfort, safety   Family Goals: comfort, safety     Progress made toward(s) clinical / shift goals:    Problem: Knowledge Deficit - Standard  Goal: Patient and family/care givers will demonstrate understanding of plan of care, disease process/condition, diagnostic tests and medications  Outcome: Progressing     Problem: Pain - Standard  Goal: Alleviation of pain or a reduction in pain to the patient’s comfort goal  Outcome: Progressing     Problem: Skin Integrity  Goal: Skin integrity is maintained or improved  Outcome: Progressing     Problem: Fall Risk  Goal: Patient will remain free from falls  Outcome: Progressing       Patient is not progressing towards the following goals:

## 2022-01-29 NOTE — ASSESSMENT & PLAN NOTE
MRI of the lumbar spine:It was noted that the patient had increased T2 signal at L2-L3 disc with some fluid is well sacral insufficiency fracture.  Neurosurgery, Dr. Nguyễn, recommended IR transpedicular biopsy given high suspicion for discitis.  Case was discussed with IR on 1/30 who said they were reaching out to Gopi to decided if biopsy was still warranted now that she is receiving IV abx for bacteremia.   Pain control  Fall precautions  PT/OT ordered   Serial neuro exam   Possible for biopsy today  Continue vancomycin

## 2022-01-29 NOTE — PROGRESS NOTES
Assumed patient care and received report from Night RN.  Assessment completed. Pt A&Ox 4. Respirations are even and unlabored on room air. Pt denies pain at this time. Medical patient, VS stable, call light and belongings within reach. POC updated (IR). Pt educated on room and call light, pt verbalized understanding. Communication board updated. Needs met.

## 2022-01-30 ENCOUNTER — APPOINTMENT (OUTPATIENT)
Dept: CARDIOLOGY | Facility: MEDICAL CENTER | Age: 87
DRG: 478 | End: 2022-01-30
Attending: NURSE PRACTITIONER
Payer: MEDICARE

## 2022-01-30 PROBLEM — Z71.89 ADVANCED CARE PLANNING/COUNSELING DISCUSSION: Status: ACTIVE | Noted: 2022-01-30

## 2022-01-30 PROBLEM — R78.81 BACTEREMIA: Status: ACTIVE | Noted: 2022-01-30

## 2022-01-30 LAB
ALBUMIN SERPL BCP-MCNC: 3.6 G/DL (ref 3.2–4.9)
ALBUMIN/GLOB SERPL: 1.4 G/DL
ALP SERPL-CCNC: 214 U/L (ref 30–99)
ALT SERPL-CCNC: 45 U/L (ref 2–50)
ANION GAP SERPL CALC-SCNC: 16 MMOL/L (ref 7–16)
AST SERPL-CCNC: 21 U/L (ref 12–45)
BILIRUB SERPL-MCNC: 0.7 MG/DL (ref 0.1–1.5)
BUN SERPL-MCNC: 7 MG/DL (ref 8–22)
CALCIUM SERPL-MCNC: 8.4 MG/DL (ref 8.5–10.5)
CHLORIDE SERPL-SCNC: 99 MMOL/L (ref 96–112)
CO2 SERPL-SCNC: 24 MMOL/L (ref 20–33)
CREAT SERPL-MCNC: 0.51 MG/DL (ref 0.5–1.4)
ERYTHROCYTE [DISTWIDTH] IN BLOOD BY AUTOMATED COUNT: 49.1 FL (ref 35.9–50)
GLOBULIN SER CALC-MCNC: 2.6 G/DL (ref 1.9–3.5)
GLUCOSE BLD-MCNC: 106 MG/DL (ref 65–99)
GLUCOSE SERPL-MCNC: 111 MG/DL (ref 65–99)
HCT VFR BLD AUTO: 44.3 % (ref 37–47)
HGB BLD-MCNC: 14.9 G/DL (ref 12–16)
MCH RBC QN AUTO: 30.1 PG (ref 27–33)
MCHC RBC AUTO-ENTMCNC: 33.6 G/DL (ref 33.6–35)
MCV RBC AUTO: 89.5 FL (ref 81.4–97.8)
PLATELET # BLD AUTO: 251 K/UL (ref 164–446)
PMV BLD AUTO: 9.4 FL (ref 9–12.9)
POTASSIUM SERPL-SCNC: 3.8 MMOL/L (ref 3.6–5.5)
PROT SERPL-MCNC: 6.2 G/DL (ref 6–8.2)
RBC # BLD AUTO: 4.95 M/UL (ref 4.2–5.4)
SODIUM SERPL-SCNC: 139 MMOL/L (ref 135–145)
WBC # BLD AUTO: 12.9 K/UL (ref 4.8–10.8)

## 2022-01-30 PROCEDURE — 97602 WOUND(S) CARE NON-SELECTIVE: CPT

## 2022-01-30 PROCEDURE — 700102 HCHG RX REV CODE 250 W/ 637 OVERRIDE(OP): Performed by: NURSE PRACTITIONER

## 2022-01-30 PROCEDURE — 80053 COMPREHEN METABOLIC PANEL: CPT

## 2022-01-30 PROCEDURE — A9270 NON-COVERED ITEM OR SERVICE: HCPCS | Performed by: INTERNAL MEDICINE

## 2022-01-30 PROCEDURE — 85027 COMPLETE CBC AUTOMATED: CPT

## 2022-01-30 PROCEDURE — 82962 GLUCOSE BLOOD TEST: CPT

## 2022-01-30 PROCEDURE — 93306 TTE W/DOPPLER COMPLETE: CPT | Mod: 26 | Performed by: INTERNAL MEDICINE

## 2022-01-30 PROCEDURE — 770001 HCHG ROOM/CARE - MED/SURG/GYN PRIV*

## 2022-01-30 PROCEDURE — 99233 SBSQ HOSP IP/OBS HIGH 50: CPT | Performed by: NURSE PRACTITIONER

## 2022-01-30 PROCEDURE — A9270 NON-COVERED ITEM OR SERVICE: HCPCS | Performed by: NURSE PRACTITIONER

## 2022-01-30 PROCEDURE — 99223 1ST HOSP IP/OBS HIGH 75: CPT | Performed by: INTERNAL MEDICINE

## 2022-01-30 PROCEDURE — 93306 TTE W/DOPPLER COMPLETE: CPT

## 2022-01-30 PROCEDURE — 87040 BLOOD CULTURE FOR BACTERIA: CPT | Mod: 91

## 2022-01-30 PROCEDURE — 700102 HCHG RX REV CODE 250 W/ 637 OVERRIDE(OP): Performed by: INTERNAL MEDICINE

## 2022-01-30 RX ORDER — CYCLOBENZAPRINE HCL 5 MG
10 TABLET ORAL NIGHTLY PRN
Status: DISCONTINUED | OUTPATIENT
Start: 2022-01-30 | End: 2022-02-02 | Stop reason: HOSPADM

## 2022-01-30 RX ORDER — SPIRONOLACTONE 25 MG/1
25 TABLET ORAL DAILY
Status: DISCONTINUED | OUTPATIENT
Start: 2022-01-30 | End: 2022-02-02 | Stop reason: HOSPADM

## 2022-01-30 RX ADMIN — SPIRONOLACTONE 25 MG: 25 TABLET ORAL at 09:39

## 2022-01-30 RX ADMIN — POLYETHYLENE GLYCOL 3350 1 PACKET: 17 POWDER, FOR SOLUTION ORAL at 17:35

## 2022-01-30 RX ADMIN — LEVOTHYROXINE SODIUM 150 MCG: 0.15 TABLET ORAL at 09:39

## 2022-01-30 RX ADMIN — LATANOPROST 1 DROP: 50 SOLUTION OPHTHALMIC at 20:03

## 2022-01-30 RX ADMIN — DILTIAZEM HYDROCHLORIDE 120 MG: 120 CAPSULE, COATED, EXTENDED RELEASE ORAL at 05:06

## 2022-01-30 RX ADMIN — DOCUSATE SODIUM 50 MG AND SENNOSIDES 8.6 MG 2 TABLET: 8.6; 5 TABLET, FILM COATED ORAL at 17:30

## 2022-01-30 RX ADMIN — DOCUSATE SODIUM 50 MG AND SENNOSIDES 8.6 MG 2 TABLET: 8.6; 5 TABLET, FILM COATED ORAL at 05:06

## 2022-01-30 RX ADMIN — OXYCODONE 2.5 MG: 5 TABLET ORAL at 22:03

## 2022-01-30 RX ADMIN — DILTIAZEM HYDROCHLORIDE 120 MG: 120 CAPSULE, COATED, EXTENDED RELEASE ORAL at 17:29

## 2022-01-30 ASSESSMENT — ENCOUNTER SYMPTOMS
NAUSEA: 0
FALLS: 0
WEAKNESS: 0
DIAPHORESIS: 0
SHORTNESS OF BREATH: 0
SPEECH CHANGE: 0
NECK PAIN: 0
HEARTBURN: 0
FEVER: 0
SENSORY CHANGE: 0
DIZZINESS: 0
WHEEZING: 0
COUGH: 0
ABDOMINAL PAIN: 0
PALPITATIONS: 0
HEADACHES: 0
BACK PAIN: 1
MYALGIAS: 1
CONSTIPATION: 1
FOCAL WEAKNESS: 0
VOMITING: 0
WEIGHT LOSS: 0
CHILLS: 0

## 2022-01-30 ASSESSMENT — PATIENT HEALTH QUESTIONNAIRE - PHQ9
SUM OF ALL RESPONSES TO PHQ9 QUESTIONS 1 AND 2: 0
2. FEELING DOWN, DEPRESSED, IRRITABLE, OR HOPELESS: NOT AT ALL
1. LITTLE INTEREST OR PLEASURE IN DOING THINGS: NOT AT ALL

## 2022-01-30 ASSESSMENT — PAIN DESCRIPTION - PAIN TYPE
TYPE: ACUTE PAIN

## 2022-01-30 NOTE — CARE PLAN
The patient is Watcher - Medium risk of patient condition declining or worsening    Shift Goals  Clinical Goals: Blood cultures, awaiting IR, pain mgmt  Patient Goals: comfort, safety   Family Goals: safety     Progress made toward(s) clinical / shift goals:    Problem: Knowledge Deficit - Standard  Goal: Patient and family/care givers will demonstrate understanding of plan of care, disease process/condition, diagnostic tests and medications  1/30/2022 0126 by Alyssa Mckeon R.N.  Outcome: Progressing  1/30/2022 0126 by Alyssa Mckeon R.N.  Outcome: Progressing     Problem: Pain - Standard  Goal: Alleviation of pain or a reduction in pain to the patient’s comfort goal  1/30/2022 0126 by THERESA Farmer.YIFAN.  Outcome: Progressing  1/30/2022 0126 by THERESA Farmer.N.  Outcome: Progressing     Problem: Skin Integrity  Goal: Skin integrity is maintained or improved  1/30/2022 0126 by Alyssa Mckeon R.N.  Outcome: Progressing  1/30/2022 0126 by Alyssa Mckeon R.N.  Outcome: Progressing     Problem: Fall Risk  Goal: Patient will remain free from falls  1/30/2022 0126 by Alyssa Mckeon R.N.  Outcome: Progressing  1/30/2022 0126 by Alyssa Mckeon R.N.  Outcome: Progressing     Problem: Infection - Standard  Goal: Patient will remain free from infection  Outcome: Progressing       Patient is not progressing towards the following goals:

## 2022-01-30 NOTE — PROGRESS NOTES
Hospital Medicine Daily Progress Note    Date of Service  1/30/2022    Chief Complaint  Saumya Vann is a 89 y.o. female admitted 1/28/2022 with acute back pain    Hospital Course  Saumya Vann is a 89 y.o. female with past medical history of type 2 diabetes, hypothyroid, chronic lower back pain, hypertension, A. fib, watchman device, who presented 1/28/2022 with complaints of acute on chronic left hip pain and lower back pain worsening with movements that started 5 days prior to admission. She had what sounds like epidural steroid injection 5 days ago for chronic lower back pain. Patient was treated symptomatically in ER, and evaluated with MRI of lumbar spine, that revealed possible discitis at L2 L3, as well as sacral insufficiency fracture.  ERP discussed case with neurosurgeon Dr. Nguyễn, who recommended IR guided biopsy of the disc to guide antibiotics treatment.    Interval Problem Update  1/29: Patient denies any pain while lying flat, but says pain is a 10/10 with any movement. LE sensation and strength intact. WBC 11.3 from 11.8. Afebrile. Blood cx negative. CRP is elevated at 11.21. NPO for possible transpedicular bone biopsy per NSG recs . VSS on room air.   1/30: Admission blood cultures growing GPC. Empiric abx started. ID consulted. Stable neuro exam. WBC slightly up at 12.9 from 11.3. Afebrile. Not septic. Patient denies BM x 6 days. + flatus. Will give milk of mag. Discussed treatment plan with patient and grandson. They would like to discharge to [facility] if possible. Will place referral     I have personally seen and examined the patient at bedside. I discussed the plan of care with patient, family, bedside RN, charge RN and .    Consultants/Specialty  infectious disease and neurosurgery    Code Status  DNAR/DNI    Disposition  Patient is not medically cleared for discharge.   Anticipate discharge to to home with organized home healthcare and close outpatient follow-up.  I  have placed the appropriate orders for post-discharge needs.    Review of Systems  Review of Systems   Constitutional: Positive for malaise/fatigue. Negative for chills, diaphoresis, fever and weight loss.   Eyes:        Vision loss     Respiratory: Negative for cough, shortness of breath and wheezing.    Cardiovascular: Negative for chest pain, palpitations and leg swelling.   Gastrointestinal: Positive for constipation. Negative for abdominal pain, heartburn, nausea and vomiting.   Musculoskeletal: Positive for back pain and myalgias. Negative for falls, joint pain and neck pain.   Neurological: Negative for dizziness, sensory change, speech change, focal weakness, weakness and headaches.        Physical Exam  Temp:  [35.9 °C (96.6 °F)-36.3 °C (97.4 °F)] 35.9 °C (96.6 °F)  Pulse:  [] 104  Resp:  [16-18] 18  BP: (139-186)/(74-97) 170/79  SpO2:  [92 %-96 %] 96 %    Physical Exam  Vitals and nursing note reviewed.   Constitutional:       General: She is not in acute distress.     Appearance: She is not ill-appearing, toxic-appearing or diaphoretic.   HENT:      Head: Normocephalic.      Right Ear: External ear normal.      Left Ear: External ear normal.      Nose: Nose normal.      Mouth/Throat:      Mouth: Mucous membranes are moist.      Pharynx: Oropharynx is clear.   Eyes:      General:         Right eye: No discharge.         Left eye: No discharge.      Conjunctiva/sclera: Conjunctivae normal.   Cardiovascular:      Rate and Rhythm: Normal rate.      Pulses: Normal pulses.   Pulmonary:      Effort: Pulmonary effort is normal.      Breath sounds: Normal breath sounds.   Abdominal:      General: Bowel sounds are normal. There is distension.      Palpations: Abdomen is soft.      Tenderness: There is no abdominal tenderness. There is no guarding.   Musculoskeletal:      Cervical back: Normal range of motion.      Right lower leg: No edema.      Left lower leg: No edema.      Comments: Lower extremity ROM  limited due to pain    Skin:     General: Skin is warm and dry.      Coloration: Skin is pale.      Findings: Bruising (scattered to BLE ) present.   Neurological:      Mental Status: She is alert and oriented to person, place, and time.      Coordination: Coordination normal.      Comments: Plantarflexion/doriflexion bilaterally   Sensation intact      Psychiatric:         Mood and Affect: Mood normal.         Thought Content: Thought content normal.         Judgment: Judgment normal.         Fluids    Intake/Output Summary (Last 24 hours) at 1/30/2022 1057  Last data filed at 1/29/2022 1602  Gross per 24 hour   Intake --   Output 1000 ml   Net -1000 ml       Laboratory  Recent Labs     01/28/22  2037 01/29/22  0540 01/30/22  1012   WBC 11.8* 11.3* 12.9*   RBC 4.50 4.20 4.95   HEMOGLOBIN 13.7 12.8 14.9   HEMATOCRIT 40.4 38.3 44.3   MCV 89.8 91.2 89.5   MCH 30.4 30.5 30.1   MCHC 33.9 33.4* 33.6   RDW 49.7 50.6* 49.1   PLATELETCT 206 200 251   MPV 9.8 9.3 9.4     Recent Labs     01/28/22  1305 01/29/22  0540 01/30/22  1012   SODIUM 137 138 139   POTASSIUM 4.0 3.9 3.8   CHLORIDE 100 104 99   CO2 22 23 24   GLUCOSE 150* 117* 111*   BUN 16 14 7*   CREATININE 0.70 0.67 0.51   CALCIUM 8.2* 7.5* 8.4*     Recent Labs     01/29/22  0540   APTT 30.6   INR 1.37*               Imaging  MR-LUMBAR SPINE-W/O   Final Result      1.  No evidence of epidural hematoma or other epidural fluid collection.   2.  Acute to subacute T12 inferior endplate compression fracture with less than 25% loss of height and no retropulsion.   3.  Acute to subacute S2 sacral insufficiency fracture or   4.  Diffusely increased T2 signal intensity in the disc spaces at L1-2, L2-3, L3-4 and L4-5 which was not present on the previous exam but is likely related to degenerative disease. This is felt less likely to be related to infection given absence of    changes in the adjacent vertebral body endplates however, this cannot entirely be excluded. The most  prominent signal abnormality is in the L2-3 disc space. Follow-up on clinical basis is recommended.   5.  Advanced multilevel degenerative changes of the lumbar spine as described above.      IR-CONSULT AND TREAT    (Results Pending)   EC-ECHOCARDIOGRAM COMPLETE W/O CONT    (Results Pending)        Assessment/Plan  * Discitis  Assessment & Plan  MRI of the lumbar spine:It was noted that the patient had increased T2 signal at L2-L3 disc with some fluid is well sacral insufficiency fracture.  Neurosurgery, Dr. Nguyễn, consulted and recommended IR transpedicular biopsy given high suspicion for discitis   Discussed case with IR and they are reaching out to Dr. Nguyễn to see if bone bx is still warranted. Will keep NPO pending recommendations   Pain control  Fall precautions  PT/OT when appropriate   Stable neuro exam     Bacteremia- (present on admission)  Assessment & Plan  Blood cultures x 2 on admission growing GPC   Repeat blood cultures collected 1/29 and in process   Started on empiric Ancef and Vancomycin   MRSA PCR ordered   Monitor renal function closely while on Vancomycin   ID consulted   Echocardiogram pending     Advanced care planning/counseling discussion  Assessment & Plan  Code status discussed with patient. She said she knows she is not going to live forever and doesn't want to be on a ventilator if it comes to it. She would like to be DNR/DNI. Code status changed to reflect her wishes     GERD (gastroesophageal reflux disease)- (present on admission)  Assessment & Plan  Continue omeprazole    Hypothyroidism- (present on admission)  Assessment & Plan  Continue levothyroxine    HTN (hypertension)- (present on admission)  Assessment & Plan  Continue home medications     AF (atrial fibrillation) ([facility])- (present on admission)  Assessment & Plan  Not on anticoagulation at home   Continue diltiazem       DM2 (diabetes mellitus, type 2) ([facility])- (present on admission)  Assessment & Plan  H/o of   Not on  medications   BG well controlled with diet alone   Will monitor on chemistries        VTE prophylaxis: SCDs/TEDs and pharmacologic prophylaxis contraindicated due to upcoming surgery    I have performed a physical exam and reviewed and updated ROS and Plan today (1/30/2022). In review of yesterday's note (1/29/2022), there are no changes except as documented above.

## 2022-01-30 NOTE — PROGRESS NOTES
Tech from Lab called with critical result of 2 gram positive blood cultures, cocci clusters, not staph aureus at 2020. Critical lab result read back to .   Dr. Rodriguez  notified of critical lab result at 2025.  Critical lab result read back by Dr. Rodriguez.

## 2022-01-30 NOTE — WOUND TEAM
[facility]  Inpatient Services  Wound and Skin Care Brief Evaluation    Admission Date: 1/28/2022     Last order of IP CONSULT TO WOUND CARE was found on 1/29/2022 from Hospital Encounter on 1/28/2022     HPI, PMH, SH: Reviewed    Chief Complaint   Patient presents with   • Hip Pain     L hip/buttocks/lower back pain, acute on chronic, mechanical injury 1 year ago while bending over, surgery shortly after, now acute pain X 5 days, pt denies incident for acute pain, pt ambulates at baseline with walker but is not able to ambulate now r/t pain      Diagnosis: Chest pain [R07.9]  Back pain [M54.9]  Discitis [M46.40]    Unit where seen by Wound Team: T213/00     Wound consult placed regarding sacrum. Chart and images reviewed. This discussed with bedside RN, Maynor. This RN in to assess patient. Pt pleasant and agreeable. Non-selectively debrided with NS/wound cleanser and gauze OR moist warm washcloth and no rinse foam soap. No pressure injuries or advanced wound care needs identified. Wound consult completed. No further follow up unless indicated and consulted.             RSKIN:   CURRENTLY IN PLACE (X), APPLIED THIS VISIT (A), ORDERED (O):   Q shift Abdulkadir:  X  Q shift pressure point assessments:  X    Surface/Positioning   Pressure redistribution mattress   x         Low Airloss          Bariatric foam      Bariatric SOFIYA     Waffle cushion        Waffle Overlay          Reposition q 2 hours      TAPs Turning system     Z Bala Pillow     Offloading/Redistribution   Sacral Mepilex (Silicone dressing)  x   Heel Mepilex (Silicone dressing)         Heel float boots (Prevalon boot)             Float Heels off Bed with Pillows           Respiratory NA  Silicone O2 tubing         Gray Foam Ear protectors     Cannula fixation Device (Tender )          High flow offloading Clip    Elastic head band offloading device      Anchorfast                                                         Trach with  Optifoam split foam             Containment/Moisture Prevention NA    Rectal tube or BMS    Purwick/Condom Cath        Gallardo Catheter    Barrier wipes           Barrier paste       Antifungal tx      Interdry        Mobilization       Up to chair    x    Ambulate    x  PT/OT      Nutrition       Dietician        Diabetes Education      PO  x   TF     TPN     NPO   # days     Other

## 2022-01-30 NOTE — DISCHARGE PLANNING
HTH/SCP TCN chart review completed. Collaborated with , Alondra Evans RN and attending nurse, Maynor Sanders RN prior to meeting with patient. TCN met with patient at bedside. Education provided regarding levels of post acute care available to patient based on patient concerns of current level of functional and physical independence. Patient noted she wished to discuss further with granddavin Rizwan.     TCN returned to patient room to further discuss post acute resources available. Appreciate attending nurse practitioner note, JEREMY Corado with recommendations for post acute level of care, specific to [facility]. As such, choice form for IRF obtained and provided to Alondra CHAN RN. SNF choice also discussed and left at bedside for further discussion between patient and grandson at their request with TCN to follow up regarding SNF choice tomorrow. Appreciate physician referral to [facility] at this time. TCN will continue to follow and collaborate with discharge planning team as additional post acute needs arise. Thank you.    Previously completed:  - Please consider PT/OT consults as appropriate  - Choice forms: IRF, SNF provided at bedside for further review and decision making   - [facility] introduced (Y), referral (not sent) pending patient discharge disposition.

## 2022-01-30 NOTE — ASSESSMENT & PLAN NOTE
Code status discussed with patient. She said she knows she is not going to live forever and doesn't want to be on a ventilator if it comes to it. She would like to be DNR/DNI. Code status changed to reflect her wishes

## 2022-01-30 NOTE — ASSESSMENT & PLAN NOTE
Blood cultures x 2 on 1/28 growing staph captitis    Repeat blood cultures collected 1/29 and negative for growth   Continue Vancomycin   Monitor renal function closely while on Vancomycin   ID following, appreciate recommendations  Echocardiogram done 1/30 and unchanged from prior. No evidence of vegetations   Waiting to get biopsy from discitis

## 2022-01-30 NOTE — PROGRESS NOTES
Assumed patient care and received report from Night RN.  Assessment completed. Pt A&Ox 4. Respirations are even and unlabored on room air. Pt es/reports pain at this time. Monitors applied, VS stable, call light and belongings within reach. POC updated (Inpatient). Pt educated on room and call light, pt verbalized understanding. Communication board updated. Needs met.

## 2022-01-30 NOTE — CONSULTS
INFECTIOUS DISEASES INPATIENT CONSULT NOTE     Date of Service:1/30/2022    Consult Requested By: JEREMY Anand    Reason for Consultation: discitis    History of Present Illness:   Saumya Vann is a 89 y.o. female admitted 1/28/2022 for worsening back pain and malaise. She has known hypothyroidism, chronic lower back pain, HTN, A. Fib with Watchman device.  SHe is s/p epidural steroid injection 5 days prior to admission that was followed by worsening back and left hip pain  MRI of lumbar spine with possible discitis at L1-L5 but more likely DJD and acute to subacute sacral fracture.  Neurosurgery consulted- recommended IR guided biopsy of the disc to guide antibiotics treatment. Now blood cultures for staph epi  Pain not improved  Persistent mild leukocytosis, no fever  Started on Ancef  Infectious Diseases consulted for antibiotic recommendation and management    Review Of Systems:  Back pain  Weakness,generalized, nonfocal  Constipation  All other systems are reviewed and are negative     PMH:   Past Medical History:   Diagnosis Date   • A-fib (HCC)    • Breath shortness    • Cataract 04/23/2021    Surgically removed bilat   • Diabetes (HCC) 04/23/2021    Diet-controlled   • Dry cough 04/23/2021   • Glaucoma 04/23/2021    Left eye   • Hypertension    • Hypothyroid    • Macular degeneration    • Pain 04/23/2021    Back and Leg   • Urinary incontinence          PSH:  Past Surgical History:   Procedure Laterality Date   • OTHER ORTHOPEDIC SURGERY  2002    2 Knee Replacements    • CATARACT EXTRACTION WITH IOL  2001   • NEUROMA EXCISION  1985    Right Foot   • CHOLECYSTECTOMY  1970   • APPENDECTOMY  1957   • OTHER  1952    Varicose veins   • APPENDECTOMY     • CHOLECYSTECTOMY     • HYSTERECTOMY, TOTAL ABDOMINAL     • KNEE REPLACEMENT, TOTAL Bilateral    • US-NEEDLE CORE BX-BREAST PANEL         FAMILY HX:  Family History   Problem Relation Age of Onset   • Cancer Other        SOCIAL HX:  Social  History     Socioeconomic History   • Marital status:      Spouse name: Not on file   • Number of children: Not on file   • Years of education: Not on file   • Highest education level: Not on file   Occupational History   • Not on file   Tobacco Use   • Smoking status: Never Smoker   • Smokeless tobacco: Never Used   Vaping Use   • Vaping Use: Never used   Substance and Sexual Activity   • Alcohol use: No   • Drug use: No   • Sexual activity: Not on file   Other Topics Concern   • Not on file   Social History Narrative   • Not on file     Social Determinants of Health     Financial Resource Strain:    • Difficulty of Paying Living Expenses: Not on file   Food Insecurity:    • Worried About Running Out of Food in the Last Year: Not on file   • Ran Out of Food in the Last Year: Not on file   Transportation Needs:    • Lack of Transportation (Medical): Not on file   • Lack of Transportation (Non-Medical): Not on file   Physical Activity:    • Days of Exercise per Week: Not on file   • Minutes of Exercise per Session: Not on file   Stress:    • Feeling of Stress : Not on file   Social Connections:    • Frequency of Communication with Friends and Family: Not on file   • Frequency of Social Gatherings with Friends and Family: Not on file   • Attends Hinduism Services: Not on file   • Active Member of Clubs or Organizations: Not on file   • Attends Club or Organization Meetings: Not on file   • Marital Status: Not on file   Intimate Partner Violence:    • Fear of Current or Ex-Partner: Not on file   • Emotionally Abused: Not on file   • Physically Abused: Not on file   • Sexually Abused: Not on file   Housing Stability:    • Unable to Pay for Housing in the Last Year: Not on file   • Number of Places Lived in the Last Year: Not on file   • Unstable Housing in the Last Year: Not on file     Social History     Tobacco Use   Smoking Status Never Smoker   Smokeless Tobacco Never Used     Social History     Substance  and Sexual Activity   Alcohol Use No       Allergies/Intolerances:  Allergies   Allergen Reactions   • Pcn [Penicillins] Rash     Rash           Other Current Medications:    Current Facility-Administered Medications:   •  spironolactone (ALDACTONE) tablet 25 mg, 25 mg, Oral, DAILY, Dorothy Rojas, A.P.R.N., 25 mg at 01/30/22 0939  •  cyclobenzaprine (Flexeril) tablet 10 mg, 10 mg, Oral, HS PRN, Dorothy Rojas, A.P.R.N.  •  MD Alert...Vancomycin per Pharmacy, , Other, PHARMACY TO DOSE, Tamy Melendez M.D.  •  vancomycin (VANCOCIN) 1,750 mg in  mL IVPB, 25 mg/kg, Intravenous, Once, Tamy Melendez M.D.  •  senna-docusate (PERICOLACE or SENOKOT S) 8.6-50 MG per tablet 2 Tablet, 2 Tablet, Oral, BID, 2 Tablet at 01/30/22 0506 **AND** polyethylene glycol/lytes (MIRALAX) PACKET 1 Packet, 1 Packet, Oral, QDAY PRN **AND** magnesium hydroxide (MILK OF MAGNESIA) suspension 30 mL, 30 mL, Oral, QDAY PRN **AND** bisacodyl (DULCOLAX) suppository 10 mg, 10 mg, Rectal, QDAY PRN, Genaro Peters M.D.  •  acetaminophen (Tylenol) tablet 650 mg, 650 mg, Oral, Q6HRS PRN, Genaro Peters M.D.  •  ondansetron (ZOFRAN) syringe/vial injection 4 mg, 4 mg, Intravenous, Q4HRS PRN, Genaro Peters M.D.  •  ondansetron (ZOFRAN ODT) dispertab 4 mg, 4 mg, Oral, Q4HRS PRN, Genaro Peters M.D.  •  labetalol (NORMODYNE/TRANDATE) injection 10 mg, 10 mg, Intravenous, Q4HRS PRN, Genaro Peters M.D.  •  Notify provider if pain remains uncontrolled, , , CONTINUOUS **AND** Use the Numeric Rating Scale (NRS), Fischer-Baker Faces (WBF), or FLACC on regular floors and Critical-Care Pain Observation Tool (CPOT) on ICUs/Trauma to assess pain, , , CONTINUOUS **AND** Pulse Ox, , , CONTINUOUS **AND** Pharmacy Consult Request ...Pain Management Review 1 Each, 1 Each, Other, PHARMACY TO DOSE **AND** If patient difficult to arouse and/or has respiratory depression (respiratory rate of 10 or less), stop any opiates that are currently  "infusing and call a Rapid Response., , , CONTINUOUS, Genaro Peters M.D.  •  oxyCODONE immediate-release (ROXICODONE) tablet 2.5 mg, 2.5 mg, Oral, Q3HRS PRN **OR** oxyCODONE immediate-release (ROXICODONE) tablet 5 mg, 5 mg, Oral, Q3HRS PRN **OR** morphine 4 MG/ML injection 2 mg, 2 mg, Intravenous, Q3HRS PRN, Genaro Peters M.D.  •  [START ON 2022] alendronate (FOSAMAX) tablet 70 mg, 70 mg, Oral, Q MONDAY, Genaro Peters M.D.  •  [Held by provider] aspirin EC (ECOTRIN) tablet 81 mg, 81 mg, Oral, DAILY, Genaro Peters M.D., 81 mg at 22 0545  •  DILTIAZem CD (CARDIZEM CD) capsule 120 mg, 120 mg, Oral, BID, Genaro Peters M.D., 120 mg at 22 0506  •  latanoprost (XALATAN) 0.005 % ophthalmic solution 1 Drop, 1 Drop, Left Eye, QHS, Genaro Peters M.D., 1 Drop at 22 1949  •  levothyroxine (SYNTHROID) tablet 150 mcg, 150 mcg, Oral, QAM ,TH,SA,KEENAN, Genaro Peters M.D., 150 mcg at 22 0939  •  [DISCONTINUED] insulin regular (HumuLIN R,NovoLIN R) injection, 1-6 Units, Subcutaneous, 4X/DAY ACHS **AND** POC blood glucose manual result, , , Q AC AND BEDTIME(S) **AND** NOTIFY MD and PharmD, , , Once **AND** Administer 20 grams of glucose (approximately 8 ounces of fruit juice) every 15 minutes PRN FSBG less than 70 mg/dL, , , PRN **AND** dextrose 50% (D50W) injection 50 mL, 50 mL, Intravenous, Q15 MIN PRN, Genaro Peters M.D.      Most Recent Vital Signs:  BP (!) 170/79 Comment: Will notify RN  Pulse (!) 104   Temp 35.9 °C (96.6 °F) (Temporal)   Resp 18   Ht 1.626 m (5' 4\")   Wt 72 kg (158 lb 11.7 oz)   SpO2 96%   BMI 27.25 kg/m²   Temp  Av.1 °C (96.9 °F)  Min: 35.9 °C (96.6 °F)  Max: 36.3 °C (97.4 °F)    Physical Exam:  General: Frail well nourished no acute distress  HEENT: NCAT, PERRLA, sclera anicteric, PERRL, EOMI,  no oral lesions   Neck: supple, no lymphadenopathy No JVD  Chest: CTAB, unlabored.  Cardiac: IRR  Abdomen: + bowel sounds, soft, non-tender, " "non-distended  Extremities: No cyanosis, clubbing. + edema, 2+ pulses  Skin: no rashes ext ecchymosis  Neuro: Alert and oriented times 3, Speech fluent CN intact LLOYD  Psych: Mood normal Affect normal    Pertinent Lab Results:  Recent Labs     01/28/22 2037 01/29/22  0540 01/30/22  1012   WBC 11.8* 11.3* 12.9*      Recent Labs     01/28/22  1305 01/28/22  1305 01/28/22 2037 01/29/22  0540 01/30/22  1012   HEMOGLOBIN 14.0   < > 13.7 12.8 14.9   HEMATOCRIT 41.0   < > 40.4 38.3 44.3   MCV 89.1   < > 89.8 91.2 89.5   MCH 30.4   < > 30.4 30.5 30.1   ANISOCYTOSIS 1+  --  1+ 1+  --    PLATELETCT 198   < > 206 200 251    < > = values in this interval not displayed.         Recent Labs     01/28/22 1305 01/29/22  0540 01/30/22  1012   SODIUM 137 138 139   POTASSIUM 4.0 3.9 3.8   CHLORIDE 100 104 99   CO2 22 23 24   CREATININE 0.70 0.67 0.51        Recent Labs     01/28/22 1305 01/29/22  0540 01/30/22  1012   ALBUMIN 3.2 3.2 3.6        Pertinent Micro:  Results     Procedure Component Value Units Date/Time    BLOOD CULTURE [[id number]]  (Abnormal) Collected: 01/28/22 2037    Order Status: Completed Specimen: Blood from Peripheral Updated: 01/30/22 1037     Significant Indicator POS     Source BLD     Site PERIPHERAL     Culture Result Growth detected by Bactec instrument. 01/29/2022  20:13      Staphylococcus capitis    Narrative:      CALL  Amaral  CPU tel. [phone],  CALLED  CPU tel. [phone] 01/29/2022, 20:14, RB PERF. RESULTS CALLED TO: RN  [id number]  Per Hospital Policy: Only change Specimen Src: to \"Line\" if  specified by physician order.  No site indicated    BLOOD CULTURE [[id number]]  (Abnormal) Collected: 01/28/22 2235    Order Status: Completed Specimen: Blood from Peripheral Updated: 01/30/22 1037     Significant Indicator POS     Source BLD     Site PERIPHERAL     Culture Result Growth detected by Bactec instrument. 01/29/2022  20:11  Negative for Staphylococcus aureus and MRSA by PCR. Correlate  ongoing need for " "antibiotics with clinical condition.  Further report to follow.        Staphylococcus capitis  Susceptibilities in progress      Narrative:      CALL  Amaral  CPU tel. [phone],  CALLED  CPU tel. [phone] 01/29/2022, 20:13, RB PERF. RESULTS CALLED TO: RN  [phone]  Per Hospital Policy: Only change Specimen Src: to \"Line\" if  specified by physician order.  Right Hand    MRSA By PCR (Amp) [[id number]]     Order Status: Sent Specimen: Respirate from Nares     BLOOD CULTURE [[id number]]     Order Status: Canceled Specimen: Blood from Peripheral     BLOOD CULTURE [[id number]]     Order Status: Canceled Specimen: Blood from Peripheral     BLOOD CULTURE [[id number]] Collected: 01/30/22 0218    Order Status: Completed Specimen: Blood from Peripheral Updated: 01/30/22 0323    Narrative:      Per Hospital Policy: Only change Specimen Src: to \"Line\" if  specified by physician order.    BLOOD CULTURE [[id number]] Collected: 01/30/22 0218    Order Status: Completed Specimen: Blood from Peripheral Updated: 01/30/22 0322    Narrative:      Per Hospital Policy: Only change Specimen Src: to \"Line\" if  specified by physician order.    URINALYSIS (UA) [[id number]]  (Abnormal) Collected: 01/28/22 1247    Order Status: Completed Specimen: Blood Updated: 01/28/22 1432     Color Yellow     Character Clear     Specific Gravity 1.011     Ph 6.0     Glucose Negative mg/dL      Ketones Negative mg/dL      Protein Negative mg/dL      Bilirubin Negative     Urobilinogen, Urine 0.2     Nitrite Negative     Leukocyte Esterase Trace     Occult Blood Negative     Micro Urine Req Microscopic        No results found for: BLOODCULTU, BLDCULT, BCHOLD     Studies: MRI [facility] 1/28  IMPRESSION:   1.  No evidence of epidural hematoma or other epidural fluid collection.  2.  Acute to subacute T12 inferior endplate compression fracture with less than 25% loss of height and no retropulsion.  3.  Acute to subacute S2 sacral insufficiency fracture or  4.  Diffusely " increased T2 signal intensity in the disc spaces at L1-2, L2-3, L3-4 and L4-5 which was not present on the previous exam but is likely related to degenerative disease. This is felt less likely to be related to infection given absence of changes in the adjacent vertebral body endplates however, this cannot entirely be excluded. The most prominent signal abnormality is in the L2-3 disc space. Follow-up on clinical basis is recommended.  5.  Advanced multilevel degenerative changes of the lumbar spine as described above    IMPRESSION:   Staph capitis bacteremia  Has cardiac device-Watchman. Infection rare.   Acute on chronic back pain-MRI with multiple fractures and DJD. Equivocal for infection  PCN allergic-states severe, had to be hospitalized  DM  Leukocytosis    PLAN:   Given presence of cardiac device, start vancomycin  Monitor vancomycin trough and renal function closely  BCxs + staph capitis-has been associated with endocarditis. Can be methicillin-resistant. Frequent contaminant as well-colonizer of skin incl scalp and arms  Repeat Bcxs every 48 hours until neg  TTE; if unrevealing recommend ARBEN if persistently positive blood cultures  Final antibiotic recommendations per culture results and clinical course  Agree with IR sampling L2/L3 if c/w goals of care  Keep BS under 150 to help control current infection  Final antibiotic recommendations per culture results and clinical course      Plan of care discussed with IM JEREMY Anand. Will continue to follow    Tamy Melendez M.D.

## 2022-01-31 ENCOUNTER — APPOINTMENT (OUTPATIENT)
Dept: RADIOLOGY | Facility: MEDICAL CENTER | Age: 87
DRG: 478 | End: 2022-01-31
Attending: NURSE PRACTITIONER
Payer: MEDICARE

## 2022-01-31 PROBLEM — Z71.89 ADVANCED CARE PLANNING/COUNSELING DISCUSSION: Status: RESOLVED | Noted: 2022-01-30 | Resolved: 2022-01-31

## 2022-01-31 LAB
BACTERIA BLD CULT: ABNORMAL
SCCMEC + MECA PNL NOSE NAA+PROBE: NEGATIVE
SIGNIFICANT IND 70042: ABNORMAL
SIGNIFICANT IND 70042: ABNORMAL
SITE SITE: ABNORMAL
SITE SITE: ABNORMAL
SOURCE SOURCE: ABNORMAL
SOURCE SOURCE: ABNORMAL

## 2022-01-31 PROCEDURE — 700102 HCHG RX REV CODE 250 W/ 637 OVERRIDE(OP): Performed by: PHYSICAL MEDICINE & REHABILITATION

## 2022-01-31 PROCEDURE — A9270 NON-COVERED ITEM OR SERVICE: HCPCS | Performed by: PHYSICAL MEDICINE & REHABILITATION

## 2022-01-31 PROCEDURE — 99222 1ST HOSP IP/OBS MODERATE 55: CPT | Performed by: PHYSICAL MEDICINE & REHABILITATION

## 2022-01-31 PROCEDURE — 700102 HCHG RX REV CODE 250 W/ 637 OVERRIDE(OP): Performed by: INTERNAL MEDICINE

## 2022-01-31 PROCEDURE — 97166 OT EVAL MOD COMPLEX 45 MIN: CPT

## 2022-01-31 PROCEDURE — A9270 NON-COVERED ITEM OR SERVICE: HCPCS | Performed by: NURSE PRACTITIONER

## 2022-01-31 PROCEDURE — 97162 PT EVAL MOD COMPLEX 30 MIN: CPT

## 2022-01-31 PROCEDURE — 700102 HCHG RX REV CODE 250 W/ 637 OVERRIDE(OP): Performed by: NURSE PRACTITIONER

## 2022-01-31 PROCEDURE — 700101 HCHG RX REV CODE 250: Performed by: PHYSICAL MEDICINE & REHABILITATION

## 2022-01-31 PROCEDURE — 700105 HCHG RX REV CODE 258: Performed by: INTERNAL MEDICINE

## 2022-01-31 PROCEDURE — 700111 HCHG RX REV CODE 636 W/ 250 OVERRIDE (IP): Performed by: INTERNAL MEDICINE

## 2022-01-31 PROCEDURE — 770001 HCHG ROOM/CARE - MED/SURG/GYN PRIV*

## 2022-01-31 PROCEDURE — 99233 SBSQ HOSP IP/OBS HIGH 50: CPT | Performed by: INTERNAL MEDICINE

## 2022-01-31 PROCEDURE — 99232 SBSQ HOSP IP/OBS MODERATE 35: CPT | Performed by: NURSE PRACTITIONER

## 2022-01-31 PROCEDURE — 87641 MR-STAPH DNA AMP PROBE: CPT

## 2022-01-31 PROCEDURE — A9270 NON-COVERED ITEM OR SERVICE: HCPCS | Performed by: INTERNAL MEDICINE

## 2022-01-31 RX ORDER — ACETAMINOPHEN 500 MG
1000 TABLET ORAL 3 TIMES DAILY
Status: DISCONTINUED | OUTPATIENT
Start: 2022-01-31 | End: 2022-02-02 | Stop reason: HOSPADM

## 2022-01-31 RX ORDER — LIDOCAINE 50 MG/G
2 PATCH TOPICAL EVERY 24 HOURS
Status: DISCONTINUED | OUTPATIENT
Start: 2022-01-31 | End: 2022-02-02 | Stop reason: HOSPADM

## 2022-01-31 RX ADMIN — VANCOMYCIN HYDROCHLORIDE 1250 MG: 5 INJECTION, POWDER, LYOPHILIZED, FOR SOLUTION INTRAVENOUS at 22:34

## 2022-01-31 RX ADMIN — SPIRONOLACTONE 25 MG: 25 TABLET ORAL at 05:28

## 2022-01-31 RX ADMIN — OXYCODONE 5 MG: 5 TABLET ORAL at 20:22

## 2022-01-31 RX ADMIN — DILTIAZEM HYDROCHLORIDE 120 MG: 120 CAPSULE, COATED, EXTENDED RELEASE ORAL at 22:39

## 2022-01-31 RX ADMIN — DOCUSATE SODIUM 50 MG AND SENNOSIDES 8.6 MG 2 TABLET: 8.6; 5 TABLET, FILM COATED ORAL at 05:29

## 2022-01-31 RX ADMIN — ACETAMINOPHEN 1000 MG: 500 TABLET ORAL at 20:22

## 2022-01-31 RX ADMIN — LIDOCAINE 2 PATCH: 50 PATCH TOPICAL at 20:21

## 2022-01-31 RX ADMIN — ALENDRONATE SODIUM 70 MG: 70 TABLET ORAL at 09:58

## 2022-01-31 RX ADMIN — DILTIAZEM HYDROCHLORIDE 120 MG: 120 CAPSULE, COATED, EXTENDED RELEASE ORAL at 05:29

## 2022-01-31 RX ADMIN — LATANOPROST 1 DROP: 50 SOLUTION OPHTHALMIC at 20:23

## 2022-01-31 ASSESSMENT — COGNITIVE AND FUNCTIONAL STATUS - GENERAL
PERSONAL GROOMING: A LITTLE
EATING MEALS: A LITTLE
SUGGESTED CMS G CODE MODIFIER DAILY ACTIVITY: CK
TOILETING: A LOT
STANDING UP FROM CHAIR USING ARMS: TOTAL
CLIMB 3 TO 5 STEPS WITH RAILING: TOTAL
DRESSING REGULAR UPPER BODY CLOTHING: A LITTLE
MOBILITY SCORE: 7
DRESSING REGULAR LOWER BODY CLOTHING: A LOT
DAILY ACTIVITIY SCORE: 15
TURNING FROM BACK TO SIDE WHILE IN FLAT BAD: A LOT
MOVING FROM LYING ON BACK TO SITTING ON SIDE OF FLAT BED: UNABLE
WALKING IN HOSPITAL ROOM: TOTAL
HELP NEEDED FOR BATHING: A LOT
SUGGESTED CMS G CODE MODIFIER MOBILITY: CM
MOVING TO AND FROM BED TO CHAIR: UNABLE

## 2022-01-31 ASSESSMENT — ENCOUNTER SYMPTOMS
FEVER: 0
DIZZINESS: 0
MYALGIAS: 1
DIARRHEA: 0
CONSTIPATION: 0
HEARTBURN: 0
SPEECH CHANGE: 0
FOCAL WEAKNESS: 0
WEAKNESS: 0
VOMITING: 0
SHORTNESS OF BREATH: 0
WHEEZING: 0
FALLS: 0
BACK PAIN: 1
WEIGHT LOSS: 0
DIAPHORESIS: 0
PALPITATIONS: 0
NAUSEA: 0
CHILLS: 0
SENSORY CHANGE: 0
COUGH: 0
HEADACHES: 0
ABDOMINAL PAIN: 0
NECK PAIN: 0

## 2022-01-31 ASSESSMENT — GAIT ASSESSMENTS: GAIT LEVEL OF ASSIST: UNABLE TO PARTICIPATE

## 2022-01-31 ASSESSMENT — PAIN DESCRIPTION - PAIN TYPE
TYPE: ACUTE PAIN

## 2022-01-31 ASSESSMENT — ACTIVITIES OF DAILY LIVING (ADL): TOILETING: INDEPENDENT

## 2022-01-31 NOTE — PROGRESS NOTES
Infectious Disease Progress Note    Author: Ciro Veloz M.D. Date & Time of service: 2022  2:08 PM    Chief Complaint:  Discitis    Interval History:   patient remains afebrile, white count 12.9 on , tolerating start of antibiotics.  Procedure planned tomorrow.  A little tired this afternoon after standing up with PT    Labs Reviewed and Medications Reviewed.    Review of Systems:  Review of Systems   Constitutional: Negative for chills and fever.   Gastrointestinal: Negative for abdominal pain, diarrhea, nausea and vomiting.   Musculoskeletal: Positive for back pain.   Skin: Negative for itching and rash.   All other systems reviewed and are negative.      Hemodynamics:  Temp (24hrs), Av.1 °C (96.9 °F), Min:35.9 °C (96.7 °F), Max:36.2 °C (97.2 °F)  Temperature: 36.2 °C (97.2 °F)  Pulse  Av.7  Min: 72  Max: 104   Blood Pressure : (!) 163/86       Physical Exam:  Physical Exam  Vitals and nursing note reviewed.   Constitutional:       General: She is not in acute distress.     Appearance: She is ill-appearing. She is not toxic-appearing.      Comments: Elderly   HENT:      Head: Normocephalic.      Mouth/Throat:      Pharynx: No oropharyngeal exudate.   Eyes:      General: No scleral icterus.        Right eye: No discharge.         Left eye: No discharge.      Conjunctiva/sclera: Conjunctivae normal.   Cardiovascular:      Rate and Rhythm: Normal rate.      Heart sounds: No murmur heard.      Pulmonary:      Effort: Pulmonary effort is normal. No respiratory distress.      Breath sounds: No stridor.   Abdominal:      General: Abdomen is flat. There is no distension.      Tenderness: There is no abdominal tenderness.   Musculoskeletal:         General: No swelling or tenderness.      Comments: Bilateral anterior TKA well-healed scars with no gross external evidence of infection   Skin:     Findings: Bruising present. No erythema or rash.   Neurological:      General: No focal deficit  present.      Mental Status: She is alert and oriented to person, place, and time.   Psychiatric:         Mood and Affect: Mood normal.         Behavior: Behavior normal.      Comments: Very pleasant         Meds:    Current Facility-Administered Medications:   •  spironolactone  •  cyclobenzaprine  •  MD Alert...Vancomycin per Pharmacy  •  vancomycin  •  senna-docusate **AND** polyethylene glycol/lytes **AND** magnesium hydroxide **AND** bisacodyl  •  acetaminophen  •  ondansetron  •  ondansetron  •  labetalol  •  Notify provider if pain remains uncontrolled **AND** Use the Numeric Rating Scale (NRS), Fischer-Baker Faces (WBF), or FLACC on regular floors and Critical-Care Pain Observation Tool (CPOT) on ICUs/Trauma to assess pain **AND** Pulse Ox **AND** Pharmacy Consult Request **AND** If patient difficult to arouse and/or has respiratory depression (respiratory rate of 10 or less), stop any opiates that are currently infusing and call a Rapid Response.  •  oxyCODONE immediate-release **OR** oxyCODONE immediate-release **OR** morphine injection  •  alendronate  •  [Held by provider] aspirin EC  •  DILTIAZem CD  •  latanoprost  •  levothyroxine  •  [DISCONTINUED] insulin regular **AND** POC blood glucose manual result **AND** NOTIFY MD and PharmD **AND** Administer 20 grams of glucose (approximately 8 ounces of fruit juice) every 15 minutes PRN FSBG less than 70 mg/dL **AND** dextrose 50%    Labs:  Recent Labs     01/28/22 2037 01/29/22  0540 01/30/22  1012   WBC 11.8* 11.3* 12.9*   RBC 4.50 4.20 4.95   HEMOGLOBIN 13.7 12.8 14.9   HEMATOCRIT 40.4 38.3 44.3   MCV 89.8 91.2 89.5   MCH 30.4 30.5 30.1   RDW 49.7 50.6* 49.1   PLATELETCT 206 200 251   MPV 9.8 9.3 9.4   NEUTSPOLYS 77.70* 79.10*  --    LYMPHOCYTES 9.80* 6.10*  --    MONOCYTES 3.60 3.50  --    EOSINOPHILS 1.80 0.90  --    BASOPHILS 0.00 0.00  --    RBCMORPHOLO Present Present  --      Recent Labs     01/29/22  0540 01/30/22  1012   SODIUM 138 139   POTASSIUM  3.9 3.8   CHLORIDE 104 99   CO2 23 24   GLUCOSE 117* 111*   BUN 14 7*     Recent Labs     01/29/22  0540 01/30/22  1012   ALBUMIN 3.2 3.6   TBILIRUBIN 0.4 0.7   ALKPHOSPHAT 187* 214*   TOTPROTEIN 5.3* 6.2   ALTSGPT 54* 45   ASTSGOT 19 21   CREATININE 0.67 0.51       Imaging:  MR-LUMBAR SPINE-W/O    Result Date: 1/29/2022 1/28/2022 7:12 PM HISTORY/REASON FOR EXAM: Low back pain, recent epidural steroid, rule out epidural hematoma. TECHNIQUE/EXAM DESCRIPTION: MRI of the lumbar spine without contrast. The study was performed on a [employer] 1.5 Renate MRI scanner.  T1 sagittal, T2 sagittal, and T2 axial images were obtained of the lumbar spine. COMPARISON: 11/12/2020. FINDINGS: There is no evidence of an epidural hematoma or other epidural fluid collection. There is signal abnormality and mild inferior endplate compression deformity with less than 25% loss of height of the T12 vertebral body consistent with an acute to subacute compression fracture. There is no significant retropulsion. There is signal abnormality consistent with edema in the S2 sacral segment consistent with an acute to subacute sacral insufficiency fracture. There is stable chronic compression deformity and retropulsion of the L4 vertebral body with post surgical changes consistent with vertebral augmentation. There is diffusely increased T2 signal intensity in the disc spaces at L1-2, L2-3, L3-4 and L4-5 which was not present on the previous exam but is likely related to degenerative disease. This is felt unlikely to be related to infection given absence of changes in the adjacent vertebral body endplates however, this cannot entirely be excluded. The most prominent signal abnormality is in the L2-3 disc space. There is mild grade 1 retrolisthesis of L1 on 2, L2 on 3 and mild anterolisthesis of L4 on 5 and L5 on S1. There is moderate to severe vertebral disc space narrowing throughout the lumbar spine. The conus medullaris has a normal caliber,  course and signal intensity. Level-specific findings as follows: L1-2: There is mild broad posterior disc bulge and endplate spurring. There is moderate facet arthropathy and moderate to marked ligamentum flavum hypertrophy. There is moderate central canal stenosis. There is moderate bilateral neural foraminal stenosis. L2-3: There is moderate broad posterior disc bulge and endplate spurring. There is severe facet arthropathy and marked ligamentum flavum hypertrophy. There is severe central canal stenosis. There is severe bilateral neural foraminal stenosis. L3-4: There is moderate to marked broad posterior endplate spurring and disc bulge. There is severe facet arthropathy and marked ligamentum flavum hypertrophy. There is severe central canal stenosis. There is moderate to severe bilateral neural foraminal  stenosis. L4-5: There is mild broad posterior disc bulge and endplate spurring. There is severe facet arthropathy and marked ligamentum flavum hypertrophy. There is moderate to severe central canal stenosis. There is moderate to severe bilateral neural foraminal stenosis. L5-S1: There is mild broad posterior disc bulge and endplate spurring. There is moderate to severe facet arthropathy and mild ligamentum flavum hypertrophy. There is severe right and moderate to severe left neural foraminal stenosis. The visualized prevertebral and posterior paraspinous soft tissues are grossly unremarkable.     1.  No evidence of epidural hematoma or other epidural fluid collection. 2.  Acute to subacute T12 inferior endplate compression fracture with less than 25% loss of height and no retropulsion. 3.  Acute to subacute S2 sacral insufficiency fracture or 4.  Diffusely increased T2 signal intensity in the disc spaces at L1-2, L2-3, L3-4 and L4-5 which was not present on the previous exam but is likely related to degenerative disease. This is felt less likely to be related to infection given absence of changes in the  adjacent vertebral body endplates however, this cannot entirely be excluded. The most prominent signal abnormality is in the L2-3 disc space. Follow-up on clinical basis is recommended. 5.  Advanced multilevel degenerative changes of the lumbar spine as described above.    EC-ECHOCARDIOGRAM COMPLETE W/O CONT    Result Date: 2022  Transthoracic Echo Report Echocardiography Laboratory CONCLUSIONS Technically challenging study. Patient noted to be in atrial fibrillation. Small LV cavity size. The left ventricular ejection fraction is visually estimated to be 65%. Mild left ventricular hypertrophy. Severely dilated left atrium. Aortic valve sclerosis without stenosis. Right ventricular systolic pressure is estimated to be  43 mmHg. Compared to prior echocardiogram 2021, no significant change. NATACHA DIAS Exam Date:         2022                    09:00 Exam Location:     Inpatient Priority:          Routine Ordering Physician:        EDIE BORRERO Referring Physician:       [id number] Cabral Sonographer:               Kj Wharton RDCS Age:    89     Gender:    F MRN:    [id number] :    1930 BSA:    1.77   Ht (in):    64     Wt (lb):    158 Exam Type:     Complete Indications:     Acute/Subacute Bacterial Endocarditis ICD Codes:       421 CPT Codes:       29628 BP:   170    /   79     HR: Technical Quality:       Technically difficult study -                          adequate information is obtained MEASUREMENTS  (Male / Female) Normal Values 2D ECHO LV Diastolic Diameter PLAX        3.7 cm                4.2 - 5.9 / 3.9 - 5.3 cm LV Systolic Diameter PLAX         2.7 cm                2.1 - 4.0 cm LV Fractional Shortening PLAX     27.3 %                25 - 46  % IVS Diastolic Thickness           0.96 cm               LVPW Diastolic Thickness          1 cm                  M-MODE Aortic Root Diameter MM           2.8 cm                DOPPLER AV Peak Velocity                  1.3 m/s                AV Peak Gradient                  7.2 mmHg              AV Mean Gradient                  3.9 mmHg              LVOT Peak Velocity                0.7 m/s               TR Peak Velocity                  293 cm/s              TR Peak Gradient                  34.2 mmHg             PV Peak Velocity                  0.86 m/s              PV Peak Gradient                  3 mmHg                * Indicates values subject to auto-interpretation LV EF:        % FINDINGS Left Ventricle Normal left ventricular systolic function. The left ventricular ejection fraction is visually estimated to be 65%.  Small LV cavity size.  Mild left ventricular hypertrophy.  Normal wall motion.  Indeterminate diastolic function due to atrial fibrillation. Right Ventricle Mildly dilated right ventricle. Right Atrium Normal right atrial size. Left Atrium Left atrial volume index is 52  mL/sq m. Severely dilated left atrium. Mitral Valve Mild mitral annular calcification. No mitral stenosis. Mild mitral regurgitation.  Mildly thickened mitral valve leaflets.  Mean gradient 2.1 mmHg, HR 87 bpm. Aortic Valve Trileaflet aortic valve without significant stenosis or regurgitation.  Aortic valve sclerosis without stenosis. Tricuspid Valve Structurally normal tricuspid valve. No tricuspid stenosis. Mild tricuspid regurgitation. Right ventricular systolic pressure is estimated to be  43 mmHg. Right atrial pressure is estimated to be 3 mmHg. Pulmonic Valve Structurally normal pulmonic valve. No pulmonic stenosis. Mild pulmonic insufficiency. Pericardium Normal pericardium without effusion. Aorta Normal aortic root for body surface area.  Ascending aorta normal size, 3.6 cm. Maya Méndez MD (Electronically Signed) Final Date:     30 January 2022                 18:02      Micro:  Results     Procedure Component Value Units Date/Time    BLOOD CULTURE [[id number]]  (Abnormal) Collected: 01/28/22 2037    Order Status: Completed Specimen: Blood  "from Peripheral Updated: 01/31/22 0823     Significant Indicator POS     Source BLD     Site PERIPHERAL     Culture Result Growth detected by Bactec instrument. 01/29/2022  20:13      Staphylococcus capitis  See previous culture for sensitivity report.      Narrative:      CALL  Amaral  CPU tel. [phone],  CALLED  CPU tel. [phone] 01/29/2022, 20:14, RB PERF. RESULTS CALLED TO: RN  [id number]  Per Hospital Policy: Only change Specimen Src: to \"Line\" if  specified by physician order.  No site indicated    BLOOD CULTURE [[id number]]  (Abnormal)  (Susceptibility) Collected: 01/28/22 2235    Order Status: Completed Specimen: Blood from Peripheral Updated: 01/31/22 0822     Significant Indicator POS     Source BLD     Site PERIPHERAL     Culture Result Growth detected by Bactec instrument. 01/29/2022  20:11  Negative for Staphylococcus aureus and MRSA by PCR. Correlate  ongoing need for antibiotics with clinical condition.        Staphylococcus capitis    Narrative:      CALL  Amaral  CPU tel. [phone],  CALLED  CPU tel. [phone] 01/29/2022, 20:13, RB PERF. RESULTS CALLED TO: RN  [phone]  Per Hospital Policy: Only change Specimen Src: to \"Line\" if  specified by physician order.  Right Hand    Susceptibility     Staphylococcus capitis (1)     Antibiotic Interpretation Microscan   Method Status    Azithromycin Resistant >4 mcg/mL BARAK Final    Clindamycin Resistant >4 mcg/mL BARAK Final    Cefazolin Resistant <=8 mcg/mL BARAK Final    Cefepime Resistant <=4 mcg/mL BARAK Final    Ampicillin/sulbactam Resistant <=8/4 mcg/mL BARAK Final    Erythromycin Resistant >4 mcg/mL BARAK Final    Vancomycin Sensitive 1 mcg/mL BARAK Final    Oxacillin Resistant >2 mcg/mL BARAK Final    Trimeth/Sulfa Sensitive 2/38 mcg/mL BARAK Final    Tetracycline Resistant >8 mcg/mL BARAK Final                   BLOOD CULTURE [[id number]] Collected: 01/30/22 0218    Order Status: Completed Specimen: Blood from Peripheral Updated: 01/31/22 0745     Significant Indicator NEG " "    Source BLD     Site PERIPHERAL     Culture Result No Growth  Note: Blood cultures are incubated for 5 days and  are monitored continuously.Positive blood cultures  are called to the RN and reported as soon as  they are identified.      Narrative:      Per Hospital Policy: Only change Specimen Src: to \"Line\" if  specified by physician order.  Left Hand    BLOOD CULTURE [[id number]] Collected: 01/30/22 0218    Order Status: Completed Specimen: Blood from Peripheral Updated: 01/31/22 0745     Significant Indicator NEG     Source BLD     Site PERIPHERAL     Culture Result No Growth  Note: Blood cultures are incubated for 5 days and  are monitored continuously.Positive blood cultures  are called to the RN and reported as soon as  they are identified.      Narrative:      Per Hospital Policy: Only change Specimen Src: to \"Line\" if  specified by physician order.  Left AC    MRSA By PCR (Amp) [[id number]]     Order Status: Sent Specimen: Respirate from Nares     BLOOD CULTURE [[id number]]     Order Status: Canceled Specimen: Blood from Peripheral     BLOOD CULTURE [[id number]]     Order Status: Canceled Specimen: Blood from Peripheral     URINALYSIS (UA) [[id number]]  (Abnormal) Collected: 01/28/22 1247    Order Status: Completed Specimen: Blood Updated: 01/28/22 1432     Color Yellow     Character Clear     Specific Gravity 1.011     Ph 6.0     Glucose Negative mg/dL      Ketones Negative mg/dL      Protein Negative mg/dL      Bilirubin Negative     Urobilinogen, Urine 0.2     Nitrite Negative     Leukocyte Esterase Trace     Occult Blood Negative     Micro Urine Req Microscopic          Assessment:  Patient with type 2 diabetes, hypothyroidism, chronic lower back pain, hypertension, A. fib, watchman device in place, presented with acute on chronic lower back and left hip pain.  MRI was concerning for possible L2-L3 discitis - infection versus degenerative changes.  Blood cultures x2+ for coagulase-negative " Staphylococcus    Plan:  -Continue IV vancomycin.  Follow repeat blood cultures x2.  Monitor levels and renal function  -Given that coagulase-negative Staphylococcus in blood cultures can be contaminants, agree with going forward with the IR biopsy with cultures of the lumbar lesion  -If blood cultures remain persistently positive despite antibiotics, may need ARBEN and may need to consider Watchman device infection    Discussed with MARILOU Corado.  ID will follow.  Please call with questions.

## 2022-01-31 NOTE — PROGRESS NOTES
IV vancomycin ordered during day shift. Per report from day shift, the IV access the patient had was leaking from IV site, attempts to insert other ultrasound guided IV's failed, midline unable to placed until tomorrow. I went to remove this IV and was able to tighten the connections, flush line, and draw back.Pharmacy called and I was instructed to give this missed dose and following doses would be retimed. IV vancomycin infusing.

## 2022-01-31 NOTE — THERAPY
"Occupational Therapy   Initial Evaluation     Patient Name: Saumya Vann  Age:  89 y.o., Sex:  female  Medical Record #: [id number]  Today's Date: 1/31/2022     Precautions  Precautions: Fall Risk, spinal precautions for comfort     Assessment  Patient is a 89 y.o. female with a h/o DM, chronic lower back pain, HTN, A-fib with Watchman device and hypothyroidism who presented to the hospital with worsening left hip and back pain. MRI of the lumbar spine positive for possible discitis versus DJD, acute to subacute T12 endplate compression fx and acute to subacute S2 sacral insufficiency fx, At baseline, pt reports that she is fully independent with basic ADLs. She has a very supportive family. During OT eval, pt most limited by pain impairing her functional mobility and ADLs. She would benefit from OT services.     Plan    Recommend Occupational Therapy 3 times per week until therapy goals are met for the following treatments:  Adaptive Equipment, Self Care/Activities of Daily Living, Therapeutic Activities and Therapeutic Exercises.    DC Equipment Recommendations: Unable to determine at this time  Discharge Recommendations: Recommend post-acute placement for additional occupational therapy services prior to discharge home     Subjective    \"That's the most I've moved in days.\"      Objective       01/31/22 1102   Prior Living Situation   Prior Services Intermittent Physical Support for ADL Per Family   Housing / Facility 1 Story House   Steps Into Home 2   Steps In Home 0   Bathroom Set up Walk In Shower;Shower Chair   Equipment Owned Front-Wheel Walker;4-Wheel Walker;Raised Toilet Seat With Arms;Hand Held Shower;Tub / Shower Seat   Lives with - Patient's Self Care Capacity Other (Comments)   Comments Pt's adult grandson Rizwan lives with her. He works during the day but is very supportive and completes all IADLs   Prior Level of ADL Function   Self Feeding Independent   Grooming / Hygiene Independent   Bathing " Independent   Dressing Independent   Toileting Independent   Prior Level of IADL Function   Medication Management Requires Assist   Laundry Requires Assist   Kitchen Mobility Requires Assist  (Rizwan leaves pt easy to prepare food for lunch )   Home Management Requires Assist   Shopping Requires Assist   Prior Level Of Mobility Independent With Device in Home  (has spvn to do stairs, uses FWW in home, [employer] community)   Driving / Transportation Relatives / Others Provide Transportation   History of Falls   History of Falls Yes  (reports she slides off the edge of the bed sometimes)   Precautions   Precautions Fall Risk   Vitals   O2 Delivery Device None - Room Air   Pain 0 - 10 Group   Location Back;Hip   Location Orientation Left   Therapist Pain Assessment During Activity;Nurse Notified  (pain increased with sitting and movement )   Cognition    Cognition / Consciousness WDL   Comments pleasant and cooperative    Active ROM Upper Body   Active ROM Upper Body  WDL   Strength Upper Body   Upper Body Strength  X   Gross Strength Generalized Weakness, Equal Bilaterally.    Upper Body Muscle Tone   Upper Body Muscle Tone  WDL   Balance Assessment   Sitting Balance (Static) Fair   Sitting Balance (Dynamic) Fair -   Weight Shift Sitting Poor   Bed Mobility    Supine to Sit Moderate Assist   Sit to Supine Moderate Assist   Scooting Moderate Assist   Rolling Moderate Assist to Lt.   ADL Assessment   Eating Supervision   Grooming Supervision;Seated  (seated upright in bed)   Lower Body Dressing Maximal Assist  (shoes)   Toileting   (pure wick catheter in place )   Comments needs set up for grooming and self feeding due to vision loss    How much help from another person does the patient currently need...   Putting on and taking off regular lower body clothing? 2   Bathing (including washing, rinsing, and drying)? 2   Toileting, which includes using a toilet, bedpan, or urinal? 2   Putting on and taking off regular upper body  clothing? 3   Taking care of personal grooming such as brushing teeth? 3   Eating meals? 3   6 Clicks Daily Activity Score 15   Functional Mobility   Sit to Stand Unable to Participate  (due to pain )   Visual Perception   Visual Perception  X   Visual Acuity   (legally blind due to macular degeneration )   Activity Tolerance   Sitting Edge of Bed 2 min    Patient / Family Goals   Patient / Family Goal #1 to get stronger    Short Term Goals   Short Term Goal # 1 Pt will transfer to[place]from [facility] [place] assist    Short Term Goal # 2 Pt will complete LB dressing with min assist and AE   Education Group   Role of Occupational Therapist Patient Response Patient;Acceptance;Explanation;Verbal Demonstration   Problem List   Problem List Decreased Active Daily Living Skills;Decreased Upper Extremity Strength Left;Decreased Upper Extremity Strength Right;Decreased Functional Mobility;Decreased Activity Tolerance;Impaired Postural Control / Balance  (pain )

## 2022-01-31 NOTE — PROGRESS NOTES
Pharmacy Vancomycin Kinetics Note for 1/30/2022     89 y.o. female on Vancomycin day # 1     Vancomycin Indication (AUC Dosing): Bacteremia (discitits)    Provider specified end date: 02/13/22    Active Antibiotics (From admission, onward)    Ordered     Ordering Provider       Yaneth Jan 30, 2022  4:23 PM    01/30/22 1623  vancomycin (VANCOCIN) 1,250 mg in  mL IVPB  (vancomycin (VANCOCIN) IV (LD + Maintenance))  EVERY 24 HOURS         LESLY Gallegos Jan 30, 2022  9:47 AM    01/30/22 0947  vancomycin (VANCOCIN) 1,750 mg in  mL IVPB  ONCE         LESLY Gallegos Jan 30, 2022  9:35 AM    01/30/22 0935  MD Alert...Vancomycin per Pharmacy  PHARMACY TO DOSE        Question Answer Comment   Indication(s) for vancomycin? Other (comments)    Indication(s) for vancomycin? Staphylococcus aureus bacteremia        Tamy Melendez M.D.          Dosing Weight: 72 kg (158 lb 11.7 oz)      Admission History: Admitted on 1/28/2022 for Chest pain [R07.9]  Back pain [M54.9]  Discitis [M46.40]  Pertinent history: presented 1/28/2022 with complaints of acute on chronic left hip pain and lower back pain worsening with movements that started 5 days prior to admission. She had what sounds like epidural steroid injection 5 days ago for chronic lower back pain. Patient was treated symptomatically in ER, and evaluated with MRI of lumbar spine, that revealed possible discitis at L2 L3, as well as sacral insufficiency fracture.  ERP discussed case with neurosurgeon Dr. Nguyễn, who recommended IR guided biopsy of the disc to guide antibiotics treatment. found to have staph capitis bacteremia    Allergies:     Pcn [penicillins]     Pertinent cultures to date:     Results     Procedure Component Value Units Date/Time    BLOOD CULTURE [[id number]]  (Abnormal) Collected: 01/28/22 2037    Order Status: Completed Specimen: Blood from Peripheral Updated: 01/30/22 1037     Significant Indicator POS      "Source BLD     Site PERIPHERAL     Culture Result Growth detected by Bactec instrument. 01/29/2022  20:13      Staphylococcus capitis    Narrative:      CALL  Amaral  CPU tel. [phone],  CALLED  CPU tel. [phone] 01/29/2022, 20:14, RB PERF. RESULTS CALLED TO: RN  [id number]  Per Hospital Policy: Only change Specimen Src: to \"Line\" if  specified by physician order.  No site indicated    BLOOD CULTURE [[id number]]  (Abnormal) Collected: 01/28/22 2235    Order Status: Completed Specimen: Blood from Peripheral Updated: 01/30/22 1037     Significant Indicator POS     Source BLD     Site PERIPHERAL     Culture Result Growth detected by Bactec instrument. 01/29/2022  20:11  Negative for Staphylococcus aureus and MRSA by PCR. Correlate  ongoing need for antibiotics with clinical condition.  Further report to follow.        Staphylococcus capitis  Susceptibilities in progress      Narrative:      CALL  Amaral  CPU tel. [phone],  CALLED  CPU tel. [phone] 01/29/2022, 20:13, RB PERF. RESULTS CALLED TO: RN  [id number]  Per Hospital Policy: Only change Specimen Src: to \"Line\" if  specified by physician order.  Right Hand    MRSA By PCR (Amp) [[id number]]     Order Status: Sent Specimen: Respirate from Nares     BLOOD CULTURE [[id number]]     Order Status: Canceled Specimen: Blood from Peripheral     BLOOD CULTURE [[id number]]     Order Status: Canceled Specimen: Blood from Peripheral     BLOOD CULTURE [[id number]] Collected: 01/30/22 0218    Order Status: Completed Specimen: Blood from Peripheral Updated: 01/30/22 0323    Narrative:      Per Hospital Policy: Only change Specimen Src: to \"Line\" if  specified by physician order.    BLOOD CULTURE [[id number]] Collected: 01/30/22 0218    Order Status: Completed Specimen: Blood from Peripheral Updated: 01/30/22 0322    Narrative:      Per Hospital Policy: Only change Specimen Src: to \"Line\" if  specified by physician order.    URINALYSIS (UA) [[id number]]  (Abnormal) Collected: 01/28/22 1247    " "Order Status: Completed Specimen: Blood Updated: 22 1432     Color Yellow     Character Clear     Specific Gravity 1.011     Ph 6.0     Glucose Negative mg/dL      Ketones Negative mg/dL      Protein Negative mg/dL      Bilirubin Negative     Urobilinogen, Urine 0.2     Nitrite Negative     Leukocyte Esterase Trace     Occult Blood Negative     Micro Urine Req Microscopic          Labs:     Estimated Creatinine Clearance: 69.9 mL/min (by C-G formula based on SCr of 0.51 mg/dL).  Recent Labs     22  1305 227 22  0540 22  1012   WBC 13.1* 11.8* 11.3* 12.9*   NEUTSPOLYS 77.90* 77.70* 79.10*  --      Recent Labs     22  1305 22  0540 22  1012   BUN 16 14 7*   CREATININE 0.70 0.67 0.51   ALBUMIN 3.2 3.2 3.6     No intake or output data in the 24 hours ending 22 1624   BP (!) 170/79   Pulse (!) 104   Temp 35.9 °C (96.6 °F) (Temporal)   Resp 18   Ht 1.626 m (5' 4\")   Wt 72 kg (158 lb 11.7 oz)   SpO2 96%  Temp (24hrs), Av.1 °C (96.9 °F), Min:35.9 °C (96.6 °F), Max:36.3 °C (97.4 °F)      List concerns for Vancomycin clearance:     Age    Pharmacokinetics:     AUC kinetics:   Ke (hr ^-1): 0.0584 hr^-1  Half life: 11.87 hr  Clearance: 2.733  Estimated TDD: 1366.5  Estimated Dose: 791  Estimated interval: 13.9      A/P:     -  Vancomycin dose: Start 17 mg/kg q24hr (1250 mg)    -  Next vancomycin level(s): Obtain after 4th or 5th dose    -  Predicted vancomycin AUC from initial AUC test calculator: 457 mg·hr/L    -  Comments: Dosing as above, monitor renal closely due to age. ID consulted Pharmacy will follow.    Jonnathan Nunez, PharmD, BCPS  "

## 2022-01-31 NOTE — CARE PLAN
Problem: Knowledge Deficit - Standard  Goal: Patient and family/care givers will demonstrate understanding of plan of care, disease process/condition, diagnostic tests and medications  Outcome: Progressing     Problem: Pain - Standard  Goal: Alleviation of pain or a reduction in pain to the patient’s comfort goal  Outcome: Progressing     Problem: Skin Integrity  Goal: Skin integrity is maintained or improved  Outcome: Progressing     Problem: Fall Risk  Goal: Patient will remain free from falls  Outcome: Progressing     Problem: Infection - Standard  Goal: Patient will remain free from infection  Outcome: Progressing   The patient is Stable - Low risk of patient condition declining or worsening    Shift Goals  Clinical Goals: IR  Patient Goals: comfort, safety  Family Goals: safety     Progress made toward(s) clinical / shift goals:  patient updated on POC    Patient is not progressing towards the following goals:

## 2022-01-31 NOTE — CARE PLAN
The patient is Watcher - Medium risk of patient condition declining or worsening    Shift Goals  Clinical Goals: IR   Patient Goals: comfort, safety  Family Goals: comfort, safety     Progress made toward(s) clinical / shift goals:    Problem: Knowledge Deficit - Standard  Goal: Patient and family/care givers will demonstrate understanding of plan of care, disease process/condition, diagnostic tests and medications  Outcome: Progressing     Problem: Pain - Standard  Goal: Alleviation of pain or a reduction in pain to the patient’s comfort goal  Outcome: Progressing     Problem: Skin Integrity  Goal: Skin integrity is maintained or improved  Outcome: Progressing     Problem: Fall Risk  Goal: Patient will remain free from falls  Outcome: Progressing     Problem: Infection - Standard  Goal: Patient will remain free from infection  Outcome: Progressing       Patient is not progressing towards the following goals:

## 2022-01-31 NOTE — PROGRESS NOTES
3 attempts US guided IV and were unsuccessful. Midline order not available until tomorrow. MD notified

## 2022-01-31 NOTE — DISCHARGE PLANNING
Received Choice form at 1340  Agency/Facility Name: [facility]  Referral sent per Choice form @ 2960     Received Choice form at 9279  Agency[facility]Facility Name[facility]  Referral sent per Choice form @ [phone]

## 2022-01-31 NOTE — DISCHARGE PLANNING
[facility] TCN chart review completed. Collaborated with DUSTIN Lara. Confirmed IRF choice was completed and faxed to [facility]. Patient seen at bedside with Son Jian present. Bill inquires about post acute resources. TCN reviewed levels of care with no additional questions by pt or family. Pt completed SNF form as secondary choice if not accepted by [facility]. (1. [facility] 2. [facility] 3. [facility]). Completed SNF form given to DUSTIN Fraga. TCN will continue to follow and collaborate with discharge planning team as additional post acute needs arise. Thank you.    Previously completed:  - Orders received for: PT/OT consult  - Choice forms: SNF, IRF.   - [facility] introduced (Y), referral (not sent- disposition currently placement).

## 2022-01-31 NOTE — PROGRESS NOTES
Hospital Medicine Daily Progress Note    Date of Service  1/31/2022    Chief Complaint  Saumya Vann is a 89 y.o. female admitted 1/28/2022 with acute back pain    Hospital Course  Saumya Vann is a 89 y.o. female with past medical history of type 2 diabetes, hypothyroid, chronic lower back pain, hypertension, A. fib, watchman device, who presented 1/28/2022 with complaints of acute on chronic left hip pain and lower back pain worsening with movements that started 5 days prior to admission. She had what sounds like epidural steroid injection 5 days ago for chronic lower back pain. Patient was treated symptomatically in ER, and evaluated with MRI of lumbar spine, that revealed possible discitis at L2 L3, as well as sacral insufficiency fracture.  ERP discussed case with neurosurgeon Dr. Nguyễn, who recommended IR guided biopsy of the disc to guide antibiotics treatment.    Interval Problem Update  1/29: Patient denies any pain while lying flat, but says pain is a 10/10 with any movement. LE sensation and strength intact. WBC 11.3 from 11.8. Afebrile. Blood cx negative. CRP is elevated at 11.21. NPO for possible transpedicular bone biopsy per NSG recs . VSS on room air.   1/30: Admission blood cultures growing GPC. Empiric abx started. ID consulted. Stable neuro exam. WBC slightly up at 12.9 from 11.3. Afebrile. Not septic. Patient denies BM x 6 days. + flatus. Will give milk of mag. Discussed treatment plan with patient and grandson. They would like to discharge to [facility] if possible. Will place referral     1/31: Interval improvement in pain this morning. Stable neuro exam. Repeat blood cx negative x 24 hours. HR , -163. IV abx and labs delayed d/t loss of IV access. + BM overnight. Adequate UOP. Doing well on room air.     I have personally seen and examined the patient at bedside. I discussed the plan of care with patient, bedside RN, charge RN and case  manager.    Consultants/Specialty  infectious disease and neurosurgery    Code Status  DNAR/DNI    Disposition  Patient is not medically cleared for discharge.   Anticipate discharge to to skilled nursing facility.  I have placed the appropriate orders for post-discharge needs.    Review of Systems  Review of Systems   Constitutional: Positive for malaise/fatigue. Negative for chills, diaphoresis, fever and weight loss.   Eyes:        Vision loss     Respiratory: Negative for cough, shortness of breath and wheezing.    Cardiovascular: Negative for chest pain, palpitations and leg swelling.   Gastrointestinal: Negative for abdominal pain, constipation, heartburn, nausea and vomiting.   Musculoskeletal: Positive for back pain and myalgias. Negative for falls, joint pain and neck pain.   Neurological: Negative for dizziness, sensory change, speech change, focal weakness, weakness and headaches.        Physical Exam  Temp:  [35.9 °C (96.7 °F)-36.2 °C (97.2 °F)] 36.2 °C (97.2 °F)  Pulse:  [] 102  Resp:  [16] 16  BP: (128-163)/(74-86) 163/86  SpO2:  [94 %-95 %] 94 %    Physical Exam  Vitals and nursing note reviewed.   Constitutional:       General: She is not in acute distress.     Appearance: She is not ill-appearing, toxic-appearing or diaphoretic.   HENT:      Head: Normocephalic.      Right Ear: External ear normal.      Left Ear: External ear normal.      Nose: Nose normal.      Mouth/Throat:      Mouth: Mucous membranes are moist.      Pharynx: Oropharynx is clear.   Eyes:      General:         Right eye: No discharge.         Left eye: No discharge.      Conjunctiva/sclera: Conjunctivae normal.   Cardiovascular:      Rate and Rhythm: Normal rate. Rhythm irregular.      Pulses: Normal pulses.   Pulmonary:      Effort: Pulmonary effort is normal.      Breath sounds: Normal breath sounds.   Abdominal:      General: Bowel sounds are normal. There is distension.      Palpations: Abdomen is soft.      Tenderness:  There is no abdominal tenderness. There is no guarding.   Musculoskeletal:      Cervical back: Normal range of motion.      Right lower leg: No edema.      Left lower leg: No edema.      Comments: Lower extremity ROM limited due to pain    Skin:     General: Skin is warm and dry.      Coloration: Skin is pale.      Findings: Bruising (scattered to BLE /BUE) present.   Neurological:      Mental Status: She is alert and oriented to person, place, and time.      Coordination: Coordination normal.      Comments: Plantarflexion/doriflexion bilaterally   Sensation intact      Psychiatric:         Mood and Affect: Mood normal.         Thought Content: Thought content normal.         Judgment: Judgment normal.         Fluids  No intake or output data in the 24 hours ending 01/31/22 0844    Laboratory  Recent Labs     01/28/22  2037 01/29/22  0540 01/30/22  1012   WBC 11.8* 11.3* 12.9*   RBC 4.50 4.20 4.95   HEMOGLOBIN 13.7 12.8 14.9   HEMATOCRIT 40.4 38.3 44.3   MCV 89.8 91.2 89.5   MCH 30.4 30.5 30.1   MCHC 33.9 33.4* 33.6   RDW 49.7 50.6* 49.1   PLATELETCT 206 200 251   MPV 9.8 9.3 9.4     Recent Labs     01/28/22  1305 01/29/22  0540 01/30/22  1012   SODIUM 137 138 139   POTASSIUM 4.0 3.9 3.8   CHLORIDE 100 104 99   CO2 22 23 24   GLUCOSE 150* 117* 111*   BUN 16 14 7*   CREATININE 0.70 0.67 0.51   CALCIUM 8.2* 7.5* 8.4*     Recent Labs     01/29/22  0540   APTT 30.6   INR 1.37*               Imaging  EC-ECHOCARDIOGRAM COMPLETE W/O CONT   Final Result      MR-LUMBAR SPINE-W/O   Final Result      1.  No evidence of epidural hematoma or other epidural fluid collection.   2.  Acute to subacute T12 inferior endplate compression fracture with less than 25% loss of height and no retropulsion.   3.  Acute to subacute S2 sacral insufficiency fracture or   4.  Diffusely increased T2 signal intensity in the disc spaces at L1-2, L2-3, L3-4 and L4-5 which was not present on the previous exam but is likely related to degenerative  disease. This is felt less likely to be related to infection given absence of    changes in the adjacent vertebral body endplates however, this cannot entirely be excluded. The most prominent signal abnormality is in the L2-3 disc space. Follow-up on clinical basis is recommended.   5.  Advanced multilevel degenerative changes of the lumbar spine as described above.      IR-CONSULT AND TREAT    (Results Pending)   IR-MIDLINE CATHETER INSERTION WO GUIDANCE > AGE 3    (Results Pending)        Assessment/Plan  * Discitis  Assessment & Plan  MRI of the lumbar spine:It was noted that the patient had increased T2 signal at L2-L3 disc with some fluid is well sacral insufficiency fracture.  Neurosurgery, Dr. Nguyễn, recommended IR transpedicular biopsy given high suspicion for discitis.We are still awaiting IR plan. Case was discussed with IR on 1/30 who said they were reaching out to Gopi to decided if biopsy was still warranted now that she is receiving IV abx for bacteremia. Will reach out to IR again today to establish plan.   Pain control  Fall precautions  PT/OT ordered   Serial neuro exam     Bacteremia- (present on admission)  Assessment & Plan  Blood cultures x 2 on admission growing staph captitis    Repeat blood cultures collected 1/29 and negative for growth   Continue empiric Ancef and Vancomycin   MRSA PCR pending   Monitor renal function closely while on Vancomycin   ID following   Echocardiogram done 1/30 and unchanged from prior. No evidence of vegetations     GERD (gastroesophageal reflux disease)- (present on admission)  Assessment & Plan  Continue omeprazole    Hypothyroidism- (present on admission)  Assessment & Plan  Continue levothyroxine    HTN (hypertension)- (present on admission)  Assessment & Plan  Continue home medications     AF (atrial fibrillation) ([facility])- (present on admission)  Assessment & Plan  Not on anticoagulation at home   Continue diltiazem       DM2 (diabetes mellitus, type 2) ([facility])-  (present on admission)  Assessment & Plan  H/o of   Not on medications   BG well controlled with diet alone   Will monitor on chemistries        VTE prophylaxis: SCDs/TEDs and pharmacologic prophylaxis contraindicated due to unknown surgical plan    I have performed a physical exam and reviewed and updated ROS and Plan today (1/31/2022). In review of yesterday's note (1/30/2022), there are no changes except as documented above.

## 2022-01-31 NOTE — DISCHARGE PLANNING
[facility] Transitional Care Coordination    Referral from:  MARILOU Rojas    Insurance Provider on Facesheet: SCP    Potential Rehab Diagnosis: TBD    Chart review indicates patient may have on going medical management and may have therapy needs to possibly meet inpatient rehab facility criteria with the goal of returning to community.    D/C support: TBD     Physiatry consultation forwarded per protocol.     Would appreciate CLARI alexander once appropriate.     Thank you for the referral.

## 2022-02-01 ENCOUNTER — HOSPITAL ENCOUNTER (OUTPATIENT)
Dept: RADIOLOGY | Facility: MEDICAL CENTER | Age: 87
End: 2022-02-01
Attending: INTERNAL MEDICINE
Payer: MEDICARE

## 2022-02-01 VITALS
OXYGEN SATURATION: 100 % | HEART RATE: 66 BPM | SYSTOLIC BLOOD PRESSURE: 166 MMHG | DIASTOLIC BLOOD PRESSURE: 76 MMHG | RESPIRATION RATE: 18 BRPM

## 2022-02-01 LAB
ALBUMIN SERPL BCP-MCNC: 3.6 G/DL (ref 3.2–4.9)
ALBUMIN/GLOB SERPL: 1.9 G/DL
ALP SERPL-CCNC: 194 U/L (ref 30–99)
ALT SERPL-CCNC: 32 U/L (ref 2–50)
ANION GAP SERPL CALC-SCNC: 14 MMOL/L (ref 7–16)
ANION GAP SERPL CALC-SCNC: 16 MMOL/L (ref 7–16)
APTT PPP: 29 SEC (ref 24.7–36)
AST SERPL-CCNC: 22 U/L (ref 12–45)
BILIRUB SERPL-MCNC: 0.6 MG/DL (ref 0.1–1.5)
BUN SERPL-MCNC: 13 MG/DL (ref 8–22)
BUN SERPL-MCNC: 13 MG/DL (ref 8–22)
CALCIUM SERPL-MCNC: 8.3 MG/DL (ref 8.5–10.5)
CALCIUM SERPL-MCNC: 8.4 MG/DL (ref 8.5–10.5)
CHLORIDE SERPL-SCNC: 101 MMOL/L (ref 96–112)
CHLORIDE SERPL-SCNC: 101 MMOL/L (ref 96–112)
CO2 SERPL-SCNC: 21 MMOL/L (ref 20–33)
CO2 SERPL-SCNC: 23 MMOL/L (ref 20–33)
CREAT SERPL-MCNC: 0.57 MG/DL (ref 0.5–1.4)
CREAT SERPL-MCNC: 0.59 MG/DL (ref 0.5–1.4)
CRP SERPL HS-MCNC: 2.92 MG/DL (ref 0–0.75)
ERYTHROCYTE [DISTWIDTH] IN BLOOD BY AUTOMATED COUNT: 50.2 FL (ref 35.9–50)
GLOBULIN SER CALC-MCNC: 1.9 G/DL (ref 1.9–3.5)
GLUCOSE SERPL-MCNC: 115 MG/DL (ref 65–99)
GLUCOSE SERPL-MCNC: 116 MG/DL (ref 65–99)
HCT VFR BLD AUTO: 43 % (ref 37–47)
HGB BLD-MCNC: 14.4 G/DL (ref 12–16)
INR PPP: 1.2 (ref 0.87–1.13)
MCH RBC QN AUTO: 30.3 PG (ref 27–33)
MCHC RBC AUTO-ENTMCNC: 33.5 G/DL (ref 33.6–35)
MCV RBC AUTO: 90.3 FL (ref 81.4–97.8)
PLATELET # BLD AUTO: 231 K/UL (ref 164–446)
PMV BLD AUTO: 8.9 FL (ref 9–12.9)
POTASSIUM SERPL-SCNC: 4.1 MMOL/L (ref 3.6–5.5)
POTASSIUM SERPL-SCNC: 4.2 MMOL/L (ref 3.6–5.5)
PROCALCITONIN SERPL-MCNC: 0.07 NG/ML
PROT SERPL-MCNC: 5.5 G/DL (ref 6–8.2)
PROTHROMBIN TIME: 14.9 SEC (ref 12–14.6)
RBC # BLD AUTO: 4.76 M/UL (ref 4.2–5.4)
SODIUM SERPL-SCNC: 138 MMOL/L (ref 135–145)
SODIUM SERPL-SCNC: 138 MMOL/L (ref 135–145)
WBC # BLD AUTO: 14.8 K/UL (ref 4.8–10.8)

## 2022-02-01 PROCEDURE — 700111 HCHG RX REV CODE 636 W/ 250 OVERRIDE (IP): Performed by: INTERNAL MEDICINE

## 2022-02-01 PROCEDURE — 87102 FUNGUS ISOLATION CULTURE: CPT

## 2022-02-01 PROCEDURE — 770001 HCHG ROOM/CARE - MED/SURG/GYN PRIV*

## 2022-02-01 PROCEDURE — 99233 SBSQ HOSP IP/OBS HIGH 50: CPT | Performed by: INTERNAL MEDICINE

## 2022-02-01 PROCEDURE — 0QB03ZX EXCISION OF LUMBAR VERTEBRA, PERCUTANEOUS APPROACH, DIAGNOSTIC: ICD-10-PCS | Performed by: RADIOLOGY

## 2022-02-01 PROCEDURE — A9270 NON-COVERED ITEM OR SERVICE: HCPCS | Performed by: NURSE PRACTITIONER

## 2022-02-01 PROCEDURE — 700101 HCHG RX REV CODE 250

## 2022-02-01 PROCEDURE — 87015 SPECIMEN INFECT AGNT CONCNTJ: CPT

## 2022-02-01 PROCEDURE — 87205 SMEAR GRAM STAIN: CPT | Mod: 91

## 2022-02-01 PROCEDURE — 80048 BASIC METABOLIC PNL TOTAL CA: CPT

## 2022-02-01 PROCEDURE — 99153 MOD SED SAME PHYS/QHP EA: CPT

## 2022-02-01 PROCEDURE — 86140 C-REACTIVE PROTEIN: CPT

## 2022-02-01 PROCEDURE — A9270 NON-COVERED ITEM OR SERVICE: HCPCS | Performed by: PHYSICAL MEDICINE & REHABILITATION

## 2022-02-01 PROCEDURE — 87070 CULTURE OTHR SPECIMN AEROBIC: CPT

## 2022-02-01 PROCEDURE — 85610 PROTHROMBIN TIME: CPT

## 2022-02-01 PROCEDURE — 700102 HCHG RX REV CODE 250 W/ 637 OVERRIDE(OP): Performed by: PHYSICAL MEDICINE & REHABILITATION

## 2022-02-01 PROCEDURE — 700102 HCHG RX REV CODE 250 W/ 637 OVERRIDE(OP): Performed by: NURSE PRACTITIONER

## 2022-02-01 PROCEDURE — A9270 NON-COVERED ITEM OR SERVICE: HCPCS | Performed by: INTERNAL MEDICINE

## 2022-02-01 PROCEDURE — 85027 COMPLETE CBC AUTOMATED: CPT

## 2022-02-01 PROCEDURE — 700102 HCHG RX REV CODE 250 W/ 637 OVERRIDE(OP): Performed by: INTERNAL MEDICINE

## 2022-02-01 PROCEDURE — 700105 HCHG RX REV CODE 258: Performed by: INTERNAL MEDICINE

## 2022-02-01 PROCEDURE — 700111 HCHG RX REV CODE 636 W/ 250 OVERRIDE (IP): Performed by: RADIOLOGY

## 2022-02-01 PROCEDURE — 84145 PROCALCITONIN (PCT): CPT

## 2022-02-01 PROCEDURE — 85730 THROMBOPLASTIN TIME PARTIAL: CPT

## 2022-02-01 PROCEDURE — 87116 MYCOBACTERIA CULTURE: CPT

## 2022-02-01 PROCEDURE — 99232 SBSQ HOSP IP/OBS MODERATE 35: CPT | Performed by: PHYSICAL MEDICINE & REHABILITATION

## 2022-02-01 PROCEDURE — 700101 HCHG RX REV CODE 250: Performed by: PHYSICAL MEDICINE & REHABILITATION

## 2022-02-01 PROCEDURE — 80053 COMPREHEN METABOLIC PANEL: CPT

## 2022-02-01 RX ORDER — MIDAZOLAM HYDROCHLORIDE 1 MG/ML
INJECTION INTRAMUSCULAR; INTRAVENOUS
Status: COMPLETED
Start: 2022-02-01 | End: 2022-02-01

## 2022-02-01 RX ORDER — MIDAZOLAM HYDROCHLORIDE 1 MG/ML
.5-2 INJECTION INTRAMUSCULAR; INTRAVENOUS PRN
Status: ACTIVE | OUTPATIENT
Start: 2022-02-01 | End: 2022-02-01

## 2022-02-01 RX ORDER — SODIUM CHLORIDE 9 MG/ML
500 INJECTION, SOLUTION INTRAVENOUS
Status: ACTIVE | OUTPATIENT
Start: 2022-02-01 | End: 2022-02-01

## 2022-02-01 RX ORDER — ONDANSETRON 2 MG/ML
4 INJECTION INTRAMUSCULAR; INTRAVENOUS PRN
Status: ACTIVE | OUTPATIENT
Start: 2022-02-01 | End: 2022-02-01

## 2022-02-01 RX ORDER — LIDOCAINE HYDROCHLORIDE 10 MG/ML
INJECTION, SOLUTION INFILTRATION; PERINEURAL
Status: COMPLETED
Start: 2022-02-01 | End: 2022-02-01

## 2022-02-01 RX ADMIN — FENTANYL CITRATE 25 MCG: 50 INJECTION, SOLUTION INTRAMUSCULAR; INTRAVENOUS at 17:23

## 2022-02-01 RX ADMIN — MIDAZOLAM HYDROCHLORIDE 0.5 MG: 1 INJECTION, SOLUTION INTRAMUSCULAR; INTRAVENOUS at 17:20

## 2022-02-01 RX ADMIN — LIDOCAINE HYDROCHLORIDE: 10 INJECTION, SOLUTION INFILTRATION; PERINEURAL at 15:45

## 2022-02-01 RX ADMIN — LATANOPROST 1 DROP: 50 SOLUTION OPHTHALMIC at 20:47

## 2022-02-01 RX ADMIN — FENTANYL CITRATE 25 MCG: 50 INJECTION, SOLUTION INTRAMUSCULAR; INTRAVENOUS at 17:25

## 2022-02-01 RX ADMIN — MIDAZOLAM HYDROCHLORIDE 0.5 MG: 1 INJECTION, SOLUTION INTRAMUSCULAR; INTRAVENOUS at 17:09

## 2022-02-01 RX ADMIN — SPIRONOLACTONE 25 MG: 25 TABLET ORAL at 04:41

## 2022-02-01 RX ADMIN — LIDOCAINE 2 PATCH: 50 PATCH TOPICAL at 18:25

## 2022-02-01 RX ADMIN — DILTIAZEM HYDROCHLORIDE 120 MG: 120 CAPSULE, COATED, EXTENDED RELEASE ORAL at 06:29

## 2022-02-01 RX ADMIN — OXYCODONE 5 MG: 5 TABLET ORAL at 20:46

## 2022-02-01 RX ADMIN — OXYCODONE 5 MG: 5 TABLET ORAL at 04:41

## 2022-02-01 RX ADMIN — DOCUSATE SODIUM 50 MG AND SENNOSIDES 8.6 MG 2 TABLET: 8.6; 5 TABLET, FILM COATED ORAL at 18:26

## 2022-02-01 RX ADMIN — VANCOMYCIN HYDROCHLORIDE 1250 MG: 5 INJECTION, POWDER, LYOPHILIZED, FOR SOLUTION INTRAVENOUS at 20:47

## 2022-02-01 RX ADMIN — DILTIAZEM HYDROCHLORIDE 120 MG: 120 CAPSULE, COATED, EXTENDED RELEASE ORAL at 18:59

## 2022-02-01 RX ADMIN — ACETAMINOPHEN 1000 MG: 500 TABLET ORAL at 20:46

## 2022-02-01 ASSESSMENT — ENCOUNTER SYMPTOMS
FOCAL WEAKNESS: 0
DIZZINESS: 0
CONSTIPATION: 0
SENSORY CHANGE: 0
WHEEZING: 0
DIARRHEA: 0
WEAKNESS: 0
DIAPHORESIS: 0
HEADACHES: 0
VOMITING: 0
NAUSEA: 0
MYALGIAS: 1
FALLS: 0
SHORTNESS OF BREATH: 0
BACK PAIN: 1
COUGH: 0
HEARTBURN: 0
PALPITATIONS: 0
FEVER: 0
NECK PAIN: 0
CHILLS: 0
WEIGHT LOSS: 0
ABDOMINAL PAIN: 0
SPEECH CHANGE: 0

## 2022-02-01 ASSESSMENT — PATIENT HEALTH QUESTIONNAIRE - PHQ9
1. LITTLE INTEREST OR PLEASURE IN DOING THINGS: NOT AT ALL
SUM OF ALL RESPONSES TO PHQ9 QUESTIONS 1 AND 2: 0
2. FEELING DOWN, DEPRESSED, IRRITABLE, OR HOPELESS: NOT AT ALL

## 2022-02-01 ASSESSMENT — PAIN DESCRIPTION - PAIN TYPE
TYPE: ACUTE PAIN
TYPE: ACUTE PAIN

## 2022-02-01 NOTE — ASSESSMENT & PLAN NOTE
Patient has A. Fib  Echo showed dilated left atrium  Close monitoring and waiting for blood culture  Follow-up with cardiology as outpatient

## 2022-02-01 NOTE — THERAPY
Physical Therapy   Initial Evaluation     Patient Name: Saumya Vann  Age:  89 y.o., Sex:  female  Medical Record #: [id number]  Today's Date: 1/31/2022     Precautions  Precautions: Fall Risk    Assessment  Ms. Vann is a 89 y/o female who presents to acute secondary to discitis and bacteremia. Pt with significant lower extremity weakness, gross motor coordination impairments, and dynamic balance impairments that negatively impact her ability to perform gait, transfers, and bed mobility without physical assist. Pt very fearful of mobility but was able to WB on LEs today. Recommend post acute placement. Acute PT to follow.  Plan    Recommend Physical Therapy 3 times per week until therapy goals are met for the following treatments:  Bed Mobility, Gait Training, Neuro Re-Education / Balance, Therapeutic Activities and Therapeutic Exercises    DC Equipment Recommendations: Unable to determine at this time  Discharge Recommendations: Recommend post-acute placement for additional physical therapy services prior to discharge home            Objective       01/31/22 1509   Prior Living Situation   Prior Services Intermittent Physical Support for ADL Per Family   Housing / Facility 1 Story House   Steps Into Home 2   Equipment Owned Front-Wheel Walker;4-Wheel Walker   Lives with - Patient's Self Care Capacity Other (Comments)  (grandson)   Comments Grandson lives with her and assists with IADLs   Prior Level of Functional Mobility   Bed Mobility Independent   Transfer Status Independent   Ambulation Independent   Distance Ambulation (Feet) 50   Assistive Devices Used Front-Wheel Walker   Stairs Required Assist   Comments reports someone is always with her if she is out of home   Cognition    Cognition / Consciousness WDL   Passive ROM Lower Body   Passive ROM Lower Body WDL   Active ROM Lower Body    Active ROM Lower Body  WDL   Strength Lower Body   Lower Body Strength  X   Gross Strength Generalized Weakness, Equal  Bilaterally   Comments 3-/5 B LE   Sensation Lower Body   Lower Extremity Sensation   WDL   Balance Assessment   Sitting Balance (Static) Fair -   Sitting Balance (Dynamic) Poor +   Standing Balance (Static) Trace +   Standing Balance (Dynamic) Trace   Weight Shift Sitting Poor   Weight Shift Standing Absent   Comments FWW   Gait Analysis   Gait Level Of Assist Unable to Participate   Bed Mobility    Supine to Sit Maximal Assist   Sit to Supine Maximal Assist   Scooting Maximal Assist   Functional Mobility   Sit to Stand Maximal Assist   How much difficulty does the patient currently have...   Turning over in bed (including adjusting bedclothes, sheets and blankets)? 2   Sitting down on and standing up from a chair with arms (e.g., wheelchair, bedside commode, etc.) 1   Moving from lying on back to sitting on the side of the bed? 1   How much help from another person does the patient currently need...   Moving to and from a bed to a chair (including a wheelchair)? 1   Need to walk in a hospital room? 1   Climbing 3-5 steps with a railing? 1   6 clicks Mobility Score 7   Short Term Goals    Short Term Goal # 1 Pt will perform supine <> sit with min assist in 6 visits to get in/out of bed   Short Term Goal # 2 Pt will perform functional transfers with min assist in 6 visits to increase independence   Short Term Goal # 3 Pt will ambulate 50ft with FWW and min assist in 6 visits to increase independence

## 2022-02-01 NOTE — CARE PLAN
The patient is Stable - Low risk of patient condition declining or worsening    Shift Goals  Clinical Goals: IR , pain control   Patient Goals: comfort, safety  Family Goals: comfort, safety     Progress made toward(s) clinical / shift goals:      Problem: Knowledge Deficit - Standard  Goal: Patient and family/care givers will demonstrate understanding of plan of care, disease process/condition, diagnostic tests and medications  Outcome: Progressing     Problem: Pain - Standard  Goal: Alleviation of pain or a reduction in pain to the patient’s comfort goal  Outcome: Progressing    Patient is not progressing towards the following goals:

## 2022-02-01 NOTE — PROGRESS NOTES
Pharmacy Vancomycin Kinetics Note for 2/1/2022   89 y.o. female on Vancomycin day # 3   Vancomycin Indication (AUC Dosing): Bacteremia  Provider specified end date: 02/13/22    Active Antibiotics (From admission, onward)    Ordered     Ordering Provider       Yaneth Jan 30, 2022  4:23 PM    01/30/22 1623  vancomycin (VANCOCIN) 1,250 mg in  mL IVPB  (vancomycin (VANCOCIN) IV (LD + Maintenance))  EVERY 24 HOURS         LESLY Gallegos Jan 30, 2022  9:35 AM    01/30/22 0935  MD Alert...Vancomycin per Pharmacy  PHARMACY TO DOSE        Question Answer Comment   Indication(s) for vancomycin? Other (comments)    Indication(s) for vancomycin? Staphylococcus aureus bacteremia        Tamy Melendez M.D.        Dosing Weight: 72 kg (158 lb 11.7 oz)    Admission History: Admitted on 1/28/2022 for Chest pain [R07.9]  Back pain [M54.9]  Discitis [M46.40]  Pertinent history: Presented 1/28/2022 with complaints of acute on chronic left hip pain and lower back pain. MRI of lumbar spine concerning for possible L2-L3 discitis, infection versus degenerative disease. Plan for IR biopsy of lumbar lesion for further charracterization. Blood cultures drawn 1/28 resulted 2/2 positive for Staph capitis. Of note, patient has a watchman device in place for afib.  ID has consulted=, vanco continues.    Allergies:   Pcn [penicillins]     Pertinent cultures to date:   Results     Procedure Component Value Units Date/Time    MRSA By PCR (Amp) [[id number]] Collected: 01/31/22 1604    Order Status: Completed Specimen: Respirate from Nares Updated: 01/31/22 1755     MRSA by PCR Negative    BLOOD CULTURE [[id number]]  (Abnormal) Collected: 01/28/22 2037    Order Status: Completed Specimen: Blood from Peripheral Updated: 01/31/22 0823     Significant Indicator POS     Source BLD     Site PERIPHERAL     Culture Result Growth detected by Bactec instrument. 01/29/2022  20:13      Staphylococcus capitis  See previous culture for  "sensitivity report.      Narrative:      CALL  Amaral  CPU tel. [phone],  CALLED  CPU tel. [phone] 01/29/2022, 20:14, RB PERF. RESULTS CALLED TO: RN  [phone]  Per Hospital Policy: Only change Specimen Src: to \"Line\" if  specified by physician order.  No site indicated    BLOOD CULTURE [[id number]]  (Abnormal)  (Susceptibility) Collected: 01/28/22 2235    Order Status: Completed Specimen: Blood from Peripheral Updated: 01/31/22 0822     Significant Indicator POS     Source BLD     Site PERIPHERAL     Culture Result Growth detected by Bactec instrument. 01/29/2022  20:11  Negative for Staphylococcus aureus and MRSA by PCR. Correlate  ongoing need for antibiotics with clinical condition.        Staphylococcus capitis    Narrative:      CALL  Amaral  CPU tel. [phone],  CALLED  CPU tel. [phone] 01/29/2022, 20:13, RB PERF. RESULTS CALLED TO: RN  [phone]  Per Hospital Policy: Only change Specimen Src: to \"Line\" if  specified by physician order.  Right Hand    BLOOD CULTURE [[id number]] Collected: 01/30/22 0218    Order Status: Completed Specimen: Blood from Peripheral Updated: 01/31/22 0745     Significant Indicator NEG     Source BLD     Site PERIPHERAL     Culture Result No Growth  Note: Blood cultures are incubated for 5 days and  are monitored continuously.Positive blood cultures  are called to the RN and reported as soon as  they are identified.      Narrative:      Per Hospital Policy: Only change Specimen Src: to \"Line\" if  specified by physician order.  Left Hand    BLOOD CULTURE [[id number]] Collected: 01/30/22 0218    Order Status: Completed Specimen: Blood from Peripheral Updated: 01/31/22 0745     Significant Indicator NEG     Source BLD     Site PERIPHERAL     Culture Result No Growth  Note: Blood cultures are incubated for 5 days and  are monitored continuously.Positive blood cultures  are called to the RN and reported as soon as  they are identified.      Narrative:      Per Hospital Policy: Only change " "Specimen Src: to \"Line\" if  specified by physician order.  Left AC    BLOOD CULTURE [[id number]]     Order Status: Canceled Specimen: Blood from Peripheral     BLOOD CULTURE [[id number]]     Order Status: Canceled Specimen: Blood from Peripheral     URINALYSIS (UA) [[id number]]  (Abnormal) Collected: 22 1247    Order Status: Completed Specimen: Blood Updated: 22 1432     Color Yellow     Character Clear     Specific Gravity 1.011     Ph 6.0     Glucose Negative mg/dL      Ketones Negative mg/dL      Protein Negative mg/dL      Bilirubin Negative     Urobilinogen, Urine 0.2     Nitrite Negative     Leukocyte Esterase Trace     Occult Blood Negative     Micro Urine Req Microscopic        Labs:   Estimated Creatinine Clearance: 62.5 mL/min (by C-G formula based on SCr of 0.57 mg/dL).  Recent Labs     22  1012 22  0516   WBC 12.9* 14.8*     Recent Labs     22  1012 22  0516   BUN 7* 13  13   CREATININE 0.51 0.57  0.59   ALBUMIN 3.6 3.6     No intake or output data in the 24 hours ending 22 1253   /79   Pulse 64   Temp (!) 35.7 °C (96.3 °F) (Temporal)   Resp 12   Ht 1.626 m (5' 4\")   Wt 72 kg (158 lb 11.7 oz)   SpO2 96%  Temp (24hrs), Av.8 °C (96.5 °F), Min:35.7 °C (96.3 °F), Max:35.9 °C (96.7 °F)    List concerns for Vancomycin clearance:   Age    Pharmacokinetics:   AUC kinetics:   Ke (hr ^-1): 0.0534 hr^-1  Half life: 12.98 hr  Clearance: 2.499  Estimated TDD: 1249.5  Estimated Dose: 781  Estimated interval: 15    A/P:   -  Vancomycin dose: continue 1250 mg IV q24h (nightly @ 2215)   -  Next vancomycin level(s): (ordered)    -2/2  @ 0200   -2/2 Vt @ 2130   -  Predicted vancomycin AUC from initial AUC test calculator: 500 mg·hr/L  -  Comments: Renal function stable. Repeat blood cultures obtained on 22 remain negative at this time (still in process). Duration of vancomycin not exclusively defined by ID at this time. Will continue current regimen. Peak and " tough levels ordered for after the 4th maintenance dose.     Niharika Jenkins, PharmD

## 2022-02-01 NOTE — DISCHARGE PLANNING
Please review the consult from Dr. Moreno regarding post acute recommendations.  TCC remains monitoring.

## 2022-02-01 NOTE — CARE PLAN
Problem: Knowledge Deficit - Standard  Goal: Patient and family/care givers will demonstrate understanding of plan of care, disease process/condition, diagnostic tests and medications  Outcome: Progressing     Problem: Pain - Standard  Goal: Alleviation of pain or a reduction in pain to the patient’s comfort goal  Outcome: Progressing     Problem: Fall Risk  Goal: Patient will remain free from falls  Outcome: Progressing   The patient is Stable - Low risk of patient condition declining or worsening    Shift Goals  Clinical Goals: IR   Patient Goals: comfort, safety  Family Goals: comfort, safety     Progress made toward(s) clinical / shift goals:  Pt updated on POC, all questions answered. Pt fall risk precautions in place. Pt instructed on use of call light and pt agrees to call when needing to get up. Bed alarm on. Call light and personal belongings within reach of pt.     Patient is not progressing towards the following goals:

## 2022-02-01 NOTE — PROGRESS NOTES
Infectious Disease Progress Note    Author: Ciro Veloz M.D. Date & Time of service: 2022  9:25 AM    Chief Complaint:  Discitis    Interval History:   patient remains afebrile, white count 12.9 on , tolerating start of antibiotics.  Procedure planned tomorrow.  A little tired this afternoon after standing up with PT   patient remains afebrile, white count up to 14.8 today.  IR biopsy pending    Labs Reviewed and Medications Reviewed.    Review of Systems:  Review of Systems   Constitutional: Negative for chills and fever.   Gastrointestinal: Negative for abdominal pain, diarrhea, nausea and vomiting.   Musculoskeletal: Positive for back pain.   Skin: Negative for itching and rash.   All other systems reviewed and are negative.      Hemodynamics:  Temp (24hrs), Av.8 °C (96.5 °F), Min:35.7 °C (96.3 °F), Max:35.9 °C (96.7 °F)  Temperature: (!) 35.7 °C (96.3 °F)  Pulse  Av  Min: 64  Max: 104   Blood Pressure : 146/79       Physical Exam:  Physical Exam  Vitals and nursing note reviewed.   Constitutional:       General: She is not in acute distress.     Appearance: She is ill-appearing. She is not toxic-appearing.      Comments: Elderly   HENT:      Head: Normocephalic.      Mouth/Throat:      Pharynx: No oropharyngeal exudate.   Eyes:      General: No scleral icterus.        Right eye: No discharge.         Left eye: No discharge.      Conjunctiva/sclera: Conjunctivae normal.   Cardiovascular:      Rate and Rhythm: Normal rate.      Heart sounds: No murmur heard.      Pulmonary:      Effort: Pulmonary effort is normal. No respiratory distress.      Breath sounds: No stridor.   Abdominal:      General: Abdomen is flat. There is no distension.      Tenderness: There is no abdominal tenderness.   Musculoskeletal:         General: No swelling or tenderness.      Comments: Bilateral anterior TKA well-healed scars with no gross external evidence of infection   Skin:     Findings: Bruising  present. No erythema or rash.   Neurological:      General: No focal deficit present.      Mental Status: She is alert and oriented to person, place, and time.   Psychiatric:         Mood and Affect: Mood normal.         Behavior: Behavior normal.      Comments: Very pleasant         Meds:    Current Facility-Administered Medications:   •  acetaminophen  •  lidocaine  •  spironolactone  •  cyclobenzaprine  •  MD Alert...Vancomycin per Pharmacy  •  vancomycin  •  senna-docusate **AND** polyethylene glycol/lytes **AND** magnesium hydroxide **AND** bisacodyl  •  acetaminophen  •  ondansetron  •  ondansetron  •  labetalol  •  Notify provider if pain remains uncontrolled **AND** Use the Numeric Rating Scale (NRS), Fischer-Baker Faces (WBF), or FLACC on regular floors and Critical-Care Pain Observation Tool (CPOT) on ICUs/Trauma to assess pain **AND** Pulse Ox **AND** Pharmacy Consult Request **AND** If patient difficult to arouse and/or has respiratory depression (respiratory rate of 10 or less), stop any opiates that are currently infusing and call a Rapid Response.  •  oxyCODONE immediate-release **OR** oxyCODONE immediate-release **OR** morphine injection  •  alendronate  •  [Held by provider] aspirin EC  •  DILTIAZem CD  •  latanoprost  •  levothyroxine  •  [DISCONTINUED] insulin regular **AND** POC blood glucose manual result **AND** NOTIFY MD and PharmD **AND** Administer 20 grams of glucose (approximately 8 ounces of fruit juice) every 15 minutes PRN FSBG less than 70 mg/dL **AND** dextrose 50%    Labs:  Recent Labs     01/30/22  1012 02/01/22  0516   WBC 12.9* 14.8*   RBC 4.95 4.76   HEMOGLOBIN 14.9 14.4   HEMATOCRIT 44.3 43.0   MCV 89.5 90.3   MCH 30.1 30.3   RDW 49.1 50.2*   PLATELETCT 251 231   MPV 9.4 8.9*     Recent Labs     01/30/22  1012 02/01/22  0516   SODIUM 139 138  138   POTASSIUM 3.8 4.2  4.1   CHLORIDE 99 101  101   CO2 24 21  23   GLUCOSE 111* 115*  116*   BUN 7* 13  13     Recent Labs      01/30/22  1012 02/01/22  0516   ALBUMIN 3.6 3.6   TBILIRUBIN 0.7 0.6   ALKPHOSPHAT 214* 194*   TOTPROTEIN 6.2 5.5*   ALTSGPT 45 32   ASTSGOT 21 22   CREATININE 0.51 0.57  0.59       Imaging:  MR-LUMBAR SPINE-W/O    Result Date: 1/29/2022 1/28/2022 7:12 PM HISTORY/REASON FOR EXAM: Low back pain, recent epidural steroid, rule out epidural hematoma. TECHNIQUE/EXAM DESCRIPTION: MRI of the lumbar spine without contrast. The study was performed on a [employer] 1.5 Renate MRI scanner.  T1 sagittal, T2 sagittal, and T2 axial images were obtained of the lumbar spine. COMPARISON: 11/12/2020. FINDINGS: There is no evidence of an epidural hematoma or other epidural fluid collection. There is signal abnormality and mild inferior endplate compression deformity with less than 25% loss of height of the T12 vertebral body consistent with an acute to subacute compression fracture. There is no significant retropulsion. There is signal abnormality consistent with edema in the S2 sacral segment consistent with an acute to subacute sacral insufficiency fracture. There is stable chronic compression deformity and retropulsion of the L4 vertebral body with post surgical changes consistent with vertebral augmentation. There is diffusely increased T2 signal intensity in the disc spaces at L1-2, L2-3, L3-4 and L4-5 which was not present on the previous exam but is likely related to degenerative disease. This is felt unlikely to be related to infection given absence of changes in the adjacent vertebral body endplates however, this cannot entirely be excluded. The most prominent signal abnormality is in the L2-3 disc space. There is mild grade 1 retrolisthesis of L1 on 2, L2 on 3 and mild anterolisthesis of L4 on 5 and L5 on S1. There is moderate to severe vertebral disc space narrowing throughout the lumbar spine. The conus medullaris has a normal caliber, course and signal intensity. Level-specific findings as follows: L1-2: There is mild  broad posterior disc bulge and endplate spurring. There is moderate facet arthropathy and moderate to marked ligamentum flavum hypertrophy. There is moderate central canal stenosis. There is moderate bilateral neural foraminal stenosis. L2-3: There is moderate broad posterior disc bulge and endplate spurring. There is severe facet arthropathy and marked ligamentum flavum hypertrophy. There is severe central canal stenosis. There is severe bilateral neural foraminal stenosis. L3-4: There is moderate to marked broad posterior endplate spurring and disc bulge. There is severe facet arthropathy and marked ligamentum flavum hypertrophy. There is severe central canal stenosis. There is moderate to severe bilateral neural foraminal  stenosis. L4-5: There is mild broad posterior disc bulge and endplate spurring. There is severe facet arthropathy and marked ligamentum flavum hypertrophy. There is moderate to severe central canal stenosis. There is moderate to severe bilateral neural foraminal stenosis. L5-S1: There is mild broad posterior disc bulge and endplate spurring. There is moderate to severe facet arthropathy and mild ligamentum flavum hypertrophy. There is severe right and moderate to severe left neural foraminal stenosis. The visualized prevertebral and posterior paraspinous soft tissues are grossly unremarkable.     1.  No evidence of epidural hematoma or other epidural fluid collection. 2.  Acute to subacute T12 inferior endplate compression fracture with less than 25% loss of height and no retropulsion. 3.  Acute to subacute S2 sacral insufficiency fracture or 4.  Diffusely increased T2 signal intensity in the disc spaces at L1-2, L2-3, L3-4 and L4-5 which was not present on the previous exam but is likely related to degenerative disease. This is felt less likely to be related to infection given absence of changes in the adjacent vertebral body endplates however, this cannot entirely be excluded. The most  prominent signal abnormality is in the L2-3 disc space. Follow-up on clinical basis is recommended. 5.  Advanced multilevel degenerative changes of the lumbar spine as described above.    EC-ECHOCARDIOGRAM COMPLETE W/O CONT    Result Date: 2022  Transthoracic Echo Report Echocardiography Laboratory CONCLUSIONS Technically challenging study. Patient noted to be in atrial fibrillation. Small LV cavity size. The left ventricular ejection fraction is visually estimated to be 65%. Mild left ventricular hypertrophy. Severely dilated left atrium. Aortic valve sclerosis without stenosis. Right ventricular systolic pressure is estimated to be  43 mmHg. Compared to prior echocardiogram 2021, no significant change. NATACHA DIAS Exam Date:         2022                    09:00 Exam Location:     Inpatient Priority:          Routine Ordering Physician:        EDIE BORRERO Referring Physician:       GISSELL Hirsch Sonographer:               Kj Wharton RDCS Age:    89     Gender:    F MRN:    [id number] :    1930 BSA:    1.77   Ht (in):    64     Wt (lb):    158 Exam Type:     Complete Indications:     Acute/Subacute Bacterial Endocarditis ICD Codes:       421 CPT Codes:       31458 BP:   170    /   79     HR: Technical Quality:       Technically difficult study -                          adequate information is obtained MEASUREMENTS  (Male / Female) Normal Values 2D ECHO LV Diastolic Diameter PLAX        3.7 cm                4.2 - 5.9 / 3.9 - 5.3 cm LV Systolic Diameter PLAX         2.7 cm                2.1 - 4.0 cm LV Fractional Shortening PLAX     27.3 %                25 - 46  % IVS Diastolic Thickness           0.96 cm               LVPW Diastolic Thickness          1 cm                  M-MODE Aortic Root Diameter MM           2.8 cm                DOPPLER AV Peak Velocity                  1.3 m/s               AV Peak Gradient                  7.2 mmHg              AV Mean Gradient                   3.9 mmHg              LVOT Peak Velocity                0.7 m/s               TR Peak Velocity                  293 cm/s              TR Peak Gradient                  34.2 mmHg             PV Peak Velocity                  0.86 m/s              PV Peak Gradient                  3 mmHg                * Indicates values subject to auto-interpretation LV EF:        % FINDINGS Left Ventricle Normal left ventricular systolic function. The left ventricular ejection fraction is visually estimated to be 65%.  Small LV cavity size.  Mild left ventricular hypertrophy.  Normal wall motion.  Indeterminate diastolic function due to atrial fibrillation. Right Ventricle Mildly dilated right ventricle. Right Atrium Normal right atrial size. Left Atrium Left atrial volume index is 52  mL/sq m. Severely dilated left atrium. Mitral Valve Mild mitral annular calcification. No mitral stenosis. Mild mitral regurgitation.  Mildly thickened mitral valve leaflets.  Mean gradient 2.1 mmHg, HR 87 bpm. Aortic Valve Trileaflet aortic valve without significant stenosis or regurgitation.  Aortic valve sclerosis without stenosis. Tricuspid Valve Structurally normal tricuspid valve. No tricuspid stenosis. Mild tricuspid regurgitation. Right ventricular systolic pressure is estimated to be  43 mmHg. Right atrial pressure is estimated to be 3 mmHg. Pulmonic Valve Structurally normal pulmonic valve. No pulmonic stenosis. Mild pulmonic insufficiency. Pericardium Normal pericardium without effusion. Aorta Normal aortic root for body surface area.  Ascending aorta normal size, 3.6 cm. Maya Méndez MD (Electronically Signed) Final Date:     30 January 2022                 18:02      Micro:  Results     Procedure Component Value Units Date/Time    MRSA By PCR (Amp) [[id number]] Collected: 01/31/22 1604    Order Status: Completed Specimen: Respirate from Nares Updated: 01/31/22 1758     MRSA by PCR Negative    BLOOD CULTURE [[id number]]   "(Abnormal) Collected: 01/28/22 2037    Order Status: Completed Specimen: Blood from Peripheral Updated: 01/31/22 0823     Significant Indicator POS     Source BLD     Site PERIPHERAL     Culture Result Growth detected by Bactec instrument. 01/29/2022  20:13      Staphylococcus capitis  See previous culture for sensitivity report.      Narrative:      CALL  Amaral  CPU tel. [phone],  CALLED  CPU tel. [phone] 01/29/2022, 20:14, RB PERF. RESULTS CALLED TO: RN  [phone]  Per Hospital Policy: Only change Specimen Src: to \"Line\" if  specified by physician order.  No site indicated    BLOOD CULTURE [[id number]]  (Abnormal)  (Susceptibility) Collected: 01/28/22 2235    Order Status: Completed Specimen: Blood from Peripheral Updated: 01/31/22 0822     Significant Indicator POS     Source BLD     Site PERIPHERAL     Culture Result Growth detected by Bactec instrument. 01/29/2022  20:11  Negative for Staphylococcus aureus and MRSA by PCR. Correlate  ongoing need for antibiotics with clinical condition.        Staphylococcus capitis    Narrative:      CALL  Amaral  CPU tel. [phone],  CALLED  CPU tel. [phone] 01/29/2022, 20:13, RB PERF. RESULTS CALLED TO: RN  [phone]  Per Hospital Policy: Only change Specimen Src: to \"Line\" if  specified by physician order.  Right Hand    Susceptibility     Staphylococcus capitis (1)     Antibiotic Interpretation Microscan   Method Status    Azithromycin Resistant >4 mcg/mL BARAK Final    Clindamycin Resistant >4 mcg/mL BARAK Final    Cefazolin Resistant <=8 mcg/mL BARAK Final    Cefepime Resistant <=4 mcg/mL BARAK Final    Ampicillin/sulbactam Resistant <=8/4 mcg/mL BARAK Final    Erythromycin Resistant >4 mcg/mL BARAK Final    Vancomycin Sensitive 1 mcg/mL BARAK Final    Oxacillin Resistant >2 mcg/mL BARAK Final    Trimeth/Sulfa Sensitive 2/38 mcg/mL BARAK Final    Tetracycline Resistant >8 mcg/mL BARAK Final                   BLOOD CULTURE [[id number]] Collected: 01/30/22 0218    Order Status: Completed " "Specimen: Blood from Peripheral Updated: 01/31/22 0745     Significant Indicator NEG     Source BLD     Site PERIPHERAL     Culture Result No Growth  Note: Blood cultures are incubated for 5 days and  are monitored continuously.Positive blood cultures  are called to the RN and reported as soon as  they are identified.      Narrative:      Per Hospital Policy: Only change Specimen Src: to \"Line\" if  specified by physician order.  Left Hand    BLOOD CULTURE [[id number]] Collected: 01/30/22 0218    Order Status: Completed Specimen: Blood from Peripheral Updated: 01/31/22 0745     Significant Indicator NEG     Source BLD     Site PERIPHERAL     Culture Result No Growth  Note: Blood cultures are incubated for 5 days and  are monitored continuously.Positive blood cultures  are called to the RN and reported as soon as  they are identified.      Narrative:      Per Hospital Policy: Only change Specimen Src: to \"Line\" if  specified by physician order.  Left AC    BLOOD CULTURE [[id number]]     Order Status: Canceled Specimen: Blood from Peripheral     BLOOD CULTURE [[id number]]     Order Status: Canceled Specimen: Blood from Peripheral     URINALYSIS (UA) [[id number]]  (Abnormal) Collected: 01/28/22 1247    Order Status: Completed Specimen: Blood Updated: 01/28/22 1432     Color Yellow     Character Clear     Specific Gravity 1.011     Ph 6.0     Glucose Negative mg/dL      Ketones Negative mg/dL      Protein Negative mg/dL      Bilirubin Negative     Urobilinogen, Urine 0.2     Nitrite Negative     Leukocyte Esterase Trace     Occult Blood Negative     Micro Urine Req Microscopic          Assessment:  Patient with type 2 diabetes, hypothyroidism, chronic lower back pain, hypertension, A. fib, watchman device in place, presented with acute on chronic lower back and left hip pain.  MRI was concerning for possible L2-L3 discitis - infection versus degenerative changes.  Blood cultures x2+ for coagulase-negative " Staphylococcus    Plan:  -Continue IV vancomycin.  Follow repeat blood cultures x2.  Monitor vancomycin levels and renal function  -Given that coagulase-negative Staphylococcus in blood cultures can be contaminants, agree with going forward with the IR biopsy with cultures of the lumbar lesion  -If blood cultures remain persistently positive despite antibiotics, may need ARBEN and may need to consider Watchman device infection  -Follow repeat blood cultures x2 from 1/30, no growth to date    Discussed with MARILOU Corado.  ID will follow.  Please call with questions.

## 2022-02-01 NOTE — PROGRESS NOTES
Hospital Medicine Daily Progress Note    Date of Service  2/1/2022    Chief Complaint  Saumya Vann is a 89 y.o. female admitted 1/28/2022 with acute back pain    Hospital Course:    89-year-old female with history of diabetes, hypothyroidism, hypertension, A. fib and chronic lower back pain presented 1/28 with acute on chronic left hip pain, also patient had lower back pain increasing with moving, for last 5 days prior to admission, patient had steroid injection 5 days ago for chronic lower back pain, labs showed leukocytosis 13, CRP was 11, MRI for lumbar spine showed possible discitis at L2/L3, ER physician discussed the case with the neurosurgeon Dr. Nguyễn however recommended IR biopsy, however it was not done, blood culture came back positive for staph capitis 2/2, ID was consulted and patient is on vancomycin, repeat blood culture on 1/30 still pending.    Patient was evaluated by PT and OT who recommended postacute placement.    Interval Problem Update  -Evaluated examined the patient at bedside, patient feels better, no fever or events last night  -Continue vancomycin  -Possible biopsy today by IR  -Patient needs placement and SNF      I have personally seen and examined the patient at bedside. I discussed the plan of care with patient, bedside RN, charge RN and .    Consultants/Specialty  infectious disease and neurosurgery    Code Status  DNAR/DNI    Disposition  Patient is not medically cleared for discharge.   Anticipate discharge to to skilled nursing facility.  I have placed the appropriate orders for post-discharge needs.    Review of Systems  Review of Systems   Constitutional: Positive for malaise/fatigue. Negative for chills, diaphoresis, fever and weight loss.   Eyes:        Vision loss     Respiratory: Negative for cough, shortness of breath and wheezing.    Cardiovascular: Negative for chest pain, palpitations and leg swelling.   Gastrointestinal: Negative for abdominal pain,  constipation, heartburn, nausea and vomiting.   Musculoskeletal: Positive for back pain and myalgias. Negative for falls, joint pain and neck pain.   Neurological: Negative for dizziness, sensory change, speech change, focal weakness, weakness and headaches.        Physical Exam  Temp:  [35.7 °C (96.3 °F)-35.9 °C (96.7 °F)] 35.7 °C (96.3 °F)  Pulse:  [64-86] 64  Resp:  [12-20] 12  BP: (140-154)/(73-81) 146/79  SpO2:  [95 %-97 %] 96 %    Physical Exam  Vitals and nursing note reviewed.   Constitutional:       General: She is not in acute distress.     Appearance: She is not ill-appearing, toxic-appearing or diaphoretic.   HENT:      Head: Normocephalic.      Right Ear: External ear normal.      Left Ear: External ear normal.      Nose: Nose normal.      Mouth/Throat:      Mouth: Mucous membranes are moist.      Pharynx: Oropharynx is clear.   Eyes:      General:         Right eye: No discharge.         Left eye: No discharge.      Conjunctiva/sclera: Conjunctivae normal.   Cardiovascular:      Rate and Rhythm: Normal rate. Rhythm irregular.      Pulses: Normal pulses.   Pulmonary:      Effort: Pulmonary effort is normal.      Breath sounds: Normal breath sounds.   Abdominal:      General: Bowel sounds are normal. There is distension.      Palpations: Abdomen is soft.      Tenderness: There is no abdominal tenderness. There is no guarding.   Musculoskeletal:      Cervical back: Normal range of motion.      Right lower leg: No edema.      Left lower leg: No edema.      Comments: Lower extremity ROM limited due to pain    Skin:     General: Skin is warm and dry.      Coloration: Skin is pale.      Findings: Bruising (scattered to BLE /BUE) present.   Neurological:      Mental Status: She is alert and oriented to person, place, and time.      Coordination: Coordination normal.      Comments: Plantarflexion/doriflexion bilaterally   Sensation intact      Psychiatric:         Mood and Affect: Mood normal.         Thought  Content: Thought content normal.         Judgment: Judgment normal.         Fluids  No intake or output data in the 24 hours ending 02/01/22 1203    Laboratory  Recent Labs     01/30/22  1012 02/01/22  0516   WBC 12.9* 14.8*   RBC 4.95 4.76   HEMOGLOBIN 14.9 14.4   HEMATOCRIT 44.3 43.0   MCV 89.5 90.3   MCH 30.1 30.3   MCHC 33.6 33.5*   RDW 49.1 50.2*   PLATELETCT 251 231   MPV 9.4 8.9*     Recent Labs     01/30/22  1012 02/01/22  0516   SODIUM 139 138  138   POTASSIUM 3.8 4.2  4.1   CHLORIDE 99 101  101   CO2 24 21  23   GLUCOSE 111* 115*  116*   BUN 7* 13  13   CREATININE 0.51 0.57  0.59   CALCIUM 8.4* 8.4*  8.3*     Recent Labs     02/01/22  0516   APTT 29.0   INR 1.20*               Imaging  EC-ECHOCARDIOGRAM COMPLETE W/O CONT   Final Result      MR-LUMBAR SPINE-W/O   Final Result      1.  No evidence of epidural hematoma or other epidural fluid collection.   2.  Acute to subacute T12 inferior endplate compression fracture with less than 25% loss of height and no retropulsion.   3.  Acute to subacute S2 sacral insufficiency fracture or   4.  Diffusely increased T2 signal intensity in the disc spaces at L1-2, L2-3, L3-4 and L4-5 which was not present on the previous exam but is likely related to degenerative disease. This is felt less likely to be related to infection given absence of    changes in the adjacent vertebral body endplates however, this cannot entirely be excluded. The most prominent signal abnormality is in the L2-3 disc space. Follow-up on clinical basis is recommended.   5.  Advanced multilevel degenerative changes of the lumbar spine as described above.      IR-NEEDLE BX-DEEP BONE    (Results Pending)        Assessment/Plan  * Discitis  Assessment & Plan  MRI of the lumbar spine:It was noted that the patient had increased T2 signal at L2-L3 disc with some fluid is well sacral insufficiency fracture.  Neurosurgery, Dr. Nguyễn, recommended IR transpedicular biopsy given high suspicion for  discitis.  Case was discussed with IR on 1/30 who said they were reaching out to Gopi to decided if biopsy was still warranted now that she is receiving IV abx for bacteremia.   Pain control  Fall precautions  PT/OT ordered   Serial neuro exam   Possible for biopsy today  Continue vancomycin    Bacteremia- (present on admission)  Assessment & Plan  Blood cultures x 2 on 1/28 growing staph captitis    Repeat blood cultures collected 1/29 and negative for growth   Continue Vancomycin   Monitor renal function closely while on Vancomycin   ID following, appreciate recommendations  Echocardiogram done 1/30 and unchanged from prior. No evidence of vegetations   Waiting to get biopsy from discitis     Presence of Watchman left atrial appendage closure device: 24 mm palced 4/28/21- (present on admission)  Assessment & Plan  Patient has A. Fib  Echo showed dilated left atrium  Close monitoring and waiting for blood culture  Follow-up with cardiology as outpatient    GERD (gastroesophageal reflux disease)- (present on admission)  Assessment & Plan  Continue omeprazole    Hypothyroidism- (present on admission)  Assessment & Plan  Continue levothyroxine    HTN (hypertension)- (present on admission)  Assessment & Plan  Continue home medications     AF (atrial fibrillation) ([facility])- (present on admission)  Assessment & Plan  Not on anticoagulation at home   Continue diltiazem       DM2 (diabetes mellitus, type 2) ([facility])- (present on admission)  Assessment & Plan  A1c around 5.8 couple months ago around target for her age  Not on medications   BG well controlled with diet alone   Will monitor on chemistries        VTE prophylaxis: SCDs/TEDs and start with Lovenox after surgery    I have performed a physical exam and reviewed and updated ROS and Plan today (2/1/2022). In review of yesterday's note (1/31/2022), there are no changes except as documented above.

## 2022-02-01 NOTE — DISCHARGE PLANNING
[facility] TCN chart review completed. Pt continues to have ongoing acute need and not medically cleared for discharge. PMR consult completed- good candidate pending needle bone biopsy and d/c plan. As an alternate plan, also has been accepted by [facility], pending [facility] and [facility]. Patient seen at bedside with dtr-in-law present. Updated to status of referrals. She and family have no additional questions. TCN will continue to follow and collaborate with discharge planning team as additional post acute needs arise. Thank you.    Previously completed:  - PT/OT, PMR consult  - Choice forms: SNF, IRF.   - [facility] introduced (Y), referral (not sent).

## 2022-02-01 NOTE — PROGRESS NOTES
Assumed care of patient. Patient is AOx4, reporting 7/10 pain to back. Patient medicated for pain per MAR. Patient updated on plan of care, all needs met at this time. Bed locked and in lowest position. Call light and personal belongings within reach. Bed alarm in place.

## 2022-02-01 NOTE — PROGRESS NOTES
Physical Medicine and Rehabilitation Consultation              Date of initial consultation: 1/31/2022  Requested by: MARILOU Corado  Consulting physician: Rachna Moreno D.O.  Reason for consultation: assessment of rehabilitation needs  LOS: 2 Day(s)    SUBJECTIVE  Patient seen in room. Awaiting biopsy  GI: last 1/30  : purewick  Psych: slept well last night  MSK: minimal pain when not moving, pain occurs with positioning and therapy - discussed need to premedicate before therapy - hard to plan as therapy is not prescheduled but will attempt    Medications:  Current Facility-Administered Medications   Medication Dose   • [Held by provider] enoxaparin (LOVENOX) inj 40 mg  40 mg   • acetaminophen (TYLENOL) tablet 1,000 mg  1,000 mg   • lidocaine (LIDODERM) 5 % 2 Patch  2 Patch   • spironolactone (ALDACTONE) tablet 25 mg  25 mg   • cyclobenzaprine (Flexeril) tablet 10 mg  10 mg   • MD Alert...Vancomycin per Pharmacy     • vancomycin (VANCOCIN) 1,250 mg in  mL IVPB  17 mg/kg   • senna-docusate (PERICOLACE or SENOKOT S) 8.6-50 MG per tablet 2 Tablet  2 Tablet    And   • polyethylene glycol/lytes (MIRALAX) PACKET 1 Packet  1 Packet    And   • magnesium hydroxide (MILK OF MAGNESIA) suspension 30 mL  30 mL    And   • bisacodyl (DULCOLAX) suppository 10 mg  10 mg   • acetaminophen (Tylenol) tablet 650 mg  650 mg   • ondansetron (ZOFRAN) syringe/vial injection 4 mg  4 mg   • ondansetron (ZOFRAN ODT) dispertab 4 mg  4 mg   • labetalol (NORMODYNE/TRANDATE) injection 10 mg  10 mg   • Pharmacy Consult Request ...Pain Management Review 1 Each  1 Each   • oxyCODONE immediate-release (ROXICODONE) tablet 2.5 mg  2.5 mg    Or   • oxyCODONE immediate-release (ROXICODONE) tablet 5 mg  5 mg    Or   • morphine 4 MG/ML injection 2 mg  2 mg   • alendronate (FOSAMAX) tablet 70 mg  70 mg   • [Held by provider] aspirin EC (ECOTRIN) tablet 81 mg  81 mg   • DILTIAZem CD (CARDIZEM CD) capsule 120 mg  120 mg   •  "latanoprost (XALATAN) 0.005 % ophthalmic solution 1 Drop  1 Drop   • levothyroxine (SYNTHROID) tablet 150 mcg  150 mcg   • dextrose 50% (D50W) injection 50 mL  50 mL       Allergies:  Allergies   Allergen Reactions   • Pcn [Penicillins] Rash     Rash       Physical Exam:  Vitals: /79   Pulse 64   Temp (!) 35.7 °C (96.3 °F) (Temporal)   Resp 12   Ht 1.626 m (5' 4\")   Wt 72 kg (158 lb 11.7 oz)   SpO2 96%   General: well-groomed in no acute distress, no visitors present  Eyes: no scleral icterus or conjunctival injection  Ears, nose, mouth and throat: moist oral mucosa  Cardiovascular: good peripheral perfusion  Respiratory: breathing comfortably without use of accessory muscles, no cyanosis  Gastrointestinal: soft, nontender, nondistended  Genitourinary: purewick in place  Musculoskeletal: good symmetry in bilateral shoulders,  Skin: no wounds seen on exposed skin, +frail skin    Neurologic:  Mental status:  A&Ox4 (person, place, date, situation) answers questions appropriately follows commands  Speech: fluent, no aphasia or dysarthria  Motor-not tested today:    Upper Extremity  Myotome R L   Shoulder flexion C5 4/5 4/5   Elbow flexion C5 4/5 4/5   Wrist extension C6 5/5 5/5   Elbow extension C7 5/5 5/5   Finger flexion C8 5/5 5/5   Finger abduction T1 5/5 4/5     Lower Extremity Myotome R L   Hip flexion L2 3*/5 4/5   Knee extension L3 5/5 5/5   Ankle dorsiflexion L4 5/5 5/5   Toe extension L5 5/5 5/5   Ankle plantarflexion S1 5/5 5/5   *limited by pain  No clonus at bilateral ankles  Tone: no spasticity noted  Psychiatric: appropriate affect  Hematologic/lymphatic/immunologic: ++IV access, no bruises seen on exposed skin    Labs: Reviewed and significant for   Recent Labs     01/30/22  1012 02/01/22  0516   RBC 4.95 4.76   HEMOGLOBIN 14.9 14.4   HEMATOCRIT 44.3 43.0   PLATELETCT 251 231   PROTHROMBTM  --  14.9*   APTT  --  29.0   INR  --  1.20*     Recent Labs     01/30/22  1012 02/01/22  0516   SODIUM " 139 138  138   POTASSIUM 3.8 4.2  4.1   CHLORIDE 99 101  101   CO2 24 21  23   GLUCOSE 111* 115*  116*   BUN 7* 13  13   CREATININE 0.51 0.57  0.59   CALCIUM 8.4* 8.4*  8.3*     Recent Results (from the past 24 hour(s))   MRSA By PCR (Amp)    Collection Time: 01/31/22  4:04 PM    Specimen: Nares; Respirate   Result Value Ref Range    MRSA by PCR Negative Negative   CBC WITHOUT DIFFERENTIAL    Collection Time: 02/01/22  5:16 AM   Result Value Ref Range    WBC 14.8 (H) 4.8 - 10.8 K/uL    RBC 4.76 4.20 - 5.40 M/uL    Hemoglobin 14.4 12.0 - 16.0 g/dL    Hematocrit 43.0 37.0 - 47.0 %    MCV 90.3 81.4 - 97.8 fL    MCH 30.3 27.0 - 33.0 pg    MCHC 33.5 (L) 33.6 - 35.0 g/dL    RDW 50.2 (H) 35.9 - 50.0 fL    Platelet Count 231 164 - 446 K/uL    MPV 8.9 (L) 9.0 - 12.9 fL   Comp Metabolic Panel    Collection Time: 02/01/22  5:16 AM   Result Value Ref Range    Sodium 138 135 - 145 mmol/L    Potassium 4.2 3.6 - 5.5 mmol/L    Chloride 101 96 - 112 mmol/L    Co2 21 20 - 33 mmol/L    Anion Gap 16.0 7.0 - 16.0    Glucose 115 (H) 65 - 99 mg/dL    Bun 13 8 - 22 mg/dL    Creatinine 0.57 0.50 - 1.40 mg/dL    Calcium 8.4 (L) 8.5 - 10.5 mg/dL    AST(SGOT) 22 12 - 45 U/L    ALT(SGPT) 32 2 - 50 U/L    Alkaline Phosphatase 194 (H) 30 - 99 U/L    Total Bilirubin 0.6 0.1 - 1.5 mg/dL    Albumin 3.6 3.2 - 4.9 g/dL    Total Protein 5.5 (L) 6.0 - 8.2 g/dL    Globulin 1.9 1.9 - 3.5 g/dL    A-G Ratio 1.9 g/dL   Basic Metabolic Panel    Collection Time: 02/01/22  5:16 AM   Result Value Ref Range    Sodium 138 135 - 145 mmol/L    Potassium 4.1 3.6 - 5.5 mmol/L    Chloride 101 96 - 112 mmol/L    Co2 23 20 - 33 mmol/L    Glucose 116 (H) 65 - 99 mg/dL    Bun 13 8 - 22 mg/dL    Creatinine 0.59 0.50 - 1.40 mg/dL    Calcium 8.3 (L) 8.5 - 10.5 mg/dL    Anion Gap 14.0 7.0 - 16.0   Prothrombin Time    Collection Time: 02/01/22  5:16 AM   Result Value Ref Range    PT 14.9 (H) 12.0 - 14.6 sec    INR 1.20 (H) 0.87 - 1.13   APTT    Collection Time: 02/01/22   5:16 AM   Result Value Ref Range    APTT 29.0 24.7 - 36.0 sec   ESTIMATED GFR    Collection Time: 02/01/22  5:16 AM   Result Value Ref Range    GFR If African American >60 >60 mL/min/1.73 m 2    GFR If Non African American >60 >60 mL/min/1.73 m 2   ESTIMATED GFR    Collection Time: 02/01/22  5:16 AM   Result Value Ref Range    GFR If African American >60 >60 mL/min/1.73 m 2    GFR If Non African American >60 >60 mL/min/1.73 m 2   CRP QUANTITIVE (NON-CARDIAC)    Collection Time: 02/01/22  5:16 AM   Result Value Ref Range    Stat C-Reactive Protein 2.92 (H) 0.00 - 0.75 mg/dL   PROCALCITONIN    Collection Time: 02/01/22  5:16 AM   Result Value Ref Range    Procalcitonin 0.07 <0.25 ng/mL         ASSESSMENT:  IMPRESSION: The patient is a 89 y.o. female with a past medical history of heart surgery, lower back pain, hypothyroidism;  who presented on 1/28/2022 10:52 AM with worsening left hip pain x 5 days and found to have possible sacral fracture and Staph capitis bacteremia with concern for endocarditis.    [facility] Code: 0008.9 - Orthopaedic Disorders: Other Orthopaedic  -With acute secondary complications of: impaired ADLs and mobility, posttraumatic pain  -with chronic conditions of:  heart surgery, lower back pain, hypothyroidism  -and:     Medical Complexity:  Leukocytosis  Hyperglycemia  Elevated liver enzymes, resolved    Data points:  Reviewed results of radiology tests  Reviewed clinical lab tests  Reviewed old records    RECOMMENDATIONS:  ##MSK  #Impaired ADLs and mobility: Agree with continuing OT/PT while admitted here.    Patient prefers acute inpatient rehabilitation as her goal is to return to home and be as independent as she was previously. We discussed need for safe discharge and patient plans to discuss with son and grandson about options.     Patient would benefit from aggressive OT and PT in acute inpatient rehabilitation. Currently, barriers to acute inpatient rehabilitation include:  - solidification  of safe final discharge plan (24/7 supervision for bridge to home) - will attempt to call grandson after he gets home from work  - tolerance to three hours of therapies a day - need to premedicate with pain medication at least an hour before therapies  - Pending needle bone biopsy    Estimated length of stay: 14-18 days  Anticipated discharge destination: home with family  Prognosis: good    #Posttraumatic pain, acute  #Acute on chronic lower back pain  Continue tylenol 1000mg TID for basal pain management, lidocaine patch 5% 2 patches daily for basal pain management, oxycodone 2.5-5mg Q3 hr prn pain    ##NEURO  Had bowel incontinence x 1, and then constipation - concern for cauda equina syndrome   Monitor neurological changes    ##GI  #Bowel incontinence and constipation  Etiology likely multifactorial  Had bowel incontinence x 1, and then constipation  May be related to use of narcotic medication - norco is listed as home medication  Goal of one continent BM daily  Agree with current bowel medications    ##  #Urinary continence at baseline  Uses purewick at night to reduce burden of care (multiple awakenings at night)  Using Purewick at this time    ##SKIN  Recommend turning Q2hr and monitor for skin changes    DVT chemoprophlaxis: recommend DVT chemoprophlaxis once medically cleared to do so as patient at risk for DVT due to immobility    Code: DNAR/DNI - therefore high risk    Thank you for allowing us to participate in the care of this patient. Physiatry will continue to follow and provide recommendations, as needed.    Patient was seen for 26 minutes on unit/floor of which > 50% of time was spent on counseling and coordination of care regarding the above, including prognosis, risk reduction, benefits of treatment, and options for next stage of care.    Rachna Moreno D.O.   Physical Medicine and Rehabilitation     I have performed a physical exam and reviewed and updated history and plan today  (2/1/2022).     Please note that this dictation was created using voice recognition software. I have made every reasonable attempt to correct obvious errors, but there may be errors of grammar and possibly content that I did not discover before finalizing the note.

## 2022-02-01 NOTE — CONSULTS
Physical Medicine and Rehabilitation Consultation              Date of initial consultation: 1/31/2022  Requested by: MARILOU Corado  Consulting physician: Rachna Moreno D.O.  Reason for consultation: assessment of rehabilitation needs  LOS: 1 Day(s)    Chief complaint: lower back pain and all over pain and fatigue that is improving    This history was prepared after interviewing the patient and reviewing the patient's chart at [facility].    HPI: The patient is a 89 y.o. female with a past medical history of heart surgery, lower back pain, hypothyroidism;  who presented on 1/28/2022 10:52 AM with worsening left hip pain x 5 days and found to have possible sacral fracture and Staph capitis bacteremia with concern for endocarditis. Per documentation, patient received an epidural steroid shot five days ago at pain management. She was also seen at pain management on 1/5/22 where she received several pain medications. Four days ago patient had explosive diarrhea and was bent over on the toilet which worsened her pain. +Associated symptoms of weakness and pain radiating down to her lower extremities and toes, cough onset today, abdominal pain, and dark urine, but denies falls. Pain is worse when she is upright versus laying flat. She has not had a bowel movement since her diarrhea four days ago.     Patient has a Watchman device in her heart to prevent blood clots after her history of falls. She sees a Cardiologist.     +Staph capitis bacteremia. Seen by infectious disease. Concern for endocarditis. Started vancomycin     The patient was found to have the following:  The left ventricular ejection fraction is visually estimated to be 65%. Mild left ventricular hypertrophy. Severely dilated left atrium.  Acute to subacute T12 inferior endplate compression fracture with less than 25% loss of height and no retropulsion.  Acute to subacute S2 sacral insufficiency fracture  Diffusely  increased T2 signal intensity in the disc spaces at L1-2, L2-3, L3-4 and L4-5 which was not present on the previous exam but is likely related to degenerative disease. This is felt less likely to be related to infection given absence of changes in the adjacent vertebral body endplates however, this cannot entirely be excluded. The most prominent signal abnormality is in the L2-3 disc space. Follow-up on clinical basis is recommended.  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    Today, patient reports: pain primarily in right buttock. She recently started taking narcotics for this    Goals for rehabilitation include: improving independence and going home safely    Social and functional history:  Prior to this hospitalization, patient was independent with ADLS and mobility with an assistive device (walker). Patient lives with  grandson in a one story home with 2 stairs to enter. Son lives very close by. Grandson works during the day      Current function during therapy include:  Restrictions: none  PT: Functional mobility   Cognition    Cognition / Consciousness WDL   Passive ROM Lower Body   Passive ROM Lower Body WDL   Active ROM Lower Body    Active ROM Lower Body  WDL   Strength Lower Body   Lower Body Strength  X   Gross Strength Generalized Weakness, Equal Bilaterally   Comments 3-/5 B LE   Sensation Lower Body   Lower Extremity Sensation   WDL   Balance Assessment   Sitting Balance (Static) Fair -   Sitting Balance (Dynamic) Poor +   Standing Balance (Static) Trace +   Standing Balance (Dynamic) Trace   Weight Shift Sitting Poor   Weight Shift Standing Absent   Comments FWW   Gait Analysis   Gait Level Of Assist Unable to Participate   Bed Mobility    Supine to Sit Maximal Assist   Sit to Supine Maximal Assist   Scooting Maximal Assist   Functional Mobility   Sit to Stand Maximal Assist    How much difficulty does the patient currently have...   Turning over in bed (including adjusting bedclothes, sheets and blankets)? 2   Sitting down on and standing up from a chair with arms (e.g., wheelchair, bedside commode, etc.) 1   Moving from lying on back to sitting on the side of the bed? 1   How much help from another person does the patient currently need...   Moving to and from a bed to a chair (including a wheelchair)? 1   Need to walk in a hospital room? 1   Climbing 3-5 steps with a railing? 1   6 clicks Mobility Score 7         OT: Activities of daily living  Precautions   Precautions Fall Risk   Vitals   O2 Delivery Device None - Room Air   Pain 0 - 10 Group   Location Back;Hip   Location Orientation Left   Therapist Pain Assessment During Activity;Nurse Notified  (pain increased with sitting and movement )   Cognition    Cognition / Consciousness WDL   Comments pleasant and cooperative    Active ROM Upper Body   Active ROM Upper Body  WDL   Strength Upper Body   Upper Body Strength  X   Gross Strength Generalized Weakness, Equal Bilaterally.    Upper Body Muscle Tone   Upper Body Muscle Tone  WDL   Balance Assessment   Sitting Balance (Static) Fair   Sitting Balance (Dynamic) Fair -   Weight Shift Sitting Poor   Bed Mobility    Supine to Sit Moderate Assist   Sit to Supine Moderate Assist   Scooting Moderate Assist   Rolling Moderate Assist to Lt.   ADL Assessment   Eating Supervision   Grooming Supervision;Seated  (seated upright in bed)   Lower Body Dressing Maximal Assist  (shoes)   Toileting    (pure wick catheter in place )   Comments needs set up for grooming and self feeding due to vision loss    How much help from another person does the patient currently need...   Putting on and taking off regular lower body clothing? 2   Bathing (including washing, rinsing, and drying)? 2   Toileting, which includes using a toilet, bedpan, or urinal? 2   Putting on and taking off regular upper body  clothing? 3   Taking care of personal grooming such as brushing teeth? 3   Eating meals? 3   6 Clicks Daily Activity Score 15   Functional Mobility   Sit to Stand Unable to Participate  (due to pain )   Visual Perception   Visual Perception  X   Visual Acuity    (legally blind due to macular degeneration )   Activity Tolerance   Sitting Edge of Bed 2 min        PMH:  Past Medical History:   Diagnosis Date   • A-fib (HCC)    • Breath shortness    • Cataract 04/23/2021    Surgically removed bilat   • Diabetes (HCC) 04/23/2021    Diet-controlled   • Dry cough 04/23/2021   • Glaucoma 04/23/2021    Left eye   • Hypertension    • Hypothyroid    • Macular degeneration    • Pain 04/23/2021    Back and Leg   • Urinary incontinence        PSH:  Past Surgical History:   Procedure Laterality Date   • OTHER ORTHOPEDIC SURGERY  2002    2 Knee Replacements    • CATARACT EXTRACTION WITH IOL  2001   • NEUROMA EXCISION  1985    Right Foot   • CHOLECYSTECTOMY  1970   • APPENDECTOMY  1957   • OTHER  1952    Varicose veins   • APPENDECTOMY     • CHOLECYSTECTOMY     • HYSTERECTOMY, TOTAL ABDOMINAL     • KNEE REPLACEMENT, TOTAL Bilateral    • US-NEEDLE CORE BX-BREAST PANEL         FHX:  Family History   Problem Relation Age of Onset   • Cancer Other        Medications:  Current Facility-Administered Medications   Medication Dose   • spironolactone (ALDACTONE) tablet 25 mg  25 mg   • cyclobenzaprine (Flexeril) tablet 10 mg  10 mg   • MD Alert...Vancomycin per Pharmacy     • vancomycin (VANCOCIN) 1,250 mg in  mL IVPB  17 mg/kg   • senna-docusate (PERICOLACE or SENOKOT S) 8.6-50 MG per tablet 2 Tablet  2 Tablet    And   • polyethylene glycol/lytes (MIRALAX) PACKET 1 Packet  1 Packet    And   • magnesium hydroxide (MILK OF MAGNESIA) suspension 30 mL  30 mL    And   • bisacodyl (DULCOLAX) suppository 10 mg  10 mg   • acetaminophen (Tylenol) tablet 650 mg  650 mg   • ondansetron (ZOFRAN) syringe/vial injection 4 mg  4 mg   • ondansetron  "(SANGEETA JAIME) dispertab 4 mg  4 mg   • labetalol (NORMODYNE/TRANDATE) injection 10 mg  10 mg   • Pharmacy Consult Request ...Pain Management Review 1 Each  1 Each   • oxyCODONE immediate-release (ROXICODONE) tablet 2.5 mg  2.5 mg    Or   • oxyCODONE immediate-release (ROXICODONE) tablet 5 mg  5 mg    Or   • morphine 4 MG/ML injection 2 mg  2 mg   • alendronate (FOSAMAX) tablet 70 mg  70 mg   • [Held by provider] aspirin EC (ECOTRIN) tablet 81 mg  81 mg   • DILTIAZem CD (CARDIZEM CD) capsule 120 mg  120 mg   • latanoprost (XALATAN) 0.005 % ophthalmic solution 1 Drop  1 Drop   • levothyroxine (SYNTHROID) tablet 150 mcg  150 mcg   • dextrose 50% (D50W) injection 50 mL  50 mL       Allergies:  Allergies   Allergen Reactions   • Pcn [Penicillins] Rash     Rash           Review of systems:  Constitutional: denies fevers and chills  Eyes: denies change in vision  Ears, nose, mouth, throat: denies sore throat  Cardiovascular: denies palpitations  Respiratory: denies respiratory discomfort  Gastrointestinal: admits to incontinence of bowels and then constipation  Genitourinary: has been using purewick at night to ease burden of care, continent at home during the day  Musculoskeletal: admits to pain in right buttock that does not radiate down the leg  Integumentary: denies pressure ulcer, has frail skin  Neurological: denies spasms, denies burning or shooting pain, denies headaches  Psychiatric: denies change in mood  Endocrine: denies diabetes mellitus  Hematologic/lymphatic: denies history of blood disorders  Allergic/immunologic: has no history of allergy to any known medications    Physical Exam:  Vitals: /73   Pulse 86   Temp 35.8 °C (96.5 °F) (Temporal)   Resp 20   Ht 1.626 m (5' 4\")   Wt 72 kg (158 lb 11.7 oz)   SpO2 95%   General: well-groomed in no acute distress, no visitors present  Eyes: no scleral icterus or conjunctival injection  Ears, nose, mouth and throat: moist oral mucosa  Cardiovascular: " regular rate, good peripheral perfusion  Respiratory: breathing comfortably without use of accessory muscles  Gastrointestinal: soft, nontender, nondistended  Genitourinary: purewick in place  Musculoskeletal: good symmetry in bilateral shoulders,  Skin: no wounds seen on exposed skin, +frail skin    Neurologic:  Mental status:  A&Ox4 (person, place, date, situation) answers questions appropriately follows commands  Speech: fluent, no aphasia or dysarthria  Motor:    Upper Extremity  Myotome R L   Shoulder flexion C5 4/5 4/5   Elbow flexion C5 4/5 4/5   Wrist extension C6 5/5 5/5   Elbow extension C7 5/5 5/5   Finger flexion C8 5/5 5/5   Finger abduction T1 5/5 4/5     Lower Extremity Myotome R L   Hip flexion L2 3*/5 4/5   Knee extension L3 5/5 5/5   Ankle dorsiflexion L4 5/5 5/5   Toe extension L5 5/5 5/5   Ankle plantarflexion S1 5/5 5/5   *limited by pain  Sensory:   intact to light touch through out  No clonus at bilateral ankles  Tone: no spasticity noted  Psychiatric: appropriate affect  Hematologic/lymphatic/immunologic: ++IV access, no bruises seen on exposed skin    Labs: Reviewed and significant for   Recent Labs     01/28/22 2037 01/29/22  0540 01/30/22  1012   RBC 4.50 4.20 4.95   HEMOGLOBIN 13.7 12.8 14.9   HEMATOCRIT 40.4 38.3 44.3   PLATELETCT 206 200 251   PROTHROMBTM  --  16.5*  --    APTT  --  30.6  --    INR  --  1.37*  --      Recent Labs     01/29/22  0540 01/30/22  1012   SODIUM 138 139   POTASSIUM 3.9 3.8   CHLORIDE 104 99   CO2 23 24   GLUCOSE 117* 111*   BUN 14 7*   CREATININE 0.67 0.51   CALCIUM 7.5* 8.4*     No results found for this or any previous visit (from the past 24 hour(s)).      ASSESSMENT:  IMPRESSION: The patient is a 89 y.o. female with a past medical history of heart surgery, lower back pain, hypothyroidism;  who presented on 1/28/2022 10:52 AM with worsening left hip pain x 5 days and found to have possible sacral fracture and Staph capitis bacteremia with concern for  endocarditis.    [facility] Code: 0008.9 - Orthopaedic Disorders: Other Orthopaedic  -With acute secondary complications of: impaired ADLs and mobility, posttraumatic pain  -with chronic conditions of:  heart surgery, lower back pain, hypothyroidism  -and:     Medical Complexity:  Leukocytosis  Hyperglycemia  Elevated liver enzymes, resolved    Data points:  Reviewed results of radiology tests  Reviewed clinical lab tests  Reviewed old records    RECOMMENDATIONS:  ##MSK  #Impaired ADLs and mobility: Agree with continuing OT/PT while admitted here.    Patient prefers acute inpatient rehabilitation as her goal is to return to home and be as independent as she was previously. We discussed need for safe discharge and patient plans to discuss with son and grandson about options.     Patient would benefit from aggressive OT and PT in acute inpatient rehabilitation. Currently, barriers to acute inpatient rehabilitation include:  - solidification of safe final discharge plan (24/7 supervision for bridge to home)  - tolerance to three hours of therapies a day  - Pending needle bone biopsy    Estimated length of stay: 14-18 days  Anticipated discharge destination: home with family  Prognosis: good    #Posttraumatic pain, acute  #Acute on chronic lower back pain  Ordered tylenol 1000mg TID for basal pain management  Ordered lidocaine patch 5% 2 patches daily for basal pain management    ##NEURO  Had bowel incontinence x 1, and then constipation - concern for cauda equina syndrome   Monitor neurological changes    ##GI  #Bowel incontinence and constipation  Etiology likely multifactorial  Had bowel incontinence x 1, and then constipation  May be related to use of narcotic medication - norco is listed as home medication  Goal of one continent BM daily  Agree with current bowel medications    ##  #Urinary continence at baseline  Uses purewick at night to reduce burden of care (multiple awakenings at night)  Using Purewick at this  time    ##SKIN  Recommend turning Q2hr and monitor for skin changes    DVT chemoprophlaxis: recommend DVT chemoprophlaxis once medically cleared to do so as patient at risk for DVT due to immobility    Code: DNAR/DNI - therefore high risk    Thank you for allowing us to participate in the care of this patient. Physiatry will continue to follow and provide recommendations, as needed.    Patient was seen for 65 minutes on unit/floor of which > 50% of time was spent on counseling and coordination of care regarding the above, including prognosis, risk reduction, benefits of treatment, and options for next stage of care.    Rachna Moreno D.O.   Physical Medicine and Rehabilitation     I have performed a physical exam and reviewed and updated history and plan today (1/31/2022).     Please note that this dictation was created using voice recognition software. I have made every reasonable attempt to correct obvious errors, but there may be errors of grammar and possibly content that I did not discover before finalizing the note.

## 2022-02-02 VITALS
DIASTOLIC BLOOD PRESSURE: 74 MMHG | SYSTOLIC BLOOD PRESSURE: 153 MMHG | RESPIRATION RATE: 18 BRPM | HEART RATE: 85 BPM | WEIGHT: 158.73 LBS | HEIGHT: 64 IN | OXYGEN SATURATION: 95 % | TEMPERATURE: 96.9 F | BODY MASS INDEX: 27.1 KG/M2

## 2022-02-02 PROBLEM — R07.9 CHEST PAIN: Status: RESOLVED | Noted: 2022-01-28 | Resolved: 2022-02-02

## 2022-02-02 LAB
ALBUMIN SERPL BCP-MCNC: 3.7 G/DL (ref 3.2–4.9)
ALBUMIN/GLOB SERPL: 1.4 G/DL
ALP SERPL-CCNC: 209 U/L (ref 30–99)
ALT SERPL-CCNC: 28 U/L (ref 2–50)
ANION GAP SERPL CALC-SCNC: 18 MMOL/L (ref 7–16)
AST SERPL-CCNC: 30 U/L (ref 12–45)
BILIRUB SERPL-MCNC: 0.7 MG/DL (ref 0.1–1.5)
BUN SERPL-MCNC: 13 MG/DL (ref 8–22)
CALCIUM SERPL-MCNC: 8.9 MG/DL (ref 8.5–10.5)
CHLORIDE SERPL-SCNC: 97 MMOL/L (ref 96–112)
CO2 SERPL-SCNC: 22 MMOL/L (ref 20–33)
CREAT SERPL-MCNC: 0.59 MG/DL (ref 0.5–1.4)
CRP SERPL HS-MCNC: 5.54 MG/DL (ref 0–0.75)
FLUAV RNA SPEC QL NAA+PROBE: NEGATIVE
FLUBV RNA SPEC QL NAA+PROBE: NEGATIVE
FUNGUS SPEC FUNGUS STN: NORMAL
GLOBULIN SER CALC-MCNC: 2.7 G/DL (ref 1.9–3.5)
GLUCOSE SERPL-MCNC: 138 MG/DL (ref 65–99)
GRAM STN SPEC: NORMAL
MAGNESIUM SERPL-MCNC: 1.4 MG/DL (ref 1.5–2.5)
POTASSIUM SERPL-SCNC: 4 MMOL/L (ref 3.6–5.5)
PROCALCITONIN SERPL-MCNC: <0.05 NG/ML
PROT SERPL-MCNC: 6.4 G/DL (ref 6–8.2)
RSV RNA SPEC QL NAA+PROBE: NEGATIVE
SARS-COV-2 RNA RESP QL NAA+PROBE: NOTDETECTED
SIGNIFICANT IND 70042: NORMAL
SIGNIFICANT IND 70042: NORMAL
SITE SITE: NORMAL
SITE SITE: NORMAL
SODIUM SERPL-SCNC: 137 MMOL/L (ref 135–145)
SOURCE SOURCE: NORMAL
SOURCE SOURCE: NORMAL
SPECIMEN SOURCE: NORMAL

## 2022-02-02 PROCEDURE — 99233 SBSQ HOSP IP/OBS HIGH 50: CPT | Performed by: INTERNAL MEDICINE

## 2022-02-02 PROCEDURE — 80053 COMPREHEN METABOLIC PANEL: CPT

## 2022-02-02 PROCEDURE — 0241U HCHG SARS-COV-2 COVID-19 NFCT DS RESP RNA 4 TRGT MIC: CPT

## 2022-02-02 PROCEDURE — 84145 PROCALCITONIN (PCT): CPT

## 2022-02-02 PROCEDURE — 700102 HCHG RX REV CODE 250 W/ 637 OVERRIDE(OP): Performed by: NURSE PRACTITIONER

## 2022-02-02 PROCEDURE — A9270 NON-COVERED ITEM OR SERVICE: HCPCS | Performed by: INTERNAL MEDICINE

## 2022-02-02 PROCEDURE — 99239 HOSP IP/OBS DSCHRG MGMT >30: CPT | Performed by: INTERNAL MEDICINE

## 2022-02-02 PROCEDURE — 83735 ASSAY OF MAGNESIUM: CPT

## 2022-02-02 PROCEDURE — 86140 C-REACTIVE PROTEIN: CPT

## 2022-02-02 PROCEDURE — 700102 HCHG RX REV CODE 250 W/ 637 OVERRIDE(OP): Performed by: PHYSICAL MEDICINE & REHABILITATION

## 2022-02-02 PROCEDURE — A9270 NON-COVERED ITEM OR SERVICE: HCPCS | Performed by: NURSE PRACTITIONER

## 2022-02-02 PROCEDURE — 700102 HCHG RX REV CODE 250 W/ 637 OVERRIDE(OP): Performed by: INTERNAL MEDICINE

## 2022-02-02 PROCEDURE — A9270 NON-COVERED ITEM OR SERVICE: HCPCS | Performed by: PHYSICAL MEDICINE & REHABILITATION

## 2022-02-02 RX ORDER — AMOXICILLIN 250 MG
2 CAPSULE ORAL 2 TIMES DAILY
Status: DISCONTINUED | OUTPATIENT
Start: 2022-02-02 | End: 2022-02-02 | Stop reason: HOSPADM

## 2022-02-02 RX ORDER — LIDOCAINE 50 MG/G
2 PATCH TOPICAL EVERY 24 HOURS
Qty: 10 PATCH | Refills: 2 | Status: SHIPPED | OUTPATIENT
Start: 2022-02-02 | End: 2022-03-18

## 2022-02-02 RX ORDER — BISACODYL 10 MG
10 SUPPOSITORY, RECTAL RECTAL
Status: DISCONTINUED | OUTPATIENT
Start: 2022-02-02 | End: 2022-02-02 | Stop reason: HOSPADM

## 2022-02-02 RX ORDER — POLYETHYLENE GLYCOL 3350 17 G/17G
1 POWDER, FOR SOLUTION ORAL 2 TIMES DAILY
Status: DISCONTINUED | OUTPATIENT
Start: 2022-02-02 | End: 2022-02-02 | Stop reason: HOSPADM

## 2022-02-02 RX ORDER — POLYETHYLENE GLYCOL 3350 17 G/17G
17 POWDER, FOR SOLUTION ORAL 2 TIMES DAILY
Qty: 30 EACH | Refills: 4 | Status: SHIPPED | OUTPATIENT
Start: 2022-02-02 | End: 2022-08-04

## 2022-02-02 RX ORDER — ENEMA 19; 7 G/133ML; G/133ML
1 ENEMA RECTAL ONCE
Status: DISCONTINUED | OUTPATIENT
Start: 2022-02-02 | End: 2022-02-02 | Stop reason: HOSPADM

## 2022-02-02 RX ADMIN — DILTIAZEM HYDROCHLORIDE 120 MG: 120 CAPSULE, COATED, EXTENDED RELEASE ORAL at 06:41

## 2022-02-02 RX ADMIN — SPIRONOLACTONE 25 MG: 25 TABLET ORAL at 06:41

## 2022-02-02 RX ADMIN — Medication 10 MG: at 14:41

## 2022-02-02 RX ADMIN — ACETAMINOPHEN 1000 MG: 500 TABLET ORAL at 14:41

## 2022-02-02 RX ADMIN — OXYCODONE 5 MG: 5 TABLET ORAL at 14:41

## 2022-02-02 RX ADMIN — ACETAMINOPHEN 1000 MG: 500 TABLET ORAL at 09:37

## 2022-02-02 RX ADMIN — OXYCODONE 5 MG: 5 TABLET ORAL at 06:40

## 2022-02-02 ASSESSMENT — ENCOUNTER SYMPTOMS
DIZZINESS: 0
HEMOPTYSIS: 0
MYALGIAS: 0
NAUSEA: 0
SENSORY CHANGE: 0
TINGLING: 0
FEVER: 0
ABDOMINAL PAIN: 0
SHORTNESS OF BREATH: 0
BRUISES/BLEEDS EASILY: 0
DIARRHEA: 0
CHILLS: 0
PALPITATIONS: 0
BACK PAIN: 1
HEADACHES: 0
VOMITING: 0
WEAKNESS: 1
COUGH: 0

## 2022-02-02 ASSESSMENT — PAIN DESCRIPTION - PAIN TYPE: TYPE: ACUTE PAIN

## 2022-02-02 NOTE — CARE PLAN
Problem: Knowledge Deficit - Standard  Goal: Patient and family/care givers will demonstrate understanding of plan of care, disease process/condition, diagnostic tests and medications  Outcome: Met     Problem: Pain - Standard  Goal: Alleviation of pain or a reduction in pain to the patient’s comfort goal  Outcome: Met     Problem: Skin Integrity  Goal: Skin integrity is maintained or improved  Outcome: Met     Problem: Fall Risk  Goal: Patient will remain free from falls  Outcome: Met     Problem: Infection - Standard  Goal: Patient will remain free from infection  Outcome: Met

## 2022-02-02 NOTE — CARE PLAN
The patient is Stable - Low risk of patient condition declining or worsening    Shift Goals  Clinical Goals: IR procedure  Patient Goals: Rest, Family at bedside  Family Goals: comfort, safety     Progress made toward(s) clinical / shift goals:  Yes    Patient is not progressing towards the following goals:None

## 2022-02-02 NOTE — DISCHARGE PLANNING
Transfer packet printed and COBRA filled out. Cobra signed by patient. Dm Astorga notified of transfer. Transfer packet given to Imani TORRES.

## 2022-02-02 NOTE — PROGRESS NOTES
Assessment completed. Pt A&Ox4. Respirations are even and unlabored on RA. Pt denies back pain at this time. Monitors applied, VS stable, call light and belongings within reach. POC updated (transfer to Rehab). Pt educated on room and call light, pt verbalized understanding. Communication board updated. Needs met.

## 2022-02-02 NOTE — DISCHARGE PLANNING
Agency/Facility Name: [facility]  Outcome: DPA left v-mail regarding referral status with request for callback.

## 2022-02-02 NOTE — DISCHARGE SUMMARY
Discharge Summary    CHIEF COMPLAINT ON ADMISSION  Chief Complaint   Patient presents with   • Hip Pain     L hip/buttocks/lower back pain, acute on chronic, mechanical injury 1 year ago while bending over, surgery shortly after, now acute pain X 5 days, pt denies incident for acute pain, pt ambulates at baseline with walker but is not able to ambulate now r/t pain        Reason for Admission:  Discitis and bacteremia staph capitis    CODE STATUS:  DNAR/DNI    HPI & HOSPITAL COURSE    89-year-old female with history of diabetes, hypothyroidism, hypertension, A. fib and chronic lower back pain presented 1/28 with acute on chronic left hip pain, also patient had lower back pain increasing with moving, for last 5 days prior to admission, patient had steroid injection 5 days ago for chronic lower back pain, labs showed leukocytosis 13, CRP was 11, MRI for lumbar spine showed possible discitis at L2/L3, ER physician discussed the case with the neurosurgeon Dr. Nugyễn however recommended IR biopsy, blood culture came back positive for staph capitis 2/2, echo did not show any signs of endocarditis, ID was consulted and patient is on vancomycin, repeat blood culture on 1/30 is negative, biopsy from L2/L3 was done on 2/1 and so far the culture is negative, patient feels better with no fever or leukocytosis, and no significant weakness, ID recommended 6 weeks of IV daptomycin every 24 hours for 6-weeks or IV vancomycin, stop date will be 3/12/2022.    Patient has history of diabetes and A1c 5.8 couple months ago, on target for her age, also patient has atrial fibrillation with watchman left atrial appendage, last echo showed ejection fraction 65% with severely dilated left atrium, to follow-up with cardiologist.    On the day of discharge the patient is alert and oriented x4, she has back pain, controlled, no significant lower extremity weakness, patient is stable for SNF to continue therapy and IV antibiotics.    Patient was  evaluated by PT and OT who recommended postacute placement.    Therefore, she is discharged in good and stable condition to skilled nursing facility.    The patient met 2-midnight criteria for an inpatient stay at the time of discharge.      FOLLOW UP ITEMS POST DISCHARGE  Follow-up with blood culture on 1/30, is negative till now  Follow-up the biopsy culture which was done on 2/1, is negative till now  Follow-up with ID  Follow-up with labs and kidney functions in the patient on vancomycin.    DISCHARGE DIAGNOSES  Principal Problem:    Discitis POA: Unknown  Active Problems:    Presence of Watchman left atrial appendage closure device: 24 mm palced 4/28/21 POA: Yes    Bacteremia POA: Yes    AF (atrial fibrillation) ([facility]) POA: Yes    HTN (hypertension) POA: Yes    Hypothyroidism POA: Yes    GERD (gastroesophageal reflux disease) POA: Yes    Back pain POA: Yes    DM2 (diabetes mellitus, type 2) ([facility]) POA: Yes  Resolved Problems:    Chest pain POA: Yes    Advanced care planning/counseling discussion POA: No      FOLLOW UP  Future Appointments   Date Time Provider Department Center   6/10/2022 10:30 AM Grant Woodard M.D. [facility] None     Bebe Santamaria M.D.  [id number]  [place] [address]  [phone]    In 2 weeks      [address] [address]  [phone]          MEDICATIONS ON DISCHARGE     Medication List      START taking these medications      Instructions   enoxaparin 40 MG/0.4ML Soln inj  Commonly known as: LOVENOX   Inject 40 mg under the skin every day for 26 days.  Dose: 40 mg     lidocaine 5 % Ptch  Commonly known as: LIDODERM   Place 2 Patches on the skin every 24 hours.  Dose: 2 Patch     NS SOLN 250 mL with vancomycin 5 g SOLR 1,000 mg   Infuse 1,000 mg into a venous catheter every 24 hours for 38 days.  Dose: 17 mg/kg     polyethylene glycol/lytes 17 g Pack  Commonly known as: MIRALAX   Take 1 Packet by mouth 2 times a day.  Dose: 17 g         CONTINUE taking these medications      Instructions   alendronate 70 MG Tabs  Commonly known as: FOSAMAX   Take 70 mg by mouth every Monday.  Dose: 70 mg     aspirin EC 81 MG Tbec  Commonly known as: ECOTRIN   Take 1 tablet by mouth every day.  Dose: 81 mg     cyclobenzaprine 5 mg tablet  Commonly known as: Flexeril   Take 10 mg by mouth at bedtime as needed for Mild Pain or Muscle Spasms.  Dose: 10 mg     diclofenac DR 50 MG Tbec  Commonly known as: Voltaren   Take 50 mg by mouth every evening.  Dose: 50 mg     DILTIAZem  MG Cp24  Commonly known as: CARDIZEM CD   TAKE 1 CAPSULE BY MOUTH TWICE DAILY     HYDROcodone-acetaminophen 5-325 MG Tabs per tablet  Commonly known as: NORCO   as needed.     latanoprost 0.005 % Soln  Commonly known as: XALATAN   Place 1 Drop in left eye every bedtime.  Dose: 1 Drop     levothyroxine 150 MCG Tabs  Commonly known as: SYNTHROID   Take 150 mcg by mouth in the morning every Tue, Thurs, Sat, and Sun. Take 1.5 tablets (225 mcg) on Monday, Wednesday, and Friday  Dose: 150 mcg     LUCENTIS IO   Inject  into the eye. Avastin or Lucentis 0.5 mg injections for Retinal Vein Occlusion and Macular Degeneration.  Each eye is treated every other month.     omeprazole 20 MG delayed-release capsule  Commonly known as: PRILOSEC   Take 20 mg by mouth every evening.  Dose: 20 mg     spironolactone 25 MG Tabs  Commonly known as: ALDACTONE   Take 25 mg by mouth every day.  Dose: 25 mg     therapeutic multivitamin-minerals Tabs   Take 1 Tablet by mouth every day. Areds eye vitamin  Dose: 1 Tablet     VITAMIN B COMPLEX PO   Take 1 Tab by mouth 2 Times a Day.  Dose: 1 Tablet     vitamin D2 (Ergocalciferol) 1.25 MG (27572 UT) Caps capsule  Commonly known as: Drisdol   Take 50,000 Units by mouth every Saturday.  Dose: 50,000 Units            Allergies  Allergies   Allergen Reactions   • Pcn [Penicillins] Rash     Rash         DIET  Orders Placed This Encounter   Procedures   • Diet Order Diet:  Cardiac     Standing Status:   Standing     Number of Occurrences:   1     Order Specific Question:   Diet:     Answer:   Cardiac [6]       ACTIVITY  As tolerated.  Weight bearing as tolerated    LINES, DRAINS, AND WOUNDS  This is an automated list. Peripheral IVs will be removed prior to discharge.  Peripheral IV 02/01/22 20 G Left Antecubital (Active)   Site Assessment Clean;Dry;Intact 02/01/22 2045   Dressing Type Transparent 02/01/22 2045   Line Status Blood return noted;Flushed 02/01/22 2045   Dressing Status Clean;Dry;Intact 02/01/22 2045   Dressing Intervention N/A 02/01/22 2045          Peripheral IV 02/01/22 20 G Left Antecubital (Active)   Site Assessment Clean;Dry;Intact 02/01/22 2045   Dressing Type Transparent 02/01/22 2045   Line Status Blood return noted;Flushed 02/01/22 2045   Dressing Status Clean;Dry;Intact 02/01/22 2045   Dressing Intervention N/A 02/01/22 2045               MENTAL STATUS ON TRANSFER     The patient is alert and oriented x4  No significant weakness in her lower extremities       CONSULTATIONS  Neurosurgeon  IR  ID    PROCEDURES  Disc biopsy    LABORATORY  Lab Results   Component Value Date    SODIUM 137 02/02/2022    POTASSIUM 4.0 02/02/2022    CHLORIDE 97 02/02/2022    CO2 22 02/02/2022    GLUCOSE 138 (H) 02/02/2022    BUN 13 02/02/2022    CREATININE 0.59 02/02/2022        Lab Results   Component Value Date    WBC 14.8 (H) 02/01/2022    HEMOGLOBIN 14.4 02/01/2022    HEMATOCRIT 43.0 02/01/2022    PLATELETCT 231 02/01/2022        Total time of the discharge process exceeds 35 minutes.

## 2022-02-02 NOTE — CARE PLAN
The patient is Stable - Low risk of patient condition declining or worsening    Shift Goals  Clinical Goals: transfer to Rehab  Patient Goals: gain strength  Family Goals: n/a    Progress made toward(s) clinical / shift goals:      Problem: Knowledge Deficit - Standard  Goal: Patient and family/care givers will demonstrate understanding of plan of care, disease process/condition, diagnostic tests and medications  Outcome: Progressing     Problem: Pain - Standard  Goal: Alleviation of pain or a reduction in pain to the patient’s comfort goal  Outcome: Progressing       Patient is not progressing towards the following goals:

## 2022-02-02 NOTE — CARE PLAN
The patient is Stable - Low risk of patient condition declining or worsening    Shift Goals  Clinical Goals: pain control and abx  Patient Goals: pain control and rest  Family Goals: comfort, safety     Progress made toward(s) clinical / shift goals:      Problem: Knowledge Deficit - Standard  Goal: Patient and family/care givers will demonstrate understanding of plan of care, disease process/condition, diagnostic tests and medications  Outcome: Progressing    Patient is not progressing towards the following goals:

## 2022-02-02 NOTE — DISCHARGE PLANNING
Patient accepted to [facility] but we will need to set up transport. Ride line paperwork filled out and approved services. All paperwork faxed to Ride line # [phone] and message sent to Harvey to update on ride request.

## 2022-02-02 NOTE — DISCHARGE INSTRUCTIONS
Discharge Instructions    Discharged to home by car with escort. Discharged via ambulance, hospital escort: Yes.  Special equipment needed: Not Applicable    Be sure to schedule a follow-up appointment with your primary care doctor or any specialists as instructed.     Discharge Plan:   Diet Plan: Discussed  Activity Level: Discussed  Confirmed Follow up Appointment: Patient to Call and Schedule Appointment  Confirmed Symptoms Management: Discussed  Medication Reconciliation Updated: Yes  Influenza Vaccine Indication: Not indicated: Previously immunized this influenza season and > 8 years of age    I understand that a diet low in cholesterol, fat, and sodium is recommended for good health. Unless I have been given specific instructions below for another diet, I accept this instruction as my diet prescription.   Other diet: heart healthy    Special Instructions: None    · Is patient discharged on Warfarin / Coumadin?   No     Depression / Suicide Risk    As you are discharged from this [facility] facility, it is important to learn how to keep safe from harming yourself.    Recognize the warning signs:  · Abrupt changes in personality, positive or negative- including increase in energy   · Giving away possessions  · Change in eating patterns- significant weight changes-  positive or negative  · Change in sleeping patterns- unable to sleep or sleeping all the time   · Unwillingness or inability to communicate  · Depression  · Unusual sadness, discouragement and loneliness  · Talk of wanting to die  · Neglect of personal appearance   · Rebelliousness- reckless behavior  · Withdrawal from people/activities they love  · Confusion- inability to concentrate     If you or a loved one observes any of these behaviors or has concerns about self-harm, here's what you can do:  · Talk about it- your feelings and reasons for harming yourself  · Remove any means that you might use to hurt yourself (examples: pills, rope,  extension cords, firearm)  · Get professional help from the community (Mental Health, Substance Abuse, psychological counseling)  · Do not be alone:Call your Safe Contact- someone whom you trust who will be there for you.  · Call your local CRISIS HOTLINE 568-6267 or 321-662-2454  · Call your local Children's Mobile Crisis Response Team [place] (661) 483-3478 or www.[employer]  · Call the toll free National Suicide Prevention Hotlines   · National Suicide Prevention Lifeline 099-302-TMWT (4720)  · National Hope Line Network 800-SUICIDE (390-0892)

## 2022-02-02 NOTE — PROGRESS NOTES
"Radiology Progress Note   Author: JEREMY Rodriguez Date & Time created: 2/2/2022  11:35 AM   Date of admission  1/28/2022  Note to reader: this note follows the APSO format rather than the historical SOAP format. Assessment and plan located at the top of the note for ease of use.    Chief Complaint  89 y.o. female admitted 1/28/2022 with   Chief Complaint   Patient presents with   • Hip Pain     L hip/buttocks/lower back pain, acute on chronic, mechanical injury 1 year ago while bending over, surgery shortly after, now acute pain X 5 days, pt denies incident for acute pain, pt ambulates at baseline with walker but is not able to ambulate now r/t pain          HPI  \" 89-year-old female with history of diabetes, hypothyroidism, hypertension, A. fib and chronic lower back pain presented 1/28 with acute on chronic left hip pain, also patient had lower back pain increasing with moving, for last 5 days prior to admission, patient had steroid injection 5 days ago for chronic lower back pain, labs showed leukocytosis 13, CRP was 11, MRI for lumbar spine showed possible discitis at L2/L3, ER physician discussed the case with the neurosurgeon Dr. Nguyễn however recommended IR biopsy\"    Assessment/Plan  Interval History   Principal Problem:    Discitis  Active Problems:    DM2 (diabetes mellitus, type 2) (HCC)    AF (atrial fibrillation) (HCC)    HTN (hypertension)    Hypothyroidism    GERD (gastroesophageal reflux disease)    Presence of Watchman left atrial appendage closure device: 24 mm palced 4/28/21    Back pain    Bacteremia      Plan IR  -Pending BX results   -Thank you for allowing Interventional Radiology team to participate in the patients care, if any additonal care or requests are needed in the future please do not hesitate call or place IR order      [id number]  IR:   2/1- L2-3 disc space and endplates biopsy. Specimens submitted for culture and sensitivity, Gram stain, AFB, and fungal cultures.  2/2- BX " site CDI, no redness or swelling, dressing in place. Patient reports huge improvement in pain left hip since pain medications            Review of Systems  Physical Exam   Review of Systems   Constitutional: Positive for malaise/fatigue. Negative for chills and fever.   HENT: Negative for hearing loss.    Eyes:        Vision loss   Respiratory: Negative for cough, hemoptysis and shortness of breath.    Cardiovascular: Negative for chest pain and palpitations.   Gastrointestinal: Negative for abdominal pain and vomiting.   Genitourinary: Negative for dysuria.   Musculoskeletal: Positive for back pain. Negative for joint pain and myalgias.   Skin: Negative for rash.   Neurological: Positive for weakness. Negative for dizziness, tingling, sensory change and headaches.   Endo/Heme/Allergies: Does not bruise/bleed easily.   Psychiatric/Behavioral: Negative for suicidal ideas.      Vitals:    02/02/22 0720   BP: 153/74   Pulse: 85   Resp: 18   Temp: 36.1 °C (96.9 °F)   SpO2: 95%        Physical Exam  Vitals and nursing note reviewed.   HENT:      Head: Normocephalic.      Nose: Nose normal.      Mouth/Throat:      Mouth: Mucous membranes are moist.   Eyes:      Pupils: Pupils are equal, round, and reactive to light.   Cardiovascular:      Rate and Rhythm: Normal rate.   Pulmonary:      Effort: Pulmonary effort is normal. No respiratory distress.   Abdominal:      General: Abdomen is flat.      Tenderness: There is no abdominal tenderness.   Musculoskeletal:         General: Tenderness present. No deformity.      Cervical back: Normal range of motion.   Skin:     General: Skin is warm and dry.      Capillary Refill: Capillary refill takes less than 2 seconds.      Coloration: Skin is pale. Skin is not jaundiced.   Neurological:      General: No focal deficit present.      Mental Status: She is alert.      Motor: Weakness present.   Psychiatric:         Mood and Affect: Mood normal.         Behavior: Behavior normal.              Labs    Recent Labs     02/01/22  0516   WBC 14.8*   RBC 4.76   HEMOGLOBIN 14.4   HEMATOCRIT 43.0   MCV 90.3   MCH 30.3   MCHC 33.5*   RDW 50.2*   PLATELETCT 231   MPV 8.9*     Recent Labs     02/01/22  0516 02/02/22  0915   SODIUM 138  138 137   POTASSIUM 4.2  4.1 4.0   CHLORIDE 101  101 97   CO2 21  23 22   GLUCOSE 115*  116* 138*   BUN 13  13 13   CREATININE 0.57  0.59 0.59   CALCIUM 8.4*  8.3* 8.9     Recent Labs     02/01/22  0516 02/02/22  0915   ALBUMIN 3.6 3.7   TBILIRUBIN 0.6 0.7   ALKPHOSPHAT 194* 209*   TOTPROTEIN 5.5* 6.4   ALTSGPT 32 28   ASTSGOT 22 30   CREATININE 0.57  0.59 0.59     IR-NEEDLE BX-DEEP BONE   Final Result      1.  Biplane fluoroscopic guided L2-3 disc space and endplates biopsy. Specimens submitted for culture and sensitivity, Gram stain, AFB, and fungal cultures.      EC-ECHOCARDIOGRAM COMPLETE W/O CONT   Final Result      MR-LUMBAR SPINE-W/O   Final Result      1.  No evidence of epidural hematoma or other epidural fluid collection.   2.  Acute to subacute T12 inferior endplate compression fracture with less than 25% loss of height and no retropulsion.   3.  Acute to subacute S2 sacral insufficiency fracture or   4.  Diffusely increased T2 signal intensity in the disc spaces at L1-2, L2-3, L3-4 and L4-5 which was not present on the previous exam but is likely related to degenerative disease. This is felt less likely to be related to infection given absence of    changes in the adjacent vertebral body endplates however, this cannot entirely be excluded. The most prominent signal abnormality is in the L2-3 disc space. Follow-up on clinical basis is recommended.   5.  Advanced multilevel degenerative changes of the lumbar spine as described above.          INR   Date Value Ref Range Status   02/01/2022 1.20 (H) 0.87 - 1.13 Final     Comment:     INR - Non-therapeutic Reference Range: 0.87-1.13  INR - Therapeutic Reference Range: 2.0-4.0       No results found for: POCINR      Intake/Output Summary (Last 24 hours) at 2/2/2022 1135  Last data filed at 2/1/2022 1716  Gross per 24 hour   Intake --   Output 400 ml   Net -400 ml      Labs not explicitly included in this progress note were reviewed by the author. Radiology/imaging not explicitly included in this progress note was reviewed by the author.     I have performed a physical exam and reviewed and updated ROS and Plan today (2/2/2022).     15 minutes in directly providing and coordinating care and extensive data review.  No time overlap and excludes procedures.

## 2022-02-02 NOTE — DISCHARGE PLANNING
DC Transport Scheduled    Received request at: 1050    Transport Company Scheduled:  [employer]  Scheduled Date: 02/02/22  Scheduled Time: [id number]    Destination: ([facility] [address] [place]    Notified care team of scheduled transport via [employer].     If there are any changes needed to the DC transportation scheduled, please contact [facility] at ext. [phone] between the hours of 0526-1078 Mon-Fri. If outside those hours, contact the ED Case Manager at ext. [phone].

## 2022-02-02 NOTE — PROGRESS NOTES
Assumed care of patient. Patient is AOx4, reporting 7/10 pain to back. Medicated for pain per MAR. Patient updated on plan of care, all needs met at this time. Bed locked and  In lowest position. Call light and personal belongings within reach. Bed alarm in place.

## 2022-02-02 NOTE — PROGRESS NOTES
Infectious Disease Progress Note    Author: Ciro Veloz M.D. Date & Time of service: 2022  9:35 AM    Chief Complaint:  Discitis    Interval History:   patient remains afebrile, white count 12.9 on , tolerating start of antibiotics.  Procedure planned tomorrow.  A little tired this afternoon after standing up with PT   patient remains afebrile, white count up to 14.8 today.  IR biopsy pending   patient remains afebrile, no CBC this morning, underwent IR guided biopsy yesterday    Labs Reviewed and Medications Reviewed.    Review of Systems:  Review of Systems   Constitutional: Negative for chills and fever.   Gastrointestinal: Negative for abdominal pain, diarrhea, nausea and vomiting.   Musculoskeletal: Positive for back pain.   Skin: Negative for itching and rash.   All other systems reviewed and are negative.      Hemodynamics:  Temp (24hrs), Av.2 °C (97.1 °F), Min:36.1 °C (96.9 °F), Max:36.3 °C (97.4 °F)  Temperature: 36.1 °C (96.9 °F)  Pulse  Av.8  Min: 55  Max: 104   Blood Pressure : 153/74       Physical Exam:  Physical Exam  Vitals and nursing note reviewed.   Constitutional:       General: She is not in acute distress.     Appearance: She is ill-appearing. She is not toxic-appearing.      Comments: Elderly   HENT:      Head: Normocephalic.      Mouth/Throat:      Pharynx: No oropharyngeal exudate.   Eyes:      General: No scleral icterus.        Right eye: No discharge.         Left eye: No discharge.      Conjunctiva/sclera: Conjunctivae normal.   Cardiovascular:      Rate and Rhythm: Normal rate.      Heart sounds: No murmur heard.      Pulmonary:      Effort: Pulmonary effort is normal. No respiratory distress.      Breath sounds: No stridor.   Abdominal:      General: Abdomen is flat. There is no distension.      Tenderness: There is no abdominal tenderness.   Musculoskeletal:         General: No swelling or tenderness.      Comments: Bilateral anterior TKA well-healed  scars with no gross external evidence of infection   Skin:     Findings: Bruising present. No erythema or rash.   Neurological:      General: No focal deficit present.      Mental Status: She is alert and oriented to person, place, and time.   Psychiatric:         Mood and Affect: Mood normal.         Behavior: Behavior normal.      Comments: Very pleasant         Meds:    Current Facility-Administered Medications:   •  senna-docusate **AND** polyethylene glycol/lytes **AND** magnesium hydroxide **AND** bisacodyl  •  Fleet Enema  •  enoxaparin (LOVENOX) injection  •  acetaminophen  •  lidocaine  •  spironolactone  •  cyclobenzaprine  •  MD Alert...Vancomycin per Pharmacy  •  vancomycin  •  acetaminophen  •  ondansetron  •  ondansetron  •  labetalol  •  Notify provider if pain remains uncontrolled **AND** Use the Numeric Rating Scale (NRS), Fischer-Baker Faces (WBF), or FLACC on regular floors and Critical-Care Pain Observation Tool (CPOT) on ICUs/Trauma to assess pain **AND** Pulse Ox **AND** Pharmacy Consult Request **AND** If patient difficult to arouse and/or has respiratory depression (respiratory rate of 10 or less), stop any opiates that are currently infusing and call a Rapid Response.  •  oxyCODONE immediate-release **OR** oxyCODONE immediate-release **OR** morphine injection  •  alendronate  •  aspirin EC  •  DILTIAZem CD  •  latanoprost  •  levothyroxine  •  [DISCONTINUED] insulin regular **AND** POC blood glucose manual result **AND** NOTIFY MD and PharmD **AND** Administer 20 grams of glucose (approximately 8 ounces of fruit juice) every 15 minutes PRN FSBG less than 70 mg/dL **AND** dextrose 50%    Labs:  Recent Labs     01/30/22  1012 02/01/22  0516   WBC 12.9* 14.8*   RBC 4.95 4.76   HEMOGLOBIN 14.9 14.4   HEMATOCRIT 44.3 43.0   MCV 89.5 90.3   MCH 30.1 30.3   RDW 49.1 50.2*   PLATELETCT 251 231   MPV 9.4 8.9*     Recent Labs     01/30/22  1012 02/01/22  0516   SODIUM 139 138  138   POTASSIUM 3.8 4.2   4.1   CHLORIDE 99 101  101   CO2 24 21  23   GLUCOSE 111* 115*  116*   BUN 7* 13  13     Recent Labs     01/30/22  1012 02/01/22  0516   ALBUMIN 3.6 3.6   TBILIRUBIN 0.7 0.6   ALKPHOSPHAT 214* 194*   TOTPROTEIN 6.2 5.5*   ALTSGPT 45 32   ASTSGOT 21 22   CREATININE 0.51 0.57  0.59       Imaging:  MR-LUMBAR SPINE-W/O    Result Date: 1/29/2022 1/28/2022 7:12 PM HISTORY/REASON FOR EXAM: Low back pain, recent epidural steroid, rule out epidural hematoma. TECHNIQUE/EXAM DESCRIPTION: MRI of the lumbar spine without contrast. The study was performed on a [employer] Signa 1.5 Renate MRI scanner.  T1 sagittal, T2 sagittal, and T2 axial images were obtained of the lumbar spine. COMPARISON: 11/12/2020. FINDINGS: There is no evidence of an epidural hematoma or other epidural fluid collection. There is signal abnormality and mild inferior endplate compression deformity with less than 25% loss of height of the T12 vertebral body consistent with an acute to subacute compression fracture. There is no significant retropulsion. There is signal abnormality consistent with edema in the S2 sacral segment consistent with an acute to subacute sacral insufficiency fracture. There is stable chronic compression deformity and retropulsion of the L4 vertebral body with post surgical changes consistent with vertebral augmentation. There is diffusely increased T2 signal intensity in the disc spaces at L1-2, L2-3, L3-4 and L4-5 which was not present on the previous exam but is likely related to degenerative disease. This is felt unlikely to be related to infection given absence of changes in the adjacent vertebral body endplates however, this cannot entirely be excluded. The most prominent signal abnormality is in the L2-3 disc space. There is mild grade 1 retrolisthesis of L1 on 2, L2 on 3 and mild anterolisthesis of L4 on 5 and L5 on S1. There is moderate to severe vertebral disc space narrowing throughout the lumbar spine. The conus medullaris  has a normal caliber, course and signal intensity. Level-specific findings as follows: L1-2: There is mild broad posterior disc bulge and endplate spurring. There is moderate facet arthropathy and moderate to marked ligamentum flavum hypertrophy. There is moderate central canal stenosis. There is moderate bilateral neural foraminal stenosis. L2-3: There is moderate broad posterior disc bulge and endplate spurring. There is severe facet arthropathy and marked ligamentum flavum hypertrophy. There is severe central canal stenosis. There is severe bilateral neural foraminal stenosis. L3-4: There is moderate to marked broad posterior endplate spurring and disc bulge. There is severe facet arthropathy and marked ligamentum flavum hypertrophy. There is severe central canal stenosis. There is moderate to severe bilateral neural foraminal  stenosis. L4-5: There is mild broad posterior disc bulge and endplate spurring. There is severe facet arthropathy and marked ligamentum flavum hypertrophy. There is moderate to severe central canal stenosis. There is moderate to severe bilateral neural foraminal stenosis. L5-S1: There is mild broad posterior disc bulge and endplate spurring. There is moderate to severe facet arthropathy and mild ligamentum flavum hypertrophy. There is severe right and moderate to severe left neural foraminal stenosis. The visualized prevertebral and posterior paraspinous soft tissues are grossly unremarkable.     1.  No evidence of epidural hematoma or other epidural fluid collection. 2.  Acute to subacute T12 inferior endplate compression fracture with less than 25% loss of height and no retropulsion. 3.  Acute to subacute S2 sacral insufficiency fracture or 4.  Diffusely increased T2 signal intensity in the disc spaces at L1-2, L2-3, L3-4 and L4-5 which was not present on the previous exam but is likely related to degenerative disease. This is felt less likely to be related to infection given absence of  changes in the adjacent vertebral body endplates however, this cannot entirely be excluded. The most prominent signal abnormality is in the L2-3 disc space. Follow-up on clinical basis is recommended. 5.  Advanced multilevel degenerative changes of the lumbar spine as described above.    EC-ECHOCARDIOGRAM COMPLETE W/O CONT    Result Date: 2022  Transthoracic Echo Report Echocardiography Laboratory CONCLUSIONS Technically challenging study. Patient noted to be in atrial fibrillation. Small LV cavity size. The left ventricular ejection fraction is visually estimated to be 65%. Mild left ventricular hypertrophy. Severely dilated left atrium. Aortic valve sclerosis without stenosis. Right ventricular systolic pressure is estimated to be  43 mmHg. Compared to prior echocardiogram 2021, no significant change. NATACHA DIAS Exam Date:         2022                    09:00 Exam Location:     Inpatient Priority:          Routine Ordering Physician:        EDIE BORRERO Referring Physician:       [id number] Cabral Sonographer:               Kj Wharton RDCS Age:    89     Gender:    F MRN:    [id number] :    1930 BSA:    1.77   Ht (in):    64     Wt (lb):    158 Exam Type:     Complete Indications:     Acute/Subacute Bacterial Endocarditis ICD Codes:       421 CPT Codes:       09444 BP:   170    /   79     HR: Technical Quality:       Technically difficult study -                          adequate information is obtained MEASUREMENTS  (Male / Female) Normal Values 2D ECHO LV Diastolic Diameter PLAX        3.7 cm                4.2 - 5.9 / 3.9 - 5.3 cm LV Systolic Diameter PLAX         2.7 cm                2.1 - 4.0 cm LV Fractional Shortening PLAX     27.3 %                25 - 46  % IVS Diastolic Thickness           0.96 cm               LVPW Diastolic Thickness          1 cm                  M-MODE Aortic Root Diameter MM           2.8 cm                DOPPLER AV Peak Velocity                   1.3 m/s               AV Peak Gradient                  7.2 mmHg              AV Mean Gradient                  3.9 mmHg              LVOT Peak Velocity                0.7 m/s               TR Peak Velocity                  293 cm/s              TR Peak Gradient                  34.2 mmHg             PV Peak Velocity                  0.86 m/s              PV Peak Gradient                  3 mmHg                * Indicates values subject to auto-interpretation LV EF:        % FINDINGS Left Ventricle Normal left ventricular systolic function. The left ventricular ejection fraction is visually estimated to be 65%.  Small LV cavity size.  Mild left ventricular hypertrophy.  Normal wall motion.  Indeterminate diastolic function due to atrial fibrillation. Right Ventricle Mildly dilated right ventricle. Right Atrium Normal right atrial size. Left Atrium Left atrial volume index is 52  mL/sq m. Severely dilated left atrium. Mitral Valve Mild mitral annular calcification. No mitral stenosis. Mild mitral regurgitation.  Mildly thickened mitral valve leaflets.  Mean gradient 2.1 mmHg, HR 87 bpm. Aortic Valve Trileaflet aortic valve without significant stenosis or regurgitation.  Aortic valve sclerosis without stenosis. Tricuspid Valve Structurally normal tricuspid valve. No tricuspid stenosis. Mild tricuspid regurgitation. Right ventricular systolic pressure is estimated to be  43 mmHg. Right atrial pressure is estimated to be 3 mmHg. Pulmonic Valve Structurally normal pulmonic valve. No pulmonic stenosis. Mild pulmonic insufficiency. Pericardium Normal pericardium without effusion. Aorta Normal aortic root for body surface area.  Ascending aorta normal size, 3.6 cm. Maya Méndez MD (Electronically Signed) Final Date:     30 January 2022                 18:02      Micro:  Results     Procedure Component Value Units Date/Time    COV-2, FLU A/B, AND RSV BY PCR (2-4 HOURS CEPHEID): Collect NP swab in VTM [[id number]]      "Order Status: Sent Specimen: Respirate from Nasopharyngeal     Fungal Culture [[id number]] Collected: 02/01/22 1730    Order Status: Completed Specimen: Respirate from Back Updated: 02/01/22 1942    Narrative:      L2 L3 disc space end plates    CULTURE TISSUE W/ GRM STAIN [[id number]] Collected: 02/01/22 1730    Order Status: Completed Specimen: Tissue Updated: 02/01/22 1942     Significant Indicator NEG     Source TISS     Site L2 L3 disc space end plates     Culture Result -     Gram Stain Result -    Narrative:      L2 L3 disc space end plates  L2 L3 disc space end plates    AFB Culture [[id number]] Collected: 02/01/22 1730    Order Status: Completed Specimen: Tissue from Bone Updated: 02/01/22 1941    Narrative:      L2 L3 disc space end plates    FLUID CULTURE W/GRAM STAIN [[id number]] Collected: 02/01/22 1730    Order Status: Canceled Specimen: Other Body Fluid     MRSA By PCR (Amp) [[id number]] Collected: 01/31/22 1604    Order Status: Completed Specimen: Respirate from Nares Updated: 01/31/22 1755     MRSA by PCR Negative    BLOOD CULTURE [[id number]]  (Abnormal) Collected: 01/28/22 2037    Order Status: Completed Specimen: Blood from Peripheral Updated: 01/31/22 0823     Significant Indicator POS     Source BLD     Site PERIPHERAL     Culture Result Growth detected by Bactec instrument. 01/29/2022  20:13      Staphylococcus capitis  See previous culture for sensitivity report.      Narrative:      CALL  Amaral  CPU tel. [phone],  CALLED  CPU tel. [phone] 01/29/2022, 20:14, RB PERF. RESULTS CALLED TO: RN  [id number]  Per Hospital Policy: Only change Specimen Src: to \"Line\" if  specified by physician order.  No site indicated    BLOOD CULTURE [[id number]]  (Abnormal)  (Susceptibility) Collected: 01/28/22 2235    Order Status: Completed Specimen: Blood from Peripheral Updated: 01/31/22 0822     Significant Indicator POS     Source BLD     Site PERIPHERAL     Culture Result Growth detected by Bactec instrument. " "01/29/2022  20:11  Negative for Staphylococcus aureus and MRSA by PCR. Correlate  ongoing need for antibiotics with clinical condition.        Staphylococcus capitis    Narrative:      CALL  Amaral  CPU tel. [phone],  CALLED  CPU tel. [phone] 01/29/2022, 20:13, RB PERF. RESULTS CALLED TO: RN  [phone]  Per Hospital Policy: Only change Specimen Src: to \"Line\" if  specified by physician order.  Right Hand    Susceptibility     Staphylococcus capitis (1)     Antibiotic Interpretation Microscan   Method Status    Azithromycin Resistant >4 mcg/mL BARAK Final    Clindamycin Resistant >4 mcg/mL BARAK Final    Cefazolin Resistant <=8 mcg/mL BARAK Final    Cefepime Resistant <=4 mcg/mL BARAK Final    Ampicillin/sulbactam Resistant <=8/4 mcg/mL BARAK Final    Erythromycin Resistant >4 mcg/mL BARAK Final    Vancomycin Sensitive 1 mcg/mL BARAK Final    Oxacillin Resistant >2 mcg/mL BARAK Final    Trimeth/Sulfa Sensitive 2/38 mcg/mL BARAK Final    Tetracycline Resistant >8 mcg/mL BARAK Final                   BLOOD CULTURE [[id number]] Collected: 01/30/22 0218    Order Status: Completed Specimen: Blood from Peripheral Updated: 01/31/22 0745     Significant Indicator NEG     Source BLD     Site PERIPHERAL     Culture Result No Growth  Note: Blood cultures are incubated for 5 days and  are monitored continuously.Positive blood cultures  are called to the RN and reported as soon as  they are identified.      Narrative:      Per Hospital Policy: Only change Specimen Src: to \"Line\" if  specified by physician order.  Left Hand    BLOOD CULTURE [[id number]] Collected: 01/30/22 0218    Order Status: Completed Specimen: Blood from Peripheral Updated: 01/31/22 0745     Significant Indicator NEG     Source BLD     Site PERIPHERAL     Culture Result No Growth  Note: Blood cultures are incubated for 5 days and  are monitored continuously.Positive blood cultures  are called to the RN and reported as soon as  they are identified.      Narrative:      Per Hospital " "Policy: Only change Specimen Src: to \"Line\" if  specified by physician order.  Left AC    BLOOD CULTURE [[id number]]     Order Status: Canceled Specimen: Blood from Peripheral     BLOOD CULTURE [[id number]]     Order Status: Canceled Specimen: Blood from Peripheral     URINALYSIS (UA) [[id number]]  (Abnormal) Collected: 01/28/22 1247    Order Status: Completed Specimen: Blood Updated: 01/28/22 1432     Color Yellow     Character Clear     Specific Gravity 1.011     Ph 6.0     Glucose Negative mg/dL      Ketones Negative mg/dL      Protein Negative mg/dL      Bilirubin Negative     Urobilinogen, Urine 0.2     Nitrite Negative     Leukocyte Esterase Trace     Occult Blood Negative     Micro Urine Req Microscopic          Assessment:  Patient with type 2 diabetes, hypothyroidism, chronic lower back pain, hypertension, A. fib, watchman device in place, presented with acute on chronic lower back and left hip pain.  MRI was concerning for possible L2-L3 discitis - infection versus degenerative changes.  Blood cultures x2+ for methicillin-resistant Staph capitis    Plan:  -Continue IV vancomycin.  Follow repeat blood cultures x2.  Monitor vancomycin levels and renal function  -IR performed biopsy on 2/1, 2 days after IV antibiotics were initiated.  No pathology sent, cultures pending   -If blood cultures remain persistently positive despite antibiotics, may need ARBEN and may need to consider Watchman device infection  -Repeat blood cultures x2 from 1/30, no growth to date  -If no other data emerges, anticipate 6 weeks of IV daptomycin 8 mg/KG every 24 hours for 6 weeks through 3/12/2022 at a facility  -At least weekly CBC with differential, CMP, CPK while on IV antibiotics  -If facility refuses daptomycin due to cost, then okay to use IV vancomycin instead only if the facility has trained pharmacists in order to appropriately dose and monitor vancomycin levels and renal function    Discussed with Dr. Alves.  ID will sign " off.  Please call with questions.

## 2022-02-02 NOTE — DISCHARGE PLANNING
Agency/Facility Name: [facility]  Spoke To: Verenice  Outcome: Facility is able to accept Pt today pending CM creating transport request.     RN CM notified     1150-  Agency/Facility Name: [facility]  Spoke To: Verenice  Outcome: [facility] verified acceptance with 1500 - 1530 transport time, no further needs from [facility] at this time.

## 2022-02-02 NOTE — PROGRESS NOTES
Discharge instructions provided to patient. Pt verbalizes understanding. Pt states all questions have been answered. Copy of DC provided to patient/transporter. Signed copy in chart. Pt states all personal belongings are in possession. Pt escorted off unit by GMT without incident.

## 2022-02-04 LAB
BACTERIA BLD CULT: NORMAL
BACTERIA BLD CULT: NORMAL
BACTERIA TISS AEROBE CULT: NORMAL
GRAM STN SPEC: NORMAL
SIGNIFICANT IND 70042: NORMAL
SITE SITE: NORMAL
SOURCE SOURCE: NORMAL

## 2022-02-08 ENCOUNTER — HOSPITAL ENCOUNTER (OUTPATIENT)
Dept: RADIOLOGY | Facility: MEDICAL CENTER | Age: 87
End: 2022-02-08
Attending: INTERNAL MEDICINE
Payer: MEDICARE

## 2022-02-08 DIAGNOSIS — I10 ESSENTIAL HYPERTENSION, MALIGNANT: ICD-10-CM

## 2022-02-08 DIAGNOSIS — M62.81 MUSCLE WEAKNESS (GENERALIZED): ICD-10-CM

## 2022-02-08 DIAGNOSIS — M46.46 DISCITIS OF LUMBAR REGION: ICD-10-CM

## 2022-02-08 DIAGNOSIS — M48.48XD FATIGUE FRACTURE OF VERTEBRA, SACRAL AND SACROCOCCYGEAL REGION, SUBSEQUENT ENCOUNTER FOR FRACTURE WITH ROUTINE HEALING: ICD-10-CM

## 2022-02-08 DIAGNOSIS — Z95.818 STATUS POST BLALOCK-TAUSSIG SHUNT: ICD-10-CM

## 2022-02-08 DIAGNOSIS — L01.00 IMPETIGO DUE TO STAPHYLOCOCCUS AUREUS: ICD-10-CM

## 2022-02-08 DIAGNOSIS — I48.91 ATRIAL FIBRILLATION, UNSPECIFIED TYPE (HCC): ICD-10-CM

## 2022-02-08 DIAGNOSIS — E03.9 PRIMARY HYPOTHYROIDISM: ICD-10-CM

## 2022-02-08 DIAGNOSIS — R26.2 CANNOT WALK: ICD-10-CM

## 2022-02-08 DIAGNOSIS — B95.61 IMPETIGO DUE TO STAPHYLOCOCCUS AUREUS: ICD-10-CM

## 2022-02-08 PROCEDURE — 71045 X-RAY EXAM CHEST 1 VIEW: CPT

## 2022-02-08 PROCEDURE — C1751 CATH, INF, PER/CENT/MIDLINE: HCPCS

## 2022-02-08 NOTE — PROGRESS NOTES
Vascular Access Team     Date of Insertion: 2/8/2022  Arm Circumference: 31  Internal length: 43  External Length: 0  Vein Occupancy %: 31   Reason for PICC: ABX  Labs: WBC 14.8, , BUN 13, Cr 0.59, GFR >60     Consents confirmed, vessel patency confirmed with ultrasound. Risks and benefits of procedure explained to patient and family member and education regarding central line associated bloodstream infections provided. Questions answered.      PICC placed in LUE per licensed provider order with ultrasound guidance.  5 Fr, 2 lumen PICC placed in BASILIC vein after 1 attempt(s). 2 mL of 1% lidocaine injected intradermally at the insertion site. A 21 gauge microintroducer needle was visualized entering the vein and modified Seldinger technique was used to obtain access to the vein. 43 cm catheter inserted and brisk blood return was observed from each lumen upon aspiration. Line secured at the 0 cm marker. TCS stylet removed and observed to be fully intact. Each lumen flushed using pulsatile method without resistance with 10 mL 0.9% normal saline. PICC line secured with Biopatch and Tegaderm.     PICC tip placement location is confirmed by RADIOLOGIST to be in the Superior Vena Cava (SVC) utilizing CXR. PICC line is appropriate for use at this time. Patient tolerated procedure well, without complications.  Patient condition relayed to primary RN or ordering physician via this post procedure note in the EMR.      Ultrasound images uploaded to PACS and viewable in the EMR - yes  Ultrasound imaged printed and placed in paper chart - no     BARD Power PICC ref # [id number], Lot # [id number], Expiration Date 2022-07-31

## 2022-02-16 ENCOUNTER — PATIENT MESSAGE (OUTPATIENT)
Dept: HEALTH INFORMATION MANAGEMENT | Facility: OTHER | Age: 87
End: 2022-02-16
Payer: MEDICARE

## 2022-03-02 LAB
FUNGUS SPEC CULT: NORMAL
FUNGUS SPEC FUNGUS STN: NORMAL
SIGNIFICANT IND 70042: NORMAL
SITE SITE: NORMAL
SOURCE SOURCE: NORMAL

## 2022-03-11 ENCOUNTER — TELEPHONE (OUTPATIENT)
Dept: INFECTIOUS DISEASES | Facility: MEDICAL CENTER | Age: 87
End: 2022-03-11
Payer: MEDICARE

## 2022-03-11 NOTE — TELEPHONE ENCOUNTER
Received a call from Dr. Wall at [facility] regarding patient's antibiotics.  Patient will complete IV antibiotics for discitis on 3/12.  Unable to transition to doxycycline as doxycycline was resistant.  Okay to transition to Bactrim 1 double strength tablet twice a day until seen in ID clinic.  Dr. Wall will write a prescription for at least a 1 month supply until patient can be seen in ID clinic.  Recommend checking CMP 1 week after patient starts Bactrim.

## 2022-03-16 ENCOUNTER — HOME HEALTH ADMISSION (OUTPATIENT)
Dept: HOME HEALTH SERVICES | Facility: HOME HEALTHCARE | Age: 87
End: 2022-03-16
Payer: MEDICARE

## 2022-03-18 ENCOUNTER — APPOINTMENT (OUTPATIENT)
Dept: RADIOLOGY | Facility: MEDICAL CENTER | Age: 87
End: 2022-03-18
Attending: EMERGENCY MEDICINE
Payer: MEDICARE

## 2022-03-18 ENCOUNTER — HOSPITAL ENCOUNTER (EMERGENCY)
Facility: MEDICAL CENTER | Age: 87
End: 2022-03-18
Attending: EMERGENCY MEDICINE
Payer: MEDICARE

## 2022-03-18 VITALS
DIASTOLIC BLOOD PRESSURE: 52 MMHG | BODY MASS INDEX: 33.57 KG/M2 | TEMPERATURE: 98 F | SYSTOLIC BLOOD PRESSURE: 109 MMHG | HEART RATE: 75 BPM | HEIGHT: 60 IN | OXYGEN SATURATION: 95 % | RESPIRATION RATE: 16 BRPM | WEIGHT: 171 LBS

## 2022-03-18 DIAGNOSIS — S01.81XA FACIAL LACERATION, INITIAL ENCOUNTER: ICD-10-CM

## 2022-03-18 PROCEDURE — 304999 HCHG REPAIR-SIMPLE/INTERMED LEVEL 1

## 2022-03-18 PROCEDURE — 72125 CT NECK SPINE W/O DYE: CPT | Mod: ME

## 2022-03-18 PROCEDURE — 70486 CT MAXILLOFACIAL W/O DYE: CPT | Mod: ME

## 2022-03-18 PROCEDURE — 70450 CT HEAD/BRAIN W/O DYE: CPT | Mod: ME

## 2022-03-18 PROCEDURE — 304217 HCHG IRRIGATION SYSTEM

## 2022-03-18 PROCEDURE — 303747 HCHG EXTRA SUTURE

## 2022-03-18 PROCEDURE — 700111 HCHG RX REV CODE 636 W/ 250 OVERRIDE (IP): Performed by: EMERGENCY MEDICINE

## 2022-03-18 PROCEDURE — 90715 TDAP VACCINE 7 YRS/> IM: CPT | Performed by: EMERGENCY MEDICINE

## 2022-03-18 PROCEDURE — 700101 HCHG RX REV CODE 250: Performed by: EMERGENCY MEDICINE

## 2022-03-18 PROCEDURE — 99284 EMERGENCY DEPT VISIT MOD MDM: CPT

## 2022-03-18 PROCEDURE — 90471 IMMUNIZATION ADMIN: CPT

## 2022-03-18 RX ORDER — LIDOCAINE HYDROCHLORIDE AND EPINEPHRINE 10; 10 MG/ML; UG/ML
10 INJECTION, SOLUTION INFILTRATION; PERINEURAL ONCE
Status: COMPLETED | OUTPATIENT
Start: 2022-03-18 | End: 2022-03-18

## 2022-03-18 RX ORDER — SULFAMETHOXAZOLE AND TRIMETHOPRIM 800; 160 MG/1; MG/1
1 TABLET ORAL 2 TIMES DAILY
COMMUNITY
Start: 2022-03-14 | End: 2022-04-10 | Stop reason: ALTCHOICE

## 2022-03-18 RX ORDER — VANCOMYCIN HYDROCHLORIDE 1 G/20ML
1000 INJECTION, POWDER, LYOPHILIZED, FOR SOLUTION INTRAVENOUS DAILY
COMMUNITY
Start: 2022-02-28 | End: 2022-04-10 | Stop reason: ALTCHOICE

## 2022-03-18 RX ADMIN — LIDOCAINE HYDROCHLORIDE AND EPINEPHRINE 10 ML: 10; 10 INJECTION, SOLUTION INFILTRATION; PERINEURAL at 12:45

## 2022-03-18 RX ADMIN — CLOSTRIDIUM TETANI TOXOID ANTIGEN (FORMALDEHYDE INACTIVATED), CORYNEBACTERIUM DIPHTHERIAE TOXOID ANTIGEN (FORMALDEHYDE INACTIVATED), BORDETELLA PERTUSSIS TOXOID ANTIGEN (GLUTARALDEHYDE INACTIVATED), BORDETELLA PERTUSSIS FILAMENTOUS HEMAGGLUTININ ANTIGEN (FORMALDEHYDE INACTIVATED), BORDETELLA PERTUSSIS PERTACTIN ANTIGEN, AND BORDETELLA PERTUSSIS FIMBRIAE 2/3 ANTIGEN 0.5 ML: 5; 2; 2.5; 5; 3; 5 INJECTION, SUSPENSION INTRAMUSCULAR at 13:05

## 2022-03-18 ASSESSMENT — FIBROSIS 4 INDEX: FIB4 SCORE: 2.23

## 2022-03-18 NOTE — ED TRIAGE NOTES
Chief Complaint   Patient presents with   • GLF   • Laceration      pt BIB [facility] for a GLF sustained today when getting out of her WC. laceration to right eyebrow, extensive skin tear to right arm. ROM intact x 4 extremities. Pt on ASA, no LOC. Pt AOX3,  and able to describe fall.

## 2022-03-18 NOTE — DISCHARGE INSTRUCTIONS
Your CT showed that you have a meningioma which is typically a benign brain mass, follow-up with your primary care physician, they may want a repeat CT in 6 to 12 months to check on this growth.  He also had some abnormalities in your teeth and I would like you to follow-up with a dentist.  The sutures that were placed are absorbable and therefore do not need to come out

## 2022-03-18 NOTE — ED PROVIDER NOTES
ED Provider Note    CHIEF COMPLAINT  Chief Complaint   Patient presents with   • GLF   • Laceration       HPI  Saumya Vann is a 89 y.o. female who presents with chief complaint of ground-level fall.  Patient is mostly limited to a wheelchair but was transitioning from her wheelchair to her wardrobe when her foot got caught on the wheelchair causing her to fall onto the front of her face.  Patient denies any neck or back pain.  She is poorly ambulatory at baseline and did not attempt to ambulate following the incident.  She denies any lower extremity pain or new weakness or numbness.  Patient denies any nausea or vomiting.  Patient denies any loss of consciousness.  She denies any use of anticoagulants.  She has had multiple falls.    REVIEW OF SYSTEMS  ROS    See HPI for further details. All other systems are negative.     PAST MEDICAL HISTORY   has a past medical history of A-fib (HCC), Breath shortness, Cataract (04/23/2021), Diabetes (HCC) (04/23/2021), Dry cough (04/23/2021), Glaucoma (04/23/2021), Hypertension, Hypothyroid, Macular degeneration, Pain (04/23/2021), and Urinary incontinence.    SOCIAL HISTORY  Social History     Tobacco Use   • Smoking status: Never Smoker   • Smokeless tobacco: Never Used   Vaping Use   • Vaping Use: Never used   Substance and Sexual Activity   • Alcohol use: No   • Drug use: No   • Sexual activity: Not on file       SURGICAL HISTORY   has a past surgical history that includes us-needle core bx-breast panel; appendectomy; cholecystectomy; hysterectomy, total abdominal; knee replacement, total (Bilateral); other (1952); appendectomy (1957); cholecystectomy (1970); neuroma excision (1985); other orthopedic surgery (2002); and cataract extraction with iol (2001).    CURRENT MEDICATIONS  Home Medications    **Home medications have not yet been reviewed for this encounter**         ALLERGIES  Allergies   Allergen Reactions   • Pcn [Penicillins] Rash     Rash         PHYSICAL  EXAM  Vitals:    03/18/22 1044   BP: (!) 97/59   Pulse: 91   Resp: 18   Temp: 37.1 °C (98.8 °F)   SpO2: 96%       Physical Exam  Constitutional:       Appearance: She is well-developed.   HENT:      Head:      Comments: Frontal hematoma immediately superior to the right orbital rim, there is some mild tenderness of the superior orbital rim.  Full range of motion of extraocular muscles without any diplopia or strabismus evoked.  Conjunctiva is unremarkable.  There is a small associated 3 cm laceration at the area of the hematoma.  There is no associated underlying bony crepitus or abnormality otherwise.  Eyes:      Conjunctiva/sclera: Conjunctivae normal.   Cardiovascular:      Rate and Rhythm: Normal rate and regular rhythm.   Pulmonary:      Effort: Pulmonary effort is normal.      Breath sounds: Normal breath sounds.   Abdominal:      General: Bowel sounds are normal. There is no distension.      Palpations: Abdomen is soft.      Tenderness: There is no abdominal tenderness. There is no rebound.   Musculoskeletal:      Cervical back: Normal range of motion and neck supple.      Comments: Mild tenderness at C6-C7, no associated bony crepitus or bone abnormality otherwise.  Thoracic and lumbar spine are clear of any tenderness to palpation.  Bilateral shoulders, elbows, wrists, hips, knees and ankles are clear of any tenderness palpation or significant pain on range of motion.  No tenderness of any long bones.  Distal pulses are 2+ cap refill is instantaneous.   Skin:     General: Skin is warm and dry.      Findings: No rash.   Neurological:      Mental Status: She is alert and oriented to person, place, and time.   Psychiatric:         Behavior: Behavior normal.           DIAGNOSTIC STUDIES / PROCEDURES      LABS  Results for orders placed or performed during the hospital encounter of 01/28/22   CBC WITH DIFFERENTIAL   Result Value Ref Range    WBC 13.1 (H) 4.8 - 10.8 K/uL    RBC 4.60 4.20 - 5.40 M/uL    Hemoglobin  14.0 12.0 - 16.0 g/dL    Hematocrit 41.0 37.0 - 47.0 %    MCV 89.1 81.4 - 97.8 fL    MCH 30.4 27.0 - 33.0 pg    MCHC 34.1 33.6 - 35.0 g/dL    RDW 49.4 35.9 - 50.0 fL    Platelet Count 198 164 - 446 K/uL    MPV 9.7 9.0 - 12.9 fL    Neutrophils-Polys 77.90 (H) 44.00 - 72.00 %    Lymphocytes 11.50 (L) 22.00 - 41.00 %    Monocytes 4.40 0.00 - 13.40 %    Eosinophils 0.90 0.00 - 6.90 %    Basophils 0.00 0.00 - 1.80 %    Nucleated RBC 0.00 /100 WBC    Neutrophils (Absolute) 10.20 (H) 2.00 - 7.15 K/uL    Lymphs (Absolute) 1.51 1.00 - 4.80 K/uL    Monos (Absolute) 0.58 0.00 - 0.85 K/uL    Eos (Absolute) 0.12 0.00 - 0.51 K/uL    Baso (Absolute) 0.00 0.00 - 0.12 K/uL    NRBC (Absolute) 0.00 K/uL    Anisocytosis 1+     Microcytosis 1+    COMP METABOLIC PANEL   Result Value Ref Range    Sodium 137 135 - 145 mmol/L    Potassium 4.0 3.6 - 5.5 mmol/L    Chloride 100 96 - 112 mmol/L    Co2 22 20 - 33 mmol/L    Anion Gap 15.0 7.0 - 16.0    Glucose 150 (H) 65 - 99 mg/dL    Bun 16 8 - 22 mg/dL    Creatinine 0.70 0.50 - 1.40 mg/dL    Calcium 8.2 (L) 8.5 - 10.5 mg/dL    AST(SGOT) 36 12 - 45 U/L    ALT(SGPT) 76 (H) 2 - 50 U/L    Alkaline Phosphatase 221 (H) 30 - 99 U/L    Total Bilirubin 0.5 0.1 - 1.5 mg/dL    Albumin 3.2 3.2 - 4.9 g/dL    Total Protein 5.7 (L) 6.0 - 8.2 g/dL    Globulin 2.5 1.9 - 3.5 g/dL    A-G Ratio 1.3 g/dL   URINALYSIS (UA)    Specimen: Blood   Result Value Ref Range    Color Yellow     Character Clear     Specific Gravity 1.011 <1.035    Ph 6.0 5.0 - 8.0    Glucose Negative Negative mg/dL    Ketones Negative Negative mg/dL    Protein Negative Negative mg/dL    Bilirubin Negative Negative    Urobilinogen, Urine 0.2 Negative    Nitrite Negative Negative    Leukocyte Esterase Trace (A) Negative    Occult Blood Negative Negative    Micro Urine Req Microscopic    ESTIMATED GFR   Result Value Ref Range    GFR If African American >60 >60 mL/min/1.73 m 2    GFR If Non African American >60 >60 mL/min/1.73 m 2   URINE  MICROSCOPIC (W/UA)   Result Value Ref Range    WBC 0-2 /hpf    RBC 0-2 /hpf    Bacteria Few (A) None /hpf    Epithelial Cells Few /hpf    Hyaline Cast 3-5 (A) /lpf    Waxy Cast 0-2 (A) /lpf   DIFFERENTIAL MANUAL   Result Value Ref Range    Metamyelocytes 3.50 %    Myelocytes 1.80 %    Manual Diff Status PERFORMED    PERIPHERAL SMEAR REVIEW   Result Value Ref Range    Peripheral Smear Review see below    PLATELET ESTIMATE   Result Value Ref Range    Plt Estimation Normal    MORPHOLOGY   Result Value Ref Range    RBC Morphology Present     Polychromia 1+     Poikilocytosis 2+     Echinocytes 2+    Sed Rate   Result Value Ref Range    Sed Rate Westergren 17 0 - 25 mm/hour   CRP QUANTITIVE (NON-CARDIAC)   Result Value Ref Range    Stat C-Reactive Protein 11.21 (H) 0.00 - 0.75 mg/dL   BLOOD CULTURE    Specimen: Peripheral; Blood   Result Value Ref Range    Significant Indicator POS (POS)     Source BLD     Site PERIPHERAL     Culture Result (A)      Growth detected by [employer] instrument. 01/29/2022  20:13    Culture Result (A)      Staphylococcus capitis  See previous culture for sensitivity report.     BLOOD CULTURE    Specimen: Peripheral; Blood   Result Value Ref Range    Significant Indicator POS (POS)     Source BLD     Site PERIPHERAL     Culture Result (A)      Growth detected by [employer] instrument. 01/29/2022  20:11  Negative for Staphylococcus aureus and MRSA by PCR. Correlate  ongoing need for antibiotics with clinical condition.      Culture Result Staphylococcus capitis (A)        Susceptibility    Staphylococcus capitis - BARAK     Azithromycin >4 Resistant mcg/mL     Clindamycin >4 Resistant mcg/mL     Cefazolin <=8 Resistant mcg/mL     Cefepime <=4 Resistant mcg/mL     Ampicillin/sulbactam <=8/4 Resistant mcg/mL     Erythromycin >4 Resistant mcg/mL     Vancomycin 1 Sensitive mcg/mL     Oxacillin >2 Resistant mcg/mL     Trimeth/Sulfa 2/38 Sensitive mcg/mL     Tetracycline >8 Resistant mcg/mL   CBC WITH  DIFFERENTIAL   Result Value Ref Range    WBC 11.8 (H) 4.8 - 10.8 K/uL    RBC 4.50 4.20 - 5.40 M/uL    Hemoglobin 13.7 12.0 - 16.0 g/dL    Hematocrit 40.4 37.0 - 47.0 %    MCV 89.8 81.4 - 97.8 fL    MCH 30.4 27.0 - 33.0 pg    MCHC 33.9 33.6 - 35.0 g/dL    RDW 49.7 35.9 - 50.0 fL    Platelet Count 206 164 - 446 K/uL    MPV 9.8 9.0 - 12.9 fL    Neutrophils-Polys 77.70 (H) 44.00 - 72.00 %    Lymphocytes 9.80 (L) 22.00 - 41.00 %    Monocytes 3.60 0.00 - 13.40 %    Eosinophils 1.80 0.00 - 6.90 %    Basophils 0.00 0.00 - 1.80 %    Nucleated RBC 0.00 /100 WBC    Neutrophils (Absolute) 9.17 (H) 2.00 - 7.15 K/uL    Lymphs (Absolute) 1.16 1.00 - 4.80 K/uL    Monos (Absolute) 0.42 0.00 - 0.85 K/uL    Eos (Absolute) 0.21 0.00 - 0.51 K/uL    Baso (Absolute) 0.00 0.00 - 0.12 K/uL    NRBC (Absolute) 0.00 K/uL    Anisocytosis 1+     Microcytosis 1+    SARS-COV Antigen MILDRED: Collect dry Nasal swab   Result Value Ref Range    SARS-CoV-2 Source Nasal Swab     SARS-COV ANTIGEN MILDRED NotDetected NotDetected   PERIPHERAL SMEAR REVIEW   Result Value Ref Range    Peripheral Smear Review see below    PLATELET ESTIMATE   Result Value Ref Range    Plt Estimation Normal    MORPHOLOGY   Result Value Ref Range    RBC Morphology Present     Poikilocytosis 1+     Ovalocytes 1+     Schistocytes 1+    DIFFERENTIAL MANUAL   Result Value Ref Range    Metamyelocytes 1.80 %    Myelocytes 4.40 %    Progranulocytes 0.90 %    Manual Diff Status PERFORMED    CBC with Differential   Result Value Ref Range    WBC 11.3 (H) 4.8 - 10.8 K/uL    RBC 4.20 4.20 - 5.40 M/uL    Hemoglobin 12.8 12.0 - 16.0 g/dL    Hematocrit 38.3 37.0 - 47.0 %    MCV 91.2 81.4 - 97.8 fL    MCH 30.5 27.0 - 33.0 pg    MCHC 33.4 (L) 33.6 - 35.0 g/dL    RDW 50.6 (H) 35.9 - 50.0 fL    Platelet Count 200 164 - 446 K/uL    MPV 9.3 9.0 - 12.9 fL    Neutrophils-Polys 79.10 (H) 44.00 - 72.00 %    Lymphocytes 6.10 (L) 22.00 - 41.00 %    Monocytes 3.50 0.00 - 13.40 %    Eosinophils 0.90 0.00 - 6.90 %     Basophils 0.00 0.00 - 1.80 %    Nucleated RBC 0.00 /100 WBC    Neutrophils (Absolute) 8.94 (H) 2.00 - 7.15 K/uL    Lymphs (Absolute) 0.69 (L) 1.00 - 4.80 K/uL    Monos (Absolute) 0.40 0.00 - 0.85 K/uL    Eos (Absolute) 0.10 0.00 - 0.51 K/uL    Baso (Absolute) 0.00 0.00 - 0.12 K/uL    NRBC (Absolute) 0.00 K/uL    Anisocytosis 1+     Microcytosis 1+    Comp Metabolic Panel (CMP)   Result Value Ref Range    Sodium 138 135 - 145 mmol/L    Potassium 3.9 3.6 - 5.5 mmol/L    Chloride 104 96 - 112 mmol/L    Co2 23 20 - 33 mmol/L    Anion Gap 11.0 7.0 - 16.0    Glucose 117 (H) 65 - 99 mg/dL    Bun 14 8 - 22 mg/dL    Creatinine 0.67 0.50 - 1.40 mg/dL    Calcium 7.5 (L) 8.5 - 10.5 mg/dL    AST(SGOT) 19 12 - 45 U/L    ALT(SGPT) 54 (H) 2 - 50 U/L    Alkaline Phosphatase 187 (H) 30 - 99 U/L    Total Bilirubin 0.4 0.1 - 1.5 mg/dL    Albumin 3.2 3.2 - 4.9 g/dL    Total Protein 5.3 (L) 6.0 - 8.2 g/dL    Globulin 2.1 1.9 - 3.5 g/dL    A-G Ratio 1.5 g/dL   Prothrombin Time   Result Value Ref Range    PT 16.5 (H) 12.0 - 14.6 sec    INR 1.37 (H) 0.87 - 1.13   APTT   Result Value Ref Range    APTT 30.6 24.7 - 36.0 sec   ESTIMATED GFR   Result Value Ref Range    GFR If African American >60 >60 mL/min/1.73 m 2    GFR If Non African American >60 >60 mL/min/1.73 m 2   DIFFERENTIAL MANUAL   Result Value Ref Range    Metamyelocytes 4.30 %    Myelocytes 6.10 %    Manual Diff Status PERFORMED    PERIPHERAL SMEAR REVIEW   Result Value Ref Range    Peripheral Smear Review see below    PLATELET ESTIMATE   Result Value Ref Range    Plt Estimation Normal    MORPHOLOGY   Result Value Ref Range    RBC Morphology Present     Poikilocytosis 1+     Ovalocytes 1+    BLOOD CULTURE    Specimen: Peripheral; Blood   Result Value Ref Range    Significant Indicator NEG     Source BLD     Site PERIPHERAL     Culture Result No growth after 5 days of incubation.    BLOOD CULTURE    Specimen: Peripheral; Blood   Result Value Ref Range    Significant Indicator NEG      Source BLD     Site PERIPHERAL     Culture Result No growth after 5 days of incubation.    Comp Metabolic Panel   Result Value Ref Range    Sodium 139 135 - 145 mmol/L    Potassium 3.8 3.6 - 5.5 mmol/L    Chloride 99 96 - 112 mmol/L    Co2 24 20 - 33 mmol/L    Anion Gap 16.0 7.0 - 16.0    Glucose 111 (H) 65 - 99 mg/dL    Bun 7 (L) 8 - 22 mg/dL    Creatinine 0.51 0.50 - 1.40 mg/dL    Calcium 8.4 (L) 8.5 - 10.5 mg/dL    AST(SGOT) 21 12 - 45 U/L    ALT(SGPT) 45 2 - 50 U/L    Alkaline Phosphatase 214 (H) 30 - 99 U/L    Total Bilirubin 0.7 0.1 - 1.5 mg/dL    Albumin 3.6 3.2 - 4.9 g/dL    Total Protein 6.2 6.0 - 8.2 g/dL    Globulin 2.6 1.9 - 3.5 g/dL    A-G Ratio 1.4 g/dL   CBC WITHOUT DIFFERENTIAL   Result Value Ref Range    WBC 12.9 (H) 4.8 - 10.8 K/uL    RBC 4.95 4.20 - 5.40 M/uL    Hemoglobin 14.9 12.0 - 16.0 g/dL    Hematocrit 44.3 37.0 - 47.0 %    MCV 89.5 81.4 - 97.8 fL    MCH 30.1 27.0 - 33.0 pg    MCHC 33.6 33.6 - 35.0 g/dL    RDW 49.1 35.9 - 50.0 fL    Platelet Count 251 164 - 446 K/uL    MPV 9.4 9.0 - 12.9 fL   MRSA By PCR (Amp)    Specimen: Nares; Respirate   Result Value Ref Range    MRSA by PCR Negative Negative   ESTIMATED GFR   Result Value Ref Range    GFR If African American >60 >60 mL/min/1.73 m 2    GFR If Non African American >60 >60 mL/min/1.73 m 2   CBC WITHOUT DIFFERENTIAL   Result Value Ref Range    WBC 14.8 (H) 4.8 - 10.8 K/uL    RBC 4.76 4.20 - 5.40 M/uL    Hemoglobin 14.4 12.0 - 16.0 g/dL    Hematocrit 43.0 37.0 - 47.0 %    MCV 90.3 81.4 - 97.8 fL    MCH 30.3 27.0 - 33.0 pg    MCHC 33.5 (L) 33.6 - 35.0 g/dL    RDW 50.2 (H) 35.9 - 50.0 fL    Platelet Count 231 164 - 446 K/uL    MPV 8.9 (L) 9.0 - 12.9 fL   Comp Metabolic Panel   Result Value Ref Range    Sodium 138 135 - 145 mmol/L    Potassium 4.2 3.6 - 5.5 mmol/L    Chloride 101 96 - 112 mmol/L    Co2 21 20 - 33 mmol/L    Anion Gap 16.0 7.0 - 16.0    Glucose 115 (H) 65 - 99 mg/dL    Bun 13 8 - 22 mg/dL    Creatinine 0.57 0.50 - 1.40 mg/dL     Calcium 8.4 (L) 8.5 - 10.5 mg/dL    AST(SGOT) 22 12 - 45 U/L    ALT(SGPT) 32 2 - 50 U/L    Alkaline Phosphatase 194 (H) 30 - 99 U/L    Total Bilirubin 0.6 0.1 - 1.5 mg/dL    Albumin 3.6 3.2 - 4.9 g/dL    Total Protein 5.5 (L) 6.0 - 8.2 g/dL    Globulin 1.9 1.9 - 3.5 g/dL    A-G Ratio 1.9 g/dL   Basic Metabolic Panel   Result Value Ref Range    Sodium 138 135 - 145 mmol/L    Potassium 4.1 3.6 - 5.5 mmol/L    Chloride 101 96 - 112 mmol/L    Co2 23 20 - 33 mmol/L    Glucose 116 (H) 65 - 99 mg/dL    Bun 13 8 - 22 mg/dL    Creatinine 0.59 0.50 - 1.40 mg/dL    Calcium 8.3 (L) 8.5 - 10.5 mg/dL    Anion Gap 14.0 7.0 - 16.0   Prothrombin Time   Result Value Ref Range    PT 14.9 (H) 12.0 - 14.6 sec    INR 1.20 (H) 0.87 - 1.13   APTT   Result Value Ref Range    APTT 29.0 24.7 - 36.0 sec   ESTIMATED GFR   Result Value Ref Range    GFR If African American >60 >60 mL/min/1.73 m 2    GFR If Non African American >60 >60 mL/min/1.73 m 2   ESTIMATED GFR   Result Value Ref Range    GFR If African American >60 >60 mL/min/1.73 m 2    GFR If Non African American >60 >60 mL/min/1.73 m 2   CRP QUANTITIVE (NON-CARDIAC)   Result Value Ref Range    Stat C-Reactive Protein 2.92 (H) 0.00 - 0.75 mg/dL   PROCALCITONIN   Result Value Ref Range    Procalcitonin 0.07 <0.25 ng/mL   Fungal Culture    Specimen: Back; Tissue   Result Value Ref Range    Significant Indicator NEG     Source TISS     Site L2 L3 disc space end plates     Culture Result No fungal growth.     Fungal Smear Results No fungal elements seen.    AFB Culture    Specimen: Bone; Tissue   Result Value Ref Range    AFB Smear Results SEE NOTE     Preliminary Report SEE NOTE    CULTURE TISSUE W/ GRM STAIN    Specimen: Tissue   Result Value Ref Range    Significant Indicator NEG     Source TISS     Site L2 L3 disc space end plates     Culture Result No growth at 72 hours.     Gram Stain Result No organisms seen.    Comp Metabolic Panel   Result Value Ref Range    Sodium 137 135 - 145  mmol/L    Potassium 4.0 3.6 - 5.5 mmol/L    Chloride 97 96 - 112 mmol/L    Co2 22 20 - 33 mmol/L    Anion Gap 18.0 (H) 7.0 - 16.0    Glucose 138 (H) 65 - 99 mg/dL    Bun 13 8 - 22 mg/dL    Creatinine 0.59 0.50 - 1.40 mg/dL    Calcium 8.9 8.5 - 10.5 mg/dL    AST(SGOT) 30 12 - 45 U/L    ALT(SGPT) 28 2 - 50 U/L    Alkaline Phosphatase 209 (H) 30 - 99 U/L    Total Bilirubin 0.7 0.1 - 1.5 mg/dL    Albumin 3.7 3.2 - 4.9 g/dL    Total Protein 6.4 6.0 - 8.2 g/dL    Globulin 2.7 1.9 - 3.5 g/dL    A-G Ratio 1.4 g/dL   MAGNESIUM   Result Value Ref Range    Magnesium 1.4 (L) 1.5 - 2.5 mg/dL   CRP QUANTITIVE (NON-CARDIAC)   Result Value Ref Range    Stat C-Reactive Protein 5.54 (H) 0.00 - 0.75 mg/dL   PROCALCITONIN   Result Value Ref Range    Procalcitonin <0.05 <0.25 ng/mL   COV-2, FLU A/B, AND RSV BY PCR (2-4 HOURS CEPHEID): Collect NP swab in VTM    Specimen: Nasopharyngeal; Respirate   Result Value Ref Range    Influenza virus A RNA Negative Negative    Influenza virus B, PCR Negative Negative    RSV, PCR Negative Negative    SARS-CoV-2 by PCR NotDetected     SARS-CoV-2 Source NP Swab    ESTIMATED GFR   Result Value Ref Range    GFR If African American >60 >60 mL/min/1.73 m 2    GFR If Non African American >60 >60 mL/min/1.73 m 2   GRAM STAIN    Specimen: Tissue   Result Value Ref Range    Significant Indicator .     Source TISS     Site L2 L3 disc space end plates     Gram Stain Result No organisms seen.    Fungal Smear    Specimen: Tissue   Result Value Ref Range    Significant Indicator NEG     Source TISS     Site L2 L3 disc space end plates     Fungal Smear Results No fungal elements seen.    POCT glucose device results   Result Value Ref Range    Glucose - Accu-Ck 98 65 - 99 mg/dL   POCT glucose device results   Result Value Ref Range    Glucose - Accu-Ck 91 65 - 99 mg/dL   POCT glucose device results   Result Value Ref Range    Glucose - Accu-Ck 94 65 - 99 mg/dL   POCT glucose device results   Result Value Ref Range     Glucose - Accu-Ck 95 65 - 99 mg/dL   POCT glucose device results   Result Value Ref Range    Glucose - Accu-Ck 106 (H) 65 - 99 mg/dL         RADIOLOGY  CT-HEAD W/O   Final Result      1.  No acute intracranial abnormality is identified.   2.  Atrophy   3.  There are periventricular and subcortical white matter changes present.  This finding is nonspecific and could be from previous small vessel ischemia, demyelination, or gliosis.   4.  2.6 x 2 cm parafalcine meningioma.   5.  Mucosal thickening in the sphenoid sinuses bilaterally.   6.  Right frontal scalp swelling.            CT-CSPINE WITHOUT PLUS RECONS   Final Result      1.  No evidence of cervical spine fracture.      2.  Multilevel degenerative disc disease and facet degeneration.      CT-MAXILLOFACIAL W/O   Final Result      1.  No facial fracture is identified.   2.  Mucosal thickening within the sphenoid sinuses bilaterally.   3.  Right frontal scalp swelling and laceration.   4.  Carotid atherosclerotic plaque.   5.  Periapical lucency surrounding the left maxillary molar          Laceration Repair Procedure    Indication: Laceration    Location/Description: see above    Procedure: The patient was placed in the appropriate position and anesthesia around the laceration was obtained by infiltration using 1% Lidocaine with epinephrine. The area was then irrigated with high pressure normal saline. The laceration was closed with 6-0 vicryl using interrupted sutures. There were no additional lacerations requiring repair. The wound area was then dressed with a bandage.      Total repaired wound length: 3 cm.     Other Items: Suture count: 6    The patient tolerated the procedure well.    Complications: None2      COURSE & MEDICAL DECISION MAKING  Pertinent Labs & Imaging studies reviewed. (See chart for details)    Patient here after mechanical fall.  She has evidence of head trauma and some tenderness of her cervical spine.  CT will be checked of her head  and neck.  We will also CT scan her face given her associated orbital tenderness.  Patient CTs are thankfully unremarkable for any acute traumatic findings, however patient does appear to have a periapical abscess of her right maxillary molar; I have asked patient to follow-up with her dentist.  Patient's tetanus will be updated.  Patient laceration repaired after it was thoroughly irrigated.      The patient will not drink alcohol nor drive with prescribed medications. The patient will return for worsening symptoms and is stable at the time of discharge. The patient verbalizes understanding and will comply.    FINAL IMPRESSION    1. Facial laceration, initial encounter    Head trauma.  Mechanical fall.        Electronically signed by: Hima Moody M.D., 3/18/2022 10:47 AM

## 2022-03-31 LAB
FINAL REPORT Q0603: NORMAL
PRELIMINARY RPT Q0601: NORMAL
RHODAMINE-AURAMINE STN SPEC: NORMAL

## 2022-04-10 ENCOUNTER — HOME CARE VISIT (OUTPATIENT)
Dept: HOME HEALTH SERVICES | Facility: HOME HEALTHCARE | Age: 87
End: 2022-04-10
Payer: MEDICARE

## 2022-04-10 VITALS
HEIGHT: 60 IN | HEART RATE: 78 BPM | DIASTOLIC BLOOD PRESSURE: 72 MMHG | OXYGEN SATURATION: 94 % | SYSTOLIC BLOOD PRESSURE: 118 MMHG | TEMPERATURE: 97.8 F | BODY MASS INDEX: 33.4 KG/M2 | RESPIRATION RATE: 16 BRPM

## 2022-04-10 PROCEDURE — 665001 SOC-HOME HEALTH

## 2022-04-10 PROCEDURE — G0493 RN CARE EA 15 MIN HH/HOSPICE: HCPCS

## 2022-04-10 SDOH — ECONOMIC STABILITY: HOUSING INSECURITY: HOME SAFETY: SN EDUCATED PT/CG IN REGARDS TO FALL PREVENTION USING HANDOUT IN WHITE BINDER.

## 2022-04-10 ASSESSMENT — PATIENT HEALTH QUESTIONNAIRE - PHQ9
SUM OF ALL RESPONSES TO PHQ QUESTIONS 1-9: 2
2. FEELING DOWN, DEPRESSED, IRRITABLE, OR HOPELESS: 01
5. POOR APPETITE OR OVEREATING: 0 - NOT AT ALL
CLINICAL INTERPRETATION OF PHQ2 SCORE: 1
1. LITTLE INTEREST OR PLEASURE IN DOING THINGS: 00

## 2022-04-10 ASSESSMENT — ENCOUNTER SYMPTOMS
PAIN LOCATION: RIGHT LEG
PAIN SEVERITY GOAL: 0/10
LOWEST PAIN SEVERITY IN PAST 24 HOURS: 0/10
PAIN LOCATION: BACK
DIFFICULTY THINKING: 1
NAUSEA: PT DENIES
PAIN LOCATION - PAIN QUALITY: ACHY, SHARP
PAIN LOCATION - PAIN FREQUENCY: FREQUENT
PAIN LOCATION - RELIEVING FACTORS: REST, PAIN MEDS
SHORTNESS OF BREATH: T
PAIN LOCATION - PAIN SEVERITY: 5/10
PAIN LOCATION - PAIN FREQUENCY: FREQUENT
PERSON REPORTING PAIN: PATIENT
PAIN LOCATION - PAIN SEVERITY: 6/10
VOMITING: PT DENIES
SUBJECTIVE PAIN PROGRESSION: WAXING AND WANING
HIGHEST PAIN SEVERITY IN PAST 24 HOURS: 6/10
DEPRESSED MOOD: 1
PAIN: 1
PAIN LOCATION - RELIEVING FACTORS: REST, PAIN MEDS

## 2022-04-10 ASSESSMENT — ACTIVITIES OF DAILY LIVING (ADL)
OASIS_M1830: 03
TRANSPORTATION COMMENTS: NEEDS ASSISTANCE TO LEAVE HOME

## 2022-04-11 ENCOUNTER — DOCUMENTATION (OUTPATIENT)
Dept: MEDICAL GROUP | Facility: PHYSICIAN GROUP | Age: 87
End: 2022-04-11
Payer: MEDICARE

## 2022-04-11 ENCOUNTER — HOME CARE VISIT (OUTPATIENT)
Dept: HOME HEALTH SERVICES | Facility: HOME HEALTHCARE | Age: 87
End: 2022-04-11
Payer: MEDICARE

## 2022-04-11 PROBLEM — Z66 DNR (DO NOT RESUSCITATE): Status: ACTIVE | Noted: 2022-04-11

## 2022-04-11 PROBLEM — M79.89 LEG SWELLING: Status: ACTIVE | Noted: 2022-04-11

## 2022-04-11 NOTE — DISCHARGE PLANNING
[facility] TCN chart review completed. Collaborated with MARILOU De La Cruz prior to meeting with the pt. CM not present in CDU at time of visit. The most current review of medical record, knowledge of pt's PLOF and social support, LACE+ score of 55, no 6 clicks score available.     Pt seen at bedside with grandson present. Introduced TCN program. Provided education regarding differences in post acute resources including LTAC, IRF, SNF and HH. Discussed [facility] plan benefits including [facility] and [facility] transitional [place] services. Pt verbalizes understanding. Requested provider consults for: PT and OT from [facility] AZMARRIPA. Note she reports will place post IR biopsy procedure as medically appropriate (dependent on medical POC once results are in). Will monitor for medical POC to obtain choice and discussed process with family and pt present (eg - will have IR procedure-> likely ABX and see response/pain management -> PT and OT assessments post IR procedure/pain management to assist with dc planning location). Pt and son aware that pt may require post acute placement and OK with placement (though do not want [facility]). Family wanting to see pt response prior to medical management/IR prior to making choices. TCN will continue to follow and collaborate with discharge planning team as additional post acute needs arise. Thank you.    0

## 2022-04-11 NOTE — PROGRESS NOTES
Medication chart review for [facility] services        Current medication list     Current Outpatient Medications:   •  PreserVision AREDS 2, 1 Capsule, Oral, DAILY  •  Vitamin D, 1 Capsule, Oral, DAILY  •  Multiple Vitamins-Minerals (ONE DAILY COMPLETE PO), 1 Capsule, Oral, DAILY  •  cyclobenzaprine, 5 mg, Oral, QDAY PRN  •  docusate sodium, 100 mg, Oral, DAILY  •  acetaminophen, 500-1,000 mg, Oral, Q6HRS PRN  •  Naproxen Sodium, 220 mg, Oral, QDAY PRN  •  polyethylene glycol/lytes, 17 g, Oral, BID (Patient taking differently: 17 g, Oral, 2 TIMES DAILY PRN, constipation not relieved by stool softener)  •  diclofenac DR, 50 mg, Oral, Nightly  •  DILTIAZem CD, TAKE 1 CAPSULE BY MOUTH TWICE DAILY (Patient taking differently: 120 mg, Oral, 2 TIMES DAILY, TAKE 1 CAPSULE BY MOUTH TWICE DAILY)  •  aspirin EC, 81 mg, Oral, DAILY  •  spironolactone, 25 mg, Oral, DAILY  •  HYDROcodone-acetaminophen, 1 Tablet, Oral, Q4HRS PRN  •  B Complex Vitamins (VITAMIN B COMPLEX PO), 1 Tablet, Oral, BID  •  alendronate, 70 mg, Oral, Q MONDAY  •  omeprazole, 20 mg, Oral, Q EVENING  •  latanoprost, 1 Drop, Left Eye, QHS  •  levothyroxine, 150-225 mcg, Oral, See Admin Instructions    Discharge summary from:    [facility] on 3/18/22 from the emergency room discharge summary note.  Seen in media:              Allergies   Allergen Reactions   • Pcn [Penicillins] Rash     Rash           Labs     Lab Results   Component Value Date/Time    SODIUM 137 02/02/2022 09:15 AM    POTASSIUM 4.0 02/02/2022 09:15 AM    CHLORIDE 97 02/02/2022 09:15 AM    CO2 22 02/02/2022 09:15 AM    GLUCOSE 138 (H) 02/02/2022 09:15 AM    BUN 13 02/02/2022 09:15 AM    CREATININE 0.59 02/02/2022 09:15 AM     Lab Results   Component Value Date/Time    ALKPHOSPHAT 209 (H) 02/02/2022 09:15 AM    ASTSGOT 30 02/02/2022 09:15 AM    ALTSGPT 28 02/02/2022 09:15 AM    TBILIRUBIN 0.7 02/02/2022 09:15 AM    INR 1.20 (H) 02/01/2022 05:16 AM    ALBUMIN 3.7 02/02/2022 09:15 AM         Assessment and Plan:   • Received referral from [facility]. Medications reviewed, and compared with discharge summary if available.        CC   Bebe Santamaria M.D.  [id number]  [place] [address]  Fax: [phone]    Saud Lemon, PharmD, MS, BCACP, [facility]  Phone [phone] fax [phone]    This note was created using voice recognition software (Dragon). The accuracy of the dictation is limited by the abilities of the software. I have reviewed the note prior to signing, however some errors in grammar and context are still possible. If you have any questions related to this note please do not hesitate to contact our office.

## 2022-04-12 ENCOUNTER — HOME CARE VISIT (OUTPATIENT)
Dept: HOME HEALTH SERVICES | Facility: HOME HEALTHCARE | Age: 87
End: 2022-04-12
Payer: MEDICARE

## 2022-04-13 ENCOUNTER — HOME CARE VISIT (OUTPATIENT)
Dept: HOME HEALTH SERVICES | Facility: HOME HEALTHCARE | Age: 87
End: 2022-04-13
Payer: MEDICARE

## 2022-04-13 VITALS
TEMPERATURE: 98.5 F | SYSTOLIC BLOOD PRESSURE: 112 MMHG | OXYGEN SATURATION: 91 % | HEART RATE: 72 BPM | RESPIRATION RATE: 16 BRPM | DIASTOLIC BLOOD PRESSURE: 66 MMHG

## 2022-04-13 PROCEDURE — G0152 HHCP-SERV OF OT,EA 15 MIN: HCPCS

## 2022-04-13 ASSESSMENT — ACTIVITIES OF DAILY LIVING (ADL)
TOILETING: INDEPENDENT
TOILETING EQUIPMENT USED: RAISED TOILET SEAT
TRANSPORTATION ASSESSED: 1
ORAL_CARE_ASSESSED: 1
DRESSING_LB_CURRENT_FUNCTION: INDEPENDENT
FEEDING_WITHIN_DEFINED_LIMITS: 1
WASHING_UPB_CURRENT_FUNCTION: STAND BY ASSIST
ORAL_CARE_CURRENT_FUNCTION_INDEPENDENT: 1
CURRENT_FUNCTION: MINIMUM ASSIST
BATHING ASSESSED: 1
HOUSEKEEPING ASSESSED: 1
PHYSICAL TRANSFERS ASSESSED: 1
FEEDING ASSESSED: 1
BATHING_CURRENT_FUNCTION: STAND BY ASSIST
WASHING_LB_CURRENT_FUNCTION: STAND BY ASSIST
LAUNDRY ASSESSED: 1
GROOMING ASSESSED: 1
TOILETING: 1
SHOPPING ASSESSED: 1
AMBULATION ASSISTANCE: INDEPENDENT
AMBULATION ASSISTANCE: 1
DRESSING_UB_CURRENT_FUNCTION: INDEPENDENT

## 2022-04-13 ASSESSMENT — ENCOUNTER SYMPTOMS
PAIN: 1
SUBJECTIVE PAIN PROGRESSION: UNCHANGED
HIGHEST PAIN SEVERITY IN PAST 24 HOURS: 4/10
LOWEST PAIN SEVERITY IN PAST 24 HOURS: 0/10
PERSON REPORTING PAIN: PATIENT
PAIN LOCATION: RIGHT LEG
PAIN LOCATION: LEFT LEG
PAIN SEVERITY GOAL: 2/10

## 2022-04-14 ENCOUNTER — HOME CARE VISIT (OUTPATIENT)
Dept: HOME HEALTH SERVICES | Facility: HOME HEALTHCARE | Age: 87
End: 2022-04-14
Payer: MEDICARE

## 2022-04-14 VITALS
RESPIRATION RATE: 16 BRPM | SYSTOLIC BLOOD PRESSURE: 126 MMHG | TEMPERATURE: 97.4 F | HEART RATE: 86 BPM | DIASTOLIC BLOOD PRESSURE: 72 MMHG | OXYGEN SATURATION: 97 %

## 2022-04-14 VITALS
SYSTOLIC BLOOD PRESSURE: 126 MMHG | TEMPERATURE: 97.4 F | OXYGEN SATURATION: 97 % | RESPIRATION RATE: 16 BRPM | DIASTOLIC BLOOD PRESSURE: 72 MMHG | HEART RATE: 92 BPM

## 2022-04-14 PROCEDURE — G0299 HHS/HOSPICE OF RN EA 15 MIN: HCPCS

## 2022-04-14 PROCEDURE — G0151 HHCP-SERV OF PT,EA 15 MIN: HCPCS

## 2022-04-14 ASSESSMENT — ENCOUNTER SYMPTOMS
PAIN LOCATION - PAIN SEVERITY: 0/10
LOWEST PAIN SEVERITY IN PAST 24 HOURS: 0/10
PAIN: 1
PAIN LOCATION - RELIEVING FACTORS: REST
PAIN LOCATION: RIGHT LEG
HIGHEST PAIN SEVERITY IN PAST 24 HOURS: 5/10
VOMITING: PT DENIES ANY EMESIS AT THIS TIME
PAIN LOCATION - PAIN FREQUENCY: WITH ACTIVITY
PERSON REPORTING PAIN: PATIENT
PAIN LOCATION - PAIN FREQUENCY: WITH ACTIVITY
PAIN LOCATION - RELIEVING FACTORS: REST
NAUSEA: PT DENIES ANY NAUSEA AT THIS TIME
DYSPNEA ACTIVITY LEVEL: AFTER AMBULATING MORE THAN 20 FT
PAIN SEVERITY GOAL: 0/10
PAIN LOCATION - PAIN SEVERITY: 0/10
PAIN LOCATION - EXACERBATING FACTORS: WHEN STANDING
PAIN LOCATION - EXACERBATING FACTORS: WHEN STANDING
PAIN LOCATION - PAIN QUALITY: SHARP
SUBJECTIVE PAIN PROGRESSION: WAXING AND WANING
PAIN LOCATION - PAIN QUALITY: SHARP
MENTAL STATUS CHANGE: 0
SHORTNESS OF BREATH: 1
PAIN LOCATION: LEFT LEG

## 2022-04-14 ASSESSMENT — ACTIVITIES OF DAILY LIVING (ADL)
LIGHT HOUSEKEEPING: DEPENDENT
GROOMING_COMMENTS: REFER TO OT EVALUATION
PREPARING MEALS: DEPENDENT
SHOPPING: DEPENDENT
FEEDING: INDEPENDENT
TRANSPORTATION: DEPENDENT
LAUNDRY: DEPENDENT
ORAL_CARE_CURRENT_FUNCTION: INDEPENDENT
GROOMING_CURRENT_FUNCTION: INDEPENDENT

## 2022-04-14 NOTE — CASE COMMUNICATION
OCCUPATIONAL THERAPY EVALUATION AND PLAN OF CARE     ·       Patient:  Saumya Vann     ·       Home Health Admission due to:  hospitalization and rehab for GLF at home, and septicemia     ·       Living Situation/PLOF:  Lives in one story home with her grandson. Grandson works M-F, but she has someone with her at all times except a few hours on Mondays.  Grandson helps with ADLs and transfers, and does all IADLs.       ·       Past Medi toma History:  back pain with lumbar compression fracture, GERD, HTN, Afib, DM     ·       Skilled Therapeutic Need:  Decreased activity tolerance, UE weakness and Poor endurance     Recommend skilled HHOT to address deficits and improve function-report sent to MD     ·       Frequency:   1W3, 2 visits prn, effective 4/13/22     ·       Goals: Patient will demonstrate increased independence and safety with kitchen mobility when getting m eals/snacks that are prepared for her,to perform at Md I level within 4 therapy visits., Patient will demonstrate independence with UEbHEP for purpose of increasing strength and activity tolerance within 4 therapy visits., Patient/caregiver will verbalize understanding of home safety and fall prevention precautions related to altered mobility by the 4th visit.     Does the patient get SOB with Minimum exertion?  frequently  How often (i f at all) does pain interfere with patient's movements?  frequently

## 2022-04-15 ENCOUNTER — HOME CARE VISIT (OUTPATIENT)
Dept: HOME HEALTH SERVICES | Facility: HOME HEALTHCARE | Age: 87
End: 2022-04-15
Payer: MEDICARE

## 2022-04-16 SDOH — ECONOMIC STABILITY: HOUSING INSECURITY
HOME SAFETY: PT LIVES IN MIL HOME BEHIND THE MAIN HOUSE WITH HER GRANDSON; 2 STEPS WITH HANDRAILS TO ENTER THE HOME; PT IS ALONE DURING THE DAY WHILE HER GRANDSON IS WORKING. PT SAYS SHE SPEND MOST OF HER DAY AT HOME AND ONLY GOES OUT FOR MD VISITS. PT'S HOME IS

## 2022-04-16 SDOH — ECONOMIC STABILITY: HOUSING INSECURITY
HOME SAFETY: CLEAN AND WELL KEPT WITH CLEAR PATHWAYS. PT RECOMMENDED FLOOR RUG REMOVAL IN THE BATHROOM, TO HELP DECREASE FALLS RISK; PT DECLINED.

## 2022-04-16 ASSESSMENT — ENCOUNTER SYMPTOMS
PAIN LOCATION - PAIN QUALITY: SHARP
PAIN LOCATION - RELIEVING FACTORS: REST, MED
PAIN LOCATION - PAIN SEVERITY: 0/10
PAIN: 1
PAIN LOCATION - PAIN SEVERITY: 0/10
PAIN LOCATION - RELIEVING FACTORS: REST, MED
HIGHEST PAIN SEVERITY IN PAST 24 HOURS: 5/10
SUBJECTIVE PAIN PROGRESSION: WAXING AND WANING
DEBILITATING PAIN: 1
ARTHRALGIAS: 1
PAIN LOCATION: LEFT LEG
PAIN LOCATION - PAIN QUALITY: SHARP
PAIN LOCATION - PAIN FREQUENCY: WITH ACTIVITY
PERSON REPORTING PAIN: PATIENT
LOWEST PAIN SEVERITY IN PAST 24 HOURS: 0/10
PAIN LOCATION: RIGHT LEG
PAIN LOCATION - PAIN FREQUENCY: WITH ACTIVITY
MUSCLE WEAKNESS: 1

## 2022-04-16 ASSESSMENT — ACTIVITIES OF DAILY LIVING (ADL)
ADLS_COMMENTS: REFER TO OT EVALUATION
AMBULATION ASSISTANCE ON FLAT SURFACES: 1
FEEDING_WITHIN_DEFINED_LIMITS: 1

## 2022-04-18 ENCOUNTER — HOME CARE VISIT (OUTPATIENT)
Dept: HOME HEALTH SERVICES | Facility: HOME HEALTHCARE | Age: 87
End: 2022-04-18
Payer: MEDICARE

## 2022-04-18 VITALS
HEART RATE: 93 BPM | TEMPERATURE: 97.5 F | SYSTOLIC BLOOD PRESSURE: 124 MMHG | OXYGEN SATURATION: 95 % | RESPIRATION RATE: 16 BRPM | DIASTOLIC BLOOD PRESSURE: 72 MMHG

## 2022-04-18 VITALS
HEART RATE: 93 BPM | RESPIRATION RATE: 16 BRPM | DIASTOLIC BLOOD PRESSURE: 72 MMHG | SYSTOLIC BLOOD PRESSURE: 124 MMHG | TEMPERATURE: 97.5 F | OXYGEN SATURATION: 95 %

## 2022-04-18 PROCEDURE — G0299 HHS/HOSPICE OF RN EA 15 MIN: HCPCS

## 2022-04-18 PROCEDURE — G0152 HHCP-SERV OF OT,EA 15 MIN: HCPCS

## 2022-04-18 ASSESSMENT — ENCOUNTER SYMPTOMS
PAIN LOCATION - RELIEVING FACTORS: REST
PAIN LOCATION - PAIN SEVERITY: 0/10
PERSON REPORTING PAIN: PATIENT
PAIN LOCATION - PAIN QUALITY: SHARP
PAIN LOCATION - PAIN SEVERITY: 0/10
PERSON REPORTING PAIN: PATIENT
DENIES PAIN: 1
PAIN LOCATION - EXACERBATING FACTORS: STANDING UP
PAIN LOCATION - EXACERBATING FACTORS: STANDING UP
SUBJECTIVE PAIN PROGRESSION: UNCHANGED
PAIN LOCATION: LEFT KNEE
MENTAL STATUS CHANGE: 0
PAIN LOCATION - PAIN QUALITY: SHARP
PAIN LOCATION - PAIN FREQUENCY: WITH ACTIVITY
PAIN LOCATION - RELIEVING FACTORS: REST
PAIN: 1
NAUSEA: PT DENIES ANY NAUSEA AT THIS TIME
PAIN LOCATION - PAIN FREQUENCY: WITH ACTIVITY
PAIN LOCATION: RIGHT KNEE
HIGHEST PAIN SEVERITY IN PAST 24 HOURS: 6/10
LOWEST PAIN SEVERITY IN PAST 24 HOURS: 0/10
PAIN SEVERITY GOAL: 0/10
VOMITING: PT DENIES ANY EMESIS AT THIS TIME

## 2022-04-18 ASSESSMENT — ACTIVITIES OF DAILY LIVING (ADL)
CURRENT_FUNCTION: STAND BY ASSIST
AMBULATION ASSISTANCE: STAND BY ASSIST

## 2022-04-18 NOTE — CASE COMMUNICATION
Falls Template         Date & Time of fall: 4/17/22 morning           Cause of fall:  Physiological - loss of balance           Location:  Bathroom doorway           Was the fall witnessed?                  If yes, by who? No            Actions taken by patient:  Called her grandson who got her up off the floor and checked her for injuries            Any new injury?                   If yes, what kind?  No.  She did tear the scab off  an old injury.  It was currently bandaged.            Any recent medication changes?    No            Reviewed Post Fall Questionnaire:  Yes                 Post fall instructions:  ambulate with supervision at all times            Actions Taken: Notified care team and Notified MD

## 2022-04-18 NOTE — CASE COMMUNICATION
Quality Review for SOC OASIS by SHEYLA Donahue RN on  April 18, 2022    Edits completed by SHEYLA Donahue RN:  1.  is 4/10/22 per the LSOC order  2.  is 4/7/22, date of discharge from SNF  3.  checked #3 and 4 per chart review  4.  is 3 per OT assessment dated 4/13/22, making  4/13/22 per the collaboration convention  5.  is 3 per ambulation status  6. Safety measures checked ambulate only with assistance  and adequate emergency plan

## 2022-04-19 ENCOUNTER — HOME CARE VISIT (OUTPATIENT)
Dept: HOME HEALTH SERVICES | Facility: HOME HEALTHCARE | Age: 87
End: 2022-04-19
Payer: MEDICARE

## 2022-04-19 ENCOUNTER — OFFICE VISIT (OUTPATIENT)
Dept: INFECTIOUS DISEASES | Facility: MEDICAL CENTER | Age: 87
End: 2022-04-19
Payer: MEDICARE

## 2022-04-19 ENCOUNTER — HOSPITAL ENCOUNTER (OUTPATIENT)
Dept: LAB | Facility: MEDICAL CENTER | Age: 87
End: 2022-04-19
Attending: INTERNAL MEDICINE
Payer: MEDICARE

## 2022-04-19 VITALS
TEMPERATURE: 97.1 F | OXYGEN SATURATION: 95 % | SYSTOLIC BLOOD PRESSURE: 120 MMHG | WEIGHT: 162 LBS | BODY MASS INDEX: 31.64 KG/M2 | HEART RATE: 106 BPM | RESPIRATION RATE: 14 BRPM | DIASTOLIC BLOOD PRESSURE: 60 MMHG

## 2022-04-19 DIAGNOSIS — I48.0 PAROXYSMAL ATRIAL FIBRILLATION (HCC): ICD-10-CM

## 2022-04-19 DIAGNOSIS — R78.81 BACTEREMIA: ICD-10-CM

## 2022-04-19 DIAGNOSIS — M46.40 DISCITIS, UNSPECIFIED SPINAL REGION: ICD-10-CM

## 2022-04-19 DIAGNOSIS — E11.9 TYPE 2 DIABETES MELLITUS WITHOUT COMPLICATION, UNSPECIFIED WHETHER LONG TERM INSULIN USE (HCC): ICD-10-CM

## 2022-04-19 DIAGNOSIS — N28.9 RENAL INSUFFICIENCY: ICD-10-CM

## 2022-04-19 DIAGNOSIS — M79.89 LEG SWELLING: ICD-10-CM

## 2022-04-19 DIAGNOSIS — Z95.818 PRESENCE OF WATCHMAN LEFT ATRIAL APPENDAGE CLOSURE DEVICE: ICD-10-CM

## 2022-04-19 LAB
ALBUMIN SERPL BCP-MCNC: 4.2 G/DL (ref 3.2–4.9)
ALBUMIN/GLOB SERPL: 1.7 G/DL
ALP SERPL-CCNC: 129 U/L (ref 30–99)
ALT SERPL-CCNC: 16 U/L (ref 2–50)
ANION GAP SERPL CALC-SCNC: 15 MMOL/L (ref 7–16)
AST SERPL-CCNC: 29 U/L (ref 12–45)
BASOPHILS # BLD AUTO: 1.9 % (ref 0–1.8)
BASOPHILS # BLD: 0.17 K/UL (ref 0–0.12)
BILIRUB SERPL-MCNC: 0.2 MG/DL (ref 0.1–1.5)
BUN SERPL-MCNC: 16 MG/DL (ref 8–22)
CALCIUM SERPL-MCNC: 9.3 MG/DL (ref 8.5–10.5)
CHLORIDE SERPL-SCNC: 103 MMOL/L (ref 96–112)
CO2 SERPL-SCNC: 20 MMOL/L (ref 20–33)
CREAT SERPL-MCNC: 1.04 MG/DL (ref 0.5–1.4)
EOSINOPHIL # BLD AUTO: 0.29 K/UL (ref 0–0.51)
EOSINOPHIL NFR BLD: 3.3 % (ref 0–6.9)
ERYTHROCYTE [DISTWIDTH] IN BLOOD BY AUTOMATED COUNT: 52.7 FL (ref 35.9–50)
ERYTHROCYTE [SEDIMENTATION RATE] IN BLOOD BY WESTERGREN METHOD: 14 MM/HOUR (ref 0–25)
GFR SERPLBLD CREATININE-BSD FMLA CKD-EPI: 51 ML/MIN/1.73 M 2
GLOBULIN SER CALC-MCNC: 2.5 G/DL (ref 1.9–3.5)
GLUCOSE SERPL-MCNC: 112 MG/DL (ref 65–99)
HCT VFR BLD AUTO: 34 % (ref 37–47)
HGB BLD-MCNC: 10.6 G/DL (ref 12–16)
IMM GRANULOCYTES # BLD AUTO: 0.13 K/UL (ref 0–0.11)
IMM GRANULOCYTES NFR BLD AUTO: 1.5 % (ref 0–0.9)
LYMPHOCYTES # BLD AUTO: 1.11 K/UL (ref 1–4.8)
LYMPHOCYTES NFR BLD: 12.7 % (ref 22–41)
MCH RBC QN AUTO: 27.9 PG (ref 27–33)
MCHC RBC AUTO-ENTMCNC: 31.2 G/DL (ref 33.6–35)
MCV RBC AUTO: 89.5 FL (ref 81.4–97.8)
MONOCYTES # BLD AUTO: 0.76 K/UL (ref 0–0.85)
MONOCYTES NFR BLD AUTO: 8.7 % (ref 0–13.4)
NEUTROPHILS # BLD AUTO: 6.31 K/UL (ref 2–7.15)
NEUTROPHILS NFR BLD: 71.9 % (ref 44–72)
NRBC # BLD AUTO: 0 K/UL
NRBC BLD-RTO: 0 /100 WBC
PLATELET # BLD AUTO: 430 K/UL (ref 164–446)
PMV BLD AUTO: 8.4 FL (ref 9–12.9)
POTASSIUM SERPL-SCNC: 4.4 MMOL/L (ref 3.6–5.5)
PROT SERPL-MCNC: 6.7 G/DL (ref 6–8.2)
RBC # BLD AUTO: 3.8 M/UL (ref 4.2–5.4)
SODIUM SERPL-SCNC: 138 MMOL/L (ref 135–145)
WBC # BLD AUTO: 8.8 K/UL (ref 4.8–10.8)

## 2022-04-19 PROCEDURE — 36415 COLL VENOUS BLD VENIPUNCTURE: CPT

## 2022-04-19 PROCEDURE — 85025 COMPLETE CBC W/AUTO DIFF WBC: CPT

## 2022-04-19 PROCEDURE — 80053 COMPREHEN METABOLIC PANEL: CPT

## 2022-04-19 PROCEDURE — 85652 RBC SED RATE AUTOMATED: CPT

## 2022-04-19 PROCEDURE — 99213 OFFICE O/P EST LOW 20 MIN: CPT | Performed by: INTERNAL MEDICINE

## 2022-04-19 ASSESSMENT — FIBROSIS 4 INDEX: FIB4 SCORE: 2.23

## 2022-04-19 NOTE — CASE COMMUNICATION
I agree with these changes  ----- Message -----  From: Mercy Donahue R.N.  Sent: 4/18/2022  11:07 AM PDT  To: Peg Hammer R.N.      Quality Review for SOC OASIS by SHEYLA Donahue, RN on  April 18, 2022    Edits completed by SHEYLA Donahue RN:  1.  is 4/10/22 per the LSOC order  2.  is 4/7/22, date of discharge from SNF  3.  checked #3 and 4 per chart review  4.  is 3 per OT assessment dated 4/13/22, silas ing  4/13/22 per the collaboration convention  5.  is 3 per ambulation status  6. Safety measures checked ambulate only with assistance and adequate emergency plan

## 2022-04-20 ENCOUNTER — HOME CARE VISIT (OUTPATIENT)
Dept: HOME HEALTH SERVICES | Facility: HOME HEALTHCARE | Age: 87
End: 2022-04-20
Payer: MEDICARE

## 2022-04-20 NOTE — PROGRESS NOTES
Infectious Disease Clinic    Subjective:   No chief complaint on file.    Patient with type 2 diabetes, hypothyroidism, chronic lower back pain, hypertension, A. fib, watchman device in place, presented with acute on chronic lower back and left hip pain.  MRI was concerning for possible L2-L3 discitis - infection versus degenerative changes.  Blood cultures x2+ for methicillin-resistant Staph capitis.  The pt completed a 6 week IV abx course on 3/12/22 and then due to presence of Watchman device was continued on Bactrim, with labs requested.    Hospital records reviewed    Today, 4/19/2022: Patient reports she has been taking the Bactrim. She did not get any new labs. Denies feeling generally ill, fevers/chills, general malaise, headache, v/d, abdominal pain, chest pain. She denies back pain. She has some SOB that she states is chronic and some ongoing mild nausea since starting the Bactrim.     ROS  As documented above in my HPI.    Past Medical History:   Diagnosis Date   • A-fib (HCC)    • Breath shortness    • Cataract 04/23/2021    Surgically removed bilat   • Diabetes (HCC) 04/23/2021    Diet-controlled   • Dry cough 04/23/2021   • Glaucoma 04/23/2021    Left eye   • Hypertension    • Hypothyroid    • Macular degeneration    • Pain 04/23/2021    Back and Leg   • Urinary incontinence        Social History     Tobacco Use   • Smoking status: Never Smoker   • Smokeless tobacco: Never Used   Vaping Use   • Vaping Use: Never used   Substance Use Topics   • Alcohol use: No   • Drug use: No       Allergies: Pcn [penicillins]    Pt's medication and problem list reviewed.     Objective:     PE:  /60 (BP Location: Right arm, Patient Position: Sitting, BP Cuff Size: Adult)   Pulse (!) 106   Temp 36.2 °C (97.1 °F)   Resp 14   Wt 73.5 kg (162 lb)   SpO2 95%   BMI 31.64 kg/m²     Vital signs reviewed    Constitutional: Appears well-developed and well-nourished. No acute distress.  Speech fluent.    Eyes:  Conjunctivae normal and EOM are normal. Pupils are equal, round, and reactive to light.   ENMT: Lips without lesions, good dentition.  Oropharynx is clear and moist.  Neck: Trachea midline. Normal range of motion. Neck supple. No masses.  No JVD.  Cardiovascular: Normal rate, Irregular rhythm. Bilateral LE edema.  Respiratory: No respiratory distress, unlabored respiratory effort.  Lungs clear to auscultation bilaterally. No wheezes or rales.   Abdomen: Soft, non tender, non-distended. BS + x 4. No masses or hepatosplenomegaly.   Musculoskeletal: Steady gait.  Normal range of motion.  No joint or bone tenderness, swelling, erythema or deformity.  No clubbing or cyanosis.  Skin: Warm and dry. Good turgor. No visible rashes or lesions.  Neurological: No cranial nerve deficit. Coordination normal.  Sensation intact.  Psychiatric: Alert and oriented to person, place, and time. Normal mood, calm affect.  Normal behavior and judgment.     Labs:  WBC   Date/Time Value Ref Range Status   02/01/2022 05:16 AM 14.8 (H) 4.8 - 10.8 K/uL Final     RBC   Date/Time Value Ref Range Status   02/01/2022 05:16 AM 4.76 4.20 - 5.40 M/uL Final     Hemoglobin   Date/Time Value Ref Range Status   02/01/2022 05:16 AM 14.4 12.0 - 16.0 g/dL Final     Hematocrit   Date/Time Value Ref Range Status   02/01/2022 05:16 AM 43.0 37.0 - 47.0 % Final     MCV   Date/Time Value Ref Range Status   02/01/2022 05:16 AM 90.3 81.4 - 97.8 fL Final     MCH   Date/Time Value Ref Range Status   02/01/2022 05:16 AM 30.3 27.0 - 33.0 pg Final     MCHC   Date/Time Value Ref Range Status   02/01/2022 05:16 AM 33.5 (L) 33.6 - 35.0 g/dL Final     MPV   Date/Time Value Ref Range Status   02/01/2022 05:16 AM 8.9 (L) 9.0 - 12.9 fL Final        Sodium   Date/Time Value Ref Range Status   02/02/2022 09:15  135 - 145 mmol/L Final     Potassium   Date/Time Value Ref Range Status   02/02/2022 09:15 AM 4.0 3.6 - 5.5 mmol/L Final     Chloride   Date/Time Value Ref Range  Status   02/02/2022 09:15 AM 97 96 - 112 mmol/L Final     Co2   Date/Time Value Ref Range Status   02/02/2022 09:15 AM 22 20 - 33 mmol/L Final     Glucose   Date/Time Value Ref Range Status   02/02/2022 09:15  (H) 65 - 99 mg/dL Final     Bun   Date/Time Value Ref Range Status   02/02/2022 09:15 AM 13 8 - 22 mg/dL Final     Creatinine   Date/Time Value Ref Range Status   02/02/2022 09:15 AM 0.59 0.50 - 1.40 mg/dL Final       Alkaline Phosphatase   Date/Time Value Ref Range Status   02/02/2022 09:15  (H) 30 - 99 U/L Final     AST(SGOT)   Date/Time Value Ref Range Status   02/02/2022 09:15 AM 30 12 - 45 U/L Final     ALT(SGPT)   Date/Time Value Ref Range Status   02/02/2022 09:15 AM 28 2 - 50 U/L Final     Total Bilirubin   Date/Time Value Ref Range Status   02/02/2022 09:15 AM 0.7 0.1 - 1.5 mg/dL Final        No results found for: CPKTOTAL     Assessment and Plan:   The following treatment plan was discussed with patient at length:    1. Bacteremia  CBC WITH DIFFERENTIAL    Comp Metabolic Panel    Sed Rate   2. Renal insufficiency  CBC WITH DIFFERENTIAL    Comp Metabolic Panel    Sed Rate   3. Presence of Watchman left atrial appendage closure device: 24 mm palced 4/28/21     4. Leg swelling     5. Discitis, unspecified spinal region     6. Type 2 diabetes mellitus without complication, unspecified whether long term insulin use (HCC)     7. Paroxysmal atrial fibrillation (HCC)       --- The pt completed a 6 week IV abx course for CoNS bacteremia and possible lumbar spine discitis. She has now completed an additional 4 weeks of PO Bactrim.  No endocarditis on TTE.  She has a Watchman device but no definite infection and limited abx options are available.  Last labs on 3/8 with VJ and creatinine of 1.29.  ID recommended labs one week after starting Bactrim but it does not appear this was done.   --- Long discussion with pt and son regarding risks of stopping abx vs continuing Bactrim and agreed to stop abx  and monitor.  Pt and son prefer to avoid frequent lab draws and visits which would be needed for abx monitoring and I am concerned about further kidney injury and E-lye abnormalities on Bactrim in this 89 yo.   --- If any new concerning sx then go into the ED  --- Labs ordered today- CBC, CMP, ESR        Follow up: PRN, RTC sooner if needed. FU with PCP for ongoing chronic medical conditions.     Magalys Teran M.D.       Please note that this dictation was created using voice recognition software. I have  worked with technical experts from [employer]  [employer] to optimize the interface.  I have made every reasonable attempt to correct obvious errors, but there may be errors of grammar and possibly content that I did not discover before finalizing the note.

## 2022-04-21 ENCOUNTER — HOME CARE VISIT (OUTPATIENT)
Dept: HOME HEALTH SERVICES | Facility: HOME HEALTHCARE | Age: 87
End: 2022-04-21
Payer: MEDICARE

## 2022-04-21 VITALS
RESPIRATION RATE: 16 BRPM | DIASTOLIC BLOOD PRESSURE: 70 MMHG | SYSTOLIC BLOOD PRESSURE: 138 MMHG | OXYGEN SATURATION: 99 % | HEART RATE: 64 BPM | TEMPERATURE: 97.4 F

## 2022-04-21 PROCEDURE — G0299 HHS/HOSPICE OF RN EA 15 MIN: HCPCS

## 2022-04-21 ASSESSMENT — ACTIVITIES OF DAILY LIVING (ADL)
CURRENT_FUNCTION: STAND BY ASSIST
AMBULATION ASSISTANCE: STAND BY ASSIST

## 2022-04-21 ASSESSMENT — ENCOUNTER SYMPTOMS
PAIN LOCATION: BACK
PAIN LOCATION - PAIN SEVERITY: 0/10
PAIN LOCATION - EXACERBATING FACTORS: ACTIVITY
PAIN: 1
MUSCLE WEAKNESS: 1
NAUSEA: PT DENIES ANY NAUSEA AT THIS TIME
HIGHEST PAIN SEVERITY IN PAST 24 HOURS: 3/10
LOWEST PAIN SEVERITY IN PAST 24 HOURS: 0/10
PAIN LOCATION - RELIEVING FACTORS: REST
PAIN LOCATION - PAIN QUALITY: ACHY
PERSON REPORTING PAIN: PATIENT
SUBJECTIVE PAIN PROGRESSION: WAXING AND WANING
PAIN LOCATION - PAIN FREQUENCY: WITH ACTIVITY
VOMITING: PT DENIES ANY EMESIS AT THIS TIME
PAIN SEVERITY GOAL: 0/10

## 2022-04-22 ENCOUNTER — HOME CARE VISIT (OUTPATIENT)
Dept: HOME HEALTH SERVICES | Facility: HOME HEALTHCARE | Age: 87
End: 2022-04-22
Payer: MEDICARE

## 2022-04-25 ENCOUNTER — HOME CARE VISIT (OUTPATIENT)
Dept: HOME HEALTH SERVICES | Facility: HOME HEALTHCARE | Age: 87
End: 2022-04-25
Payer: MEDICARE

## 2022-04-25 VITALS
HEART RATE: 86 BPM | TEMPERATURE: 98.8 F | RESPIRATION RATE: 16 BRPM | OXYGEN SATURATION: 98 % | SYSTOLIC BLOOD PRESSURE: 124 MMHG | DIASTOLIC BLOOD PRESSURE: 68 MMHG

## 2022-04-25 PROCEDURE — G0152 HHCP-SERV OF OT,EA 15 MIN: HCPCS

## 2022-04-25 ASSESSMENT — ENCOUNTER SYMPTOMS
PAIN LOCATION: CHEST
HIGHEST PAIN SEVERITY IN PAST 24 HOURS: 2/10
PERSON REPORTING PAIN: PATIENT
PAIN: 1
LOWEST PAIN SEVERITY IN PAST 24 HOURS: 0/10
SUBJECTIVE PAIN PROGRESSION: UNCHANGED
PAIN SEVERITY GOAL: 0/10

## 2022-04-25 ASSESSMENT — ACTIVITIES OF DAILY LIVING (ADL)
ORAL_CARE_ASSESSED: 1
ORAL_CARE_CURRENT_FUNCTION: INDEPENDENT
FEEDING: INDEPENDENT
DRESSING_UB_CURRENT_FUNCTION: INDEPENDENT
BATHING ASSESSED: 1
AMBULATION ASSISTANCE: 1
GROOMING_CURRENT_FUNCTION: INDEPENDENT
GROOMING_WITHIN_DEFINED_LIMITS: 1
FEEDING ASSESSED: 1
PREPARING MEALS: NEEDS ASSISTANCE
DRESSING_LB_CURRENT_FUNCTION: INDEPENDENT
TOILETING: INDEPENDENT
FEEDING_WITHIN_DEFINED_LIMITS: 1
AMBULATION ASSISTANCE: INDEPENDENT
CURRENT_FUNCTION: CONTACT GUARD ASSIST
GROOMING ASSESSED: 1
TOILETING: 1
PHYSICAL TRANSFERS ASSESSED: 1

## 2022-04-26 ENCOUNTER — HOME CARE VISIT (OUTPATIENT)
Dept: HOME HEALTH SERVICES | Facility: HOME HEALTHCARE | Age: 87
End: 2022-04-26
Payer: MEDICARE

## 2022-04-26 VITALS
RESPIRATION RATE: 17 BRPM | OXYGEN SATURATION: 95 % | TEMPERATURE: 97.8 F | DIASTOLIC BLOOD PRESSURE: 62 MMHG | SYSTOLIC BLOOD PRESSURE: 122 MMHG | HEART RATE: 78 BPM

## 2022-04-26 VITALS
HEART RATE: 78 BPM | OXYGEN SATURATION: 95 % | SYSTOLIC BLOOD PRESSURE: 122 MMHG | DIASTOLIC BLOOD PRESSURE: 62 MMHG | WEIGHT: 174 LBS | TEMPERATURE: 97.8 F | RESPIRATION RATE: 17 BRPM | BODY MASS INDEX: 33.98 KG/M2

## 2022-04-26 PROCEDURE — G0299 HHS/HOSPICE OF RN EA 15 MIN: HCPCS

## 2022-04-26 PROCEDURE — G0151 HHCP-SERV OF PT,EA 15 MIN: HCPCS

## 2022-04-26 ASSESSMENT — ACTIVITIES OF DAILY LIVING (ADL)
WASHING_UPB_CURRENT_FUNCTION: SUPERVISION
BATHING_CURRENT_FUNCTION: SUPERVISION
WASHING_LB_CURRENT_FUNCTION: SUPERVISION

## 2022-04-26 ASSESSMENT — FIBROSIS 4 INDEX: FIB4 SCORE: 1.53

## 2022-04-28 ENCOUNTER — HOME CARE VISIT (OUTPATIENT)
Dept: HOME HEALTH SERVICES | Facility: HOME HEALTHCARE | Age: 87
End: 2022-04-28
Payer: MEDICARE

## 2022-04-28 VITALS
OXYGEN SATURATION: 96 % | TEMPERATURE: 97.5 F | RESPIRATION RATE: 16 BRPM | SYSTOLIC BLOOD PRESSURE: 128 MMHG | HEART RATE: 85 BPM | DIASTOLIC BLOOD PRESSURE: 70 MMHG

## 2022-04-28 VITALS
RESPIRATION RATE: 16 BRPM | TEMPERATURE: 97.5 F | SYSTOLIC BLOOD PRESSURE: 128 MMHG | HEART RATE: 85 BPM | OXYGEN SATURATION: 96 % | DIASTOLIC BLOOD PRESSURE: 70 MMHG

## 2022-04-28 PROCEDURE — G0299 HHS/HOSPICE OF RN EA 15 MIN: HCPCS

## 2022-04-28 PROCEDURE — G0151 HHCP-SERV OF PT,EA 15 MIN: HCPCS

## 2022-04-28 ASSESSMENT — ENCOUNTER SYMPTOMS
PAIN LOCATION - EXACERBATING FACTORS: ACTIVITY
SUBJECTIVE PAIN PROGRESSION: GRADUALLY IMPROVING
PAIN LOCATION - PAIN FREQUENCY: WITH ACTIVITY
HIGHEST PAIN SEVERITY IN PAST 24 HOURS: 6/10
PERSON REPORTING PAIN: PATIENT
PAIN LOCATION: RIGHT LEG
LOWEST PAIN SEVERITY IN PAST 24 HOURS: 0/10
PAIN LOCATION - RELIEVING FACTORS: REST
PAIN: 1
PAIN LOCATION - PAIN QUALITY: THROB
NAUSEA: PT DENIES ANY NAUSEA AT THIS TIME
PAIN LOCATION - RELIEVING FACTORS: REST
PAIN SEVERITY GOAL: 0/10
PAIN LOCATION - PAIN FREQUENCY: WITH ACTIVITY
PAIN LOCATION: LEFT LEG
PAIN LOCATION - EXACERBATING FACTORS: ACTIVITY
PAIN LOCATION - PAIN SEVERITY: 0/10
PAIN LOCATION - PAIN QUALITY: THROB
PAIN LOCATION - PAIN SEVERITY: 0/10

## 2022-04-28 ASSESSMENT — ACTIVITIES OF DAILY LIVING (ADL)
AMBULATION ASSISTANCE: STAND BY ASSIST
CURRENT_FUNCTION: STAND BY ASSIST

## 2022-04-29 ASSESSMENT — ENCOUNTER SYMPTOMS
PERSON REPORTING PAIN: PATIENT
LIMITED RANGE OF MOTION: 1
DENIES PAIN: 1

## 2022-04-29 ASSESSMENT — PAIN SCALES - PAIN ASSESSMENT IN ADVANCED DEMENTIA (PAINAD)
FACIALEXPRESSION: 0 - SMILING OR INEXPRESSIVE.
CONSOLABILITY: 0 - NO NEED TO CONSOLE.
NEGVOCALIZATION: 0 - NONE.
BODYLANGUAGE: 0 - RELAXED.
TOTALSCORE: 0

## 2022-05-01 ASSESSMENT — ACTIVITIES OF DAILY LIVING (ADL)
AMBULATION ASSISTANCE ON FLAT SURFACES: 1
TRANSPORTATION COMMENTS: BLINDNESS
AMBULATION_DISTANCE/DURATION_TOLERATED: 2 X 10
TRANSPORTATION COMMENTS: BLIND

## 2022-05-01 ASSESSMENT — ENCOUNTER SYMPTOMS
PERSON REPORTING PAIN: PATIENT
DENIES PAIN: 1
DENIES PAIN: 1
PERSON REPORTING PAIN: PATIENT

## 2022-05-03 ENCOUNTER — HOME CARE VISIT (OUTPATIENT)
Dept: HOME HEALTH SERVICES | Facility: HOME HEALTHCARE | Age: 87
End: 2022-05-03
Payer: MEDICARE

## 2022-05-05 ENCOUNTER — HOME CARE VISIT (OUTPATIENT)
Dept: HOME HEALTH SERVICES | Facility: HOME HEALTHCARE | Age: 87
End: 2022-05-05
Payer: MEDICARE

## 2022-05-05 ENCOUNTER — HOSPITAL ENCOUNTER (OUTPATIENT)
Facility: MEDICAL CENTER | Age: 87
End: 2022-05-05
Attending: PHYSICIAN ASSISTANT
Payer: MEDICARE

## 2022-05-05 VITALS
SYSTOLIC BLOOD PRESSURE: 115 MMHG | TEMPERATURE: 98 F | RESPIRATION RATE: 16 BRPM | OXYGEN SATURATION: 94 % | HEART RATE: 86 BPM | DIASTOLIC BLOOD PRESSURE: 60 MMHG

## 2022-05-05 LAB
ANION GAP SERPL CALC-SCNC: 12 MMOL/L (ref 7–16)
BASOPHILS # BLD AUTO: 1.3 % (ref 0–1.8)
BASOPHILS # BLD: 0.09 K/UL (ref 0–0.12)
BUN SERPL-MCNC: 16 MG/DL (ref 8–22)
CALCIUM SERPL-MCNC: 8.6 MG/DL (ref 8.5–10.5)
CHLORIDE SERPL-SCNC: 101 MMOL/L (ref 96–112)
CO2 SERPL-SCNC: 23 MMOL/L (ref 20–33)
CREAT SERPL-MCNC: 0.96 MG/DL (ref 0.5–1.4)
EOSINOPHIL # BLD AUTO: 0.18 K/UL (ref 0–0.51)
EOSINOPHIL NFR BLD: 2.6 % (ref 0–6.9)
ERYTHROCYTE [DISTWIDTH] IN BLOOD BY AUTOMATED COUNT: 53 FL (ref 35.9–50)
FASTING STATUS PATIENT QL REPORTED: NORMAL
GFR SERPLBLD CREATININE-BSD FMLA CKD-EPI: 56 ML/MIN/1.73 M 2
GLUCOSE SERPL-MCNC: 117 MG/DL (ref 65–99)
HCT VFR BLD AUTO: 33.9 % (ref 37–47)
HGB BLD-MCNC: 10.8 G/DL (ref 12–16)
IMM GRANULOCYTES # BLD AUTO: 0.04 K/UL (ref 0–0.11)
IMM GRANULOCYTES NFR BLD AUTO: 0.6 % (ref 0–0.9)
LYMPHOCYTES # BLD AUTO: 1.03 K/UL (ref 1–4.8)
LYMPHOCYTES NFR BLD: 14.7 % (ref 22–41)
MAGNESIUM SERPL-MCNC: 1.5 MG/DL (ref 1.5–2.5)
MCH RBC QN AUTO: 28 PG (ref 27–33)
MCHC RBC AUTO-ENTMCNC: 31.9 G/DL (ref 33.6–35)
MCV RBC AUTO: 87.8 FL (ref 81.4–97.8)
MONOCYTES # BLD AUTO: 0.67 K/UL (ref 0–0.85)
MONOCYTES NFR BLD AUTO: 9.5 % (ref 0–13.4)
NEUTROPHILS # BLD AUTO: 5.01 K/UL (ref 2–7.15)
NEUTROPHILS NFR BLD: 71.3 % (ref 44–72)
NRBC # BLD AUTO: 0 K/UL
NRBC BLD-RTO: 0 /100 WBC
PLATELET # BLD AUTO: 315 K/UL (ref 164–446)
PMV BLD AUTO: 8.5 FL (ref 9–12.9)
POTASSIUM SERPL-SCNC: 4.8 MMOL/L (ref 3.6–5.5)
RBC # BLD AUTO: 3.86 M/UL (ref 4.2–5.4)
SODIUM SERPL-SCNC: 136 MMOL/L (ref 135–145)
WBC # BLD AUTO: 7 K/UL (ref 4.8–10.8)

## 2022-05-05 PROCEDURE — G0299 HHS/HOSPICE OF RN EA 15 MIN: HCPCS

## 2022-05-05 PROCEDURE — 85025 COMPLETE CBC W/AUTO DIFF WBC: CPT

## 2022-05-05 PROCEDURE — 83735 ASSAY OF MAGNESIUM: CPT

## 2022-05-05 PROCEDURE — 80048 BASIC METABOLIC PNL TOTAL CA: CPT

## 2022-05-05 ASSESSMENT — ENCOUNTER SYMPTOMS
HIGHEST PAIN SEVERITY IN PAST 24 HOURS: 0/10
DENIES PAIN: 1
PAIN SEVERITY GOAL: 0/10
LOWEST PAIN SEVERITY IN PAST 24 HOURS: 0/10
PERSON REPORTING PAIN: PATIENT
NAUSEA: PT DENIES ANY NAUSEA AT THIS TIME
VOMITING: PT DENIES ANY EMESIS AT THIS TIME

## 2022-05-05 ASSESSMENT — ACTIVITIES OF DAILY LIVING (ADL)
CURRENT_FUNCTION: STAND BY ASSIST
AMBULATION ASSISTANCE: STAND BY ASSIST

## 2022-05-06 ENCOUNTER — HOME CARE VISIT (OUTPATIENT)
Dept: HOME HEALTH SERVICES | Facility: HOME HEALTHCARE | Age: 87
End: 2022-05-06
Payer: MEDICARE

## 2022-05-06 VITALS
RESPIRATION RATE: 17 BRPM | DIASTOLIC BLOOD PRESSURE: 64 MMHG | HEART RATE: 82 BPM | OXYGEN SATURATION: 97 % | TEMPERATURE: 97.9 F | SYSTOLIC BLOOD PRESSURE: 118 MMHG

## 2022-05-06 PROBLEM — E26.1 SECONDARY HYPERALDOSTERONISM (HCC): Status: ACTIVE | Noted: 2022-05-06

## 2022-05-06 PROCEDURE — G0151 HHCP-SERV OF PT,EA 15 MIN: HCPCS

## 2022-05-09 ENCOUNTER — HOME CARE VISIT (OUTPATIENT)
Dept: HOME HEALTH SERVICES | Facility: HOME HEALTHCARE | Age: 87
End: 2022-05-09
Payer: MEDICARE

## 2022-05-09 VITALS
DIASTOLIC BLOOD PRESSURE: 66 MMHG | RESPIRATION RATE: 16 BRPM | HEART RATE: 76 BPM | TEMPERATURE: 98.4 F | SYSTOLIC BLOOD PRESSURE: 112 MMHG | OXYGEN SATURATION: 97 %

## 2022-05-09 PROCEDURE — G0152 HHCP-SERV OF OT,EA 15 MIN: HCPCS

## 2022-05-09 ASSESSMENT — ACTIVITIES OF DAILY LIVING (ADL)
AMBULATION_DISTANCE/DURATION_TOLERATED: 140 FT
AMBULATION ASSISTANCE: INDEPENDENT
CURRENT_FUNCTION: CONTACT GUARD ASSIST
DRESSING_LB_CURRENT_FUNCTION: INDEPENDENT
FEEDING: INDEPENDENT
DRESSING_UB_CURRENT_FUNCTION: INDEPENDENT
TRANSPORTATION ASSESSED: 1
PHYSICAL TRANSFERS ASSESSED: 1
TRANSPORTATION COMMENTS: LOW VISION
TOILETING: 1
BATHING ASSESSED: 1
TOILETING: INDEPENDENT
TRANSPORTATION: DEPENDENT
BATHING_CURRENT_FUNCTION: STAND BY ASSIST
CURRENT_FUNCTION: SUPERVISION
TOILETING: 1
AMBULATION ASSISTANCE: 1
PHYSICAL TRANSFERS ASSESSED: 1
AMBULATION ASSISTANCE ON FLAT SURFACES: 1
GROOMING ASSESSED: 1
FEEDING ASSESSED: 1
TOILETING: SUPERVISION
AMBULATION ASSISTANCE: 1
AMBULATION ASSISTANCE: SUPERVISION
GROOMING_CURRENT_FUNCTION: INDEPENDENT
PREPARING MEALS: NEEDS ASSISTANCE

## 2022-05-09 ASSESSMENT — ENCOUNTER SYMPTOMS
DENIES PAIN: 1
PERSON REPORTING PAIN: PATIENT
PERSON REPORTING PAIN: PATIENT
DENIES PAIN: 1

## 2022-05-09 NOTE — CASE COMMUNICATION
[facility]  Unfortunately he has doors on his tub, and is unable to effectively use bench.  He was trying to step backwards, over toilet, into shower as his strategy.  He had bench set lengthwise in tub.

## 2022-05-10 ENCOUNTER — HOME CARE VISIT (OUTPATIENT)
Dept: HOME HEALTH SERVICES | Facility: HOME HEALTHCARE | Age: 87
End: 2022-05-10
Payer: MEDICARE

## 2022-05-10 VITALS
DIASTOLIC BLOOD PRESSURE: 66 MMHG | RESPIRATION RATE: 17 BRPM | HEART RATE: 72 BPM | SYSTOLIC BLOOD PRESSURE: 120 MMHG | OXYGEN SATURATION: 97 % | TEMPERATURE: 98.2 F

## 2022-05-10 PROCEDURE — G0151 HHCP-SERV OF PT,EA 15 MIN: HCPCS

## 2022-05-10 PROCEDURE — 665001 SOC-HOME HEALTH

## 2022-05-12 ENCOUNTER — HOME CARE VISIT (OUTPATIENT)
Dept: HOME HEALTH SERVICES | Facility: HOME HEALTHCARE | Age: 87
End: 2022-05-12
Payer: MEDICARE

## 2022-05-12 VITALS
DIASTOLIC BLOOD PRESSURE: 60 MMHG | OXYGEN SATURATION: 98 % | TEMPERATURE: 98.2 F | HEART RATE: 83 BPM | RESPIRATION RATE: 16 BRPM | SYSTOLIC BLOOD PRESSURE: 112 MMHG

## 2022-05-12 VITALS
OXYGEN SATURATION: 98 % | HEART RATE: 83 BPM | DIASTOLIC BLOOD PRESSURE: 62 MMHG | SYSTOLIC BLOOD PRESSURE: 112 MMHG | TEMPERATURE: 98.2 F | RESPIRATION RATE: 16 BRPM

## 2022-05-12 PROCEDURE — G0299 HHS/HOSPICE OF RN EA 15 MIN: HCPCS

## 2022-05-12 PROCEDURE — G0151 HHCP-SERV OF PT,EA 15 MIN: HCPCS

## 2022-05-12 ASSESSMENT — ENCOUNTER SYMPTOMS
PERSON REPORTING PAIN: PATIENT
PAIN SEVERITY GOAL: 0/10
PERSON REPORTING PAIN: PATIENT
DENIES PAIN: 1
PERSON REPORTING PAIN: PATIENT
COUGH: 1
LOWEST PAIN SEVERITY IN PAST 24 HOURS: 0/10
COUGH CHARACTERISTICS: NON-PRODUCTIVE
DENIES PAIN: 1
DENIES PAIN: 1
NAUSEA: PT DENIES ANY NAUSEA AT THIS TIME
SHORTNESS OF BREATH: 1
DYSPNEA ACTIVITY LEVEL: AFTER AMBULATING 10 - 20 FT
HIGHEST PAIN SEVERITY IN PAST 24 HOURS: 0/10
VOMITING: PT DENIES ANY EMESIS AT THIS TIME

## 2022-05-12 ASSESSMENT — ACTIVITIES OF DAILY LIVING (ADL)
TRANSPORTATION COMMENTS: LOW VISION
TRANSPORTATION COMMENTS: LOW VISION

## 2022-05-18 ENCOUNTER — HOME CARE VISIT (OUTPATIENT)
Dept: HOME HEALTH SERVICES | Facility: HOME HEALTHCARE | Age: 87
End: 2022-05-18
Payer: MEDICARE

## 2022-05-18 VITALS
DIASTOLIC BLOOD PRESSURE: 70 MMHG | SYSTOLIC BLOOD PRESSURE: 118 MMHG | TEMPERATURE: 97.4 F | OXYGEN SATURATION: 98 % | RESPIRATION RATE: 16 BRPM | HEART RATE: 89 BPM

## 2022-05-18 PROCEDURE — G0299 HHS/HOSPICE OF RN EA 15 MIN: HCPCS

## 2022-05-18 ASSESSMENT — ENCOUNTER SYMPTOMS
PERSON REPORTING PAIN: PATIENT
HIGHEST PAIN SEVERITY IN PAST 24 HOURS: 0/10
DENIES PAIN: 1
LOWEST PAIN SEVERITY IN PAST 24 HOURS: 0/10
PAIN SEVERITY GOAL: 0/10
VOMITING: PT DENIES ANY EMESIS AT THIS TIME
NAUSEA: PT DENIES ANY NAUSEA AT THIS TIME

## 2022-05-26 ENCOUNTER — HOME CARE VISIT (OUTPATIENT)
Dept: HOME HEALTH SERVICES | Facility: HOME HEALTHCARE | Age: 87
End: 2022-05-26
Payer: MEDICARE

## 2022-05-26 VITALS
RESPIRATION RATE: 16 BRPM | SYSTOLIC BLOOD PRESSURE: 116 MMHG | HEART RATE: 96 BPM | OXYGEN SATURATION: 98 % | DIASTOLIC BLOOD PRESSURE: 68 MMHG | TEMPERATURE: 97.9 F

## 2022-05-26 PROCEDURE — G0493 RN CARE EA 15 MIN HH/HOSPICE: HCPCS

## 2022-05-26 ASSESSMENT — ENCOUNTER SYMPTOMS
PAIN LOCATION - RELIEVING FACTORS: REST
HIGHEST PAIN SEVERITY IN PAST 24 HOURS: 6/10
PAIN LOCATION - PAIN FREQUENCY: INTERMITTENT
PAIN LOCATION - EXACERBATING FACTORS: ACTIVITY
PAIN LOCATION - PAIN QUALITY: ACHY
PERSON REPORTING PAIN: PATIENT
PAIN LOCATION: BACK
PAIN LOCATION - PAIN SEVERITY: 0/10
PAIN SEVERITY GOAL: 0/10
LOWEST PAIN SEVERITY IN PAST 24 HOURS: 0/10
PAIN: 1
SUBJECTIVE PAIN PROGRESSION: UNCHANGED

## 2022-05-26 ASSESSMENT — ACTIVITIES OF DAILY LIVING (ADL)
HOME_HEALTH_OASIS: 01
OASIS_M1830: 02

## 2022-05-26 ASSESSMENT — PATIENT HEALTH QUESTIONNAIRE - PHQ9: CLINICAL INTERPRETATION OF PHQ2 SCORE: 0

## 2022-05-27 ENCOUNTER — HOME CARE VISIT (OUTPATIENT)
Dept: HOME HEALTH SERVICES | Facility: HOME HEALTHCARE | Age: 87
End: 2022-05-27
Payer: MEDICARE

## 2022-05-27 NOTE — CASE COMMUNICATION
Quality Review for 5.26.22 ND OASIS performed on by ABIMBOLA Chen RN on 5.27.2022:    Edits completed by ABIMBOLA Chen RN:  1. Changed  A,C,E to yes per POC

## 2022-06-03 NOTE — CASE COMMUNICATION
Quality Review for 5.26.22 LA OASIS performed on by ABIMBOLA Chen RN on 5.27.2022:   Edits completed by ABIMBOLA Chen RN:   1. Changed  A,C,E to yes per POC

## 2022-06-03 NOTE — CASE COMMUNICATION
I agree with these changes  Natasha Cavanaugh RN  ----- Message -----  From: Ashely Chen R.N.  Sent: 6/3/2022   1:38 PM PDT  To: Natasha Cavanaugh R.N.      Quality Review for 5.26.22 CT OASIS performed on by ABIMBOLA Chen RN on 5.27.2022:   Edits completed by ABIMBOLA Chen RN:   1. Changed  A,C,E to yes per POC

## 2022-06-07 ENCOUNTER — HOSPITAL ENCOUNTER (OUTPATIENT)
Dept: LAB | Facility: MEDICAL CENTER | Age: 87
End: 2022-06-07
Attending: FAMILY MEDICINE
Payer: MEDICARE

## 2022-06-07 LAB
ALBUMIN SERPL BCP-MCNC: 4.1 G/DL (ref 3.2–4.9)
ALBUMIN/GLOB SERPL: 1.3 G/DL
ALP SERPL-CCNC: 155 U/L (ref 30–99)
ALT SERPL-CCNC: 18 U/L (ref 2–50)
ANION GAP SERPL CALC-SCNC: 15 MMOL/L (ref 7–16)
AST SERPL-CCNC: 33 U/L (ref 12–45)
BILIRUB SERPL-MCNC: 0.2 MG/DL (ref 0.1–1.5)
BUN SERPL-MCNC: 22 MG/DL (ref 8–22)
CALCIUM SERPL-MCNC: 9.2 MG/DL (ref 8.5–10.5)
CHLORIDE SERPL-SCNC: 96 MMOL/L (ref 96–112)
CHOLEST SERPL-MCNC: 163 MG/DL (ref 100–199)
CO2 SERPL-SCNC: 24 MMOL/L (ref 20–33)
CREAT SERPL-MCNC: 1.16 MG/DL (ref 0.5–1.4)
CREAT UR-MCNC: 86.75 MG/DL
EST. AVERAGE GLUCOSE BLD GHB EST-MCNC: 114 MG/DL
FASTING STATUS PATIENT QL REPORTED: NORMAL
GFR SERPLBLD CREATININE-BSD FMLA CKD-EPI: 44 ML/MIN/1.73 M 2
GLOBULIN SER CALC-MCNC: 3.2 G/DL (ref 1.9–3.5)
GLUCOSE SERPL-MCNC: 117 MG/DL (ref 65–99)
HBA1C MFR BLD: 5.6 % (ref 4–5.6)
HDLC SERPL-MCNC: 65 MG/DL
LDLC SERPL CALC-MCNC: 83 MG/DL
MICROALBUMIN UR-MCNC: <1.2 MG/DL
MICROALBUMIN/CREAT UR: NORMAL MG/G (ref 0–30)
POTASSIUM SERPL-SCNC: 4.9 MMOL/L (ref 3.6–5.5)
PROT SERPL-MCNC: 7.3 G/DL (ref 6–8.2)
SODIUM SERPL-SCNC: 135 MMOL/L (ref 135–145)
TRIGL SERPL-MCNC: 75 MG/DL (ref 0–149)
TSH SERPL DL<=0.005 MIU/L-ACNC: 4.8 UIU/ML (ref 0.38–5.33)

## 2022-06-07 PROCEDURE — 80053 COMPREHEN METABOLIC PANEL: CPT

## 2022-06-07 PROCEDURE — 80061 LIPID PANEL: CPT

## 2022-06-07 PROCEDURE — 82570 ASSAY OF URINE CREATININE: CPT

## 2022-06-07 PROCEDURE — 82043 UR ALBUMIN QUANTITATIVE: CPT

## 2022-06-07 PROCEDURE — 36415 COLL VENOUS BLD VENIPUNCTURE: CPT

## 2022-06-07 PROCEDURE — 83036 HEMOGLOBIN GLYCOSYLATED A1C: CPT

## 2022-06-07 PROCEDURE — 84443 ASSAY THYROID STIM HORMONE: CPT

## 2022-07-11 ENCOUNTER — TELEPHONE (OUTPATIENT)
Dept: HEALTH INFORMATION MANAGEMENT | Facility: OTHER | Age: 87
End: 2022-07-11
Payer: MEDICARE

## 2022-08-04 ENCOUNTER — OFFICE VISIT (OUTPATIENT)
Dept: URGENT CARE | Facility: PHYSICIAN GROUP | Age: 87
End: 2022-08-04
Payer: MEDICARE

## 2022-08-04 ENCOUNTER — HOSPITAL ENCOUNTER (OUTPATIENT)
Facility: MEDICAL CENTER | Age: 87
End: 2022-08-04
Payer: MEDICARE

## 2022-08-04 VITALS
WEIGHT: 165 LBS | BODY MASS INDEX: 32.39 KG/M2 | RESPIRATION RATE: 18 BRPM | DIASTOLIC BLOOD PRESSURE: 60 MMHG | HEART RATE: 80 BPM | HEIGHT: 60 IN | OXYGEN SATURATION: 96 % | TEMPERATURE: 97.2 F | SYSTOLIC BLOOD PRESSURE: 120 MMHG

## 2022-08-04 DIAGNOSIS — R44.3 HALLUCINATION: ICD-10-CM

## 2022-08-04 LAB
APPEARANCE UR: CLEAR
BILIRUB UR STRIP-MCNC: NORMAL MG/DL
COLOR UR AUTO: YELLOW
GLUCOSE UR STRIP.AUTO-MCNC: NORMAL MG/DL
KETONES UR STRIP.AUTO-MCNC: NORMAL MG/DL
LEUKOCYTE ESTERASE UR QL STRIP.AUTO: NORMAL
NITRITE UR QL STRIP.AUTO: NORMAL
PH UR STRIP.AUTO: 5.5 [PH] (ref 5–8)
PROT UR QL STRIP: NORMAL MG/DL
RBC UR QL AUTO: NORMAL
SP GR UR STRIP.AUTO: 1.01
UROBILINOGEN UR STRIP-MCNC: 0.2 MG/DL

## 2022-08-04 PROCEDURE — 99213 OFFICE O/P EST LOW 20 MIN: CPT

## 2022-08-04 PROCEDURE — 87086 URINE CULTURE/COLONY COUNT: CPT

## 2022-08-04 PROCEDURE — 81002 URINALYSIS NONAUTO W/O SCOPE: CPT

## 2022-08-04 ASSESSMENT — ENCOUNTER SYMPTOMS
FLANK PAIN: 0
CHILLS: 0
FEVER: 0

## 2022-08-04 ASSESSMENT — FIBROSIS 4 INDEX: FIB4 SCORE: 2.25

## 2022-08-04 NOTE — PROGRESS NOTES
"Subjective     Saumya Vann is a 89 y.o. female who presents with UTI (Symptoms of UTI x1 day)          HPI     Patient presents today with concern for urinary tract infection.  Patient reports visual  \"hallucinations\" where she was seeing colored patches on the television and walls.  She denies any urinary frequency, dysuria, urinary urgency, hematuria or flank pain.  She further denies any fever or chills.  Her son is with her at this appointment, denies any change in mentation, confusion, agitation.  Patient reports that she had severe scary hallucinations a few years ago, where she ended up in the hospital with a urinary tract infection and possible sepsis.    Patient's current problem list, medications, and past medical/surgical history were reviewed in Epic.    PMH:  has a past medical history of A-fib ([facility]), Breath shortness, Cataract (04/23/2021), Diabetes ([facility]) (04/23/2021), Dry cough (04/23/2021), Glaucoma (04/23/2021), Hypertension, Hypothyroid, Macular degeneration, Pain (04/23/2021), and Urinary incontinence.  MEDS:   Current Outpatient Medications:   •  DILTIAZem CD (CARDIZEM CD) 120 MG CAPSULE SR 24 HR, Take 1 Capsule by mouth 2 times a day., Disp: 200 Capsule, Rfl: 0  •  EQ ASPIRIN ADULT LOW DOSE 81 MG EC tablet, Take 1 tablet by mouth once daily, Disp: 100 Tablet, Rfl: 1  •  cyclobenzaprine (FLEXERIL) 5 mg tablet, Take 1 Tablet by mouth 1 time a day as needed for Muscle Spasms., Disp: 30 Tablet, Rfl: 0  •  Multiple Vitamins-Minerals (PRESERVISION AREDS 2) Cap, Take 1 Capsule by mouth every day., Disp: , Rfl:   •  Cholecalciferol (VITAMIN D) 125 MCG (5000 UT) Cap, Take 1 Capsule by mouth every day., Disp: , Rfl:   •  Multiple Vitamins-Minerals (ONE DAILY COMPLETE PO), Take 1 Capsule by mouth every day., Disp: , Rfl:   •  docusate sodium (COLACE) 100 MG Cap, Take 100 mg by mouth every day., Disp: , Rfl:   •  acetaminophen (TYLENOL) 500 MG Tab, Take 500-1,000 mg by mouth every 6 hours as needed " for Mild Pain., Disp: , Rfl:   •  Naproxen Sodium 220 MG Cap, Take 220 mg by mouth 1 time a day as needed (For pain, if tylenol does not relieve pain)., Disp: , Rfl:   •  diclofenac DR (VOLTAREN) 25 MG Tablet Delayed Response, Take 50 mg by mouth every evening., Disp: , Rfl:   •  spironolactone (ALDACTONE) 25 MG Tab, Take 25 mg by mouth every day., Disp: , Rfl:   •  HYDROcodone-acetaminophen (NORCO) 5-325 MG Tab per tablet, Take 1 Tablet by mouth every four hours as needed (Severe pain). Indications: Pain, Disp: , Rfl:   •  B Complex Vitamins (VITAMIN B COMPLEX PO), Take 1 Tab by mouth 2 Times a Day., Disp: , Rfl:   •  alendronate (FOSAMAX) 70 MG Tab, Take 70 mg by mouth every Monday., Disp: , Rfl:   •  omeprazole (PRILOSEC) 20 MG delayed-release capsule, Take 20 mg by mouth every evening., Disp: , Rfl:   •  latanoprost (XALATAN) 0.005 % Solution, Place 1 Drop in left eye every bedtime., Disp: , Rfl:   •  levothyroxine (SYNTHROID) 150 MCG TABS, Take 150-225 mcg by mouth see administration instructions. 150 mcg on Tuesday, Thursday, Saturday, and Sunday  225 mcg on Monday, Wednesday, and Friday, Disp: , Rfl:   ALLERGIES:   Allergies   Allergen Reactions   • Pcn [Penicillins] Rash     Rash       SURGHX:   Past Surgical History:   Procedure Laterality Date   • OTHER ORTHOPEDIC SURGERY  2002    2 Knee Replacements    • CATARACT EXTRACTION WITH IOL  2001   • NEUROMA EXCISION  1985    Right Foot   • CHOLECYSTECTOMY  1970   • APPENDECTOMY  1957   • OTHER  1952    Varicose veins   • APPENDECTOMY     • CHOLECYSTECTOMY     • HYSTERECTOMY, TOTAL ABDOMINAL     • KNEE REPLACEMENT, TOTAL Bilateral    • US-NEEDLE CORE BX-BREAST PANEL       SOCHX:  reports that she has never smoked. She has never used smokeless tobacco. She reports that she does not drink alcohol and does not use drugs.  FH: Reviewed with patient, not pertinent to this visit.       Review of Systems   Constitutional: Negative for chills and fever.   Genitourinary:  Negative for dysuria, flank pain, frequency, hematuria and urgency.              Objective     /60   Pulse 80   Temp 36.2 °C (97.2 °F)   Resp 18   Ht 1.524 m (5')   Wt 74.8 kg (165 lb)   SpO2 96%   BMI 32.22 kg/m²      Physical Exam  Constitutional:       Appearance: Normal appearance.   HENT:      Head: Normocephalic.      Nose: Nose normal.   Eyes:      Extraocular Movements: Extraocular movements intact.   Cardiovascular:      Rate and Rhythm: Normal rate. Rhythm irregular.      Pulses: Normal pulses.      Heart sounds: Normal heart sounds.   Pulmonary:      Effort: Pulmonary effort is normal.      Breath sounds: Normal breath sounds.   Abdominal:      General: Abdomen is flat.      Palpations: Abdomen is soft.      Tenderness: There is no abdominal tenderness. There is no right CVA tenderness or left CVA tenderness.   Musculoskeletal:         General: Normal range of motion.      Cervical back: Normal range of motion.   Skin:     General: Skin is warm and dry.   Neurological:      General: No focal deficit present.      Mental Status: She is alert.      Cranial Nerves: No dysarthria or facial asymmetry.      Motor: Motor function is intact.      Coordination: Coordination is intact.      Gait: Gait is intact.   Psychiatric:         Attention and Perception: Attention normal.         Mood and Affect: Mood normal.         Speech: Speech normal.         Behavior: Behavior normal. Behavior is cooperative.         Judgment: Judgment normal.     Urinalysis:    Leukocyte esterase- trace; otherwise unremarkable              Assessment & Plan        1. Hallucination    - POCT Urinalysis  - URINE CULTURE(NEW); Future    Patient's urinalysis in the clinic trace leukocytes, otherwise unremarkable.  Patient and her son were informed that antibiotics will not be prescribed until urine culture shows urinary tract infection.  Recommended increasing oral fluid intake.  The patient and her son were given strict ER  precautions.  Discussed treatment plan with patient, she is agreeable and verbalized understanding.  Educated patient on signs and symptoms watch out for, when to return to the clinic or go to the ER.    Differential diagnoses, supportive care, and indications for immediate follow-up discussed with patient.  Pathogenesis of diagnosis discussed including typical length and natural progression.     Instructed to return to clinic or nearest emergency department for any change in condition, further concerns, or worsening of symptoms.    Electronically Signed by MARILOU Justice

## 2022-08-07 LAB
BACTERIA UR CULT: NORMAL
SIGNIFICANT IND 70042: NORMAL
SITE SITE: NORMAL
SOURCE SOURCE: NORMAL

## 2022-09-01 NOTE — TELEPHONE ENCOUNTER
I called pt to reschedule EMMIE. / Pt had a visit scheduled in July, that was cancel. She requested to contact her son Jian.    I called [facility] and and gave pt's info to [place].

## 2022-09-16 PROBLEM — H35.3231 EXUDATIVE AGE-RELATED MACULAR DEGENERATION OF BOTH EYES WITH ACTIVE CHOROIDAL NEOVASCULARIZATION (HCC): Status: ACTIVE | Noted: 2022-09-16

## 2022-09-16 PROBLEM — D68.59 HYPERCOAGULABLE STATE (HCC): Status: ACTIVE | Noted: 2022-09-16

## 2022-09-16 PROBLEM — I50.42 HYPERTENSIVE HEART DISEASE WITH CHRONIC COMBINED SYSTOLIC AND DIASTOLIC CONGESTIVE HEART FAILURE (HCC): Status: ACTIVE | Noted: 2022-09-16

## 2022-09-16 PROBLEM — I73.9 PAD (PERIPHERAL ARTERY DISEASE) (HCC): Status: ACTIVE | Noted: 2022-09-16

## 2022-09-16 PROBLEM — G31.9 CEREBRAL ATROPHY (HCC): Status: ACTIVE | Noted: 2022-09-16

## 2022-09-16 PROBLEM — I11.0 HYPERTENSIVE HEART DISEASE WITH CHRONIC COMBINED SYSTOLIC AND DIASTOLIC CONGESTIVE HEART FAILURE (HCC): Status: ACTIVE | Noted: 2022-09-16

## 2022-09-26 ENCOUNTER — OFFICE VISIT (OUTPATIENT)
Dept: CARDIOLOGY | Facility: MEDICAL CENTER | Age: 87
End: 2022-09-26
Payer: MEDICARE

## 2022-09-26 VITALS
OXYGEN SATURATION: 98 % | RESPIRATION RATE: 16 BRPM | SYSTOLIC BLOOD PRESSURE: 120 MMHG | HEART RATE: 78 BPM | BODY MASS INDEX: 33.38 KG/M2 | HEIGHT: 60 IN | WEIGHT: 170 LBS | DIASTOLIC BLOOD PRESSURE: 68 MMHG

## 2022-09-26 DIAGNOSIS — I48.21 PERMANENT ATRIAL FIBRILLATION (HCC): ICD-10-CM

## 2022-09-26 DIAGNOSIS — I10 ESSENTIAL HYPERTENSION: ICD-10-CM

## 2022-09-26 DIAGNOSIS — Z95.818 PRESENCE OF WATCHMAN LEFT ATRIAL APPENDAGE CLOSURE DEVICE: ICD-10-CM

## 2022-09-26 DIAGNOSIS — Z91.81 PERSONAL HISTORY OF FALL: ICD-10-CM

## 2022-09-26 PROCEDURE — 99213 OFFICE O/P EST LOW 20 MIN: CPT | Performed by: INTERNAL MEDICINE

## 2022-09-26 RX ORDER — NEOMYCIN SULFATE, POLYMYXIN B SULFATE AND GRAMICIDIN 1.75; 10000; .025 MG/ML; [USP'U]/ML; MG/ML
SOLUTION/ DROPS OPHTHALMIC
COMMUNITY
Start: 2022-08-30 | End: 2024-01-02

## 2022-09-26 RX ORDER — SULFAMETHOXAZOLE AND TRIMETHOPRIM 800; 160 MG/1; MG/1
TABLET ORAL
COMMUNITY
Start: 2022-08-03 | End: 2023-09-09

## 2022-09-26 ASSESSMENT — FIBROSIS 4 INDEX: FIB4 SCORE: 2.25

## 2022-09-26 NOTE — PROGRESS NOTES
Subjective:   Saumya Vann is a 89 y.o. female who presents today for followup of PAF status post watchman device. She has had exertional dyspnea over the past 3 years associated with fatigue. She has a history of HTN, diabetes mellitus, CKD . Her echo is relatively unremarkable aside from LVH and grade 1 diastolic function. PFTs per her primary doctor are unremarkable. She denies any exertional chest pain, PND or orthopnea.     No issues from a cardiac perspective since last visit however was hospitalized for discitis after an injection complicated by UTI hallucinations and a prolonged recovery in skilled nursing.  She is now convalescing.  Unfortunately during that time she had several falls resulting in significant head trauma..  Thankfully she was already off of her oral anticoagulants and I had already had a watchman device placed in lieu of this.    Family History   Problem Relation Age of Onset    Cancer Other      Social History     Tobacco Use   Smoking Status Never   Smokeless Tobacco Never     Allergies   Allergen Reactions    Pcn [Penicillins] Rash     Rash       Outpatient Encounter Medications as of 9/26/2022   Medication Sig Dispense Refill    Lactobacillus-Inulin (CULTURELLE ADULT ULT BALANCE PO) Take  by mouth.      Bevacizumab (AVASTIN IV) Infuse  into a venous catheter. Every month. Every other eye      B Complex Vitamins (B COMPLEX PO) Take  by mouth.      Multiple Vitamin (MULTI-VITAMIN DAILY PO) Take  by mouth.      DILTIAZem CD (CARDIZEM CD) 120 MG CAPSULE SR 24 HR Take 1 Capsule by mouth 2 times a day. 200 Capsule 0    EQ ASPIRIN ADULT LOW DOSE 81 MG EC tablet Take 1 tablet by mouth once daily 100 Tablet 1    cyclobenzaprine (FLEXERIL) 5 mg tablet Take 1 Tablet by mouth 1 time a day as needed for Muscle Spasms. 30 Tablet 0    Multiple Vitamins-Minerals (PRESERVISION AREDS 2) Cap Take 1 Capsule by mouth every day.      Cholecalciferol (VITAMIN D) 125 MCG (5000 UT) Cap Take 1 Capsule by  mouth every day.      diclofenac DR (VOLTAREN) 25 MG Tablet Delayed Response Take 50 mg by mouth every evening.      spironolactone (ALDACTONE) 25 MG Tab Take 25 mg by mouth every day.      HYDROcodone-acetaminophen (NORCO) 5-325 MG Tab per tablet Take 1 Tablet by mouth every four hours as needed (Severe pain). Indications: Pain      B Complex Vitamins (VITAMIN B COMPLEX PO) Take 1 Tab by mouth 2 Times a Day.      alendronate (FOSAMAX) 70 MG Tab Take 70 mg by mouth every Monday.      omeprazole (PRILOSEC) 20 MG delayed-release capsule Take 20 mg by mouth every evening.      latanoprost (XALATAN) 0.005 % Solution Place 1 Drop in left eye every bedtime.      levothyroxine (SYNTHROID) 150 MCG TABS Take 150-225 mcg by mouth see administration instructions. 150 mcg on Tuesday, Thursday, Saturday, and Sunday   225 mcg on Monday, Wednesday, and Friday      neomycin-gramicidin-polymyxin (NEOSPORIN) 1.75-31585-.025 Solution ophthalmic solution INSTILL 1/2 INCH RIBBON TO BOTH LOWER LIDS AS NEEDED FOR DISCOMFORT (Patient not taking: Reported on 9/26/2022)      sulfamethoxazole-trimethoprim (BACTRIM DS) 800-160 MG tablet  (Patient not taking: Reported on 9/26/2022)      Multiple Vitamins-Minerals (ONE DAILY COMPLETE PO) Take 1 Capsule by mouth every day.      docusate sodium (COLACE) 100 MG Cap Take 100 mg by mouth every day. (Patient not taking: Reported on 9/26/2022)      acetaminophen (TYLENOL) 500 MG Tab Take 500-1,000 mg by mouth every 6 hours as needed for Mild Pain. (Patient not taking: Reported on 9/26/2022)      Naproxen Sodium 220 MG Cap Take 220 mg by mouth 1 time a day as needed (For pain, if tylenol does not relieve pain). (Patient not taking: Reported on 9/26/2022)       No facility-administered encounter medications on file as of 9/26/2022.     Review of Systems   All other systems reviewed and are negative.     Objective:   /68 (BP Location: Left arm, Patient Position: Sitting)   Pulse 78   Resp 16   Ht  1.524 m (5')   Wt 77.1 kg (170 lb)   SpO2 98%   BMI 33.20 kg/m²     Physical Exam  Constitutional:       General: She is not in acute distress.     Appearance: She is well-developed. She is not diaphoretic.      Comments: Frail  Walker unsteady gait       HENT:      Head: Normocephalic and atraumatic.      Mouth/Throat:      Pharynx: No oropharyngeal exudate.   Eyes:      General: No scleral icterus.     Conjunctiva/sclera: Conjunctivae normal.      Pupils: Pupils are equal, round, and reactive to light.   Neck:      Thyroid: No thyromegaly.      Vascular: No JVD.   Cardiovascular:      Rate and Rhythm: Normal rate and regular rhythm. FrequentExtrasystoles are present.     Chest Wall: PMI is not displaced.      Pulses:           Carotid pulses are 2+ on the right side and 2+ on the left side.       Femoral pulses are 2+ on the right side and 2+ on the left side.       Popliteal pulses are 1+ on the right side and 1+ on the left side.        Dorsalis pedis pulses are 2+ on the right side and 2+ on the left side.        Posterior tibial pulses are 2+ on the right side and 2+ on the left side.      Heart sounds: Normal heart sounds. No murmur heard.    No friction rub. No gallop.   Pulmonary:      Effort: Pulmonary effort is normal.      Breath sounds: Normal breath sounds. No wheezing or rales.   Abdominal:      General: Bowel sounds are normal. There is no distension.      Palpations: Abdomen is soft.      Tenderness: There is no abdominal tenderness.   Musculoskeletal:         General: No tenderness.      Cervical back: Normal range of motion and neck supple.   Skin:     General: Skin is warm and dry.      Findings: No erythema or rash.   Neurological:      Mental Status: She is alert and oriented to person, place, and time.      Cranial Nerves: No cranial nerve deficit.   Psychiatric:         Behavior: Behavior normal.       LABS:  Lab Results   Component Value Date/Time    CHOLSTRLTOT 163 06/07/2022 01:42 PM     LDL 83 06/07/2022 01:42 PM    HDL 65 06/07/2022 01:42 PM    TRIGLYCERIDE 75 06/07/2022 01:42 PM       Lab Results   Component Value Date/Time    WBC 7.0 05/05/2022 09:20 AM    RBC 3.86 (L) 05/05/2022 09:20 AM    HEMOGLOBIN 10.8 (L) 05/05/2022 09:20 AM    HEMATOCRIT 33.9 (L) 05/05/2022 09:20 AM    MCV 87.8 05/05/2022 09:20 AM    NEUTSPOLYS 71.30 05/05/2022 09:20 AM    LYMPHOCYTES 14.70 (L) 05/05/2022 09:20 AM    MONOCYTES 9.50 05/05/2022 09:20 AM    EOSINOPHILS 2.60 05/05/2022 09:20 AM    BASOPHILS 1.30 05/05/2022 09:20 AM    HYPOCHROMIA 1+ 04/10/2014 03:03 PM    ANISOCYTOSIS 1+ 01/29/2022 05:40 AM     Lab Results   Component Value Date/Time    SODIUM 135 06/07/2022 01:42 PM    POTASSIUM 4.9 06/07/2022 01:42 PM    CHLORIDE 96 06/07/2022 01:42 PM    CO2 24 06/07/2022 01:42 PM    GLUCOSE 117 (H) 06/07/2022 01:42 PM    BUN 22 06/07/2022 01:42 PM    CREATININE 1.16 06/07/2022 01:42 PM     Lab Results   Component Value Date    HBA1C 5.6 06/07/2022      Lab Results   Component Value Date/Time    ALKPHOSPHAT 155 (H) 06/07/2022 01:42 PM    ASTSGOT 33 06/07/2022 01:42 PM    ALTSGPT 18 06/07/2022 01:42 PM    TBILIRUBIN 0.2 06/07/2022 01:42 PM      No results found for: BNPBTYPENAT   No results found for: TSH  Lab Results   Component Value Date/Time    PROTHROMBTM 14.9 (H) 02/01/2022 05:16 AM    INR 1.20 (H) 02/01/2022 05:16 AM      STRESS (6/2017):  CONCLUSIONS AND IMPRESSIONS:  Overall, there is no evidence of myocardial    ischemia based on EKG tracing along with myocardial PET scan images.  The    rhythm is atrial fibrillation throughout the stress test.  No malignant    arrhythmias noted.    ECHO CONCLUSIONS (4/27/2016):  Normal left ventricular size and systolic function.  Left ventricular ejection fraction is visually estimated to be 55%.  Moderate concentric left ventricular hypertrophy.  Normal regional wall motion.  Mild mitral regurgitation.  Mild tricuspid regurgitation.  Estimated right ventricular systolic  pressure is 25 mmHg.  Normal inferior vena cava size and inspiratory collapse.    ECHO (11/10/2014):  I have personally reviewed the echocardiogram this visit and reviewed the findings with the patient. It shows:   Grade 1 diastolic dysfunction, EF 60-65%, moderate LVH, left atrial enlargement    PFTs as above      Assessment:     1. Permanent atrial fibrillation (HCC)        2. Presence of Watchman left atrial appendage closure device: 24 mm palced 4/28/21        3. Essential hypertension        4. Personal history of fall              Medical Decision Making:  Today's Assessment / Status / Plan:       Heart rates well.  No cardiac complaints.  Watchman device placed last year doing well.  Blood pressure controlled.  Medications well.  Recommend continuing current therapy and following up in 6 to 12 months.

## 2022-10-09 DIAGNOSIS — I48.0 PAROXYSMAL ATRIAL FIBRILLATION (HCC): ICD-10-CM

## 2022-10-09 DIAGNOSIS — I48.91 ATRIAL FIBRILLATION, UNSPECIFIED TYPE (HCC): ICD-10-CM

## 2022-10-09 DIAGNOSIS — I10 ESSENTIAL HYPERTENSION: ICD-10-CM

## 2022-10-10 NOTE — TELEPHONE ENCOUNTER
Is the patient due for a refill? Yes    Was the patient seen the past year? Yes    Date of last office visit: 9/26/22    Does the patient have an upcoming appointment?  No   If yes, When?     Provider to refill:TW    Does the patients insurance require a 100 day supply?  Yes

## 2022-10-14 RX ORDER — DILTIAZEM HYDROCHLORIDE 120 MG/1
120 CAPSULE, COATED, EXTENDED RELEASE ORAL 2 TIMES DAILY
Qty: 200 CAPSULE | Refills: 2 | Status: SHIPPED | OUTPATIENT
Start: 2022-10-14 | End: 2023-06-16 | Stop reason: SDUPTHER

## 2023-01-18 ENCOUNTER — APPOINTMENT (OUTPATIENT)
Dept: RADIOLOGY | Facility: MEDICAL CENTER | Age: 88
End: 2023-01-18
Attending: OTOLARYNGOLOGY
Payer: MEDICARE

## 2023-02-02 ENCOUNTER — HOSPITAL ENCOUNTER (OUTPATIENT)
Dept: RADIOLOGY | Facility: MEDICAL CENTER | Age: 88
End: 2023-02-02
Attending: OTOLARYNGOLOGY
Payer: MEDICARE

## 2023-02-02 DIAGNOSIS — H81.4 VERTIGO OF CENTRAL ORIGIN: ICD-10-CM

## 2023-02-02 PROCEDURE — 700117 HCHG RX CONTRAST REV CODE 255: Performed by: OTOLARYNGOLOGY

## 2023-02-02 PROCEDURE — 70553 MRI BRAIN STEM W/O & W/DYE: CPT

## 2023-02-02 PROCEDURE — A9579 GAD-BASE MR CONTRAST NOS,1ML: HCPCS | Performed by: OTOLARYNGOLOGY

## 2023-02-02 RX ADMIN — GADOTERIDOL 15 ML: 279.3 INJECTION, SOLUTION INTRAVENOUS at 10:40

## 2023-03-02 ENCOUNTER — OFFICE VISIT (OUTPATIENT)
Dept: CARDIOLOGY | Facility: MEDICAL CENTER | Age: 88
End: 2023-03-02
Payer: MEDICARE

## 2023-03-02 VITALS
HEART RATE: 98 BPM | RESPIRATION RATE: 16 BRPM | BODY MASS INDEX: 33.38 KG/M2 | SYSTOLIC BLOOD PRESSURE: 128 MMHG | HEIGHT: 60 IN | DIASTOLIC BLOOD PRESSURE: 86 MMHG | WEIGHT: 170 LBS | OXYGEN SATURATION: 98 %

## 2023-03-02 DIAGNOSIS — R62.7 FTT (FAILURE TO THRIVE) IN ADULT: ICD-10-CM

## 2023-03-02 DIAGNOSIS — E11.9 TYPE 2 DIABETES MELLITUS WITHOUT COMPLICATION, UNSPECIFIED WHETHER LONG TERM INSULIN USE (HCC): ICD-10-CM

## 2023-03-02 DIAGNOSIS — I48.21 PERMANENT ATRIAL FIBRILLATION (HCC): ICD-10-CM

## 2023-03-02 DIAGNOSIS — I10 ESSENTIAL HYPERTENSION: ICD-10-CM

## 2023-03-02 DIAGNOSIS — Z95.818 PRESENCE OF WATCHMAN LEFT ATRIAL APPENDAGE CLOSURE DEVICE: ICD-10-CM

## 2023-03-02 DIAGNOSIS — R54 FRAILTY: ICD-10-CM

## 2023-03-02 PROCEDURE — 99213 OFFICE O/P EST LOW 20 MIN: CPT | Performed by: INTERNAL MEDICINE

## 2023-03-02 RX ORDER — IPRATROPIUM BROMIDE 21 UG/1
SPRAY, METERED NASAL
COMMUNITY
Start: 2023-02-10 | End: 2024-01-02

## 2023-03-02 ASSESSMENT — FIBROSIS 4 INDEX: FIB4 SCORE: 2.27

## 2023-03-02 NOTE — PROGRESS NOTES
Subjective:   Saumya Vann is a 89 y.o. female who presents today for followup of PAF status post watchman device. She has had exertional dyspnea over the past 3 years associated with fatigue. She has a history of HTN, diabetes mellitus, CKD . Her echo is relatively unremarkable aside from LVH and grade 1 diastolic function. PFTs per her primary doctor are unremarkable. She denies any exertional chest pain, PND or orthopnea.     Since last visit continues to have difficulty recovering from her prolonged hospital stay with falls and sepsis.  Continues to be weak.  She is not ambulatory except around her house with the assistance of a walker.  She does not exercise.  She comes with her relative.    Family History   Problem Relation Age of Onset    Cancer Other      Social History     Tobacco Use   Smoking Status Never   Smokeless Tobacco Never     Allergies   Allergen Reactions    Pcn [Penicillins] Rash     Rash       Outpatient Encounter Medications as of 3/2/2023   Medication Sig Dispense Refill    Cetirizine HCl (ZYRTEC PO) Take  by mouth every day.      DILTIAZem CD (CARDIZEM CD) 120 MG CAPSULE SR 24 HR Take 1 Capsule by mouth 2 times a day. 200 Capsule 2    Lactobacillus-Inulin (CULTURELLE ADULT ULT BALANCE PO) Take  by mouth.      Bevacizumab (AVASTIN IV) Infuse  into a venous catheter. Every month. Every other eye      B Complex Vitamins (B COMPLEX PO) Take  by mouth.      EQ ASPIRIN ADULT LOW DOSE 81 MG EC tablet Take 1 tablet by mouth once daily 100 Tablet 1    Multiple Vitamins-Minerals (PRESERVISION AREDS 2) Cap Take 1 Capsule by mouth every day.      Cholecalciferol (VITAMIN D) 125 MCG (5000 UT) Cap Take 1 Capsule by mouth every day.      docusate sodium (COLACE) 100 MG Cap Take 100 mg by mouth every day.      acetaminophen (TYLENOL) 500 MG Tab Take 500-1,000 mg by mouth every 6 hours as needed for Mild Pain.      diclofenac DR (VOLTAREN) 25 MG Tablet Delayed Response Take 50 mg by mouth every  evening.      spironolactone (ALDACTONE) 25 MG Tab Take 25 mg by mouth every day.      alendronate (FOSAMAX) 70 MG Tab Take 70 mg by mouth every Monday.      omeprazole (PRILOSEC) 20 MG delayed-release capsule Take 20 mg by mouth every evening.      latanoprost (XALATAN) 0.005 % Solution Place 1 Drop in left eye every bedtime.      levothyroxine (SYNTHROID) 150 MCG TABS Take 150-225 mcg by mouth see administration instructions. 150 mcg on Tuesday, Thursday, Saturday, and Sunday   225 mcg on Monday, Wednesday, and Friday      ipratropium (ATROVENT) 0.03 % Solution USE 2 SPRAY(S) IN EACH NOSTRIL THREE TIMES DAILY AS NEEDED FOR RUNNY NOSE (Patient not taking: Reported on 3/2/2023)      neomycin-gramicidin-polymyxin (NEOSPORIN) 1.75-31300-.025 Solution ophthalmic solution INSTILL 1/2 INCH RIBBON TO BOTH LOWER LIDS AS NEEDED FOR DISCOMFORT (Patient not taking: Reported on 9/26/2022)      sulfamethoxazole-trimethoprim (BACTRIM DS) 800-160 MG tablet  (Patient not taking: Reported on 9/26/2022)      Multiple Vitamin (MULTI-VITAMIN DAILY PO) Take  by mouth. (Patient not taking: Reported on 3/2/2023)      cyclobenzaprine (FLEXERIL) 5 mg tablet Take 1 Tablet by mouth 1 time a day as needed for Muscle Spasms. (Patient not taking: Reported on 3/2/2023) 30 Tablet 0    Multiple Vitamins-Minerals (ONE DAILY COMPLETE PO) Take 1 Capsule by mouth every day. (Patient not taking: Reported on 3/2/2023)      Naproxen Sodium 220 MG Cap Take 220 mg by mouth 1 time a day as needed (For pain, if tylenol does not relieve pain). (Patient not taking: Reported on 9/26/2022)      HYDROcodone-acetaminophen (NORCO) 5-325 MG Tab per tablet Take 1 Tablet by mouth every four hours as needed (Severe pain). Indications: Pain (Patient not taking: Reported on 3/2/2023)      B Complex Vitamins (VITAMIN B COMPLEX PO) Take 1 Tab by mouth 2 Times a Day. (Patient not taking: Reported on 3/2/2023)       No facility-administered encounter medications on file as  of 3/2/2023.     Review of Systems   All other systems reviewed and are negative.     Objective:   /86 (BP Location: Left arm, Patient Position: Sitting, BP Cuff Size: Adult)   Pulse 98   Resp 16   Ht 1.524 m (5')   Wt 77.1 kg (170 lb)   SpO2 98%   BMI 33.20 kg/m²     Physical Exam  Constitutional:       General: She is not in acute distress.     Appearance: She is well-developed. She is not diaphoretic.      Comments: Frail  Walker unsteady gait       HENT:      Head: Normocephalic and atraumatic.      Mouth/Throat:      Pharynx: No oropharyngeal exudate.   Eyes:      General: No scleral icterus.     Conjunctiva/sclera: Conjunctivae normal.      Pupils: Pupils are equal, round, and reactive to light.   Neck:      Thyroid: No thyromegaly.      Vascular: No JVD.   Cardiovascular:      Rate and Rhythm: Normal rate and regular rhythm. FrequentExtrasystoles are present.     Chest Wall: PMI is not displaced.      Pulses:           Carotid pulses are 2+ on the right side and 2+ on the left side.       Femoral pulses are 2+ on the right side and 2+ on the left side.       Popliteal pulses are 1+ on the right side and 1+ on the left side.        Dorsalis pedis pulses are 2+ on the right side and 2+ on the left side.        Posterior tibial pulses are 2+ on the right side and 2+ on the left side.      Heart sounds: Normal heart sounds. No murmur heard.    No friction rub. No gallop.   Pulmonary:      Effort: Pulmonary effort is normal.      Breath sounds: Normal breath sounds. No wheezing or rales.   Abdominal:      General: Bowel sounds are normal. There is no distension.      Palpations: Abdomen is soft.      Tenderness: There is no abdominal tenderness.   Musculoskeletal:         General: No tenderness.      Cervical back: Normal range of motion and neck supple.   Skin:     General: Skin is warm and dry.      Findings: No erythema or rash.   Neurological:      Mental Status: She is alert and oriented to  person, place, and time.      Cranial Nerves: No cranial nerve deficit.   Psychiatric:         Behavior: Behavior normal.       LABS:  Lab Results   Component Value Date/Time    CHOLSTRLTOT 163 06/07/2022 01:42 PM    LDL 83 06/07/2022 01:42 PM    HDL 65 06/07/2022 01:42 PM    TRIGLYCERIDE 75 06/07/2022 01:42 PM       Lab Results   Component Value Date/Time    WBC 7.0 05/05/2022 09:20 AM    RBC 3.86 (L) 05/05/2022 09:20 AM    HEMOGLOBIN 10.8 (L) 05/05/2022 09:20 AM    HEMATOCRIT 33.9 (L) 05/05/2022 09:20 AM    MCV 87.8 05/05/2022 09:20 AM    NEUTSPOLYS 71.30 05/05/2022 09:20 AM    LYMPHOCYTES 14.70 (L) 05/05/2022 09:20 AM    MONOCYTES 9.50 05/05/2022 09:20 AM    EOSINOPHILS 2.60 05/05/2022 09:20 AM    BASOPHILS 1.30 05/05/2022 09:20 AM    HYPOCHROMIA 1+ 04/10/2014 03:03 PM    ANISOCYTOSIS 1+ 01/29/2022 05:40 AM     Lab Results   Component Value Date/Time    SODIUM 135 06/07/2022 01:42 PM    POTASSIUM 4.9 06/07/2022 01:42 PM    CHLORIDE 96 06/07/2022 01:42 PM    CO2 24 06/07/2022 01:42 PM    GLUCOSE 117 (H) 06/07/2022 01:42 PM    BUN 22 06/07/2022 01:42 PM    CREATININE 1.16 06/07/2022 01:42 PM     Lab Results   Component Value Date    HBA1C 5.6 06/07/2022      Lab Results   Component Value Date/Time    ALKPHOSPHAT 155 (H) 06/07/2022 01:42 PM    ASTSGOT 33 06/07/2022 01:42 PM    ALTSGPT 18 06/07/2022 01:42 PM    TBILIRUBIN 0.2 06/07/2022 01:42 PM      No results found for: BNPBTYPENAT   No results found for: TSH  Lab Results   Component Value Date/Time    PROTHROMBTM 14.9 (H) 02/01/2022 05:16 AM    INR 1.20 (H) 02/01/2022 05:16 AM      STRESS (6/2017):  CONCLUSIONS AND IMPRESSIONS:  Overall, there is no evidence of myocardial    ischemia based on EKG tracing along with myocardial PET scan images.  The    rhythm is atrial fibrillation throughout the stress test.  No malignant    arrhythmias noted.    ECHO CONCLUSIONS (4/27/2016):  Normal left ventricular size and systolic function.  Left ventricular ejection fraction  is visually estimated to be 55%.  Moderate concentric left ventricular hypertrophy.  Normal regional wall motion.  Mild mitral regurgitation.  Mild tricuspid regurgitation.  Estimated right ventricular systolic pressure is 25 mmHg.  Normal inferior vena cava size and inspiratory collapse.    ECHO (11/10/2014):  I have personally reviewed the echocardiogram this visit and reviewed the findings with the patient. It shows:   Grade 1 diastolic dysfunction, EF 60-65%, moderate LVH, left atrial enlargement    PFTs as above      Assessment:     1. Permanent atrial fibrillation (HCC)        2. Essential hypertension        3. Presence of Watchman left atrial appendage closure device: 24 mm palced 4/28/21        4. Type 2 diabetes mellitus without complication, unspecified whether long term insulin use (HCC)        5. Frailty        6. FTT (failure to thrive) in adult              Medical Decision Making:  Today's Assessment / Status / Plan:       Heart rates are well controlled.  She is clearly deconditioned.  Recommend increasing physical activity and offered referral to therapy or home health.  Patient declined at this time.  They will try to get her more up and about as the weather improves.  Echocardiogram is unremarkable.  Discussed goals of care.  She is a DNR/DNI and would not like any heroic interventions performed.  She can follow-up every 3 6 months and as needed

## 2023-03-16 ENCOUNTER — APPOINTMENT (RX ONLY)
Dept: URBAN - METROPOLITAN AREA CLINIC 22 | Facility: CLINIC | Age: 88
Setting detail: DERMATOLOGY
End: 2023-03-16

## 2023-03-16 DIAGNOSIS — L30.0 NUMMULAR DERMATITIS: ICD-10-CM

## 2023-03-16 DIAGNOSIS — L57.0 ACTINIC KERATOSIS: ICD-10-CM

## 2023-03-16 DIAGNOSIS — H61.03 CHONDRITIS OF EXTERNAL EAR: ICD-10-CM

## 2023-03-16 PROBLEM — D48.5 NEOPLASM OF UNCERTAIN BEHAVIOR OF SKIN: Status: ACTIVE | Noted: 2023-03-16

## 2023-03-16 PROBLEM — H61.031 CHONDRITIS OF RIGHT EXTERNAL EAR: Status: ACTIVE | Noted: 2023-03-16

## 2023-03-16 PROCEDURE — 17000 DESTRUCT PREMALG LESION: CPT | Mod: 59

## 2023-03-16 PROCEDURE — 99203 OFFICE O/P NEW LOW 30 MIN: CPT | Mod: 25

## 2023-03-16 PROCEDURE — ? COUNSELING

## 2023-03-16 PROCEDURE — 11103 TANGNTL BX SKIN EA SEP/ADDL: CPT

## 2023-03-16 PROCEDURE — ? BIOPSY BY SHAVE METHOD

## 2023-03-16 PROCEDURE — ? LIQUID NITROGEN

## 2023-03-16 PROCEDURE — ? PRESCRIPTION

## 2023-03-16 PROCEDURE — 17003 DESTRUCT PREMALG LES 2-14: CPT

## 2023-03-16 PROCEDURE — 11102 TANGNTL BX SKIN SINGLE LES: CPT

## 2023-03-16 RX ORDER — TRIAMCINOLONE ACETONIDE 1 MG/G
1 CREAM TOPICAL BID
Qty: 453.6 | Refills: 1 | Status: ERX | COMMUNITY
Start: 2023-03-16

## 2023-03-16 RX ORDER — FLUOCINOLONE ACETONIDE 0.11 MG/ML
1 OIL TOPICAL QD
Qty: 118.28 | Refills: 2 | Status: ERX | COMMUNITY
Start: 2023-03-16

## 2023-03-16 RX ADMIN — FLUOCINOLONE ACETONIDE 1: 0.11 OIL TOPICAL at 00:00

## 2023-03-16 RX ADMIN — TRIAMCINOLONE ACETONIDE 1: 1 CREAM TOPICAL at 00:00

## 2023-03-16 ASSESSMENT — LOCATION ZONE DERM
LOCATION ZONE: LEG
LOCATION ZONE: TRUNK
LOCATION ZONE: FACE
LOCATION ZONE: ARM
LOCATION ZONE: FEET
LOCATION ZONE: EAR
LOCATION ZONE: SCALP

## 2023-03-16 ASSESSMENT — LOCATION DETAILED DESCRIPTION DERM
LOCATION DETAILED: LEFT INFERIOR MEDIAL MIDBACK
LOCATION DETAILED: RIGHT KNEE
LOCATION DETAILED: LEFT KNEE
LOCATION DETAILED: RIGHT SUPERIOR CRUS OF ANTIHELIX
LOCATION DETAILED: LEFT PROXIMAL PRETIBIAL REGION
LOCATION DETAILED: RIGHT ANKLE
LOCATION DETAILED: LEFT SUPERIOR PARIETAL SCALP
LOCATION DETAILED: RIGHT SUPERIOR LATERAL MALAR CHEEK
LOCATION DETAILED: LEFT PROXIMAL DORSAL FOREARM
LOCATION DETAILED: LEFT DORSAL FOOT
LOCATION DETAILED: LEFT MEDIAL MALAR CHEEK
LOCATION DETAILED: RIGHT DISTAL PRETIBIAL REGION

## 2023-03-16 ASSESSMENT — LOCATION SIMPLE DESCRIPTION DERM
LOCATION SIMPLE: LEFT FOREARM
LOCATION SIMPLE: LEFT PRETIBIAL REGION
LOCATION SIMPLE: RIGHT KNEE
LOCATION SIMPLE: LEFT FOOT
LOCATION SIMPLE: RIGHT ANKLE
LOCATION SIMPLE: SCALP
LOCATION SIMPLE: RIGHT PRETIBIAL REGION
LOCATION SIMPLE: RIGHT CHEEK
LOCATION SIMPLE: LEFT KNEE
LOCATION SIMPLE: LEFT CHEEK
LOCATION SIMPLE: LEFT LOWER BACK
LOCATION SIMPLE: RIGHT EAR

## 2023-03-16 ASSESSMENT — ITCH NUMERIC RATING SCALE: HOW SEVERE IS YOUR ITCHING?: 10

## 2023-03-16 NOTE — HPI: RASH
How Severe Is Your Rash?: moderate
Is This A New Presentation, Or A Follow-Up?: Rash
Additional History: Referral states she started gabapentin and itching started soon after starting medication in November. Patient stopped gabapentin for 1 week and did not notice relief, she stopped daily Zyrtec because she felt like it wasn’t helping.

## 2023-03-16 NOTE — PROCEDURE: BIOPSY BY SHAVE METHOD
Detail Level: Detailed
Depth Of Biopsy: dermis
Was A Bandage Applied: Yes
Size Of Lesion In Cm: 0.5
X Size Of Lesion In Cm: 0
Biopsy Type: H and E
Biopsy Method: Personna blade
Anesthesia Type: 0.5% lidocaine without epinephrine
Anesthesia Volume In Cc: 1
Hemostasis: Aluminum Chloride
Wound Care: Petrolatum
Dressing: pressure dressing with telfa
Destruction After The Procedure: No
Type Of Destruction Used: Curettage
Curettage Text: The wound bed was treated with curettage after the biopsy was performed.
Cryotherapy Text: The wound bed was treated with cryotherapy after the biopsy was performed.
Electrodesiccation Text: The wound bed was treated with electrodesiccation after the biopsy was performed.
Electrodesiccation And Curettage Text: The wound bed was treated with electrodesiccation and curettage after the biopsy was performed.
Silver Nitrate Text: The wound bed was treated with silver nitrate after the biopsy was performed.
Lab: [id number]
Lab Facility: 
Consent: Written consent was obtained and risks were reviewed including but not limited to scarring, infection, bleeding, scabbing, incomplete removal, nerve damage and allergy to anesthesia.
Post-Care Instructions: I reviewed with the patient in detail post-care instructions. Patient is to keep the biopsy site dry overnight. Gentle cleansing daily.  Apply petroleum ointment daily until healed. Patient may apply hydrogen peroxide soaks to remove any crusting.
Notification Instructions: Patient will be notified of biopsy results. However, patient instructed to call the office if not contacted within 2 weeks.
Billing Type: Third-Party Bill
Information: Selecting Yes will display possible errors in your note based on the variables you have selected. This validation is only offered as a suggestion for you. PLEASE NOTE THAT THE VALIDATION TEXT WILL BE REMOVED WHEN YOU FINALIZE YOUR NOTE. IF YOU WANT TO FAX A PRELIMINARY NOTE YOU WILL NEED TO TOGGLE THIS TO 'NO' IF YOU DO NOT WANT IT IN YOUR FAXED NOTE.
Size Of Lesion In Cm: 0.7
Size Of Lesion In Cm: 2.2

## 2023-03-21 DIAGNOSIS — I10 HYPERTENSION, UNSPECIFIED TYPE: ICD-10-CM

## 2023-03-21 NOTE — TELEPHONE ENCOUNTER
Is the patient due for a refill? Yes    Was the patient seen the past year? Yes    Date of last office visit: 3/2/2023    Does the patient have an upcoming appointment?  No   If yes, When?     Provider to refill:RICKIE BOONE out of office    Does the patients insurance require a 100 day supply?  Yes

## 2023-03-25 RX ORDER — ASPIRIN 81 MG/1
81 TABLET ORAL DAILY
Qty: 100 TABLET | Refills: 3 | Status: SHIPPED | OUTPATIENT
Start: 2023-03-25

## 2023-05-11 ENCOUNTER — HOSPITAL ENCOUNTER (OUTPATIENT)
Dept: LAB | Facility: MEDICAL CENTER | Age: 88
End: 2023-05-11
Attending: FAMILY MEDICINE
Payer: MEDICARE

## 2023-05-11 LAB
ALBUMIN SERPL BCP-MCNC: 4.3 G/DL (ref 3.2–4.9)
ALBUMIN/GLOB SERPL: 1.3 G/DL
ALP SERPL-CCNC: 90 U/L (ref 30–99)
ALT SERPL-CCNC: 16 U/L (ref 2–50)
ANION GAP SERPL CALC-SCNC: 13 MMOL/L (ref 7–16)
AST SERPL-CCNC: 26 U/L (ref 12–45)
BASOPHILS # BLD AUTO: 0.9 % (ref 0–1.8)
BASOPHILS # BLD: 0.08 K/UL (ref 0–0.12)
BILIRUB SERPL-MCNC: 0.6 MG/DL (ref 0.1–1.5)
BUN SERPL-MCNC: 23 MG/DL (ref 8–22)
CALCIUM ALBUM COR SERPL-MCNC: 9.3 MG/DL (ref 8.5–10.5)
CALCIUM SERPL-MCNC: 9.5 MG/DL (ref 8.5–10.5)
CHLORIDE SERPL-SCNC: 102 MMOL/L (ref 96–112)
CHOLEST SERPL-MCNC: 194 MG/DL (ref 100–199)
CO2 SERPL-SCNC: 24 MMOL/L (ref 20–33)
CREAT SERPL-MCNC: 0.98 MG/DL (ref 0.5–1.4)
EOSINOPHIL # BLD AUTO: 0.19 K/UL (ref 0–0.51)
EOSINOPHIL NFR BLD: 2.1 % (ref 0–6.9)
ERYTHROCYTE [DISTWIDTH] IN BLOOD BY AUTOMATED COUNT: 51.7 FL (ref 35.9–50)
EST. AVERAGE GLUCOSE BLD GHB EST-MCNC: 123 MG/DL
FASTING STATUS PATIENT QL REPORTED: NORMAL
GFR SERPLBLD CREATININE-BSD FMLA CKD-EPI: 54 ML/MIN/1.73 M 2
GLOBULIN SER CALC-MCNC: 3.2 G/DL (ref 1.9–3.5)
GLUCOSE SERPL-MCNC: 104 MG/DL (ref 65–99)
HBA1C MFR BLD: 5.9 % (ref 4–5.6)
HCT VFR BLD AUTO: 44.1 % (ref 37–47)
HDLC SERPL-MCNC: 56 MG/DL
HGB BLD-MCNC: 14.2 G/DL (ref 12–16)
IMM GRANULOCYTES # BLD AUTO: 0.08 K/UL (ref 0–0.11)
IMM GRANULOCYTES NFR BLD AUTO: 0.9 % (ref 0–0.9)
LDLC SERPL CALC-MCNC: 110 MG/DL
LYMPHOCYTES # BLD AUTO: 1.68 K/UL (ref 1–4.8)
LYMPHOCYTES NFR BLD: 18.9 % (ref 22–41)
MCH RBC QN AUTO: 29.8 PG (ref 27–33)
MCHC RBC AUTO-ENTMCNC: 32.2 G/DL (ref 33.6–35)
MCV RBC AUTO: 92.6 FL (ref 81.4–97.8)
MONOCYTES # BLD AUTO: 0.67 K/UL (ref 0–0.85)
MONOCYTES NFR BLD AUTO: 7.5 % (ref 0–13.4)
NEUTROPHILS # BLD AUTO: 6.18 K/UL (ref 2–7.15)
NEUTROPHILS NFR BLD: 69.7 % (ref 44–72)
NRBC # BLD AUTO: 0 K/UL
NRBC BLD-RTO: 0 /100 WBC
PLATELET # BLD AUTO: 290 K/UL (ref 164–446)
PMV BLD AUTO: 9.1 FL (ref 9–12.9)
POTASSIUM SERPL-SCNC: 4.4 MMOL/L (ref 3.6–5.5)
PROT SERPL-MCNC: 7.5 G/DL (ref 6–8.2)
RBC # BLD AUTO: 4.76 M/UL (ref 4.2–5.4)
SODIUM SERPL-SCNC: 139 MMOL/L (ref 135–145)
TRIGL SERPL-MCNC: 138 MG/DL (ref 0–149)
TSH SERPL DL<=0.005 MIU/L-ACNC: 6.7 UIU/ML (ref 0.38–5.33)
WBC # BLD AUTO: 8.9 K/UL (ref 4.8–10.8)

## 2023-05-11 PROCEDURE — 85025 COMPLETE CBC W/AUTO DIFF WBC: CPT

## 2023-05-11 PROCEDURE — 36415 COLL VENOUS BLD VENIPUNCTURE: CPT

## 2023-05-11 PROCEDURE — 83036 HEMOGLOBIN GLYCOSYLATED A1C: CPT

## 2023-05-11 PROCEDURE — 84443 ASSAY THYROID STIM HORMONE: CPT

## 2023-05-11 PROCEDURE — 80061 LIPID PANEL: CPT

## 2023-05-11 PROCEDURE — 80053 COMPREHEN METABOLIC PANEL: CPT

## 2023-06-16 DIAGNOSIS — I48.91 ATRIAL FIBRILLATION, UNSPECIFIED TYPE (HCC): ICD-10-CM

## 2023-06-16 DIAGNOSIS — I48.0 PAROXYSMAL ATRIAL FIBRILLATION (HCC): ICD-10-CM

## 2023-06-16 DIAGNOSIS — I10 ESSENTIAL HYPERTENSION: ICD-10-CM

## 2023-06-16 NOTE — TELEPHONE ENCOUNTER
Is the patient due for a refill? Yes    Was the patient seen the past year? Yes    Date of last office visit: 3/2/2023    Does the patient have an upcoming appointment?  No   If yes, When?     Provider to refill:TW    Does the patients insurance require a 100 day supply?  Yes

## 2023-06-23 RX ORDER — DILTIAZEM HYDROCHLORIDE 120 MG/1
120 CAPSULE, COATED, EXTENDED RELEASE ORAL 2 TIMES DAILY
Qty: 200 CAPSULE | Refills: 2 | Status: SHIPPED | OUTPATIENT
Start: 2023-06-23

## 2023-07-18 ENCOUNTER — APPOINTMENT (RX ONLY)
Dept: URBAN - METROPOLITAN AREA CLINIC 22 | Facility: CLINIC | Age: 88
Setting detail: DERMATOLOGY
End: 2023-07-18

## 2023-07-18 DIAGNOSIS — L50.3 DERMATOGRAPHIC URTICARIA: ICD-10-CM | Status: INADEQUATELY CONTROLLED

## 2023-07-18 DIAGNOSIS — I87.2 VENOUS INSUFFICIENCY (CHRONIC) (PERIPHERAL): ICD-10-CM

## 2023-07-18 DIAGNOSIS — L57.0 ACTINIC KERATOSIS: ICD-10-CM

## 2023-07-18 PROCEDURE — ? DIAGNOSIS COMMENT

## 2023-07-18 PROCEDURE — 17000 DESTRUCT PREMALG LESION: CPT

## 2023-07-18 PROCEDURE — 99214 OFFICE O/P EST MOD 30 MIN: CPT | Mod: 25

## 2023-07-18 PROCEDURE — ? COUNSELING

## 2023-07-18 PROCEDURE — ? TREATMENT REGIMEN

## 2023-07-18 PROCEDURE — ? PRESCRIPTION

## 2023-07-18 PROCEDURE — ? LIQUID NITROGEN

## 2023-07-18 RX ORDER — LORATADINE 10 MG/1
TABLET ORAL QD
Qty: 30 | Refills: 3 | Status: ERX | COMMUNITY
Start: 2023-07-18

## 2023-07-18 RX ADMIN — LORATADINE: 10 TABLET ORAL at 00:00

## 2023-07-18 ASSESSMENT — LOCATION DETAILED DESCRIPTION DERM
LOCATION DETAILED: LEFT MEDIAL MALAR CHEEK
LOCATION DETAILED: RIGHT DISTAL PRETIBIAL REGION
LOCATION DETAILED: RIGHT LATERAL ABDOMEN
LOCATION DETAILED: RIGHT INFERIOR LATERAL LOWER BACK
LOCATION DETAILED: LEFT INFERIOR LATERAL LOWER BACK
LOCATION DETAILED: LEFT DISTAL PRETIBIAL REGION

## 2023-07-18 ASSESSMENT — LOCATION ZONE DERM
LOCATION ZONE: FACE
LOCATION ZONE: TRUNK
LOCATION ZONE: LEG

## 2023-07-18 ASSESSMENT — SEVERITY ASSESSMENT: SEVERITY: MILD TO MODERATE

## 2023-07-18 ASSESSMENT — LOCATION SIMPLE DESCRIPTION DERM
LOCATION SIMPLE: LEFT PRETIBIAL REGION
LOCATION SIMPLE: LEFT LOWER BACK
LOCATION SIMPLE: LEFT CHEEK
LOCATION SIMPLE: RIGHT PRETIBIAL REGION
LOCATION SIMPLE: RIGHT LOWER BACK
LOCATION SIMPLE: ABDOMEN

## 2023-07-18 NOTE — PROCEDURE: COUNSELING
Detail Level: Detailed
Prescription Strength Graduated Compression Stockings Recommendations: The patient was counseled that prescription strength graduated compression stockings should be worn for all waking hours. They will follow up with a venous specialist to monitor graduated compression stocking usage and their symptoms.
Detail Level: Zone
Patient Specific Counseling (Will Not Stick From Patient To Patient): Take 2 week break from culturelle to see if improves
Detail Level: Simple

## 2023-07-29 ENCOUNTER — HOSPITAL ENCOUNTER (OUTPATIENT)
Dept: LAB | Facility: MEDICAL CENTER | Age: 88
End: 2023-07-29
Attending: FAMILY MEDICINE
Payer: MEDICARE

## 2023-07-29 LAB — TSH SERPL DL<=0.005 MIU/L-ACNC: 4.68 UIU/ML (ref 0.38–5.33)

## 2023-07-29 PROCEDURE — 84443 ASSAY THYROID STIM HORMONE: CPT

## 2023-07-29 PROCEDURE — 36415 COLL VENOUS BLD VENIPUNCTURE: CPT

## 2023-08-23 ENCOUNTER — TELEPHONE (OUTPATIENT)
Dept: HEALTH INFORMATION MANAGEMENT | Facility: OTHER | Age: 88
End: 2023-08-23
Payer: MEDICARE

## 2023-08-29 ENCOUNTER — APPOINTMENT (RX ONLY)
Dept: URBAN - METROPOLITAN AREA CLINIC 22 | Facility: CLINIC | Age: 88
Setting detail: DERMATOLOGY
End: 2023-08-29

## 2023-08-29 DIAGNOSIS — I87.2 VENOUS INSUFFICIENCY (CHRONIC) (PERIPHERAL): ICD-10-CM | Status: IMPROVED

## 2023-08-29 DIAGNOSIS — L82.1 OTHER SEBORRHEIC KERATOSIS: ICD-10-CM

## 2023-08-29 DIAGNOSIS — L50.3 DERMATOGRAPHIC URTICARIA: ICD-10-CM | Status: WELL CONTROLLED

## 2023-08-29 PROCEDURE — ? COUNSELING

## 2023-08-29 PROCEDURE — ? TREATMENT REGIMEN

## 2023-08-29 PROCEDURE — 99213 OFFICE O/P EST LOW 20 MIN: CPT

## 2023-08-29 ASSESSMENT — LOCATION ZONE DERM
LOCATION ZONE: LEG
LOCATION ZONE: TRUNK

## 2023-08-29 ASSESSMENT — LOCATION SIMPLE DESCRIPTION DERM
LOCATION SIMPLE: LEFT LOWER BACK
LOCATION SIMPLE: ABDOMEN
LOCATION SIMPLE: RIGHT LOWER BACK
LOCATION SIMPLE: RIGHT PRETIBIAL REGION
LOCATION SIMPLE: LEFT PRETIBIAL REGION

## 2023-08-29 ASSESSMENT — LOCATION DETAILED DESCRIPTION DERM
LOCATION DETAILED: LEFT INFERIOR LATERAL LOWER BACK
LOCATION DETAILED: RIGHT INFERIOR LATERAL LOWER BACK
LOCATION DETAILED: LEFT INFERIOR LATERAL MIDBACK
LOCATION DETAILED: RIGHT LATERAL ABDOMEN
LOCATION DETAILED: RIGHT DISTAL PRETIBIAL REGION
LOCATION DETAILED: LEFT DISTAL PRETIBIAL REGION

## 2023-08-29 NOTE — PROCEDURE: COUNSELING
Patient Specific Counseling (Will Not Stick From Patient To Patient): Take 2 week break from culturelle to see if improves
Detail Level: Simple
Prescription Strength Graduated Compression Stockings Recommendations: The patient was counseled that prescription strength graduated compression stockings should be worn for all waking hours. They will follow up with a venous specialist to monitor graduated compression stocking usage and their symptoms.
Detail Level: Zone
Detail Level: Detailed

## 2023-08-29 NOTE — PROCEDURE: TREATMENT REGIMEN
Plan: Triamcinolone cream twice a day for 2 weeks when flaring
Detail Level: Simple
Continue Regimen: Claritin 10mg Tablet daily

## 2023-09-08 RX ORDER — FLUOCINOLONE ACETONIDE 0.11 MG/ML
1 OIL TOPICAL QD
Qty: 118.28 | Refills: 2 | Status: ERX

## 2023-09-09 ENCOUNTER — HOSPITAL ENCOUNTER (OUTPATIENT)
Dept: RADIOLOGY | Facility: MEDICAL CENTER | Age: 88
End: 2023-09-09
Attending: PHYSICIAN ASSISTANT
Payer: MEDICARE

## 2023-09-09 ENCOUNTER — OFFICE VISIT (OUTPATIENT)
Dept: URGENT CARE | Facility: PHYSICIAN GROUP | Age: 88
End: 2023-09-09
Payer: MEDICARE

## 2023-09-09 VITALS
TEMPERATURE: 97.1 F | DIASTOLIC BLOOD PRESSURE: 70 MMHG | HEART RATE: 88 BPM | WEIGHT: 181.4 LBS | BODY MASS INDEX: 34.25 KG/M2 | RESPIRATION RATE: 20 BRPM | HEIGHT: 61 IN | SYSTOLIC BLOOD PRESSURE: 124 MMHG | OXYGEN SATURATION: 95 %

## 2023-09-09 DIAGNOSIS — M79.602 LEFT ARM PAIN: ICD-10-CM

## 2023-09-09 PROCEDURE — 73060 X-RAY EXAM OF HUMERUS: CPT | Mod: LT

## 2023-09-09 PROCEDURE — 3078F DIAST BP <80 MM HG: CPT | Performed by: PHYSICIAN ASSISTANT

## 2023-09-09 PROCEDURE — 3074F SYST BP LT 130 MM HG: CPT | Performed by: PHYSICIAN ASSISTANT

## 2023-09-09 PROCEDURE — 99213 OFFICE O/P EST LOW 20 MIN: CPT | Performed by: PHYSICIAN ASSISTANT

## 2023-09-09 PROCEDURE — 1125F AMNT PAIN NOTED PAIN PRSNT: CPT | Performed by: PHYSICIAN ASSISTANT

## 2023-09-09 RX ORDER — DICLOFENAC SODIUM 25 MG/1
50 TABLET, DELAYED RELEASE ORAL NIGHTLY
Qty: 60 TABLET | Refills: 0 | Status: SHIPPED | OUTPATIENT
Start: 2023-09-09 | End: 2024-01-02

## 2023-09-09 ASSESSMENT — PAIN SCALES - GENERAL: PAINLEVEL: 8=MODERATE-SEVERE PAIN

## 2023-09-09 ASSESSMENT — FIBROSIS 4 INDEX: FIB4 SCORE: 2.06

## 2023-09-09 ASSESSMENT — ENCOUNTER SYMPTOMS
NUMBNESS: 0
MUSCLE WEAKNESS: 0
TINGLING: 0

## 2023-09-09 NOTE — PROGRESS NOTES
Subjective     Saumya Vann is a 89 y.o. female who presents with Arm Pain (Per pt, Left arm, past x2 weeks)    PMH:  has a past medical history of A-fib ([facility]), Breath shortness, Cataract (04/23/2021), Diabetes ([facility]) (04/23/2021), DM2 (diabetes mellitus, type 2) ([facility]) (11/17/2014), Dry cough (04/23/2021), Glaucoma (04/23/2021), Hypertension, Hypothyroid, Macular degeneration, Pain (04/23/2021), and Urinary incontinence.  MEDS:   Current Outpatient Medications:     diclofenac DR (VOLTAREN) 25 MG Tablet Delayed Response, Take 2 Tablets by mouth every evening., Disp: 60 Tablet, Rfl: 0    DILTIAZem CD (CARDIZEM CD) 120 MG CAPSULE SR 24 HR, Take 1 Capsule by mouth 2 times a day., Disp: 200 Capsule, Rfl: 2    aspirin (EQ ASPIRIN ADULT LOW DOSE) 81 MG EC tablet, Take 1 Tablet by mouth every day., Disp: 100 Tablet, Rfl: 3    ipratropium (ATROVENT) 0.03 % Solution, USE 2 SPRAY(S) IN EACH NOSTRIL THREE TIMES DAILY AS NEEDED FOR RUNNY NOSE (Patient not taking: Reported on 3/2/2023), Disp: , Rfl:     Cetirizine HCl (ZYRTEC PO), Take  by mouth every day., Disp: , Rfl:     neomycin-gramicidin-polymyxin (NEOSPORIN) 1.75-82372-.025 Solution ophthalmic solution, INSTILL 1/2 INCH RIBBON TO BOTH LOWER LIDS AS NEEDED FOR DISCOMFORT (Patient not taking: Reported on 9/26/2022), Disp: , Rfl:     Lactobacillus-Inulin (CULTURELLE ADULT ULT BALANCE PO), Take  by mouth., Disp: , Rfl:     Bevacizumab (AVASTIN IV), Infuse  into a venous catheter. Every month. Every other eye, Disp: , Rfl:     B Complex Vitamins (B COMPLEX PO), Take  by mouth., Disp: , Rfl:     Multiple Vitamin (MULTI-VITAMIN DAILY PO), Take  by mouth. (Patient not taking: Reported on 3/2/2023), Disp: , Rfl:     cyclobenzaprine (FLEXERIL) 5 mg tablet, Take 1 Tablet by mouth 1 time a day as needed for Muscle Spasms. (Patient not taking: Reported on 3/2/2023), Disp: 30 Tablet, Rfl: 0    Multiple Vitamins-Minerals (PRESERVISION AREDS 2) Cap, Take 1 Capsule by mouth every  day., Disp: , Rfl:     Cholecalciferol (VITAMIN D) 125 MCG (5000 UT) Cap, Take 1 Capsule by mouth every day., Disp: , Rfl:     Multiple Vitamins-Minerals (ONE DAILY COMPLETE PO), Take 1 Capsule by mouth every day. (Patient not taking: Reported on 3/2/2023), Disp: , Rfl:     docusate sodium (COLACE) 100 MG Cap, Take 100 mg by mouth every day., Disp: , Rfl:     acetaminophen (TYLENOL) 500 MG Tab, Take 500-1,000 mg by mouth every 6 hours as needed for Mild Pain., Disp: , Rfl:     Naproxen Sodium 220 MG Cap, Take 220 mg by mouth 1 time a day as needed (For pain, if tylenol does not relieve pain). (Patient not taking: Reported on 9/26/2022), Disp: , Rfl:     spironolactone (ALDACTONE) 25 MG Tab, Take 25 mg by mouth every day., Disp: , Rfl:     B Complex Vitamins (VITAMIN B COMPLEX PO), Take 1 Tab by mouth 2 Times a Day. (Patient not taking: Reported on 3/2/2023), Disp: , Rfl:     alendronate (FOSAMAX) 70 MG Tab, Take 70 mg by mouth every Monday., Disp: , Rfl:     omeprazole (PRILOSEC) 20 MG delayed-release capsule, Take 20 mg by mouth every evening., Disp: , Rfl:     latanoprost (XALATAN) 0.005 % Solution, Place 1 Drop in left eye every bedtime., Disp: , Rfl:     levothyroxine (SYNTHROID) 150 MCG TABS, Take 150-225 mcg by mouth see administration instructions. 150 mcg on Tuesday, Thursday, Saturday, and Sunday  225 mcg on Monday, Wednesday, and Friday, Disp: , Rfl:   ALLERGIES:   Allergies   Allergen Reactions    Pcn [Penicillins] Rash     Rash       SURGHX:   Past Surgical History:   Procedure Laterality Date    OTHER ORTHOPEDIC SURGERY  2002    2 Knee Replacements     CATARACT EXTRACTION WITH IOL  2001    NEUROMA EXCISION  1985    Right Foot    CHOLECYSTECTOMY  1970    APPENDECTOMY  1957    OTHER  1952    Varicose veins    APPENDECTOMY      CHOLECYSTECTOMY      HYSTERECTOMY, TOTAL ABDOMINAL      KNEE REPLACEMENT, TOTAL Bilateral     US-NEEDLE CORE BX-BREAST PANEL       SOCHX:  reports that she has never smoked. She has  "never used smokeless tobacco. She reports that she does not drink alcohol and does not use drugs.  FH: Reviewed with patient, not pertinent to this visit.           Patient brought in by son for evaluation of worsening left arm/shoulder pain for the last 2 weeks.  Patient has been complaining about pain when getting dressed, using her arm to stand up from a seated position, doing her hair etc.  Patient has not had a recent fall, but fractured this humerus 2 years ago and it is sore in the same place.  Patient's son is concerned that she may have reinjured it, though he has not noticed any bruising, swelling, erythema or signs of injury..  Patient has been taking some over-the-counter Tylenol with little relief of the pain.  Patient denies chest pain, jaw pain, neck pain, shortness of breath or any other cardiac symptoms.  Patient states pain is limited to her arm. No other complaints.     Arm Pain   The incident occurred more than 1 week ago. There was no injury mechanism. The pain is present in the upper left arm. The quality of the pain is described as stabbing. The pain does not radiate. The pain is moderate. The pain has been Intermittent since the incident. Pertinent negatives include no chest pain, muscle weakness, numbness or tingling. The symptoms are aggravated by movement, lifting and palpation. She has tried rest and acetaminophen for the symptoms. The treatment provided mild relief.       Review of Systems   Cardiovascular:  Negative for chest pain.   Musculoskeletal:         Left upper arm pain   Neurological:  Negative for tingling and numbness.   All other systems reviewed and are negative.             Objective     /70 (BP Location: Right arm, Patient Position: Sitting, BP Cuff Size: Adult)   Pulse 88   Temp 36.2 °C (97.1 °F) (Temporal)   Resp 20   Ht 1.549 m (5' 1\")   Wt 82.3 kg (181 lb 6.4 oz)   SpO2 95%   BMI 34.28 kg/m²      Physical Exam  Vitals and nursing note reviewed. "   Constitutional:       General: She is not in acute distress.     Appearance: Normal appearance. She is well-developed. She is not diaphoretic.   HENT:      Head: Normocephalic and atraumatic.      Nose: Nose normal.      Mouth/Throat:      Lips: Pink.      Mouth: Mucous membranes are moist.   Eyes:      Extraocular Movements: Extraocular movements intact.      Conjunctiva/sclera: Conjunctivae normal.      Pupils: Pupils are equal, round, and reactive to light.   Cardiovascular:      Rate and Rhythm: Normal rate and regular rhythm.      Pulses: Normal pulses.      Heart sounds: Normal heart sounds.   Pulmonary:      Effort: Pulmonary effort is normal.   Abdominal:      General: Abdomen is flat.   Musculoskeletal:        Arms:       Cervical back: Normal range of motion and neck supple.   Skin:     General: Skin is warm and dry.      Capillary Refill: Capillary refill takes less than 2 seconds.   Neurological:      General: No focal deficit present.      Mental Status: She is alert and oriented to person, place, and time.   Psychiatric:         Mood and Affect: Mood normal.                             Assessment & Plan                   1. Left arm pain  DX-HUMERUS 2+ LEFT    diclofenac DR (VOLTAREN) 25 MG Tablet Delayed Response               RADIOLOGY RESULTS   DX-HUMERUS 2+ LEFT    Result Date: 9/9/2023 9/9/2023 1:00 PM HISTORY/REASON FOR EXAM:  Atraumatic Pain/Swelling/Deformity; history of humerus fx 2 years ago Fall. Shoulder pain. TECHNIQUE/EXAM DESCRIPTION AND NUMBER OF VIEWS:  2 views of the LEFT humerus. COMPARISON: None FINDINGS: Diffuse osseous demineralization. Old deformity of the proximal humerus. No acute fracture or dislocation. Moderate osteoarthritis.     Old deformity of the proximal humerus. No definite or acute fracture is seen but evaluation is limited.         PT has rx for diclofenac but is almost out, would like a refill for pain control.  Rx sent to pharmacy.     Travis offered, politely  declined.     PT encouraged to follow up with PCP in 2-3 days if no improvement in symptoms.     Differential diagnosis, supportive care, and indications for immediate follow-up discussed with patient.  Instructed to return to clinic or nearest emergency department for any change in condition, further concerns, or worsening of symptoms.    I personally reviewed prior external notes and test results pertinent to today's visit.  I have independently reviewed and interpreted all diagnostics ordered during this urgent care visit.    PT should follow up with PCP in 1-2 days for re-evaluation if symptoms have not improved.      Discussed red flags and reasons to return to UC or ED.      Pt and/or family verbalized understanding of diagnosis and follow up instructions and was offered informational handout on diagnosis.  PT discharged.     Please note that this dictation was created using voice recognition software. I have made every reasonable attempt to correct obvious errors, but I expect that there may be errors of grammar and possibly content that I did not discover before finalizing the note.

## 2023-09-27 PROBLEM — I48.20 CHRONIC ATRIAL FIBRILLATION (HCC): Status: ACTIVE | Noted: 2023-09-27

## 2023-09-27 PROBLEM — M81.0 AGE RELATED OSTEOPOROSIS: Status: ACTIVE | Noted: 2023-09-27

## 2023-09-27 PROBLEM — D32.9 MENINGIOMA (HCC): Status: ACTIVE | Noted: 2023-09-27

## 2023-09-27 PROBLEM — I12.9 HYPERTENSIVE KIDNEY DISEASE WITH STAGE 3A CHRONIC KIDNEY DISEASE: Status: ACTIVE | Noted: 2023-09-27

## 2023-09-27 PROBLEM — N18.31 HYPERTENSIVE KIDNEY DISEASE WITH STAGE 3A CHRONIC KIDNEY DISEASE: Status: ACTIVE | Noted: 2023-09-27

## 2023-09-27 PROBLEM — H35.3290 MACULAR DEGENERATION, WET (HCC): Status: ACTIVE | Noted: 2022-09-16

## 2023-09-27 PROBLEM — D68.69 SECONDARY HYPERCOAGULABLE STATE (HCC): Status: ACTIVE | Noted: 2022-09-16

## 2023-09-27 PROBLEM — E66.01 MORBID OBESITY (HCC): Status: ACTIVE | Noted: 2023-09-27

## 2023-09-28 PROBLEM — I11.0 HYPERTENSIVE HEART DISEASE WITH DIASTOLIC CONGESTIVE HEART FAILURE (HCC): Status: ACTIVE | Noted: 2023-09-28

## 2023-09-28 PROBLEM — I50.30 HYPERTENSIVE HEART DISEASE WITH DIASTOLIC CONGESTIVE HEART FAILURE (HCC): Status: ACTIVE | Noted: 2023-09-28

## 2023-10-18 ENCOUNTER — HOSPITAL ENCOUNTER (OUTPATIENT)
Dept: LAB | Facility: MEDICAL CENTER | Age: 88
End: 2023-10-18
Attending: FAMILY MEDICINE
Payer: MEDICARE

## 2023-10-18 LAB
EST. AVERAGE GLUCOSE BLD GHB EST-MCNC: 120 MG/DL
HBA1C MFR BLD: 5.8 % (ref 4–5.6)

## 2023-10-18 PROCEDURE — 83036 HEMOGLOBIN GLYCOSYLATED A1C: CPT

## 2023-10-18 PROCEDURE — 36415 COLL VENOUS BLD VENIPUNCTURE: CPT

## 2024-01-01 ENCOUNTER — APPOINTMENT (OUTPATIENT)
Dept: RADIOLOGY | Facility: MEDICAL CENTER | Age: 89
DRG: 552 | End: 2024-01-01
Attending: EMERGENCY MEDICINE
Payer: MEDICARE

## 2024-01-01 ENCOUNTER — HOSPITAL ENCOUNTER (EMERGENCY)
Facility: MEDICAL CENTER | Age: 89
DRG: 552 | End: 2024-01-01
Attending: EMERGENCY MEDICINE
Payer: MEDICARE

## 2024-01-01 VITALS
HEART RATE: 92 BPM | BODY MASS INDEX: 34.36 KG/M2 | WEIGHT: 175 LBS | RESPIRATION RATE: 12 BRPM | TEMPERATURE: 97.6 F | DIASTOLIC BLOOD PRESSURE: 67 MMHG | HEIGHT: 60 IN | OXYGEN SATURATION: 93 % | SYSTOLIC BLOOD PRESSURE: 148 MMHG

## 2024-01-01 DIAGNOSIS — S32.120A CLOSED NONDISPLACED ZONE II FRACTURE OF SACRUM, INITIAL ENCOUNTER (HCC): ICD-10-CM

## 2024-01-01 DIAGNOSIS — R74.8 ELEVATED LIVER ENZYMES: ICD-10-CM

## 2024-01-01 LAB
ALBUMIN SERPL BCP-MCNC: 4.2 G/DL (ref 3.2–4.9)
ALBUMIN/GLOB SERPL: 1.6 G/DL
ALP SERPL-CCNC: 222 U/L (ref 30–99)
ALT SERPL-CCNC: 745 U/L (ref 2–50)
ANION GAP SERPL CALC-SCNC: 13 MMOL/L (ref 7–16)
APPEARANCE UR: CLEAR
AST SERPL-CCNC: 441 U/L (ref 12–45)
BASOPHILS # BLD AUTO: 0 % (ref 0–1.8)
BASOPHILS # BLD: 0 K/UL (ref 0–0.12)
BILIRUB SERPL-MCNC: 1.6 MG/DL (ref 0.1–1.5)
BILIRUB UR QL STRIP.AUTO: NEGATIVE
BUN SERPL-MCNC: 36 MG/DL (ref 8–22)
CALCIUM ALBUM COR SERPL-MCNC: 8.3 MG/DL (ref 8.5–10.5)
CALCIUM SERPL-MCNC: 8.5 MG/DL (ref 8.5–10.5)
CHLORIDE SERPL-SCNC: 102 MMOL/L (ref 96–112)
CO2 SERPL-SCNC: 23 MMOL/L (ref 20–33)
COLOR UR: YELLOW
CREAT SERPL-MCNC: 1.13 MG/DL (ref 0.5–1.4)
EKG IMPRESSION: NORMAL
EOSINOPHIL # BLD AUTO: 0 K/UL (ref 0–0.51)
EOSINOPHIL NFR BLD: 0 % (ref 0–6.9)
ERYTHROCYTE [DISTWIDTH] IN BLOOD BY AUTOMATED COUNT: 51.3 FL (ref 35.9–50)
GFR SERPLBLD CREATININE-BSD FMLA CKD-EPI: 45 ML/MIN/1.73 M 2
GLOBULIN SER CALC-MCNC: 2.7 G/DL (ref 1.9–3.5)
GLUCOSE SERPL-MCNC: 122 MG/DL (ref 65–99)
GLUCOSE UR STRIP.AUTO-MCNC: NEGATIVE MG/DL
HCT VFR BLD AUTO: 47.1 % (ref 37–47)
HGB BLD-MCNC: 15.3 G/DL (ref 12–16)
KETONES UR STRIP.AUTO-MCNC: 15 MG/DL
LEUKOCYTE ESTERASE UR QL STRIP.AUTO: NEGATIVE
LIPASE SERPL-CCNC: 22 U/L (ref 11–82)
LYMPHOCYTES # BLD AUTO: 0.97 K/UL (ref 1–4.8)
LYMPHOCYTES NFR BLD: 6.9 % (ref 22–41)
MANUAL DIFF BLD: NORMAL
MCH RBC QN AUTO: 29.6 PG (ref 27–33)
MCHC RBC AUTO-ENTMCNC: 32.5 G/DL (ref 32.2–35.5)
MCV RBC AUTO: 91.1 FL (ref 81.4–97.8)
MICRO URNS: ABNORMAL
MONOCYTES # BLD AUTO: 0.49 K/UL (ref 0–0.85)
MONOCYTES NFR BLD AUTO: 3.5 % (ref 0–13.4)
MORPHOLOGY BLD-IMP: NORMAL
NEUTROPHILS # BLD AUTO: 12.54 K/UL (ref 1.82–7.42)
NEUTROPHILS NFR BLD: 89.6 % (ref 44–72)
NITRITE UR QL STRIP.AUTO: NEGATIVE
NRBC # BLD AUTO: 0 K/UL
NRBC BLD-RTO: 0 /100 WBC (ref 0–0.2)
PH UR STRIP.AUTO: 5.5 [PH] (ref 5–8)
PLATELET # BLD AUTO: 320 K/UL (ref 164–446)
PLATELET BLD QL SMEAR: NORMAL
PMV BLD AUTO: 8.6 FL (ref 9–12.9)
POTASSIUM SERPL-SCNC: 5.4 MMOL/L (ref 3.6–5.5)
PROT SERPL-MCNC: 6.9 G/DL (ref 6–8.2)
PROT UR QL STRIP: NEGATIVE MG/DL
RBC # BLD AUTO: 5.17 M/UL (ref 4.2–5.4)
RBC BLD AUTO: NORMAL
RBC UR QL AUTO: NEGATIVE
SODIUM SERPL-SCNC: 138 MMOL/L (ref 135–145)
SP GR UR STRIP.AUTO: 1.02
UROBILINOGEN UR STRIP.AUTO-MCNC: 0.2 MG/DL
WBC # BLD AUTO: 14 K/UL (ref 4.8–10.8)

## 2024-01-01 PROCEDURE — 96375 TX/PRO/DX INJ NEW DRUG ADDON: CPT

## 2024-01-01 PROCEDURE — 85007 BL SMEAR W/DIFF WBC COUNT: CPT

## 2024-01-01 PROCEDURE — 700102 HCHG RX REV CODE 250 W/ 637 OVERRIDE(OP): Performed by: EMERGENCY MEDICINE

## 2024-01-01 PROCEDURE — 80053 COMPREHEN METABOLIC PANEL: CPT

## 2024-01-01 PROCEDURE — 81003 URINALYSIS AUTO W/O SCOPE: CPT

## 2024-01-01 PROCEDURE — 83690 ASSAY OF LIPASE: CPT

## 2024-01-01 PROCEDURE — 700101 HCHG RX REV CODE 250: Performed by: EMERGENCY MEDICINE

## 2024-01-01 PROCEDURE — A9270 NON-COVERED ITEM OR SERVICE: HCPCS | Performed by: EMERGENCY MEDICINE

## 2024-01-01 PROCEDURE — 700111 HCHG RX REV CODE 636 W/ 250 OVERRIDE (IP): Performed by: EMERGENCY MEDICINE

## 2024-01-01 PROCEDURE — 36415 COLL VENOUS BLD VENIPUNCTURE: CPT

## 2024-01-01 PROCEDURE — 93005 ELECTROCARDIOGRAM TRACING: CPT | Performed by: EMERGENCY MEDICINE

## 2024-01-01 PROCEDURE — 72131 CT LUMBAR SPINE W/O DYE: CPT

## 2024-01-01 PROCEDURE — 96374 THER/PROPH/DIAG INJ IV PUSH: CPT

## 2024-01-01 PROCEDURE — 85027 COMPLETE CBC AUTOMATED: CPT

## 2024-01-01 PROCEDURE — 99285 EMERGENCY DEPT VISIT HI MDM: CPT

## 2024-01-01 RX ORDER — LIDOCAINE 4 G/G
1 PATCH TOPICAL EVERY 24 HOURS
Status: DISCONTINUED | OUTPATIENT
Start: 2024-01-01 | End: 2024-01-01 | Stop reason: HOSPADM

## 2024-01-01 RX ORDER — GABAPENTIN 100 MG/1
200 CAPSULE ORAL ONCE
Status: COMPLETED | OUTPATIENT
Start: 2024-01-01 | End: 2024-01-01

## 2024-01-01 RX ORDER — GABAPENTIN 100 MG/1
100 CAPSULE ORAL 3 TIMES DAILY
Qty: 90 CAPSULE | Refills: 0 | Status: SHIPPED | OUTPATIENT
Start: 2024-01-01 | End: 2024-01-02

## 2024-01-01 RX ORDER — DILTIAZEM HYDROCHLORIDE 5 MG/ML
10 INJECTION INTRAVENOUS ONCE
Status: COMPLETED | OUTPATIENT
Start: 2024-01-01 | End: 2024-01-01

## 2024-01-01 RX ORDER — KETOROLAC TROMETHAMINE 30 MG/ML
15 INJECTION, SOLUTION INTRAMUSCULAR; INTRAVENOUS ONCE
Status: COMPLETED | OUTPATIENT
Start: 2024-01-01 | End: 2024-01-01

## 2024-01-01 RX ADMIN — GABAPENTIN 200 MG: 100 CAPSULE ORAL at 11:41

## 2024-01-01 RX ADMIN — KETOROLAC TROMETHAMINE 15 MG: 30 INJECTION, SOLUTION INTRAMUSCULAR; INTRAVENOUS at 11:43

## 2024-01-01 RX ADMIN — DILTIAZEM HYDROCHLORIDE 10 MG: 5 INJECTION, SOLUTION INTRAVENOUS at 11:45

## 2024-01-01 RX ADMIN — LIDOCAINE 1 PATCH: 4 PATCH TOPICAL at 14:15

## 2024-01-01 ASSESSMENT — FIBROSIS 4 INDEX: FIB4 SCORE: 2.08

## 2024-01-01 NOTE — DISCHARGE INSTRUCTIONS
Buy some lidocaine patches for the affected area  Neurontin as directed for pain relief  May need a home health aide in the short-term  May need to consider long-term care facility

## 2024-01-01 NOTE — ED PROVIDER NOTES
ER Provider Note    Scribed for Chris Kerns M.d. by Kaitlyn Dodson. 1/1/2024  10:44 AM    Primary Care Provider: Bebe Santamaria M.D.    CHIEF COMPLAINT  Chief Complaint   Patient presents with    Knee Pain     Pt miriam [place] from home, pt woke up with 10/10 right knee pain, pt has been having right leg pain since christmas, pt states she had a right knee replacement 20 yrs ago, pt states she get pain shots in her right leg but does not know the name of medication, normally pt walks with one person assist, no trauma, a&o x4, gcs 15, bp 140/100, pt was given 2mg morphine IV right arm     EXTERNAL RECORDS REVIEWED  Outpatient Notes Patient was seen at geriatric follow up on 9/27/23. She was last seen at ED on 3/18/22 for a ground level fall.    HPI/ROS  LIMITATION TO HISTORY   Select: Poor historian  OUTSIDE HISTORIAN(S):  Family Son and EMS report    Saumya Vann is a 89 y.o. female with a history of diabetes, hypertension and macular degeneration who presents to the ED via EMS complaining of right hip pain. Per EMS report, patient woke up with 10/10 right knee pain since Christmas. Patient had a right knee replacement 20 years ago. Per family, she gets Toradol shots in her right leg. Patient normally walks with one person assist. Patient came from home, she lives with her family. Son states patient was initially complaining about pain behind the knee. Son reports giving oxycodone for symptomatic relief. He states when she stands on right leg, her left leg twitches but he is ultimately unsure which leg is the one causing her the most discomfort. Denies shortness of breath. No known trauma. Patient was given 2mg morphine by EMS en route. Patient is on 81 mg of Aspirin daily. No alleviating or exacerbating factors noted. Drug allergy to penicillin.    PAST MEDICAL HISTORY  Past Medical History:   Diagnosis Date    A-fib (HCC)     Breath shortness     Cataract 04/23/2021    Surgically removed bilat    Diabetes  ([facility]) 04/23/2021    Diet-controlled    DM2 (diabetes mellitus, type 2) ([facility]) 11/17/2014    Dry cough 04/23/2021    Glaucoma 04/23/2021    Left eye    Hypertension     Hypothyroid     Macular degeneration     Pain 04/23/2021    Back and Leg    Urinary incontinence      SURGICAL HISTORY  Past Surgical History:   Procedure Laterality Date    OTHER ORTHOPEDIC SURGERY  2002    2 Knee Replacements     CATARACT EXTRACTION WITH IOL  2001    NEUROMA EXCISION  1985    Right Foot    CHOLECYSTECTOMY  1970    APPENDECTOMY  1957    OTHER  1952    Varicose veins    APPENDECTOMY      CHOLECYSTECTOMY      HYSTERECTOMY, TOTAL ABDOMINAL      KNEE REPLACEMENT, TOTAL Bilateral     US-NEEDLE CORE BX-BREAST PANEL       FAMILY HISTORY  Family History   Problem Relation Age of Onset    Cancer Other      SOCIAL HISTORY   reports that she has never smoked. She has never used smokeless tobacco. She reports that she does not drink alcohol and does not use drugs.    CURRENT MEDICATIONS  Previous Medications    ACETAMINOPHEN (TYLENOL) 500 MG TAB    Take 500-1,000 mg by mouth every 6 hours as needed for Mild Pain.    ALENDRONATE (FOSAMAX) 70 MG TAB    Take 70 mg by mouth every Monday.    ASPIRIN (EQ ASPIRIN ADULT LOW DOSE) 81 MG EC TABLET    Take 1 Tablet by mouth every day.    B COMPLEX VITAMINS (B COMPLEX PO)    Take  by mouth.    B COMPLEX VITAMINS (VITAMIN B COMPLEX PO)    Take 1 Tab by mouth 2 Times a Day.    BEVACIZUMAB (AVASTIN IV)    Infuse  into a venous catheter. Every month. Every other eye    CETIRIZINE HCL (ZYRTEC PO)    Take  by mouth every day.    CHOLECALCIFEROL (VITAMIN D) 125 MCG (5000 UT) CAP    Take 1 Capsule by mouth every day.    CYCLOBENZAPRINE (FLEXERIL) 5 MG TABLET    Take 1 Tablet by mouth 1 time a day as needed for Muscle Spasms.    DICLOFENAC DR (VOLTAREN) 25 MG TABLET DELAYED RESPONSE    Take 2 Tablets by mouth every evening.    DILTIAZEM CD (CARDIZEM CD) 120 MG CAPSULE SR 24 HR    Take 1 Capsule by mouth 2 times a  day.    DOCUSATE SODIUM (COLACE) 100 MG CAP    Take 100 mg by mouth every day.    IPRATROPIUM (ATROVENT) 0.03 % SOLUTION    USE 2 SPRAY(S) IN EACH NOSTRIL THREE TIMES DAILY AS NEEDED FOR RUNNY NOSE    LACTOBACILLUS-INULIN (CULTURELLE ADULT ULT BALANCE PO)    Take  by mouth.    LATANOPROST (XALATAN) 0.005 % SOLUTION    Place 1 Drop in left eye every bedtime.    LEVOTHYROXINE (SYNTHROID) 150 MCG TABS    Take 150-225 mcg by mouth see administration instructions. 150 mcg on Tuesday, Thursday, Saturday, and Sunday   225 mcg on Monday, Wednesday, and Friday    MULTIPLE VITAMIN (MULTI-VITAMIN DAILY PO)    Take  by mouth.    MULTIPLE VITAMINS-MINERALS (ONE DAILY COMPLETE PO)    Take 1 Capsule by mouth every day.    MULTIPLE VITAMINS-MINERALS (PRESERVISION AREDS 2) CAP    Take 1 Capsule by mouth every day.    NAPROXEN SODIUM 220 MG CAP    Take 220 mg by mouth 1 time a day as needed (For pain, if tylenol does not relieve pain).    NEOMYCIN-GRAMICIDIN-POLYMYXIN (NEOSPORIN) 1.75-03055-.025 SOLUTION OPHTHALMIC SOLUTION    INSTILL 1/2 INCH RIBBON TO BOTH LOWER LIDS AS NEEDED FOR DISCOMFORT    OMEPRAZOLE (PRILOSEC) 20 MG DELAYED-RELEASE CAPSULE    Take 20 mg by mouth every evening.    SPIRONOLACTONE (ALDACTONE) 25 MG TAB    Take 25 mg by mouth every day.     ALLERGIES  Pcn [penicillins]    PHYSICAL EXAM  /76   Pulse (!) 109   Temp 36.4 °C (97.5 °F) (Temporal)   Resp 15   Ht 1.524 m (5')   Wt 79.4 kg (175 lb)   SpO2 91%   BMI 34.18 kg/m²   Constitutional: Well developed, Well nourished, No acute distress, Non-toxic appearance.   HENT: Normocephalic, Atraumatic, Bilateral external ears normal, Oropharynx is clear mucous membranes are moist. No oral exudates or nasal discharge.   Eyes: Pupils are equal round and reactive, EOMI, Conjunctiva normal, No discharge.   Neck: Normal range of motion, No tenderness, Supple, No stridor. No meningismus.  Lymphatic: No lymphadenopathy noted.   Cardiovascular: Regular rate and rhythm  without murmur rub or gallop.  Thorax & Lungs: Clear breath sounds bilaterally without wheezes, rhonchi or rales. There is no chest wall tenderness.   Abdomen: Soft non-tender non-distended. There is no rebound or guarding. No organomegaly is appreciated. Bowel sounds are normal.  Skin: Normal without rash.   Back: No CVA or spinal tenderness.   Extremities: Intact distal pulses, No edema, No tenderness, No cyanosis, No clubbing. Capillary refill is less than 2 seconds.  Musculoskeletal: Good range of motion in all major joints. No tenderness to palpation or major deformities noted.   Neurologic: Alert & oriented x 3, Normal motor function, Normal sensory function, No focal deficits noted. Reflexes are normal.  Psychiatric: Affect normal, Judgment normal, Mood normal. There is no suicidal ideation or patient reported hallucinations.     DIAGNOSTIC STUDIES  Labs:   Labs Reviewed   CBC WITH DIFFERENTIAL - Abnormal; Notable for the following components:       Result Value    WBC 14.0 (*)     Hematocrit 47.1 (*)     RDW 51.3 (*)     MPV 8.6 (*)     Neutrophils-Polys 89.60 (*)     Lymphocytes 6.90 (*)     Neutrophils (Absolute) 12.54 (*)     Lymphs (Absolute) 0.97 (*)     All other components within normal limits   COMP METABOLIC PANEL - Abnormal; Notable for the following components:    Glucose 122 (*)     Bun 36 (*)     Correct Calcium 8.3 (*)     AST(SGOT) 441 (*)     ALT(SGPT) 745 (*)     Alkaline Phosphatase 222 (*)     Total Bilirubin 1.6 (*)     All other components within normal limits   URINALYSIS,CULTURE IF INDICATED - Abnormal; Notable for the following components:    Ketones 15 (*)     All other components within normal limits    Narrative:     Indication for culture:->Patient WITHOUT an indwelling Gallardo  catheter in place with new onset of Dysuria, Frequency,  Urgency, and/or Suprapubic pain   ESTIMATED GFR - Abnormal; Notable for the following components:    GFR (CKD-EPI) 45 (*)     All other components  within normal limits   LIPASE   DIFFERENTIAL MANUAL   PERIPHERAL SMEAR REVIEW   PLATELET ESTIMATE   MORPHOLOGY     EKG:   I have independently interpreted this EKG  Results for orders placed or performed during the hospital encounter of 24   EKG (NOW)   Result Value Ref Range    Report       [facility] Emergency Dept[facility]    Test Date:  2024  Pt Name:    NATACHA DIAS                  Department: ER  MRN:        [id number]                      Room:       [facility]  Gender:     Female                       Technician: [id number]  :        [id number]                   Requested By:JUAN M ABRAHAM  Order #:    [id number]                    Reading MD:    Measurements  Intervals                                Axis  Rate:       101                          P:          0  DC:         0                            QRS:        53  QRSD:       100                          T:          105  QT:         313  QTc:        406    Interpretive Statements  Atrial fibrillation  Ventricular premature complex  Borderline repolarization abnormality  Compared to ECG 2021 15:24:30  No significant changes       Radiology:   The attending emergency physician has independently interpreted the diagnostic imaging associated with this visit and am waiting the final reading from the radiologist.   Preliminary interpretation is a follows: Possible fracture of sacrum    Radiologist interpretation:   CT-LSPINE W/O PLUS RECONS   Final Result         1. Cement augmentation of the L4 vertebral body with moderate vertebral height loss.      2. Sclerotic appearance of the T11 and T12 vertebral bodies could relate to mild chronic compression fractures.      3. Subacute to chronic compression fracture of S2 with mild step-off and sclerosis.            US-RUQ    (Results Pending)     COURSE & MEDICAL DECISION MAKING   ED Observation Status? No; the patient does not meet criteria for ED observation at this time.    INITIAL ASSESSMENT,  COURSE AND PLAN  Care Narrative:   10:48 AM - Patient was seen and evaluated at bedside. Patient presents to the ED via EMS for right knee pain. After my exam, I discussed with the patient the plan of care, which includes obtaining lab work and imaging for further evaluation. Patient understands and verbalizes agreement to plan of care.     10:57 AM - Son at bedside to provide additional history.     12:05 PM - Lab work returned, enzymes are elevated. Comparison to prior lab work shows:   5/11/23 AST: 26, today: 441   5/11/23 ALT: 16; today: 745   5/11/23 Alkaline phosphate: 90; today: 222   5/11/23 Bilirubin: 0.6; today: 1.6    12:16 PM - Spoke with patient's son, patient has been on high dose of Tylenol 1000 mg, 3 times a day because of sciatica.  Considered ultrasound but ultimately canceled this because she is already had her gallbladder out and I think this is secondary to mild Tylenol toxicity    HTN/IDDM FOLLOW UP:  The patient has known hypertension and is being followed by their primary care doctor    Laboratory evaluation reveals white blood cell count elevated to 14,000 with 89% PMN shift.  I think this is likely secondary to fracture.  Urinalysis is negative for infection.    DISPOSITION AND DISCUSSIONS    Escalation of care considered, and ultimately not performed: acute inpatient care management, however at this time, the patient is most appropriate for outpatient management.  Both of her sons are at the bedside.  They can take her home and care for her.  They understand lidocaine patches locally for pain along with Neurontin but that opiates should be avoided because of her age group    Decision tools and prescription drugs considered including, but not limited to: Pain Medications we will stick with nonnarcotics to avoid delirium and constipation .    FINAL DIAGNOSIS  1. Closed nondisplaced zone II fracture of sacrum, initial encounter ([facility])    2. Elevated liver enzymes           The note accurately  reflects work and decisions made by me.  Chris Kerns M.D.  1/1/2024  1:20 PM

## 2024-01-01 NOTE — ED TRIAGE NOTES
Chief Complaint   Patient presents with    Knee Pain     Pt miriam [place] from home, pt woke up with 10/10 right knee pain, pt has been having right leg pain since shanell, pt states she had a right knee replacement 20 yrs ago, pt states she get pain shots in her right leg but does not know the name of medication, normally pt walks with one person assist, no trauma, a&o x4, gcs 15, bp 140/100, pt was given 2mg morphine IV right arm     /76   Pulse (!) 109   Temp 36.4 °C (97.5 °F) (Temporal)   Resp 15   Ht 1.524 m (5')   Wt 79.4 kg (175 lb)   SpO2 91%   BMI 34.18 kg/m²     Pt states her pain is better after the morphine medication took effect.

## 2024-01-01 NOTE — ED NOTES
Discharge paperwork given to pt, all questions answered, all belongings accounted for, pt was wheeled to ER exit

## 2024-01-02 ENCOUNTER — APPOINTMENT (OUTPATIENT)
Dept: RADIOLOGY | Facility: MEDICAL CENTER | Age: 89
DRG: 552 | End: 2024-01-02
Attending: EMERGENCY MEDICINE
Payer: MEDICARE

## 2024-01-02 ENCOUNTER — APPOINTMENT (OUTPATIENT)
Dept: RADIOLOGY | Facility: MEDICAL CENTER | Age: 89
DRG: 552 | End: 2024-01-02
Attending: STUDENT IN AN ORGANIZED HEALTH CARE EDUCATION/TRAINING PROGRAM
Payer: MEDICARE

## 2024-01-02 ENCOUNTER — HOSPITAL ENCOUNTER (INPATIENT)
Facility: MEDICAL CENTER | Age: 89
LOS: 3 days | DRG: 552 | End: 2024-01-05
Attending: EMERGENCY MEDICINE | Admitting: STUDENT IN AN ORGANIZED HEALTH CARE EDUCATION/TRAINING PROGRAM
Payer: MEDICARE

## 2024-01-02 DIAGNOSIS — S32.10XA CLOSED FRACTURE OF SACRUM, UNSPECIFIED FRACTURE MORPHOLOGY, INITIAL ENCOUNTER (HCC): ICD-10-CM

## 2024-01-02 DIAGNOSIS — R26.2 INABILITY TO AMBULATE DUE TO RIGHT HIP: ICD-10-CM

## 2024-01-02 DIAGNOSIS — M79.604 RIGHT LEG PAIN: ICD-10-CM

## 2024-01-02 DIAGNOSIS — R53.1 GENERALIZED WEAKNESS: ICD-10-CM

## 2024-01-02 PROBLEM — R54 AGE-RELATED PHYSICAL DEBILITY: Status: ACTIVE | Noted: 2024-01-02

## 2024-01-02 PROBLEM — M25.551 ACUTE RIGHT HIP PAIN: Status: ACTIVE | Noted: 2024-01-02

## 2024-01-02 PROBLEM — N18.30 CKD (CHRONIC KIDNEY DISEASE) STAGE 3, GFR 30-59 ML/MIN: Status: ACTIVE | Noted: 2024-01-02

## 2024-01-02 PROBLEM — M25.552 HIP PAIN, ACUTE, LEFT: Status: ACTIVE | Noted: 2024-01-02

## 2024-01-02 LAB
ALBUMIN SERPL BCP-MCNC: 4.3 G/DL (ref 3.2–4.9)
ALBUMIN/GLOB SERPL: 1.5 G/DL
ALP SERPL-CCNC: 220 U/L (ref 30–99)
ALT SERPL-CCNC: 543 U/L (ref 2–50)
ANION GAP SERPL CALC-SCNC: 13 MMOL/L (ref 7–16)
APPEARANCE UR: CLEAR
AST SERPL-CCNC: 178 U/L (ref 12–45)
BACTERIA #/AREA URNS HPF: ABNORMAL /HPF
BASOPHILS # BLD AUTO: 0 % (ref 0–1.8)
BASOPHILS # BLD: 0 K/UL (ref 0–0.12)
BILIRUB SERPL-MCNC: 1 MG/DL (ref 0.1–1.5)
BILIRUB UR QL STRIP.AUTO: NEGATIVE
BUN SERPL-MCNC: 48 MG/DL (ref 8–22)
CALCIUM ALBUM COR SERPL-MCNC: 9 MG/DL (ref 8.5–10.5)
CALCIUM SERPL-MCNC: 9.2 MG/DL (ref 8.5–10.5)
CHLORIDE SERPL-SCNC: 103 MMOL/L (ref 96–112)
CK SERPL-CCNC: 163 U/L (ref 0–154)
CO2 SERPL-SCNC: 24 MMOL/L (ref 20–33)
COLOR UR: YELLOW
CREAT SERPL-MCNC: 1.35 MG/DL (ref 0.5–1.4)
CRP SERPL HS-MCNC: 0.44 MG/DL (ref 0–0.75)
EOSINOPHIL # BLD AUTO: 0 K/UL (ref 0–0.51)
EOSINOPHIL NFR BLD: 0 % (ref 0–6.9)
EPI CELLS #/AREA URNS HPF: ABNORMAL /HPF
ERYTHROCYTE [DISTWIDTH] IN BLOOD BY AUTOMATED COUNT: 52.9 FL (ref 35.9–50)
GFR SERPLBLD CREATININE-BSD FMLA CKD-EPI: 37 ML/MIN/1.73 M 2
GLOBULIN SER CALC-MCNC: 2.9 G/DL (ref 1.9–3.5)
GLUCOSE SERPL-MCNC: 105 MG/DL (ref 65–99)
GLUCOSE UR STRIP.AUTO-MCNC: NEGATIVE MG/DL
HCT VFR BLD AUTO: 47.7 % (ref 37–47)
HGB BLD-MCNC: 15.3 G/DL (ref 12–16)
KETONES UR STRIP.AUTO-MCNC: 15 MG/DL
LEUKOCYTE ESTERASE UR QL STRIP.AUTO: ABNORMAL
LYMPHOCYTES # BLD AUTO: 1.26 K/UL (ref 1–4.8)
LYMPHOCYTES NFR BLD: 6.9 % (ref 22–41)
MANUAL DIFF BLD: NORMAL
MCH RBC QN AUTO: 29.9 PG (ref 27–33)
MCHC RBC AUTO-ENTMCNC: 32.1 G/DL (ref 32.2–35.5)
MCV RBC AUTO: 93.3 FL (ref 81.4–97.8)
MICRO URNS: ABNORMAL
MONOCYTES # BLD AUTO: 0.48 K/UL (ref 0–0.85)
MONOCYTES NFR BLD AUTO: 2.6 % (ref 0–13.4)
MORPHOLOGY BLD-IMP: NORMAL
MUCOUS THREADS #/AREA URNS HPF: ABNORMAL /HPF
MYELOCYTES NFR BLD MANUAL: 2.6 %
NEUTROPHILS # BLD AUTO: 16.09 K/UL (ref 1.82–7.42)
NEUTROPHILS NFR BLD: 87.9 % (ref 44–72)
NITRITE UR QL STRIP.AUTO: NEGATIVE
NRBC # BLD AUTO: 0 K/UL
NRBC BLD-RTO: 0 /100 WBC (ref 0–0.2)
PH UR STRIP.AUTO: 5 [PH] (ref 5–8)
PLATELET # BLD AUTO: 309 K/UL (ref 164–446)
PLATELET BLD QL SMEAR: NORMAL
PMV BLD AUTO: 8.6 FL (ref 9–12.9)
POTASSIUM SERPL-SCNC: 5.3 MMOL/L (ref 3.6–5.5)
PROCALCITONIN SERPL-MCNC: 0.13 NG/ML
PROT SERPL-MCNC: 7.2 G/DL (ref 6–8.2)
PROT UR QL STRIP: NEGATIVE MG/DL
RBC # BLD AUTO: 5.11 M/UL (ref 4.2–5.4)
RBC # URNS HPF: ABNORMAL /HPF
RBC BLD AUTO: NORMAL
RBC UR QL AUTO: NEGATIVE
SODIUM SERPL-SCNC: 140 MMOL/L (ref 135–145)
SP GR UR STRIP.AUTO: 1.02
TROPONIN T SERPL-MCNC: 44 NG/L (ref 6–19)
UROBILINOGEN UR STRIP.AUTO-MCNC: 0.2 MG/DL
WBC # BLD AUTO: 18.3 K/UL (ref 4.8–10.8)
WBC #/AREA URNS HPF: ABNORMAL /HPF

## 2024-01-02 PROCEDURE — 81001 URINALYSIS AUTO W/SCOPE: CPT

## 2024-01-02 PROCEDURE — 99497 ADVNCD CARE PLAN 30 MIN: CPT | Performed by: STUDENT IN AN ORGANIZED HEALTH CARE EDUCATION/TRAINING PROGRAM

## 2024-01-02 PROCEDURE — 71045 X-RAY EXAM CHEST 1 VIEW: CPT

## 2024-01-02 PROCEDURE — 73552 X-RAY EXAM OF FEMUR 2/>: CPT | Mod: RT

## 2024-01-02 PROCEDURE — 84484 ASSAY OF TROPONIN QUANT: CPT

## 2024-01-02 PROCEDURE — 85007 BL SMEAR W/DIFF WBC COUNT: CPT

## 2024-01-02 PROCEDURE — A9270 NON-COVERED ITEM OR SERVICE: HCPCS | Performed by: STUDENT IN AN ORGANIZED HEALTH CARE EDUCATION/TRAINING PROGRAM

## 2024-01-02 PROCEDURE — 85027 COMPLETE CBC AUTOMATED: CPT

## 2024-01-02 PROCEDURE — 73700 CT LOWER EXTREMITY W/O DYE: CPT | Mod: RT

## 2024-01-02 PROCEDURE — 99285 EMERGENCY DEPT VISIT HI MDM: CPT

## 2024-01-02 PROCEDURE — 700105 HCHG RX REV CODE 258: Performed by: STUDENT IN AN ORGANIZED HEALTH CARE EDUCATION/TRAINING PROGRAM

## 2024-01-02 PROCEDURE — 700101 HCHG RX REV CODE 250: Performed by: STUDENT IN AN ORGANIZED HEALTH CARE EDUCATION/TRAINING PROGRAM

## 2024-01-02 PROCEDURE — 82550 ASSAY OF CK (CPK): CPT

## 2024-01-02 PROCEDURE — 99223 1ST HOSP IP/OBS HIGH 75: CPT | Mod: AI,25 | Performed by: STUDENT IN AN ORGANIZED HEALTH CARE EDUCATION/TRAINING PROGRAM

## 2024-01-02 PROCEDURE — 36415 COLL VENOUS BLD VENIPUNCTURE: CPT

## 2024-01-02 PROCEDURE — 86140 C-REACTIVE PROTEIN: CPT

## 2024-01-02 PROCEDURE — 84145 PROCALCITONIN (PCT): CPT

## 2024-01-02 PROCEDURE — 770006 HCHG ROOM/CARE - MED/SURG/GYN SEMI*

## 2024-01-02 PROCEDURE — 80053 COMPREHEN METABOLIC PANEL: CPT

## 2024-01-02 PROCEDURE — 700102 HCHG RX REV CODE 250 W/ 637 OVERRIDE(OP): Performed by: STUDENT IN AN ORGANIZED HEALTH CARE EDUCATION/TRAINING PROGRAM

## 2024-01-02 RX ORDER — ACETAMINOPHEN 325 MG/1
650 TABLET ORAL EVERY 6 HOURS PRN
Status: DISCONTINUED | OUTPATIENT
Start: 2024-01-02 | End: 2024-01-02

## 2024-01-02 RX ORDER — OXYCODONE HYDROCHLORIDE 5 MG/1
5 TABLET ORAL
Status: DISCONTINUED | OUTPATIENT
Start: 2024-01-02 | End: 2024-01-05 | Stop reason: HOSPADM

## 2024-01-02 RX ORDER — GUAIFENESIN/DEXTROMETHORPHAN 100-10MG/5
10 SYRUP ORAL EVERY 6 HOURS PRN
Status: DISCONTINUED | OUTPATIENT
Start: 2024-01-02 | End: 2024-01-05 | Stop reason: HOSPADM

## 2024-01-02 RX ORDER — DILTIAZEM HYDROCHLORIDE 120 MG/1
120 CAPSULE, COATED, EXTENDED RELEASE ORAL 2 TIMES DAILY
Status: DISCONTINUED | OUTPATIENT
Start: 2024-01-02 | End: 2024-01-05 | Stop reason: HOSPADM

## 2024-01-02 RX ORDER — SODIUM CHLORIDE, SODIUM LACTATE, POTASSIUM CHLORIDE, AND CALCIUM CHLORIDE .6; .31; .03; .02 G/100ML; G/100ML; G/100ML; G/100ML
500 INJECTION, SOLUTION INTRAVENOUS
Status: DISCONTINUED | OUTPATIENT
Start: 2024-01-02 | End: 2024-01-05 | Stop reason: HOSPADM

## 2024-01-02 RX ORDER — AMOXICILLIN 250 MG
2 CAPSULE ORAL 2 TIMES DAILY
Status: DISCONTINUED | OUTPATIENT
Start: 2024-01-03 | End: 2024-01-05 | Stop reason: HOSPADM

## 2024-01-02 RX ORDER — OMEPRAZOLE 20 MG/1
20 CAPSULE, DELAYED RELEASE ORAL EVERY EVENING
Status: DISCONTINUED | OUTPATIENT
Start: 2024-01-02 | End: 2024-01-05 | Stop reason: HOSPADM

## 2024-01-02 RX ORDER — LABETALOL HYDROCHLORIDE 5 MG/ML
10 INJECTION, SOLUTION INTRAVENOUS EVERY 4 HOURS PRN
Status: DISCONTINUED | OUTPATIENT
Start: 2024-01-02 | End: 2024-01-05 | Stop reason: HOSPADM

## 2024-01-02 RX ORDER — METHOCARBAMOL 750 MG/1
750 TABLET, FILM COATED ORAL 4 TIMES DAILY PRN
Status: DISCONTINUED | OUTPATIENT
Start: 2024-01-02 | End: 2024-01-05 | Stop reason: HOSPADM

## 2024-01-02 RX ORDER — LEVOTHYROXINE SODIUM 0.2 MG/1
200 TABLET ORAL
Status: DISCONTINUED | OUTPATIENT
Start: 2024-01-03 | End: 2024-01-05 | Stop reason: HOSPADM

## 2024-01-02 RX ORDER — ONDANSETRON 4 MG/1
4 TABLET, ORALLY DISINTEGRATING ORAL EVERY 4 HOURS PRN
Status: DISCONTINUED | OUTPATIENT
Start: 2024-01-02 | End: 2024-01-05 | Stop reason: HOSPADM

## 2024-01-02 RX ORDER — ACETAMINOPHEN 325 MG/1
650 TABLET ORAL EVERY 6 HOURS PRN
Status: DISCONTINUED | OUTPATIENT
Start: 2024-01-07 | End: 2024-01-03

## 2024-01-02 RX ORDER — LIDOCAINE 4 G/G
1 PATCH TOPICAL EVERY 24 HOURS
Status: DISCONTINUED | OUTPATIENT
Start: 2024-01-02 | End: 2024-01-05 | Stop reason: HOSPADM

## 2024-01-02 RX ORDER — OXYCODONE HYDROCHLORIDE 5 MG/1
2.5 TABLET ORAL
Status: DISCONTINUED | OUTPATIENT
Start: 2024-01-02 | End: 2024-01-05 | Stop reason: HOSPADM

## 2024-01-02 RX ORDER — SODIUM CHLORIDE 9 MG/ML
INJECTION, SOLUTION INTRAVENOUS CONTINUOUS
Status: ACTIVE | OUTPATIENT
Start: 2024-01-02 | End: 2024-01-03

## 2024-01-02 RX ORDER — ACETAMINOPHEN 325 MG/1
650 TABLET ORAL EVERY 6 HOURS
Status: DISCONTINUED | OUTPATIENT
Start: 2024-01-02 | End: 2024-01-03

## 2024-01-02 RX ORDER — LEVOTHYROXINE SODIUM 0.2 MG/1
200 TABLET ORAL
COMMUNITY

## 2024-01-02 RX ORDER — BISACODYL 10 MG
10 SUPPOSITORY, RECTAL RECTAL
Status: DISCONTINUED | OUTPATIENT
Start: 2024-01-02 | End: 2024-01-05 | Stop reason: HOSPADM

## 2024-01-02 RX ORDER — HYDROMORPHONE HYDROCHLORIDE 1 MG/ML
0.25 INJECTION, SOLUTION INTRAMUSCULAR; INTRAVENOUS; SUBCUTANEOUS
Status: DISCONTINUED | OUTPATIENT
Start: 2024-01-02 | End: 2024-01-05 | Stop reason: HOSPADM

## 2024-01-02 RX ORDER — GABAPENTIN 100 MG/1
200 CAPSULE ORAL EVERY EVENING
Status: DISCONTINUED | OUTPATIENT
Start: 2024-01-02 | End: 2024-01-05 | Stop reason: HOSPADM

## 2024-01-02 RX ORDER — GABAPENTIN 100 MG/1
100 CAPSULE ORAL EVERY EVENING
COMMUNITY

## 2024-01-02 RX ORDER — ALENDRONATE SODIUM 70 MG/1
70 TABLET ORAL
Status: DISCONTINUED | OUTPATIENT
Start: 2024-01-07 | End: 2024-01-05 | Stop reason: HOSPADM

## 2024-01-02 RX ORDER — RANIBIZUMAB 6 MG/ML
0.3 INJECTION, SOLUTION INTRAVITREAL
Status: SHIPPED | COMMUNITY
End: 2024-01-02

## 2024-01-02 RX ORDER — LATANOPROST 50 UG/ML
1 SOLUTION/ DROPS OPHTHALMIC EVERY EVENING
Status: DISCONTINUED | OUTPATIENT
Start: 2024-01-02 | End: 2024-01-05 | Stop reason: HOSPADM

## 2024-01-02 RX ORDER — ASPIRIN 81 MG/1
81 TABLET ORAL EVERY EVENING
Status: DISCONTINUED | OUTPATIENT
Start: 2024-01-02 | End: 2024-01-05 | Stop reason: HOSPADM

## 2024-01-02 RX ORDER — POLYETHYLENE GLYCOL 3350 17 G/17G
1 POWDER, FOR SOLUTION ORAL
Status: DISCONTINUED | OUTPATIENT
Start: 2024-01-02 | End: 2024-01-05 | Stop reason: HOSPADM

## 2024-01-02 RX ORDER — CARBOXYMETHYLCELLULOSE SODIUM 5 MG/ML
1 SOLUTION/ DROPS OPHTHALMIC PRN
Status: DISCONTINUED | OUTPATIENT
Start: 2024-01-02 | End: 2024-01-05 | Stop reason: HOSPADM

## 2024-01-02 RX ORDER — ONDANSETRON 2 MG/ML
4 INJECTION INTRAMUSCULAR; INTRAVENOUS EVERY 4 HOURS PRN
Status: DISCONTINUED | OUTPATIENT
Start: 2024-01-02 | End: 2024-01-05 | Stop reason: HOSPADM

## 2024-01-02 RX ORDER — HEPARIN SODIUM 5000 [USP'U]/ML
5000 INJECTION, SOLUTION INTRAVENOUS; SUBCUTANEOUS EVERY 8 HOURS
Status: DISCONTINUED | OUTPATIENT
Start: 2024-01-03 | End: 2024-01-05 | Stop reason: HOSPADM

## 2024-01-02 RX ADMIN — LIDOCAINE 1 PATCH: 4 PATCH TOPICAL at 15:44

## 2024-01-02 RX ADMIN — ASPIRIN 81 MG: 81 TABLET, COATED ORAL at 18:30

## 2024-01-02 RX ADMIN — DILTIAZEM HYDROCHLORIDE 120 MG: 120 CAPSULE, COATED, EXTENDED RELEASE ORAL at 18:30

## 2024-01-02 RX ADMIN — GABAPENTIN 200 MG: 100 CAPSULE ORAL at 18:30

## 2024-01-02 RX ADMIN — LATANOPROST 1 DROP: 50 SOLUTION OPHTHALMIC at 21:48

## 2024-01-02 RX ADMIN — ACETAMINOPHEN 650 MG: 325 TABLET, FILM COATED ORAL at 18:30

## 2024-01-02 RX ADMIN — OMEPRAZOLE 20 MG: 20 CAPSULE, DELAYED RELEASE ORAL at 18:30

## 2024-01-02 RX ADMIN — SODIUM CHLORIDE: 9 INJECTION, SOLUTION INTRAVENOUS at 15:44

## 2024-01-02 ASSESSMENT — ENCOUNTER SYMPTOMS
BACK PAIN: 1
SPEECH CHANGE: 0
SHORTNESS OF BREATH: 0
DOUBLE VISION: 0
HEARTBURN: 0
FEVER: 0
WEAKNESS: 1
NECK PAIN: 0
FALLS: 1
PALPITATIONS: 0
COUGH: 0
MYALGIAS: 1
NAUSEA: 0
HEADACHES: 0
FOCAL WEAKNESS: 0
BRUISES/BLEEDS EASILY: 0
DEPRESSION: 0
DIZZINESS: 0
CHILLS: 0
BLURRED VISION: 0

## 2024-01-02 ASSESSMENT — FIBROSIS 4 INDEX: FIB4 SCORE: 4.7

## 2024-01-02 ASSESSMENT — PAIN DESCRIPTION - PAIN TYPE
TYPE: CHRONIC PAIN
TYPE: CHRONIC PAIN

## 2024-01-02 ASSESSMENT — LIFESTYLE VARIABLES: SUBSTANCE_ABUSE: 0

## 2024-01-02 NOTE — ED NOTES
Med rec updated and complete. Allergies reviewed . Per interview with pt/family.  List is currently at bedside.   All morning medications taken. Last dose of ASA 01/02/24.  No anticoagulant medications. No antibiotic use in last 30 days.  Pt receives an injection ( eye) for glaucoma. Last injection   12/20/23.    Home pharmacy  [facility] = [phone]

## 2024-01-02 NOTE — ED TRIAGE NOTES
Chief Complaint   Patient presents with    Back Pain     BIB EMS, pt was discharged yesterday has back pain that is uncontrolled w/ lidocaine patch and gabapentin that pt was sent home with. Received 100mcg fent IM PTA and reports pain is better.      Vitals:    01/02/24 1217   BP: 139/63   Pulse: 78   Resp: 17   Temp: 36.6 °C (97.8 °F)   SpO2: 100%     Pt placed in gown and connected to monitoring equipment. Pt son states pt is unable to get up to use restroom due to pain and requires additional assistance b/c son is unable to care for her. Chart up for ERP.

## 2024-01-02 NOTE — ED PROVIDER NOTES
ED Provider Note    CHIEF COMPLAINT  Chief Complaint   Patient presents with    Back Pain     BIB EMS, pt was discharged yesterday has back pain that is uncontrolled w/ lidocaine patch and gabapentin that pt was sent home with. Received 100mcg fent IM PTA and reports pain is better.        EXTERNAL RECORDS REVIEWED  Urgent care note 9/9/2023, left arm pain status post distant fall    Additional historian: EMS    HPI/ROS  Saumya Vann is a 89 y.o. female who presents with ongoing right leg pain, reports she is unable to ambulate, her son informs EMS that he can no longer take care of her at home.  She was here in the emergency department yesterday, she was evaluated for back pain with a CT scan which revealed multiple chronic appearing fractures and degeneration without acute findings.  She tells me that its never been her back that hurts, she reports it is the right leg between her hip and her thigh.  She denies any falls.  She has no weakness or numbness.  Denies fever.  She reports that she also feels increasingly generally weak.  Extensive medical history including atrial fibrillation not anticoagulated, diabetes, hypertension, glaucoma.  She reports having 2 knee replacement surgeries on her right knee.  EMS reports they treated her with 100 mcg of fentanyl and the patient states after that her pain improved    PAST MEDICAL HISTORY   has a past medical history of A-fib ([facility]), Breath shortness, Cataract (04/23/2021), Diabetes ([facility]) (04/23/2021), DM2 (diabetes mellitus, type 2) ([facility]) (11/17/2014), Dry cough (04/23/2021), Glaucoma (04/23/2021), Hypertension, Hypothyroid, Macular degeneration, Pain (04/23/2021), and Urinary incontinence.    SURGICAL HISTORY   has a past surgical history that includes us-needle core bx-breast panel; appendectomy; cholecystectomy; hysterectomy, total abdominal; knee replacement, total (Bilateral); other (1952); appendectomy (1957); cholecystectomy (1970); neuroma excision  (1985); other orthopedic surgery (2002); and cataract extraction with iol (2001).    FAMILY HISTORY  Family History   Problem Relation Age of Onset    Cancer Other        SOCIAL HISTORY  Social History     Tobacco Use    Smoking status: Never    Smokeless tobacco: Never   Vaping Use    Vaping Use: Never used   Substance and Sexual Activity    Alcohol use: No    Drug use: No    Sexual activity: Not on file       CURRENT MEDICATIONS  Home Medications       Reviewed by Snehal Ayers R.N. (Registered Nurse) on 01/02/24 at 1219  Med List Status: Partial     Medication Last Dose Status   acetaminophen (TYLENOL) 500 MG Tab  Active   alendronate (FOSAMAX) 70 MG Tab  Active   aspirin (EQ ASPIRIN ADULT LOW DOSE) 81 MG EC tablet  Active   B Complex Vitamins (B COMPLEX PO)  Active   B Complex Vitamins (VITAMIN B COMPLEX PO)  Active   Bevacizumab (AVASTIN IV)  Active   Cetirizine HCl (ZYRTEC PO)  Active   Cholecalciferol (VITAMIN D) 125 MCG (5000 UT) Cap  Active   cyclobenzaprine (FLEXERIL) 5 mg tablet  Active   diclofenac DR (VOLTAREN) 25 MG Tablet Delayed Response  Active   DILTIAZem CD (CARDIZEM CD) 120 MG CAPSULE SR 24 HR  Active   docusate sodium (COLACE) 100 MG Cap  Active   gabapentin (NEURONTIN) 100 MG Cap  Active   ipratropium (ATROVENT) 0.03 % Solution  Active   Lactobacillus-Inulin (CULTURELLE ADULT ULT BALANCE PO)  Active   latanoprost (XALATAN) 0.005 % Solution  Active   levothyroxine (SYNTHROID) 150 MCG TABS  Active   Multiple Vitamin (MULTI-VITAMIN DAILY PO)  Active   Multiple Vitamins-Minerals (ONE DAILY COMPLETE PO)  Active   Multiple Vitamins-Minerals (PRESERVISION AREDS 2) Cap  Active   Naproxen Sodium 220 MG Cap  Active   neomycin-gramicidin-polymyxin (NEOSPORIN) 1.75-19950-.025 Solution ophthalmic solution  Active   omeprazole (PRILOSEC) 20 MG delayed-release capsule  Active   spironolactone (ALDACTONE) 25 MG Tab  Active                    ALLERGIES  Allergies   Allergen Reactions    Pcn [Penicillins]  Rash     Rash         PHYSICAL EXAM  VITAL SIGNS: /63   Pulse 78   Temp 36.6 °C (97.8 °F) (Temporal)   Resp 17   Ht 1.524 m (5')   Wt 79.4 kg (175 lb)   SpO2 100%   BMI 34.18 kg/m²    Constitutional: Elderly, awake, drowsy, no acute distress  HENT: Normocephalic, no obvious evidence of acute trauma.  Eyes: No scleral icterus. Normal conjunctiva   Thorax & Lungs: Normal nonlabored respirations.  On cardiac auscultation has an irregular rhythm with a normal rate  Abdomen: The abdomen is not visibly distended. Upon palpation, I find it to be without tenderness.  No mass appreciated.  Skin: The exposed portions of skin reveal no obvious rash or other abnormalities.  Extremities/Musculoskeletal: Inspection of the right leg is essentially unremarkable.  No erythema.  No edema.  Has a surgical scar overlying the extensor surface of the right knee.  She has no tenderness of the thigh on palpation.  Unable to internally and externally rotate the hip with minimal discomfort.  Extend and flex the knee with minimal discomfort     DIAGNOSTIC STUDIES / PROCEDURES    LABS  Results for orders placed or performed during the hospital encounter of 01/02/24   CBC WITH DIFFERENTIAL   Result Value Ref Range    WBC 18.3 (H) 4.8 - 10.8 K/uL    RBC 5.11 4.20 - 5.40 M/uL    Hemoglobin 15.3 12.0 - 16.0 g/dL    Hematocrit 47.7 (H) 37.0 - 47.0 %    MCV 93.3 81.4 - 97.8 fL    MCH 29.9 27.0 - 33.0 pg    MCHC 32.1 (L) 32.2 - 35.5 g/dL    RDW 52.9 (H) 35.9 - 50.0 fL    Platelet Count 309 164 - 446 K/uL    MPV 8.6 (L) 9.0 - 12.9 fL    Neutrophils-Polys 87.90 (H) 44.00 - 72.00 %    Lymphocytes 6.90 (L) 22.00 - 41.00 %    Monocytes 2.60 0.00 - 13.40 %    Eosinophils 0.00 0.00 - 6.90 %    Basophils 0.00 0.00 - 1.80 %    Nucleated RBC 0.00 0.00 - 0.20 /100 WBC    Neutrophils (Absolute) 16.09 (H) 1.82 - 7.42 K/uL    Lymphs (Absolute) 1.26 1.00 - 4.80 K/uL    Monos (Absolute) 0.48 0.00 - 0.85 K/uL    Eos (Absolute) 0.00 0.00 - 0.51 K/uL     Baso (Absolute) 0.00 0.00 - 0.12 K/uL    NRBC (Absolute) 0.00 K/uL   COMP METABOLIC PANEL   Result Value Ref Range    Sodium 140 135 - 145 mmol/L    Potassium 5.3 3.6 - 5.5 mmol/L    Chloride 103 96 - 112 mmol/L    Co2 24 20 - 33 mmol/L    Anion Gap 13.0 7.0 - 16.0    Glucose 105 (H) 65 - 99 mg/dL    Bun 48 (H) 8 - 22 mg/dL    Creatinine 1.35 0.50 - 1.40 mg/dL    Calcium 9.2 8.5 - 10.5 mg/dL    Correct Calcium 9.0 8.5 - 10.5 mg/dL    AST(SGOT) 178 (H) 12 - 45 U/L    ALT(SGPT) 543 (H) 2 - 50 U/L    Alkaline Phosphatase 220 (H) 30 - 99 U/L    Total Bilirubin 1.0 0.1 - 1.5 mg/dL    Albumin 4.3 3.2 - 4.9 g/dL    Total Protein 7.2 6.0 - 8.2 g/dL    Globulin 2.9 1.9 - 3.5 g/dL    A-G Ratio 1.5 g/dL   URINALYSIS (UA)    Specimen: Urine   Result Value Ref Range    Color Yellow     Character Clear     Specific Gravity 1.019 <1.035    Ph 5.0 5.0 - 8.0    Glucose Negative Negative mg/dL    Ketones 15 (A) Negative mg/dL    Protein Negative Negative mg/dL    Bilirubin Negative Negative    Urobilinogen, Urine 0.2 Negative    Nitrite Negative Negative    Leukocyte Esterase Small (A) Negative    Occult Blood Negative Negative    Micro Urine Req Microscopic    TROPONIN   Result Value Ref Range    Troponin T 44 (H) 6 - 19 ng/L   ESTIMATED GFR   Result Value Ref Range    GFR (CKD-EPI) 37 (A) >60 mL/min/1.73 m 2   DIFFERENTIAL MANUAL   Result Value Ref Range    Myelocytes 2.60 %    Manual Diff Status PERFORMED    PERIPHERAL SMEAR REVIEW   Result Value Ref Range    Peripheral Smear Review see below    PLATELET ESTIMATE   Result Value Ref Range    Plt Estimation Normal    MORPHOLOGY   Result Value Ref Range    RBC Morphology Normal      RADIOLOGY  I have independently interpreted the diagnostic imaging associated with this visit and am waiting the final reading from the radiologist.   My preliminary interpretation is as follows: X-ray of femur, no fracture  Radiologist interpretation:      DX-CHEST-LIMITED (1 VIEW)   Final Result      No  acute cardiopulmonary disease.      CT-HIP W/O PLUS RECONS RIGHT   Final Result      1.  No acute pelvic fracture.   2.  Evidence of old sacral fracture.   3.  No hip fracture or dislocation.   4.  Moderate degenerative change of both hips.      DX-FEMUR-2+ RIGHT   Final Result      1.  No RIGHT femur fracture.   2.  Moderate degenerative change of RIGHT hip.   3.  RIGHT knee prosthesis in place, normally aligned.            COURSE & MEDICAL DECISION MAKING    ED Observation Status? No; Patient does not meet criteria for ED Observation.     INITIAL ASSESSMENT, COURSE AND PLAN  Care Narrative: 89-year-old female comes in with worsening right leg pain now preventing her from walking.  Sent in by ambulance as her son can no longer care for her at home.  She was here yesterday, the patient reports that it has always been the right leg that hurts, she states that her back has never hurt but she reports that everyone continues to focus on her back and yesterday she had a CT scan of her L-spine.  This revealed no acute findings, lots of chronic issues.  No other focal findings on exam of particular importance, she does not have any evidence of infection, she has some minimal discomfort with range of motion of the hip and the knee.  I am going to x-ray her femur.  I will check some blood work and urinalysis as she does describe being increasingly generally weak as well.  Ultimately, the patient does not have a safe disposition at this point as she is unable to walk and her son is unable to care for her so I will consult the hospitalist at the conclusion of the workup here in the emergency department.    Blood work reveals a leukocytosis.  Redemonstration of elevation in the LFTs.  X-ray of the leg is unremarkable.  Discussed the case with Dr. Whitlock, hospitalist.  He request a CT of the hip be performed and I have ordered this.  He is excepted the patient for admission.    DISPOSITION AND DISCUSSIONS  I have discussed  management of the patient with the following physicians and EDITH's: Dr. Whitlock, hospitalist      FINAL DIAGNOSIS  1. Right leg pain    2. Inability to ambulate due to right hip    3. Generalized weakness           Electronically signed by: Alban Salomon M.D., 1/2/2024 12:43 PM

## 2024-01-02 NOTE — H&P
Hospital Medicine History & Physical Note    Date of Service  1/2/2024    Primary Care Physician  Bebe Santamaria M.D.    Consultants  None    Code Status  DNAR/DNI    Chief Complaint  Chief Complaint   Patient presents with    Back Pain     BIB EMS, pt was discharged yesterday has back pain that is uncontrolled w/ lidocaine patch and gabapentin that pt was sent home with. Received 100mcg fent IM PTA and reports pain is better.        History of Presenting Illness  Saumya Vann is a 89 y.o. female with history of hypertension, hypothyroidism, osteoporosis, GERD who presented 1/2/2024 with evaluation for back pain, right hip pain.  Patient had multiple ER visit with no fracture seen on multiple imagings.  Patient is reside with granddavin, who is unable to care for him.  Due to difficulty with ambulation and uncontrolled pain, patient was brought into ER again by tom.  I requested ERP to obtain CT pelvis given no prior CT done so far.  Admitted to medicine service for further evaluation treatment.    I discussed the plan of care with patient, family, bedside RN, and pharmacy.    Review of Systems  Review of Systems   Constitutional:  Negative for chills and fever.   HENT:  Negative for hearing loss and tinnitus.    Eyes:  Negative for blurred vision and double vision.   Respiratory:  Negative for cough and shortness of breath.    Cardiovascular:  Negative for chest pain and palpitations.   Gastrointestinal:  Negative for heartburn and nausea.   Genitourinary:  Negative for dysuria and urgency.   Musculoskeletal:  Positive for back pain, falls, joint pain and myalgias. Negative for neck pain.   Skin:  Negative for itching and rash.   Neurological:  Positive for weakness. Negative for dizziness, speech change, focal weakness and headaches.   Endo/Heme/Allergies:  Negative for environmental allergies. Does not bruise/bleed easily.   Psychiatric/Behavioral:  Negative for depression and substance abuse.    All  other systems reviewed and are negative.      Past Medical History   has a past medical history of A-fib ([facility]), Breath shortness, Cataract (04/23/2021), Diabetes (HCC) (04/23/2021), DM2 (diabetes mellitus, type 2) ([facility]) (11/17/2014), Dry cough (04/23/2021), Glaucoma (04/23/2021), Hypertension, Hypothyroid, Macular degeneration, Pain (04/23/2021), and Urinary incontinence.    Surgical History   has a past surgical history that includes us-needle core bx-breast panel; appendectomy; cholecystectomy; hysterectomy, total abdominal; knee replacement, total (Bilateral); other (1952); appendectomy (1957); cholecystectomy (1970); neuroma excision (1985); other orthopedic surgery (2002); and cataract extraction with iol (2001).     Family History  family history includes Cancer in an other family member.   Family history reviewed with patient. There is no family history that is pertinent to the chief complaint.     Social History   reports that she has never smoked. She has never used smokeless tobacco. She reports that she does not drink alcohol and does not use drugs.    Allergies  Allergies   Allergen Reactions    Pcn [Penicillins] Rash     Rash  > 60 years ago       Medications  Prior to Admission Medications   Prescriptions Last Dose Informant Patient Reported? Taking?   B Complex Vitamins (B COMPLEX PO)   Yes No   Sig: Take  by mouth.   B Complex Vitamins (VITAMIN B COMPLEX PO)  MAR from Other Facility Yes No   Sig: Take 1 Tab by mouth 2 Times a Day.   Patient not taking: Reported on 3/2/2023   Bevacizumab (AVASTIN IV)   Yes No   Sig: Infuse  into a venous catheter. Every month. Every other eye   Cetirizine HCl (ZYRTEC PO)   Yes No   Sig: Take  by mouth every day.   Cholecalciferol (VITAMIN D) 125 MCG (5000 UT) Cap   Yes No   Sig: Take 1 Capsule by mouth every day.   DILTIAZem CD (CARDIZEM CD) 120 MG CAPSULE SR 24 HR   No No   Sig: Take 1 Capsule by mouth 2 times a day.   Lactobacillus-Inulin (CULTURELLE ADULT ULT  BALANCE PO)   Yes No   Sig: Take  by mouth.   Multiple Vitamin (MULTI-VITAMIN DAILY PO)   Yes No   Sig: Take  by mouth.   Patient not taking: Reported on 3/2/2023   Multiple Vitamins-Minerals (ONE DAILY COMPLETE PO)   Yes No   Sig: Take 1 Capsule by mouth every day.   Patient not taking: Reported on 3/2/2023   Multiple Vitamins-Minerals (PRESERVISION AREDS 2) Cap   Yes No   Sig: Take 1 Capsule by mouth every day.   Naproxen Sodium 220 MG Cap   Yes No   Sig: Take 220 mg by mouth 1 time a day as needed (For pain, if tylenol does not relieve pain).   Patient not taking: Reported on 9/26/2022   acetaminophen (TYLENOL) 500 MG Tab   Yes No   Sig: Take 500-1,000 mg by mouth every 6 hours as needed for Mild Pain.   alendronate (FOSAMAX) 70 MG Tab  MAR from Other Facility Yes No   Sig: Take 70 mg by mouth every Monday.   aspirin (EQ ASPIRIN ADULT LOW DOSE) 81 MG EC tablet   No No   Sig: Take 1 Tablet by mouth every day.   cyclobenzaprine (FLEXERIL) 5 mg tablet   No No   Sig: Take 1 Tablet by mouth 1 time a day as needed for Muscle Spasms.   Patient not taking: Reported on 3/2/2023   diclofenac DR (VOLTAREN) 25 MG Tablet Delayed Response   No No   Sig: Take 2 Tablets by mouth every evening.   docusate sodium (COLACE) 100 MG Cap   Yes No   Sig: Take 100 mg by mouth every day.   gabapentin (NEURONTIN) 100 MG Cap   No No   Sig: Take 1 Capsule by mouth 3 times a day.   ipratropium (ATROVENT) 0.03 % Solution   Yes No   Sig: USE 2 SPRAY(S) IN EACH NOSTRIL THREE TIMES DAILY AS NEEDED FOR RUNNY NOSE   Patient not taking: Reported on 3/2/2023   latanoprost (XALATAN) 0.005 % Solution  MAR from Other Facility Yes No   Sig: Place 1 Drop in left eye every bedtime.   levothyroxine (SYNTHROID) 150 MCG TABS  MAR from Other Facility Yes No   Sig: Take 150-225 mcg by mouth see administration instructions. 150 mcg on Tuesday, Thursday, Saturday, and Sunday   225 mcg on Monday, Wednesday, and Friday   neomycin-gramicidin-polymyxin (NEOSPORIN)  1.75-62923-.025 Solution ophthalmic solution   Yes No   Sig: INSTILL 1/2 INCH RIBBON TO BOTH LOWER LIDS AS NEEDED FOR DISCOMFORT   Patient not taking: Reported on 9/26/2022   omeprazole (PRILOSEC) 20 MG delayed-release capsule  MAR from Other Facility Yes No   Sig: Take 20 mg by mouth every evening.   spironolactone (ALDACTONE) 25 MG Tab  MAR from Other Facility Yes No   Sig: Take 25 mg by mouth every day.      Facility-Administered Medications: None       Physical Exam  Temp:  [36.1 °C (97 °F)-36.6 °C (97.8 °F)] 36.1 °C (97 °F)  Pulse:  [66-87] 66  Resp:  [14-19] 18  BP: (115-155)/(54-88) 115/66  SpO2:  [90 %-100 %] 96 %  Blood Pressure : 137/60   Temperature: 36.6 °C (97.8 °F)   Pulse: 69   Respiration: 18   Pulse Oximetry: 98 %       Physical Exam  Vitals and nursing note reviewed.   Constitutional:       General: She is not in acute distress.     Appearance: She is obese.   HENT:      Head: Normocephalic and atraumatic.      Nose: Nose normal.      Mouth/Throat:      Mouth: Mucous membranes are dry.      Pharynx: Oropharynx is clear.   Eyes:      General: No scleral icterus.     Extraocular Movements: Extraocular movements intact.   Cardiovascular:      Rate and Rhythm: Normal rate and regular rhythm.      Pulses: Normal pulses.      Heart sounds:      No friction rub.   Pulmonary:      Effort: No respiratory distress.      Breath sounds: No wheezing or rales.   Chest:      Chest wall: No tenderness.   Abdominal:      General: There is distension.      Tenderness: There is no abdominal tenderness. There is no guarding or rebound.   Musculoskeletal:      Cervical back: Neck supple. No tenderness.      Right lower leg: No edema.      Left lower leg: No edema.      Comments: Right hip-limited ROM  Mildly tender   Skin:     General: Skin is warm and dry.   Neurological:      General: No focal deficit present.      Mental Status: She is alert and oriented to person, place, and time. Mental status is at baseline.  "  Psychiatric:         Mood and Affect: Mood normal.         Laboratory:  Recent Labs     01/01/24  1049 01/02/24  1319   WBC 14.0* 18.3*   RBC 5.17 5.11   HEMOGLOBIN 15.3 15.3   HEMATOCRIT 47.1* 47.7*   MCV 91.1 93.3   MCH 29.6 29.9   MCHC 32.5 32.1*   RDW 51.3* 52.9*   PLATELETCT 320 309   MPV 8.6* 8.6*     Recent Labs     01/01/24  1049 01/02/24  1319   SODIUM 138 140   POTASSIUM 5.4 5.3   CHLORIDE 102 103   CO2 23 24   GLUCOSE 122* 105*   BUN 36* 48*   CREATININE 1.13 1.35   CALCIUM 8.5 9.2     Recent Labs     01/01/24  1049 01/02/24  1319   ALTSGPT 745* 543*   ASTSGOT 441* 178*   ALKPHOSPHAT 222* 220*   TBILIRUBIN 1.6* 1.0   LIPASE 22  --    GLUCOSE 122* 105*         No results for input(s): \"NTPROBNP\" in the last 72 hours.      Recent Labs     01/02/24  1319   TROPONINT 44*       Imaging:  DX-CHEST-LIMITED (1 VIEW)   Final Result      No acute cardiopulmonary disease.      CT-HIP W/O PLUS RECONS RIGHT   Final Result      1.  No acute pelvic fracture.   2.  Evidence of old sacral fracture.   3.  No hip fracture or dislocation.   4.  Moderate degenerative change of both hips.      DX-FEMUR-2+ RIGHT   Final Result      1.  No RIGHT femur fracture.   2.  Moderate degenerative change of RIGHT hip.   3.  RIGHT knee prosthesis in place, normally aligned.          X-Ray:  I have personally reviewed the images and compared with prior images.  EKG:  I have personally reviewed the images and compared with prior images.    Assessment/Plan:  Justification for Admission Status  I anticipate this patient will require at least 2 midnights hospitalization, therefore appropriate for inpatient status.        Acute right hip pain- (present on admission)  Assessment & Plan  No fracture seen on CT pelvis  Supportive pain control  PT/OT    CKD (chronic kidney disease) stage 3, GFR 30-59 ml/min ([facility])  Assessment & Plan  Minimize nephrotoxic agents, renally dose meds    Chronic atrial fibrillation (HCC)- (present on " admission)  Assessment & Plan  No oral anticoagulation due to frequent falls  Cardizem    Morbid obesity (HCC)- (present on admission)  Assessment & Plan  Diet and lifestyle modification    HTN (hypertension)- (present on admission)  Assessment & Plan  Cardizem  Discontinue spironolactone given borderline hyperkalemia and CKD    Age-related physical debility  Assessment & Plan  PT/OT  Fall precautions    Advanced care planning/counseling discussion- (present on admission)  Assessment & Plan  Goal of care discussed with patient and patient's grandson ER.  She stated she does not wish for aggressive/heroic life-sustaining measures-no CPR/defibrillation/intubation or mechanical ventilation.  Diagnosis, prognosis, questions or concern addressed.  DNR/denies status confirmed.  ACP: 16 minutes        VTE prophylaxis: heparin ppx

## 2024-01-02 NOTE — ED NOTES
Meal tray delivered, pt medicated per MAR, pillow used for positioning, no additional needs reported at this time.

## 2024-01-03 ENCOUNTER — APPOINTMENT (OUTPATIENT)
Dept: RADIOLOGY | Facility: MEDICAL CENTER | Age: 89
DRG: 552 | End: 2024-01-03
Attending: FAMILY MEDICINE
Payer: MEDICARE

## 2024-01-03 PROBLEM — I27.20 PULMONARY HTN (HCC): Status: ACTIVE | Noted: 2024-01-03

## 2024-01-03 PROBLEM — R82.81 PYURIA: Status: ACTIVE | Noted: 2024-01-03

## 2024-01-03 PROBLEM — R79.89 ELEVATED LFTS: Status: ACTIVE | Noted: 2024-01-03

## 2024-01-03 PROBLEM — M48.50XA COMPRESSION FRACTURE OF SPINE (HCC): Status: ACTIVE | Noted: 2019-11-16

## 2024-01-03 PROBLEM — R54 FRAILTY SYNDROME IN GERIATRIC PATIENT: Status: ACTIVE | Noted: 2024-01-03

## 2024-01-03 PROBLEM — I48.19 PERSISTENT ATRIAL FIBRILLATION (HCC): Status: ACTIVE | Noted: 2023-09-27

## 2024-01-03 PROBLEM — S32.10XA SACRAL FRACTURE (HCC): Status: ACTIVE | Noted: 2024-01-02

## 2024-01-03 PROBLEM — D72.829 LEUKOCYTOSIS: Status: ACTIVE | Noted: 2024-01-03

## 2024-01-03 LAB
ALBUMIN SERPL BCP-MCNC: 3.9 G/DL (ref 3.2–4.9)
ALBUMIN/GLOB SERPL: 1.3 G/DL
ALP SERPL-CCNC: 192 U/L (ref 30–99)
ALT SERPL-CCNC: 397 U/L (ref 2–50)
ANION GAP SERPL CALC-SCNC: 15 MMOL/L (ref 7–16)
AST SERPL-CCNC: 90 U/L (ref 12–45)
BILIRUB SERPL-MCNC: 0.9 MG/DL (ref 0.1–1.5)
BUN SERPL-MCNC: 47 MG/DL (ref 8–22)
CALCIUM ALBUM COR SERPL-MCNC: 9 MG/DL (ref 8.5–10.5)
CALCIUM SERPL-MCNC: 8.9 MG/DL (ref 8.5–10.5)
CHLORIDE SERPL-SCNC: 103 MMOL/L (ref 96–112)
CO2 SERPL-SCNC: 18 MMOL/L (ref 20–33)
CREAT SERPL-MCNC: 1.04 MG/DL (ref 0.5–1.4)
CRP SERPL HS-MCNC: 0.4 MG/DL (ref 0–0.75)
ERYTHROCYTE [DISTWIDTH] IN BLOOD BY AUTOMATED COUNT: 52.1 FL (ref 35.9–50)
GFR SERPLBLD CREATININE-BSD FMLA CKD-EPI: 50 ML/MIN/1.73 M 2
GLOBULIN SER CALC-MCNC: 3.1 G/DL (ref 1.9–3.5)
GLUCOSE SERPL-MCNC: 112 MG/DL (ref 65–99)
HCT VFR BLD AUTO: 46.5 % (ref 37–47)
HGB BLD-MCNC: 15.2 G/DL (ref 12–16)
MCH RBC QN AUTO: 30.2 PG (ref 27–33)
MCHC RBC AUTO-ENTMCNC: 32.7 G/DL (ref 32.2–35.5)
MCV RBC AUTO: 92.4 FL (ref 81.4–97.8)
PLATELET # BLD AUTO: 278 K/UL (ref 164–446)
PMV BLD AUTO: 8.7 FL (ref 9–12.9)
POTASSIUM SERPL-SCNC: 4.8 MMOL/L (ref 3.6–5.5)
PROT SERPL-MCNC: 7 G/DL (ref 6–8.2)
RBC # BLD AUTO: 5.03 M/UL (ref 4.2–5.4)
SODIUM SERPL-SCNC: 136 MMOL/L (ref 135–145)
VIT B12 SERPL-MCNC: >4000 PG/ML (ref 211–911)
WBC # BLD AUTO: 19.5 K/UL (ref 4.8–10.8)

## 2024-01-03 PROCEDURE — 80053 COMPREHEN METABOLIC PANEL: CPT

## 2024-01-03 PROCEDURE — 87040 BLOOD CULTURE FOR BACTERIA: CPT

## 2024-01-03 PROCEDURE — 97162 PT EVAL MOD COMPLEX 30 MIN: CPT

## 2024-01-03 PROCEDURE — 700102 HCHG RX REV CODE 250 W/ 637 OVERRIDE(OP): Performed by: FAMILY MEDICINE

## 2024-01-03 PROCEDURE — 99232 SBSQ HOSP IP/OBS MODERATE 35: CPT | Performed by: FAMILY MEDICINE

## 2024-01-03 PROCEDURE — 97535 SELF CARE MNGMENT TRAINING: CPT

## 2024-01-03 PROCEDURE — 36415 COLL VENOUS BLD VENIPUNCTURE: CPT

## 2024-01-03 PROCEDURE — A9270 NON-COVERED ITEM OR SERVICE: HCPCS | Performed by: FAMILY MEDICINE

## 2024-01-03 PROCEDURE — 85652 RBC SED RATE AUTOMATED: CPT

## 2024-01-03 PROCEDURE — A9270 NON-COVERED ITEM OR SERVICE: HCPCS | Performed by: STUDENT IN AN ORGANIZED HEALTH CARE EDUCATION/TRAINING PROGRAM

## 2024-01-03 PROCEDURE — 700111 HCHG RX REV CODE 636 W/ 250 OVERRIDE (IP): Performed by: STUDENT IN AN ORGANIZED HEALTH CARE EDUCATION/TRAINING PROGRAM

## 2024-01-03 PROCEDURE — 85027 COMPLETE CBC AUTOMATED: CPT

## 2024-01-03 PROCEDURE — 82607 VITAMIN B-12: CPT

## 2024-01-03 PROCEDURE — 76705 ECHO EXAM OF ABDOMEN: CPT

## 2024-01-03 PROCEDURE — 86140 C-REACTIVE PROTEIN: CPT

## 2024-01-03 PROCEDURE — 770006 HCHG ROOM/CARE - MED/SURG/GYN SEMI*

## 2024-01-03 PROCEDURE — 97167 OT EVAL HIGH COMPLEX 60 MIN: CPT

## 2024-01-03 PROCEDURE — 700101 HCHG RX REV CODE 250: Performed by: STUDENT IN AN ORGANIZED HEALTH CARE EDUCATION/TRAINING PROGRAM

## 2024-01-03 PROCEDURE — 700102 HCHG RX REV CODE 250 W/ 637 OVERRIDE(OP): Performed by: STUDENT IN AN ORGANIZED HEALTH CARE EDUCATION/TRAINING PROGRAM

## 2024-01-03 RX ORDER — SPIRONOLACTONE 25 MG/1
25 TABLET ORAL DAILY
Status: DISCONTINUED | OUTPATIENT
Start: 2024-01-03 | End: 2024-01-03

## 2024-01-03 RX ORDER — NYSTATIN 100000 [USP'U]/G
POWDER TOPICAL 3 TIMES DAILY
Status: DISCONTINUED | OUTPATIENT
Start: 2024-01-03 | End: 2024-01-05 | Stop reason: HOSPADM

## 2024-01-03 RX ORDER — ACETAMINOPHEN 500 MG
500 TABLET ORAL EVERY 6 HOURS PRN
Status: DISCONTINUED | OUTPATIENT
Start: 2024-01-07 | End: 2024-01-05 | Stop reason: HOSPADM

## 2024-01-03 RX ORDER — ACETAMINOPHEN 500 MG
500 TABLET ORAL EVERY 6 HOURS
Status: DISCONTINUED | OUTPATIENT
Start: 2024-01-03 | End: 2024-01-05 | Stop reason: HOSPADM

## 2024-01-03 RX ADMIN — OXYCODONE 5 MG: 5 TABLET ORAL at 09:00

## 2024-01-03 RX ADMIN — CHOLECALCIFEROL TAB 125 MCG (5000 UNIT) 5000 UNITS: 125 TAB at 05:21

## 2024-01-03 RX ADMIN — LATANOPROST 1 DROP: 50 SOLUTION OPHTHALMIC at 18:25

## 2024-01-03 RX ADMIN — NYSTATIN: 100000 POWDER TOPICAL at 17:28

## 2024-01-03 RX ADMIN — ASPIRIN 81 MG: 81 TABLET, COATED ORAL at 17:27

## 2024-01-03 RX ADMIN — LIDOCAINE 1 PATCH: 4 PATCH TOPICAL at 14:44

## 2024-01-03 RX ADMIN — LEVOTHYROXINE SODIUM 200 MCG: 0.2 TABLET ORAL at 05:10

## 2024-01-03 RX ADMIN — DILTIAZEM HYDROCHLORIDE 120 MG: 120 CAPSULE, COATED, EXTENDED RELEASE ORAL at 05:10

## 2024-01-03 RX ADMIN — ACETAMINOPHEN 500 MG: 500 TABLET, FILM COATED ORAL at 17:27

## 2024-01-03 RX ADMIN — ACETAMINOPHEN 500 MG: 500 TABLET, FILM COATED ORAL at 12:00

## 2024-01-03 RX ADMIN — GABAPENTIN 200 MG: 100 CAPSULE ORAL at 17:27

## 2024-01-03 RX ADMIN — HEPARIN SODIUM 5000 UNITS: 5000 INJECTION, SOLUTION INTRAVENOUS; SUBCUTANEOUS at 19:57

## 2024-01-03 RX ADMIN — NYSTATIN: 100000 POWDER TOPICAL at 12:32

## 2024-01-03 RX ADMIN — OMEPRAZOLE 20 MG: 20 CAPSULE, DELAYED RELEASE ORAL at 17:27

## 2024-01-03 RX ADMIN — HYDROMORPHONE HYDROCHLORIDE 0.25 MG: 1 INJECTION, SOLUTION INTRAMUSCULAR; INTRAVENOUS; SUBCUTANEOUS at 10:31

## 2024-01-03 RX ADMIN — DILTIAZEM HYDROCHLORIDE 120 MG: 120 CAPSULE, COATED, EXTENDED RELEASE ORAL at 17:28

## 2024-01-03 RX ADMIN — ACETAMINOPHEN 650 MG: 325 TABLET, FILM COATED ORAL at 05:10

## 2024-01-03 RX ADMIN — DOCUSATE SODIUM 50 MG AND SENNOSIDES 8.6 MG 2 TABLET: 8.6; 5 TABLET, FILM COATED ORAL at 17:28

## 2024-01-03 RX ADMIN — NYSTATIN: 100000 POWDER TOPICAL at 09:06

## 2024-01-03 ASSESSMENT — ENCOUNTER SYMPTOMS
NAUSEA: 0
DIAPHORESIS: 0
ABDOMINAL PAIN: 0
NERVOUS/ANXIOUS: 1
FLANK PAIN: 0
CHILLS: 0
DIZZINESS: 0
SENSORY CHANGE: 0
VOMITING: 0
WHEEZING: 0
SPEECH CHANGE: 0
BACK PAIN: 1
FEVER: 0
COUGH: 0
WEAKNESS: 1
HEADACHES: 0
SORE THROAT: 0
PALPITATIONS: 0
HEARTBURN: 0
BLURRED VISION: 0
NECK PAIN: 0
SHORTNESS OF BREATH: 0
DIARRHEA: 0
MYALGIAS: 0
FOCAL WEAKNESS: 0

## 2024-01-03 ASSESSMENT — GAIT ASSESSMENTS: GAIT LEVEL OF ASSIST: UNABLE TO PARTICIPATE

## 2024-01-03 ASSESSMENT — COGNITIVE AND FUNCTIONAL STATUS - GENERAL
MOBILITY SCORE: 7
DAILY ACTIVITIY SCORE: 15
MOVING FROM LYING ON BACK TO SITTING ON SIDE OF FLAT BED: UNABLE
TOILETING: A LOT
SUGGESTED CMS G CODE MODIFIER MOBILITY: CM
DRESSING REGULAR UPPER BODY CLOTHING: A LOT
TURNING FROM BACK TO SIDE WHILE IN FLAT BAD: A LOT
PERSONAL GROOMING: A LITTLE
STANDING UP FROM CHAIR USING ARMS: TOTAL
WALKING IN HOSPITAL ROOM: TOTAL
HELP NEEDED FOR BATHING: A LOT
SUGGESTED CMS G CODE MODIFIER DAILY ACTIVITY: CK
DRESSING REGULAR LOWER BODY CLOTHING: A LOT
MOVING TO AND FROM BED TO CHAIR: UNABLE
CLIMB 3 TO 5 STEPS WITH RAILING: TOTAL

## 2024-01-03 ASSESSMENT — FIBROSIS 4 INDEX: FIB4 SCORE: 2.3

## 2024-01-03 ASSESSMENT — PAIN DESCRIPTION - PAIN TYPE: TYPE: ACUTE PAIN

## 2024-01-03 ASSESSMENT — ACTIVITIES OF DAILY LIVING (ADL): TOILETING: INDEPENDENT

## 2024-01-03 NOTE — THERAPY
Occupational Therapy   Initial Evaluation     Patient Name: Saumya Vann  Age:  89 y.o., Sex:  female  Medical Record #: [id number]  Today's Date: 1/3/2024     Precautions: (P) Fall Risk    Assessment  Patient is 89 y.o. female admitted with R hip pain, with imaging negative for fractures with hx of HTN, hypothyroidism, osteoporosis, GERD. Pt presented with uncontrolled R hip pain, weakness, poor balance, and poor endurance impacting ADLs and mobility. Discussed with pt's sons at length regarding prior level and DC plan. Sons report that pt lives with her grandson who assists as needed with ADLs in the morning but is at work all day unable to assist. Both sons live near by and check in but are unable to provide heavy caregiving assist. At this time recommend post acute placement. Will follow for skilled OT services.    Plan    Occupational Therapy Initial Treatment Plan   Treatment Interventions: (P) Self Care / Activities of Daily Living, Cognitive Skill Development, Adaptive Equipment, Neuro Re-Education / Balance, Therapeutic Exercises, Therapeutic Activity  Treatment Frequency: (P) 3 Times per Week  Duration: (P) Until Therapy Goals Met    DC Equipment Recommendations: (P) Unable to determine at this time  Discharge Recommendations: (P) Recommend post-acute placement for additional occupational therapy services prior to discharge home      01/03/24 1049   Prior Living Situation   Prior Services Intermittent Physical Support for ADL Per Family   Housing / Facility 1 Story House   Bathroom Set up Walk In Shower;Shower Chair;Grab Bars   Equipment Owned Front-Wheel Walker;4-Wheel Walker;Wheelchair;Tub / Shower Seat;Grab Bar(s) In Tub / Shower;Grab Bar(s) By Toilet   Lives with - Patient's Self Care Capacity Related Adult   Comments Grandson assists in the morning as needed with ADLs and meal prep, but works during the day   Prior Level of ADL Function   Self Feeding Independent   Grooming / Hygiene Independent  "  Bathing Requires Assist   Dressing Requires Assist   Toileting Independent   Comments occassional assist   Prior Level of IADL Function   Medication Management Independent   Prior Level Of Mobility Independent Without Device in Home   Precautions   Precautions Fall Risk   Pain 0 - 10 Group   Therapist Pain Assessment   (pain in R hip throughout session)   Cognition    Cognition / Consciousness X   Comments limited by pain, stating \"Im in too much pain to make decisions\"   Active ROM Upper Body   Active ROM Upper Body  WDL   Strength Upper Body   Upper Body Strength  WDL   Balance Assessment   Sitting Balance (Static) Fair -   Sitting Balance (Dynamic) Poor +   Standing Balance (Static) Poor -   Standing Balance (Dynamic) Trace +   Weight Shift Sitting Poor   Weight Shift Standing Poor   Bed Mobility    Supine to Sit Maximal Assist   Sit to Supine Maximal Assist   Scooting Maximal Assist   Rolling Moderate Assist to Rt.;Moderate Assist to Lt.   ADL Assessment   Grooming Minimal Assist;Seated   Lower Body Dressing Total Assist   Toileting Total Assist   How much help from another person does the patient currently need...   6 Clicks Daily Activity Score 15   Functional Mobility   Sit to Stand Maximal Assist   Patient / Family Goals   Patient / Family Goal #1 to be in less pain   Short Term Goals   Short Term Goal # 1 Pt will demo functional txf min A   Short Term Goal # 2 Pt will complete toileting hygiene min A   Short Term Goal # 3 Pt will demo seated G/H routine without vc   Education Group   Role of Occupational Therapist Patient Response Patient;Acceptance;Explanation   Occupational Therapy Initial Treatment Plan    Treatment Interventions Self Care / Activities of Daily Living;Cognitive Skill Development;Adaptive Equipment;Neuro Re-Education / Balance;Therapeutic Exercises;Therapeutic Activity   Treatment Frequency 3 Times per Week   Duration Until Therapy Goals Met   Problem List   Problem List Decreased " Active Daily Living Skills;Decreased Homemaking Skills;Decreased Functional Mobility;Decreased Activity Tolerance;Safety Awareness Deficits / Cognition;Impaired Postural Control / Balance   Anticipated Discharge Equipment and Recommendations   DC Equipment Recommendations Unable to determine at this time   Discharge Recommendations Recommend post-acute placement for additional occupational therapy services prior to discharge home

## 2024-01-03 NOTE — DISCHARGE PLANNING
HTH/SCP TCN chart review completed. No CM consulted. The most current review of medical record, knowledge of pt's PLOF and social support, LACE+ score of 76, no 6 clicks scores documented.    Pt seen at bedside. Introduced TCN program. Provided education regarding post acute levels of care. Discussed HTH/SCP plan benefits. Pt verbalizes understanding.     Patient defers to grandson, Rizwan, for choice.  TCN called Rizwan.  Rizwan reports patient requires assist for iADLs at baseline, but was mostly independent with mobility.  Reports pain became severe, and Rizwan was unable to manage.  Rizwan works during day, and patient is essentially alone, per Rizwan, as patient has a disabled son at home, however he is unable and/or unwilling to provide any assist.  SNF choice obtained, which is Rizwan's preference.  However if patient's pain can be managed patient is open to HH.  Educated patient on Enhanced HH, which may be consideration pending medical progression.  Additionally rizwan reports no DME needs as patient is on RA at baseline, and owns FWW and WC.  Noted per chart patient is on RA.  Awaiting PT/OT recommendations for d/c planning.     Choice proactively obtained for SNF, HH and faxed to [facility]. TCN will continue to follow and collaborate with discharge planning team as additional post acute needs arise. Thank you.     Completed today:  Awaiting PT/OT with recommendations   Choice obtained: HH (open to enhanced), SNF  1) [facility]  2) [facility]  3) [facility]  SCP with [facility] PCP.

## 2024-01-03 NOTE — THERAPY
"Physical Therapy   Initial Evaluation     Patient Name: Saumya Vann  Age:  89 y.o., Sex:  female  Medical Record #: [id number]  Today's Date: 1/3/2024     Precautions  Precautions: Fall Risk    Assessment  Patient is a 89 y.o. female with hx of HTN, hypothyroidism, osteoporosis, GERD, CKD, and A fib. Now admitted with acute R hip pain; pt's femur and pelvis negative for fxs. CT imaging revealed \"old sacral fx\". PT eval complete, pt currently presents below her functional baseline due to significant pain and impaired strength, balance, activity tolerance, and mobility. Pt is typically independent or requiring very minimal assist for at home mobility, however she is now at Max A for bed mobility and STS with 2 person assist today. Pt's RLE pain increases once weight bearing and she can only stand for ~15 seconds. Pt's family present and supportive, stating that they cannot provide the support the pt needs at her CLOF. Recommend SNF placement at this time to maximize functional independence and decrease caregiver burden. Will follow while admitted for skilled PT intervention.     Plan    Physical Therapy Initial Treatment Plan   Treatment Plan : Bed Mobility, Gait Training, Neuro Re-Education / Balance, Self Care / Home Evaluation, Stair Training, Therapeutic Activities, Therapeutic Exercise  Treatment Frequency: 3 Times per Week  Duration: Until Therapy Goals Met    DC Equipment Recommendations: Unable to determine at this time  Discharge Recommendations: Recommend post-acute placement for additional physical therapy services prior to discharge home (SNF)         Vitals   O2 (LPM) 0   O2 Delivery Device None - Room Air   Pain 0 - 10 Group   Location Groin;Leg   Location Orientation Right;Anterior   Pain Rating Scale (NPRS)   (not quantified)   Description Aching   Therapist Pain Assessment During Activity   Prior Living Situation   Prior Services Intermittent Physical Support for ADL Per Family   Housing / " Facility [facility]   Steps Into Home 2   Steps In Home 0   Equipment Owned Front-Wheel Walker;4-Wheel Walker;Wheelchair   Lives with - Patient's Self Care Capacity Related Adult   Comments pt lives with her grandson who has to work during the day   Prior Level of Functional Mobility   Bed Mobility Independent   Transfer Status Independent   Ambulation Required Assist   Ambulation Distance household distances   Assistive Devices Used Front-Wheel Walker   Stairs Required Assist   Comments pt has been needing more assistance in recent weeks and has not been able to transfer or ambulate without assistance. normally needs close guarding with standing activities   History of Falls   History of Falls Yes   Cognition    Cognition / Consciousness WDL   Level of Consciousness Alert   Comments pleasant and participatory, appears minorly forgetful but age appropriate   Active ROM Lower Body    Active ROM Lower Body  X   Comments minorly limited R hip flexion   Strength Lower Body   Lower Body Strength  X   Comments RLE grossly 4/5, LLE 5/5   Coordination Lower Body    Comments grossly limited by pain   Balance Assessment   Sitting Balance (Static) Fair -   Sitting Balance (Dynamic) Poor +   Standing Balance (Static) Poor -   Standing Balance (Dynamic) Trace +   Weight Shift Sitting Poor   Weight Shift Standing Poor   Comments HHA x2 in standing   Bed Mobility    Supine to Sit Maximal Assist   Sit to Supine Maximal Assist   Scooting Maximal Assist   Gait Analysis   Gait Level Of Assist Unable to Participate   Level of Assist with Stairs Unable to Participate   Functional Mobility   Sit to Stand Maximal Assist   Mobility STS x1 from EOB with 2 person assisted   Comments limited standing tolerance due to RLE pain   How much difficulty does the patient currently have...   Turning over in bed (including adjusting bedclothes, sheets and blankets)? 2   Sitting down on and standing up from a chair with arms (e.g., wheelchair,  bedside commode, etc.) 1   Moving from lying on back to sitting on the side of the bed? 1   How much help from another person does the patient currently need...   Moving to and from a bed to a chair (including a wheelchair)? 1   Need to walk in a hospital room? 1   Climbing 3-5 steps with a railing? 1   6 clicks Mobility Score 7   Activity Tolerance   Sitting Edge of Bed 6 min   Standing <1 min   Comments limited by pain/weakness   Short Term Goals    Short Term Goal # 1 pt will move supine<>eob with spv in 6 tx for bed mobility.   Short Term Goal # 2 pt will complete spt with fww and spv in 6 tx for functional mobility.   Short Term Goal # 3 pt will ambulate 50 ft with fww and cga in 6 tx for short household distances.   Short Term Goal # 4 pt will negotiate 2 stairs with cga in 6 tx for home access.   Education Group   Education Provided Role of Physical Therapist   Role of Physical Therapist Patient Response Patient;Family;Acceptance;Explanation;Verbal Demonstration   Additional Comments further education on role of PT, DC planning, pt's CLOF and goals   Physical Therapy Initial Treatment Plan    Treatment Plan  Bed Mobility;Gait Training;Neuro Re-Education / Balance;Self Care / Home Evaluation;Stair Training;Therapeutic Activities;Therapeutic Exercise   Treatment Frequency 3 Times per Week   Duration Until Therapy Goals Met   Problem List    Problems Pain;Impaired Bed Mobility;Impaired Transfers;Impaired Ambulation;Functional Strength Deficit;Impaired Balance;Decreased Activity Tolerance   Anticipated Discharge Equipment and Recommendations   DC Equipment Recommendations Unable to determine at this time   Discharge Recommendations Recommend post-acute placement for additional physical therapy services prior to discharge home  (SNF)   Interdisciplinary Plan of Care Collaboration   IDT Collaboration with  Family / Caregiver;Nursing;Occupational Therapist   Patient Position at End of Therapy In Bed;Bed Alarm  On;Call Light within Reach;Tray Table within Reach;Phone within Reach   Collaboration Comments RN updated   Session Information   Date / Session Number  1/3- 1 (1/3, 1/9)

## 2024-01-03 NOTE — PROGRESS NOTES
Pt AO x2, disoriented to time and situation.  Pt denies pain.  Pt able to rest throughout shift.  Call light, belongings, and wastebasket within reach.  Bed locked and in lowest positions.  Hourly rounding in place. Needs met at this time.

## 2024-01-03 NOTE — CARE PLAN
Problem: Knowledge Deficit - Standard  Goal: Patient and family/care givers will demonstrate understanding of plan of care, disease process/condition, diagnostic tests and medications  Outcome: Progressing     Problem: Hemodynamics  Goal: Patient's hemodynamics, fluid balance and neurologic status will be stable or improve  Outcome: Progressing     Problem: Fluid Volume  Goal: Fluid volume balance will be maintained  Outcome: Progressing     Problem: Urinary - Renal Perfusion  Goal: Ability to achieve and maintain adequate renal perfusion and functioning will improve  Outcome: Progressing     Problem: Respiratory  Goal: Patient will achieve/maintain optimum respiratory ventilation and gas exchange  Outcome: Progressing     Problem: Mechanical Ventilation  Goal: Safe management of artificial airway and ventilation  Outcome: Progressing  Goal: Successful weaning off mechanical ventilator, spontaneously maintains adequate gas exchange  Outcome: Progressing  Goal: Patient will be able to express needs and understand communication  Outcome: Progressing     Problem: Physical Regulation  Goal: Diagnostic test results will improve  Outcome: Progressing  Goal: Signs and symptoms of infection will decrease  Outcome: Progressing     Problem: Skin Integrity  Goal: Skin integrity is maintained or improved  Outcome: Progressing     Problem: Fall Risk  Goal: Patient will remain free from falls  Outcome: Progressing     Problem: Pain - Standard  Goal: Alleviation of pain or a reduction in pain to the patient’s comfort goal  Outcome: Progressing   The patient is Stable - Low risk of patient condition declining or worsening    Shift Goals  Clinical Goals: comfort  Patient Goals: sleep well tonight  Family Goals: CROW    Progress made toward(s) clinical / shift goals: Pain is currently under control with prn medications.

## 2024-01-03 NOTE — ASSESSMENT & PLAN NOTE
Goal of care discussed with patient and patient's grandson ER.  She stated she does not wish for aggressive/heroic life-sustaining measures-no CPR/defibrillation/intubation or mechanical ventilation.  Diagnosis, prognosis, questions or concern addressed.  DNR/denies status confirmed.  ACP: 16 minutes

## 2024-01-03 NOTE — PROGRESS NOTES
4 Eyes Skin Assessment Completed by BRIAN Power and BRIAN Ayala.    Head WDL  Ears WDL  Nose WDL  Mouth WDL  Neck WDL  Breast/Chest WDL  Shoulder Blades WDL  Spine WDL  (R) Arm/Elbow/Hand WDL  (L) Arm/Elbow/Hand WDL  Abdomen Redness and Blanching  Groin Redness  Scrotum/Coccyx/Buttocks Redness and Blanching  (R) Leg WDL  (L) Leg WDL  (R) Heel/Foot/Toe WDL  (L) Heel/Foot/Toe WDL          Devices In Places Pulse Ox and SCD's      Interventions In Place TAP System, Pillows, Q2 Turns, and Low Air Loss Mattress    Possible Skin Injury Yes    Pictures Uploaded Into Epic Yes  Wound Consult Placed Yes  RN Wound Prevention Protocol Ordered Yes

## 2024-01-03 NOTE — WOUND TEAM
Received wound consult for virgilio groin redness.  Photos reviewed, patient appears to have redness related to moisture.  Orders updated for nystatin powder and interdry.  Continue with all preventative orders.  Consult completed.

## 2024-01-03 NOTE — CARE PLAN
The patient is Stable - Low risk of patient condition declining or worsening    Shift Goals  Clinical Goals: patient will be able to rest comfortably during shift  Patient Goals: sleep well tonight  Family Goals: CROW    Progress made toward(s) clinical / shift goals:    Pt was able to achieve ample sleep for the majority of the shift.     Problem: Knowledge Deficit - Standard  Goal: Patient and family/care givers will demonstrate understanding of plan of care, disease process/condition, diagnostic tests and medications  Outcome: Progressing     Problem: Fall Risk  Goal: Patient will remain free from falls  Outcome: Progressing

## 2024-01-03 NOTE — PROGRESS NOTES
Hospital Medicine Daily Progress Note    Date of Service  1/3/2024    Chief Complaint  Saumya Vann is a 89 y.o. female admitted 1/2/2024 with intractable back pain    Hospital Course  Admitted with intractable back pain, known history of compression fractures of the spine.  Further workup showed a subacute to chronic sacral fracture.    Interval Problem Update  Sacral fracture -pain better controlled  Leukocytosis - 88170, Tmax 97.6  A-fib - rate 66-87  Hypertension - sbp 115-155    Updates given and plan of care discussed with patient's family who were at bedside.    I have discussed this patient's plan of care and discharge plan at IDT rounds today with Case Management, Nursing, Nursing leadership, and other members of the IDT team.    Consultants/Specialty  None    Code Status  DNAR/DNI    Disposition  The patient is not medically cleared for discharge to home or a post-acute facility.  Anticipate discharge to: skilled nursing facility    I have placed the appropriate orders for post-discharge needs.    Review of Systems  Review of Systems   Constitutional:  Positive for malaise/fatigue. Negative for chills, diaphoresis and fever.   HENT:  Negative for congestion, hearing loss and sore throat.    Eyes:  Negative for blurred vision.   Respiratory:  Negative for cough, shortness of breath and wheezing.    Cardiovascular:  Positive for leg swelling. Negative for chest pain and palpitations.   Gastrointestinal:  Negative for abdominal pain, diarrhea, heartburn, nausea and vomiting.   Genitourinary:  Negative for dysuria, flank pain and hematuria.   Musculoskeletal:  Positive for back pain. Negative for joint pain, myalgias and neck pain.   Skin:  Negative for rash.   Neurological:  Positive for weakness. Negative for dizziness, sensory change, speech change, focal weakness and headaches.   Psychiatric/Behavioral:  The patient is nervous/anxious.         Physical Exam  Temp:  [36.1 °C (97 °F)-36.4 °C (97.6 °F)]  36.4 °C (97.6 °F)  Pulse:  [66-87] 67  Resp:  [14-18] 18  BP: (115-155)/(54-88) 119/73  SpO2:  [90 %-98 %] 95 %    Physical Exam  Vitals and nursing note reviewed.   Constitutional:       Appearance: She is obese.   HENT:      Head: Normocephalic and atraumatic.      Nose: No congestion.      Mouth/Throat:      Mouth: Mucous membranes are moist.   Eyes:      Extraocular Movements: Extraocular movements intact.      Conjunctiva/sclera: Conjunctivae normal.   Cardiovascular:      Rate and Rhythm: Normal rate. Rhythm irregular.   Pulmonary:      Effort: Pulmonary effort is normal.      Breath sounds: Normal breath sounds.   Abdominal:      General: There is no distension.      Tenderness: There is abdominal tenderness. There is no guarding or rebound.   Musculoskeletal:      Cervical back: No tenderness.      Right lower leg: Edema present.      Left lower leg: Edema present.   Skin:     General: Skin is warm and dry.   Neurological:      General: No focal deficit present.      Mental Status: She is alert and oriented to person, place, and time.      Cranial Nerves: No cranial nerve deficit.   Psychiatric:         Speech: Speech is delayed.         Fluids    Intake/Output Summary (Last 24 hours) at 1/3/2024 1306  Last data filed at 1/2/2024 1953  Gross per 24 hour   Intake 200 ml   Output --   Net 200 ml       Laboratory  Recent Labs     01/01/24  1049 01/02/24  1319 01/03/24  0902   WBC 14.0* 18.3* 19.5*   RBC 5.17 5.11 5.03   HEMOGLOBIN 15.3 15.3 15.2   HEMATOCRIT 47.1* 47.7* 46.5   MCV 91.1 93.3 92.4   MCH 29.6 29.9 30.2   MCHC 32.5 32.1* 32.7   RDW 51.3* 52.9* 52.1*   PLATELETCT 320 309 278   MPV 8.6* 8.6* 8.7*     Recent Labs     01/01/24  1049 01/02/24  1319 01/03/24  0902   SODIUM 138 140 136   POTASSIUM 5.4 5.3 4.8   CHLORIDE 102 103 103   CO2 23 24 18*   GLUCOSE 122* 105* 112*   BUN 36* 48* 47*   CREATININE 1.13 1.35 1.04   CALCIUM 8.5 9.2 8.9                   Imaging  DX-CHEST-LIMITED (1 VIEW)   Final Result       No acute cardiopulmonary disease.      CT-HIP W/O PLUS RECONS RIGHT   Final Result      1.  No acute pelvic fracture.   2.  Evidence of old sacral fracture.   3.  No hip fracture or dislocation.   4.  Moderate degenerative change of both hips.      DX-FEMUR-2+ RIGHT   Final Result      1.  No RIGHT femur fracture.   2.  Moderate degenerative change of RIGHT hip.   3.  RIGHT knee prosthesis in place, normally aligned.      US-RUQ    (Results Pending)        Assessment/Plan  Elevated LFTs- (present on admission)  Assessment & Plan  Follow cmp  Check US RUQ    Sacral fracture (HCC)- (present on admission)  Assessment & Plan  Chronic  Pain control   May need MRI pelvis    Compression fracture of spine (HCC)- (present on admission)  Assessment & Plan  May need MRI T/L spines  Pain control    Pyuria- (present on admission)  Assessment & Plan  Follow culture    Frailty syndrome in geriatric patient- (present on admission)  Assessment & Plan  PT and OT    Leukocytosis- (present on admission)  Assessment & Plan  Follow cbc  Check procalcitonin, ESR, CRP, blood cultures  May need MRI T/L spines and Pelvis    Pulmonary HTN (HCC)- (present on admission)  Assessment & Plan  Monitor volume status    CKD (chronic kidney disease) stage 3, GFR 30-59 ml/min (HCC)- (present on admission)  Assessment & Plan  Follow bmp    Persistent atrial fibrillation (HCC)- (present on admission)  Assessment & Plan  Cardizem    Presence of Watchman left atrial appendage closure device- (present on admission)  Assessment & Plan  monitor    HTN (hypertension)- (present on admission)  Assessment & Plan  Cardizem    Morbid obesity (HCC)- (present on admission)  Assessment & Plan  Body mass index is 32.94 kg/m².     Hypothyroidism- (present on admission)  Assessment & Plan  Synthroid         VTE prophylaxis:    heparin ppx      I have performed a physical exam and reviewed and updated ROS and Plan today (1/3/2024). In review of yesterday's note  (1/2/2024), there are no changes except as documented above.

## 2024-01-04 ENCOUNTER — APPOINTMENT (OUTPATIENT)
Dept: RADIOLOGY | Facility: MEDICAL CENTER | Age: 89
DRG: 552 | End: 2024-01-04
Attending: FAMILY MEDICINE
Payer: MEDICARE

## 2024-01-04 LAB
ALBUMIN SERPL BCP-MCNC: 4 G/DL (ref 3.2–4.9)
ALBUMIN/GLOB SERPL: 1.5 G/DL
ALP SERPL-CCNC: 167 U/L (ref 30–99)
ALT SERPL-CCNC: 269 U/L (ref 2–50)
ANION GAP SERPL CALC-SCNC: 14 MMOL/L (ref 7–16)
AST SERPL-CCNC: 45 U/L (ref 12–45)
BILIRUB SERPL-MCNC: 0.7 MG/DL (ref 0.1–1.5)
BUN SERPL-MCNC: 40 MG/DL (ref 8–22)
CALCIUM ALBUM COR SERPL-MCNC: 8.9 MG/DL (ref 8.5–10.5)
CALCIUM SERPL-MCNC: 8.9 MG/DL (ref 8.5–10.5)
CHLORIDE SERPL-SCNC: 102 MMOL/L (ref 96–112)
CO2 SERPL-SCNC: 19 MMOL/L (ref 20–33)
CREAT SERPL-MCNC: 0.85 MG/DL (ref 0.5–1.4)
ERYTHROCYTE [DISTWIDTH] IN BLOOD BY AUTOMATED COUNT: 49.5 FL (ref 35.9–50)
ERYTHROCYTE [SEDIMENTATION RATE] IN BLOOD BY WESTERGREN METHOD: 7 MM/HOUR (ref 0–25)
GFR SERPLBLD CREATININE-BSD FMLA CKD-EPI: 64 ML/MIN/1.73 M 2
GLOBULIN SER CALC-MCNC: 2.6 G/DL (ref 1.9–3.5)
GLUCOSE SERPL-MCNC: 132 MG/DL (ref 65–99)
HCT VFR BLD AUTO: 45.1 % (ref 37–47)
HGB BLD-MCNC: 15 G/DL (ref 12–16)
MAGNESIUM SERPL-MCNC: 2.1 MG/DL (ref 1.5–2.5)
MCH RBC QN AUTO: 30.1 PG (ref 27–33)
MCHC RBC AUTO-ENTMCNC: 33.3 G/DL (ref 32.2–35.5)
MCV RBC AUTO: 90.6 FL (ref 81.4–97.8)
NT-PROBNP SERPL IA-MCNC: 1343 PG/ML (ref 0–125)
PLATELET # BLD AUTO: 284 K/UL (ref 164–446)
PMV BLD AUTO: 8.8 FL (ref 9–12.9)
POTASSIUM SERPL-SCNC: 4.6 MMOL/L (ref 3.6–5.5)
PROCALCITONIN SERPL-MCNC: 0.07 NG/ML
PROT SERPL-MCNC: 6.6 G/DL (ref 6–8.2)
RBC # BLD AUTO: 4.98 M/UL (ref 4.2–5.4)
SODIUM SERPL-SCNC: 135 MMOL/L (ref 135–145)
WBC # BLD AUTO: 15.2 K/UL (ref 4.8–10.8)

## 2024-01-04 PROCEDURE — 85027 COMPLETE CBC AUTOMATED: CPT

## 2024-01-04 PROCEDURE — A9270 NON-COVERED ITEM OR SERVICE: HCPCS | Performed by: FAMILY MEDICINE

## 2024-01-04 PROCEDURE — 700102 HCHG RX REV CODE 250 W/ 637 OVERRIDE(OP): Performed by: FAMILY MEDICINE

## 2024-01-04 PROCEDURE — 770006 HCHG ROOM/CARE - MED/SURG/GYN SEMI*

## 2024-01-04 PROCEDURE — 99232 SBSQ HOSP IP/OBS MODERATE 35: CPT | Performed by: FAMILY MEDICINE

## 2024-01-04 PROCEDURE — 36415 COLL VENOUS BLD VENIPUNCTURE: CPT

## 2024-01-04 PROCEDURE — 700111 HCHG RX REV CODE 636 W/ 250 OVERRIDE (IP): Performed by: STUDENT IN AN ORGANIZED HEALTH CARE EDUCATION/TRAINING PROGRAM

## 2024-01-04 PROCEDURE — 74181 MRI ABDOMEN W/O CONTRAST: CPT

## 2024-01-04 PROCEDURE — 83880 ASSAY OF NATRIURETIC PEPTIDE: CPT

## 2024-01-04 PROCEDURE — 83735 ASSAY OF MAGNESIUM: CPT

## 2024-01-04 PROCEDURE — 84145 PROCALCITONIN (PCT): CPT

## 2024-01-04 PROCEDURE — 700101 HCHG RX REV CODE 250: Performed by: STUDENT IN AN ORGANIZED HEALTH CARE EDUCATION/TRAINING PROGRAM

## 2024-01-04 PROCEDURE — 700102 HCHG RX REV CODE 250 W/ 637 OVERRIDE(OP): Performed by: STUDENT IN AN ORGANIZED HEALTH CARE EDUCATION/TRAINING PROGRAM

## 2024-01-04 PROCEDURE — 80053 COMPREHEN METABOLIC PANEL: CPT

## 2024-01-04 PROCEDURE — A9270 NON-COVERED ITEM OR SERVICE: HCPCS | Performed by: STUDENT IN AN ORGANIZED HEALTH CARE EDUCATION/TRAINING PROGRAM

## 2024-01-04 RX ADMIN — ACETAMINOPHEN 500 MG: 500 TABLET, FILM COATED ORAL at 17:46

## 2024-01-04 RX ADMIN — NYSTATIN: 100000 POWDER TOPICAL at 12:16

## 2024-01-04 RX ADMIN — DILTIAZEM HYDROCHLORIDE 120 MG: 120 CAPSULE, COATED, EXTENDED RELEASE ORAL at 17:47

## 2024-01-04 RX ADMIN — LEVOTHYROXINE SODIUM 200 MCG: 0.2 TABLET ORAL at 04:59

## 2024-01-04 RX ADMIN — CHOLECALCIFEROL TAB 125 MCG (5000 UNIT) 5000 UNITS: 125 TAB at 05:00

## 2024-01-04 RX ADMIN — OXYCODONE 5 MG: 5 TABLET ORAL at 15:59

## 2024-01-04 RX ADMIN — HEPARIN SODIUM 5000 UNITS: 5000 INJECTION, SOLUTION INTRAVENOUS; SUBCUTANEOUS at 05:05

## 2024-01-04 RX ADMIN — GABAPENTIN 200 MG: 100 CAPSULE ORAL at 17:49

## 2024-01-04 RX ADMIN — ACETAMINOPHEN 500 MG: 500 TABLET, FILM COATED ORAL at 12:16

## 2024-01-04 RX ADMIN — ACETAMINOPHEN 500 MG: 500 TABLET, FILM COATED ORAL at 04:59

## 2024-01-04 RX ADMIN — HEPARIN SODIUM 5000 UNITS: 5000 INJECTION, SOLUTION INTRAVENOUS; SUBCUTANEOUS at 15:47

## 2024-01-04 RX ADMIN — LATANOPROST 1 DROP: 50 SOLUTION OPHTHALMIC at 18:00

## 2024-01-04 RX ADMIN — HEPARIN SODIUM 5000 UNITS: 5000 INJECTION, SOLUTION INTRAVENOUS; SUBCUTANEOUS at 23:08

## 2024-01-04 RX ADMIN — DILTIAZEM HYDROCHLORIDE 120 MG: 120 CAPSULE, COATED, EXTENDED RELEASE ORAL at 05:04

## 2024-01-04 RX ADMIN — DOCUSATE SODIUM 50 MG AND SENNOSIDES 8.6 MG 2 TABLET: 8.6; 5 TABLET, FILM COATED ORAL at 04:58

## 2024-01-04 RX ADMIN — NYSTATIN: 100000 POWDER TOPICAL at 17:51

## 2024-01-04 RX ADMIN — ACETAMINOPHEN 500 MG: 500 TABLET, FILM COATED ORAL at 23:09

## 2024-01-04 RX ADMIN — NYSTATIN 1 G: 100000 POWDER TOPICAL at 04:58

## 2024-01-04 RX ADMIN — OMEPRAZOLE 20 MG: 20 CAPSULE, DELAYED RELEASE ORAL at 17:50

## 2024-01-04 RX ADMIN — DOCUSATE SODIUM 50 MG AND SENNOSIDES 8.6 MG 2 TABLET: 8.6; 5 TABLET, FILM COATED ORAL at 17:48

## 2024-01-04 ASSESSMENT — ENCOUNTER SYMPTOMS
DIAPHORESIS: 0
MYALGIAS: 0
PALPITATIONS: 0
FOCAL WEAKNESS: 0
SPEECH CHANGE: 0
NAUSEA: 0
FEVER: 0
VOMITING: 0
SENSORY CHANGE: 0
NERVOUS/ANXIOUS: 1
DIZZINESS: 0
HEARTBURN: 0
FLANK PAIN: 0
BLURRED VISION: 0
DIARRHEA: 0
COUGH: 0
BACK PAIN: 1
SHORTNESS OF BREATH: 0
SORE THROAT: 0
HEADACHES: 0
NECK PAIN: 0
WEAKNESS: 1
ABDOMINAL PAIN: 0
CHILLS: 0
WHEEZING: 0

## 2024-01-04 ASSESSMENT — PAIN DESCRIPTION - PAIN TYPE
TYPE: ACUTE PAIN
TYPE: ACUTE PAIN

## 2024-01-04 NOTE — PROGRESS NOTES
Hospital Medicine Daily Progress Note    Date of Service  1/4/2024    Chief Complaint  Saumya Vann is a 89 y.o. female admitted 1/2/2024 with intractable back pain    Hospital Course  Admitted with intractable back pain, known history of compression fractures of the spine.  Further workup showed a subacute to chronic sacral fracture. Patient was also noted to have leukocytosis and elevated LFTs.     Interval Problem Update  Sacral fracture  - pain better controlled  Leukocytosis - 52309, Tmax 98.9, procalcitonin, ESR and CRP all negative  A-fib - rate 77  Hypertension - sbp 129-161  Elev LFTs - US showed possible CBD dilation, LFTs trended down    I have discussed this patient's plan of care and discharge plan at IDT rounds today with Case Management, Nursing, Nursing leadership, and other members of the IDT team.    Consultants/Specialty  None    Code Status  DNAR/DNI    Disposition  The patient is not medically cleared for discharge to home or a post-acute facility.  Anticipate discharge to: skilled nursing facility    I have placed the appropriate orders for post-discharge needs.    Review of Systems  Review of Systems   Constitutional:  Positive for malaise/fatigue. Negative for chills, diaphoresis and fever.   HENT:  Negative for congestion, hearing loss and sore throat.    Eyes:  Negative for blurred vision.   Respiratory:  Negative for cough, shortness of breath and wheezing.    Cardiovascular:  Positive for leg swelling. Negative for chest pain and palpitations.   Gastrointestinal:  Negative for abdominal pain, diarrhea, heartburn, nausea and vomiting.   Genitourinary:  Negative for dysuria, flank pain and hematuria.   Musculoskeletal:  Positive for back pain. Negative for joint pain, myalgias and neck pain.   Skin:  Negative for rash.   Neurological:  Positive for weakness. Negative for dizziness, sensory change, speech change, focal weakness and headaches.   Psychiatric/Behavioral:  The patient is  nervous/anxious.         Physical Exam  Temp:  [36.5 °C (97.7 °F)-37.2 °C (98.9 °F)] 36.8 °C (98.2 °F)  Pulse:  [] 77  Resp:  [16-18] 16  BP: (129-161)/(66-82) 161/74  SpO2:  [95 %-99 %] 99 %    Physical Exam  Vitals and nursing note reviewed.   Constitutional:       Appearance: She is obese.   HENT:      Head: Normocephalic and atraumatic.      Nose: No congestion.      Mouth/Throat:      Mouth: Mucous membranes are moist.   Eyes:      Extraocular Movements: Extraocular movements intact.      Conjunctiva/sclera: Conjunctivae normal.   Cardiovascular:      Rate and Rhythm: Normal rate. Rhythm irregular.   Pulmonary:      Effort: Pulmonary effort is normal.      Breath sounds: Normal breath sounds.   Abdominal:      General: There is no distension.      Tenderness: There is no abdominal tenderness. There is no guarding or rebound.   Musculoskeletal:      Cervical back: No tenderness.      Right lower leg: Edema present.      Left lower leg: Edema present.   Skin:     General: Skin is warm and dry.   Neurological:      General: No focal deficit present.      Mental Status: She is alert and oriented to person, place, and time.      Cranial Nerves: No cranial nerve deficit.         Fluids    Intake/Output Summary (Last 24 hours) at 1/4/2024 1259  Last data filed at 1/4/2024 1100  Gross per 24 hour   Intake 960 ml   Output 1800 ml   Net -840 ml         Laboratory  Recent Labs     01/02/24  1319 01/03/24  0902 01/04/24  0931   WBC 18.3* 19.5* 15.2*   RBC 5.11 5.03 4.98   HEMOGLOBIN 15.3 15.2 15.0   HEMATOCRIT 47.7* 46.5 45.1   MCV 93.3 92.4 90.6   MCH 29.9 30.2 30.1   MCHC 32.1* 32.7 33.3   RDW 52.9* 52.1* 49.5   PLATELETCT 309 278 284   MPV 8.6* 8.7* 8.8*       Recent Labs     01/02/24  1319 01/03/24  0902 01/04/24  0931   SODIUM 140 136 135   POTASSIUM 5.3 4.8 4.6   CHLORIDE 103 103 102   CO2 24 18* 19*   GLUCOSE 105* 112* 132*   BUN 48* 47* 40*   CREATININE 1.35 1.04 0.85   CALCIUM 9.2 8.9 8.9                      Imaging  US-RUQ   Final Result      1.  Dilated common duct measuring 2.3 cm. Distal obstructive etiology is not excluded. If there is clinical suspicion for choledocholithiasis, MRCP is recommended for further evaluation. If there is clinical suspicion for pancreatic or ampullary mass, MR    abdomen with and without contrast is recommended for further evaluation.   2.  Status post cholecystectomy.   3.  Hepatic steatosis and/or diffuse hepatocellular disease.      DX-CHEST-LIMITED (1 VIEW)   Final Result      No acute cardiopulmonary disease.      CT-HIP W/O PLUS RECONS RIGHT   Final Result      1.  No acute pelvic fracture.   2.  Evidence of old sacral fracture.   3.  No hip fracture or dislocation.   4.  Moderate degenerative change of both hips.      DX-FEMUR-2+ RIGHT   Final Result      1.  No RIGHT femur fracture.   2.  Moderate degenerative change of RIGHT hip.   3.  RIGHT knee prosthesis in place, normally aligned.      PG-[id number]-D/O    (Results Pending)        Assessment/Plan  Elevated LFTs- (present on admission)  Assessment & Plan  Check MRCP  Follow cmp    Sacral fracture (HCC)- (present on admission)  Assessment & Plan  Chronic  Pain control   May need MRI pelvis    Compression fracture of spine (HCC)- (present on admission)  Assessment & Plan  May need MRI T/L spines  Pain control    Pyuria- (present on admission)  Assessment & Plan  Follow culture    Frailty syndrome in geriatric patient- (present on admission)  Assessment & Plan  PT and OT    Leukocytosis- (present on admission)  Assessment & Plan  Follow cbc  Check MRCP    Pulmonary HTN (HCC)- (present on admission)  Assessment & Plan  Monitor volume status    CKD (chronic kidney disease) stage 3, GFR 30-59 ml/min (HCC)- (present on admission)  Assessment & Plan  Follow bmp    Persistent atrial fibrillation (HCC)- (present on admission)  Assessment & Plan  Cardizem    Presence of Watchman left atrial appendage closure device- (present on  admission)  Assessment & Plan  monitor    HTN (hypertension)- (present on admission)  Assessment & Plan  Cardizem    Morbid obesity (HCC)- (present on admission)  Assessment & Plan  Body mass index is 32.94 kg/m².     Hypothyroidism- (present on admission)  Assessment & Plan  Synthroid         VTE prophylaxis:    heparin ppx      I have performed a physical exam and reviewed and updated ROS and Plan today (1/4/2024). In review of yesterday's note (1/3/2024), there are no changes except as documented above.

## 2024-01-04 NOTE — DISCHARGE PLANNING
Referrals sent to area SNFs. The [facility] was [facility], however patient was declined from that facility. She is not currently medically cleared so CM will follow up with the family tomorrow to request his selection.     Case Management Discharge Planning    Admission Date: 1/2/2024  GMLOS: 2.9  ALOS: 2    6-Clicks ADL Score: 15  6-Clicks Mobility Score: 7  PT and/or OT Eval ordered: Yes  Post-acute Referrals Ordered: Yes  Post-acute Choice Obtained: Yes  Has referral(s) been sent to post-acute provider:  Yes      Anticipated Discharge Dispo: Discharge Disposition: D/T to SNF with Medicare cert in anticipation of skilled care (03)    DME Needed: No    Action(s) Taken: Updated Provider/Nurse on Discharge Plan, Patient Conference, DC Assessment Complete (See below), Acceptance Received, Family Conference, and Completed PASSR/LOC    Escalations Completed: None    Medically Clear: No    Barriers to Discharge: Medical clearance    Care Transition Team Assessment    Information Source  Orientation Level: Disoriented to place, Disoriented to time, Disoriented to situation, Oriented to person  Information Given By: Relative  Informant's Name: Fish Vann  Who is responsible for making decisions for patient? : Adult child  Name(s) of Primary Decision Maker: Fish Vann    Readmission Evaluation  Is this a readmission?: No    Elopement Risk  Legal Hold: No  Ambulatory or Self Mobile in Wheelchair: No-Not an Elopement Risk  Elopement Risk: Not at Risk for Elopement    Interdisciplinary Discharge Planning  Does Admitting Nurse Feel This Could be a Complex Discharge?: No  Primary Care Physician: Bebe Santamaria MD  Lives with - Patient's Self Care Capacity: Related Adult  Patient or legal guardian wants to designate a caregiver: No  Support Systems: Family Member(s)  Housing / Facility: [address]  Do You Take your Prescribed Medications Regularly: Yes  Mobility Issues: Yes  Prior Services: Intermittent Physical Support for  ADL Per Family  Patient Prefers to be Discharged to:: SNF    Discharge Preparedness  What is your plan after discharge?: Skilled nursing facility  Prior Functional Level: Ambulatory, Needs Assist with Activities of Daily Living, Needs Assist with Medication Management    Functional Assesment  Prior Functional Level: Ambulatory, Needs Assist with Activities of Daily Living, Needs Assist with Medication Management    Finances  Financial Barriers to Discharge: No  Prescription Coverage: Yes         Values / Beliefs / Concerns  Values / Beliefs Concerns : No    [facility] Directive  [facility] Directive?: None    Domestic Abuse  Have you ever been the victim of abuse or violence?: No              Anticipated Discharge Information  Discharge Disposition: D/T to SNF with Medicare cert in anticipation of skilled care (03)

## 2024-01-04 NOTE — CARE PLAN
The patient is Stable - Low risk of patient condition declining or worsening    Shift Goals  Clinical Goals: VSS, trend labs and maintain skin integrity  Patient Goals: Rest  Family Goals: No family at bedside    Progress made toward(s) clinical / shift goals:    Problem: Skin Integrity  Goal: Skin integrity is maintained or improved  Description: Target End Date:  Prior to discharge or change in level of care    Document interventions on Skin Risk/Abdulkadir flowsheet groups and corresponding LDA    1.  Assess and monitor skin integrity, appearance and/or temperature  2.  Assess risk factors for impaired skin integrity and/or pressures ulcers  3.  Implement precautions to protect skin integrity in collaboration with interdisciplinary team  4.  Implement pressure ulcer prevention protocol if at risk for skin breakdown  5.  Confirm wound care consult if at risk for skin breakdown  6.  Ensure patient use of pressure relieving devices  (Low air loss bed, waffle overlay, heel protectors, ROHO cushion, etc)  Outcome: Progressing     Problem: Fall Risk  Goal: Patient will remain free from falls  Description: Target End Date:  Prior to discharge or change in level of care    Document interventions on the Nga Granda Fall Risk Assessment    1.  Assess for fall risk factors  2.  Implement fall precautions  Outcome: Progressing       Patient is not progressing towards the following goals:

## 2024-01-05 ENCOUNTER — PATIENT OUTREACH (OUTPATIENT)
Dept: SCHEDULING | Facility: IMAGING CENTER | Age: 89
End: 2024-01-05
Payer: MEDICARE

## 2024-01-05 VITALS
RESPIRATION RATE: 18 BRPM | HEIGHT: 60 IN | TEMPERATURE: 97 F | BODY MASS INDEX: 33.11 KG/M2 | HEART RATE: 81 BPM | WEIGHT: 168.65 LBS | DIASTOLIC BLOOD PRESSURE: 74 MMHG | OXYGEN SATURATION: 98 % | SYSTOLIC BLOOD PRESSURE: 151 MMHG

## 2024-01-05 PROBLEM — K83.1 COMMON BILE DUCT (CBD) STRICTURE: Status: ACTIVE | Noted: 2024-01-05

## 2024-01-05 LAB
ALBUMIN SERPL BCP-MCNC: 3.8 G/DL (ref 3.2–4.9)
ALBUMIN/GLOB SERPL: 1.3 G/DL
ALP SERPL-CCNC: 150 U/L (ref 30–99)
ALT SERPL-CCNC: 198 U/L (ref 2–50)
ANION GAP SERPL CALC-SCNC: 14 MMOL/L (ref 7–16)
AST SERPL-CCNC: 29 U/L (ref 12–45)
BILIRUB SERPL-MCNC: 0.7 MG/DL (ref 0.1–1.5)
BUN SERPL-MCNC: 33 MG/DL (ref 8–22)
CALCIUM ALBUM COR SERPL-MCNC: 9.4 MG/DL (ref 8.5–10.5)
CALCIUM SERPL-MCNC: 9.2 MG/DL (ref 8.5–10.5)
CHLORIDE SERPL-SCNC: 104 MMOL/L (ref 96–112)
CO2 SERPL-SCNC: 18 MMOL/L (ref 20–33)
CREAT SERPL-MCNC: 0.79 MG/DL (ref 0.5–1.4)
ERYTHROCYTE [DISTWIDTH] IN BLOOD BY AUTOMATED COUNT: 49.1 FL (ref 35.9–50)
GFR SERPLBLD CREATININE-BSD FMLA CKD-EPI: 70 ML/MIN/1.73 M 2
GLOBULIN SER CALC-MCNC: 3 G/DL (ref 1.9–3.5)
GLUCOSE SERPL-MCNC: 118 MG/DL (ref 65–99)
HCT VFR BLD AUTO: 46.5 % (ref 37–47)
HGB BLD-MCNC: 15.6 G/DL (ref 12–16)
MCH RBC QN AUTO: 29.9 PG (ref 27–33)
MCHC RBC AUTO-ENTMCNC: 33.5 G/DL (ref 32.2–35.5)
MCV RBC AUTO: 89.3 FL (ref 81.4–97.8)
PLATELET # BLD AUTO: 281 K/UL (ref 164–446)
PMV BLD AUTO: 8.7 FL (ref 9–12.9)
POTASSIUM SERPL-SCNC: 4.4 MMOL/L (ref 3.6–5.5)
PROT SERPL-MCNC: 6.8 G/DL (ref 6–8.2)
RBC # BLD AUTO: 5.21 M/UL (ref 4.2–5.4)
SODIUM SERPL-SCNC: 136 MMOL/L (ref 135–145)
WBC # BLD AUTO: 14.9 K/UL (ref 4.8–10.8)

## 2024-01-05 PROCEDURE — 700102 HCHG RX REV CODE 250 W/ 637 OVERRIDE(OP): Performed by: FAMILY MEDICINE

## 2024-01-05 PROCEDURE — 700101 HCHG RX REV CODE 250: Performed by: STUDENT IN AN ORGANIZED HEALTH CARE EDUCATION/TRAINING PROGRAM

## 2024-01-05 PROCEDURE — 36415 COLL VENOUS BLD VENIPUNCTURE: CPT

## 2024-01-05 PROCEDURE — 700102 HCHG RX REV CODE 250 W/ 637 OVERRIDE(OP): Performed by: STUDENT IN AN ORGANIZED HEALTH CARE EDUCATION/TRAINING PROGRAM

## 2024-01-05 PROCEDURE — 99239 HOSP IP/OBS DSCHRG MGMT >30: CPT | Performed by: FAMILY MEDICINE

## 2024-01-05 PROCEDURE — A9270 NON-COVERED ITEM OR SERVICE: HCPCS | Performed by: STUDENT IN AN ORGANIZED HEALTH CARE EDUCATION/TRAINING PROGRAM

## 2024-01-05 PROCEDURE — 700111 HCHG RX REV CODE 636 W/ 250 OVERRIDE (IP): Performed by: STUDENT IN AN ORGANIZED HEALTH CARE EDUCATION/TRAINING PROGRAM

## 2024-01-05 PROCEDURE — 80053 COMPREHEN METABOLIC PANEL: CPT

## 2024-01-05 PROCEDURE — 85027 COMPLETE CBC AUTOMATED: CPT

## 2024-01-05 PROCEDURE — A9270 NON-COVERED ITEM OR SERVICE: HCPCS | Performed by: FAMILY MEDICINE

## 2024-01-05 RX ORDER — ACETAMINOPHEN 500 MG
1000 TABLET ORAL 2 TIMES DAILY
Status: SHIPPED
Start: 2024-01-05 | End: 2024-03-19

## 2024-01-05 RX ORDER — GABAPENTIN 100 MG/1
200 CAPSULE ORAL EVERY EVENING
Qty: 90 CAPSULE | Status: SHIPPED
Start: 2024-01-05 | End: 2024-03-19

## 2024-01-05 RX ORDER — OXYCODONE HYDROCHLORIDE 5 MG/1
5 TABLET ORAL EVERY 6 HOURS PRN
Qty: 12 TABLET | Refills: 0 | Status: SHIPPED | OUTPATIENT
Start: 2024-01-05 | End: 2024-01-08

## 2024-01-05 RX ORDER — METHOCARBAMOL 500 MG/1
750 TABLET, FILM COATED ORAL 4 TIMES DAILY PRN
Status: SHIPPED
Start: 2024-01-05 | End: 2024-03-19

## 2024-01-05 RX ORDER — LIDOCAINE 50 MG/G
1 PATCH TOPICAL EVERY 24 HOURS
Qty: 10 PATCH | Refills: 0 | Status: SHIPPED
Start: 2024-01-05 | End: 2024-03-19

## 2024-01-05 RX ADMIN — LIDOCAINE 1 PATCH: 4 PATCH TOPICAL at 15:25

## 2024-01-05 RX ADMIN — LEVOTHYROXINE SODIUM 200 MCG: 0.2 TABLET ORAL at 04:30

## 2024-01-05 RX ADMIN — HEPARIN SODIUM 5000 UNITS: 5000 INJECTION, SOLUTION INTRAVENOUS; SUBCUTANEOUS at 05:06

## 2024-01-05 RX ADMIN — ACETAMINOPHEN 500 MG: 500 TABLET, FILM COATED ORAL at 12:17

## 2024-01-05 RX ADMIN — NYSTATIN: 100000 POWDER TOPICAL at 12:21

## 2024-01-05 RX ADMIN — HEPARIN SODIUM 5000 UNITS: 5000 INJECTION, SOLUTION INTRAVENOUS; SUBCUTANEOUS at 15:25

## 2024-01-05 RX ADMIN — ACETAMINOPHEN 500 MG: 500 TABLET, FILM COATED ORAL at 04:30

## 2024-01-05 RX ADMIN — NYSTATIN: 100000 POWDER TOPICAL at 06:08

## 2024-01-05 RX ADMIN — OXYCODONE 5 MG: 5 TABLET ORAL at 16:23

## 2024-01-05 RX ADMIN — CHOLECALCIFEROL TAB 125 MCG (5000 UNIT) 5000 UNITS: 125 TAB at 04:30

## 2024-01-05 RX ADMIN — OXYCODONE 5 MG: 5 TABLET ORAL at 12:41

## 2024-01-05 RX ADMIN — DOCUSATE SODIUM 50 MG AND SENNOSIDES 8.6 MG 2 TABLET: 8.6; 5 TABLET, FILM COATED ORAL at 04:30

## 2024-01-05 RX ADMIN — DILTIAZEM HYDROCHLORIDE 120 MG: 120 CAPSULE, COATED, EXTENDED RELEASE ORAL at 04:29

## 2024-01-05 ASSESSMENT — PAIN DESCRIPTION - PAIN TYPE
TYPE: ACUTE PAIN
TYPE: ACUTE PAIN

## 2024-01-05 NOTE — DISCHARGE PLANNING
HTH/SCP TCN chart review completed. Current discharge considerations are post acute placement. Noted patient's first choice[facility] declined, however [facility], patient's second choice has accepted. TCN will continue to follow and collaborate with discharge planning team as additional post acute needs arise. Thank you.    Completed:  PT/OT recommending post acute placement - 1/3  Choice obtained: HH (open to enhanced), SNF  1) [facility] (declined)  2) [facility] (Accepted)  3) [facility] (Accepted)  SCP with [facility] PCP.

## 2024-01-05 NOTE — DISCHARGE PLANNING
"HTH/SCP TCN chart review completed. Current discharge considerations are post acute placement and note per prior TCN that patient's first choice[facility] declined, however [facility], patient's second choice has accepted. However, per latest update from CM from today, \"RNCM met with patient and son at bedside. SonJian stated [facility] was first choice. RNCM placed call to Charles at [facility]\" and note pt now anticipated to dc to [facility] today. TCN will continue to follow and collaborate with discharge planning team as additional post acute needs arise. Thank you.     Completed:  PT/OT recommending post acute placement - 1/3  Choice obtained: HH (open to enhanced), SNF -> see above; to dc today  SCP with [facility] PCP  "

## 2024-01-05 NOTE — DISCHARGE SUMMARY
Discharge Summary    CHIEF COMPLAINT ON ADMISSION  Chief Complaint   Patient presents with    Back Pain     BIB EMS, pt was discharged yesterday has back pain that is uncontrolled w/ lidocaine patch and gabapentin that pt was sent home with. Received 100mcg fent IM PTA and reports pain is better.        Reason for Admission  EMS    Admission Date  1/2/2024     CODE STATUS  DNAR/DNI    HPI & HOSPITAL COURSE  This is a 89 y.o. female here with tractable back pain.  Patient had recent ED visit and was noted to have a subacute to chronic sacral fracture on CT imaging.  She had chronic compression fractures of the spine which are known.  CT scan of the hip showed degenerative joint disease of both hips, the sacral fracture, no hip fracture or dislocation.  Adjustment of pain control medication was started.  She was also noted to have leukocytosis and elevated LFTs.  Procalcitonin, sed rate and CRP were all negative.  Ultrasound right upper quadrant did show possible CBD dilation.  MRCP was then done which showed possible common bile duct stricture at the ampulla.  At this point her leukocytosis was trending down, LFTs were also trending down, she was able to tolerate a regular diet. She had no complaints of epigastric pain, nausea or vomiting.  Patient wanted to avoid any procedures or interventions, this was also discussed with her son who was at bedside, at this point conservative medical measures are very reasonable.      Therefore, she is discharged in good and stable condition to skilled nursing facility.    The patient met 2-midnight criteria for an inpatient stay at the time of discharge.      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 *      FOLLOW UP  No future appointments.  [facility]  [address] [address]  [phone]          MEDICATIONS ON DISCHARGE     Medication List        START taking these medications        Instructions   lidocaine 5 % Ptch  Commonly known as: Lidoderm   Place 1 Patch on the skin every 24 hours.  Dose: 1 Patch     methocarbamol 500 MG Tabs  Commonly known as: Robaxin   Take 1.5 Tablets by mouth 4 times a day as needed (spasms).  Dose: 750 mg     oxyCODONE immediate-release 5 MG Tabs  Commonly known as: Roxicodone   Take 1 Tablet by mouth every 6 hours as needed for Severe Pain for up to 3 days.  Dose: 5 mg            CHANGE how you take these medications        Instructions   acetaminophen 500 MG Tabs  What changed:   when to take this  additional instructions  Commonly known as: Tylenol   Take 2 Tablets by mouth 2 times a day.  Dose: 1,000 mg     * gabapentin 100 MG Caps  What changed: Another medication with the same name was added. Make sure you understand how and when to take each.  Commonly known as: Neurontin   Take 100 mg by mouth every evening.  Dose: 100 mg     * gabapentin 100 MG Caps  What changed: You were already taking a medication with the same name, and this prescription was added. Make sure you understand how and when to take each.  Commonly known as: Neurontin   Take 2 Capsules by mouth every evening.  Dose: 200 mg           * This list has 2 medication(s) that are the same as other medications prescribed for you. Read the directions  carefully, and ask your doctor or other care provider to review them with you.                CONTINUE taking these medications        Instructions   alendronate 70 MG Tabs  Commonly known as: Fosamax   Take 70 mg by mouth every Sunday.  Dose: 70 mg     aspirin 81 MG EC tablet  Commonly known as: EQ Aspirin Adult Low Dose   Take 1 Tablet by mouth every day.  Dose: 81 mg     DILTIAZem  MG Cp24  Commonly known as: Cardizem CD   Take 1 Capsule by mouth 2 times a day.  Dose: 120 mg     latanoprost 0.005 % Soln  Commonly known as: Xalatan   Administer 1 Drop into the left eye every evening.  Dose: 1 Drop     levothyroxine 200 MCG Tabs  Commonly known as: Synthroid   Take 200 mcg by mouth every morning on an empty stomach.  Dose: 200 mcg     omeprazole 20 MG delayed-release capsule  Commonly known as: PriLOSEC   Take 20 mg by mouth every evening.  Dose: 20 mg     PreserVision AREDS 2 Caps   Take 1 Capsule by mouth 2 times a day.  Dose: 1 Capsule     Vitamin D 125 MCG (5000 UT) Caps   Take 5,000 Units by mouth every day.  Dose: 5,000 Units            STOP taking these medications      spironolactone 25 MG Tabs  Commonly known as: Aldactone              Allergies  Allergies   Allergen Reactions    Pcn [Penicillins] Rash     Rash  > 60 years ago       DIET  Orders Placed This Encounter   Procedures    Diet Order Diet: Regular     Standing Status:   Standing     Number of Occurrences:   1     Order Specific Question:   Diet:     Answer:   Regular [1]       ACTIVITY  As tolerated and directed by skilled nursing.  Weight bearing as tolerated    LINES, DRAINS, AND WOUNDS  This is an automated list. Peripheral IVs will be removed prior to discharge.  Peripheral IV 01/02/24 20 G Right Antecubital (Active)   Site Assessment Clean;Dry;Intact 01/05/24 0800   Dressing Type Transparent 01/05/24 0800   Line Status Saline locked;Scrubbed the hub prior to access 01/05/24 0800   Dressing Status Clean;Dry;Intact 01/05/24 0800    Dressing Intervention N/A 01/05/24 0800   Dressing Change Due 01/06/24 01/03/24 2000   Date Primary Tubing Changed 01/02/24 01/02/24 2100   Infiltration Grading ([facility], [facility]) 0 01/05/24 0800   Phlebitis Scale ([facility] Only) 0 01/05/24 0800       Wound 01/02/24 Anterior Left (Active)   Site Assessment Clean;Dry 01/04/24 2045   Periwound Assessment Clean;Dry 01/04/24 2045   Margins Attached edges 01/04/24 2045   Treatments Offloading 01/04/24 2045   Offloading/DME Other (comment) 01/03/24 2000       Peripheral IV 01/02/24 20 G Right Antecubital (Active)   Site Assessment Clean;Dry;Intact 01/05/24 0800   Dressing Type Transparent 01/05/24 0800   Line Status Saline locked;Scrubbed the hub prior to access 01/05/24 0800   Dressing Status Clean;Dry;Intact 01/05/24 0800   Dressing Intervention N/A 01/05/24 0800   Dressing Change Due 01/06/24 01/03/24 2000   Date Primary Tubing Changed 01/02/24 01/02/24 2100   Infiltration Grading ([facility], [facility]) 0 01/05/24 0800   Phlebitis Scale ([facility] Only) 0 01/05/24 0800               MENTAL STATUS ON TRANSFER  Alert and oriented x 4          CONSULTATIONS  None    PROCEDURES  None    LABORATORY  Lab Results   Component Value Date    SODIUM 136 01/05/2024    POTASSIUM 4.4 01/05/2024    CHLORIDE 104 01/05/2024    CO2 18 (L) 01/05/2024    GLUCOSE 118 (H) 01/05/2024    BUN 33 (H) 01/05/2024    CREATININE 0.79 01/05/2024        Lab Results   Component Value Date    WBC 14.9 (H) 01/05/2024    HEMOGLOBIN 15.6 01/05/2024    HEMATOCRIT 46.5 01/05/2024    PLATELETCT 281 01/05/2024        Total time of the discharge process exceeds 40 minutes.

## 2024-01-05 NOTE — CARE PLAN
The patient is Stable - Low risk of patient condition declining or worsening    Shift Goals  Clinical Goals: trend labs  Patient Goals: comfort and rest  Family Goals: na    Progress made toward(s) clinical / shift goals:  no new skin breakdown. Turn q 2. Vss. Patient A&O X3.    Patient is not progressing towards the following goals:

## 2024-01-05 NOTE — DISCHARGE PLANNING
DC Transport Scheduled    Received request at: 15/2024 at 1346    Transport Company Scheduled:  [facility]    Scheduled Date: 1/5/2024  Scheduled Time: 1700    Destination: [facility] [address] [facility] [facility] [address] [facility]     Notified care team of scheduled transport via Voalte.     If there are any changes needed to the DC transportation scheduled, please contact [facility] at ext. [phone] between the hours of 4244-7229 Mon-Fri. If outside those hours, contact the ED Case Manager at ext. [phone].

## 2024-01-05 NOTE — DISCHARGE PLANNING
Case Management Discharge Planning    Admission Date: 1/2/2024  GMLOS: 2.9  ALOS: 3    6-Clicks ADL Score: 15  6-Clicks Mobility Score: 7  PT and/or OT Eval ordered: Yes  Post-acute Referrals Ordered: Yes  Post-acute Choice Obtained: Yes  Has referral(s) been sent to post-acute provider:  Yes      Anticipated Discharge Dispo: Discharge Disposition: D/T to SNF with Medicare cert in anticipation of skilled care (03)    DME Needed: No    Action(s) Taken: Acceptance Received    Escalations Completed: None    Medically Clear: Yes    Next Steps: RNCM met with patient and son at bedside. Jian Evans stated [facility] was first choice. RNCM placed call to [facility] at [place]. They have a bed available. RNCM will set up transport for 1530. RNCM will update medical team when transport is confirmed.     Barriers to Discharge: None    Is the patient up for discharge tomorrow: No    1347 RNCM faxed transport communication form and approved service form to [facility]. 2nd IMM given. Patient gave RNCM verbal consent to sign chart copy.  RNCM placed call to Jian evans to update on transport time.     1410 RNCM received notification from [facility] that only [facility] ride is for 1700. RNCM placed call to [facility] [place] to updated. RNCM will update patients son Bill as well. RNCM received paper scripts for narcotics and DNR/DNI which will be placed in transfer packet. RNCM completed COBRA and transfer packet and gave to bedside RN.          No

## 2024-01-08 LAB
BACTERIA BLD CULT: NORMAL
BACTERIA BLD CULT: NORMAL
SIGNIFICANT IND 70042: NORMAL
SIGNIFICANT IND 70042: NORMAL
SITE SITE: NORMAL
SITE SITE: NORMAL
SOURCE SOURCE: NORMAL
SOURCE SOURCE: NORMAL

## 2024-01-24 ENCOUNTER — HOSPITAL ENCOUNTER (OUTPATIENT)
Facility: MEDICAL CENTER | Age: 89
End: 2024-01-24
Attending: FAMILY MEDICINE
Payer: COMMERCIAL

## 2024-02-26 ENCOUNTER — HOSPITAL ENCOUNTER (OUTPATIENT)
Facility: MEDICAL CENTER | Age: 89
End: 2024-02-26
Attending: FAMILY MEDICINE
Payer: COMMERCIAL

## 2024-03-13 ENCOUNTER — HOSPITAL ENCOUNTER (OUTPATIENT)
Facility: MEDICAL CENTER | Age: 89
End: 2024-03-13
Attending: FAMILY MEDICINE
Payer: COMMERCIAL

## 2024-03-14 LAB
APPEARANCE UR: ABNORMAL
BACTERIA #/AREA URNS HPF: ABNORMAL /HPF
BILIRUB UR QL STRIP.AUTO: ABNORMAL
COLOR UR: ABNORMAL
EPI CELLS #/AREA URNS HPF: ABNORMAL /HPF
GLUCOSE UR STRIP.AUTO-MCNC: NEGATIVE MG/DL
HYALINE CASTS #/AREA URNS LPF: ABNORMAL /LPF
KETONES UR STRIP.AUTO-MCNC: ABNORMAL MG/DL
LEUKOCYTE ESTERASE UR QL STRIP.AUTO: ABNORMAL
MICRO URNS: ABNORMAL
NITRITE UR QL STRIP.AUTO: NEGATIVE
PH UR STRIP.AUTO: 5 [PH] (ref 5–8)
PROT UR QL STRIP: NEGATIVE MG/DL
RBC # URNS HPF: ABNORMAL /HPF
RBC UR QL AUTO: NEGATIVE
SP GR UR STRIP.AUTO: 1.03
UROBILINOGEN UR STRIP.AUTO-MCNC: 0.2 MG/DL
WBC #/AREA URNS HPF: ABNORMAL /HPF

## 2024-03-15 ENCOUNTER — HOME HEALTH ADMISSION (OUTPATIENT)
Dept: HOME HEALTH SERVICES | Facility: HOME HEALTHCARE | Age: 89
End: 2024-03-15
Payer: MEDICARE

## 2024-03-19 ENCOUNTER — APPOINTMENT (OUTPATIENT)
Dept: RADIOLOGY | Facility: MEDICAL CENTER | Age: 89
End: 2024-03-19
Attending: EMERGENCY MEDICINE
Payer: MEDICARE

## 2024-03-19 ENCOUNTER — HOSPITAL ENCOUNTER (EMERGENCY)
Facility: MEDICAL CENTER | Age: 89
End: 2024-03-21
Attending: EMERGENCY MEDICINE
Payer: MEDICARE

## 2024-03-19 DIAGNOSIS — R53.1 GENERALIZED WEAKNESS: ICD-10-CM

## 2024-03-19 DIAGNOSIS — S42.421A CLOSED DISPLACED COMMINUTED SUPRACONDYLAR FRACTURE OF RIGHT HUMERUS WITHOUT INTERCONDYLAR FRACTURE, INITIAL ENCOUNTER: ICD-10-CM
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
## 2024-03-19 PROBLEM — Z71.89 ACP (ADVANCE CARE PLANNING): Status: ACTIVE | Noted: 2024-03-19

## 2024-03-19 PROBLEM — S42.309A HUMERAL FRACTURE: Status: ACTIVE | Noted: 2024-03-19

## 2024-03-19 LAB
ALBUMIN SERPL BCP-MCNC: 3.2 G/DL (ref 3.2–4.9)
ALBUMIN/GLOB SERPL: 1 G/DL
ALP SERPL-CCNC: 101 U/L (ref 30–99)
ALT SERPL-CCNC: 13 U/L (ref 2–50)
ANION GAP SERPL CALC-SCNC: 13 MMOL/L (ref 7–16)
AST SERPL-CCNC: 24 U/L (ref 12–45)
BASOPHILS # BLD AUTO: 0 % (ref 0–1.8)
BASOPHILS # BLD: 0 K/UL (ref 0–0.12)
BILIRUB SERPL-MCNC: 0.3 MG/DL (ref 0.1–1.5)
BUN SERPL-MCNC: 15 MG/DL (ref 8–22)
CALCIUM ALBUM COR SERPL-MCNC: 9.4 MG/DL (ref 8.5–10.5)
CALCIUM SERPL-MCNC: 8.8 MG/DL (ref 8.5–10.5)
CHLORIDE SERPL-SCNC: 103 MMOL/L (ref 96–112)
CO2 SERPL-SCNC: 22 MMOL/L (ref 20–33)
CREAT SERPL-MCNC: 0.84 MG/DL (ref 0.5–1.4)
EKG IMPRESSION: NORMAL
EOSINOPHIL # BLD AUTO: 0.18 K/UL (ref 0–0.51)
EOSINOPHIL NFR BLD: 1.7 % (ref 0–6.9)
ERYTHROCYTE [DISTWIDTH] IN BLOOD BY AUTOMATED COUNT: 49.3 FL (ref 35.9–50)
GFR SERPLBLD CREATININE-BSD FMLA CKD-EPI: 65 ML/MIN/1.73 M 2
GLOBULIN SER CALC-MCNC: 3.2 G/DL (ref 1.9–3.5)
GLUCOSE SERPL-MCNC: 153 MG/DL (ref 65–99)
HCT VFR BLD AUTO: 30.6 % (ref 37–47)
HGB BLD-MCNC: 9.7 G/DL (ref 12–16)
LYMPHOCYTES # BLD AUTO: 0.28 K/UL (ref 1–4.8)
LYMPHOCYTES NFR BLD: 2.6 % (ref 22–41)
MANUAL DIFF BLD: NORMAL
MCH RBC QN AUTO: 27.8 PG (ref 27–33)
MCHC RBC AUTO-ENTMCNC: 31.7 G/DL (ref 32.2–35.5)
MCV RBC AUTO: 87.7 FL (ref 81.4–97.8)
MONOCYTES # BLD AUTO: 0.64 K/UL (ref 0–0.85)
MONOCYTES NFR BLD AUTO: 6 % (ref 0–13.4)
MORPHOLOGY BLD-IMP: NORMAL
NEUTROPHILS # BLD AUTO: 9.6 K/UL (ref 1.82–7.42)
NEUTROPHILS NFR BLD: 89.7 % (ref 44–72)
NRBC # BLD AUTO: 0 K/UL
NRBC BLD-RTO: 0 /100 WBC (ref 0–0.2)
PLATELET # BLD AUTO: 365 K/UL (ref 164–446)
PLATELET BLD QL SMEAR: NORMAL
PMV BLD AUTO: 8.3 FL (ref 9–12.9)
POTASSIUM SERPL-SCNC: 3.6 MMOL/L (ref 3.6–5.5)
PROT SERPL-MCNC: 6.4 G/DL (ref 6–8.2)
RBC # BLD AUTO: 3.49 M/UL (ref 4.2–5.4)
RBC BLD AUTO: NORMAL
SODIUM SERPL-SCNC: 138 MMOL/L (ref 135–145)
WBC # BLD AUTO: 10.7 K/UL (ref 4.8–10.8)

## 2024-03-19 PROCEDURE — 93005 ELECTROCARDIOGRAM TRACING: CPT

## 2024-03-19 PROCEDURE — 85027 COMPLETE CBC AUTOMATED: CPT

## 2024-03-19 PROCEDURE — 73060 X-RAY EXAM OF HUMERUS: CPT | Mod: RT

## 2024-03-19 PROCEDURE — 96375 TX/PRO/DX INJ NEW DRUG ADDON: CPT | Mod: XU

## 2024-03-19 PROCEDURE — 93005 ELECTROCARDIOGRAM TRACING: CPT | Performed by: EMERGENCY MEDICINE

## 2024-03-19 PROCEDURE — 80053 COMPREHEN METABOLIC PANEL: CPT

## 2024-03-19 PROCEDURE — 36415 COLL VENOUS BLD VENIPUNCTURE: CPT

## 2024-03-19 PROCEDURE — 700105 HCHG RX REV CODE 258: Performed by: STUDENT IN AN ORGANIZED HEALTH CARE EDUCATION/TRAINING PROGRAM

## 2024-03-19 PROCEDURE — 99497 ADVNCD CARE PLAN 30 MIN: CPT | Performed by: STUDENT IN AN ORGANIZED HEALTH CARE EDUCATION/TRAINING PROGRAM

## 2024-03-19 PROCEDURE — 99285 EMERGENCY DEPT VISIT HI MDM: CPT

## 2024-03-19 PROCEDURE — 700111 HCHG RX REV CODE 636 W/ 250 OVERRIDE (IP): Mod: JZ | Performed by: EMERGENCY MEDICINE

## 2024-03-19 PROCEDURE — 99221 1ST HOSP IP/OBS SF/LOW 40: CPT | Performed by: STUDENT IN AN ORGANIZED HEALTH CARE EDUCATION/TRAINING PROGRAM

## 2024-03-19 PROCEDURE — 73200 CT UPPER EXTREMITY W/O DYE: CPT | Mod: RT

## 2024-03-19 PROCEDURE — 29105 APPLICATION LONG ARM SPLINT: CPT

## 2024-03-19 PROCEDURE — A9270 NON-COVERED ITEM OR SERVICE: HCPCS | Performed by: STUDENT IN AN ORGANIZED HEALTH CARE EDUCATION/TRAINING PROGRAM

## 2024-03-19 PROCEDURE — 24535 CLTX SPRCNDYLR HUMERAL FX W/: CPT

## 2024-03-19 PROCEDURE — 96374 THER/PROPH/DIAG INJ IV PUSH: CPT | Mod: XU

## 2024-03-19 PROCEDURE — 73060 X-RAY EXAM OF HUMERUS: CPT | Mod: RT,XS

## 2024-03-19 PROCEDURE — 99283 EMERGENCY DEPT VISIT LOW MDM: CPT | Mod: 25 | Performed by: STUDENT IN AN ORGANIZED HEALTH CARE EDUCATION/TRAINING PROGRAM

## 2024-03-19 PROCEDURE — 24505 CLTX HUMRL SHFT FX W/MNPJ: CPT

## 2024-03-19 PROCEDURE — 306637 HCHG MISC ORTHO ITEM RC 0274

## 2024-03-19 PROCEDURE — 700102 HCHG RX REV CODE 250 W/ 637 OVERRIDE(OP): Performed by: STUDENT IN AN ORGANIZED HEALTH CARE EDUCATION/TRAINING PROGRAM

## 2024-03-19 PROCEDURE — 700105 HCHG RX REV CODE 258: Performed by: EMERGENCY MEDICINE

## 2024-03-19 PROCEDURE — 302874 HCHG BANDAGE ACE 2 OR 3""

## 2024-03-19 PROCEDURE — 85007 BL SMEAR W/DIFF WBC COUNT: CPT

## 2024-03-19 RX ORDER — ACETAMINOPHEN 325 MG/1
650 TABLET ORAL EVERY 6 HOURS PRN
Status: DISCONTINUED | OUTPATIENT
Start: 2024-03-19 | End: 2024-03-19

## 2024-03-19 RX ORDER — OMEPRAZOLE 20 MG/1
20 CAPSULE, DELAYED RELEASE ORAL EVERY EVENING
Status: DISCONTINUED | OUTPATIENT
Start: 2024-03-19 | End: 2024-03-21 | Stop reason: HOSPADM

## 2024-03-19 RX ORDER — ASPIRIN 81 MG/1
81 TABLET ORAL DAILY
Status: DISCONTINUED | OUTPATIENT
Start: 2024-03-20 | End: 2024-03-21 | Stop reason: HOSPADM

## 2024-03-19 RX ORDER — LEVOTHYROXINE SODIUM 0.1 MG/1
200 TABLET ORAL
Status: DISCONTINUED | OUTPATIENT
Start: 2024-03-20 | End: 2024-03-21 | Stop reason: HOSPADM

## 2024-03-19 RX ORDER — ACETAMINOPHEN 325 MG/1
650 TABLET ORAL EVERY 6 HOURS PRN
Status: DISCONTINUED | OUTPATIENT
Start: 2024-03-19 | End: 2024-03-21 | Stop reason: HOSPADM

## 2024-03-19 RX ORDER — SODIUM CHLORIDE, SODIUM LACTATE, POTASSIUM CHLORIDE, CALCIUM CHLORIDE 600; 310; 30; 20 MG/100ML; MG/100ML; MG/100ML; MG/100ML
1000 INJECTION, SOLUTION INTRAVENOUS ONCE
Status: COMPLETED | OUTPATIENT
Start: 2024-03-19 | End: 2024-03-19

## 2024-03-19 RX ORDER — DILTIAZEM HYDROCHLORIDE 120 MG/1
120 CAPSULE, COATED, EXTENDED RELEASE ORAL 2 TIMES DAILY
Status: DISCONTINUED | OUTPATIENT
Start: 2024-03-19 | End: 2024-03-21 | Stop reason: HOSPADM

## 2024-03-19 RX ORDER — SPIRONOLACTONE 25 MG/1
25 TABLET ORAL DAILY
Status: DISCONTINUED | OUTPATIENT
Start: 2024-03-20 | End: 2024-03-21 | Stop reason: HOSPADM

## 2024-03-19 RX ORDER — ACETAMINOPHEN 325 MG/1
650 TABLET ORAL EVERY 6 HOURS PRN
COMMUNITY

## 2024-03-19 RX ORDER — LATANOPROST 50 UG/ML
1 SOLUTION/ DROPS OPHTHALMIC EVERY EVENING
Status: DISCONTINUED | OUTPATIENT
Start: 2024-03-19 | End: 2024-03-21 | Stop reason: HOSPADM

## 2024-03-19 RX ORDER — PROPOFOL 10 MG/ML
INJECTION, EMULSION INTRAVENOUS
Status: COMPLETED | OUTPATIENT
Start: 2024-03-19 | End: 2024-03-19

## 2024-03-19 RX ORDER — PROPOFOL 10 MG/ML
0.5 INJECTION, EMULSION INTRAVENOUS ONCE
Status: DISCONTINUED | OUTPATIENT
Start: 2024-03-19 | End: 2024-03-20

## 2024-03-19 RX ORDER — ENOXAPARIN SODIUM 100 MG/ML
40 INJECTION SUBCUTANEOUS DAILY
Status: DISCONTINUED | OUTPATIENT
Start: 2024-03-20 | End: 2024-03-21 | Stop reason: HOSPADM

## 2024-03-19 RX ORDER — SODIUM CHLORIDE 9 MG/ML
INJECTION, SOLUTION INTRAVENOUS CONTINUOUS
Status: DISCONTINUED | OUTPATIENT
Start: 2024-03-19 | End: 2024-03-21 | Stop reason: HOSPADM

## 2024-03-19 RX ORDER — GABAPENTIN 100 MG/1
100 CAPSULE ORAL EVERY EVENING
Status: DISCONTINUED | OUTPATIENT
Start: 2024-03-19 | End: 2024-03-21 | Stop reason: HOSPADM

## 2024-03-19 RX ORDER — KETOROLAC TROMETHAMINE 15 MG/ML
15 INJECTION, SOLUTION INTRAMUSCULAR; INTRAVENOUS EVERY 6 HOURS PRN
Status: DISCONTINUED | OUTPATIENT
Start: 2024-03-19 | End: 2024-03-21 | Stop reason: HOSPADM

## 2024-03-19 RX ORDER — MORPHINE SULFATE 4 MG/ML
2 INJECTION INTRAVENOUS ONCE
Status: COMPLETED | OUTPATIENT
Start: 2024-03-19 | End: 2024-03-19

## 2024-03-19 RX ORDER — LIDOCAINE 50 MG/G
2 PATCH TOPICAL EVERY 24 HOURS
COMMUNITY

## 2024-03-19 RX ADMIN — PROPOFOL 20 MG: 10 INJECTION, EMULSION INTRAVENOUS at 15:12

## 2024-03-19 RX ADMIN — DILTIAZEM HYDROCHLORIDE 120 MG: 120 CAPSULE, COATED, EXTENDED RELEASE ORAL at 21:04

## 2024-03-19 RX ADMIN — LATANOPROST 1 DROP: 50 SOLUTION OPHTHALMIC at 21:03

## 2024-03-19 RX ADMIN — SODIUM CHLORIDE: 9 INJECTION, SOLUTION INTRAVENOUS at 21:06

## 2024-03-19 RX ADMIN — SODIUM CHLORIDE, POTASSIUM CHLORIDE, SODIUM LACTATE AND CALCIUM CHLORIDE 1000 ML: 600; 310; 30; 20 INJECTION, SOLUTION INTRAVENOUS at 15:15

## 2024-03-19 RX ADMIN — GABAPENTIN 100 MG: 100 CAPSULE ORAL at 21:03

## 2024-03-19 RX ADMIN — MORPHINE SULFATE 2 MG: 4 INJECTION, SOLUTION INTRAMUSCULAR; INTRAVENOUS at 15:01

## 2024-03-19 RX ADMIN — OMEPRAZOLE 20 MG: 20 CAPSULE, DELAYED RELEASE ORAL at 21:03

## 2024-03-19 ASSESSMENT — ENCOUNTER SYMPTOMS
RESPIRATORY NEGATIVE: 1
CONSTITUTIONAL NEGATIVE: 1
NEUROLOGICAL NEGATIVE: 1
FALLS: 1
GASTROINTESTINAL NEGATIVE: 1
EYES NEGATIVE: 1
PSYCHIATRIC NEGATIVE: 1
CARDIOVASCULAR NEGATIVE: 1

## 2024-03-19 ASSESSMENT — FIBROSIS 4 INDEX: FIB4 SCORE: 0.68

## 2024-03-19 ASSESSMENT — PAIN DESCRIPTION - PAIN TYPE: TYPE: ACUTE PAIN

## 2024-03-19 NOTE — ED PROVIDER NOTES
"ER Provider Note    Scribed for Sae Cunningham D.O. by Adrian Saldivar. 3/19/2024  1:29 PM    Primary Care Provider: Bebe Santamaria M.D.    CHIEF COMPLAINT  Chief Complaint   Patient presents with    Fall     Pt BIBA for MGLF , pt states that her legs gave out and she fell and hit her R arm , - head strike, - thinner, + deformity of R humerus        HPI/ROS  LIMITATION TO HISTORY   None    OUTSIDE HISTORIAN(S):  None    Saumya Vann is a 89 y.o. female who presents to the Emergency Department for fall. Patient reports getting up from bed was going to restroom in where she started to experience weakness on her legs. She mentions that she felt \"so weak and have never exerinced weakness like that before\".  Her legs then gave out, having patient to fall and hit right her arm. Patient is not aware if she hit her head during incident. Patient does not use oxygen at home. Patient currently lives with her grandson.     ROS as per HPI.    PAST MEDICAL HISTORY  Past Medical History:   Diagnosis Date    A-fib (HCC)     Breath shortness     Cataract 04/23/2021    Surgically removed bilat    Diabetes ([facility]) 04/23/2021    Diet-controlled    DM2 (diabetes mellitus, type 2) ([facility]) 11/17/2014    Dry cough 04/23/2021    Glaucoma 04/23/2021    Left eye    Hypertension     Hypothyroid     Macular degeneration     Pain 04/23/2021    Back and Leg    Urinary incontinence        SURGICAL HISTORY  Past Surgical History:   Procedure Laterality Date    OTHER ORTHOPEDIC SURGERY  2002    2 Knee Replacements     CATARACT EXTRACTION WITH IOL  2001    NEUROMA EXCISION  1985    Right Foot    CHOLECYSTECTOMY  1970    APPENDECTOMY  1957    OTHER  1952    Varicose veins    APPENDECTOMY      CHOLECYSTECTOMY      HYSTERECTOMY, TOTAL ABDOMINAL      KNEE REPLACEMENT, TOTAL Bilateral     US-NEEDLE CORE BX-BREAST PANEL         FAMILY HISTORY  Family History   Problem Relation Age of Onset    Cancer Other        SOCIAL HISTORY   reports that she has never " smoked. She has never used smokeless tobacco. She reports that she does not drink alcohol and does not use drugs.    CURRENT MEDICATIONS  Previous Medications    ACETAMINOPHEN (TYLENOL) 325 MG TAB    Take 650 mg by mouth every 6 hours as needed for Mild Pain. 325 mg x 2 tablets = 350 mg    ALENDRONATE (FOSAMAX) 70 MG TAB    Take 70 mg by mouth every Sunday.    ASPIRIN (EQ ASPIRIN ADULT LOW DOSE) 81 MG EC TABLET    Take 1 Tablet by mouth every day.    CHOLECALCIFEROL (VITAMIN D) 125 MCG (5000 UT) CAP    Take 5,000 Units by mouth every day.    DILTIAZEM CD (CARDIZEM CD) 120 MG CAPSULE SR 24 HR    Take 1 Capsule by mouth 2 times a day.    GABAPENTIN (NEURONTIN) 100 MG CAP    Take 100 mg by mouth every evening.    LATANOPROST (XALATAN) 0.005 % SOLUTION    Administer 1 Drop into the left eye every evening.    LEVOTHYROXINE (SYNTHROID) 200 MCG TAB    Take 200 mcg by mouth every morning on an empty stomach.    LIDOCAINE (LIDODERM) 5 % PATCH    Place 2 Patches on the skin every 24 hours. * apply to both ankles*    MULTIPLE VITAMINS-MINERALS (PRESERVISION AREDS 2) CAP    Take 1 Capsule by mouth 2 times a day.    OMEPRAZOLE (PRILOSEC) 20 MG DELAYED-RELEASE CAPSULE    Take 20 mg by mouth every evening.    SPIRONOLACTONE (ALDACTONE) 25 MG TAB    Take 25 mg by mouth every day.       ALLERGIES  Pcn [penicillins]    PHYSICAL EXAM  /53   Pulse 73   Temp 36.2 °C (97.2 °F) (Temporal)   Resp 14   Ht 1.524 m (5')   Wt 77.1 kg (170 lb)   SpO2 95%   BMI 33.20 kg/m²     General: No acute distress.  HENT: Normocephalic, Mucus membranes are moist.   Chest: Lungs have even and unlabored respirations, Clear to auscultation.   Cardiovascular: Regular rate and regular rhythm, No peripheral cyanosis.  Abdomen: Non distended.  Musculoskeletal:Deformity upper right arm with ecchymosis, distal pulse and is normal, ROM of left upper extremity, right and lower extremity normal without tendnerss.  Neuro: Awake, Conversive, Able to relay  recent events.  Psychiatric: Calm and cooperative.       EXTERNAL RECORDS REVIEWED  Review of patient's past medical records show recent admission for weakness.     INITIAL ASSESSMENT  This patient a ground-level fall has significant right upper extremity pain.  There is no head injury, no loss of consciousness.  X-rays will be done.  Lab test will be done to evaluate for her generalized weakness.    ED Observation Status? Yes; I am placing the patient in to an observation status due to a diagnostic uncertainty as well as therapeutic intensity. Patient placed in observation status at 1:17 PM, 3/19/2024.     Observation plan is as follows:     .     DIAGNOSTIC STUDIES    Labs:   Results for orders placed or performed during the hospital encounter of 03/19/24   CBC WITH DIFFERENTIAL   Result Value Ref Range    WBC 10.7 4.8 - 10.8 K/uL    RBC 3.49 (L) 4.20 - 5.40 M/uL    Hemoglobin 9.7 (L) 12.0 - 16.0 g/dL    Hematocrit 30.6 (L) 37.0 - 47.0 %    MCV 87.7 81.4 - 97.8 fL    MCH 27.8 27.0 - 33.0 pg    MCHC 31.7 (L) 32.2 - 35.5 g/dL    RDW 49.3 35.9 - 50.0 fL    Platelet Count 365 164 - 446 K/uL    MPV 8.3 (L) 9.0 - 12.9 fL    Neutrophils-Polys 89.70 (H) 44.00 - 72.00 %    Lymphocytes 2.60 (L) 22.00 - 41.00 %    Monocytes 6.00 0.00 - 13.40 %    Eosinophils 1.70 0.00 - 6.90 %    Basophils 0.00 0.00 - 1.80 %    Nucleated RBC 0.00 0.00 - 0.20 /100 WBC    Neutrophils (Absolute) 9.60 (H) 1.82 - 7.42 K/uL    Lymphs (Absolute) 0.28 (L) 1.00 - 4.80 K/uL    Monos (Absolute) 0.64 0.00 - 0.85 K/uL    Eos (Absolute) 0.18 0.00 - 0.51 K/uL    Baso (Absolute) 0.00 0.00 - 0.12 K/uL    NRBC (Absolute) 0.00 K/uL   COMP METABOLIC PANEL   Result Value Ref Range    Sodium 138 135 - 145 mmol/L    Potassium 3.6 3.6 - 5.5 mmol/L    Chloride 103 96 - 112 mmol/L    Co2 22 20 - 33 mmol/L    Anion Gap 13.0 7.0 - 16.0    Glucose 153 (H) 65 - 99 mg/dL    Bun 15 8 - 22 mg/dL    Creatinine 0.84 0.50 - 1.40 mg/dL    Calcium 8.8 8.5 - 10.5 mg/dL    Correct  Calcium 9.4 8.5 - 10.5 mg/dL    AST(SGOT) 24 12 - 45 U/L    ALT(SGPT) 13 2 - 50 U/L    Alkaline Phosphatase 101 (H) 30 - 99 U/L    Total Bilirubin 0.3 0.1 - 1.5 mg/dL    Albumin 3.2 3.2 - 4.9 g/dL    Total Protein 6.4 6.0 - 8.2 g/dL    Globulin 3.2 1.9 - 3.5 g/dL    A-G Ratio 1.0 g/dL   ESTIMATED GFR   Result Value Ref Range    GFR (CKD-EPI) 65 >60 mL/min/1.73 m 2   DIFFERENTIAL MANUAL   Result Value Ref Range    Manual Diff Status PERFORMED    PERIPHERAL SMEAR REVIEW   Result Value Ref Range    Peripheral Smear Review see below    PLATELET ESTIMATE   Result Value Ref Range    Plt Estimation Normal    MORPHOLOGY   Result Value Ref Range    RBC Morphology Normal    EKG (NOW)   Result Value Ref Range    Report       [facility] Emergency Dept.    Test Date:  2024  Pt Name:    NATACHA DIAS                  Department: ER  MRN:        [id number]                      Room:        13  Gender:     Female                       Technician: [id number]  :        [id number]                   Requested By:ER TRIAGE PROTOCOL  Order #:    [id number]                    Reading MD:    Measurements  Intervals                                Axis  Rate:       79                           P:          0  GA:         0                            QRS:        19  QRSD:       100                          T:          0  QT:         514  QTc:        590    Interpretive Statements  Atrial fibrillation  Borderline low voltage, extremity leads  Prolonged QT interval  Compared to ECG 2024 10:53:27  Prolonged QT interval now present  Ventricular premature complex(es) no longer present          EKG:   I have independently interpreted the above EKG.    Radiology:   The attending emergency physician has independently interpreted the diagnostic imaging associated with this visit and am waiting the final reading from the radiologist.   Preliminary interpretation is as follows: Comminuted fracture right proximal  humerus  Radiologist interpretation:   CT-EXTREMITY, UPPER W/O RIGHT   Final Result      1.  Comminuted moderately displaced intra-articular distal humeral diametaphysis fracture.   2.  Advanced degenerative changes of the elbow with chronic rotator cuff tears.   3.  Groundglass and interstitial opacity in the right lung could be chronic changes versus edema or atypical infection.      DX-HUMERUS 2+ RIGHT   Final Result         1.  Comminuted fracture of left distal humerus in plaster splint with slight dorsal displacement of the distal fragments.      DX-PORTABLE FLUOROSCOPY < 1 HOUR   Final Result      Portable fluoroscopy utilized for 8 seconds.      INTERPRETING LOCATION: [address] [place] [address], [place], [address]      DX-HUMERUS 2+ RIGHT   Final Result         1.  Acute comminuted fracture of left distal humerus with dorsal angulation.           Procedure note, Procedural Sedation:    Indication for the sedation was closed reduction humerus.  Informed consent was obtained prior to the sedation.      The patient was given Morphine 4 mg/ml, Propofol 20mL for sedation.  Adequate sedation was achieved.    There were no adverse events.  No apnea, hypoxia, hypotension, or aspiration.    The patient tolerated the procedure well with no apparent complications.    Total bedside time was 30 minutes minutes.   .  COURSE & MEDICAL DECISION MAKING     COURSE AND PLAN  1:29 PM - Patient seen and examined at bedside. Discussed plan of care, including further imagining and lab work to further evaluate. Patient agrees to the plan of care. Ordered for EKG, DX-chest-humerus 2+ right, CBC w/diff, CMP  to evaluate her symptoms.     2:13 PM I discussed the patient's case and the above findings with Dr. Mabry (Ortho) who request a CT 3D reconstruction for further evaluation.     2:50 PM- Ortho PA recommends Dr. Mabry (Ortho) plan in reducing patient fracture at ED. I talked to pharmacy of medication and will be doing Propofol 20mL. I will  titrate just the level the patiens needs to do procedure.     6:11 PM- I spoke with  due to concern that it is unsafe to discharge patient. I will continue patient on ED Obs. Will be evaluated by OTPT and wait for Hospitalist call.     6:17 PM- I talked to patient in where she agrees to say the night.     6:48 PM- I discussed the patient's case and the above findings with Dr. Bowman (Hospitalist) who agrees to evaluate patient.     ED Summary: This patient just cannot rehab 2 days ago.  She was at home and still feels very weak.  Her legs buckled and she fell and has a significant fracture to the right upper extremity.  This required reduction.  I did sedation, and the reduction was done by the orthopedic PA Yohan.  And postreduction films showed continued fracture.  She has distal neurovascular is normal.  She is in a long-arm splint with the elbow at 90 degrees.  She tolerated this well.  At this time she still has weakness, nausea is more debilitated with right upper extremity in splint.  She is living with his son, son is still working and she is at home a lot by herself which is unsafe for this patient.  She can barely get out out of bed without assistance at this time.    I spoke with  .  The patient will be held as an ED observation patient.        DISPOSITION AND DISCUSSIONS  I have discussed management of the patient with the following physicians and EDITH's: I spoke with the hospitalist for consultation and recommendation    Discussion of management with other QHP or appropriate source(s):     Barriers to care at this time, including but not limited to: None    Maintain in ED observation    FINAL DIAGNOSIS  1. Fall, initial encounter    2. Closed displaced comminuted supracondylar fracture of right humerus without intercondylar fracture, initial encounter    3. Generalized weakness        IAdrian (Scribe), am scribing for, and in the presence of, Sae  AGUSTINA Cunningham.    Electronically signed by: Adrian Saldivar (Scribe), 3/19/2024    ISae D.O. personally performed the services described in this documentation, as scribed by Adrian Saldivar in my presence, and it is both accurate and complete.     The note accurately reflects work and decisions made by me.  Sae Cunningham D.O.  3/19/2024  8:10 PM

## 2024-03-19 NOTE — ED TRIAGE NOTES
Chief Complaint   Patient presents with    Fall     Pt BIBA for MGLF , pt states that her legs gave out and she fell and hit her R arm , - head strike, - thinner, + deformity of R humerus

## 2024-03-19 NOTE — PROCEDURES
Seen per request of Dr Mabry for reduction and splinting of right distal humeral shaft fracture.  R/A/B/I presented to patient knowing full well the patient consented.  The fracture was manipulated into place then a long arm posterior slab plaster splint was applied and held in place with 3 ace wraps.  Patient tolerated the procedure well

## 2024-03-20 LAB
ANION GAP SERPL CALC-SCNC: 12 MMOL/L (ref 7–16)
BUN SERPL-MCNC: 14 MG/DL (ref 8–22)
CALCIUM SERPL-MCNC: 8.6 MG/DL (ref 8.5–10.5)
CHLORIDE SERPL-SCNC: 102 MMOL/L (ref 96–112)
CO2 SERPL-SCNC: 24 MMOL/L (ref 20–33)
CREAT SERPL-MCNC: 0.68 MG/DL (ref 0.5–1.4)
GFR SERPLBLD CREATININE-BSD FMLA CKD-EPI: 81 ML/MIN/1.73 M 2
GLUCOSE SERPL-MCNC: 93 MG/DL (ref 65–99)
POTASSIUM SERPL-SCNC: 3.9 MMOL/L (ref 3.6–5.5)
SODIUM SERPL-SCNC: 138 MMOL/L (ref 135–145)

## 2024-03-20 PROCEDURE — 96372 THER/PROPH/DIAG INJ SC/IM: CPT

## 2024-03-20 PROCEDURE — A9270 NON-COVERED ITEM OR SERVICE: HCPCS | Performed by: STUDENT IN AN ORGANIZED HEALTH CARE EDUCATION/TRAINING PROGRAM

## 2024-03-20 PROCEDURE — 80048 BASIC METABOLIC PNL TOTAL CA: CPT

## 2024-03-20 PROCEDURE — 97535 SELF CARE MNGMENT TRAINING: CPT

## 2024-03-20 PROCEDURE — 36415 COLL VENOUS BLD VENIPUNCTURE: CPT

## 2024-03-20 PROCEDURE — 97163 PT EVAL HIGH COMPLEX 45 MIN: CPT

## 2024-03-20 PROCEDURE — 97167 OT EVAL HIGH COMPLEX 60 MIN: CPT

## 2024-03-20 PROCEDURE — 96375 TX/PRO/DX INJ NEW DRUG ADDON: CPT | Mod: XU

## 2024-03-20 PROCEDURE — 700111 HCHG RX REV CODE 636 W/ 250 OVERRIDE (IP): Mod: JZ | Performed by: STUDENT IN AN ORGANIZED HEALTH CARE EDUCATION/TRAINING PROGRAM

## 2024-03-20 PROCEDURE — 700102 HCHG RX REV CODE 250 W/ 637 OVERRIDE(OP): Performed by: STUDENT IN AN ORGANIZED HEALTH CARE EDUCATION/TRAINING PROGRAM

## 2024-03-20 RX ADMIN — ASPIRIN 81 MG: 81 TABLET, COATED ORAL at 05:17

## 2024-03-20 RX ADMIN — DILTIAZEM HYDROCHLORIDE 120 MG: 120 CAPSULE, COATED, EXTENDED RELEASE ORAL at 05:17

## 2024-03-20 RX ADMIN — OMEPRAZOLE 20 MG: 20 CAPSULE, DELAYED RELEASE ORAL at 18:36

## 2024-03-20 RX ADMIN — ENOXAPARIN SODIUM 40 MG: 100 INJECTION SUBCUTANEOUS at 18:00

## 2024-03-20 RX ADMIN — GABAPENTIN 100 MG: 100 CAPSULE ORAL at 18:37

## 2024-03-20 RX ADMIN — KETOROLAC TROMETHAMINE 15 MG: 15 INJECTION, SOLUTION INTRAMUSCULAR; INTRAVENOUS at 18:38

## 2024-03-20 RX ADMIN — LATANOPROST 1 DROP: 50 SOLUTION OPHTHALMIC at 20:18

## 2024-03-20 RX ADMIN — SPIRONOLACTONE 25 MG: 25 TABLET ORAL at 05:17

## 2024-03-20 RX ADMIN — LEVOTHYROXINE SODIUM 200 MCG: 0.1 TABLET ORAL at 05:17

## 2024-03-20 ASSESSMENT — COGNITIVE AND FUNCTIONAL STATUS - GENERAL
DRESSING REGULAR LOWER BODY CLOTHING: TOTAL
STANDING UP FROM CHAIR USING ARMS: A LOT
DAILY ACTIVITIY SCORE: 12
TOILETING: TOTAL
DRESSING REGULAR UPPER BODY CLOTHING: A LOT
CLIMB 3 TO 5 STEPS WITH RAILING: TOTAL
SUGGESTED CMS G CODE MODIFIER DAILY ACTIVITY: CL
WALKING IN HOSPITAL ROOM: TOTAL
EATING MEALS: A LITTLE
SUGGESTED CMS G CODE MODIFIER MOBILITY: CL
MOVING FROM LYING ON BACK TO SITTING ON SIDE OF FLAT BED: A LOT
TURNING FROM BACK TO SIDE WHILE IN FLAT BAD: A LOT
PERSONAL GROOMING: A LITTLE
MOBILITY SCORE: 10
HELP NEEDED FOR BATHING: A LOT
MOVING TO AND FROM BED TO CHAIR: A LOT

## 2024-03-20 ASSESSMENT — GAIT ASSESSMENTS: GAIT LEVEL OF ASSIST: UNABLE TO PARTICIPATE

## 2024-03-20 ASSESSMENT — ACTIVITIES OF DAILY LIVING (ADL): TOILETING: INDEPENDENT

## 2024-03-20 NOTE — ASSESSMENT & PLAN NOTE
Found to have acute comminuted fracture of the left distal humerus with dorsal angulation status post reduction by orthopedic surgery in ED  Patient overall has generalized weakness, not safe discharge, likely will need nursing home placement.  Does not meet criteria for admission right now  PT OT rehab consult

## 2024-03-20 NOTE — ED NOTES
At bedside with Neisha TORRES. Pt incontinent of urine. Provided with corby care and full linen change. Turned to left side. Pillow placed under RUE for comfort. Son at bedside. Pure wick repositioned. PT/OT at bedside. Skin assessed and is pink and intact. No areas of redness, skin blanches with pressure.

## 2024-03-20 NOTE — ED NOTES
.Bedside report received from off going RN/tech: BRIAN Maciel, assumed care of patient.  POC discussed with patient. Call light within reach, all needs addressed at this time.       Fall risk interventions in place: Move the patient closer to the nurse's station, Patient's personal possessions are with in their safe reach, Place socks on patient, Place fall risk sign on patient's door, Give patient urinal if applicable, Keep floor surfaces clean and dry, and Accompanied to restroom (all applicable per [place] Fall risk assessment)   Continuous monitoring: Cardiac Leads, Pulse Ox, or Blood Pressure  IVF/IV medications: Not Applicable   Oxygen: Room Air  Bedside sitter: Not Applicable   Isolation: Not Applicable

## 2024-03-20 NOTE — DISCHARGE PLANNING
Per MD pt not safe to d/c home, just left rehab 3 days ago and is home alone as son works.  MD does not feel that pt would get enough help from .  Per MD humerus fx doesnt not need surgery.  Will report to KARTHIKEYAN, pt placed on ED OBSV board.

## 2024-03-20 NOTE — DISCHARGE PLANNING
SW visited pt, pt asleep.  SW called pts emergency contact, pts grandsonJustin, to get an idea of what plans the family would like to explore in terms of discharge planning.  Pts grandson stated he is on his way here, SW will visit the room when he is present so that a family conversation may take place.

## 2024-03-20 NOTE — DISCHARGE PLANNING
KARTHIKEYAN spoke to Heidi at [facility] and they will deny Pt as they feel she will need more time to rehabilitate and they do not have place for long term care.   KARTHIKEYAN spoke to Pt and Grandson. The signed CHOICE for 1. [facility] and 2. [facility].   KARTHIKEYAN faxed CHOICE to [facility]. CHOICE was signed by Pt Grandson at Pt request.   KARTHIKEYAN called [facility] and spoke to Nicole [phone]. Nicole can accept pt. But no beds this afternoon, they may be able to take her tomorrow.     KARTHIKEYAN will notify Pt and her grandson. KARTHIKEYAN will leave report for AM KARTHIKEYAN Childs for continued DC Planning.     COBRA was signed by Pt grandson per Pt request.

## 2024-03-20 NOTE — DISCHARGE PLANNING
K[facility]THIKEYAN [facility] She will look at the referral and contact KARTHIKEYAN back. They do not have beds today.

## 2024-03-20 NOTE — ED NOTES
Med rec updated and complete. Allergies reviewed. Interviewed son at bedside. Son reports that on 03/15/24  Pt was discharged from [facility].   Per family pt was not discharged on any antibiotics. No anticoagulant medications.  Last ASA 03/18/24.    HOME PHARMACY  [facility] = [phone]

## 2024-03-20 NOTE — CONSULTS
Hospital Medicine Consultation    Date of Service  3/19/2024    Referring Physician  Sae Cunningham D.O.    Consulting Physician  Chin Casarez M.D.    Reason for Consultation  Generalized weakness    History of Presenting Illness  89 y.o. female who presented 3/19/2024 with a mechanical fall.  Patient was recently discharged from rehab facility 3 days ago.  Today she was ambulating when she suddenly felt weak and found her knees gave out.  She fell on her right arm, denies head strike and loss consciousness.  Noticed persistent right arm pain after and was transferred to [facility] for further care.    In ER, patient found to have acute comminuted fracture of the left distal humerus with dorsal angulation.  Orthopedic surgery was consulted, fracture was reduced.  Repeat CAT scan after showing comminuted moderately displaced intra-articular distal humeral diametaphysis fracture.  Medicine consulted as patient will remain in the ER.    Review of Systems  Review of Systems   Constitutional: Negative.    HENT: Negative.     Eyes: Negative.    Respiratory: Negative.     Cardiovascular: Negative.    Gastrointestinal: Negative.    Genitourinary: Negative.    Musculoskeletal:  Positive for falls and joint pain.   Skin: Negative.    Neurological: Negative.    Endo/Heme/Allergies: Negative.    Psychiatric/Behavioral: Negative.         Past Medical History   has a past medical history of A-fib ([facility]), Breath shortness, Cataract (04/23/2021), Diabetes ([facility]) (04/23/2021), DM2 (diabetes mellitus, type 2) ([facility]) (11/17/2014), Dry cough (04/23/2021), Glaucoma (04/23/2021), Hypertension, Hypothyroid, Macular degeneration, Pain (04/23/2021), and Urinary incontinence.    Surgical History   has a past surgical history that includes us-needle core bx-breast panel; appendectomy; cholecystectomy; hysterectomy, total abdominal; knee replacement, total (Bilateral); other (1952); appendectomy (1957); cholecystectomy (1970); neuroma  excision (1985); other orthopedic surgery (2002); and cataract extraction with iol (2001).    Family History  family history includes Cancer in an other family member.    Social History   reports that she has never smoked. She has never used smokeless tobacco. She reports that she does not drink alcohol and does not use drugs.    Medications  Prior to Admission Medications   Prescriptions Last Dose Informant Patient Reported? Taking?   Cholecalciferol (VITAMIN D) 125 MCG (5000 UT) Cap 3/19/2024 at 1000 Family Member Yes Yes   Sig: Take 5,000 Units by mouth every day.   DILTIAZem CD (CARDIZEM CD) 120 MG CAPSULE SR 24 HR 3/19/2024 at 1000 Family Member No Yes   Sig: Take 1 Capsule by mouth 2 times a day.   Multiple Vitamins-Minerals (PRESERVISION AREDS 2) Cap 3/19/2024 at 0900 Family Member Yes Yes   Sig: Take 1 Capsule by mouth 2 times a day.   acetaminophen (TYLENOL) 325 MG Tab 3/19/2024 at 1000 Family Member Yes Yes   Sig: Take 650 mg by mouth every 6 hours as needed for Mild Pain. 325 mg x 2 tablets = 350 mg   alendronate (FOSAMAX) 70 MG Tab unknown at unknown Family Member Yes No   Sig: Take 70 mg by mouth every Sunday.   aspirin (EQ ASPIRIN ADULT LOW DOSE) 81 MG EC tablet 3/18/2024 at 1900 Family Member No Yes   Sig: Take 1 Tablet by mouth every day.   gabapentin (NEURONTIN) 100 MG Cap 3/18/2024 at 1900 Family Member Yes Yes   Sig: Take 100 mg by mouth every evening.   latanoprost (XALATAN) 0.005 % Solution 3/18/2024 at 1900 Family Member Yes Yes   Sig: Administer 1 Drop into the left eye every evening.   levothyroxine (SYNTHROID) 200 MCG Tab 3/19/2024 at 0800 Family Member Yes Yes   Sig: Take 200 mcg by mouth every morning on an empty stomach.   lidocaine (LIDODERM) 5 % Patch 3/17/2024 at 1900 Family Member Yes Yes   Sig: Place 2 Patches on the skin every 24 hours. * apply to both ankles*   omeprazole (PRILOSEC) 20 MG delayed-release capsule 3/18/2024 at 1900 Family Member Yes Yes   Sig: Take 20 mg by mouth  every evening.   spironolactone (ALDACTONE) 25 MG Tab 3/19/2024 at 0900 Family Member Yes Yes   Sig: Take 25 mg by mouth every day.      Facility-Administered Medications: None       Allergies  Allergies   Allergen Reactions    Pcn [Penicillins] Rash     Rash  > 60 years ago       Physical Exam  Temp:  [36.2 °C (97.2 °F)] 36.2 °C (97.2 °F)  Pulse:  [68-80] 68  Resp:  [13-30] 15  BP: ()/(53-59) 102/55  SpO2:  [92 %-99 %] 98 %    Physical Exam  Constitutional:       Appearance: Normal appearance. She is obese.      Comments: Overall generalized weakness, no focal deficits appreciated   HENT:      Head: Normocephalic.      Nose: Nose normal.      Mouth/Throat:      Mouth: Mucous membranes are moist.   Eyes:      Pupils: Pupils are equal, round, and reactive to light.   Cardiovascular:      Rate and Rhythm: Normal rate and regular rhythm.   Pulmonary:      Effort: Pulmonary effort is normal.      Breath sounds: Normal breath sounds.   Abdominal:      General: Abdomen is flat.      Palpations: Abdomen is soft.   Musculoskeletal:      Cervical back: Neck supple.      Comments: Right upper extremity in contracted position, in sling   Neurological:      General: No focal deficit present.      Mental Status: She is alert and oriented to person, place, and time. Mental status is at baseline.         Fluids      Laboratory  Recent Labs     03/19/24  1400   WBC 10.7   RBC 3.49*   HEMOGLOBIN 9.7*   HEMATOCRIT 30.6*   MCV 87.7   MCH 27.8   MCHC 31.7*   RDW 49.3   PLATELETCT 365   MPV 8.3*     Recent Labs     03/19/24  1400   SODIUM 138   POTASSIUM 3.6   CHLORIDE 103   CO2 22   GLUCOSE 153*   BUN 15   CREATININE 0.84   CALCIUM 8.8                     Imaging  CT-EXTREMITY, UPPER W/O RIGHT   Final Result      1.  Comminuted moderately displaced intra-articular distal humeral diametaphysis fracture.   2.  Advanced degenerative changes of the elbow with chronic rotator cuff tears.   3.  Groundglass and interstitial opacity in  the right lung could be chronic changes versus edema or atypical infection.      DX-HUMERUS 2+ RIGHT   Final Result         1.  Comminuted fracture of left distal humerus in plaster splint with slight dorsal displacement of the distal fragments.      DX-PORTABLE FLUOROSCOPY < 1 HOUR   Final Result      Portable fluoroscopy utilized for 8 seconds.      INTERPRETING LOCATION: [address] [place] [address] [place], [address]      DX-HUMERUS 2+ RIGHT   Final Result         1.  Acute comminuted fracture of left distal humerus with dorsal angulation.          Assessment/Plan  * Humeral fracture  Assessment & Plan  Found to have acute comminuted fracture of the left distal humerus with dorsal angulation status post reduction by orthopedic surgery in ED  Patient overall has generalized weakness, not safe discharge, likely will need nursing home placement.  Does not meet criteria for admission right now  PT OT rehab consult    ACP (advance care planning)  Assessment & Plan  16 minutes spent discussing goals of care with patient and tom Astorga at bedside.  They felt that patient was discharged prematurely from her rehab facility.  They want to see their options for nursing facility upon discharge from ER.  Tom Astorga states that he wants to weigh the options, and can potentially take care of patient at home if nursing facility is not an option.  They were explained the patient will be in the ER until a decision is made.  They were asked about CODE STATUS, to which they agreed that at this time patient to be DNR/DNI    Hypothyroidism- (present on admission)  Assessment & Plan  Continue synthroid    HTN (hypertension)- (present on admission)  Assessment & Plan  Resume home meds

## 2024-03-20 NOTE — ED NOTES
Patient presents for broken humerus following GLF. Patient's home medications have been reviewed by the pharmacy team.     Past Medical History:   Diagnosis Date    A-fib ([facility])     Breath shortness     Cataract 04/23/2021    Surgically removed bilat    Diabetes ([facility]) 04/23/2021    Diet-controlled    DM2 (diabetes mellitus, type 2) ([facility]) 11/17/2014    Dry cough 04/23/2021    Glaucoma 04/23/2021    Left eye    Hypertension     Hypothyroid     Macular degeneration     Pain 04/23/2021    Back and Leg    Urinary incontinence        Patient's Medications   New Prescriptions    No medications on file   Previous Medications    ACETAMINOPHEN (TYLENOL) 325 MG TAB    Take 650 mg by mouth every 6 hours as needed for Mild Pain. 325 mg x 2 tablets = 350 mg    ALENDRONATE (FOSAMAX) 70 MG TAB    Take 70 mg by mouth every Sunday.    ASPIRIN (EQ ASPIRIN ADULT LOW DOSE) 81 MG EC TABLET    Take 1 Tablet by mouth every day.    CHOLECALCIFEROL (VITAMIN D) 125 MCG (5000 UT) CAP    Take 5,000 Units by mouth every day.    DILTIAZEM CD (CARDIZEM CD) 120 MG CAPSULE SR 24 HR    Take 1 Capsule by mouth 2 times a day.    GABAPENTIN (NEURONTIN) 100 MG CAP    Take 100 mg by mouth every evening.    LATANOPROST (XALATAN) 0.005 % SOLUTION    Administer 1 Drop into the left eye every evening.    LEVOTHYROXINE (SYNTHROID) 200 MCG TAB    Take 200 mcg by mouth every morning on an empty stomach.    LIDOCAINE (LIDODERM) 5 % PATCH    Place 2 Patches on the skin every 24 hours. * apply to both ankles*    MULTIPLE VITAMINS-MINERALS (PRESERVISION AREDS 2) CAP    Take 1 Capsule by mouth 2 times a day.    OMEPRAZOLE (PRILOSEC) 20 MG DELAYED-RELEASE CAPSULE    Take 20 mg by mouth every evening.    SPIRONOLACTONE (ALDACTONE) 25 MG TAB    Take 25 mg by mouth every day.   Modified Medications    No medications on file   Discontinued Medications    ACETAMINOPHEN (TYLENOL) 500 MG TAB    Take 2 Tablets by mouth 2 times a day.    GABAPENTIN (NEURONTIN) 100 MG CAP     Take 2 Capsules by mouth every evening.    LIDOCAINE (LIDODERM) 5 % PATCH    Place 1 Patch on the skin every 24 hours.    METHOCARBAMOL (ROBAXIN) 500 MG TAB    Take 1.5 Tablets by mouth 4 times a day as needed (spasms).    ROPINIROLE (REQUIP) 0.5 MG TAB    Take 0.5 mg by mouth at bedtime.          A:  Medications do not appear to be contributing to current complaints.     P:    No recommendations at this time. Home medications have been reordered as appropriate.      Elaine Gill, PharmD

## 2024-03-20 NOTE — DISCHARGE PLANNING
SW placed call to [facility] regarding referral. SW left  waiting for a call back.

## 2024-03-20 NOTE — DISCHARGE PLANNING
HTH/SCP TCN chart review completed. Collaborated with KARTHIKEYAN Gill*, HUM team, as well as PT and OT. The most current review of medical record, knowledge of pt's PLOF and social support, LACE+ score of 79, 6 clicks scores unavailable.    Note PT and OT had just evaluated pt and per report/discussion they are going to recommend post acute placement. Discussed dc planning with SW and note SW to send blanket referrals to SNF per CM policy once PT and OT recs are in place. Note per discussion with PT and OT, [facility] is [facility]family [facility]st choice and relayed information to .     No additional provider requests at this time and will monitor for recs (per verbal discussion PT and OT recommending placement after initial evaluations this AM). No choice indicated as will defer to  SNF blanket referral policy (of note, [facility] is pt/family first choice per discussion with PT and OT-> relayed info to KARTHIKEYAN as well). TCN will continue to follow and collaborate with discharge planning team as additional post acute needs arise. Thank you.     Completed today:  PT/OT with recommendations for post acute placement on 3/20 (per verbal discussion; monitoring for formal recs in chart)  Choice obtained: none; will defer to CM blanket SNF choice; note family/pt 1st choice would be [facility] (see above)     *received VOALTE from KARTHIKEYAN in PM indicated pt grandson at bedside and would like to discuss dc planning; TCN returned to ED room and discussed dc planning considerations/questions with pt present; note that at time of writing addendum, [facility] has declined though pt has been accepted to [facility], [facility] and [facility]; note per TCN discussion with grandson and pt, if [facility] declined, next choice would be [facility]; relayed information to  as well;

## 2024-03-20 NOTE — THERAPY
Physical Therapy   Initial Evaluation     Patient Name: Saumya Vann  Age:  89 y.o., Sex:  female  Medical Record #: [id number]  Today's Date: 3/20/2024     Precautions  Precautions: Fall Risk;Non Weight Bearing Right Upper Extremity;Sling Right Upper Extremity    Assessment  Pt is a 89 year old female who presents with mechanical fall and her knees gave out. Pt with comminuted fracture of L distal humerus with doral angulation, fracture reduced by ortho- CT after showed comminuted moderately displaced intra-articular distal humeral diametaphysis fracture. Due to patient's injury and mobility status, pt required 2 skilled clinicians during evaluation for safety. Pt was max A to bedside chair and max A BTB. Pt able to bear weight through LE, but unable to step or shift weight during transfer. Pt will benefit from continued skilled PT services.       Plan    Physical Therapy Initial Treatment Plan   Treatment Plan : Bed Mobility, Equipment, Gait Training, Manual Therapy, Neuro Re-Education / Balance, Self Care / Home Evaluation, Therapeutic Activities, Therapeutic Exercise, Stair Training  Treatment Frequency: 4 Times per Week  Duration: Until Therapy Goals Met    DC Equipment Recommendations: Ramp  Discharge Recommendations: Recommend post-acute placement for additional physical therapy services prior to discharge home     Objective     03/20/24 1032   Initial Contact Note    Initial Contact Note Order Received and Verified, Physical Therapy Evaluation in Progress with Full Report to Follow.   Precautions   Precautions Fall Risk;Non Weight Bearing Right Upper Extremity;Sling Right Upper Extremity   Vitals   O2 (LPM) 2   O2 Delivery Device Nasal Cannula   Pain 0 - 10 Group   Therapist Pain Assessment During Activity;Nurse Notified  (increased R UE pain with movement or lack of support for weight of arm)   Prior Living Situation   Prior Services Continuous (24 Hour) Care Giving Family   Housing / Facility 1 Story  House   Steps Into Home 3   Steps In Home 0   Equipment Owned Front-Wheel Walker;4-Wheel Walker;Wheelchair;Bed Side Commode;Tub / Shower Seat   Lives with - Patient's Self Care Capacity Related Adult  (grandson)   Comments Pt was recently DC'd from a SNF   Prior Level of Functional Mobility   Bed Mobility Independent   Transfer Status Independent   Ambulation Independent   Ambulation Distance short household   Assistive Devices Used Front-Wheel Walker   Stairs Required Assist   History of Falls   History of Falls Yes   Date of Last Fall 03/19/24   Passive ROM Lower Body   Passive ROM Lower Body WDL   Active ROM Lower Body    Active ROM Lower Body  X   Comments limited by weakness   Strength Lower Body   Lower Body Strength  X   Comments B LE 2-/5 grossly   Vision   Vision Comments macular degeneration   Other Treatments   Other Treatments Provided Pt and family member were educated on safety during transfers, bed mobility techniques to decrease pain with R UE and DC disposition/POC.   Balance Assessment   Sitting Balance (Static) Fair -   Sitting Balance (Dynamic) Poor +   Standing Balance (Static) Trace +   Standing Balance (Dynamic) Trace   Weight Shift Sitting Poor   Weight Shift Standing Absent   Bed Mobility    Supine to Sit Maximal Assist   Sit to Supine Maximal Assist   Scooting Maximal Assist   Gait Analysis   Gait Level Of Assist Unable to Participate   Weight Bearing Status NWB R UE   Functional Mobility   Sit to Stand Maximal Assist   Bed, Chair, Wheelchair Transfer Maximal Assist   Transfer Method Stand Pivot   Mobility sup>sit, EOB>chair, BTB, sit>sup   6 Clicks Assessment - How much HELP from from another person do you currently need... (If the patient hasn't done an activity recently, how much help from another person do you think he/she would need if he/she tried?)   Turning from your back to your side while in a flat bed without using bedrails? 2   Moving from lying on your back to sitting on the  side of a flat bed without using bedrails? 2   Moving to and from a bed to a chair (including a wheelchair)? 2   Standing up from a chair using your arms (e.g., wheelchair, or bedside chair)? 2   Walking in hospital room? 1   Climbing 3-5 steps with a railing? 1   6 clicks Mobility Score 10   Activity Tolerance   Sitting in Chair 10 min   Sitting Edge of Bed 5 min   Short Term Goals    Short Term Goal # 1 Pt will complete sup<>sit with HOB elevated as needed with min A within 6 visits to improve functional mobility   Short Term Goal # 2 Pt will complete STS with FWW with min A within 6 visits to prepare for transfers   Short Term Goal # 3 Pt will complete stand pivot transfer with mod A within 6 visits to improve functional mobility status   Short Term Goal # 4 Pt will ambulate 10 feet with FWW with mod A within 6 visits to improve functional mobility   Education Group   Education Provided Role of Physical Therapist  (safety with mobility)   Role of Physical Therapist Patient Response Patient;Family;Acceptance;Explanation;Demonstration;Verbal Demonstration;Action Demonstration   Physical Therapy Initial Treatment Plan    Treatment Plan  Bed Mobility;Equipment;Gait Training;Manual Therapy;Neuro Re-Education / Balance;Self Care / Home Evaluation;Therapeutic Activities;Therapeutic Exercise;Stair Training   Treatment Frequency 4 Times per Week   Duration Until Therapy Goals Met   Problem List    Problems Pain;Impaired Bed Mobility;Impaired Transfers;Impaired Ambulation;Functional Strength Deficit;Functional ROM Deficit;Impaired Balance;Impaired Coordination;Impaired Vision;Decreased Activity Tolerance;Motor Planning / Sequencing   Anticipated Discharge Equipment and Recommendations   DC Equipment Recommendations Ramp   Discharge Recommendations Recommend post-acute placement for additional physical therapy services prior to discharge home   Interdisciplinary Plan of Care Collaboration   IDT Collaboration with  Social  Worker;Occupational Therapist  (TCN)   Patient Position at End of Therapy In Bed;Family / Friend in Room;Call Light within Reach;Tray Table within Reach   Collaboration Comments PT Eval   Session Information   Date / Session Number  3/20-1(1/4, 3/26)

## 2024-03-20 NOTE — DISCHARGE PLANNING
Referral sent to [facility] (no [facility])    1300-  Agency/Facility Name: [facility]  Plan or Request: pending skilled days left.

## 2024-03-20 NOTE — ASSESSMENT & PLAN NOTE
16 minutes spent discussing goals of care with patient and tom Astorga at bedside.  They felt that patient was discharged prematurely from her rehab facility.  They want to see their options for nursing facility upon discharge from ER.  Tom Astorga states that he wants to weigh the options, and can potentially take care of patient at home if nursing facility is not an option.  They were explained the patient will be in the ER until a decision is made.  They were asked about CODE STATUS, to which they agreed that at this time patient to be DNR/DNI

## 2024-03-20 NOTE — ED NOTES
Bedside report received from Da TORRES. Pt sleeping. Equal chest rise, unlabored breathing. Opens eyes to voice and answers questions appropriately. On 2 liters NC.

## 2024-03-20 NOTE — CONSULTS
3/19/2024          HPI: Saumya Vann is a 89 y.o. female who presents with right distal humerus fracture.  She has a significant past medical history including type 2 diabetes, hypertension, atrial fibrillation.  Orthopedic surgery is consulted for management.    Past Medical History:   Diagnosis Date    A-fib ([facility])     Breath shortness     Cataract 04/23/2021    Surgically removed bilat    Diabetes ([facility]) 04/23/2021    Diet-controlled    DM2 (diabetes mellitus, type 2) ([facility]) 11/17/2014    Dry cough 04/23/2021    Glaucoma 04/23/2021    Left eye    Hypertension     Hypothyroid     Macular degeneration     Pain 04/23/2021    Back and Leg    Urinary incontinence        Past Surgical History:   Procedure Laterality Date    OTHER ORTHOPEDIC SURGERY  2002    2 Knee Replacements     CATARACT EXTRACTION WITH IOL  2001    NEUROMA EXCISION  1985    Right Foot    CHOLECYSTECTOMY  1970    APPENDECTOMY  1957    OTHER  1952    Varicose veins    APPENDECTOMY      CHOLECYSTECTOMY      HYSTERECTOMY, TOTAL ABDOMINAL      KNEE REPLACEMENT, TOTAL Bilateral     US-NEEDLE CORE BX-BREAST PANEL         Medications  No current facility-administered medications on file prior to encounter.     Current Outpatient Medications on File Prior to Encounter   Medication Sig Dispense Refill    spironolactone (ALDACTONE) 25 MG Tab Take 25 mg by mouth every day.      ROPINIRole (REQUIP) 0.5 MG Tab Take 0.5 mg by mouth at bedtime.      acetaminophen (TYLENOL) 500 MG Tab Take 2 Tablets by mouth 2 times a day.      gabapentin (NEURONTIN) 100 MG Cap Take 2 Capsules by mouth every evening. 90 Capsule     methocarbamol (ROBAXIN) 500 MG Tab Take 1.5 Tablets by mouth 4 times a day as needed (spasms).      lidocaine (LIDODERM) 5 % Patch Place 1 Patch on the skin every 24 hours. 10 Patch 0    gabapentin (NEURONTIN) 100 MG Cap Take 100 mg by mouth every evening.      levothyroxine (SYNTHROID) 200 MCG Tab Take 200 mcg by mouth every morning on an empty  stomach.      DILTIAZem CD (CARDIZEM CD) 120 MG CAPSULE SR 24 HR Take 1 Capsule by mouth 2 times a day. 200 Capsule 2    aspirin (EQ ASPIRIN ADULT LOW DOSE) 81 MG EC tablet Take 1 Tablet by mouth every day. 100 Tablet 3    Multiple Vitamins-Minerals (PRESERVISION AREDS 2) Cap Take 1 Capsule by mouth 2 times a day.      Cholecalciferol (VITAMIN D) 125 MCG (5000 UT) Cap Take 5,000 Units by mouth every day.      alendronate (FOSAMAX) 70 MG Tab Take 70 mg by mouth every Sunday.      omeprazole (PRILOSEC) 20 MG delayed-release capsule Take 20 mg by mouth every evening.      latanoprost (XALATAN) 0.005 % Solution Administer 1 Drop into the left eye every evening.         Allergies  Pcn [penicillins]    ROS  . All other systems were reviewed and found to be negative    Family History   Problem Relation Age of Onset    Cancer Other        Social History     Socioeconomic History    Marital status:    Tobacco Use    Smoking status: Never    Smokeless tobacco: Never   Vaping Use    Vaping Use: Never used   Substance and Sexual Activity    Alcohol use: No    Drug use: No     Social Determinants of Health     Financial Resource Strain: Low Risk  (9/13/2022)    Overall Financial Resource Strain (CARDIA)     Difficulty of Paying Living Expenses: Not hard at all   Food Insecurity: No Food Insecurity (9/13/2022)    Hunger Vital Sign     Worried About Running Out of Food in the Last Year: Never true     Ran Out of Food in the Last Year: Never true   Transportation Needs: No Transportation Needs (9/13/2022)    PRAPARE - Transportation     Lack of Transportation (Medical): No     Lack of Transportation (Non-Medical): No   Physical Activity: Inactive (9/13/2022)    Exercise Vital Sign     Days of Exercise per Week: 0 days     Minutes of Exercise per Session: 0 min   Stress: No Stress Concern Present (9/13/2022)    [employer] [place] [employer] - Occupational Stress Questionnaire     Feeling of Stress : Only a little    Social Connections: Socially Isolated (9/13/2022)    Social Connection and Isolation Panel [NHANES]     Frequency of Communication with Friends and Family: More than three times a week     Frequency of Social Gatherings with Friends and Family: More than three times a week     Attends Religion Services: Never     Active Member of Clubs or Organizations: No     Attends Club or Organization Meetings: Never     Marital Status:    Intimate Partner Violence: Not At Risk (9/13/2022)    Humiliation, Afraid, Rape, and Kick questionnaire     Fear of Current or Ex-Partner: No     Emotionally Abused: No     Physically Abused: No     Sexually Abused: No   Housing Stability: Low Risk  (9/13/2022)    Housing Stability Vital Sign     Unable to Pay for Housing in the Last Year: No     Number of Places Lived in the Last Year: 1     Unstable Housing in the Last Year: No       Physical Exam  Vitals  /55   Pulse 68   Temp 36.2 °C (97.2 °F) (Temporal)   Resp 15   Ht 1.524 m (5')   Wt 77.1 kg (170 lb)   SpO2 98%   General: Ill-appearing  HEENT: Normocephalic, atraumatic  Psych: Normal mood and affect  Neck: Supple, nontender, no masses  Lungs: Breathing unlabored, No audible wheezing  Heart: Regular heart rate and rhythm  Abdomen: Soft, NT, ND  Neuro: Sensation grossly intact to BUE and BLE, moving all four extremities  Skin: Intact, no open wounds  Vascular: , Capillary refill <2 seconds        Radiographs:  CT-EXTREMITY, UPPER W/O RIGHT   Final Result      1.  Comminuted moderately displaced intra-articular distal humeral diametaphysis fracture.   2.  Advanced degenerative changes of the elbow with chronic rotator cuff tears.   3.  Groundglass and interstitial opacity in the right lung could be chronic changes versus edema or atypical infection.      DX-HUMERUS 2+ RIGHT   Final Result         1.  Comminuted fracture of left distal humerus in plaster splint with slight dorsal displacement of the distal fragments.       DX-PORTABLE FLUOROSCOPY < 1 HOUR   Final Result      Portable fluoroscopy utilized for 8 seconds.      INTERPRETING LOCATION: [address] [place] [address] [place], [id number]      DX-HUMERUS 2+ RIGHT   Final Result         1.  Acute comminuted fracture of left distal humerus with dorsal angulation.          Laboratory Values  Recent Labs     03/19/24  1400   WBC 10.7   RBC 3.49*   HEMOGLOBIN 9.7*   HEMATOCRIT 30.6*   MCV 87.7   MCH 27.8   MCHC 31.7*   RDW 49.3   PLATELETCT 365   MPV 8.3*     Recent Labs     03/19/24  1400   SODIUM 138   POTASSIUM 3.6   CHLORIDE 103   CO2 22   GLUCOSE 153*   BUN 15             Impression: 89-year-old female displaced left distal humerus fracture.  Will plan for closed reduction and splinting today.  No plans for surgery at this point in time based on age and medical comorbidities however may require surgical fixation if unable to obtain reasonable reduction.  Plan for splint today discharge when medically appropriate and pain is controlled.  Plan to see him in clinic at follow-up for discussion regarding current alignment and options for management.          Delbert Mabry MD  Orthopedic Trauma Surgery

## 2024-03-20 NOTE — ED NOTES
Skin check:  Left hand: lac to lateral aspect of palm  Left elbow: skin tear  Sacrum red and blanching. Purwick in place. Q 2 turns in place. Pt turned and cleaned in bed. Fresh linens provided.     Heels floated on pillows. Heel mepilex ordered.     Incontinence dermatitis and skin breakdown to pannus and groin area. Skin cleaned and interdry ordered.     Right elbow floated on pillow.    Gray foam in place on oxygen tubing. Ears pink and blanching      Hospital bed ordered.

## 2024-03-20 NOTE — DISCHARGE PLANNING
KARTHIKEYAN met with Pt and her grandson Rizwan who is at bedside. KARTHIKEYAN notified Pt she has been accepted to [facility] and [facility]. Grandson would like to know if [facility] will accept Pt. Before signing CHOICE. He would also like to talk with [facility] regarding insurance coverage. KARTHIKEYAN has left Voicemail with Heidi at [facility] and sent Voalte message to SHAYNA Swanson to discuss insurance coverage with grandson.

## 2024-03-20 NOTE — THERAPY
"Occupational Therapy   Initial Evaluation     Patient Name: Saumya Vann  Age:  89 y.o., Sex:  female  Medical Record #: [id number]  Today's Date: 3/20/2024     Precautions  Precautions: Fall Risk, Non Weight Bearing Right Upper Extremity, Sling Right Upper Extremity    Assessment  Patient is a 89 y.o. female who presented to the ED 3/16 following a mechanical GLF with resultant right humerus fx. Pt discharged home from SNF on 3/15. Pt had been at SNF recovering from a fx of her sacrum, per family report. Pt has supportive family ay home but family member is typically at work during the day. Pt with significant functional limitations with ADLs and ADL transfers due to impairments in dominant RUE ROM, vision, balance, LE strength and balance. She would benefit from OT services.        Plan    Occupational Therapy Initial Treatment Plan   Treatment Interventions: Self Care / Activities of Daily Living, Adaptive Equipment, Therapeutic Exercises, Therapeutic Activity  Treatment Frequency: 3 Times per Week  Duration: Until Therapy Goals Met    DC Equipment Recommendations: Unable to determine at this time  Discharge Recommendations: Recommend post-acute placement for additional occupational therapy services prior to discharge home     Subjective    \"I have broken both my arm before. Not at the same time thank goodness.\"     Objective       03/20/24 1035   Prior Living Situation   Prior Services Continuous (24 Hour) Care Giving Family   Housing / Facility 1 Story House   Steps Into Home 3   Steps In Home 0   Equipment Owned Front-Wheel Walker;4-Wheel Walker;Wheelchair;Tub / Shower Seat;Bed Side Commode   Comments Pt lives with her grandson who is typically at work during the day. He has taken some time off to care for her recently.   Prior Level of ADL Function   Self Feeding Independent   Grooming / Hygiene Independent   Bathing Requires Assist   Dressing Requires Assist   Toileting Independent   Prior Level of IADL " Function   Medication Management Requires Assist   Laundry Requires Assist   Kitchen Mobility Requires Assist   Finances Requires Assist   Home Management Requires Assist   Shopping Requires Assist   Prior Level Of Mobility Supervision With Device in Home   Driving / Transportation Relatives / Others Provide Transportation   History of Falls   History of Falls Yes   Date of Last Fall 03/19/24   Precautions   Precautions Fall Risk;Non Weight Bearing Right Upper Extremity;Sling Right Upper Extremity   Vitals   O2 Delivery Device Nasal Cannula   Pain 0 - 10 Group   Therapist Pain Assessment During Activity;Nurse Notified  (reports R UE pain with bed mobility and sit to stand)   Cognition    Cognition / Consciousness WDL   Active ROM Upper Body   Active ROM Upper Body  X   Dominant Hand Right   Comments able to fully open and close her right hand. Right elbow, forearm, and shoulder NT. L shoulder is limited due to old injury but AROM is functional for ADLs   Strength Upper Body   Upper Body Strength  X   Comments RUE NT, LUE with generalized weakness 4/5   Sensation Upper Body   Upper Extremity Sensation  WDL   Comments denies numbness/tingling   Balance Assessment   Sitting Balance (Static) Fair -   Sitting Balance (Dynamic) Poor +   Standing Balance (Static) Trace +   Standing Balance (Dynamic) Trace   Weight Shift Sitting Poor   Weight Shift Standing Absent   Bed Mobility    Supine to Sit Maximal Assist   Sit to Supine Maximal Assist   ADL Assessment   Eating Minimal Assist   Grooming Minimal Assist   Lower Body Dressing Total Assist  (socks)   Toileting Total Assist   6 Clicks Daily Activity Score 12   Functional Mobility   Sit to Stand Maximal Assist   Bed, Chair, Wheelchair Transfer Maximal Assist   Transfer Method Stand Pivot   Comments Pt reports a h/o B knee replacements   Visual Perception   Visual Perception  X   Comments generalized weakness   Patient / Family Goals   Patient / Family Goal #1 to get  stronger, return home   Short Term Goals   Short Term Goal # 1 Pt will complete BSC transfers with Danika   Short Term Goal # 2 Pt will complete clothing management and hygiene during toileting tasks with Danika   Short Term Goal # 3 Pt will groom seated with set-up   Education Group   Education Provided Role of Occupational Therapist   Role of Occupational Therapist Patient Response Patient;Acceptance;Explanation;Reinforcement Needed   Occupational Therapy Initial Treatment Plan    Treatment Interventions Self Care / Activities of Daily Living;Adaptive Equipment;Therapeutic Exercises;Therapeutic Activity   Treatment Frequency 3 Times per Week   Duration Until Therapy Goals Met   Problem List   Problem List Decreased Active Daily Living Skills;Decreased Homemaking Skills;Decreased Upper Extremity AROM Right;Decreased Functional Mobility;Decreased Activity Tolerance;Impaired Postural Control / Balance

## 2024-03-21 ENCOUNTER — APPOINTMENT (OUTPATIENT)
Dept: RADIOLOGY | Facility: MEDICAL CENTER | Age: 89
End: 2024-03-21
Attending: ORTHOPAEDIC SURGERY
Payer: MEDICARE

## 2024-03-21 VITALS
HEIGHT: 60 IN | SYSTOLIC BLOOD PRESSURE: 121 MMHG | WEIGHT: 170 LBS | HEART RATE: 79 BPM | RESPIRATION RATE: 14 BRPM | DIASTOLIC BLOOD PRESSURE: 62 MMHG | OXYGEN SATURATION: 98 % | TEMPERATURE: 98 F | BODY MASS INDEX: 33.38 KG/M2

## 2024-03-21 LAB
ANION GAP SERPL CALC-SCNC: 10 MMOL/L (ref 7–16)
BUN SERPL-MCNC: 15 MG/DL (ref 8–22)
CALCIUM SERPL-MCNC: 8.3 MG/DL (ref 8.5–10.5)
CHLORIDE SERPL-SCNC: 100 MMOL/L (ref 96–112)
CO2 SERPL-SCNC: 23 MMOL/L (ref 20–33)
CREAT SERPL-MCNC: 0.62 MG/DL (ref 0.5–1.4)
GFR SERPLBLD CREATININE-BSD FMLA CKD-EPI: 83 ML/MIN/1.73 M 2
GLUCOSE SERPL-MCNC: 92 MG/DL (ref 65–99)
POTASSIUM SERPL-SCNC: 4.7 MMOL/L (ref 3.6–5.5)
SODIUM SERPL-SCNC: 133 MMOL/L (ref 135–145)

## 2024-03-21 PROCEDURE — 80048 BASIC METABOLIC PNL TOTAL CA: CPT

## 2024-03-21 PROCEDURE — 36415 COLL VENOUS BLD VENIPUNCTURE: CPT

## 2024-03-21 PROCEDURE — 700102 HCHG RX REV CODE 250 W/ 637 OVERRIDE(OP): Performed by: STUDENT IN AN ORGANIZED HEALTH CARE EDUCATION/TRAINING PROGRAM

## 2024-03-21 PROCEDURE — A9270 NON-COVERED ITEM OR SERVICE: HCPCS | Performed by: STUDENT IN AN ORGANIZED HEALTH CARE EDUCATION/TRAINING PROGRAM

## 2024-03-21 RX ADMIN — SPIRONOLACTONE 25 MG: 25 TABLET ORAL at 06:15

## 2024-03-21 RX ADMIN — LEVOTHYROXINE SODIUM 200 MCG: 0.1 TABLET ORAL at 06:15

## 2024-03-21 RX ADMIN — DILTIAZEM HYDROCHLORIDE 120 MG: 120 CAPSULE, COATED, EXTENDED RELEASE ORAL at 06:15

## 2024-03-21 RX ADMIN — ASPIRIN 81 MG: 81 TABLET, COATED ORAL at 06:15

## 2024-03-21 ASSESSMENT — COGNITIVE AND FUNCTIONAL STATUS - GENERAL
SUGGESTED CMS G CODE MODIFIER DAILY ACTIVITY: CN
TOILETING: A LOT
HELP NEEDED FOR BATHING: TOTAL
MOVING TO AND FROM BED TO CHAIR: TOTAL
WALKING IN HOSPITAL ROOM: TOTAL
SUGGESTED CMS G CODE MODIFIER MOBILITY: CL
HELP NEEDED FOR BATHING: A LOT
MOVING TO AND FROM BED TO CHAIR: A LOT
STANDING UP FROM CHAIR USING ARMS: TOTAL
DRESSING REGULAR LOWER BODY CLOTHING: A LOT
DAILY ACTIVITIY SCORE: 13
PERSONAL GROOMING: A LITTLE
CLIMB 3 TO 5 STEPS WITH RAILING: TOTAL
MOBILITY SCORE: 10
WALKING IN HOSPITAL ROOM: TOTAL
DRESSING REGULAR UPPER BODY CLOTHING: TOTAL
CLIMB 3 TO 5 STEPS WITH RAILING: TOTAL
SUGGESTED CMS G CODE MODIFIER DAILY ACTIVITY: CL
STANDING UP FROM CHAIR USING ARMS: A LOT
EATING MEALS: TOTAL
TURNING FROM BACK TO SIDE WHILE IN FLAT BAD: A LOT
DRESSING REGULAR UPPER BODY CLOTHING: A LOT
SUGGESTED CMS G CODE MODIFIER MOBILITY: CM
PERSONAL GROOMING: TOTAL
MOBILITY SCORE: 7
DRESSING REGULAR LOWER BODY CLOTHING: TOTAL
MOVING FROM LYING ON BACK TO SITTING ON SIDE OF FLAT BED: A LOT
TURNING FROM BACK TO SIDE WHILE IN FLAT BAD: TOTAL
DAILY ACTIVITIY SCORE: 6
MOVING FROM LYING ON BACK TO SITTING ON SIDE OF FLAT BED: A LOT
TOILETING: TOTAL
EATING MEALS: A LOT

## 2024-03-21 NOTE — ED NOTES
Pt resting comfortably in hospital bed. Breathing unlabored. 2 L O2. Bed in the lowest position, side rails up, Call light within reach.

## 2024-03-21 NOTE — ED NOTES
Pt repositioned to right side with assistance of two HCP. States pure winikolai is putting pressure on her buttocks. Removed for a break. Water proof pads and a brief placed. Skin intact. Blanches. Linen is clean and dry.

## 2024-03-21 NOTE — ED NOTES
Pt resting comfortably in [facility]. Breathing unlabored. Bed in the lowest position, side rails up, Call light within reach. Updated on POC.

## 2024-03-21 NOTE — ED NOTES
09/16/21 2:51 PM     See documentation in the VB [employer] SmartForm       Lauree Leisure, [place] Assisted RN with skin check, corby-care, and repositioning for sacral pressure injury. Pt tolerated well.

## 2024-03-21 NOTE — ED NOTES
Attempted to call report to [facility], RN unavailable. Provided [facility] phone number for call back for report.

## 2024-03-21 NOTE — ED NOTES
Pt resting comfortably in bed. Breathing unlabored. Bed in the lowest position, side rails up, Call light within reach. Updated on POC with placement.

## 2024-03-21 NOTE — DISCHARGE SUMMARY
ED Observation Discharge Summary    Patient:Saumya Vann  Patient : 1930  Patient MRN: [id number]  Patient PCP: Bebe Santamaria M.D.    Admit Date: 3/19/2024  Discharge Date and Time: 24 9:55 AM  Discharge Diagnosis:   1. Fall, initial encounter    2. Closed displaced comminuted supracondylar fracture of right humerus without intercondylar fracture, initial encounter    3. Generalized weakness        Discharge Attending: Clifton Medina M.D.  Discharge Service: ED Observation    ED Course  Saumya is a 89 y.o. female who was evaluated at [facility] emergency department after a fall sustaining an elbow fracture.  Patient was offered surgery.  She has declined.  She will be sent to a skilled nursing facility for ongoing care.  No acute issues or complaints today.  She has been accepted at a care facility and will be transferred today.    Discharge Exam:  /58   Pulse 81   Temp 36.2 °C (97.2 °F) (Temporal)   Resp 18   Ht 1.524 m (5')   Wt 77.1 kg (170 lb)   SpO2 99%   BMI 33.20 kg/m² .    Constitutional: Awake and alert. Nontoxic  HENT:  Grossly normal  Eyes: Grossly normal  Neck: Normal range of motion  Cardiovascular: Normal heart rate   Thorax & Lungs: No respiratory distress  Abdomen: Nontender  Skin:  No pathologic rash.   Extremities: Well perfused  Psychiatric: Affect normal    Labs  Results for orders placed or performed during the hospital encounter of 24   CBC WITH DIFFERENTIAL   Result Value Ref Range    WBC 10.7 4.8 - 10.8 K/uL    RBC 3.49 (L) 4.20 - 5.40 M/uL    Hemoglobin 9.7 (L) 12.0 - 16.0 g/dL    Hematocrit 30.6 (L) 37.0 - 47.0 %    MCV 87.7 81.4 - 97.8 fL    MCH 27.8 27.0 - 33.0 pg    MCHC 31.7 (L) 32.2 - 35.5 g/dL    RDW 49.3 35.9 - 50.0 fL    Platelet Count 365 164 - 446 K/uL    MPV 8.3 (L) 9.0 - 12.9 fL    Neutrophils-Polys 89.70 (H) 44.00 - 72.00 %    Lymphocytes 2.60 (L) 22.00 - 41.00 %    Monocytes 6.00 0.00 - 13.40 %    Eosinophils 1.70 0.00 - 6.90 %     Basophils 0.00 0.00 - 1.80 %    Nucleated RBC 0.00 0.00 - 0.20 /100 WBC    Neutrophils (Absolute) 9.60 (H) 1.82 - 7.42 K/uL    Lymphs (Absolute) 0.28 (L) 1.00 - 4.80 K/uL    Monos (Absolute) 0.64 0.00 - 0.85 K/uL    Eos (Absolute) 0.18 0.00 - 0.51 K/uL    Baso (Absolute) 0.00 0.00 - 0.12 K/uL    NRBC (Absolute) 0.00 K/uL   COMP METABOLIC PANEL   Result Value Ref Range    Sodium 138 135 - 145 mmol/L    Potassium 3.6 3.6 - 5.5 mmol/L    Chloride 103 96 - 112 mmol/L    Co2 22 20 - 33 mmol/L    Anion Gap 13.0 7.0 - 16.0    Glucose 153 (H) 65 - 99 mg/dL    Bun 15 8 - 22 mg/dL    Creatinine 0.84 0.50 - 1.40 mg/dL    Calcium 8.8 8.5 - 10.5 mg/dL    Correct Calcium 9.4 8.5 - 10.5 mg/dL    AST(SGOT) 24 12 - 45 U/L    ALT(SGPT) 13 2 - 50 U/L    Alkaline Phosphatase 101 (H) 30 - 99 U/L    Total Bilirubin 0.3 0.1 - 1.5 mg/dL    Albumin 3.2 3.2 - 4.9 g/dL    Total Protein 6.4 6.0 - 8.2 g/dL    Globulin 3.2 1.9 - 3.5 g/dL    A-G Ratio 1.0 g/dL   ESTIMATED GFR   Result Value Ref Range    GFR (CKD-EPI) 65 >60 mL/min/1.73 m 2   DIFFERENTIAL MANUAL   Result Value Ref Range    Manual Diff Status PERFORMED    PERIPHERAL SMEAR REVIEW   Result Value Ref Range    Peripheral Smear Review see below    PLATELET ESTIMATE   Result Value Ref Range    Plt Estimation Normal    MORPHOLOGY   Result Value Ref Range    RBC Morphology Normal    BASIC METABOLIC PANEL   Result Value Ref Range    Sodium 138 135 - 145 mmol/L    Potassium 3.9 3.6 - 5.5 mmol/L    Chloride 102 96 - 112 mmol/L    Co2 24 20 - 33 mmol/L    Glucose 93 65 - 99 mg/dL    Bun 14 8 - 22 mg/dL    Creatinine 0.68 0.50 - 1.40 mg/dL    Calcium 8.6 8.5 - 10.5 mg/dL    Anion Gap 12.0 7.0 - 16.0   ESTIMATED GFR   Result Value Ref Range    GFR (CKD-EPI) 81 >60 mL/min/1.73 m 2   BASIC METABOLIC PANEL   Result Value Ref Range    Sodium 133 (L) 135 - 145 mmol/L    Potassium 4.7 3.6 - 5.5 mmol/L    Chloride 100 96 - 112 mmol/L    Co2 23 20 - 33 mmol/L    Glucose 92 65 - 99 mg/dL    Bun 15 8 -  22 mg/dL    Creatinine 0.62 0.50 - 1.40 mg/dL    Calcium 8.3 (L) 8.5 - 10.5 mg/dL    Anion Gap 10.0 7.0 - 16.0   ESTIMATED GFR   Result Value Ref Range    GFR (CKD-EPI) 83 >60 mL/min/1.73 m 2   EKG (NOW)   Result Value Ref Range    Report       [facility] Emergency Dept.    Test Date:  2024  Pt Name:    NATACHA DIAS                  Department: ER  MRN:        [id number]                      Room:        13  Gender:     Female                       Technician: [id number]  :        [id number]                   Requested By:ER TRIAGE PROTOCOL  Order #:    [id number]                    Reading MD:    Measurements  Intervals                                Axis  Rate:       79                           P:          0  AK:         0                            QRS:        19  QRSD:       100                          T:          0  QT:         514  QTc:        590    Interpretive Statements  Atrial fibrillation  Borderline low voltage, extremity leads  Prolonged QT interval  Compared to ECG 2024 10:53:27  Prolonged QT interval now present  Ventricular premature complex(es) no longer present         Radiology  CT-EXTREMITY, UPPER W/O RIGHT   Final Result      1.  Comminuted moderately displaced intra-articular distal humeral diametaphysis fracture.   2.  Advanced degenerative changes of the elbow with chronic rotator cuff tears.   3.  Groundglass and interstitial opacity in the right lung could be chronic changes versus edema or atypical infection.      DX-HUMERUS 2+ RIGHT   Final Result         1.  Comminuted fracture of left distal humerus in plaster splint with slight dorsal displacement of the distal fragments.      DX-PORTABLE FLUOROSCOPY < 1 HOUR   Final Result      Portable fluoroscopy utilized for 8 seconds.      INTERPRETING LOCATION: [address] [place] [address] [place], [id number]      DX-HUMERUS 2+ RIGHT   Final Result         1.  Acute comminuted fracture of left distal humerus with dorsal angulation.       DX-PORTABLE FLUOROSCOPY < 1 HOUR    (Results Pending)   DX-SHOULDER 2+ RIGHT    (Results Pending)       Medications:   New Prescriptions    No medications on file       My final assessment includes   1. Fall, initial encounter    2. Closed displaced comminuted supracondylar fracture of right humerus without intercondylar fracture, initial encounter    3. Generalized weakness        Upon Reevaluation, the patient's condition has: Has remained stable.  He has been accepted at a care facility.    Patient discharged from ED Observation status at 9:56 AM  (Time) 3/21/2024  (Date).     Total time spent on this ED Observation discharge encounter is < 30 Minutes    Electronically signed by: Clifton Medina M.D., 3/21/2024 9:55 AM

## 2024-03-21 NOTE — ED NOTES
Bedside report received from off going RN/tech: Myriam RN, assumed care of patient.  POC discussed with patient. Call light within reach, all needs addressed at this time.       Fall risk interventions in place: Move the patient closer to the nurse's station, Patient's personal possessions are with in their safe reach, Place fall risk sign on patient's door, and Keep floor surfaces clean and dry (all applicable per Advance Fall risk assessment)   Continuous monitoring: Cardiac Leads, Pulse Ox, or Blood Pressure  IVF/IV medications: Not Applicable   Oxygen: How many liters 2L  Bedside sitter: Not Applicable   Isolation: Not Applicable

## 2024-03-21 NOTE — ED NOTES
Pt resting comfortably in bed. Breathing unlabored. Bed in the lowest position, side rails up, Call light within reach.

## 2024-03-21 NOTE — DISCHARGE PLANNING
TCN following. HTH/SCP chart reviewed. No new TCN needs identified. Currently anticipating dc to [facility] later today per discussion with SW/review. Please see prior TCN note from 3/21 for most recent discharge planning considerations if indicated.  Discussed dc planning concerns/considerations with HUM team per SW request as well for LTC via caregivers/family and transition to home post SNF. TCN has obtained SCP auth for SNF as well.     Completed:  PT/OT with recommendations for post acute placement on 3/20   Choice obtained: none; final choices obtained by SW after CM blanket referral; see prior TCN note for details

## 2024-03-21 NOTE — ED NOTES
Report received. Pt AAOx4. VSS on 2L O2 NC.  Purewick in place. Call light in place. Pt denies needs.

## 2024-03-21 NOTE — ED NOTES
Pt repositioned in hospital bed, pillows placed under hip for comfort. Pt has no other needs at this time.

## 2024-03-21 NOTE — ED NOTES
Pre-op calling for pt surgery this afternoon.  Pt states that she is unaware that there was a plan for surgery.  Pt reports that she does not want to have surgery.  States that she is planning to go to nursing facility.    Page to ortho as ERP and notes do not provide plan for surgery.    TC from Dr. Deng, updated on Pt's request.

## 2024-03-21 NOTE — ED NOTES
Bedside report to Ariana TORRES. Remains on 2 liters NC. Requesting assistance eating. All questions addressed. Plan is spot check VS.

## 2024-03-21 NOTE — ED NOTES
Skin check completed. Pt with noted incontinence associated skin breakdown near rectum (see media photo), skin blanchable. Barrier paste and mepilex placed. Pt turn completed with pillows for positioning of bony prominences.  All other skin checked and is PWD and blanchable.  ERP notified.

## 2024-03-21 NOTE — ED NOTES
Pt 1:1 fed breakfast tray upon delivery. Pt ate approx 2/3 of sausage and eggs, but did not want remainder of meal tray. Pt tolerated well.

## 2024-03-21 NOTE — ED NOTES
Pt repositioned in bed, purewick placed. Pt states they would like to try to get some sleep. Lights turned down. Call light within reach.

## 2024-03-21 NOTE — ED NOTES
Pt resting comfortably in hospital bed. Breathing unlabored. Bed in the lowest position, side rails up, Call light within reach. Pt states she has no needs at this time.

## 2024-03-21 NOTE — DISCHARGE PLANNING
MSW spoke Magalys at [facility]. They can take pt today at 11am by their van. MSW will have ERP do med rec and d/c summary.     MSW spoke to bedside RN. [place]. MSW arranged [facility] transport for [id number]. MSW faxed Med Rec and d/c summary to [facility]. Bedside RN given COBRA and transport packet. MSW updated pt's grandson Rizwan at bedside. MSW left  for [facility] admissions on transport time.

## 2024-04-11 DIAGNOSIS — I48.91 ATRIAL FIBRILLATION, UNSPECIFIED TYPE (HCC): ICD-10-CM

## 2024-04-11 DIAGNOSIS — I10 ESSENTIAL HYPERTENSION: ICD-10-CM

## 2024-04-11 DIAGNOSIS — I48.0 PAROXYSMAL ATRIAL FIBRILLATION (HCC): ICD-10-CM

## 2024-04-11 RX ORDER — DILTIAZEM HYDROCHLORIDE 120 MG/1
120 CAPSULE, COATED, EXTENDED RELEASE ORAL 2 TIMES DAILY
Qty: 200 CAPSULE | Refills: 0 | Status: SHIPPED | OUTPATIENT
Start: 2024-04-11

## 2024-04-11 NOTE — TELEPHONE ENCOUNTER
Is the patient due for a refill? Yes    Was the patient seen the past year? No    Date of last office visit: 03/02/2023    Does the patient have an upcoming appointment?  No   If yes, When?     Provider to refill:TW    Does the patients insurance require a 100 day supply?  Yes

## 2024-04-18 ENCOUNTER — HOME HEALTH ADMISSION (OUTPATIENT)
Dept: HOME HEALTH SERVICES | Facility: HOME HEALTHCARE | Age: 89
End: 2024-04-18
Payer: MEDICARE

## 2024-04-24 ENCOUNTER — HOME CARE VISIT (OUTPATIENT)
Dept: HOME HEALTH SERVICES | Facility: HOME HEALTHCARE | Age: 89
End: 2024-04-24
Payer: MEDICARE

## 2024-04-24 PROCEDURE — G0493 RN CARE EA 15 MIN HH/HOSPICE: HCPCS

## 2024-04-24 ASSESSMENT — FIBROSIS 4 INDEX: FIB4 SCORE: 1.7

## 2024-04-25 ENCOUNTER — DOCUMENTATION (OUTPATIENT)
Dept: MEDICAL GROUP | Facility: PHYSICIAN GROUP | Age: 89
End: 2024-04-25
Payer: MEDICARE

## 2024-04-25 VITALS
WEIGHT: 164 LBS | RESPIRATION RATE: 18 BRPM | SYSTOLIC BLOOD PRESSURE: 132 MMHG | HEART RATE: 76 BPM | HEIGHT: 60 IN | DIASTOLIC BLOOD PRESSURE: 84 MMHG | TEMPERATURE: 97.2 F | BODY MASS INDEX: 32.2 KG/M2 | OXYGEN SATURATION: 92 %

## 2024-04-25 ASSESSMENT — ENCOUNTER SYMPTOMS
SUBJECTIVE PAIN PROGRESSION: GRADUALLY IMPROVING
PAIN LOCATION - PAIN SEVERITY: 4/10
PERSON REPORTING PAIN: PATIENT
LAST BOWEL MOVEMENT: 66954
FATIGUE: 1
FATIGUES EASILY: 1
LOWEST PAIN SEVERITY IN PAST 24 HOURS: 0/10
VOMITING: DENIES
NAUSEA: DENIES
PAIN SEVERITY GOAL: 0/10
HIGHEST PAIN SEVERITY IN PAST 24 HOURS: 4/10
PAIN LOCATION - PAIN QUALITY: HURTS""
PAIN: 1
PAIN LOCATION - PAIN FREQUENCY: INTERMITTENT
CONSTIPATION: 1

## 2024-04-25 ASSESSMENT — ACTIVITIES OF DAILY LIVING (ADL): OASIS_M1830: 06

## 2024-04-26 ENCOUNTER — HOME CARE VISIT (OUTPATIENT)
Dept: HOME HEALTH SERVICES | Facility: HOME HEALTHCARE | Age: 89
End: 2024-04-26
Payer: MEDICARE

## 2024-04-26 VITALS
HEART RATE: 78 BPM | SYSTOLIC BLOOD PRESSURE: 128 MMHG | OXYGEN SATURATION: 95 % | RESPIRATION RATE: 18 BRPM | DIASTOLIC BLOOD PRESSURE: 78 MMHG | TEMPERATURE: 97.8 F

## 2024-04-26 PROCEDURE — G0299 HHS/HOSPICE OF RN EA 15 MIN: HCPCS

## 2024-04-26 PROCEDURE — G0152 HHCP-SERV OF OT,EA 15 MIN: HCPCS

## 2024-04-26 ASSESSMENT — ENCOUNTER SYMPTOMS
BOWEL INCONTINENCE: 1
LAST BOWEL MOVEMENT: 66956
DENIES PAIN: 1
VOMITING: PT DENIES ANY EMESIS AT THIS TIME
FATIGUES EASILY: 1
STOOL FREQUENCY: LESS THAN DAILY
NAUSEA: PT DENIES ANY NAUSEA AT THIS TIME
PERSON REPORTING PAIN: PATIENT
SHORTNESS OF BREATH: 1
DYSPNEA ACTIVITY LEVEL: AT REST

## 2024-04-26 ASSESSMENT — ACTIVITIES OF DAILY LIVING (ADL)
AMBULATION ASSISTANCE: NON-AMBULATORY
CURRENT_FUNCTION: ONE PERSON

## 2024-04-28 VITALS
SYSTOLIC BLOOD PRESSURE: 128 MMHG | DIASTOLIC BLOOD PRESSURE: 78 MMHG | HEART RATE: 78 BPM | RESPIRATION RATE: 18 BRPM | TEMPERATURE: 97.8 F | OXYGEN SATURATION: 95 %

## 2024-04-29 ENCOUNTER — HOME CARE VISIT (OUTPATIENT)
Dept: HOME HEALTH SERVICES | Facility: HOME HEALTHCARE | Age: 89
End: 2024-04-29
Payer: MEDICARE

## 2024-04-29 VITALS
DIASTOLIC BLOOD PRESSURE: 64 MMHG | RESPIRATION RATE: 18 BRPM | HEART RATE: 72 BPM | OXYGEN SATURATION: 90 % | TEMPERATURE: 97.8 F | SYSTOLIC BLOOD PRESSURE: 118 MMHG

## 2024-04-29 ASSESSMENT — ACTIVITIES OF DAILY LIVING (ADL): TRANSPORTATION COMMENTS: LOW VISION

## 2024-04-30 ENCOUNTER — HOME CARE VISIT (OUTPATIENT)
Dept: HOME HEALTH SERVICES | Facility: HOME HEALTHCARE | Age: 89
End: 2024-04-30
Payer: MEDICARE

## 2024-04-30 VITALS
DIASTOLIC BLOOD PRESSURE: 58 MMHG | OXYGEN SATURATION: 94 % | RESPIRATION RATE: 18 BRPM | HEART RATE: 69 BPM | SYSTOLIC BLOOD PRESSURE: 110 MMHG | TEMPERATURE: 97.5 F

## 2024-04-30 PROCEDURE — G0299 HHS/HOSPICE OF RN EA 15 MIN: HCPCS

## 2024-04-30 ASSESSMENT — ENCOUNTER SYMPTOMS
LAST BOWEL MOVEMENT: 66959
STOOL FREQUENCY: DAILY
VOMITING: PT DENIES ANY EMESIS AT THIS TIME
PERSON REPORTING PAIN: PATIENT
LOWEST PAIN SEVERITY IN PAST 24 HOURS: 4/10
SHORTNESS OF BREATH: 1
SUBJECTIVE PAIN PROGRESSION: UNCHANGED
PAIN: 1
PAIN LOCATION - PAIN SEVERITY: 5/10
DENIES PAIN: 1
HIGHEST PAIN SEVERITY IN PAST 24 HOURS: 5/10
DEBILITATING PAIN: 1
BOWEL INCONTINENCE: 1
DEPRESSION: 1
PERSON REPORTING PAIN: PATIENT
PAIN SEVERITY GOAL: 0/10

## 2024-04-30 ASSESSMENT — ACTIVITIES OF DAILY LIVING (ADL)
TOILETING: MAXIMUM ASSIST
DRESSING_LB_CURRENT_FUNCTION: MAXIMUM ASSIST
BATHING_CURRENT_FUNCTION: MAXIMUM ASSIST
PHYSICAL_TRANSFERS_DEVICES: HOYER LIFT
BATHING ASSESSED: 1
CURRENT_FUNCTION: ONE PERSON
GROOMING ASSESSED: 1
DRESSING_UB_CURRENT_FUNCTION: MAXIMUM ASSIST
CURRENT_FUNCTION: MAXIMUM ASSIST
TOILETING: 1
PREPARING MEALS: DEPENDENT
GROOMING_CURRENT_FUNCTION: MAXIMUM ASSIST
AMBULATION ASSISTANCE: NON-AMBULATORY
PHYSICAL TRANSFERS ASSESSED: 1

## 2024-05-01 ENCOUNTER — HOME CARE VISIT (OUTPATIENT)
Dept: HOME HEALTH SERVICES | Facility: HOME HEALTHCARE | Age: 89
End: 2024-05-01
Payer: MEDICARE

## 2024-05-03 ENCOUNTER — HOME CARE VISIT (OUTPATIENT)
Dept: HOME HEALTH SERVICES | Facility: HOME HEALTHCARE | Age: 89
End: 2024-05-03
Payer: MEDICARE

## 2024-05-03 ENCOUNTER — APPOINTMENT (OUTPATIENT)
Dept: CARDIOLOGY | Facility: MEDICAL CENTER | Age: 89
End: 2024-05-03
Attending: NURSE PRACTITIONER
Payer: MEDICARE

## 2024-05-03 VITALS
TEMPERATURE: 97.6 F | SYSTOLIC BLOOD PRESSURE: 128 MMHG | OXYGEN SATURATION: 93 % | DIASTOLIC BLOOD PRESSURE: 64 MMHG | RESPIRATION RATE: 18 BRPM | HEART RATE: 85 BPM

## 2024-05-03 ASSESSMENT — ENCOUNTER SYMPTOMS
BOWEL PATTERN NORMAL: 1
STOOL FREQUENCY: DAILY
LIMITED RANGE OF MOTION: 1
MUSCLE WEAKNESS: 1
PERSON REPORTING PAIN: PATIENT
LAST BOWEL MOVEMENT: 66963
NAUSEA: PT DENIES NAUSEA AT THIS TIME.
DENIES PAIN: 1
DYSPNEA ON EXERTION: 1
FATIGUES EASILY: 1
VOMITING: PT DENIES EMESIS AT THIS TIME.

## 2024-05-06 ENCOUNTER — HOME CARE VISIT (OUTPATIENT)
Dept: HOME HEALTH SERVICES | Facility: HOME HEALTHCARE | Age: 89
End: 2024-05-06
Payer: MEDICARE

## 2024-05-06 VITALS
DIASTOLIC BLOOD PRESSURE: 64 MMHG | SYSTOLIC BLOOD PRESSURE: 128 MMHG | OXYGEN SATURATION: 93 % | TEMPERATURE: 97.6 F | HEART RATE: 85 BPM

## 2024-05-06 VITALS
TEMPERATURE: 97.6 F | DIASTOLIC BLOOD PRESSURE: 64 MMHG | RESPIRATION RATE: 18 BRPM | SYSTOLIC BLOOD PRESSURE: 128 MMHG | HEART RATE: 85 BPM | OXYGEN SATURATION: 93 %

## 2024-05-06 ASSESSMENT — ENCOUNTER SYMPTOMS
PAIN LOCATION - PAIN SEVERITY: 5/10
PAIN LOCATION - PAIN QUALITY: ACHY, SORE
HIGHEST PAIN SEVERITY IN PAST 24 HOURS: 5/10
LOWEST PAIN SEVERITY IN PAST 24 HOURS: 0/10
LOWEST PAIN SEVERITY IN PAST 24 HOURS: 0/10
MUSCLE WEAKNESS: 1
HIGHEST PAIN SEVERITY IN PAST 24 HOURS: 5/10
PAIN SEVERITY GOAL: 0/10
LOWEST PAIN SEVERITY IN PAST 24 HOURS: 3/10
PERSON REPORTING PAIN: PATIENT
PAIN LOCATION - PAIN SEVERITY: 5/10
PAIN: 1
PAIN LOCATION - EXACERBATING FACTORS: ACTIVITY, POSITIONAL
PAIN LOCATION - EXACERBATING FACTORS: POSITIONAL, ACTIVITY
DEPRESSION: 1
PAIN LOCATION - PAIN FREQUENCY: FREQUENT
PAIN LOCATION - PAIN DURATION: WEEKS
PAIN LOCATION: RIGHT ARM
PERSON REPORTING PAIN: PATIENT
SUBJECTIVE PAIN PROGRESSION: GRADUALLY IMPROVING
PERSON REPORTING PAIN: PATIENT
PAIN LOCATION - PAIN FREQUENCY: FREQUENT
PAIN LOCATION - RELIEVING FACTORS: MEDS, REST, REPOSITION
PAIN LOCATION - PAIN SEVERITY: 0/10
PAIN SEVERITY GOAL: 0/10
PAIN LOCATION - PAIN QUALITY: ACHY, SORE, SHARP AT TIMES
LIMITED RANGE OF MOTION: 1
PAIN SEVERITY GOAL: 0/10
PAIN LOCATION - RELIEVING FACTORS: REST, REPOSITION, MEDS
PAIN LOCATION - PAIN DURATION: MONTHS
PAIN: 1
SUBJECTIVE PAIN PROGRESSION: WAXING AND WANING
SUBJECTIVE PAIN PROGRESSION: GRADUALLY IMPROVING
PAIN LOCATION: RIGHT ARM
PAIN: 1
HIGHEST PAIN SEVERITY IN PAST 24 HOURS: 5/10

## 2024-05-06 ASSESSMENT — ACTIVITIES OF DAILY LIVING (ADL)
PHYSICAL TRANSFERS ASSESSED: 1
CURRENT_FUNCTION: MAXIMUM ASSIST
TRANSPORTATION: DEPENDENT
AMBULATION ASSISTANCE: NON-AMBULATORY
TRANSPORTATION ASSESSED: 1

## 2024-05-06 NOTE — CASE COMMUNICATION
I agree with these changes  ----- Message -----  From: Ashely Chen R.N.  Sent: 5/6/2024   7:16 AM PDT  To: Mackenzie Mabry R.N.      Quality Review for 4.24.24 SOC OASIS performed on by ABIMBOLA Chen RN on 4.30.2024:    Edits completed by ABIMBOLA Chen RN:  1.  and  dx coding updated per chart review.   2. Filled out HB status in the OASIS per the narrative  3. Changed  to 4.24.24 per the LSOC order  4. Added #1 to ,  pt fell and hurt her right humerus  5. Changed  to 2,  to 3,  to 2 and  to 4 per OT eval on 4.26.24. Changed  to 4.26.24 date of OT eval, data used as part of the collaboration convention  6. Changed F1 to 1 per left heel wound assessment, changed A1 to 0 and E1 to 1 per wound assessment of butt/sacrum wounds  7. Changed  to 3 per OT eval  8. Changed  to D per dxs of HF  9. Changed  to 6 per   response of 5. Changed , ,  to 3 per [id number] F-H responses of 1. Changed  to 2, MK2736 A, B to 2 per narrative pt max assist with ADLs. Changed TN9553 B, C to 09 per narrative pt used a lift and wheelchair prior. Changed MZ1425 C,F dc goal to 3 per [id number] A,B, E response of 3. Changed [id number] D, I-P performance and dc goal to 09 per PLOF. Changed DI9374 G to 88 from 9.   10. Added wheelchair to DME  11. Changed   I to yes and E to no, per MAR pt is on ASA.  12. Added ADA, heart healthy diet to nutritional requirements per dxs  13.   Updated F2F data  14. Changed  to 9

## 2024-05-06 NOTE — CASE COMMUNICATION
Quality Review for 4.24.24 SOC OASIS performed on by ABIMBOLA Chen RN on 4.30.2024:    Edits completed by ABIMBOLA Chen RN:  1.  and  dx coding updated per chart review.   2. Filled out HB status in the OASIS per the narrative  3. Changed  to 4.24.24 per the LSOC order  4. Added #1 to , pt fell and hurt her right humerus  5. Changed  to 2,  to 3,  to 2 and  to 4 per OT eval on 4.26.24. Changed  to 4. 26.24 date of OT eval, data used as part of the collaboration convention  6. Changed F1 to 1 per left heel wound assessment, changed A1 to 0 and E1 to 1 per wound assessment of butt/sacrum wounds  7. Changed  to 3 per OT eval  8. Changed  to D per dxs of HF  9. Changed  to 6 per  response of 5. Changed , ,  to 3 per WO0133 F-H responses of 1. Changed  to 2, LY3217 A, B to 2 per narrative pt max as sist with ADLs. Changed AF8457 B, C to 09 per narrative pt used a lift and wheelchair prior. Changed UD1683 C,F dc goal to 3 per LP0642 A,B, E response of 3. Changed NV8537 D, I-P performance and dc goal to 09 per PLOF. Changed UG0465 G to 88 from 9.   10. Added wheelchair to DME  11. Changed  I to yes and E to no, per MAR pt is on ASA.  12. Added ADA, heart healthy diet to nutritional requirements per dxs  13.   Updated F2F data   14. Changed  to 9

## 2024-05-07 ENCOUNTER — HOME CARE VISIT (OUTPATIENT)
Dept: HOME HEALTH SERVICES | Facility: HOME HEALTHCARE | Age: 89
End: 2024-05-07
Payer: MEDICARE

## 2024-05-07 VITALS
DIASTOLIC BLOOD PRESSURE: 70 MMHG | OXYGEN SATURATION: 96 % | RESPIRATION RATE: 16 BRPM | HEART RATE: 83 BPM | SYSTOLIC BLOOD PRESSURE: 110 MMHG | TEMPERATURE: 97.8 F

## 2024-05-07 ASSESSMENT — ACTIVITIES OF DAILY LIVING (ADL)
AMBULATION ASSISTANCE: ONE PERSON
CURRENT_FUNCTION: ONE PERSON

## 2024-05-07 ASSESSMENT — ENCOUNTER SYMPTOMS
LAST BOWEL MOVEMENT: 66966
FATIGUES EASILY: 1
VOMITING: PT DENIES EMESIS AT THIS TIME.
BOWEL PATTERN NORMAL: 1
LIMITED RANGE OF MOTION: 1
DYSPNEA ON EXERTION: 1
NAUSEA: PT DENIES NAUSEA AT THIS TIME.
PERSON REPORTING PAIN: PATIENT
STOOL FREQUENCY: DAILY
SKIN LESIONS: 1
MUSCLE WEAKNESS: 1
DENIES PAIN: 1

## 2024-05-08 ENCOUNTER — HOME CARE VISIT (OUTPATIENT)
Dept: HOME HEALTH SERVICES | Facility: HOME HEALTHCARE | Age: 89
End: 2024-05-08
Payer: MEDICARE

## 2024-05-08 VITALS — TEMPERATURE: 97.6 F | RESPIRATION RATE: 18 BRPM | DIASTOLIC BLOOD PRESSURE: 68 MMHG | SYSTOLIC BLOOD PRESSURE: 138 MMHG

## 2024-05-08 ASSESSMENT — ENCOUNTER SYMPTOMS
PERSON REPORTING PAIN: PATIENT
DENIES PAIN: 1

## 2024-05-10 ENCOUNTER — HOME CARE VISIT (OUTPATIENT)
Dept: HOME HEALTH SERVICES | Facility: HOME HEALTHCARE | Age: 89
End: 2024-05-10
Payer: MEDICARE

## 2024-05-10 VITALS
RESPIRATION RATE: 18 BRPM | SYSTOLIC BLOOD PRESSURE: 110 MMHG | OXYGEN SATURATION: 98 % | TEMPERATURE: 97.1 F | DIASTOLIC BLOOD PRESSURE: 72 MMHG | HEART RATE: 83 BPM

## 2024-05-10 VITALS
SYSTOLIC BLOOD PRESSURE: 110 MMHG | RESPIRATION RATE: 18 BRPM | TEMPERATURE: 97.1 F | DIASTOLIC BLOOD PRESSURE: 72 MMHG | HEART RATE: 83 BPM | OXYGEN SATURATION: 98 %

## 2024-05-11 VITALS
HEART RATE: 83 BPM | OXYGEN SATURATION: 98 % | TEMPERATURE: 99.1 F | SYSTOLIC BLOOD PRESSURE: 110 MMHG | DIASTOLIC BLOOD PRESSURE: 72 MMHG | RESPIRATION RATE: 18 BRPM

## 2024-05-11 ASSESSMENT — ENCOUNTER SYMPTOMS
PERSON REPORTING PAIN: PATIENT
SKIN LESIONS: 1
STOOL FREQUENCY: LESS THAN DAILY
BOWEL PATTERN NORMAL: 1
DENIES PAIN: 1
VOMITING: PT DENIES EMESIS AT THIS TIME.
LAST BOWEL MOVEMENT: 66970
NAUSEA: PT DENIES NAUSEA AT THIS TIME.
LIMITED RANGE OF MOTION: 1
MUSCLE WEAKNESS: 1

## 2024-05-12 ASSESSMENT — ENCOUNTER SYMPTOMS
PERSON REPORTING PAIN: PATIENT
DENIES PAIN: 1
DENIES PAIN: 1
PERSON REPORTING PAIN: PATIENT
PAIN: PT DENIES PAIN

## 2024-05-13 ENCOUNTER — HOME CARE VISIT (OUTPATIENT)
Dept: HOME HEALTH SERVICES | Facility: HOME HEALTHCARE | Age: 89
End: 2024-05-13
Payer: MEDICARE

## 2024-05-13 VITALS
TEMPERATURE: 97.4 F | OXYGEN SATURATION: 97 % | RESPIRATION RATE: 18 BRPM | HEART RATE: 69 BPM | DIASTOLIC BLOOD PRESSURE: 64 MMHG | SYSTOLIC BLOOD PRESSURE: 116 MMHG

## 2024-05-13 ASSESSMENT — ENCOUNTER SYMPTOMS
DENIES PAIN: 1
PERSON REPORTING PAIN: PATIENT

## 2024-05-14 VITALS
DIASTOLIC BLOOD PRESSURE: 64 MMHG | RESPIRATION RATE: 18 BRPM | HEART RATE: 69 BPM | TEMPERATURE: 97.4 F | OXYGEN SATURATION: 97 % | SYSTOLIC BLOOD PRESSURE: 116 MMHG

## 2024-05-14 ASSESSMENT — ENCOUNTER SYMPTOMS
PERSON REPORTING PAIN: PATIENT
DENIES PAIN: 1

## 2024-05-15 ENCOUNTER — HOME CARE VISIT (OUTPATIENT)
Dept: HOME HEALTH SERVICES | Facility: HOME HEALTHCARE | Age: 89
End: 2024-05-15
Payer: MEDICARE

## 2024-05-16 ENCOUNTER — HOME CARE VISIT (OUTPATIENT)
Dept: HOME HEALTH SERVICES | Facility: HOME HEALTHCARE | Age: 89
End: 2024-05-16
Payer: MEDICARE

## 2024-05-16 VITALS
SYSTOLIC BLOOD PRESSURE: 112 MMHG | HEART RATE: 64 BPM | RESPIRATION RATE: 18 BRPM | TEMPERATURE: 97.4 F | DIASTOLIC BLOOD PRESSURE: 60 MMHG

## 2024-05-16 ASSESSMENT — ENCOUNTER SYMPTOMS
LAST BOWEL MOVEMENT: 66976
LIMITED RANGE OF MOTION: 1
MUSCLE WEAKNESS: 1
TREMORS: 1
VOMITING: PT STATED SHE HAS NOT HAD ANY EMESIS
DENIES PAIN: 1
BOWEL PATTERN NORMAL: 1
PERSON REPORTING PAIN: PATIENT
STOOL FREQUENCY: DAILY

## 2024-05-16 ASSESSMENT — ACTIVITIES OF DAILY LIVING (ADL)
CURRENT_FUNCTION: TWO PERSON
AMBULATION ASSISTANCE: NON-AMBULATORY

## 2024-05-17 ENCOUNTER — HOME CARE VISIT (OUTPATIENT)
Dept: HOME HEALTH SERVICES | Facility: HOME HEALTHCARE | Age: 89
End: 2024-05-17
Payer: MEDICARE

## 2024-05-17 VITALS
RESPIRATION RATE: 18 BRPM | DIASTOLIC BLOOD PRESSURE: 62 MMHG | SYSTOLIC BLOOD PRESSURE: 122 MMHG | TEMPERATURE: 97.8 F | OXYGEN SATURATION: 100 % | HEART RATE: 62 BPM

## 2024-05-19 VITALS
RESPIRATION RATE: 18 BRPM | OXYGEN SATURATION: 100 % | HEART RATE: 62 BPM | SYSTOLIC BLOOD PRESSURE: 122 MMHG | TEMPERATURE: 97.8 F | DIASTOLIC BLOOD PRESSURE: 62 MMHG

## 2024-05-19 ASSESSMENT — ENCOUNTER SYMPTOMS
DENIES PAIN: 1
PERSON REPORTING PAIN: PATIENT

## 2024-05-20 ENCOUNTER — HOME CARE VISIT (OUTPATIENT)
Dept: HOME HEALTH SERVICES | Facility: HOME HEALTHCARE | Age: 89
End: 2024-05-20
Payer: MEDICARE

## 2024-05-20 VITALS
DIASTOLIC BLOOD PRESSURE: 66 MMHG | SYSTOLIC BLOOD PRESSURE: 114 MMHG | OXYGEN SATURATION: 94 % | HEART RATE: 55 BPM | RESPIRATION RATE: 18 BRPM | TEMPERATURE: 98 F

## 2024-05-20 ASSESSMENT — ENCOUNTER SYMPTOMS
LOWEST PAIN SEVERITY IN PAST 24 HOURS: 0/10
PAIN LOCATION - RELIEVING FACTORS: REST
PAIN LOCATION - PAIN SEVERITY: 0/10
PERSON REPORTING PAIN: PATIENT
PERSON REPORTING PAIN: PATIENT
PAIN LOCATION - PAIN QUALITY: ACHY, SORE
DENIES PAIN: 1
PAIN: 1
HIGHEST PAIN SEVERITY IN PAST 24 HOURS: 2/10
PAIN SEVERITY GOAL: 0/10
PAIN LOCATION - PAIN DURATION: ACUTE
PAIN LOCATION - PAIN FREQUENCY: INTERMITTENT
PAIN LOCATION: LEFT LEG

## 2024-05-21 ENCOUNTER — HOME CARE VISIT (OUTPATIENT)
Dept: HOME HEALTH SERVICES | Facility: HOME HEALTHCARE | Age: 89
End: 2024-05-21
Payer: MEDICARE

## 2024-05-22 ENCOUNTER — HOME CARE VISIT (OUTPATIENT)
Dept: HOME HEALTH SERVICES | Facility: HOME HEALTHCARE | Age: 89
End: 2024-05-22
Payer: MEDICARE

## 2024-05-22 VITALS
DIASTOLIC BLOOD PRESSURE: 64 MMHG | OXYGEN SATURATION: 90 % | TEMPERATURE: 97.5 F | SYSTOLIC BLOOD PRESSURE: 108 MMHG | HEART RATE: 60 BPM | RESPIRATION RATE: 18 BRPM

## 2024-05-22 VITALS
TEMPERATURE: 98 F | HEART RATE: 55 BPM | DIASTOLIC BLOOD PRESSURE: 66 MMHG | OXYGEN SATURATION: 96 % | SYSTOLIC BLOOD PRESSURE: 114 MMHG | RESPIRATION RATE: 18 BRPM

## 2024-05-22 VITALS
HEART RATE: 60 BPM | RESPIRATION RATE: 18 BRPM | DIASTOLIC BLOOD PRESSURE: 64 MMHG | SYSTOLIC BLOOD PRESSURE: 108 MMHG | OXYGEN SATURATION: 90 % | TEMPERATURE: 97.5 F

## 2024-05-22 ASSESSMENT — ENCOUNTER SYMPTOMS
BOWEL PATTERN NORMAL: 1
PERSON REPORTING PAIN: PATIENT
FATIGUES EASILY: 1
DENIES PAIN: 1
LAST BOWEL MOVEMENT: 66981
STOOL FREQUENCY: LESS THAN DAILY
PERSON REPORTING PAIN: PATIENT
DENIES PAIN: 1
DYSPNEA ON EXERTION: 1

## 2024-05-26 ASSESSMENT — ACTIVITIES OF DAILY LIVING (ADL)
TRANSPORTATION COMMENTS: LOW VISION
CURRENT_FUNCTION: TWO PERSON
CURRENT_FUNCTION: MAXIMUM ASSIST
PHYSICAL TRANSFERS ASSESSED: 1
TRANSPORTATION ASSESSED: 1
TRANSPORTATION: DEPENDENT

## 2024-05-26 ASSESSMENT — ENCOUNTER SYMPTOMS
DENIES PAIN: 1
PERSON REPORTING PAIN: PATIENT
PERSON REPORTING PAIN: PATIENT
DENIES PAIN: 1

## 2024-05-27 ENCOUNTER — HOME CARE VISIT (OUTPATIENT)
Dept: HOME HEALTH SERVICES | Facility: HOME HEALTHCARE | Age: 89
End: 2024-05-27
Payer: MEDICARE

## 2024-05-27 VITALS
OXYGEN SATURATION: 100 % | SYSTOLIC BLOOD PRESSURE: 118 MMHG | DIASTOLIC BLOOD PRESSURE: 62 MMHG | RESPIRATION RATE: 18 BRPM | HEART RATE: 71 BPM | TEMPERATURE: 97.6 F

## 2024-05-27 ASSESSMENT — ENCOUNTER SYMPTOMS
PERSON REPORTING PAIN: PATIENT
DENIES PAIN: 1

## 2024-05-29 ENCOUNTER — HOME CARE VISIT (OUTPATIENT)
Dept: HOME HEALTH SERVICES | Facility: HOME HEALTHCARE | Age: 89
End: 2024-05-29
Payer: MEDICARE

## 2024-05-29 VITALS
HEART RATE: 68 BPM | SYSTOLIC BLOOD PRESSURE: 120 MMHG | RESPIRATION RATE: 17 BRPM | TEMPERATURE: 97.8 F | OXYGEN SATURATION: 92 % | DIASTOLIC BLOOD PRESSURE: 60 MMHG

## 2024-05-29 ASSESSMENT — ENCOUNTER SYMPTOMS
PAIN SEVERITY GOAL: 0/10
PAIN LOCATION: GENERALIZED
PERSON REPORTING PAIN: PATIENT
PAIN: 1
LOWEST PAIN SEVERITY IN PAST 24 HOURS: 3/10
HIGHEST PAIN SEVERITY IN PAST 24 HOURS: 5/10
SUBJECTIVE PAIN PROGRESSION: GRADUALLY IMPROVING

## 2024-05-30 NOTE — CASE COMMUNICATION
Saumya did very well today with her ther ex. She was able to perform sit to stands with assistance today but was limited with increase standing time due to B ankle pain.. WIll cont with POC to increase his functional mob to prevent further decline in IND. S/B Kirsten Mitchell PT

## 2024-05-31 ENCOUNTER — HOME CARE VISIT (OUTPATIENT)
Dept: HOME HEALTH SERVICES | Facility: HOME HEALTHCARE | Age: 89
End: 2024-05-31
Payer: MEDICARE

## 2024-05-31 PROCEDURE — G0157 HHC PT ASSISTANT EA 15: HCPCS | Mod: CQ

## 2024-06-01 VITALS
HEART RATE: 65 BPM | RESPIRATION RATE: 17 BRPM | DIASTOLIC BLOOD PRESSURE: 62 MMHG | TEMPERATURE: 97.8 F | SYSTOLIC BLOOD PRESSURE: 118 MMHG | OXYGEN SATURATION: 94 %

## 2024-06-01 ASSESSMENT — ENCOUNTER SYMPTOMS
PERSON REPORTING PAIN: PATIENT
DENIES PAIN: 1

## 2024-06-02 NOTE — CASE COMMUNICATION
Saumya did very well today She is cherie increase standing cherie with max assist. She does cont to fatigue easily but is reporting that she is trying to do her exercises more during the day. Will cont with POC to increase her functional mob to prevent further decline in IND. S/B Kirsten Mitchell PT

## 2024-06-03 ENCOUNTER — HOME CARE VISIT (OUTPATIENT)
Dept: HOME HEALTH SERVICES | Facility: HOME HEALTHCARE | Age: 89
End: 2024-06-03
Payer: MEDICARE

## 2024-06-03 VITALS — DIASTOLIC BLOOD PRESSURE: 66 MMHG | HEART RATE: 68 BPM | SYSTOLIC BLOOD PRESSURE: 126 MMHG | TEMPERATURE: 98.2 F

## 2024-06-03 VITALS — DIASTOLIC BLOOD PRESSURE: 66 MMHG | SYSTOLIC BLOOD PRESSURE: 126 MMHG | TEMPERATURE: 98.2 F | RESPIRATION RATE: 16 BRPM

## 2024-06-03 PROCEDURE — G0151 HHCP-SERV OF PT,EA 15 MIN: HCPCS

## 2024-06-03 PROCEDURE — G0152 HHCP-SERV OF OT,EA 15 MIN: HCPCS

## 2024-06-03 ASSESSMENT — ENCOUNTER SYMPTOMS: DENIES PAIN: 1

## 2024-06-04 ASSESSMENT — ENCOUNTER SYMPTOMS: PERSON REPORTING PAIN: PATIENT

## 2024-06-07 ENCOUNTER — HOME CARE VISIT (OUTPATIENT)
Dept: HOME HEALTH SERVICES | Facility: HOME HEALTHCARE | Age: 89
End: 2024-06-07
Payer: MEDICARE

## 2024-06-07 VITALS
HEART RATE: 70 BPM | RESPIRATION RATE: 18 BRPM | DIASTOLIC BLOOD PRESSURE: 64 MMHG | OXYGEN SATURATION: 94 % | SYSTOLIC BLOOD PRESSURE: 124 MMHG | TEMPERATURE: 97.8 F

## 2024-06-07 PROCEDURE — G0151 HHCP-SERV OF PT,EA 15 MIN: HCPCS

## 2024-06-10 ENCOUNTER — HOME CARE VISIT (OUTPATIENT)
Dept: HOME HEALTH SERVICES | Facility: HOME HEALTHCARE | Age: 89
End: 2024-06-10
Payer: MEDICARE

## 2024-06-10 VITALS
OXYGEN SATURATION: 94 % | SYSTOLIC BLOOD PRESSURE: 120 MMHG | TEMPERATURE: 97.3 F | RESPIRATION RATE: 18 BRPM | HEART RATE: 66 BPM | DIASTOLIC BLOOD PRESSURE: 62 MMHG

## 2024-06-10 PROCEDURE — G0151 HHCP-SERV OF PT,EA 15 MIN: HCPCS

## 2024-06-10 ASSESSMENT — ENCOUNTER SYMPTOMS
PERSON REPORTING PAIN: PATIENT
DENIES PAIN: 1
DENIES PAIN: 1
PERSON REPORTING PAIN: PATIENT

## 2024-06-10 ASSESSMENT — ACTIVITIES OF DAILY LIVING (ADL)
TRANSPORTATION COMMENTS: FALL RISK
TRANSPORTATION COMMENTS: FALL RISK

## 2024-06-11 ENCOUNTER — HOME CARE VISIT (OUTPATIENT)
Dept: HOME HEALTH SERVICES | Facility: HOME HEALTHCARE | Age: 89
End: 2024-06-11
Payer: MEDICARE

## 2024-06-11 VITALS
DIASTOLIC BLOOD PRESSURE: 62 MMHG | TEMPERATURE: 97.8 F | RESPIRATION RATE: 18 BRPM | SYSTOLIC BLOOD PRESSURE: 122 MMHG | OXYGEN SATURATION: 100 % | HEART RATE: 67 BPM

## 2024-06-11 PROCEDURE — G0152 HHCP-SERV OF OT,EA 15 MIN: HCPCS

## 2024-06-11 ASSESSMENT — ACTIVITIES OF DAILY LIVING (ADL)
PREPARING MEALS: DEPENDENT
BATHING_CURRENT_FUNCTION: MAXIMUM ASSIST
CURRENT_FUNCTION: MAXIMUM ASSIST
BATHING ASSESSED: 1
PHYSICAL TRANSFERS ASSESSED: 1
HOUSEKEEPING ASSESSED: 1
DRESSING_UB_CURRENT_FUNCTION: MINIMUM ASSIST
DRESSING_UB_CURRENT_FUNCTION: CONTACT GUARD ASSIST
LIGHT HOUSEKEEPING: DEPENDENT
DRESSING_LB_CURRENT_FUNCTION: MAXIMUM ASSIST

## 2024-06-11 ASSESSMENT — ENCOUNTER SYMPTOMS
PERSON REPORTING PAIN: PATIENT
DENIES PAIN: 1

## 2024-06-12 ASSESSMENT — ENCOUNTER SYMPTOMS
PERSON REPORTING PAIN: PATIENT
DENIES PAIN: 1

## 2024-06-14 ENCOUNTER — HOME CARE VISIT (OUTPATIENT)
Dept: HOME HEALTH SERVICES | Facility: HOME HEALTHCARE | Age: 89
End: 2024-06-14
Payer: MEDICARE

## 2024-06-14 VITALS
DIASTOLIC BLOOD PRESSURE: 64 MMHG | OXYGEN SATURATION: 95 % | HEART RATE: 62 BPM | TEMPERATURE: 97.1 F | RESPIRATION RATE: 16 BRPM | SYSTOLIC BLOOD PRESSURE: 120 MMHG

## 2024-06-14 PROCEDURE — G0151 HHCP-SERV OF PT,EA 15 MIN: HCPCS

## 2024-06-17 ENCOUNTER — HOME CARE VISIT (OUTPATIENT)
Dept: HOME HEALTH SERVICES | Facility: HOME HEALTHCARE | Age: 89
End: 2024-06-17
Payer: MEDICARE

## 2024-06-17 VITALS
OXYGEN SATURATION: 98 % | RESPIRATION RATE: 16 BRPM | SYSTOLIC BLOOD PRESSURE: 110 MMHG | TEMPERATURE: 97.3 F | HEART RATE: 88 BPM | DIASTOLIC BLOOD PRESSURE: 64 MMHG

## 2024-06-17 VITALS
SYSTOLIC BLOOD PRESSURE: 110 MMHG | TEMPERATURE: 97.3 F | OXYGEN SATURATION: 98 % | RESPIRATION RATE: 18 BRPM | DIASTOLIC BLOOD PRESSURE: 64 MMHG | HEART RATE: 86 BPM

## 2024-06-17 PROCEDURE — G0151 HHCP-SERV OF PT,EA 15 MIN: HCPCS

## 2024-06-17 PROCEDURE — G0152 HHCP-SERV OF OT,EA 15 MIN: HCPCS

## 2024-06-17 ASSESSMENT — ENCOUNTER SYMPTOMS
DENIES PAIN: 1
PERSON REPORTING PAIN: PATIENT

## 2024-06-19 ASSESSMENT — ENCOUNTER SYMPTOMS
PERSON REPORTING PAIN: PATIENT
DENIES PAIN: 1

## 2024-06-21 ENCOUNTER — HOME CARE VISIT (OUTPATIENT)
Dept: HOME HEALTH SERVICES | Facility: HOME HEALTHCARE | Age: 89
End: 2024-06-21
Payer: MEDICARE

## 2024-06-21 PROCEDURE — G0151 HHCP-SERV OF PT,EA 15 MIN: HCPCS

## 2024-06-22 VITALS
RESPIRATION RATE: 17 BRPM | TEMPERATURE: 98.1 F | HEART RATE: 84 BPM | OXYGEN SATURATION: 99 % | SYSTOLIC BLOOD PRESSURE: 108 MMHG | DIASTOLIC BLOOD PRESSURE: 62 MMHG

## 2024-06-22 ASSESSMENT — ACTIVITIES OF DAILY LIVING (ADL)
TRANSPORTATION COMMENTS: LOW VISION
PHYSICAL TRANSFERS ASSESSED: 1
TRANSPORTATION ASSESSED: 1
CURRENT_FUNCTION: MAXIMUM ASSIST
PHYSICAL_TRANSFERS_DEVICES: HOYER LIFT
FINGER_FOOD_CURRENT_FUNCTION_INDEPENDENT: 1
CURRENT_FUNCTION: ONE PERSON
CUTTING_FOOD_CURRENT_FUNCTION_DEPENDENT: 1
DRINKING_FROM_CUP_CURRENT_FUNCTIONINDEPENDENT: 1
TRANSPORTATION: DEPENDENT
OASIS_M1830: 06

## 2024-06-22 ASSESSMENT — ENCOUNTER SYMPTOMS
PAIN SEVERITY GOAL: 0/10
HIGHEST PAIN SEVERITY IN PAST 24 HOURS: 1/10
LOWEST PAIN SEVERITY IN PAST 24 HOURS: 0/10
HIGHEST PAIN SEVERITY IN PAST 24 HOURS: 1/10
SUBJECTIVE PAIN PROGRESSION: WAXING AND WANING
SUBJECTIVE PAIN PROGRESSION: WAXING AND WANING
PAIN LOCATION - RELIEVING FACTORS: MEDS, REST, REPOSITION
PAIN LOCATION - PAIN FREQUENCY: INTERMITTENT
PAIN LOCATION - EXACERBATING FACTORS: ACTIVITY, POSITIONAL
PAIN LOCATION - PAIN SEVERITY: 0/10
PAIN LOCATION - PAIN SEVERITY: 0/10
PAIN LOCATION - PAIN FREQUENCY: INTERMITTENT
PAIN LOCATION: GENERALIZED
PAIN LOCATION - PAIN QUALITY: ACHY, SORE
PAIN LOCATION - PAIN DURATION: CHRONIC
PAIN LOCATION - RELIEVING FACTORS: MEDS, REST, REPOSITION
PAIN SEVERITY GOAL: 0/10
PAIN: 1
PAIN LOCATION - PAIN QUALITY: ACHY, SORE
PERSON REPORTING PAIN: PATIENT
PAIN LOCATION - PAIN DURATION: CHRONIC
PAIN LOCATION - EXACERBATING FACTORS: ACTIVITY, POSITIONAL
LOWEST PAIN SEVERITY IN PAST 24 HOURS: 0/10
PAIN: 1
PERSON REPORTING PAIN: PATIENT
PAIN LOCATION: GENERALIZED

## 2024-06-22 ASSESSMENT — PATIENT HEALTH QUESTIONNAIRE - PHQ9: CLINICAL INTERPRETATION OF PHQ2 SCORE: 0

## 2024-06-24 ENCOUNTER — HOME CARE VISIT (OUTPATIENT)
Dept: HOME HEALTH SERVICES | Facility: HOME HEALTHCARE | Age: 89
End: 2024-06-24
Payer: MEDICARE

## 2024-06-24 VITALS
HEART RATE: 80 BPM | OXYGEN SATURATION: 93 % | DIASTOLIC BLOOD PRESSURE: 64 MMHG | SYSTOLIC BLOOD PRESSURE: 112 MMHG | WEIGHT: 164 LBS | TEMPERATURE: 97.2 F | BODY MASS INDEX: 32.03 KG/M2 | RESPIRATION RATE: 18 BRPM

## 2024-06-24 PROCEDURE — G0151 HHCP-SERV OF PT,EA 15 MIN: HCPCS

## 2024-06-24 ASSESSMENT — ENCOUNTER SYMPTOMS
PERSON REPORTING PAIN: PATIENT
DENIES PAIN: 1

## 2024-06-24 ASSESSMENT — FIBROSIS 4 INDEX: FIB4 SCORE: 1.7

## 2024-06-26 ENCOUNTER — HOME CARE VISIT (OUTPATIENT)
Dept: HOME HEALTH SERVICES | Facility: HOME HEALTHCARE | Age: 89
End: 2024-06-26
Payer: MEDICARE

## 2024-06-26 PROCEDURE — G0152 HHCP-SERV OF OT,EA 15 MIN: HCPCS

## 2024-06-29 VITALS
OXYGEN SATURATION: 95 % | HEART RATE: 82 BPM | DIASTOLIC BLOOD PRESSURE: 68 MMHG | RESPIRATION RATE: 18 BRPM | SYSTOLIC BLOOD PRESSURE: 100 MMHG | TEMPERATURE: 97.8 F

## 2024-06-29 ASSESSMENT — ENCOUNTER SYMPTOMS
DENIES PAIN: 1
PERSON REPORTING PAIN: PATIENT

## 2024-07-01 ENCOUNTER — HOME CARE VISIT (OUTPATIENT)
Dept: HOME HEALTH SERVICES | Facility: HOME HEALTHCARE | Age: 89
End: 2024-07-01
Payer: MEDICARE

## 2024-07-01 VITALS
SYSTOLIC BLOOD PRESSURE: 104 MMHG | HEART RATE: 66 BPM | TEMPERATURE: 97.5 F | RESPIRATION RATE: 18 BRPM | DIASTOLIC BLOOD PRESSURE: 62 MMHG

## 2024-07-01 PROCEDURE — G0151 HHCP-SERV OF PT,EA 15 MIN: HCPCS

## 2024-07-01 PROCEDURE — G0152 HHCP-SERV OF OT,EA 15 MIN: HCPCS

## 2024-07-01 ASSESSMENT — ENCOUNTER SYMPTOMS
DENIES PAIN: 1
PERSON REPORTING PAIN: PATIENT

## 2024-07-02 VITALS
DIASTOLIC BLOOD PRESSURE: 62 MMHG | TEMPERATURE: 97.5 F | SYSTOLIC BLOOD PRESSURE: 104 MMHG | HEART RATE: 66 BPM | RESPIRATION RATE: 18 BRPM

## 2024-07-02 ASSESSMENT — ENCOUNTER SYMPTOMS
PERSON REPORTING PAIN: PATIENT
DENIES PAIN: 1

## 2024-07-03 ENCOUNTER — HOME CARE VISIT (OUTPATIENT)
Dept: HOME HEALTH SERVICES | Facility: HOME HEALTHCARE | Age: 89
End: 2024-07-03
Payer: MEDICARE

## 2024-07-05 ENCOUNTER — HOME CARE VISIT (OUTPATIENT)
Dept: HOME HEALTH SERVICES | Facility: HOME HEALTHCARE | Age: 89
End: 2024-07-05
Payer: MEDICARE

## 2024-07-05 VITALS
WEIGHT: 164 LBS | OXYGEN SATURATION: 94 % | DIASTOLIC BLOOD PRESSURE: 64 MMHG | RESPIRATION RATE: 17 BRPM | BODY MASS INDEX: 32.03 KG/M2 | HEART RATE: 82 BPM | SYSTOLIC BLOOD PRESSURE: 110 MMHG | TEMPERATURE: 97.8 F

## 2024-07-05 PROCEDURE — G0151 HHCP-SERV OF PT,EA 15 MIN: HCPCS

## 2024-07-05 ASSESSMENT — FIBROSIS 4 INDEX: FIB4 SCORE: 1.7

## 2024-07-06 ENCOUNTER — HOME CARE VISIT (OUTPATIENT)
Dept: HOME HEALTH SERVICES | Facility: HOME HEALTHCARE | Age: 89
End: 2024-07-06
Payer: MEDICARE

## 2024-07-08 ENCOUNTER — HOME CARE VISIT (OUTPATIENT)
Dept: HOME HEALTH SERVICES | Facility: HOME HEALTHCARE | Age: 89
End: 2024-07-08
Payer: MEDICARE

## 2024-07-08 VITALS
HEART RATE: 78 BPM | SYSTOLIC BLOOD PRESSURE: 106 MMHG | WEIGHT: 165 LBS | DIASTOLIC BLOOD PRESSURE: 66 MMHG | OXYGEN SATURATION: 97 % | RESPIRATION RATE: 18 BRPM | TEMPERATURE: 98.1 F | BODY MASS INDEX: 32.22 KG/M2

## 2024-07-08 PROCEDURE — G0151 HHCP-SERV OF PT,EA 15 MIN: HCPCS

## 2024-07-08 ASSESSMENT — ENCOUNTER SYMPTOMS
PAIN LOCATION - PAIN FREQUENCY: INFREQUENT
HIGHEST PAIN SEVERITY IN PAST 24 HOURS: 2/10
PAIN: 1
PAIN LOCATION: RIGHT ARM
PAIN LOCATION - PAIN SEVERITY: 0/10
PAIN LOCATION - PAIN QUALITY: SORE
PAIN LOCATION - RELIEVING FACTORS: MASSAGE
SUBJECTIVE PAIN PROGRESSION: UNCHANGED
PAIN SEVERITY GOAL: 0/10
PERSON REPORTING PAIN: PATIENT
LOWEST PAIN SEVERITY IN PAST 24 HOURS: 0/10

## 2024-07-08 ASSESSMENT — FIBROSIS 4 INDEX: FIB4 SCORE: 1.7

## 2024-07-10 ENCOUNTER — HOME CARE VISIT (OUTPATIENT)
Dept: HOME HEALTH SERVICES | Facility: HOME HEALTHCARE | Age: 89
End: 2024-07-10
Payer: MEDICARE

## 2024-07-10 VITALS
OXYGEN SATURATION: 94 % | DIASTOLIC BLOOD PRESSURE: 64 MMHG | RESPIRATION RATE: 18 BRPM | TEMPERATURE: 97.6 F | SYSTOLIC BLOOD PRESSURE: 112 MMHG | HEART RATE: 67 BPM

## 2024-07-10 PROCEDURE — G0151 HHCP-SERV OF PT,EA 15 MIN: HCPCS

## 2024-07-12 ENCOUNTER — HOME CARE VISIT (OUTPATIENT)
Dept: HOME HEALTH SERVICES | Facility: HOME HEALTHCARE | Age: 89
End: 2024-07-12
Payer: MEDICARE

## 2024-07-12 ASSESSMENT — ENCOUNTER SYMPTOMS
DENIES PAIN: 1
PERSON REPORTING PAIN: PATIENT
PERSON REPORTING PAIN: PATIENT
DENIES PAIN: 1

## 2024-07-15 ENCOUNTER — HOME CARE VISIT (OUTPATIENT)
Dept: HOME HEALTH SERVICES | Facility: HOME HEALTHCARE | Age: 89
End: 2024-07-15
Payer: MEDICARE

## 2024-07-17 ENCOUNTER — HOME CARE VISIT (OUTPATIENT)
Dept: HOME HEALTH SERVICES | Facility: HOME HEALTHCARE | Age: 89
End: 2024-07-17
Payer: MEDICARE

## 2024-07-17 VITALS
DIASTOLIC BLOOD PRESSURE: 64 MMHG | OXYGEN SATURATION: 94 % | SYSTOLIC BLOOD PRESSURE: 108 MMHG | HEART RATE: 62 BPM | RESPIRATION RATE: 18 BRPM | TEMPERATURE: 97.3 F

## 2024-07-17 PROCEDURE — G0151 HHCP-SERV OF PT,EA 15 MIN: HCPCS

## 2024-07-19 ENCOUNTER — HOME CARE VISIT (OUTPATIENT)
Dept: HOME HEALTH SERVICES | Facility: HOME HEALTHCARE | Age: 89
End: 2024-07-19
Payer: MEDICARE

## 2024-07-19 VITALS
HEART RATE: 74 BPM | DIASTOLIC BLOOD PRESSURE: 66 MMHG | OXYGEN SATURATION: 92 % | TEMPERATURE: 97.4 F | BODY MASS INDEX: 32.22 KG/M2 | RESPIRATION RATE: 18 BRPM | WEIGHT: 165 LBS | SYSTOLIC BLOOD PRESSURE: 114 MMHG

## 2024-07-19 PROCEDURE — G0151 HHCP-SERV OF PT,EA 15 MIN: HCPCS

## 2024-07-19 ASSESSMENT — FIBROSIS 4 INDEX: FIB4 SCORE: 1.7

## 2024-07-22 ENCOUNTER — HOME CARE VISIT (OUTPATIENT)
Dept: HOME HEALTH SERVICES | Facility: HOME HEALTHCARE | Age: 89
End: 2024-07-22
Payer: MEDICARE

## 2024-07-22 VITALS
WEIGHT: 165 LBS | HEART RATE: 82 BPM | DIASTOLIC BLOOD PRESSURE: 64 MMHG | TEMPERATURE: 97.8 F | BODY MASS INDEX: 32.22 KG/M2 | SYSTOLIC BLOOD PRESSURE: 108 MMHG | RESPIRATION RATE: 18 BRPM | OXYGEN SATURATION: 92 %

## 2024-07-22 PROCEDURE — G0151 HHCP-SERV OF PT,EA 15 MIN: HCPCS

## 2024-07-22 ASSESSMENT — FIBROSIS 4 INDEX: FIB4 SCORE: 1.7

## 2024-07-25 ENCOUNTER — HOSPITAL ENCOUNTER (OUTPATIENT)
Facility: MEDICAL CENTER | Age: 89
End: 2024-07-25
Attending: FAMILY MEDICINE
Payer: MEDICARE

## 2024-07-25 PROCEDURE — 87077 CULTURE AEROBIC IDENTIFY: CPT

## 2024-07-25 PROCEDURE — 87086 URINE CULTURE/COLONY COUNT: CPT

## 2024-07-25 ASSESSMENT — ACTIVITIES OF DAILY LIVING (ADL)
DRESSING_UB_CURRENT_FUNCTION: MAXIMUM ASSIST
BATHING_CURRENT_FUNCTION: MAXIMUM ASSIST
DRESSING_LB_CURRENT_FUNCTION: MAXIMUM ASSIST
BATHING ASSESSED: 1
PREPARING MEALS: DEPENDENT
CURRENT_FUNCTION: MAXIMUM ASSIST
TOILETING: MAXIMUM ASSIST
TOILETING: 1
PHYSICAL TRANSFERS ASSESSED: 1

## 2024-07-26 ENCOUNTER — HOME CARE VISIT (OUTPATIENT)
Dept: HOME HEALTH SERVICES | Facility: HOME HEALTHCARE | Age: 89
End: 2024-07-26
Payer: MEDICARE

## 2024-07-26 VITALS
WEIGHT: 165 LBS | HEART RATE: 80 BPM | RESPIRATION RATE: 17 BRPM | SYSTOLIC BLOOD PRESSURE: 110 MMHG | OXYGEN SATURATION: 94 % | DIASTOLIC BLOOD PRESSURE: 66 MMHG | TEMPERATURE: 97.3 F | BODY MASS INDEX: 32.22 KG/M2

## 2024-07-26 PROCEDURE — G0151 HHCP-SERV OF PT,EA 15 MIN: HCPCS

## 2024-07-26 ASSESSMENT — FIBROSIS 4 INDEX: FIB4 SCORE: 1.7

## 2024-07-27 LAB
BACTERIA UR CULT: NORMAL
SIGNIFICANT IND 70042: NORMAL
SITE SITE: NORMAL
SOURCE SOURCE: NORMAL

## 2024-07-29 ENCOUNTER — HOME CARE VISIT (OUTPATIENT)
Dept: HOME HEALTH SERVICES | Facility: HOME HEALTHCARE | Age: 89
End: 2024-07-29
Payer: MEDICARE

## 2024-07-29 VITALS
DIASTOLIC BLOOD PRESSURE: 66 MMHG | SYSTOLIC BLOOD PRESSURE: 104 MMHG | TEMPERATURE: 97.2 F | BODY MASS INDEX: 32.22 KG/M2 | OXYGEN SATURATION: 90 % | WEIGHT: 165 LBS | RESPIRATION RATE: 18 BRPM | HEART RATE: 84 BPM

## 2024-07-29 DIAGNOSIS — I10 ESSENTIAL HYPERTENSION: ICD-10-CM

## 2024-07-29 DIAGNOSIS — I48.0 PAROXYSMAL ATRIAL FIBRILLATION (HCC): ICD-10-CM

## 2024-07-29 DIAGNOSIS — I48.91 ATRIAL FIBRILLATION, UNSPECIFIED TYPE (HCC): ICD-10-CM

## 2024-07-29 PROCEDURE — G0151 HHCP-SERV OF PT,EA 15 MIN: HCPCS

## 2024-07-29 ASSESSMENT — ENCOUNTER SYMPTOMS
DENIES PAIN: 1
PERSON REPORTING PAIN: PATIENT
DENIES PAIN: 1

## 2024-07-29 ASSESSMENT — ACTIVITIES OF DAILY LIVING (ADL)
TRANSPORTATION ASSESSED: 1
TRANSPORTATION: DEPENDENT

## 2024-07-29 ASSESSMENT — FIBROSIS 4 INDEX: FIB4 SCORE: 1.7

## 2024-07-30 RX ORDER — DILTIAZEM HYDROCHLORIDE 120 MG/1
120 CAPSULE, COATED, EXTENDED RELEASE ORAL 2 TIMES DAILY
Qty: 200 CAPSULE | Refills: 0 | Status: SHIPPED | OUTPATIENT
Start: 2024-07-30

## 2024-08-02 ENCOUNTER — HOME CARE VISIT (OUTPATIENT)
Dept: HOME HEALTH SERVICES | Facility: HOME HEALTHCARE | Age: 89
End: 2024-08-02
Payer: MEDICARE

## 2024-08-05 ENCOUNTER — HOME CARE VISIT (OUTPATIENT)
Dept: HOME HEALTH SERVICES | Facility: HOME HEALTHCARE | Age: 89
End: 2024-08-05
Payer: MEDICARE

## 2024-08-05 PROCEDURE — G0151 HHCP-SERV OF PT,EA 15 MIN: HCPCS

## 2024-08-07 ENCOUNTER — HOME CARE VISIT (OUTPATIENT)
Dept: HOME HEALTH SERVICES | Facility: HOME HEALTHCARE | Age: 89
End: 2024-08-07
Payer: MEDICARE

## 2024-08-07 VITALS
OXYGEN SATURATION: 92 % | RESPIRATION RATE: 18 BRPM | DIASTOLIC BLOOD PRESSURE: 64 MMHG | SYSTOLIC BLOOD PRESSURE: 110 MMHG | TEMPERATURE: 97 F | HEART RATE: 64 BPM

## 2024-08-07 PROCEDURE — G0151 HHCP-SERV OF PT,EA 15 MIN: HCPCS

## 2024-08-08 VITALS
HEART RATE: 70 BPM | TEMPERATURE: 98.1 F | SYSTOLIC BLOOD PRESSURE: 112 MMHG | OXYGEN SATURATION: 92 % | RESPIRATION RATE: 16 BRPM | DIASTOLIC BLOOD PRESSURE: 64 MMHG

## 2024-08-08 ASSESSMENT — ENCOUNTER SYMPTOMS
DENIES PAIN: 1
PERSON REPORTING PAIN: PATIENT
DENIES PAIN: 1
PERSON REPORTING PAIN: PATIENT

## 2024-08-12 ENCOUNTER — HOME CARE VISIT (OUTPATIENT)
Dept: HOME HEALTH SERVICES | Facility: HOME HEALTHCARE | Age: 89
End: 2024-08-12
Payer: MEDICARE

## 2024-08-12 VITALS
DIASTOLIC BLOOD PRESSURE: 64 MMHG | HEART RATE: 70 BPM | TEMPERATURE: 97.2 F | OXYGEN SATURATION: 94 % | RESPIRATION RATE: 18 BRPM | SYSTOLIC BLOOD PRESSURE: 108 MMHG

## 2024-08-12 PROCEDURE — G0151 HHCP-SERV OF PT,EA 15 MIN: HCPCS

## 2024-08-12 ASSESSMENT — ENCOUNTER SYMPTOMS
PERSON REPORTING PAIN: PATIENT
DENIES PAIN: 1

## 2024-08-12 ASSESSMENT — ACTIVITIES OF DAILY LIVING (ADL): TRANSPORTATION COMMENTS: LOW VISION

## 2024-08-14 ENCOUNTER — HOME CARE VISIT (OUTPATIENT)
Dept: HOME HEALTH SERVICES | Facility: HOME HEALTHCARE | Age: 89
End: 2024-08-14
Payer: MEDICARE

## 2024-08-14 VITALS
HEART RATE: 72 BPM | SYSTOLIC BLOOD PRESSURE: 116 MMHG | TEMPERATURE: 97.5 F | DIASTOLIC BLOOD PRESSURE: 66 MMHG | OXYGEN SATURATION: 99 % | RESPIRATION RATE: 16 BRPM

## 2024-08-14 VITALS
RESPIRATION RATE: 18 BRPM | TEMPERATURE: 98.2 F | HEART RATE: 60 BPM | OXYGEN SATURATION: 99 % | SYSTOLIC BLOOD PRESSURE: 108 MMHG | DIASTOLIC BLOOD PRESSURE: 62 MMHG

## 2024-08-14 PROCEDURE — G0299 HHS/HOSPICE OF RN EA 15 MIN: HCPCS

## 2024-08-14 PROCEDURE — G0151 HHCP-SERV OF PT,EA 15 MIN: HCPCS

## 2024-08-14 ASSESSMENT — ENCOUNTER SYMPTOMS
FORGETFULNESS: 1
LAST BOWEL MOVEMENT: 67066
DENIES PAIN: 1
VOMITING: DENIES
PERSON REPORTING PAIN: PATIENT
NAUSEA: DENIES

## 2024-08-19 ENCOUNTER — HOME CARE VISIT (OUTPATIENT)
Dept: HOME HEALTH SERVICES | Facility: HOME HEALTHCARE | Age: 89
End: 2024-08-19
Payer: MEDICARE

## 2024-08-19 VITALS
TEMPERATURE: 97.9 F | OXYGEN SATURATION: 94 % | RESPIRATION RATE: 18 BRPM | DIASTOLIC BLOOD PRESSURE: 64 MMHG | SYSTOLIC BLOOD PRESSURE: 112 MMHG | HEART RATE: 62 BPM

## 2024-08-19 PROCEDURE — G0151 HHCP-SERV OF PT,EA 15 MIN: HCPCS

## 2024-08-20 ENCOUNTER — HOME CARE VISIT (OUTPATIENT)
Dept: HOME HEALTH SERVICES | Facility: HOME HEALTHCARE | Age: 89
End: 2024-08-20
Payer: MEDICARE

## 2024-08-21 ENCOUNTER — HOME CARE VISIT (OUTPATIENT)
Dept: HOME HEALTH SERVICES | Facility: HOME HEALTHCARE | Age: 89
End: 2024-08-21
Payer: MEDICARE

## 2024-08-21 VITALS
HEART RATE: 74 BPM | SYSTOLIC BLOOD PRESSURE: 110 MMHG | DIASTOLIC BLOOD PRESSURE: 66 MMHG | RESPIRATION RATE: 18 BRPM | OXYGEN SATURATION: 93 % | TEMPERATURE: 97.1 F

## 2024-08-21 PROCEDURE — G0151 HHCP-SERV OF PT,EA 15 MIN: HCPCS

## 2024-08-21 ASSESSMENT — ACTIVITIES OF DAILY LIVING (ADL): TRANSPORTATION COMMENTS: LOW VISION

## 2024-08-21 ASSESSMENT — ENCOUNTER SYMPTOMS
PERSON REPORTING PAIN: PATIENT
DENIES PAIN: 1

## 2024-08-21 ASSESSMENT — PATIENT HEALTH QUESTIONNAIRE - PHQ9: CLINICAL INTERPRETATION OF PHQ2 SCORE: 0

## 2024-08-26 ENCOUNTER — HOME CARE VISIT (OUTPATIENT)
Dept: HOME HEALTH SERVICES | Facility: HOME HEALTHCARE | Age: 89
End: 2024-08-26
Payer: MEDICARE

## 2024-08-26 VITALS
TEMPERATURE: 97.8 F | HEART RATE: 62 BPM | SYSTOLIC BLOOD PRESSURE: 104 MMHG | DIASTOLIC BLOOD PRESSURE: 64 MMHG | RESPIRATION RATE: 17 BRPM | OXYGEN SATURATION: 97 %

## 2024-08-26 PROCEDURE — G0151 HHCP-SERV OF PT,EA 15 MIN: HCPCS

## 2024-08-26 ASSESSMENT — ENCOUNTER SYMPTOMS
DENIES PAIN: 1
DENIES PAIN: 1
PERSON REPORTING PAIN: PATIENT
PERSON REPORTING PAIN: PATIENT

## 2024-08-26 ASSESSMENT — ACTIVITIES OF DAILY LIVING (ADL)
TOILETING EQUIPMENT USED: HOYER
TOILETING: MAXIMUM ASSIST
AMBULATION ASSISTANCE ON FLAT SURFACES: 1
AMBULATION ASSISTANCE: 1
AMBULATION ASSISTANCE: MODERATE ASSIST
AMBULATION ASSISTANCE ON FLAT SURFACES: 1
CURRENT_FUNCTION: MODERATE ASSIST
TRANSPORTATION: DEPENDENT
TRANSPORTATION COMMENTS: LOW VISION
AMBULATION ASSISTANCE ON FLAT SURFACES: 1
TOILETING: 1
PHYSICAL TRANSFERS ASSESSED: 1
TRANSPORTATION ASSESSED: 1
MODE OF TRANSPORTATION: MEDICAL TRANSPORT
TOILETING: ONE PERSON
CURRENT_FUNCTION: MAXIMUM ASSIST
OASIS_M1830: 06

## 2024-08-26 ASSESSMENT — PATIENT HEALTH QUESTIONNAIRE - PHQ9
CLINICAL INTERPRETATION OF PHQ2 SCORE: 0
CLINICAL INTERPRETATION OF PHQ2 SCORE: 0

## 2024-08-28 ENCOUNTER — HOME CARE VISIT (OUTPATIENT)
Dept: HOME HEALTH SERVICES | Facility: HOME HEALTHCARE | Age: 89
End: 2024-08-28
Payer: MEDICARE

## 2024-08-29 ASSESSMENT — ENCOUNTER SYMPTOMS
PERSON REPORTING PAIN: PATIENT
DENIES PAIN: 1

## 2024-08-29 ASSESSMENT — ACTIVITIES OF DAILY LIVING (ADL): AMBULATION ASSISTANCE ON FLAT SURFACES: 1

## 2024-09-02 ENCOUNTER — HOME CARE VISIT (OUTPATIENT)
Dept: HOME HEALTH SERVICES | Facility: HOME HEALTHCARE | Age: 89
End: 2024-09-02
Payer: MEDICARE

## 2024-09-02 VITALS
RESPIRATION RATE: 18 BRPM | DIASTOLIC BLOOD PRESSURE: 60 MMHG | OXYGEN SATURATION: 98 % | SYSTOLIC BLOOD PRESSURE: 110 MMHG | HEART RATE: 81 BPM | TEMPERATURE: 97 F

## 2024-09-02 PROCEDURE — G0151 HHCP-SERV OF PT,EA 15 MIN: HCPCS

## 2024-09-02 ASSESSMENT — ACTIVITIES OF DAILY LIVING (ADL): AMBULATION ASSISTANCE ON FLAT SURFACES: 1

## 2024-09-02 ASSESSMENT — ENCOUNTER SYMPTOMS
PERSON REPORTING PAIN: PATIENT
DENIES PAIN: 1

## 2024-09-04 ENCOUNTER — HOME CARE VISIT (OUTPATIENT)
Dept: HOME HEALTH SERVICES | Facility: HOME HEALTHCARE | Age: 89
End: 2024-09-04
Payer: MEDICARE

## 2024-09-04 ENCOUNTER — PATIENT MESSAGE (OUTPATIENT)
Dept: HEALTH INFORMATION MANAGEMENT | Facility: OTHER | Age: 89
End: 2024-09-04

## 2024-09-04 VITALS
TEMPERATURE: 97.3 F | OXYGEN SATURATION: 95 % | SYSTOLIC BLOOD PRESSURE: 104 MMHG | HEART RATE: 68 BPM | WEIGHT: 165 LBS | DIASTOLIC BLOOD PRESSURE: 64 MMHG | BODY MASS INDEX: 32.22 KG/M2 | RESPIRATION RATE: 18 BRPM

## 2024-09-04 PROCEDURE — G0151 HHCP-SERV OF PT,EA 15 MIN: HCPCS

## 2024-09-04 ASSESSMENT — FIBROSIS 4 INDEX: FIB4 SCORE: 1.7

## 2024-09-05 ASSESSMENT — ENCOUNTER SYMPTOMS
DENIES PAIN: 1
PERSON REPORTING PAIN: PATIENT

## 2024-09-05 ASSESSMENT — ACTIVITIES OF DAILY LIVING (ADL)
AMBULATION ASSISTANCE ON FLAT SURFACES: 1
TRANSPORTATION COMMENTS: LOW VISION

## 2024-09-09 ENCOUNTER — HOME CARE VISIT (OUTPATIENT)
Dept: HOME HEALTH SERVICES | Facility: HOME HEALTHCARE | Age: 89
End: 2024-09-09
Payer: MEDICARE

## 2024-09-09 NOTE — CASE COMMUNICATION
Quality Review for 8.21.24 [facility] OASIS performed on by ABIMBOLA Chen RN on 9.9.242024:    Edits completed by ABIMBOLA Chen RN:  1.  and  dx coding updated per chart review.   2. Added HF monitoring back to the care plan  3. Sent case comm to pharm for med review  4. Changed  to Dr. Santamaria from SHEYLA Wall MD as she has been signing all of the orders  5. Added ADA and low fat diet per dxs of DM and HF to nutritional requirements  6.  Changed  to 3 as pt is chair fast and not bedfast  7. Added discharge plan per chart review

## 2024-09-10 ENCOUNTER — HOSPITAL ENCOUNTER (OUTPATIENT)
Facility: MEDICAL CENTER | Age: 89
End: 2024-09-10
Attending: FAMILY MEDICINE
Payer: MEDICARE

## 2024-09-10 LAB
APPEARANCE UR: CLEAR
BACTERIA #/AREA URNS HPF: ABNORMAL /HPF
BILIRUB UR QL STRIP.AUTO: NEGATIVE
COLOR UR: YELLOW
EPI CELLS #/AREA URNS HPF: ABNORMAL /HPF
GLUCOSE UR STRIP.AUTO-MCNC: NEGATIVE MG/DL
HYALINE CASTS #/AREA URNS LPF: ABNORMAL /LPF
KETONES UR STRIP.AUTO-MCNC: NEGATIVE MG/DL
LEUKOCYTE ESTERASE UR QL STRIP.AUTO: ABNORMAL
MICRO URNS: ABNORMAL
NITRITE UR QL STRIP.AUTO: NEGATIVE
PH UR STRIP.AUTO: 6 [PH] (ref 5–8)
PROT UR QL STRIP: NEGATIVE MG/DL
RBC # URNS HPF: ABNORMAL /HPF
RBC UR QL AUTO: NEGATIVE
SP GR UR STRIP.AUTO: 1.02
UROBILINOGEN UR STRIP.AUTO-MCNC: 0.2 MG/DL
WBC #/AREA URNS HPF: ABNORMAL /HPF

## 2024-09-10 PROCEDURE — 81001 URINALYSIS AUTO W/SCOPE: CPT

## 2024-09-11 ENCOUNTER — HOME CARE VISIT (OUTPATIENT)
Dept: HOME HEALTH SERVICES | Facility: HOME HEALTHCARE | Age: 89
End: 2024-09-11
Payer: MEDICARE

## 2024-09-16 ENCOUNTER — HOME CARE VISIT (OUTPATIENT)
Dept: HOME HEALTH SERVICES | Facility: HOME HEALTHCARE | Age: 89
End: 2024-09-16
Payer: MEDICARE

## 2024-09-16 VITALS
DIASTOLIC BLOOD PRESSURE: 62 MMHG | HEART RATE: 62 BPM | SYSTOLIC BLOOD PRESSURE: 116 MMHG | OXYGEN SATURATION: 95 % | TEMPERATURE: 98.3 F | RESPIRATION RATE: 17 BRPM

## 2024-09-16 PROCEDURE — G0151 HHCP-SERV OF PT,EA 15 MIN: HCPCS

## 2024-09-18 ENCOUNTER — HOME CARE VISIT (OUTPATIENT)
Dept: HOME HEALTH SERVICES | Facility: HOME HEALTHCARE | Age: 89
End: 2024-09-18
Payer: MEDICARE

## 2024-09-18 VITALS
HEART RATE: 64 BPM | RESPIRATION RATE: 18 BRPM | SYSTOLIC BLOOD PRESSURE: 110 MMHG | OXYGEN SATURATION: 93 % | TEMPERATURE: 97.8 F | DIASTOLIC BLOOD PRESSURE: 64 MMHG

## 2024-09-18 PROCEDURE — G0151 HHCP-SERV OF PT,EA 15 MIN: HCPCS

## 2024-09-18 NOTE — Clinical Note
Physical therapy re-evaluation completed 9/18/24, requesting authorization for 2 wk 4; effective 9/22/24.

## 2024-09-23 ENCOUNTER — HOME CARE VISIT (OUTPATIENT)
Dept: HOME HEALTH SERVICES | Facility: HOME HEALTHCARE | Age: 89
End: 2024-09-23
Payer: MEDICARE

## 2024-09-23 VITALS
OXYGEN SATURATION: 95 % | DIASTOLIC BLOOD PRESSURE: 64 MMHG | RESPIRATION RATE: 18 BRPM | HEART RATE: 71 BPM | TEMPERATURE: 97.3 F | SYSTOLIC BLOOD PRESSURE: 112 MMHG

## 2024-09-23 PROCEDURE — G0151 HHCP-SERV OF PT,EA 15 MIN: HCPCS

## 2024-09-23 ASSESSMENT — ACTIVITIES OF DAILY LIVING (ADL)
AMBULATION ASSISTANCE: MODERATE ASSIST
AMBULATION ASSISTANCE: 1
PHYSICAL TRANSFERS ASSESSED: 1
CURRENT_FUNCTION: MINIMUM ASSIST
TRANSPORTATION ASSESSED: 1
TRANSPORTATION: DEPENDENT
AMBULATION ASSISTANCE ON FLAT SURFACES: 1

## 2024-09-23 ASSESSMENT — ENCOUNTER SYMPTOMS
PAIN SEVERITY GOAL: 0/10
PAIN: 1
PAIN LOCATION - PAIN DURATION: INTER
PAIN SEVERITY GOAL: 0/10
PAIN LOCATION - RELIEVING FACTORS: MEDS, REST, REPOSITION
PAIN LOCATION - PAIN QUALITY: ACHY, SORE
PAIN LOCATION: GENERALIZED
PAIN LOCATION - PAIN DURATION: INTERMITTENT
PERSON REPORTING PAIN: PATIENT
PAIN LOCATION - PAIN SEVERITY: 0/10
PAIN LOCATION - PAIN SEVERITY: 0/10
PERSON REPORTING PAIN: PATIENT
PAIN LOCATION - PAIN FREQUENCY: FREQUENT
HIGHEST PAIN SEVERITY IN PAST 24 HOURS: 0/10
SUBJECTIVE PAIN PROGRESSION: UNCHANGED
DENIES PAIN: 1
PAIN LOCATION: GENERALIZED
PAIN: 1
HIGHEST PAIN SEVERITY IN PAST 24 HOURS: 0/10
PERSON REPORTING PAIN: PATIENT
LOWEST PAIN SEVERITY IN PAST 24 HOURS: 0/10
PAIN LOCATION - PAIN FREQUENCY: FREQUENT
LOWEST PAIN SEVERITY IN PAST 24 HOURS: 0/10

## 2024-09-24 NOTE — CASE COMMUNICATION
Noted.  ----- Message -----  From: Anabel Gill, PT  Sent: 9/23/2024  10:01 AM PDT  To: Hima Elliott R.N.; Zhane Root R.N.; *      Physical therapy re-evaluation completed 9/18/24, requesting authorization for 2 wk 4; effective 9/22/24.

## 2024-09-25 ENCOUNTER — HOME CARE VISIT (OUTPATIENT)
Dept: HOME HEALTH SERVICES | Facility: HOME HEALTHCARE | Age: 89
End: 2024-09-25
Payer: MEDICARE

## 2024-09-25 VITALS
OXYGEN SATURATION: 97 % | RESPIRATION RATE: 18 BRPM | HEART RATE: 73 BPM | DIASTOLIC BLOOD PRESSURE: 62 MMHG | TEMPERATURE: 97.2 F | SYSTOLIC BLOOD PRESSURE: 116 MMHG

## 2024-09-25 PROCEDURE — G0151 HHCP-SERV OF PT,EA 15 MIN: HCPCS

## 2024-09-25 ASSESSMENT — ACTIVITIES OF DAILY LIVING (ADL): TRANSPORTATION COMMENTS: LOW VISION

## 2024-09-25 ASSESSMENT — ENCOUNTER SYMPTOMS
DENIES PAIN: 1
PERSON REPORTING PAIN: PATIENT

## 2024-09-30 ENCOUNTER — HOME CARE VISIT (OUTPATIENT)
Dept: HOME HEALTH SERVICES | Facility: HOME HEALTHCARE | Age: 89
End: 2024-09-30
Payer: MEDICARE

## 2024-09-30 PROCEDURE — G0151 HHCP-SERV OF PT,EA 15 MIN: HCPCS

## 2024-10-01 ENCOUNTER — APPOINTMENT (OUTPATIENT)
Dept: FAMILY PLANNING/WOMEN'S HEALTH CLINIC | Facility: PHYSICIAN GROUP | Age: 89
End: 2024-10-01
Payer: MEDICARE

## 2024-10-01 VITALS
OXYGEN SATURATION: 97 % | WEIGHT: 165 LBS | BODY MASS INDEX: 32.22 KG/M2 | HEART RATE: 84 BPM | DIASTOLIC BLOOD PRESSURE: 64 MMHG | SYSTOLIC BLOOD PRESSURE: 112 MMHG

## 2024-10-01 DIAGNOSIS — E66.01 MORBID OBESITY (HCC): ICD-10-CM

## 2024-10-01 DIAGNOSIS — D32.9 MENINGIOMA (HCC): ICD-10-CM

## 2024-10-01 DIAGNOSIS — I11.0 HYPERTENSIVE HEART DISEASE WITH DIASTOLIC CONGESTIVE HEART FAILURE, UNSPECIFIED HF CHRONICITY (HCC): ICD-10-CM

## 2024-10-01 DIAGNOSIS — I50.30 HYPERTENSIVE HEART DISEASE WITH DIASTOLIC CONGESTIVE HEART FAILURE, UNSPECIFIED HF CHRONICITY (HCC): ICD-10-CM

## 2024-10-01 DIAGNOSIS — H35.3290 EXUDATIVE AGE-RELATED MACULAR DEGENERATION, UNSPECIFIED LATERALITY, UNSPECIFIED STAGE (HCC): ICD-10-CM

## 2024-10-01 DIAGNOSIS — M81.0 AGE-RELATED OSTEOPOROSIS WITHOUT CURRENT PATHOLOGICAL FRACTURE: ICD-10-CM

## 2024-10-01 DIAGNOSIS — G31.9 CEREBRAL ATROPHY (HCC): ICD-10-CM

## 2024-10-01 DIAGNOSIS — I48.21 PERMANENT ATRIAL FIBRILLATION (HCC): ICD-10-CM

## 2024-10-01 DIAGNOSIS — S32.10XA CLOSED FRACTURE OF SACRUM, UNSPECIFIED PORTION OF SACRUM, INITIAL ENCOUNTER (HCC): ICD-10-CM

## 2024-10-01 DIAGNOSIS — Z95.818 PRESENCE OF WATCHMAN LEFT ATRIAL APPENDAGE CLOSURE DEVICE: ICD-10-CM

## 2024-10-01 DIAGNOSIS — N18.31 HYPERTENSIVE KIDNEY DISEASE WITH STAGE 3A CHRONIC KIDNEY DISEASE: ICD-10-CM

## 2024-10-01 DIAGNOSIS — R54 FRAILTY SYNDROME IN GERIATRIC PATIENT: ICD-10-CM

## 2024-10-01 DIAGNOSIS — I15.2 HYPERTENSION ASSOCIATED WITH DIABETES (HCC): ICD-10-CM

## 2024-10-01 DIAGNOSIS — E03.9 HYPOTHYROIDISM, UNSPECIFIED TYPE: ICD-10-CM

## 2024-10-01 DIAGNOSIS — N18.31 STAGE 3A CHRONIC KIDNEY DISEASE: ICD-10-CM

## 2024-10-01 DIAGNOSIS — I10 HYPERTENSION, UNSPECIFIED TYPE: ICD-10-CM

## 2024-10-01 DIAGNOSIS — K21.9 GASTROESOPHAGEAL REFLUX DISEASE WITHOUT ESOPHAGITIS: ICD-10-CM

## 2024-10-01 DIAGNOSIS — I48.19 PERSISTENT ATRIAL FIBRILLATION (HCC): ICD-10-CM

## 2024-10-01 DIAGNOSIS — E11.59 HYPERTENSION ASSOCIATED WITH DIABETES (HCC): ICD-10-CM

## 2024-10-01 DIAGNOSIS — I73.9 PAD (PERIPHERAL ARTERY DISEASE) (HCC): ICD-10-CM

## 2024-10-01 DIAGNOSIS — I12.9 HYPERTENSIVE KIDNEY DISEASE WITH STAGE 3A CHRONIC KIDNEY DISEASE: ICD-10-CM

## 2024-10-01 DIAGNOSIS — E26.1 SECONDARY HYPERALDOSTERONISM (HCC): ICD-10-CM

## 2024-10-01 DIAGNOSIS — D68.69 SECONDARY HYPERCOAGULABLE STATE (HCC): ICD-10-CM

## 2024-10-01 DIAGNOSIS — I27.20 PULMONARY HTN (HCC): ICD-10-CM

## 2024-10-01 DIAGNOSIS — Z74.09 POOR MOBILITY: ICD-10-CM

## 2024-10-01 PROBLEM — N18.30 CKD (CHRONIC KIDNEY DISEASE) STAGE 3, GFR 30-59 ML/MIN: Status: RESOLVED | Noted: 2024-01-02 | Resolved: 2024-10-01

## 2024-10-01 PROBLEM — E11.51 TYPE II DIABETES MELLITUS WITH PERIPHERAL CIRCULATORY DISORDER (HCC): Status: ACTIVE | Noted: 2022-09-16

## 2024-10-01 PROCEDURE — G0439 PPPS, SUBSEQ VISIT: HCPCS | Performed by: NURSE PRACTITIONER

## 2024-10-01 RX ORDER — METHOCARBAMOL 500 MG/1
500 TABLET, FILM COATED ORAL DAILY
COMMUNITY

## 2024-10-01 SDOH — HEALTH STABILITY: PHYSICAL HEALTH: ON AVERAGE, HOW MANY DAYS PER WEEK DO YOU ENGAGE IN MODERATE TO STRENUOUS EXERCISE (LIKE A BRISK WALK)?: 2 DAYS

## 2024-10-01 SDOH — HEALTH STABILITY: MENTAL HEALTH
STRESS IS WHEN SOMEONE FEELS TENSE, NERVOUS, ANXIOUS, OR CAN'T SLEEP AT NIGHT BECAUSE THEIR MIND IS TROUBLED. HOW STRESSED ARE YOU?: NOT AT ALL

## 2024-10-01 SDOH — ECONOMIC STABILITY: TRANSPORTATION INSECURITY
IN THE PAST 12 MONTHS, HAS LACK OF TRANSPORTATION KEPT YOU FROM MEETINGS, WORK, OR FROM GETTING THINGS NEEDED FOR DAILY LIVING?: NO

## 2024-10-01 SDOH — ECONOMIC STABILITY: INCOME INSECURITY: IN THE LAST 12 MONTHS, WAS THERE A TIME WHEN YOU WERE NOT ABLE TO PAY THE MORTGAGE OR RENT ON TIME?: YES

## 2024-10-01 SDOH — HEALTH STABILITY: PHYSICAL HEALTH: ON AVERAGE, HOW MANY MINUTES DO YOU ENGAGE IN EXERCISE AT THIS LEVEL?: 30 MIN

## 2024-10-01 SDOH — ECONOMIC STABILITY: INCOME INSECURITY: HOW HARD IS IT FOR YOU TO PAY FOR THE VERY BASICS LIKE FOOD, HOUSING, MEDICAL CARE, AND HEATING?: NOT VERY HARD

## 2024-10-01 SDOH — ECONOMIC STABILITY: HOUSING INSECURITY: IN THE PAST 12 MONTHS, HOW MANY TIMES HAVE YOU MOVED WHERE YOU WERE LIVING?: 0

## 2024-10-01 SDOH — ECONOMIC STABILITY: FOOD INSECURITY: WITHIN THE PAST 12 MONTHS, YOU WORRIED THAT YOUR FOOD WOULD RUN OUT BEFORE YOU GOT MONEY TO BUY MORE.: NEVER TRUE

## 2024-10-01 SDOH — ECONOMIC STABILITY: HOUSING INSECURITY: AT ANY TIME IN THE PAST 12 MONTHS, WERE YOU HOMELESS OR LIVING IN A SHELTER (INCLUDING NOW)?: NO

## 2024-10-01 SDOH — ECONOMIC STABILITY: FOOD INSECURITY: WITHIN THE PAST 12 MONTHS, THE FOOD YOU BOUGHT JUST DIDN'T LAST AND YOU DIDN'T HAVE MONEY TO GET MORE.: NEVER TRUE

## 2024-10-01 SDOH — ECONOMIC STABILITY: TRANSPORTATION INSECURITY
IN THE PAST 12 MONTHS, HAS THE LACK OF TRANSPORTATION KEPT YOU FROM MEDICAL APPOINTMENTS OR FROM GETTING MEDICATIONS?: NO

## 2024-10-01 SDOH — ECONOMIC STABILITY: INCOME INSECURITY: HOW HARD IS IT FOR YOU TO PAY FOR THE VERY BASICS LIKE FOOD, HOUSING, MEDICAL CARE, AND HEATING?: NOT HARD AT ALL

## 2024-10-01 ASSESSMENT — ACTIVITIES OF DAILY LIVING (ADL): BATHING_REQUIRES_ASSISTANCE: 1

## 2024-10-01 ASSESSMENT — PATIENT HEALTH QUESTIONNAIRE - PHQ9: CLINICAL INTERPRETATION OF PHQ2 SCORE: 0

## 2024-10-01 ASSESSMENT — ENCOUNTER SYMPTOMS: GENERAL WELL-BEING: GOOD

## 2024-10-01 ASSESSMENT — FIBROSIS 4 INDEX: FIB4 SCORE: 1.7

## 2024-10-01 ASSESSMENT — PAIN SCALES - GENERAL: PAINLEVEL: NO PAIN

## 2024-10-02 ENCOUNTER — HOME CARE VISIT (OUTPATIENT)
Dept: HOME HEALTH SERVICES | Facility: HOME HEALTHCARE | Age: 89
End: 2024-10-02
Payer: MEDICARE

## 2024-10-02 VITALS
OXYGEN SATURATION: 98 % | WEIGHT: 174 LBS | BODY MASS INDEX: 33.98 KG/M2 | SYSTOLIC BLOOD PRESSURE: 108 MMHG | RESPIRATION RATE: 17 BRPM | DIASTOLIC BLOOD PRESSURE: 64 MMHG | HEART RATE: 64 BPM | TEMPERATURE: 97.3 F

## 2024-10-02 PROCEDURE — G0151 HHCP-SERV OF PT,EA 15 MIN: HCPCS

## 2024-10-02 ASSESSMENT — FIBROSIS 4 INDEX: FIB4 SCORE: 1.7

## 2024-10-07 ENCOUNTER — HOME CARE VISIT (OUTPATIENT)
Dept: HOME HEALTH SERVICES | Facility: HOME HEALTHCARE | Age: 89
End: 2024-10-07
Payer: MEDICARE

## 2024-10-09 ENCOUNTER — HOME CARE VISIT (OUTPATIENT)
Dept: HOME HEALTH SERVICES | Facility: HOME HEALTHCARE | Age: 89
End: 2024-10-09
Payer: MEDICARE

## 2024-10-09 VITALS
TEMPERATURE: 97.4 F | SYSTOLIC BLOOD PRESSURE: 116 MMHG | HEART RATE: 64 BPM | OXYGEN SATURATION: 92 % | DIASTOLIC BLOOD PRESSURE: 62 MMHG | RESPIRATION RATE: 18 BRPM

## 2024-10-09 VITALS
OXYGEN SATURATION: 98 % | RESPIRATION RATE: 18 BRPM | HEART RATE: 58 BPM | SYSTOLIC BLOOD PRESSURE: 112 MMHG | DIASTOLIC BLOOD PRESSURE: 64 MMHG | TEMPERATURE: 97.3 F

## 2024-10-09 PROCEDURE — G0151 HHCP-SERV OF PT,EA 15 MIN: HCPCS

## 2024-10-09 ASSESSMENT — ENCOUNTER SYMPTOMS
DENIES PAIN: 1
PERSON REPORTING PAIN: PATIENT
DENIES PAIN: 1
PERSON REPORTING PAIN: PATIENT
DENIES PAIN: 1
PERSON REPORTING PAIN: PATIENT

## 2024-10-09 ASSESSMENT — ACTIVITIES OF DAILY LIVING (ADL)
AMBULATION ASSISTANCE ON FLAT SURFACES: 1
AMBULATION_DISTANCE/DURATION_TOLERATED: 30 FT

## 2024-10-14 ENCOUNTER — HOME CARE VISIT (OUTPATIENT)
Dept: HOME HEALTH SERVICES | Facility: HOME HEALTHCARE | Age: 89
End: 2024-10-14
Payer: MEDICARE

## 2024-10-14 ENCOUNTER — TELEPHONE (OUTPATIENT)
Dept: FAMILY PLANNING/WOMEN'S HEALTH CLINIC | Facility: PHYSICIAN GROUP | Age: 89
End: 2024-10-14
Payer: MEDICARE

## 2024-10-14 VITALS
TEMPERATURE: 97.1 F | SYSTOLIC BLOOD PRESSURE: 116 MMHG | HEART RATE: 66 BPM | OXYGEN SATURATION: 94 % | DIASTOLIC BLOOD PRESSURE: 64 MMHG | RESPIRATION RATE: 17 BRPM

## 2024-10-14 PROCEDURE — G0151 HHCP-SERV OF PT,EA 15 MIN: HCPCS

## 2024-10-14 ASSESSMENT — ACTIVITIES OF DAILY LIVING (ADL): AMBULATION ASSISTANCE ON FLAT SURFACES: 1

## 2024-10-14 ASSESSMENT — ENCOUNTER SYMPTOMS
PERSON REPORTING PAIN: PATIENT
DENIES PAIN: 1

## 2024-10-16 ENCOUNTER — HOME CARE VISIT (OUTPATIENT)
Dept: HOME HEALTH SERVICES | Facility: HOME HEALTHCARE | Age: 89
End: 2024-10-16
Payer: MEDICARE

## 2024-10-16 PROCEDURE — G0151 HHCP-SERV OF PT,EA 15 MIN: HCPCS

## 2024-10-20 VITALS
OXYGEN SATURATION: 94 % | RESPIRATION RATE: 18 BRPM | SYSTOLIC BLOOD PRESSURE: 110 MMHG | TEMPERATURE: 97.4 F | DIASTOLIC BLOOD PRESSURE: 64 MMHG | HEART RATE: 65 BPM

## 2024-10-20 ASSESSMENT — ENCOUNTER SYMPTOMS
PERSON REPORTING PAIN: PATIENT
DENIES PAIN: 1

## 2024-10-20 ASSESSMENT — PATIENT HEALTH QUESTIONNAIRE - PHQ9: CLINICAL INTERPRETATION OF PHQ2 SCORE: 0

## 2024-10-20 ASSESSMENT — ACTIVITIES OF DAILY LIVING (ADL): TRANSPORTATION COMMENTS: LOW VISION

## 2024-10-21 ENCOUNTER — HOME CARE VISIT (OUTPATIENT)
Dept: HOME HEALTH SERVICES | Facility: HOME HEALTHCARE | Age: 89
End: 2024-10-21
Payer: MEDICARE

## 2024-10-21 VITALS
RESPIRATION RATE: 18 BRPM | BODY MASS INDEX: 33.98 KG/M2 | TEMPERATURE: 97.4 F | DIASTOLIC BLOOD PRESSURE: 66 MMHG | OXYGEN SATURATION: 95 % | SYSTOLIC BLOOD PRESSURE: 114 MMHG | WEIGHT: 174 LBS | HEART RATE: 86 BPM

## 2024-10-21 PROCEDURE — 665003 FOLLOW UP-HOME HEALTH

## 2024-10-21 PROCEDURE — G0151 HHCP-SERV OF PT,EA 15 MIN: HCPCS

## 2024-10-21 ASSESSMENT — ACTIVITIES OF DAILY LIVING (ADL)
OASIS_M1830: 06
CURRENT_FUNCTION: STAND BY ASSIST
AMBULATION ASSISTANCE ON FLAT SURFACES: 1
AMBULATION ASSISTANCE: STAND BY ASSIST
AMBULATION ASSISTANCE: 1
TRANSPORTATION: DEPENDENT
PHYSICAL TRANSFERS ASSESSED: 1
TRANSPORTATION ASSESSED: 1
CURRENT_FUNCTION: MINIMUM ASSIST

## 2024-10-21 ASSESSMENT — FIBROSIS 4 INDEX: FIB4 SCORE: 1.7

## 2024-10-23 ENCOUNTER — HOME CARE VISIT (OUTPATIENT)
Dept: HOME HEALTH SERVICES | Facility: HOME HEALTHCARE | Age: 89
End: 2024-10-23
Payer: MEDICARE

## 2024-10-23 VITALS
TEMPERATURE: 97.7 F | OXYGEN SATURATION: 97 % | RESPIRATION RATE: 16 BRPM | HEART RATE: 81 BPM | SYSTOLIC BLOOD PRESSURE: 118 MMHG | DIASTOLIC BLOOD PRESSURE: 64 MMHG

## 2024-10-23 PROCEDURE — G0151 HHCP-SERV OF PT,EA 15 MIN: HCPCS

## 2024-10-28 ENCOUNTER — HOME CARE VISIT (OUTPATIENT)
Dept: HOME HEALTH SERVICES | Facility: HOME HEALTHCARE | Age: 89
End: 2024-10-28
Payer: MEDICARE

## 2024-10-28 VITALS
SYSTOLIC BLOOD PRESSURE: 112 MMHG | RESPIRATION RATE: 18 BRPM | HEART RATE: 76 BPM | OXYGEN SATURATION: 96 % | TEMPERATURE: 97.8 F | DIASTOLIC BLOOD PRESSURE: 62 MMHG

## 2024-10-28 PROCEDURE — G0159 HHC PT MAINT EA 15 MIN: HCPCS

## 2024-10-28 ASSESSMENT — ENCOUNTER SYMPTOMS
DENIES PAIN: 1
PAIN: 1
PAIN LOCATION - PAIN SEVERITY: 2/10
LOWEST PAIN SEVERITY IN PAST 24 HOURS: 0/10
SUBJECTIVE PAIN PROGRESSION: GRADUALLY IMPROVING
PAIN LOCATION - PAIN QUALITY: ACHY, SORE
PERSON REPORTING PAIN: PATIENT
PAIN SEVERITY GOAL: 0/10
PAIN LOCATION: RIGHT LEG
PERSON REPORTING PAIN: PATIENT
PAIN LOCATION - PAIN DURATION: DAYS
PAIN LOCATION - PAIN FREQUENCY: CONSTANT
HIGHEST PAIN SEVERITY IN PAST 24 HOURS: 6/10

## 2024-10-28 ASSESSMENT — ACTIVITIES OF DAILY LIVING (ADL)
TRANSPORTATION COMMENTS: LOW VISION
TRANSPORTATION COMMENTS: LOW VISION
AMBULATION ASSISTANCE ON FLAT SURFACES: 1

## 2024-10-29 ENCOUNTER — HOME CARE VISIT (OUTPATIENT)
Dept: HOME HEALTH SERVICES | Facility: HOME HEALTHCARE | Age: 89
End: 2024-10-29
Payer: MEDICARE

## 2024-10-30 ENCOUNTER — HOME CARE VISIT (OUTPATIENT)
Dept: HOME HEALTH SERVICES | Facility: HOME HEALTHCARE | Age: 89
End: 2024-10-30
Payer: MEDICARE

## 2024-11-01 ENCOUNTER — APPOINTMENT (OUTPATIENT)
Dept: LAB | Facility: MEDICAL CENTER | Age: 89
End: 2024-11-01
Payer: MEDICARE

## 2024-11-01 ENCOUNTER — HOME CARE VISIT (OUTPATIENT)
Dept: HOME HEALTH SERVICES | Facility: HOME HEALTHCARE | Age: 89
End: 2024-11-01
Payer: MEDICARE

## 2024-11-01 PROCEDURE — G0495 RN CARE TRAIN/EDU IN HH: HCPCS

## 2024-11-01 ASSESSMENT — ENCOUNTER SYMPTOMS
PAIN LOCATION - PAIN FREQUENCY: INTERMITTENT
PAIN LOCATION - RELIEVING FACTORS: REPOSITION, MEDS
PAIN: 1
SUBJECTIVE PAIN PROGRESSION: WAXING AND WANING
PAIN LOCATION - PAIN QUALITY: ACHY
PAIN LOCATION - PAIN DURATION: DAYS
PAIN LOCATION: GENERALIZED
PAIN SEVERITY GOAL: 0/10
PAIN LOCATION - EXACERBATING FACTORS: POSITIONAL
HIGHEST PAIN SEVERITY IN PAST 24 HOURS: 3/10
PAIN LOCATION - PAIN SEVERITY: 2/10
LOWEST PAIN SEVERITY IN PAST 24 HOURS: 0/10
PERSON REPORTING PAIN: PATIENT

## 2024-11-01 ASSESSMENT — ACTIVITIES OF DAILY LIVING (ADL)
AMBULATION_DISTANCE/DURATION_TOLERATED: 8 FT
AMBULATION ASSISTANCE ON FLAT SURFACES: 1

## 2024-11-03 VITALS
TEMPERATURE: 97.6 F | OXYGEN SATURATION: 95 % | HEART RATE: 65 BPM | DIASTOLIC BLOOD PRESSURE: 82 MMHG | RESPIRATION RATE: 16 BRPM | SYSTOLIC BLOOD PRESSURE: 118 MMHG

## 2024-11-03 ASSESSMENT — ACTIVITIES OF DAILY LIVING (ADL)
AMBULATION ASSISTANCE: ONE PERSON
CURRENT_FUNCTION: ONE PERSON
TRANSPORTATION COMMENTS: DECREASED MOBILITY

## 2024-11-03 ASSESSMENT — ENCOUNTER SYMPTOMS
DENIES PAIN: 1
FATIGUES EASILY: 1
VOMITING: PT DENIES EMESIS AT THIS TIME.
NAUSEA: PT DENIES NAUSEA AT THIS TIME.
SKIN LESIONS: 1
STOOL FREQUENCY: DAILY
LIMITED RANGE OF MOTION: 1
MUSCLE WEAKNESS: 1
BOWEL PATTERN NORMAL: 1
LAST BOWEL MOVEMENT: 67145

## 2024-11-04 ENCOUNTER — HOME CARE VISIT (OUTPATIENT)
Dept: HOME HEALTH SERVICES | Facility: HOME HEALTHCARE | Age: 89
End: 2024-11-04
Payer: MEDICARE

## 2024-11-04 VITALS
DIASTOLIC BLOOD PRESSURE: 66 MMHG | RESPIRATION RATE: 18 BRPM | TEMPERATURE: 97.4 F | HEART RATE: 81 BPM | OXYGEN SATURATION: 96 % | SYSTOLIC BLOOD PRESSURE: 114 MMHG

## 2024-11-04 PROCEDURE — G0159 HHC PT MAINT EA 15 MIN: HCPCS

## 2024-11-04 ASSESSMENT — ACTIVITIES OF DAILY LIVING (ADL)
AMBULATION_DISTANCE/DURATION_TOLERATED: 20 FT
TRANSPORTATION COMMENTS: LOW VISION
AMBULATION ASSISTANCE ON FLAT SURFACES: 1

## 2024-11-04 ASSESSMENT — ENCOUNTER SYMPTOMS
PERSON REPORTING PAIN: PATIENT
DENIES PAIN: 1

## 2024-11-06 ENCOUNTER — HOSPITAL ENCOUNTER (OUTPATIENT)
Facility: MEDICAL CENTER | Age: 89
End: 2024-11-06
Attending: FAMILY MEDICINE
Payer: MEDICARE

## 2024-11-06 ENCOUNTER — HOME CARE VISIT (OUTPATIENT)
Dept: HOME HEALTH SERVICES | Facility: HOME HEALTHCARE | Age: 89
End: 2024-11-06
Payer: MEDICARE

## 2024-11-06 VITALS
RESPIRATION RATE: 16 BRPM | DIASTOLIC BLOOD PRESSURE: 70 MMHG | OXYGEN SATURATION: 99 % | HEART RATE: 77 BPM | TEMPERATURE: 97.8 F | SYSTOLIC BLOOD PRESSURE: 118 MMHG

## 2024-11-06 LAB
BASOPHILS # BLD AUTO: 0.7 % (ref 0–1.8)
BASOPHILS # BLD: 0.06 K/UL (ref 0–0.12)
EOSINOPHIL # BLD AUTO: 0.28 K/UL (ref 0–0.51)
EOSINOPHIL NFR BLD: 3.3 % (ref 0–6.9)
ERYTHROCYTE [DISTWIDTH] IN BLOOD BY AUTOMATED COUNT: 57.9 FL (ref 35.9–50)
EST. AVERAGE GLUCOSE BLD GHB EST-MCNC: 128 MG/DL
HBA1C MFR BLD: 6.1 % (ref 4–5.6)
HCT VFR BLD AUTO: 35.8 % (ref 37–47)
HGB BLD-MCNC: 11.4 G/DL (ref 12–16)
IMM GRANULOCYTES # BLD AUTO: 0.05 K/UL (ref 0–0.11)
IMM GRANULOCYTES NFR BLD AUTO: 0.6 % (ref 0–0.9)
LYMPHOCYTES # BLD AUTO: 0.73 K/UL (ref 1–4.8)
LYMPHOCYTES NFR BLD: 8.5 % (ref 22–41)
MCH RBC QN AUTO: 28.3 PG (ref 27–33)
MCHC RBC AUTO-ENTMCNC: 31.8 G/DL (ref 32.2–35.5)
MCV RBC AUTO: 88.8 FL (ref 81.4–97.8)
MONOCYTES # BLD AUTO: 0.76 K/UL (ref 0–0.85)
MONOCYTES NFR BLD AUTO: 8.9 % (ref 0–13.4)
NEUTROPHILS # BLD AUTO: 6.68 K/UL (ref 1.82–7.42)
NEUTROPHILS NFR BLD: 78 % (ref 44–72)
NRBC # BLD AUTO: 0 K/UL
NRBC BLD-RTO: 0 /100 WBC (ref 0–0.2)
PLATELET # BLD AUTO: 311 K/UL (ref 164–446)
PMV BLD AUTO: 9.1 FL (ref 9–12.9)
RBC # BLD AUTO: 4.03 M/UL (ref 4.2–5.4)
WBC # BLD AUTO: 8.6 K/UL (ref 4.8–10.8)

## 2024-11-06 PROCEDURE — 80053 COMPREHEN METABOLIC PANEL: CPT

## 2024-11-06 PROCEDURE — 84443 ASSAY THYROID STIM HORMONE: CPT

## 2024-11-06 PROCEDURE — 80061 LIPID PANEL: CPT

## 2024-11-06 PROCEDURE — G0299 HHS/HOSPICE OF RN EA 15 MIN: HCPCS

## 2024-11-06 PROCEDURE — 85025 COMPLETE CBC W/AUTO DIFF WBC: CPT

## 2024-11-06 PROCEDURE — 83036 HEMOGLOBIN GLYCOSYLATED A1C: CPT

## 2024-11-06 ASSESSMENT — ENCOUNTER SYMPTOMS
FATIGUES EASILY: 1
SKIN LESIONS: 1
LIMITED RANGE OF MOTION: 1
VOMITING: PT DENIES EMESIS AT THIS TIME.
CONSTIPATION: 1
STOOL FREQUENCY: LESS THAN DAILY
DENIES PAIN: 1
MUSCLE WEAKNESS: 1
LAST BOWEL MOVEMENT: 67148
NAUSEA: PT DENIES NAUSEA AT THIS TIME.

## 2024-11-06 ASSESSMENT — ACTIVITIES OF DAILY LIVING (ADL)
CURRENT_FUNCTION: ONE PERSON
AMBULATION ASSISTANCE: ONE PERSON
TRANSPORTATION COMMENTS: DECREASED MOBILITY

## 2024-11-07 LAB
ALBUMIN SERPL BCP-MCNC: 3.6 G/DL (ref 3.2–4.9)
ALBUMIN/GLOB SERPL: 1.1 G/DL
ALP SERPL-CCNC: 188 U/L (ref 30–99)
ALT SERPL-CCNC: 17 U/L (ref 2–50)
ANION GAP SERPL CALC-SCNC: 15 MMOL/L (ref 7–16)
AST SERPL-CCNC: 47 U/L (ref 12–45)
BILIRUB SERPL-MCNC: 0.4 MG/DL (ref 0.1–1.5)
BUN SERPL-MCNC: 20 MG/DL (ref 8–22)
CALCIUM ALBUM COR SERPL-MCNC: 9.9 MG/DL (ref 8.5–10.5)
CALCIUM SERPL-MCNC: 9.6 MG/DL (ref 8.5–10.5)
CHLORIDE SERPL-SCNC: 98 MMOL/L (ref 96–112)
CHOLEST SERPL-MCNC: 155 MG/DL (ref 100–199)
CO2 SERPL-SCNC: 22 MMOL/L (ref 20–33)
CREAT SERPL-MCNC: 1.06 MG/DL (ref 0.5–1.4)
GFR SERPLBLD CREATININE-BSD FMLA CKD-EPI: 49 ML/MIN/1.73 M 2
GLOBULIN SER CALC-MCNC: 3.2 G/DL (ref 1.9–3.5)
GLUCOSE SERPL-MCNC: 116 MG/DL (ref 65–99)
HDLC SERPL-MCNC: 67 MG/DL
LDLC SERPL CALC-MCNC: 72 MG/DL
POTASSIUM SERPL-SCNC: 4.4 MMOL/L (ref 3.6–5.5)
PROT SERPL-MCNC: 6.8 G/DL (ref 6–8.2)
SODIUM SERPL-SCNC: 135 MMOL/L (ref 135–145)
TRIGL SERPL-MCNC: 80 MG/DL (ref 0–149)
TSH SERPL-ACNC: 7.68 UIU/ML (ref 0.35–5.5)

## 2024-11-07 NOTE — CASE COMMUNICATION
Leigha please send to Bebe Santamaria MD   [address] [place] [address]   [place] [address]   Phone: [phone]   Fax: [phone]   ACTION NEEDED.    Pt's family is concerned pt might be having UTI. Pt is having worsening hallucinations at night, functional mobility continues to decrease and her urine is malodorous per caregiver. Will you put an order for UA please?    Thank you,  LACIE Elliott RN. Consent (Scalp)/Introductory Paragraph: The rationale for Mohs was explained to the patient and consent was obtained. The risks, benefits and alternatives to therapy were discussed in detail. Specifically, the risks of changes in hair growth pattern secondary to repair, infection, scarring, bleeding, prolonged wound healing, incomplete removal, allergy to anesthesia, nerve injury and recurrence were addressed. Prior to the procedure, the treatment site was clearly identified and confirmed by the patient. All components of Universal Protocol/PAUSE Rule completed.

## 2024-11-08 ENCOUNTER — HOME CARE VISIT (OUTPATIENT)
Dept: HOME HEALTH SERVICES | Facility: HOME HEALTHCARE | Age: 89
End: 2024-11-08
Payer: MEDICARE

## 2024-11-08 PROCEDURE — G0151 HHCP-SERV OF PT,EA 15 MIN: HCPCS

## 2024-11-08 ASSESSMENT — FIBROSIS 4 INDEX: FIB4 SCORE: 3.41

## 2024-11-10 VITALS
TEMPERATURE: 97.7 F | DIASTOLIC BLOOD PRESSURE: 72 MMHG | RESPIRATION RATE: 18 BRPM | BODY MASS INDEX: 33.79 KG/M2 | SYSTOLIC BLOOD PRESSURE: 116 MMHG | HEART RATE: 62 BPM | WEIGHT: 173 LBS | OXYGEN SATURATION: 100 %

## 2024-11-10 ASSESSMENT — ENCOUNTER SYMPTOMS
LOWEST PAIN SEVERITY IN PAST 24 HOURS: 0/10
PAIN LOCATION - PAIN FREQUENCY: INTERMITTENT
PAIN LOCATION: GENERALIZED
SUBJECTIVE PAIN PROGRESSION: WAXING AND WANING
PAIN LOCATION - PAIN SEVERITY: 0/10
HIGHEST PAIN SEVERITY IN PAST 24 HOURS: 3/10
PAIN LOCATION - EXACERBATING FACTORS: ACTIVITY, POSITION
PERSON REPORTING PAIN: PATIENT
PAIN SEVERITY GOAL: 0/10
PAIN LOCATION - PAIN QUALITY: ACHY, SORE
PAIN LOCATION - PAIN DURATION: CHRONIC
PAIN: 1
PAIN LOCATION - RELIEVING FACTORS: MEDS, REST, REPOSITION

## 2024-11-10 ASSESSMENT — PATIENT HEALTH QUESTIONNAIRE - PHQ9: CLINICAL INTERPRETATION OF PHQ2 SCORE: 0

## 2024-11-13 ENCOUNTER — HOME CARE VISIT (OUTPATIENT)
Dept: HOME HEALTH SERVICES | Facility: HOME HEALTHCARE | Age: 89
End: 2024-11-13
Payer: MEDICARE

## 2024-11-13 PROCEDURE — G0299 HHS/HOSPICE OF RN EA 15 MIN: HCPCS

## 2024-11-13 PROCEDURE — G0151 HHCP-SERV OF PT,EA 15 MIN: HCPCS

## 2024-11-14 ASSESSMENT — ENCOUNTER SYMPTOMS
LOWEST PAIN SEVERITY IN PAST 24 HOURS: 0/10
SUBJECTIVE PAIN PROGRESSION: RESOLVED
PAIN LOCATION - PAIN QUALITY: ACHY
STOOL FREQUENCY: DAILY
PAIN SEVERITY GOAL: 0/10
LAST BOWEL MOVEMENT: 67157
PAIN: 1
HIGHEST PAIN SEVERITY IN PAST 24 HOURS: 5/10
DESCRIPTION OF MEMORY LOSS: SHORT TERM
PAIN LOCATION - RELIEVING FACTORS: MASSAGE
PAIN LOCATION: R FOOT
PAIN LOCATION - PAIN SEVERITY: 5/10
MUSCLE WEAKNESS: 1
BOWEL PATTERN NORMAL: 1

## 2024-11-14 ASSESSMENT — PATIENT HEALTH QUESTIONNAIRE - PHQ9: CLINICAL INTERPRETATION OF PHQ2 SCORE: 0

## 2024-11-15 ENCOUNTER — HOME CARE VISIT (OUTPATIENT)
Dept: HOME HEALTH SERVICES | Facility: HOME HEALTHCARE | Age: 89
End: 2024-11-15
Payer: MEDICARE

## 2024-11-15 VITALS
OXYGEN SATURATION: 97 % | TEMPERATURE: 98.3 F | HEART RATE: 79 BPM | RESPIRATION RATE: 18 BRPM | SYSTOLIC BLOOD PRESSURE: 106 MMHG | DIASTOLIC BLOOD PRESSURE: 64 MMHG

## 2024-11-15 PROCEDURE — G0159 HHC PT MAINT EA 15 MIN: HCPCS

## 2024-11-18 ENCOUNTER — HOME CARE VISIT (OUTPATIENT)
Dept: HOME HEALTH SERVICES | Facility: HOME HEALTHCARE | Age: 89
End: 2024-11-18
Payer: MEDICARE

## 2024-11-18 VITALS
OXYGEN SATURATION: 100 % | DIASTOLIC BLOOD PRESSURE: 66 MMHG | SYSTOLIC BLOOD PRESSURE: 110 MMHG | RESPIRATION RATE: 17 BRPM | TEMPERATURE: 97.6 F | HEART RATE: 72 BPM

## 2024-11-18 PROCEDURE — G0151 HHCP-SERV OF PT,EA 15 MIN: HCPCS

## 2024-11-19 ASSESSMENT — ACTIVITIES OF DAILY LIVING (ADL): TRANSPORTATION COMMENTS: LOW VISION

## 2024-11-20 ENCOUNTER — HOME CARE VISIT (OUTPATIENT)
Dept: HOME HEALTH SERVICES | Facility: HOME HEALTHCARE | Age: 89
End: 2024-11-20
Payer: MEDICARE

## 2024-11-20 VITALS
RESPIRATION RATE: 18 BRPM | DIASTOLIC BLOOD PRESSURE: 68 MMHG | SYSTOLIC BLOOD PRESSURE: 122 MMHG | TEMPERATURE: 97.8 F | OXYGEN SATURATION: 93 % | HEART RATE: 70 BPM

## 2024-11-20 PROCEDURE — G0495 RN CARE TRAIN/EDU IN HH: HCPCS

## 2024-11-20 ASSESSMENT — ENCOUNTER SYMPTOMS
LIMITED RANGE OF MOTION: 1
DENIES PAIN: 1
LAST BOWEL MOVEMENT: 67164
FATIGUES EASILY: 1
MUSCLE WEAKNESS: 1
STOOL FREQUENCY: LESS THAN DAILY
SKIN LESIONS: 1
NAUSEA: PT DENIES NAUSEA AT THIS TIME.
BOWEL PATTERN NORMAL: 1
VOMITING: PT DENIES EMESIS AT THIS TIME.

## 2024-11-20 ASSESSMENT — ACTIVITIES OF DAILY LIVING (ADL)
AMBULATION ASSISTANCE: ONE PERSON
CURRENT_FUNCTION: ONE PERSON
TRANSPORTATION COMMENTS: IMPAIRED MOBILITY

## 2024-11-21 VITALS
SYSTOLIC BLOOD PRESSURE: 116 MMHG | HEART RATE: 64 BPM | OXYGEN SATURATION: 94 % | DIASTOLIC BLOOD PRESSURE: 64 MMHG | TEMPERATURE: 98.9 F | RESPIRATION RATE: 18 BRPM

## 2024-11-21 ASSESSMENT — ENCOUNTER SYMPTOMS
PAIN LOCATION - PAIN SEVERITY: 5/10
PAIN LOCATION - PAIN DURATION: WEEKS
PAIN LOCATION - PAIN QUALITY: ACHY, SORE
PAIN LOCATION - RELIEVING FACTORS: MEDS, REST, REPOSITION
PAIN LOCATION - EXACERBATING FACTORS: ACTIVITY, POSITION
PAIN LOCATION - PAIN FREQUENCY: INTERMITTENT
SUBJECTIVE PAIN PROGRESSION: WAXING AND WANING
PERSON REPORTING PAIN: PATIENT
PAIN LOCATION: GENERALIZED
PAIN SEVERITY GOAL: 2/10
HIGHEST PAIN SEVERITY IN PAST 24 HOURS: 7/10
LOWEST PAIN SEVERITY IN PAST 24 HOURS: 4/10
PAIN: 1

## 2024-11-22 ENCOUNTER — HOME CARE VISIT (OUTPATIENT)
Dept: HOME HEALTH SERVICES | Facility: HOME HEALTHCARE | Age: 89
End: 2024-11-22
Payer: MEDICARE

## 2024-11-22 VITALS
HEART RATE: 74 BPM | BODY MASS INDEX: 33.79 KG/M2 | DIASTOLIC BLOOD PRESSURE: 66 MMHG | RESPIRATION RATE: 18 BRPM | OXYGEN SATURATION: 99 % | WEIGHT: 173 LBS | TEMPERATURE: 98.4 F | SYSTOLIC BLOOD PRESSURE: 118 MMHG

## 2024-11-22 PROCEDURE — G0159 HHC PT MAINT EA 15 MIN: HCPCS

## 2024-11-22 ASSESSMENT — FIBROSIS 4 INDEX: FIB4 SCORE: 3.41

## 2024-11-24 ASSESSMENT — ENCOUNTER SYMPTOMS
HIGHEST PAIN SEVERITY IN PAST 24 HOURS: 3/10
PAIN: 1
LOWEST PAIN SEVERITY IN PAST 24 HOURS: 0/10
PAIN LOCATION - PAIN FREQUENCY: INTERMITTENT
SUBJECTIVE PAIN PROGRESSION: WAXING AND WANING
PAIN SEVERITY GOAL: 0/10
DENIES PAIN: 1
PAIN LOCATION - EXACERBATING FACTORS: TRYING TO SLEEP
PAIN LOCATION: RIGHT LEG
PAIN LOCATION - EXACERBATING FACTORS: TRYING TO SLEEP
LOWEST PAIN SEVERITY IN PAST 24 HOURS: 0/10
PERSON REPORTING PAIN: PATIENT
PAIN LOCATION - PAIN SEVERITY: 0/10
PAIN LOCATION - PAIN SEVERITY: 0/10
PAIN LOCATION: RIGHT LEG
PAIN LOCATION - PAIN QUALITY: RESTLESS
PAIN SEVERITY GOAL: 0/10
PAIN: 1
PAIN LOCATION - PAIN FREQUENCY: INTERMITTENT
PAIN LOCATION - PAIN QUALITY: RESTLESS
PERSON REPORTING PAIN: PATIENT
HIGHEST PAIN SEVERITY IN PAST 24 HOURS: 3/10
SUBJECTIVE PAIN PROGRESSION: WAXING AND WANING
PERSON REPORTING PAIN: PATIENT

## 2024-11-24 ASSESSMENT — ACTIVITIES OF DAILY LIVING (ADL)
AMBULATION ASSISTANCE ON FLAT SURFACES: 1
AMBULATION_DISTANCE/DURATION_TOLERATED: 50 FT
AMBULATION ASSISTANCE: CONTACT GUARD ASSIST
TRANSPORTATION: DEPENDENT
AMBULATION ASSISTANCE ON FLAT SURFACES: 1
CURRENT_FUNCTION: MINIMUM ASSIST
AMBULATION_DISTANCE/DURATION_TOLERATED: 20 FT
PHYSICAL TRANSFERS ASSESSED: 1
TRANSPORTATION COMMENTS: LOW VISION
AMBULATION ASSISTANCE: 1
TRANSPORTATION COMMENTS: LOW VISION
TRANSPORTATION ASSESSED: 1
AMBULATION_DISTANCE/DURATION_TOLERATED: 40 FT
MODE OF TRANSPORTATION: RW
AMBULATION ASSISTANCE ON FLAT SURFACES: 1

## 2024-11-25 ENCOUNTER — HOME CARE VISIT (OUTPATIENT)
Dept: HOME HEALTH SERVICES | Facility: HOME HEALTHCARE | Age: 89
End: 2024-11-25
Payer: MEDICARE

## 2024-11-25 VITALS
RESPIRATION RATE: 16 BRPM | WEIGHT: 173 LBS | TEMPERATURE: 98.1 F | HEART RATE: 72 BPM | DIASTOLIC BLOOD PRESSURE: 62 MMHG | BODY MASS INDEX: 33.79 KG/M2 | OXYGEN SATURATION: 99 % | SYSTOLIC BLOOD PRESSURE: 112 MMHG

## 2024-11-25 PROCEDURE — G0159 HHC PT MAINT EA 15 MIN: HCPCS

## 2024-11-25 ASSESSMENT — FIBROSIS 4 INDEX: FIB4 SCORE: 3.41

## 2024-11-25 ASSESSMENT — ENCOUNTER SYMPTOMS
SUBJECTIVE PAIN PROGRESSION: WAXING AND WANING
PERSON REPORTING PAIN: PATIENT
PAIN LOCATION - EXACERBATING FACTORS: TRYING TO SLEEP
PAIN SEVERITY GOAL: 0/10
PAIN LOCATION - PAIN SEVERITY: 0/10
PAIN LOCATION: RIGHT LEG
HIGHEST PAIN SEVERITY IN PAST 24 HOURS: 3/10
PAIN LOCATION - RELIEVING FACTORS: MEDS, REST, REPOSITION
PAIN: 1
LOWEST PAIN SEVERITY IN PAST 24 HOURS: 0/10
PAIN LOCATION - PAIN FREQUENCY: INTERMITTENT
PAIN LOCATION - PAIN DURATION: DAYS

## 2024-11-25 ASSESSMENT — ACTIVITIES OF DAILY LIVING (ADL)
TRANSPORTATION COMMENTS: LOW VISION
AMBULATION ASSISTANCE ON FLAT SURFACES: 1
AMBULATION_DISTANCE/DURATION_TOLERATED: 50 FT

## 2024-11-27 ENCOUNTER — HOME CARE VISIT (OUTPATIENT)
Dept: HOME HEALTH SERVICES | Facility: HOME HEALTHCARE | Age: 89
End: 2024-11-27
Payer: MEDICARE

## 2024-11-27 VITALS
RESPIRATION RATE: 16 BRPM | OXYGEN SATURATION: 96 % | DIASTOLIC BLOOD PRESSURE: 72 MMHG | HEART RATE: 67 BPM | SYSTOLIC BLOOD PRESSURE: 120 MMHG | TEMPERATURE: 98 F

## 2024-11-27 PROCEDURE — G0495 RN CARE TRAIN/EDU IN HH: HCPCS

## 2024-11-27 ASSESSMENT — ENCOUNTER SYMPTOMS
FATIGUES EASILY: 1
STOOL FREQUENCY: LESS THAN DAILY
DENIES PAIN: 1
DYSPNEA ON EXERTION: 1
LAST BOWEL MOVEMENT: 67170
BOWEL PATTERN NORMAL: 1

## 2024-11-27 ASSESSMENT — ACTIVITIES OF DAILY LIVING (ADL): TRANSPORTATION COMMENTS: DECREASED MOBILITY

## 2024-11-29 ENCOUNTER — HOME CARE VISIT (OUTPATIENT)
Dept: HOME HEALTH SERVICES | Facility: HOME HEALTHCARE | Age: 89
End: 2024-11-29
Payer: MEDICARE

## 2024-11-29 VITALS
SYSTOLIC BLOOD PRESSURE: 116 MMHG | TEMPERATURE: 98.2 F | WEIGHT: 174 LBS | RESPIRATION RATE: 17 BRPM | HEART RATE: 60 BPM | DIASTOLIC BLOOD PRESSURE: 70 MMHG | OXYGEN SATURATION: 96 % | BODY MASS INDEX: 33.98 KG/M2

## 2024-11-29 PROCEDURE — G0159 HHC PT MAINT EA 15 MIN: HCPCS

## 2024-11-29 ASSESSMENT — FIBROSIS 4 INDEX: FIB4 SCORE: 3.41

## 2024-11-30 ASSESSMENT — ACTIVITIES OF DAILY LIVING (ADL)
AMBULATION ASSISTANCE ON FLAT SURFACES: 1
AMBULATION_DISTANCE/DURATION_TOLERATED: 70 FT
TRANSPORTATION COMMENTS: LOW VISION

## 2024-11-30 ASSESSMENT — ENCOUNTER SYMPTOMS
DENIES PAIN: 1
PERSON REPORTING PAIN: PATIENT

## 2024-12-02 ENCOUNTER — HOME CARE VISIT (OUTPATIENT)
Dept: HOME HEALTH SERVICES | Facility: HOME HEALTHCARE | Age: 89
End: 2024-12-02
Payer: MEDICARE

## 2024-12-02 VITALS
SYSTOLIC BLOOD PRESSURE: 118 MMHG | HEART RATE: 83 BPM | TEMPERATURE: 98.1 F | RESPIRATION RATE: 18 BRPM | DIASTOLIC BLOOD PRESSURE: 70 MMHG | OXYGEN SATURATION: 100 %

## 2024-12-02 PROCEDURE — G0159 HHC PT MAINT EA 15 MIN: HCPCS

## 2024-12-02 ASSESSMENT — ENCOUNTER SYMPTOMS
PAIN LOCATION - RELIEVING FACTORS: MEDS, REST, REPOSITION
LOWEST PAIN SEVERITY IN PAST 24 HOURS: 0/10
PAIN LOCATION - PAIN SEVERITY: 0/10
PERSON REPORTING PAIN: PATIENT
PAIN LOCATION - PAIN DURATION: ACUTE
PAIN SEVERITY GOAL: 0/10
PAIN: 1
HIGHEST PAIN SEVERITY IN PAST 24 HOURS: 2/10
PAIN LOCATION: RIGHT LEG
SUBJECTIVE PAIN PROGRESSION: GRADUALLY IMPROVING
PAIN LOCATION - EXACERBATING FACTORS: TRYING TO SLEEP
PAIN LOCATION - PAIN FREQUENCY: INTERMITTENT
PAIN LOCATION - PAIN QUALITY: ACHY, SORE

## 2024-12-02 ASSESSMENT — ACTIVITIES OF DAILY LIVING (ADL)
AMBULATION ASSISTANCE ON FLAT SURFACES: 1
TRANSPORTATION COMMENTS: LOW VISION

## 2024-12-04 ENCOUNTER — HOME CARE VISIT (OUTPATIENT)
Dept: HOME HEALTH SERVICES | Facility: HOME HEALTHCARE | Age: 89
End: 2024-12-04
Payer: MEDICARE

## 2024-12-04 PROCEDURE — G0159 HHC PT MAINT EA 15 MIN: HCPCS

## 2024-12-09 ENCOUNTER — HOME CARE VISIT (OUTPATIENT)
Dept: HOME HEALTH SERVICES | Facility: HOME HEALTHCARE | Age: 89
End: 2024-12-09
Payer: MEDICARE

## 2024-12-09 VITALS
RESPIRATION RATE: 16 BRPM | HEART RATE: 72 BPM | OXYGEN SATURATION: 96 % | BODY MASS INDEX: 33.79 KG/M2 | DIASTOLIC BLOOD PRESSURE: 64 MMHG | TEMPERATURE: 97.7 F | WEIGHT: 173 LBS | SYSTOLIC BLOOD PRESSURE: 120 MMHG

## 2024-12-09 VITALS
HEART RATE: 67 BPM | RESPIRATION RATE: 18 BRPM | SYSTOLIC BLOOD PRESSURE: 114 MMHG | DIASTOLIC BLOOD PRESSURE: 66 MMHG | TEMPERATURE: 98.3 F | OXYGEN SATURATION: 99 %

## 2024-12-09 PROCEDURE — G0159 HHC PT MAINT EA 15 MIN: HCPCS

## 2024-12-09 ASSESSMENT — ENCOUNTER SYMPTOMS
PAIN: 1
PAIN LOCATION - PAIN SEVERITY: 0/10
PAIN LOCATION - PAIN FREQUENCY: INTERMITTENT
PAIN LOCATION - PAIN DURATION: CHRONIC
PAIN SEVERITY GOAL: 0/10
SUBJECTIVE PAIN PROGRESSION: WAXING AND WANING
HIGHEST PAIN SEVERITY IN PAST 24 HOURS: 2/10
PAIN LOCATION: GENERAL
LOWEST PAIN SEVERITY IN PAST 24 HOURS: 0/10

## 2024-12-09 ASSESSMENT — PATIENT HEALTH QUESTIONNAIRE - PHQ9: CLINICAL INTERPRETATION OF PHQ2 SCORE: 0

## 2024-12-09 ASSESSMENT — FIBROSIS 4 INDEX: FIB4 SCORE: 3.41

## 2024-12-11 ENCOUNTER — HOME CARE VISIT (OUTPATIENT)
Dept: HOME HEALTH SERVICES | Facility: HOME HEALTHCARE | Age: 89
End: 2024-12-11
Payer: MEDICARE

## 2024-12-11 PROCEDURE — G0159 HHC PT MAINT EA 15 MIN: HCPCS

## 2024-12-16 ENCOUNTER — HOME CARE VISIT (OUTPATIENT)
Dept: HOME HEALTH SERVICES | Facility: HOME HEALTHCARE | Age: 89
End: 2024-12-16
Payer: MEDICARE

## 2024-12-16 VITALS
SYSTOLIC BLOOD PRESSURE: 114 MMHG | HEART RATE: 72 BPM | OXYGEN SATURATION: 99 % | DIASTOLIC BLOOD PRESSURE: 66 MMHG | TEMPERATURE: 98.3 F | RESPIRATION RATE: 18 BRPM

## 2024-12-16 VITALS
RESPIRATION RATE: 16 BRPM | OXYGEN SATURATION: 95 % | DIASTOLIC BLOOD PRESSURE: 64 MMHG | HEART RATE: 70 BPM | TEMPERATURE: 98.1 F | SYSTOLIC BLOOD PRESSURE: 116 MMHG

## 2024-12-16 PROCEDURE — G0159 HHC PT MAINT EA 15 MIN: HCPCS

## 2024-12-16 ASSESSMENT — ENCOUNTER SYMPTOMS
DEBILITATING PAIN: 1
PAIN LOCATION - PAIN SEVERITY: 0/10
PAIN LOCATION: GENERAL
PAIN LOCATION - PAIN QUALITY: ACHY, SORE
PAIN LOCATION - RELIEVING FACTORS: REST, REPOSITION, MEDS
PAIN: 1
PAIN LOCATION - PAIN FREQUENCY: INTERMITTENT
PAIN LOCATION - EXACERBATING FACTORS: ACTIVITY, POSITIONAL
PAIN LOCATION - PAIN DURATION: CHRONIC
LOWEST PAIN SEVERITY IN PAST 24 HOURS: 0/10
PAIN LOCATION - EXACERBATING FACTORS: POSITIONAL, ACTIVITY
SUBJECTIVE PAIN PROGRESSION: WAXING AND WANING
PAIN LOCATION: GENERAL
SUBJECTIVE PAIN PROGRESSION: WAXING AND WANING
LOWEST PAIN SEVERITY IN PAST 24 HOURS: 0/10
PAIN: 1
PAIN SEVERITY GOAL: 0/10
PAIN LOCATION - PAIN SEVERITY: 0/10
HIGHEST PAIN SEVERITY IN PAST 24 HOURS: 1/10
PAIN LOCATION - RELIEVING FACTORS: MEDS, REST, REPOSITION
PAIN LOCATION - PAIN FREQUENCY: INTERMITTENT
PAIN SEVERITY GOAL: 0/10
PAIN LOCATION - PAIN QUALITY: ACHY, SORE
HIGHEST PAIN SEVERITY IN PAST 24 HOURS: 1/10
PAIN LOCATION - PAIN DURATION: CHRONIC

## 2024-12-16 ASSESSMENT — ACTIVITIES OF DAILY LIVING (ADL): TRANSPORTATION COMMENTS: LOW VISION

## 2024-12-16 ASSESSMENT — PATIENT HEALTH QUESTIONNAIRE - PHQ9
CLINICAL INTERPRETATION OF PHQ2 SCORE: 0
CLINICAL INTERPRETATION OF PHQ2 SCORE: 0

## 2024-12-17 ASSESSMENT — ENCOUNTER SYMPTOMS: PAIN: 1

## 2024-12-18 ENCOUNTER — HOME CARE VISIT (OUTPATIENT)
Dept: HOME HEALTH SERVICES | Facility: HOME HEALTHCARE | Age: 89
End: 2024-12-18
Payer: MEDICARE

## 2024-12-18 VITALS
RESPIRATION RATE: 18 BRPM | HEART RATE: 75 BPM | OXYGEN SATURATION: 98 % | DIASTOLIC BLOOD PRESSURE: 64 MMHG | TEMPERATURE: 98 F | SYSTOLIC BLOOD PRESSURE: 124 MMHG

## 2024-12-18 PROCEDURE — G0151 HHCP-SERV OF PT,EA 15 MIN: HCPCS

## 2024-12-18 NOTE — CASE COMMUNICATION
Patient was discussed in IDT today due to upcoming end of the certification period. Discussed progress toward goals of care with the treatment team. The treatment team currently involves the following disciplines: PT. Plan is for recertification of care for therapy.     Thank you,  LACIE Elliott RN.

## 2024-12-18 NOTE — Clinical Note
P.T reassessment and OASIS recertification completed 12/18/24; requesting authorization for 1 wk 1, 2 wk 3; Effective 12/22/24.

## 2024-12-20 ASSESSMENT — ACTIVITIES OF DAILY LIVING (ADL)
AMBULATION ASSISTANCE: 1
PHYSICAL TRANSFERS ASSESSED: 1
AMBULATION ASSISTANCE: STAND BY ASSIST
TRANSPORTATION ASSESSED: 1

## 2024-12-20 ASSESSMENT — ENCOUNTER SYMPTOMS: PAIN: 1

## 2024-12-20 ASSESSMENT — PATIENT HEALTH QUESTIONNAIRE - PHQ9: CLINICAL INTERPRETATION OF PHQ2 SCORE: 0

## 2024-12-23 ASSESSMENT — ENCOUNTER SYMPTOMS
HIGHEST PAIN SEVERITY IN PAST 24 HOURS: 2/10
PAIN LOCATION - PAIN FREQUENCY: FREQUENT
PAIN LOCATION - PAIN FREQUENCY: FREQUENT
PAIN LOCATION - PAIN DURATION: CHRONIC
PAIN LOCATION - PAIN SEVERITY: 0/10
PAIN LOCATION - PAIN SEVERITY: 0/10
SUBJECTIVE PAIN PROGRESSION: WAXING AND WANING
PAIN LOCATION: GENERAL
LOWEST PAIN SEVERITY IN PAST 24 HOURS: 0/10
PAIN LOCATION - RELIEVING FACTORS: MEDS, REST, REPOSITION
PAIN LOCATION - RELIEVING FACTORS: MEDS, REST, REPOSITION
PAIN LOCATION: GENERAL
PAIN SEVERITY GOAL: 0/10
PAIN LOCATION - PAIN DURATION: CHRONIC

## 2024-12-23 ASSESSMENT — ACTIVITIES OF DAILY LIVING (ADL)
AMBULATION_DISTANCE/DURATION_TOLERATED: 45 FT
AMBULATION ASSISTANCE ON FLAT SURFACES: 1

## 2024-12-23 ASSESSMENT — PATIENT HEALTH QUESTIONNAIRE - PHQ9: CLINICAL INTERPRETATION OF PHQ2 SCORE: 0

## 2024-12-24 ENCOUNTER — HOME CARE VISIT (OUTPATIENT)
Dept: HOME HEALTH SERVICES | Facility: HOME HEALTHCARE | Age: 89
End: 2024-12-24
Payer: MEDICARE

## 2024-12-24 ASSESSMENT — ENCOUNTER SYMPTOMS
SUBJECTIVE PAIN PROGRESSION: WAXING AND WANING
PAIN SEVERITY GOAL: 0/10
LOWEST PAIN SEVERITY IN PAST 24 HOURS: 0/10
HIGHEST PAIN SEVERITY IN PAST 24 HOURS: 2/10

## 2024-12-24 ASSESSMENT — ACTIVITIES OF DAILY LIVING (ADL)
OASIS_M1830: 06
AMBULATION ASSISTANCE ON FLAT SURFACES: 1
CURRENT_FUNCTION: MAXIMUM ASSIST
CURRENT_FUNCTION: STAND BY ASSIST
TRANSPORTATION: DEPENDENT
CURRENT_FUNCTION: MINIMUM ASSIST

## 2024-12-26 NOTE — CASE COMMUNICATION
Noted.  ----- Message -----  From: Anabel Gill, PT  Sent: 12/24/2024   7:14 AM PST  To: Hima Elliott R.N.; Zhane Root R.N.; *      P.T reassessment and OASIS recertification completed 12/18/24; requesting authorization for 1 wk 1, 2 wk 3; Effective 12/22/24.

## 2024-12-30 ENCOUNTER — HOME CARE VISIT (OUTPATIENT)
Dept: HOME HEALTH SERVICES | Facility: HOME HEALTHCARE | Age: 89
End: 2024-12-30
Payer: MEDICARE

## 2024-12-30 VITALS
TEMPERATURE: 97.7 F | SYSTOLIC BLOOD PRESSURE: 116 MMHG | RESPIRATION RATE: 18 BRPM | DIASTOLIC BLOOD PRESSURE: 66 MMHG | HEART RATE: 74 BPM | OXYGEN SATURATION: 99 %

## 2024-12-30 PROCEDURE — G0159 HHC PT MAINT EA 15 MIN: HCPCS

## 2025-01-03 ENCOUNTER — HOME CARE VISIT (OUTPATIENT)
Dept: HOME HEALTH SERVICES | Facility: HOME HEALTHCARE | Age: OVER 89
End: 2025-01-03
Payer: MEDICARE

## 2025-01-03 VITALS
OXYGEN SATURATION: 95 % | DIASTOLIC BLOOD PRESSURE: 64 MMHG | RESPIRATION RATE: 18 BRPM | SYSTOLIC BLOOD PRESSURE: 118 MMHG | TEMPERATURE: 98.1 F | HEART RATE: 69 BPM

## 2025-01-03 PROCEDURE — G0159 HHC PT MAINT EA 15 MIN: HCPCS

## 2025-01-03 ASSESSMENT — ENCOUNTER SYMPTOMS
PAIN LOCATION - PAIN SEVERITY: 0/10
SUBJECTIVE PAIN PROGRESSION: GRADUALLY IMPROVING
PAIN SEVERITY GOAL: 0/10
PAIN LOCATION - EXACERBATING FACTORS: BEING SICK
PAIN LOCATION - PAIN FREQUENCY: INTERMITTENT
HIGHEST PAIN SEVERITY IN PAST 24 HOURS: 3/10
LOWEST PAIN SEVERITY IN PAST 24 HOURS: 0/10
PAIN LOCATION: GENERAL
PAIN: 1
PAIN LOCATION - PAIN DURATION: ACUTE
PAIN LOCATION - RELIEVING FACTORS: MEDS, REST, REPOSITION
PAIN LOCATION - PAIN QUALITY: ACHY, SORE

## 2025-01-05 ASSESSMENT — ENCOUNTER SYMPTOMS
PAIN SEVERITY GOAL: 0/10
PAIN LOCATION - PAIN FREQUENCY: FREQUENT
PAIN LOCATION: GENERAL
PAIN: 1
SUBJECTIVE PAIN PROGRESSION: WAXING AND WANING
PAIN LOCATION - PAIN SEVERITY: 0/10
PAIN LOCATION - PAIN DURATION: INTERMITTENT
HIGHEST PAIN SEVERITY IN PAST 24 HOURS: 3/10
LOWEST PAIN SEVERITY IN PAST 24 HOURS: 0/10

## 2025-01-05 ASSESSMENT — ACTIVITIES OF DAILY LIVING (ADL): TRANSPORTATION COMMENTS: LOW VISION

## 2025-01-05 ASSESSMENT — PATIENT HEALTH QUESTIONNAIRE - PHQ9: CLINICAL INTERPRETATION OF PHQ2 SCORE: 0

## 2025-01-06 ENCOUNTER — HOME CARE VISIT (OUTPATIENT)
Dept: HOME HEALTH SERVICES | Facility: HOME HEALTHCARE | Age: OVER 89
End: 2025-01-06
Payer: MEDICARE

## 2025-01-06 VITALS
SYSTOLIC BLOOD PRESSURE: 110 MMHG | RESPIRATION RATE: 18 BRPM | HEART RATE: 64 BPM | DIASTOLIC BLOOD PRESSURE: 68 MMHG | OXYGEN SATURATION: 97 % | TEMPERATURE: 98.3 F

## 2025-01-06 PROCEDURE — G0159 HHC PT MAINT EA 15 MIN: HCPCS

## 2025-01-06 NOTE — CASE COMMUNICATION
Quality Review for Recertification OASIS by SHEYLA Donahue RN on  January 6, 2025    Edits completed by SHEYLA Donahue RN:  1.  and  diagnosis coding updated per chart review.  2. Nutritional requirements checked diabetic diet  3. Checked history of falls in   4. Updated goal dates for HF exacerbation and Patient specific safety precautions

## 2025-01-08 ENCOUNTER — HOME CARE VISIT (OUTPATIENT)
Dept: HOME HEALTH SERVICES | Facility: HOME HEALTHCARE | Age: OVER 89
End: 2025-01-08
Payer: MEDICARE

## 2025-01-08 VITALS
RESPIRATION RATE: 16 BRPM | TEMPERATURE: 97.5 F | OXYGEN SATURATION: 95 % | HEART RATE: 64 BPM | SYSTOLIC BLOOD PRESSURE: 122 MMHG | DIASTOLIC BLOOD PRESSURE: 62 MMHG

## 2025-01-08 PROCEDURE — G0159 HHC PT MAINT EA 15 MIN: HCPCS

## 2025-01-13 ASSESSMENT — ENCOUNTER SYMPTOMS
PAIN LOCATION - RELIEVING FACTORS: MEDS, REST, REPOSITION
PAIN LOCATION - PAIN FREQUENCY: FREQUENT
PAIN LOCATION - PAIN DURATION: CHRONIC
PAIN LOCATION - EXACERBATING FACTORS: ACTIVITY, POSITION
LOWEST PAIN SEVERITY IN PAST 24 HOURS: 0/10
PAIN LOCATION - PAIN SEVERITY: 0/10
PAIN: 1
LOWEST PAIN SEVERITY IN PAST 24 HOURS: 0/10
PAIN LOCATION - PAIN FREQUENCY: INTERMITTENT
SUBJECTIVE PAIN PROGRESSION: WAXING AND WANING
PAIN LOCATION - PAIN QUALITY: ACHY, SORE
PAIN LOCATION: GENERAL
PAIN SEVERITY GOAL: 0/10
PAIN LOCATION - PAIN QUALITY: ACHY, SORE
PAIN LOCATION: GENERAL
SUBJECTIVE PAIN PROGRESSION: WAXING AND WANING
HIGHEST PAIN SEVERITY IN PAST 24 HOURS: 1/10
HIGHEST PAIN SEVERITY IN PAST 24 HOURS: 1/10
PAIN LOCATION - PAIN DURATION: CHRONIC
PAIN LOCATION - PAIN SEVERITY: 0/10
PAIN: 1
PAIN LOCATION - EXACERBATING FACTORS: TRYING TO SLEEP
PAIN SEVERITY GOAL: 0/10
PAIN LOCATION - RELIEVING FACTORS: MEDS, REST, REPOSITION

## 2025-01-14 ENCOUNTER — PATIENT MESSAGE (OUTPATIENT)
Dept: CARDIOLOGY | Facility: MEDICAL CENTER | Age: OVER 89
End: 2025-01-14
Payer: MEDICARE

## 2025-01-14 DIAGNOSIS — I10 ESSENTIAL HYPERTENSION: ICD-10-CM

## 2025-01-14 DIAGNOSIS — I48.91 ATRIAL FIBRILLATION, UNSPECIFIED TYPE (HCC): ICD-10-CM

## 2025-01-14 DIAGNOSIS — I48.0 PAROXYSMAL ATRIAL FIBRILLATION (HCC): ICD-10-CM

## 2025-01-14 RX ORDER — DILTIAZEM HYDROCHLORIDE 120 MG/1
CAPSULE, COATED, EXTENDED RELEASE ORAL
Qty: 200 CAPSULE | Refills: 3 | OUTPATIENT
Start: 2025-01-14

## 2025-01-14 RX ORDER — DILTIAZEM HYDROCHLORIDE 120 MG/1
120 CAPSULE, COATED, EXTENDED RELEASE ORAL 2 TIMES DAILY
Qty: 200 CAPSULE | Refills: 0 | Status: SHIPPED | OUTPATIENT
Start: 2025-01-14

## 2025-01-14 NOTE — PATIENT COMMUNICATION
Pt last seen 3/2023. Pt is scheduled for [facility] 2/21. Labs are up to date and reviewed. Courtesy refill provided.

## 2025-01-14 NOTE — TELEPHONE ENCOUNTER
Is the patient due for a refill? Yes    Was the patient seen the past year? No    Date of last office visit: 03.03.2023    Does the patient have an upcoming appointment?  Yes   If yes, When? 02.21.2025    Provider to refill:TW    Does the patient have jail Plus and need 100-day supply? (This applies to ALL medications) Yes, quantity updated to 100 days

## 2025-01-15 ENCOUNTER — HOME CARE VISIT (OUTPATIENT)
Dept: HOME HEALTH SERVICES | Facility: HOME HEALTHCARE | Age: OVER 89
End: 2025-01-15
Payer: MEDICARE

## 2025-01-15 VITALS
SYSTOLIC BLOOD PRESSURE: 124 MMHG | HEART RATE: 59 BPM | RESPIRATION RATE: 16 BRPM | WEIGHT: 165 LBS | TEMPERATURE: 98.1 F | BODY MASS INDEX: 32.22 KG/M2 | OXYGEN SATURATION: 96 % | DIASTOLIC BLOOD PRESSURE: 66 MMHG

## 2025-01-15 PROCEDURE — G0159 HHC PT MAINT EA 15 MIN: HCPCS

## 2025-01-15 ASSESSMENT — ENCOUNTER SYMPTOMS
PAIN LOCATION - PAIN DURATION: CHRONIC
HIGHEST PAIN SEVERITY IN PAST 24 HOURS: 1/10
PAIN LOCATION - PAIN SEVERITY: 0/10
PAIN LOCATION - RELIEVING FACTORS: MEDS, REST, REPOSITION
LOWEST PAIN SEVERITY IN PAST 24 HOURS: 0/10
PAIN SEVERITY GOAL: 0/10
PAIN LOCATION - PAIN FREQUENCY: FREQUENT
PAIN: 1
PAIN LOCATION: GENERAL
PAIN LOCATION - PAIN QUALITY: ACHY, SORE
SUBJECTIVE PAIN PROGRESSION: WAXING AND WANING

## 2025-01-15 ASSESSMENT — ACTIVITIES OF DAILY LIVING (ADL)
AMBULATION ASSISTANCE ON FLAT SURFACES: 1
TRANSPORTATION COMMENTS: LOW VISION

## 2025-01-15 ASSESSMENT — FIBROSIS 4 INDEX: FIB4 SCORE: 3.45

## 2025-01-17 ENCOUNTER — HOME CARE VISIT (OUTPATIENT)
Dept: HOME HEALTH SERVICES | Facility: HOME HEALTHCARE | Age: OVER 89
End: 2025-01-17
Payer: MEDICARE

## 2025-01-31 ENCOUNTER — HOME CARE VISIT (OUTPATIENT)
Dept: HOME HEALTH SERVICES | Facility: HOME HEALTHCARE | Age: OVER 89
End: 2025-01-31
Payer: MEDICARE

## 2025-01-31 VITALS
SYSTOLIC BLOOD PRESSURE: 120 MMHG | RESPIRATION RATE: 18 BRPM | OXYGEN SATURATION: 94 % | DIASTOLIC BLOOD PRESSURE: 64 MMHG | TEMPERATURE: 98.2 F | HEART RATE: 64 BPM

## 2025-01-31 PROCEDURE — G0151 HHCP-SERV OF PT,EA 15 MIN: HCPCS

## 2025-01-31 NOTE — Clinical Note
Physical therapy re-evaluation completed 1/31/25, requesting authorization for 2 wk 2, effective 2/2/25.

## 2025-02-03 ENCOUNTER — HOME CARE VISIT (OUTPATIENT)
Dept: HOME HEALTH SERVICES | Facility: HOME HEALTHCARE | Age: OVER 89
End: 2025-02-03
Payer: MEDICARE

## 2025-02-03 VITALS
DIASTOLIC BLOOD PRESSURE: 64 MMHG | RESPIRATION RATE: 18 BRPM | SYSTOLIC BLOOD PRESSURE: 110 MMHG | OXYGEN SATURATION: 92 % | TEMPERATURE: 97.4 F | HEART RATE: 62 BPM

## 2025-02-03 PROCEDURE — G0159 HHC PT MAINT EA 15 MIN: HCPCS

## 2025-02-03 ASSESSMENT — ACTIVITIES OF DAILY LIVING (ADL)
TRANSPORTATION: DEPENDENT
AMBULATION ASSISTANCE ON FLAT SURFACES: 1
AMBULATION ASSISTANCE: 1
TRANSPORTATION COMMENTS: LOW VISION
TRANSPORTATION ASSESSED: 1
CURRENT_FUNCTION: MINIMUM ASSIST
PHYSICAL TRANSFERS ASSESSED: 1
AMBULATION ASSISTANCE: MINIMUM ASSIST
AMBULATION ASSISTANCE ON FLAT SURFACES: 1
AMBULATION_DISTANCE/DURATION_TOLERATED: 50 FT

## 2025-02-03 ASSESSMENT — PATIENT HEALTH QUESTIONNAIRE - PHQ9: CLINICAL INTERPRETATION OF PHQ2 SCORE: 0

## 2025-02-03 ASSESSMENT — ENCOUNTER SYMPTOMS
DENIES PAIN: 1
DENIES PAIN: 1

## 2025-02-03 NOTE — CASE COMMUNICATION
Noted. Thank you.  ----- Message -----  From: Anabel Gill, PT  Sent: 2/3/2025   4:51 AM PST  To: Hima Elliott R.N.; Cece Morales      Physical therapy re-evaluation completed 1/31/25, requesting authorization for 2 wk 2, effective 2/2/25.

## 2025-02-05 ENCOUNTER — HOME CARE VISIT (OUTPATIENT)
Dept: HOME HEALTH SERVICES | Facility: HOME HEALTHCARE | Age: OVER 89
End: 2025-02-05
Payer: MEDICARE

## 2025-02-05 VITALS
OXYGEN SATURATION: 92 % | TEMPERATURE: 98.5 F | RESPIRATION RATE: 18 BRPM | SYSTOLIC BLOOD PRESSURE: 122 MMHG | HEART RATE: 79 BPM | DIASTOLIC BLOOD PRESSURE: 66 MMHG

## 2025-02-05 PROCEDURE — G0159 HHC PT MAINT EA 15 MIN: HCPCS

## 2025-02-10 ENCOUNTER — HOME CARE VISIT (OUTPATIENT)
Dept: HOME HEALTH SERVICES | Facility: HOME HEALTHCARE | Age: OVER 89
End: 2025-02-10
Payer: MEDICARE

## 2025-02-10 VITALS
SYSTOLIC BLOOD PRESSURE: 114 MMHG | RESPIRATION RATE: 18 BRPM | HEART RATE: 68 BPM | OXYGEN SATURATION: 100 % | DIASTOLIC BLOOD PRESSURE: 66 MMHG | TEMPERATURE: 98.3 F

## 2025-02-10 PROCEDURE — G0159 HHC PT MAINT EA 15 MIN: HCPCS

## 2025-02-10 ASSESSMENT — ENCOUNTER SYMPTOMS
DENIES PAIN: 1
DENIES PAIN: 1

## 2025-02-10 ASSESSMENT — ACTIVITIES OF DAILY LIVING (ADL)
TRANSPORTATION COMMENTS: LOW VISION
TRANSPORTATION COMMENTS: LOW VISION

## 2025-02-12 ENCOUNTER — HOME CARE VISIT (OUTPATIENT)
Dept: HOME HEALTH SERVICES | Facility: HOME HEALTHCARE | Age: OVER 89
End: 2025-02-12
Payer: MEDICARE

## 2025-02-12 VITALS
DIASTOLIC BLOOD PRESSURE: 64 MMHG | TEMPERATURE: 98.4 F | SYSTOLIC BLOOD PRESSURE: 110 MMHG | OXYGEN SATURATION: 98 % | HEART RATE: 64 BPM | RESPIRATION RATE: 16 BRPM

## 2025-02-12 PROCEDURE — G0159 HHC PT MAINT EA 15 MIN: HCPCS

## 2025-02-12 NOTE — CASE COMMUNICATION
Patient was discussed in IDT today due to upcoming end of the certification period. Discussed progress toward goals of care with the treatment team. The treatment team currently involves the following disciplines: PT. Plan is for recertification of care for PT maintenance.     Thank you,  LACIE Elliott RN.

## 2025-02-17 ENCOUNTER — HOME CARE VISIT (OUTPATIENT)
Dept: HOME HEALTH SERVICES | Facility: HOME HEALTHCARE | Age: OVER 89
End: 2025-02-17
Payer: MEDICARE

## 2025-02-17 VITALS
RESPIRATION RATE: 18 BRPM | TEMPERATURE: 97.5 F | SYSTOLIC BLOOD PRESSURE: 110 MMHG | OXYGEN SATURATION: 94 % | DIASTOLIC BLOOD PRESSURE: 64 MMHG | HEART RATE: 61 BPM

## 2025-02-17 PROCEDURE — G0151 HHCP-SERV OF PT,EA 15 MIN: HCPCS

## 2025-02-17 ASSESSMENT — ACTIVITIES OF DAILY LIVING (ADL)
AMBULATION ASSISTANCE: 1
TRANSPORTATION ASSESSED: 1
CURRENT_FUNCTION: CONTACT GUARD ASSIST
TRANSPORTATION COMMENTS: LOW VISION
AMBULATION ASSISTANCE ON FLAT SURFACES: 1
TRANSPORTATION COMMENTS: LOW VISION
TRANSPORTATION: DEPENDENT
AMBULATION ASSISTANCE: CONTACT GUARD ASSIST
PHYSICAL TRANSFERS ASSESSED: 1

## 2025-02-17 ASSESSMENT — ENCOUNTER SYMPTOMS
HIGHEST PAIN SEVERITY IN PAST 24 HOURS: 1/10
PAIN LOCATION - PAIN SEVERITY: 0/10
PAIN LOCATION - RELIEVING FACTORS: MEDS, REST, REPOSITION
LOWEST PAIN SEVERITY IN PAST 24 HOURS: 0/10
PAIN SEVERITY GOAL: 0/10
PAIN LOCATION - PAIN DURATION: CHRONIC
PAIN LOCATION: GENERAL
PAIN: 1
DENIES PAIN: 1
PAIN LOCATION - PAIN FREQUENCY: INTERMITTENT
SUBJECTIVE PAIN PROGRESSION: WAXING AND WANING

## 2025-02-17 ASSESSMENT — PATIENT HEALTH QUESTIONNAIRE - PHQ9: CLINICAL INTERPRETATION OF PHQ2 SCORE: 0

## 2025-02-17 NOTE — Clinical Note
Physical therapy re-evaluation and OASIS recertification completed 2/17/25, requesting authorization for 2 wk 4, effective 2/23/25.

## 2025-02-18 ASSESSMENT — ACTIVITIES OF DAILY LIVING (ADL)
CUTTING_FOOD_CURRENT_FUNCTION_DEPENDENT: 1
BATHING_COMMENTS: DEPENDENT
AMBULATION ASSISTANCE ON FLAT SURFACES: 1
GROOMING_COMMENTS: DEPENDENT

## 2025-02-19 NOTE — CASE COMMUNICATION
Noted. Thank you.  ----- Message -----  From: Anabel Gill, PT  Sent: 2/18/2025  12:37 AM PST  To: Hima Elliott R.N.; Zhane Root R.N.; *      Physical therapy re-evaluation and OASIS recertification completed 2/17/25, requesting authorization for 2 wk 4, effective 2/23/25.

## 2025-02-21 ENCOUNTER — OFFICE VISIT (OUTPATIENT)
Dept: CARDIOLOGY | Facility: MEDICAL CENTER | Age: OVER 89
End: 2025-02-21
Attending: INTERNAL MEDICINE
Payer: MEDICARE

## 2025-02-21 VITALS
RESPIRATION RATE: 16 BRPM | HEART RATE: 96 BPM | DIASTOLIC BLOOD PRESSURE: 78 MMHG | OXYGEN SATURATION: 98 % | WEIGHT: 165 LBS | BODY MASS INDEX: 32.39 KG/M2 | SYSTOLIC BLOOD PRESSURE: 130 MMHG | HEIGHT: 60 IN

## 2025-02-21 DIAGNOSIS — I48.0 PAROXYSMAL ATRIAL FIBRILLATION (HCC): ICD-10-CM

## 2025-02-21 DIAGNOSIS — R62.7 FTT (FAILURE TO THRIVE) IN ADULT: ICD-10-CM

## 2025-02-21 DIAGNOSIS — I10 ESSENTIAL HYPERTENSION: ICD-10-CM

## 2025-02-21 DIAGNOSIS — Z95.818 PRESENCE OF WATCHMAN LEFT ATRIAL APPENDAGE CLOSURE DEVICE: ICD-10-CM

## 2025-02-21 DIAGNOSIS — R54 FRAILTY: ICD-10-CM

## 2025-02-21 PROCEDURE — 99213 OFFICE O/P EST LOW 20 MIN: CPT | Performed by: INTERNAL MEDICINE

## 2025-02-21 PROCEDURE — 3075F SYST BP GE 130 - 139MM HG: CPT | Performed by: INTERNAL MEDICINE

## 2025-02-21 PROCEDURE — 3078F DIAST BP <80 MM HG: CPT | Performed by: INTERNAL MEDICINE

## 2025-02-21 PROCEDURE — 99214 OFFICE O/P EST MOD 30 MIN: CPT | Performed by: INTERNAL MEDICINE

## 2025-02-21 RX ORDER — DEXTROMETHORPHAN HBR 15 MG/5 ML
LIQUID (ML) ORAL 2 TIMES DAILY
COMMUNITY

## 2025-02-21 RX ORDER — SULFAMETHOXAZOLE AND TRIMETHOPRIM 800; 160 MG/1; MG/1
TABLET ORAL
COMMUNITY
Start: 2025-01-20 | End: 2025-02-21

## 2025-02-21 ASSESSMENT — FIBROSIS 4 INDEX: FIB4 SCORE: 3.45

## 2025-02-21 NOTE — PROGRESS NOTES
Subjective:   Saumya Vann is a 89 y.o. female who presents today for followup of PAF status post watchman device. She has a history of HTN, diabetes mellitus, CKD . Her echo is relatively unremarkable aside from LVH and grade 1 diastolic function.     Since last visit has been in and out of extended rehab for falls and fractures and now has her grandson living with her 24/7 providing significant assistance.  He is present at the visit.  With cardiovascular standpoint she is stable.    Family History   Problem Relation Age of Onset    Cancer Other      Social History     Tobacco Use   Smoking Status Never   Smokeless Tobacco Never     Allergies   Allergen Reactions    Pcn [Penicillins] Rash     Rash  > 60 years ago     Outpatient Encounter Medications as of 2/21/2025   Medication Sig Dispense Refill    Cranberry-Vit C-Probiotic-Ca (GNP CRANBERRY) Tab Take  by mouth 2 times a day.      DILTIAZem CD (CARDIZEM CD) 120 MG CAPSULE SR 24 HR Take 1 Capsule by mouth 2 times a day. Indications: High Blood Pressure 200 Capsule 0    SULFAMETHOXAZOLE-TRIMETHOPRIM PO Take 1 Tablet by mouth every day at 6 PM. started 1 tab 2x/day x 5 days then 1 tab daily maintenance for uti prevention  Indications: UTI      methocarbamol (ROBAXIN) 500 MG Tab Take 500 mg by mouth every day. Indications: Musculoskeletal Pain      calcium carbonate (TUMS CHEWY BITES) 750 MG chewable tablet Chew 1 Tablet 4 times a day as needed (heartburn). Indications: Heartburn      gabapentin (NEURONTIN) 100 MG Cap Take 100 mg by mouth at bedtime. Indications: pain      Multiple Vitamins-Minerals (MULTIVITAMIN ADULT, MINERALS,) Tab Take 1 Tablet by mouth every day. Indications: supplement      acetaminophen (TYLENOL) 325 MG Tab Take 650 mg by mouth every 6 hours as needed for Mild Pain. 325 mg x 2 tablets = 350 mg  Indications: Pain      spironolactone (ALDACTONE) 25 MG Tab Take 25 mg by mouth every day. Indications: Edema      levothyroxine (SYNTHROID) 200  MCG Tab Take 200 mcg by mouth every morning on an empty stomach. Indications: Underactive Thyroid      aspirin (EQ ASPIRIN ADULT LOW DOSE) 81 MG EC tablet Take 1 Tablet by mouth every day. 100 Tablet 3    Multiple Vitamins-Minerals (PRESERVISION AREDS 2) Cap Take 1 Capsule by mouth 2 times a day. Indications: suppliments      Cholecalciferol (VITAMIN D) 125 MCG (5000 UT) Cap Take 5,000 Units by mouth every day. Indications: Osteoporosis, Vitamin D Deficiency      alendronate (FOSAMAX) 70 MG Tab Take 70 mg by mouth every Sunday. Indications: Osteoporosis      omeprazole (PRILOSEC) 20 MG delayed-release capsule Take 20 mg by mouth every evening. Indications: Heartburn      latanoprost (XALATAN) 0.005 % Solution Administer 1 Drop into the left eye every evening. Indications: Wide-Angle Glaucoma      [DISCONTINUED] sulfamethoxazole-trimethoprim (BACTRIM DS) 800-160 MG tablet TAKE 1 TABLET BY MOUTH TWICE DAILY FOR 5 DAYS      [DISCONTINUED] ROPINIRole (REQUIP) 0.5 MG Tab Take 0.5 mg by mouth at bedtime as needed (restless leg). Indications: Restless Leg Syndrome (Patient not taking: Reported on 2/21/2025)      [DISCONTINUED] Cyanocobalamin (HM SUPER VITAMIN B12) 2500 MCG Chew Tab Chew 1 Tablet every day at 6 PM. Indications: Inadequate Vitamin B12      [DISCONTINUED] Magnesium 400 MG Tab Take 1 Tablet by mouth at bedtime. Indications: Suppliment (Patient not taking: Reported on 10/1/2024)      [DISCONTINUED] lidocaine (LIDODERM) 5 % Patch Place 2 Patches on the skin every 24 hours. * apply to both ankles*  Indications: PAIN (Patient not taking: Reported on 10/1/2024)       No facility-administered encounter medications on file as of 2/21/2025.     Review of Systems   All other systems reviewed and are negative.       Objective:   /78 (BP Location: Left arm, Patient Position: Sitting)   Pulse 96   Resp 16   Ht 1.524 m (5')   Wt 74.8 kg (165 lb)   SpO2 98%   BMI 32.22 kg/m²     Physical Exam  Constitutional:        General: She is not in acute distress.     Appearance: She is well-developed. She is not diaphoretic.      Comments: Frail  Walker unsteady gait       HENT:      Head: Normocephalic and atraumatic.      Mouth/Throat:      Pharynx: No oropharyngeal exudate.   Eyes:      General: No scleral icterus.     Conjunctiva/sclera: Conjunctivae normal.      Pupils: Pupils are equal, round, and reactive to light.   Neck:      Thyroid: No thyromegaly.      Vascular: No JVD.   Cardiovascular:      Rate and Rhythm: Normal rate and regular rhythm. Frequent Extrasystoles are present.     Chest Wall: PMI is not displaced.      Pulses:           Carotid pulses are 2+ on the right side and 2+ on the left side.       Femoral pulses are 2+ on the right side and 2+ on the left side.       Popliteal pulses are 1+ on the right side and 1+ on the left side.        Dorsalis pedis pulses are 2+ on the right side and 2+ on the left side.        Posterior tibial pulses are 2+ on the right side and 2+ on the left side.      Heart sounds: Normal heart sounds. No murmur heard.     No friction rub. No gallop.   Pulmonary:      Effort: Pulmonary effort is normal.      Breath sounds: Normal breath sounds. No wheezing or rales.   Abdominal:      General: Bowel sounds are normal. There is no distension.      Palpations: Abdomen is soft.      Tenderness: There is no abdominal tenderness.   Musculoskeletal:         General: No tenderness.      Cervical back: Normal range of motion and neck supple.   Skin:     General: Skin is warm and dry.      Findings: No erythema or rash.   Neurological:      Mental Status: She is alert and oriented to person, place, and time.      Cranial Nerves: No cranial nerve deficit.   Psychiatric:         Behavior: Behavior normal.         LABS:  Lab Results   Component Value Date/Time    CHOLSTRLTOT 155 11/06/2024 09:50 AM    LDL 72 11/06/2024 09:50 AM    HDL 67 11/06/2024 09:50 AM    TRIGLYCERIDE 80 11/06/2024 09:50 AM     "   Lab Results   Component Value Date/Time    WBC 8.6 11/06/2024 09:50 AM    RBC 4.03 (L) 11/06/2024 09:50 AM    HEMOGLOBIN 11.4 (L) 11/06/2024 09:50 AM    HEMATOCRIT 35.8 (L) 11/06/2024 09:50 AM    MCV 88.8 11/06/2024 09:50 AM    NEUTSPOLYS 78.00 (H) 11/06/2024 09:50 AM    LYMPHOCYTES 8.50 (L) 11/06/2024 09:50 AM    MONOCYTES 8.90 11/06/2024 09:50 AM    EOSINOPHILS 3.30 11/06/2024 09:50 AM    BASOPHILS 0.70 11/06/2024 09:50 AM    HYPOCHROMIA 1+ 04/10/2014 03:03 PM    ANISOCYTOSIS 1+ 01/29/2022 05:40 AM     Lab Results   Component Value Date/Time    SODIUM 135 11/06/2024 09:50 AM    POTASSIUM 4.4 11/06/2024 09:50 AM    CHLORIDE 98 11/06/2024 09:50 AM    CO2 22 11/06/2024 09:50 AM    GLUCOSE 116 (H) 11/06/2024 09:50 AM    BUN 20 11/06/2024 09:50 AM    CREATININE 1.06 11/06/2024 09:50 AM     Lab Results   Component Value Date    HBA1C 6.1 (H) 11/06/2024      Lab Results   Component Value Date/Time    ALKPHOSPHAT 188 (H) 11/06/2024 09:50 AM    ASTSGOT 47 (H) 11/06/2024 09:50 AM    ALTSGPT 17 11/06/2024 09:50 AM    TBILIRUBIN 0.4 11/06/2024 09:50 AM      No results found for: \"BNPBTYPENAT\"   No results found for: \"TSH\"  Lab Results   Component Value Date/Time    PROTHROMBTM 14.9 (H) 02/01/2022 05:16 AM    INR 1.20 (H) 02/01/2022 05:16 AM          Assessment:     1. Paroxysmal atrial fibrillation (HCC)        2. Presence of Watchman left atrial appendage closure device: 24 mm palced 4/28/21        3. Essential hypertension        4. Frailty        5. FTT (failure to thrive) in adult              Medical Decision Making:  Today's Assessment / Status / Plan:       Asymptomatic well-controlled atrial fibrillation and Watchman device for stroke prevention.  Her most serious issues surround her frailty and failure to thrive and disability.  Thankfully she has a dedicated family member who is going above and beyond to provide care to her 24/7.  Recommend no cardiovascular changes.  She is DNR/DNI and would not like any heroic " interventions or measures completed including revascularization should she have a heart attack.  She will follow-up routinely.

## 2025-02-24 ENCOUNTER — HOME CARE VISIT (OUTPATIENT)
Dept: HOME HEALTH SERVICES | Facility: HOME HEALTHCARE | Age: OVER 89
End: 2025-02-24
Payer: MEDICARE

## 2025-02-24 VITALS
RESPIRATION RATE: 18 BRPM | OXYGEN SATURATION: 100 % | HEART RATE: 81 BPM | TEMPERATURE: 97.8 F | SYSTOLIC BLOOD PRESSURE: 104 MMHG | DIASTOLIC BLOOD PRESSURE: 62 MMHG

## 2025-02-24 PROCEDURE — G0159 HHC PT MAINT EA 15 MIN: HCPCS

## 2025-02-24 PROCEDURE — 665003 FOLLOW UP-HOME HEALTH

## 2025-02-25 ASSESSMENT — ACTIVITIES OF DAILY LIVING (ADL)
AMBULATION ASSISTANCE ON FLAT SURFACES: 1
TRANSPORTATION COMMENTS: LOW VISION

## 2025-02-25 ASSESSMENT — ENCOUNTER SYMPTOMS: DENIES PAIN: 1

## 2025-02-26 ENCOUNTER — HOME CARE VISIT (OUTPATIENT)
Dept: HOME HEALTH SERVICES | Facility: HOME HEALTHCARE | Age: OVER 89
End: 2025-02-26
Payer: MEDICARE

## 2025-02-26 VITALS
DIASTOLIC BLOOD PRESSURE: 66 MMHG | RESPIRATION RATE: 18 BRPM | HEART RATE: 76 BPM | SYSTOLIC BLOOD PRESSURE: 128 MMHG | OXYGEN SATURATION: 99 % | TEMPERATURE: 97.5 F

## 2025-02-26 PROCEDURE — G0159 HHC PT MAINT EA 15 MIN: HCPCS

## 2025-02-26 ASSESSMENT — ACTIVITIES OF DAILY LIVING (ADL): OASIS_M1830: 03

## 2025-02-27 ENCOUNTER — DOCUMENTATION (OUTPATIENT)
Dept: MEDICAL GROUP | Facility: PHYSICIAN GROUP | Age: OVER 89
End: 2025-02-27
Payer: MEDICARE

## 2025-02-27 ASSESSMENT — ENCOUNTER SYMPTOMS
PAIN LOCATION - PAIN DURATION: CHRONIC
PAIN SEVERITY GOAL: 0/10
HIGHEST PAIN SEVERITY IN PAST 24 HOURS: 2/10
PAIN LOCATION - PAIN FREQUENCY: FREQUENT
PAIN LOCATION - PAIN SEVERITY: 0/10
PAIN LOCATION - PAIN QUALITY: ACHY, SORE
PAIN LOCATION - RELIEVING FACTORS: MEDS, REST, REPOSITION
SUBJECTIVE PAIN PROGRESSION: WAXING AND WANING
PAIN LOCATION: GENERAL
LOWEST PAIN SEVERITY IN PAST 24 HOURS: 0/10
PAIN: 1

## 2025-02-27 ASSESSMENT — PATIENT HEALTH QUESTIONNAIRE - PHQ9: CLINICAL INTERPRETATION OF PHQ2 SCORE: 0

## 2025-02-27 ASSESSMENT — ACTIVITIES OF DAILY LIVING (ADL): TRANSPORTATION COMMENTS: LOW VISION

## 2025-02-27 NOTE — PROGRESS NOTES
Medication chart review for [facility] services    Received referral from [facility].   Medications reviewed  compared with discharge summary if available.  Discharge summary date, if applicable:   N/a    Current medication list per [facility]     Medication list one, patient is currently taking    Current Outpatient Medications:     GNP Cranberry, Take  by mouth 2 times a day.    DILTIAZem CD, 120 mg, Oral, BID    SULFAMETHOXAZOLE-TRIMETHOPRIM PO, 1 Tablet, Oral, DAILY AT 1800    methocarbamol, 500 mg, Oral, DAILY    Tums Chewy Bites, 1 Tablet, Oral, 4X/DAY PRN    gabapentin, 100 mg, Oral, QHS    Multivitamin Adult (Minerals), 1 Tablet, Oral, DAILY    acetaminophen, 650 mg, Oral, Q6HRS PRN    spironolactone, 25 mg, Oral, DAILY    levothyroxine, 200 mcg, Oral, AM ES    aspirin, 81 mg, Oral, DAILY    PreserVision AREDS 2, 1 Capsule, Oral, BID    Vitamin D, 5,000 Units, Oral, DAILY    alendronate, 70 mg, Oral, Q SUNDAY    omeprazole, 20 mg, Oral, Q EVENING    latanoprost, 1 Drop, Left Eye, Q EVENING      Medication list two, drugs that the patient has been prescribed or recommended to take by their healthcare provider on discharge summary  N/a    Allergies   Allergen Reactions    Pcn [Penicillins] Rash     Rash  > 60 years ago       Labs     Lab Results   Component Value Date/Time    SODIUM 135 11/06/2024 09:50 AM    POTASSIUM 4.4 11/06/2024 09:50 AM    CHLORIDE 98 11/06/2024 09:50 AM    CO2 22 11/06/2024 09:50 AM    GLUCOSE 116 (H) 11/06/2024 09:50 AM    BUN 20 11/06/2024 09:50 AM    CREATININE 1.06 11/06/2024 09:50 AM     Lab Results   Component Value Date/Time    ALKPHOSPHAT 188 (H) 11/06/2024 09:50 AM    ASTSGOT 47 (H) 11/06/2024 09:50 AM    ALTSGPT 17 11/06/2024 09:50 AM    TBILIRUBIN 0.4 11/06/2024 09:50 AM    INR 1.20 (H) 02/01/2022 05:16 AM    ALBUMIN 3.6 11/06/2024 09:50 AM        Assessment for clinically significant drug interactions, drug omissions/additions, duplicative therapies.            CC    Bebe Santamaria M.D.  [address]  [place] [address]  Fax: [phone]    [facility]  Phone [phone] fax [phone]    This note was created using voice recognition software (Dragon). The accuracy of the dictation is limited by the abilities of the software. I have reviewed the note prior to signing, however some errors in grammar and context are still possible. If you have any questions related to this note please do not hesitate to contact our office.

## 2025-03-03 ENCOUNTER — HOME CARE VISIT (OUTPATIENT)
Dept: HOME HEALTH SERVICES | Facility: HOME HEALTHCARE | Age: OVER 89
End: 2025-03-03
Payer: MEDICARE

## 2025-03-03 VITALS
HEART RATE: 68 BPM | OXYGEN SATURATION: 98 % | DIASTOLIC BLOOD PRESSURE: 64 MMHG | BODY MASS INDEX: 32.22 KG/M2 | WEIGHT: 165 LBS | RESPIRATION RATE: 18 BRPM | TEMPERATURE: 97.7 F | SYSTOLIC BLOOD PRESSURE: 120 MMHG

## 2025-03-03 PROCEDURE — G0151 HHCP-SERV OF PT,EA 15 MIN: HCPCS

## 2025-03-03 ASSESSMENT — FIBROSIS 4 INDEX: FIB4 SCORE: 3.45

## 2025-03-03 ASSESSMENT — ENCOUNTER SYMPTOMS: DENIES PAIN: 1

## 2025-03-03 ASSESSMENT — ACTIVITIES OF DAILY LIVING (ADL): TRANSPORTATION COMMENTS: LOW VISION

## 2025-03-05 ENCOUNTER — HOME CARE VISIT (OUTPATIENT)
Dept: HOME HEALTH SERVICES | Facility: HOME HEALTHCARE | Age: OVER 89
End: 2025-03-05
Payer: MEDICARE

## 2025-03-05 VITALS
DIASTOLIC BLOOD PRESSURE: 68 MMHG | RESPIRATION RATE: 16 BRPM | OXYGEN SATURATION: 96 % | TEMPERATURE: 97.2 F | HEART RATE: 72 BPM | SYSTOLIC BLOOD PRESSURE: 116 MMHG

## 2025-03-05 PROCEDURE — G0159 HHC PT MAINT EA 15 MIN: HCPCS

## 2025-03-06 ASSESSMENT — PATIENT HEALTH QUESTIONNAIRE - PHQ9: CLINICAL INTERPRETATION OF PHQ2 SCORE: 0

## 2025-03-06 ASSESSMENT — ACTIVITIES OF DAILY LIVING (ADL)
TRANSPORTATION COMMENTS: LOW VISION
AMBULATION ASSISTANCE ON FLAT SURFACES: 1

## 2025-03-06 ASSESSMENT — ENCOUNTER SYMPTOMS: DENIES PAIN: 1

## 2025-03-10 ENCOUNTER — HOME CARE VISIT (OUTPATIENT)
Dept: HOME HEALTH SERVICES | Facility: HOME HEALTHCARE | Age: OVER 89
End: 2025-03-10
Payer: MEDICARE

## 2025-03-10 VITALS
SYSTOLIC BLOOD PRESSURE: 116 MMHG | BODY MASS INDEX: 33.2 KG/M2 | TEMPERATURE: 97.2 F | WEIGHT: 170 LBS | DIASTOLIC BLOOD PRESSURE: 64 MMHG | OXYGEN SATURATION: 99 % | RESPIRATION RATE: 18 BRPM | HEART RATE: 68 BPM

## 2025-03-10 PROCEDURE — G0159 HHC PT MAINT EA 15 MIN: HCPCS

## 2025-03-10 ASSESSMENT — FIBROSIS 4 INDEX: FIB4 SCORE: 3.45

## 2025-03-12 ENCOUNTER — HOME CARE VISIT (OUTPATIENT)
Dept: HOME HEALTH SERVICES | Facility: HOME HEALTHCARE | Age: OVER 89
End: 2025-03-12
Payer: MEDICARE

## 2025-03-12 VITALS
BODY MASS INDEX: 33.2 KG/M2 | DIASTOLIC BLOOD PRESSURE: 66 MMHG | HEART RATE: 62 BPM | WEIGHT: 170 LBS | RESPIRATION RATE: 18 BRPM | TEMPERATURE: 97.2 F | OXYGEN SATURATION: 94 % | SYSTOLIC BLOOD PRESSURE: 110 MMHG

## 2025-03-12 PROCEDURE — G0159 HHC PT MAINT EA 15 MIN: HCPCS

## 2025-03-12 ASSESSMENT — FIBROSIS 4 INDEX: FIB4 SCORE: 3.45

## 2025-03-13 ENCOUNTER — HOSPITAL ENCOUNTER (OUTPATIENT)
Facility: MEDICAL CENTER | Age: OVER 89
End: 2025-03-13
Attending: FAMILY MEDICINE
Payer: MEDICARE

## 2025-03-13 PROCEDURE — 87086 URINE CULTURE/COLONY COUNT: CPT

## 2025-03-15 LAB
BACTERIA UR CULT: NORMAL
SIGNIFICANT IND 70042: NORMAL
SITE SITE: NORMAL
SOURCE SOURCE: NORMAL

## 2025-03-17 ENCOUNTER — HOME CARE VISIT (OUTPATIENT)
Dept: HOME HEALTH SERVICES | Facility: HOME HEALTHCARE | Age: OVER 89
End: 2025-03-17
Payer: MEDICARE

## 2025-03-17 VITALS
RESPIRATION RATE: 16 BRPM | SYSTOLIC BLOOD PRESSURE: 112 MMHG | DIASTOLIC BLOOD PRESSURE: 64 MMHG | OXYGEN SATURATION: 97 % | TEMPERATURE: 97.4 F | HEART RATE: 60 BPM

## 2025-03-17 PROCEDURE — G0159 HHC PT MAINT EA 15 MIN: HCPCS

## 2025-03-17 ASSESSMENT — ACTIVITIES OF DAILY LIVING (ADL)
TRANSPORTATION COMMENTS: LOW VISION

## 2025-03-17 ASSESSMENT — ENCOUNTER SYMPTOMS
DENIES PAIN: 1

## 2025-03-17 ASSESSMENT — PATIENT HEALTH QUESTIONNAIRE - PHQ9
CLINICAL INTERPRETATION OF PHQ2 SCORE: 0
CLINICAL INTERPRETATION OF PHQ2 SCORE: 0

## 2025-03-19 ENCOUNTER — HOME CARE VISIT (OUTPATIENT)
Dept: HOME HEALTH SERVICES | Facility: HOME HEALTHCARE | Age: OVER 89
End: 2025-03-19
Payer: MEDICARE

## 2025-03-19 VITALS
TEMPERATURE: 97.5 F | SYSTOLIC BLOOD PRESSURE: 108 MMHG | OXYGEN SATURATION: 96 % | DIASTOLIC BLOOD PRESSURE: 62 MMHG | HEART RATE: 69 BPM | RESPIRATION RATE: 17 BRPM

## 2025-03-19 PROCEDURE — G0151 HHCP-SERV OF PT,EA 15 MIN: HCPCS

## 2025-03-19 NOTE — Clinical Note
Physical therapy re-evaluation completed 3/19/25, requesting authorization for 2 wk 4, effective 3/23/25.

## 2025-03-24 ENCOUNTER — HOME CARE VISIT (OUTPATIENT)
Dept: HOME HEALTH SERVICES | Facility: HOME HEALTHCARE | Age: OVER 89
End: 2025-03-24
Payer: MEDICARE

## 2025-03-24 VITALS
SYSTOLIC BLOOD PRESSURE: 114 MMHG | OXYGEN SATURATION: 97 % | DIASTOLIC BLOOD PRESSURE: 64 MMHG | TEMPERATURE: 97.8 F | RESPIRATION RATE: 18 BRPM | HEART RATE: 72 BPM

## 2025-03-24 PROCEDURE — G0159 HHC PT MAINT EA 15 MIN: HCPCS

## 2025-03-25 ENCOUNTER — TELEPHONE (OUTPATIENT)
Dept: FAMILY PLANNING/WOMEN'S HEALTH CLINIC | Facility: PHYSICIAN GROUP | Age: OVER 89
End: 2025-03-25
Payer: MEDICARE

## 2025-03-25 ASSESSMENT — ACTIVITIES OF DAILY LIVING (ADL)
AMBULATION ASSISTANCE: 1
AMBULATION ASSISTANCE ON FLAT SURFACES: 1
AMBULATION ASSISTANCE: CONTACT GUARD ASSIST
PHYSICAL TRANSFERS ASSESSED: 1
PHYSICAL_TRANSFERS_DEVICES: FWW
TRANSPORTATION: DEPENDENT
CURRENT_FUNCTION: CONTACT GUARD ASSIST
CUTTING_FOOD_CURRENT_FUNCTION_DEPENDENT: 1
TRANSPORTATION ASSESSED: 1
TRANSPORTATION COMMENTS: LOW VISION

## 2025-03-25 ASSESSMENT — ENCOUNTER SYMPTOMS: DENIES PAIN: 1

## 2025-03-25 ASSESSMENT — PATIENT HEALTH QUESTIONNAIRE - PHQ9: CLINICAL INTERPRETATION OF PHQ2 SCORE: 0

## 2025-03-26 ENCOUNTER — HOME CARE VISIT (OUTPATIENT)
Dept: HOME HEALTH SERVICES | Facility: HOME HEALTHCARE | Age: OVER 89
End: 2025-03-26
Payer: MEDICARE

## 2025-03-26 VITALS
OXYGEN SATURATION: 94 % | DIASTOLIC BLOOD PRESSURE: 66 MMHG | HEART RATE: 70 BPM | TEMPERATURE: 97.8 F | SYSTOLIC BLOOD PRESSURE: 110 MMHG | RESPIRATION RATE: 18 BRPM

## 2025-03-26 PROCEDURE — G0159 HHC PT MAINT EA 15 MIN: HCPCS

## 2025-03-26 NOTE — CASE COMMUNICATION
Noted. Thank you.  ----- Message -----  From: Anabel Gill, PT  Sent: 3/25/2025  10:24 PM PDT  To: Hima Elliott R.N.; Cece Morales      Physical therapy re-evaluation completed 3/19/25, requesting authorization for 2 wk 4, effective 3/23/25.

## 2025-03-31 ENCOUNTER — HOME CARE VISIT (OUTPATIENT)
Dept: HOME HEALTH SERVICES | Facility: HOME HEALTHCARE | Age: OVER 89
End: 2025-03-31
Payer: MEDICARE

## 2025-03-31 VITALS
OXYGEN SATURATION: 100 % | TEMPERATURE: 97.9 F | RESPIRATION RATE: 18 BRPM | HEART RATE: 74 BPM | DIASTOLIC BLOOD PRESSURE: 68 MMHG | SYSTOLIC BLOOD PRESSURE: 114 MMHG

## 2025-03-31 PROCEDURE — G0151 HHCP-SERV OF PT,EA 15 MIN: HCPCS

## 2025-03-31 ASSESSMENT — ENCOUNTER SYMPTOMS
PAIN LOCATION - PAIN QUALITY: ACHY, SORE
DENIES PAIN: 1
LOWEST PAIN SEVERITY IN PAST 24 HOURS: 2/10
PAIN LOCATION - PAIN DURATION: 1-2 DAYS
PAIN: 1
PAIN LOCATION: LEFT HIP
DENIES PAIN: 1
PAIN SEVERITY GOAL: 2/10
SUBJECTIVE PAIN PROGRESSION: GRADUALLY IMPROVING
PAIN LOCATION - PAIN FREQUENCY: FREQUENT
PAIN LOCATION - PAIN SEVERITY: 2/10
HIGHEST PAIN SEVERITY IN PAST 24 HOURS: 6/10

## 2025-03-31 ASSESSMENT — ACTIVITIES OF DAILY LIVING (ADL)
AMBULATION ASSISTANCE ON FLAT SURFACES: 1
TRANSPORTATION COMMENTS: LOW VISION
AMBULATION ASSISTANCE ON FLAT SURFACES: 1
TRANSPORTATION COMMENTS: LOW VISION
TRANSPORTATION COMMENTS: LOW VISION

## 2025-04-02 ENCOUNTER — HOME CARE VISIT (OUTPATIENT)
Dept: HOME HEALTH SERVICES | Facility: HOME HEALTHCARE | Age: OVER 89
End: 2025-04-02
Payer: MEDICARE

## 2025-04-02 VITALS
TEMPERATURE: 97.6 F | RESPIRATION RATE: 18 BRPM | HEART RATE: 78 BPM | DIASTOLIC BLOOD PRESSURE: 64 MMHG | SYSTOLIC BLOOD PRESSURE: 118 MMHG | OXYGEN SATURATION: 97 %

## 2025-04-02 PROCEDURE — G0159 HHC PT MAINT EA 15 MIN: HCPCS

## 2025-04-03 ASSESSMENT — PATIENT HEALTH QUESTIONNAIRE - PHQ9: CLINICAL INTERPRETATION OF PHQ2 SCORE: 0

## 2025-04-03 ASSESSMENT — ACTIVITIES OF DAILY LIVING (ADL)
AMBULATION ASSISTANCE ON FLAT SURFACES: 1
TRANSPORTATION COMMENTS: LOW VISION
AMBULATION_DISTANCE/DURATION_TOLERATED: 35 FT

## 2025-04-03 ASSESSMENT — ENCOUNTER SYMPTOMS
PAIN SEVERITY GOAL: 2/10
PAIN LOCATION - PAIN FREQUENCY: INFREQUENT
PAIN LOCATION - PAIN QUALITY: MUSCLE SORENESS
LOWEST PAIN SEVERITY IN PAST 24 HOURS: 2/10
PAIN LOCATION - RELIEVING FACTORS: MEDS, REST, REPOSITION
PAIN LOCATION - PAIN DURATION: DAYS
PAIN LOCATION: BILATERAL THIGHS
PAIN LOCATION - PAIN SEVERITY: 2/10
PAIN: 1
SUBJECTIVE PAIN PROGRESSION: GRADUALLY IMPROVING
HIGHEST PAIN SEVERITY IN PAST 24 HOURS: 5/10

## 2025-04-07 ENCOUNTER — HOME CARE VISIT (OUTPATIENT)
Dept: HOME HEALTH SERVICES | Facility: HOME HEALTHCARE | Age: OVER 89
End: 2025-04-07
Payer: MEDICARE

## 2025-04-07 VITALS
SYSTOLIC BLOOD PRESSURE: 110 MMHG | OXYGEN SATURATION: 98 % | RESPIRATION RATE: 16 BRPM | DIASTOLIC BLOOD PRESSURE: 66 MMHG | TEMPERATURE: 97.1 F | HEART RATE: 63 BPM

## 2025-04-07 PROCEDURE — G0159 HHC PT MAINT EA 15 MIN: HCPCS

## 2025-04-09 ENCOUNTER — HOME CARE VISIT (OUTPATIENT)
Dept: HOME HEALTH SERVICES | Facility: HOME HEALTHCARE | Age: OVER 89
End: 2025-04-09
Payer: MEDICARE

## 2025-04-09 VITALS
OXYGEN SATURATION: 97 % | RESPIRATION RATE: 17 BRPM | HEART RATE: 71 BPM | TEMPERATURE: 97.7 F | DIASTOLIC BLOOD PRESSURE: 66 MMHG | SYSTOLIC BLOOD PRESSURE: 114 MMHG

## 2025-04-09 PROCEDURE — G0159 HHC PT MAINT EA 15 MIN: HCPCS

## 2025-04-15 ASSESSMENT — ACTIVITIES OF DAILY LIVING (ADL)
TRANSPORTATION COMMENTS: LOW VISION
TRANSPORTATION COMMENTS: LOW VISION

## 2025-04-15 ASSESSMENT — PATIENT HEALTH QUESTIONNAIRE - PHQ9
CLINICAL INTERPRETATION OF PHQ2 SCORE: 0
CLINICAL INTERPRETATION OF PHQ2 SCORE: 0

## 2025-04-15 ASSESSMENT — ENCOUNTER SYMPTOMS
DENIES PAIN: 1
DENIES PAIN: 1

## 2025-04-16 ENCOUNTER — HOME CARE VISIT (OUTPATIENT)
Dept: HOME HEALTH SERVICES | Facility: HOME HEALTHCARE | Age: OVER 89
End: 2025-04-16
Payer: MEDICARE

## 2025-04-16 VITALS
SYSTOLIC BLOOD PRESSURE: 110 MMHG | OXYGEN SATURATION: 97 % | HEART RATE: 68 BPM | TEMPERATURE: 97.8 F | DIASTOLIC BLOOD PRESSURE: 62 MMHG | RESPIRATION RATE: 16 BRPM

## 2025-04-16 PROCEDURE — G0159 HHC PT MAINT EA 15 MIN: HCPCS

## 2025-04-18 ENCOUNTER — HOME CARE VISIT (OUTPATIENT)
Dept: HOME HEALTH SERVICES | Facility: HOME HEALTHCARE | Age: OVER 89
End: 2025-04-18
Payer: MEDICARE

## 2025-04-18 VITALS
WEIGHT: 173 LBS | TEMPERATURE: 97.3 F | HEART RATE: 64 BPM | SYSTOLIC BLOOD PRESSURE: 114 MMHG | OXYGEN SATURATION: 98 % | BODY MASS INDEX: 33.79 KG/M2 | RESPIRATION RATE: 18 BRPM | DIASTOLIC BLOOD PRESSURE: 66 MMHG

## 2025-04-18 PROCEDURE — G0151 HHCP-SERV OF PT,EA 15 MIN: HCPCS

## 2025-04-18 ASSESSMENT — ENCOUNTER SYMPTOMS
PAIN LOCATION - PAIN FREQUENCY: INTERMITTENT
PAIN LOCATION - PAIN DURATION: CHRONIC
PAIN SEVERITY GOAL: 2/10
PAIN LOCATION - PAIN SEVERITY: 0/10
PAIN LOCATION - EXACERBATING FACTORS: ACTIVITY, POSITIONAL
PAIN LOCATION - RELIEVING FACTORS: MEDS, REST, REPOSITION
PAIN LOCATION: GENERALIZED
LOWEST PAIN SEVERITY IN PAST 24 HOURS: 0/10
SUBJECTIVE PAIN PROGRESSION: UNCHANGED
PAIN LOCATION - PAIN QUALITY: ACHY, SORE
PAIN: 1
HIGHEST PAIN SEVERITY IN PAST 24 HOURS: 2/10

## 2025-04-18 ASSESSMENT — PATIENT HEALTH QUESTIONNAIRE - PHQ9: CLINICAL INTERPRETATION OF PHQ2 SCORE: 0

## 2025-04-18 ASSESSMENT — ACTIVITIES OF DAILY LIVING (ADL)
TRANSPORTATION ASSESSED: 1
AMBULATION ASSISTANCE: CONTACT GUARD ASSIST
TRANSPORTATION: DEPENDENT
PHYSICAL TRANSFERS ASSESSED: 1
AMBULATION ASSISTANCE: 1
CURRENT_FUNCTION: CONTACT GUARD ASSIST
OASIS_M1830: 03

## 2025-04-18 ASSESSMENT — FIBROSIS 4 INDEX: FIB4 SCORE: 3.45

## 2025-04-18 NOTE — Clinical Note
Physical therapy re-evaluation and OASIS recertification completed 4/18/25, requesting authorization for 2 wk 4, effective 4/20/25.

## 2025-04-21 ASSESSMENT — ACTIVITIES OF DAILY LIVING (ADL)
TRANSPORTATION COMMENTS: LOW VISION
AMBULATION_DISTANCE/DURATION_TOLERATED: 40 FT
PHYSICAL_TRANSFERS_DEVICES: RW
DRINKING_THROUGH_STRAWS_CURRENT_FUNCTION_INDEPENDENT: 1
DRINKING_FROM_CUP_CURRENT_FUNCTIONINDEPENDENT: 1
FINGER_FOOD_CURRENT_FUNCTION_INDEPENDENT: 1
TRANSPORTATION COMMENTS: LOW VISION
CUTTING_FOOD_CURRENT_FUNCTION_DEPENDENT: 1
AMBULATION ASSISTANCE ON FLAT SURFACES: 1

## 2025-04-21 ASSESSMENT — PATIENT HEALTH QUESTIONNAIRE - PHQ9: CLINICAL INTERPRETATION OF PHQ2 SCORE: 0

## 2025-04-21 ASSESSMENT — ENCOUNTER SYMPTOMS: DENIES PAIN: 1

## 2025-04-22 NOTE — CASE COMMUNICATION
Noted. Thank you.  ----- Message -----  From: Anabel Gill, PT  Sent: 4/21/2025   7:39 AM PDT  To: Hima Elliott R.N.; Cece Morales      Physical therapy re-evaluation and OASIS recertification completed 4/18/25, requesting authorization for 2 wk 4, effective 4/20/25.

## 2025-04-23 ENCOUNTER — HOME CARE VISIT (OUTPATIENT)
Dept: HOME HEALTH SERVICES | Facility: HOME HEALTHCARE | Age: OVER 89
End: 2025-04-23
Payer: MEDICARE

## 2025-04-23 VITALS
HEART RATE: 80 BPM | TEMPERATURE: 97.1 F | WEIGHT: 173 LBS | DIASTOLIC BLOOD PRESSURE: 62 MMHG | SYSTOLIC BLOOD PRESSURE: 118 MMHG | OXYGEN SATURATION: 96 % | RESPIRATION RATE: 17 BRPM | BODY MASS INDEX: 33.79 KG/M2

## 2025-04-23 PROCEDURE — G0159 HHC PT MAINT EA 15 MIN: HCPCS

## 2025-04-23 ASSESSMENT — FIBROSIS 4 INDEX: FIB4 SCORE: 3.45

## 2025-04-24 DIAGNOSIS — I48.91 ATRIAL FIBRILLATION, UNSPECIFIED TYPE (HCC): ICD-10-CM

## 2025-04-24 DIAGNOSIS — I10 ESSENTIAL HYPERTENSION: ICD-10-CM

## 2025-04-24 DIAGNOSIS — I48.0 PAROXYSMAL ATRIAL FIBRILLATION (HCC): ICD-10-CM

## 2025-04-24 RX ORDER — DILTIAZEM HYDROCHLORIDE 120 MG/1
120 CAPSULE, COATED, EXTENDED RELEASE ORAL 2 TIMES DAILY
Qty: 200 CAPSULE | Refills: 1 | Status: SHIPPED | OUTPATIENT
Start: 2025-04-24

## 2025-04-24 NOTE — TELEPHONE ENCOUNTER
Is the patient due for a refill? Yes    Was the patient seen the last 15 months? Yes    Date of last office visit: 2/21/2025    Does the patient have an upcoming appointment?  No    Provider to refill:TW    Does the patient have [facility] and need 100-day supply? (This applies to ALL medications) Yes, quantity updated to 100 days

## 2025-04-25 ENCOUNTER — HOME CARE VISIT (OUTPATIENT)
Dept: HOME HEALTH SERVICES | Facility: HOME HEALTHCARE | Age: OVER 89
End: 2025-04-25
Payer: MEDICARE

## 2025-04-25 VITALS
DIASTOLIC BLOOD PRESSURE: 64 MMHG | OXYGEN SATURATION: 94 % | HEART RATE: 76 BPM | TEMPERATURE: 97.7 F | SYSTOLIC BLOOD PRESSURE: 110 MMHG | RESPIRATION RATE: 16 BRPM

## 2025-04-25 PROCEDURE — G0159 HHC PT MAINT EA 15 MIN: HCPCS

## 2025-04-27 ASSESSMENT — ACTIVITIES OF DAILY LIVING (ADL)
TRANSPORTATION COMMENTS: LOW VISION
TRANSPORTATION COMMENTS: LOW VISION

## 2025-04-27 ASSESSMENT — ENCOUNTER SYMPTOMS
DENIES PAIN: 1
DENIES PAIN: 1

## 2025-04-27 ASSESSMENT — PATIENT HEALTH QUESTIONNAIRE - PHQ9
CLINICAL INTERPRETATION OF PHQ2 SCORE: 0
CLINICAL INTERPRETATION OF PHQ2 SCORE: 0

## 2025-04-28 NOTE — DISCHARGE INSTRUCTIONS
Discharge Instructions    Discharged to other by ambulance with escort. Discharged via ambulance, hospital escort: Yes.  Special equipment needed: LSO    Be sure to schedule a follow-up appointment with your primary care doctor or any specialists as instructed.     Discharge Plan:   Diet Plan: Discussed  Activity Level: Discussed  Confirmed Follow up Appointment: Patient to Call and Schedule Appointment  Confirmed Symptoms Management: Discussed  Medication Reconciliation Updated: Yes  Influenza Vaccine Indication: Not indicated: Previously immunized this influenza season and > 8 years of age  Influenza Vaccine Given - only chart on this line when given: Influenza Vaccine Given (See MAR)    I understand that a diet low in cholesterol, fat, and sodium is recommended for good health. Unless I have been given specific instructions below for another diet, I accept this instruction as my diet prescription.   Other diet: Diabetic    Special Instructions:   Wear LSO brace when out of bed. Place on edge of bed.  Wear VIVIAN sling as directed by your PCP   Take stool softeners and laxatives while taking narcotics.   Contact a doctor if:  · You have a fever.  · You have a cough that makes your pain worse.  · Your pain medicine is not helping.  · Your pain does not get better over time.  · You cannot return to your normal activities as planned.  Get help right away if:  · Your pain is very bad and it suddenly gets worse.  · You are not able to move any body part (paralysis) that is below the level of your injury.  · You have numbness, tingling, or weakness in any body part that is below the level of your injury.  · You cannot control when you pee (urinate) or when you poop (have bowel movements).    · Is patient discharged on Warfarin / Coumadin?   No       Vertebral Fracture  Introduction  A vertebral fracture means that one of the bones in the spine is broken (fractured). These bones are called vertebrae. You may have back pain  Theresa Davis  [address] [address]    April 29, 2025     Dear Ms. Davis:    Your stool studies were negative.   Below are the results from your recent visit:    Resulted Orders   Clostridioides difficile EIA - Stool, Per Rectum   Result Value Ref Range    C difficile Toxins A+B, EIA Negative Negative   Enteric Bacterial Panel - Stool, Per Rectum   Result Value Ref Range    Salmonella Not Detected Not Detected    Campylobacter Not Detected Not Detected    Shigella/Enteroinvasive E. coli (EIEC) Not Detected Not Detected    Shiga-like toxin-producing E. coli (STEC) stx1/stx2 Not Detected Not Detected   Enteric Parasite Panel - Stool, Per Rectum   Result Value Ref Range    Giardia lamblia Not Detected Not Detected    Cryptosporidium Not Detected Not Detected    Entamoeba histolytica Not Detected Not Detected   Clostridioides difficile Toxin, PCR - Stool, Per Rectum   Result Value Ref Range    Toxigenic C. difficile by PCR Negative Negative    027 Toxin Presumptive Negative    Pancreatic Elastase, Fecal - Stool, Per Rectum   Result Value Ref Range    Pancreatic Fecal 532 >200 ug Elast./g      Comment:             Severe Pancreatic Insufficiency:          <100         Moderate Pancreatic Insufficiency:   100 - 200         Normal:                                   >200   Occult Blood, Fecal By Immunoassay - Stool, Per Rectum   Result Value Ref Range    Occult Blood, Fecal by Immunoassay Negative Negative   H. Pylori Antigen, Stool - Stool, Per Rectum   Result Value Ref Range    H.PYLORI AG STOOL Negative Negative   Fecal Lactoferrin Qual. - Stool, Per Rectum   Result Value Ref Range    Lactoferrin, Qual Negative Negative           If you have any questions or concerns, please don't hesitate to call.         Sincerely,        LUCRECIA Booth       that gets worse when you move.  Vertebral fractures can be mild or severe. Many will get better without surgery. Surgery may be needed for more severe breaks.  Follow these instructions at home:  General instructions  · Take medicines only as told by your doctor.  · Do not drive or use heavy machinery while taking pain medicine.  · If told, put ice on the injured area:  ¨ Put ice in a plastic bag.  ¨ Place a towel between your skin and the bag.  ¨ Leave the ice on for 30 minutes every 2 hours at first. Then use the ice as needed.  · Wear your neck brace or back brace as told by your doctor.  · Do not drink alcohol.  · Keep all follow-up visits as told by your doctor. This is important.  Activity  · Stay in bed (on bed rest) only as told by your doctor. Being on bed rest for too long can make your condition worse.  · Return to your normal activities as told by your doctor. Ask your doctor what is safe for you to do.  · Do exercises for your back (physical therapy) as told by your doctor.  · Exercise often as told by your doctor.  ·   This information is not intended to replace advice given to you by your health care provider. Make sure you discuss any questions you have with your health care provider.  Document Released: 06/07/2011 Document Revised: 05/25/2017 Document Reviewed: 12/23/2015  © 2017 [employer]      Depression / Suicide Risk    As you are discharged from this [facility] facility, it is important to learn how to keep safe from harming yourself.    Recognize the warning signs:  · Abrupt changes in personality, positive or negative- including increase in energy   · Giving away possessions  · Change in eating patterns- significant weight changes-  positive or negative  · Change in sleeping patterns- unable to sleep or sleeping all the time   · Unwillingness or inability to communicate  · Depression  · Unusual sadness, discouragement and loneliness  · Talk of wanting to die  · Neglect of personal  appearance   · Rebelliousness- reckless behavior  · Withdrawal from people/activities they love  · Confusion- inability to concentrate     If you or a loved one observes any of these behaviors or has concerns about self-harm, here's what you can do:  · Talk about it- your feelings and reasons for harming yourself  · Remove any means that you might use to hurt yourself (examples: pills, rope, extension cords, firearm)  · Get professional help from the community (Mental Health, Substance Abuse, psychological counseling)  · Do not be alone:Call your Safe Contact- someone whom you trust who will be there for you.  · Call your local CRISIS HOTLINE 016-6462 or 705-983-7896  · Call your local Children's Mobile Crisis Response Team [place] (571) 156-5969 or www.[employer]  · Call the toll free National Suicide Prevention Hotlines   · National Suicide Prevention Lifeline 340-898-DHIJ (5359)  · National Hope Line Network 800-SUICIDE (278-8897)

## 2025-04-30 ENCOUNTER — HOME CARE VISIT (OUTPATIENT)
Dept: HOME HEALTH SERVICES | Facility: HOME HEALTHCARE | Age: OVER 89
End: 2025-04-30
Payer: MEDICARE

## 2025-04-30 VITALS
OXYGEN SATURATION: 94 % | SYSTOLIC BLOOD PRESSURE: 114 MMHG | HEART RATE: 72 BPM | DIASTOLIC BLOOD PRESSURE: 66 MMHG | RESPIRATION RATE: 18 BRPM | TEMPERATURE: 98.4 F

## 2025-04-30 PROCEDURE — G0159 HHC PT MAINT EA 15 MIN: HCPCS

## 2025-05-02 ENCOUNTER — HOME CARE VISIT (OUTPATIENT)
Dept: HOME HEALTH SERVICES | Facility: HOME HEALTHCARE | Age: OVER 89
End: 2025-05-02
Payer: MEDICARE

## 2025-05-02 VITALS
SYSTOLIC BLOOD PRESSURE: 110 MMHG | OXYGEN SATURATION: 99 % | RESPIRATION RATE: 18 BRPM | TEMPERATURE: 97.7 F | HEART RATE: 63 BPM | DIASTOLIC BLOOD PRESSURE: 64 MMHG

## 2025-05-02 PROCEDURE — G0159 HHC PT MAINT EA 15 MIN: HCPCS

## 2025-05-05 ENCOUNTER — HOME CARE VISIT (OUTPATIENT)
Dept: HOME HEALTH SERVICES | Facility: HOME HEALTHCARE | Age: OVER 89
End: 2025-05-05
Payer: MEDICARE

## 2025-05-05 VITALS
SYSTOLIC BLOOD PRESSURE: 118 MMHG | HEART RATE: 70 BPM | RESPIRATION RATE: 18 BRPM | TEMPERATURE: 97.8 F | OXYGEN SATURATION: 97 % | DIASTOLIC BLOOD PRESSURE: 62 MMHG

## 2025-05-05 PROCEDURE — G0159 HHC PT MAINT EA 15 MIN: HCPCS

## 2025-05-05 ASSESSMENT — ACTIVITIES OF DAILY LIVING (ADL)
AMBULATION_DISTANCE/DURATION_TOLERATED: 20 FT
TRANSPORTATION COMMENTS: LOW VISION
AMBULATION ASSISTANCE ON FLAT SURFACES: 1
TRANSPORTATION COMMENTS: LOW VISION
AMBULATION ASSISTANCE ON FLAT SURFACES: 1
AMBULATION_DISTANCE/DURATION_TOLERATED: 60 FT
TRANSPORTATION COMMENTS: LOW VISION

## 2025-05-05 ASSESSMENT — PATIENT HEALTH QUESTIONNAIRE - PHQ9
CLINICAL INTERPRETATION OF PHQ2 SCORE: 0

## 2025-05-05 ASSESSMENT — ENCOUNTER SYMPTOMS
DENIES PAIN: 1

## 2025-05-07 ENCOUNTER — HOME CARE VISIT (OUTPATIENT)
Dept: HOME HEALTH SERVICES | Facility: HOME HEALTHCARE | Age: OVER 89
End: 2025-05-07
Payer: MEDICARE

## 2025-05-07 VITALS
TEMPERATURE: 97.9 F | OXYGEN SATURATION: 98 % | DIASTOLIC BLOOD PRESSURE: 64 MMHG | SYSTOLIC BLOOD PRESSURE: 112 MMHG | RESPIRATION RATE: 15 BRPM | HEART RATE: 62 BPM

## 2025-05-07 PROCEDURE — G0159 HHC PT MAINT EA 15 MIN: HCPCS

## 2025-05-11 ASSESSMENT — ACTIVITIES OF DAILY LIVING (ADL)
TRANSPORTATION COMMENTS: LOW VISION
AMBULATION ASSISTANCE ON FLAT SURFACES: 1

## 2025-05-11 ASSESSMENT — PATIENT HEALTH QUESTIONNAIRE - PHQ9: CLINICAL INTERPRETATION OF PHQ2 SCORE: 0

## 2025-05-11 ASSESSMENT — ENCOUNTER SYMPTOMS: DENIES PAIN: 1

## 2025-05-14 ENCOUNTER — HOME CARE VISIT (OUTPATIENT)
Dept: HOME HEALTH SERVICES | Facility: HOME HEALTHCARE | Age: OVER 89
End: 2025-05-14
Payer: MEDICARE

## 2025-05-14 PROCEDURE — G0151 HHCP-SERV OF PT,EA 15 MIN: HCPCS

## 2025-05-14 NOTE — Clinical Note
Physical therapy re-evaluation completed 5/14/25, requesting authorization for 2 wk 4, effective 5/18/25

## 2025-05-16 ENCOUNTER — HOME CARE VISIT (OUTPATIENT)
Dept: HOME HEALTH SERVICES | Facility: HOME HEALTHCARE | Age: OVER 89
End: 2025-05-16
Payer: MEDICARE

## 2025-05-19 VITALS
HEART RATE: 82 BPM | DIASTOLIC BLOOD PRESSURE: 62 MMHG | TEMPERATURE: 97.9 F | RESPIRATION RATE: 16 BRPM | OXYGEN SATURATION: 93 % | SYSTOLIC BLOOD PRESSURE: 108 MMHG

## 2025-05-19 ASSESSMENT — ACTIVITIES OF DAILY LIVING (ADL)
TRANSPORTATION: DEPENDENT
TRANSPORTATION COMMENTS: LOW VISION
PHYSICAL TRANSFERS ASSESSED: 1
PHYSICAL_TRANSFERS_DEVICES: FWW
AMBULATION ASSISTANCE: 1
AMBULATION ASSISTANCE ON FLAT SURFACES: 1
TRANSPORTATION ASSESSED: 1
AMBULATION ASSISTANCE: CONTACT GUARD ASSIST
CURRENT_FUNCTION: MINIMUM ASSIST

## 2025-05-19 ASSESSMENT — ENCOUNTER SYMPTOMS: DENIES PAIN: 1

## 2025-05-20 NOTE — CASE COMMUNICATION
Noted. Thank you.  ----- Message -----  From: Anabel Gill, PT  Sent: 5/19/2025  12:34 AM PDT  To: Hima Elliott R.N.; Cece Morales      Physical therapy re-evaluation completed 5/14/25, requesting authorization for 2 wk 4, effective 5/18/25

## 2025-05-21 ENCOUNTER — HOSPITAL ENCOUNTER (OUTPATIENT)
Dept: LAB | Facility: MEDICAL CENTER | Age: OVER 89
End: 2025-05-21
Attending: FAMILY MEDICINE
Payer: MEDICARE

## 2025-05-21 LAB
ANION GAP SERPL CALC-SCNC: 15 MMOL/L (ref 7–16)
BUN SERPL-MCNC: 23 MG/DL (ref 8–22)
CALCIUM SERPL-MCNC: 9.7 MG/DL (ref 8.5–10.5)
CHLORIDE SERPL-SCNC: 103 MMOL/L (ref 96–112)
CO2 SERPL-SCNC: 20 MMOL/L (ref 20–33)
CREAT SERPL-MCNC: 0.93 MG/DL (ref 0.5–1.4)
EST. AVERAGE GLUCOSE BLD GHB EST-MCNC: 123 MG/DL
GFR SERPLBLD CREATININE-BSD FMLA CKD-EPI: 57 ML/MIN/1.73 M 2
GLUCOSE SERPL-MCNC: 150 MG/DL (ref 65–99)
HBA1C MFR BLD: 5.9 % (ref 4–5.6)
POTASSIUM SERPL-SCNC: 4.4 MMOL/L (ref 3.6–5.5)
SODIUM SERPL-SCNC: 138 MMOL/L (ref 135–145)
TSH SERPL-ACNC: 3.43 UIU/ML (ref 0.38–5.33)

## 2025-05-21 PROCEDURE — 83036 HEMOGLOBIN GLYCOSYLATED A1C: CPT

## 2025-05-21 PROCEDURE — 84443 ASSAY THYROID STIM HORMONE: CPT

## 2025-05-21 PROCEDURE — 36415 COLL VENOUS BLD VENIPUNCTURE: CPT

## 2025-05-21 PROCEDURE — 80048 BASIC METABOLIC PNL TOTAL CA: CPT

## 2025-05-23 ENCOUNTER — HOME CARE VISIT (OUTPATIENT)
Dept: HOME HEALTH SERVICES | Facility: HOME HEALTHCARE | Age: OVER 89
End: 2025-05-23
Payer: MEDICARE

## 2025-05-23 VITALS
HEART RATE: 62 BPM | DIASTOLIC BLOOD PRESSURE: 62 MMHG | OXYGEN SATURATION: 100 % | SYSTOLIC BLOOD PRESSURE: 104 MMHG | TEMPERATURE: 97.3 F | RESPIRATION RATE: 17 BRPM

## 2025-05-23 PROCEDURE — G0151 HHCP-SERV OF PT,EA 15 MIN: HCPCS

## 2025-05-26 ASSESSMENT — ACTIVITIES OF DAILY LIVING (ADL)
AMBULATION ASSISTANCE ON FLAT SURFACES: 1
TRANSPORTATION: DEPENDENT
TRANSPORTATION COMMENTS: LOW VISION
AMBULATION_DISTANCE/DURATION_TOLERATED: 50 FT
CURRENT_FUNCTION: CONTACT GUARD ASSIST
PHYSICAL TRANSFERS ASSESSED: 1
AMBULATION ASSISTANCE: 1
TRANSPORTATION ASSESSED: 1
PHYSICAL_TRANSFERS_DEVICES: RW
AMBULATION ASSISTANCE: CONTACT GUARD ASSIST

## 2025-05-26 ASSESSMENT — ENCOUNTER SYMPTOMS: DENIES PAIN: 1

## 2025-05-26 ASSESSMENT — PATIENT HEALTH QUESTIONNAIRE - PHQ9: CLINICAL INTERPRETATION OF PHQ2 SCORE: 0

## 2025-06-03 ENCOUNTER — HOME CARE VISIT (OUTPATIENT)
Dept: HOME HEALTH SERVICES | Facility: HOME HEALTHCARE | Age: OVER 89
End: 2025-06-03
Payer: MEDICARE

## 2025-06-06 ENCOUNTER — HOME CARE VISIT (OUTPATIENT)
Dept: HOME HEALTH SERVICES | Facility: HOME HEALTHCARE | Age: OVER 89
End: 2025-06-06
Payer: MEDICARE

## 2025-06-06 PROCEDURE — G0159 HHC PT MAINT EA 15 MIN: HCPCS

## 2025-06-07 VITALS
RESPIRATION RATE: 18 BRPM | TEMPERATURE: 97.3 F | OXYGEN SATURATION: 96 % | SYSTOLIC BLOOD PRESSURE: 118 MMHG | HEART RATE: 64 BPM | DIASTOLIC BLOOD PRESSURE: 64 MMHG

## 2025-06-07 ASSESSMENT — ENCOUNTER SYMPTOMS: DENIES PAIN: 1

## 2025-06-07 ASSESSMENT — ACTIVITIES OF DAILY LIVING (ADL): TRANSPORTATION COMMENTS: LOW VISION

## 2025-06-09 ENCOUNTER — HOME CARE VISIT (OUTPATIENT)
Dept: HOME HEALTH SERVICES | Facility: HOME HEALTHCARE | Age: OVER 89
End: 2025-06-09
Payer: MEDICARE

## 2025-06-09 PROCEDURE — G0159 HHC PT MAINT EA 15 MIN: HCPCS

## 2025-06-11 ENCOUNTER — HOME CARE VISIT (OUTPATIENT)
Dept: HOME HEALTH SERVICES | Facility: HOME HEALTHCARE | Age: OVER 89
End: 2025-06-11
Payer: MEDICARE

## 2025-06-11 VITALS
SYSTOLIC BLOOD PRESSURE: 110 MMHG | OXYGEN SATURATION: 97 % | TEMPERATURE: 96.7 F | DIASTOLIC BLOOD PRESSURE: 64 MMHG | HEART RATE: 70 BPM | RESPIRATION RATE: 16 BRPM

## 2025-06-11 PROCEDURE — G0159 HHC PT MAINT EA 15 MIN: HCPCS

## 2025-06-11 NOTE — CASE COMMUNICATION
Patient was discussed in IDT today due to upcoming end of the certification period. Discussed progress toward goals of care with the treatment team. The treatment team currently involves the following disciplines: PT. Plan is for recertification of care for PT maintenance.     Thank you,  LCAIE Elliott RN.

## 2025-06-16 ENCOUNTER — HOME CARE VISIT (OUTPATIENT)
Dept: HOME HEALTH SERVICES | Facility: HOME HEALTHCARE | Age: OVER 89
End: 2025-06-16
Payer: MEDICARE

## 2025-06-16 VITALS
OXYGEN SATURATION: 97 % | HEART RATE: 80 BPM | SYSTOLIC BLOOD PRESSURE: 114 MMHG | TEMPERATURE: 97.4 F | RESPIRATION RATE: 18 BRPM | DIASTOLIC BLOOD PRESSURE: 62 MMHG

## 2025-06-16 PROCEDURE — G0151 HHCP-SERV OF PT,EA 15 MIN: HCPCS

## 2025-06-16 ASSESSMENT — ACTIVITIES OF DAILY LIVING (ADL)
AMBULATION ASSISTANCE ON FLAT SURFACES: 1
AMBULATION ASSISTANCE ON FLAT SURFACES: 1
TRANSPORTATION COMMENTS: LOW VISION
TRANSPORTATION COMMENTS: LOW VISION

## 2025-06-16 ASSESSMENT — ENCOUNTER SYMPTOMS
DENIES PAIN: 1
DENIES PAIN: 1
VOMITING: DRY HEAVES
DENIES PAIN: 1

## 2025-06-16 ASSESSMENT — PATIENT HEALTH QUESTIONNAIRE - PHQ9
CLINICAL INTERPRETATION OF PHQ2 SCORE: 0

## 2025-06-18 ENCOUNTER — HOME CARE VISIT (OUTPATIENT)
Dept: HOME HEALTH SERVICES | Facility: HOME HEALTHCARE | Age: OVER 89
End: 2025-06-18
Payer: MEDICARE

## 2025-06-18 PROCEDURE — 665999 HH PPS REVENUE DEBIT

## 2025-06-18 PROCEDURE — G0159 HHC PT MAINT EA 15 MIN: HCPCS

## 2025-06-18 PROCEDURE — 665998 HH PPS REVENUE CREDIT

## 2025-06-18 PROCEDURE — 665003 FOLLOW UP-HOME HEALTH

## 2025-06-19 VITALS
RESPIRATION RATE: 17 BRPM | TEMPERATURE: 97.2 F | HEART RATE: 72 BPM | SYSTOLIC BLOOD PRESSURE: 104 MMHG | DIASTOLIC BLOOD PRESSURE: 62 MMHG | OXYGEN SATURATION: 95 %

## 2025-06-19 PROCEDURE — 665999 HH PPS REVENUE DEBIT

## 2025-06-19 PROCEDURE — 665998 HH PPS REVENUE CREDIT

## 2025-06-19 ASSESSMENT — ACTIVITIES OF DAILY LIVING (ADL)
BATHING_COMMENTS: DEPENDENT
TRANSPORTATION ASSESSED: 1
AMBULATION ASSISTANCE ON FLAT SURFACES: 1
PHYSICAL TRANSFERS ASSESSED: 1
AMBULATION ASSISTANCE: CONTACT GUARD ASSIST
CURRENT_FUNCTION: CONTACT GUARD ASSIST
TRANSPORTATION: DEPENDENT
AMBULATION ASSISTANCE: 1

## 2025-06-20 VITALS
DIASTOLIC BLOOD PRESSURE: 66 MMHG | RESPIRATION RATE: 17 BRPM | SYSTOLIC BLOOD PRESSURE: 110 MMHG | TEMPERATURE: 97.5 F | HEART RATE: 71 BPM | OXYGEN SATURATION: 96 %

## 2025-06-20 PROCEDURE — 665998 HH PPS REVENUE CREDIT

## 2025-06-20 PROCEDURE — 665999 HH PPS REVENUE DEBIT

## 2025-06-20 ASSESSMENT — ENCOUNTER SYMPTOMS: DENIES PAIN: 1

## 2025-06-20 ASSESSMENT — ACTIVITIES OF DAILY LIVING (ADL): TRANSPORTATION COMMENTS: LOW VISION

## 2025-06-20 ASSESSMENT — PATIENT HEALTH QUESTIONNAIRE - PHQ9: CLINICAL INTERPRETATION OF PHQ2 SCORE: 0

## 2025-06-21 PROCEDURE — 665998 HH PPS REVENUE CREDIT

## 2025-06-21 PROCEDURE — 665999 HH PPS REVENUE DEBIT

## 2025-06-22 PROCEDURE — 665998 HH PPS REVENUE CREDIT

## 2025-06-22 PROCEDURE — 665999 HH PPS REVENUE DEBIT

## 2025-06-23 ENCOUNTER — HOME CARE VISIT (OUTPATIENT)
Dept: HOME HEALTH SERVICES | Facility: HOME HEALTHCARE | Age: OVER 89
End: 2025-06-23
Payer: MEDICARE

## 2025-06-23 VITALS
TEMPERATURE: 97 F | OXYGEN SATURATION: 99 % | SYSTOLIC BLOOD PRESSURE: 102 MMHG | DIASTOLIC BLOOD PRESSURE: 64 MMHG | RESPIRATION RATE: 16 BRPM | HEART RATE: 74 BPM

## 2025-06-23 PROCEDURE — G0159 HHC PT MAINT EA 15 MIN: HCPCS

## 2025-06-23 PROCEDURE — 665998 HH PPS REVENUE CREDIT

## 2025-06-23 PROCEDURE — 665999 HH PPS REVENUE DEBIT

## 2025-06-23 ASSESSMENT — ACTIVITIES OF DAILY LIVING (ADL): OASIS_M1830: 06

## 2025-06-24 PROCEDURE — 665998 HH PPS REVENUE CREDIT

## 2025-06-24 PROCEDURE — 665999 HH PPS REVENUE DEBIT

## 2025-06-24 ASSESSMENT — ENCOUNTER SYMPTOMS
DENIES PAIN: 1
DEBILITATING PAIN: 1

## 2025-06-24 ASSESSMENT — ACTIVITIES OF DAILY LIVING (ADL)
AMBULATION ASSISTANCE ON FLAT SURFACES: 1
TRANSPORTATION COMMENTS: LOW VISION
AMBULATION_DISTANCE/DURATION_TOLERATED: 45 FT

## 2025-06-24 ASSESSMENT — PATIENT HEALTH QUESTIONNAIRE - PHQ9: CLINICAL INTERPRETATION OF PHQ2 SCORE: 0

## 2025-06-25 PROCEDURE — 665999 HH PPS REVENUE DEBIT

## 2025-06-25 PROCEDURE — 665998 HH PPS REVENUE CREDIT

## 2025-06-26 PROCEDURE — 665998 HH PPS REVENUE CREDIT

## 2025-06-26 PROCEDURE — 665999 HH PPS REVENUE DEBIT

## 2025-06-27 ENCOUNTER — HOME CARE VISIT (OUTPATIENT)
Dept: HOME HEALTH SERVICES | Facility: HOME HEALTHCARE | Age: OVER 89
End: 2025-06-27
Payer: MEDICARE

## 2025-06-27 VITALS
TEMPERATURE: 97.4 F | SYSTOLIC BLOOD PRESSURE: 116 MMHG | DIASTOLIC BLOOD PRESSURE: 64 MMHG | HEART RATE: 70 BPM | OXYGEN SATURATION: 95 % | RESPIRATION RATE: 18 BRPM

## 2025-06-27 PROCEDURE — G0159 HHC PT MAINT EA 15 MIN: HCPCS

## 2025-06-27 PROCEDURE — 665998 HH PPS REVENUE CREDIT

## 2025-06-27 PROCEDURE — 665999 HH PPS REVENUE DEBIT

## 2025-06-28 PROCEDURE — 665998 HH PPS REVENUE CREDIT

## 2025-06-28 PROCEDURE — 665999 HH PPS REVENUE DEBIT

## 2025-06-29 PROCEDURE — 665999 HH PPS REVENUE DEBIT

## 2025-06-29 PROCEDURE — 665998 HH PPS REVENUE CREDIT

## 2025-06-30 ENCOUNTER — HOME CARE VISIT (OUTPATIENT)
Dept: HOME HEALTH SERVICES | Facility: HOME HEALTHCARE | Age: OVER 89
End: 2025-06-30
Payer: MEDICARE

## 2025-06-30 VITALS
SYSTOLIC BLOOD PRESSURE: 110 MMHG | OXYGEN SATURATION: 93 % | RESPIRATION RATE: 16 BRPM | HEART RATE: 93 BPM | DIASTOLIC BLOOD PRESSURE: 66 MMHG | TEMPERATURE: 97.4 F

## 2025-06-30 PROCEDURE — G0159 HHC PT MAINT EA 15 MIN: HCPCS

## 2025-06-30 ASSESSMENT — ACTIVITIES OF DAILY LIVING (ADL)
TRANSPORTATION COMMENTS: LOW VISION
AMBULATION ASSISTANCE ON FLAT SURFACES: 1

## 2025-06-30 ASSESSMENT — ENCOUNTER SYMPTOMS
DENIES PAIN: 1
DENIES PAIN: 1

## 2025-07-02 ENCOUNTER — HOME CARE VISIT (OUTPATIENT)
Dept: HOME HEALTH SERVICES | Facility: HOME HEALTHCARE | Age: OVER 89
End: 2025-07-02
Payer: MEDICARE

## 2025-07-02 VITALS
OXYGEN SATURATION: 95 % | RESPIRATION RATE: 16 BRPM | DIASTOLIC BLOOD PRESSURE: 64 MMHG | SYSTOLIC BLOOD PRESSURE: 112 MMHG | HEART RATE: 64 BPM | TEMPERATURE: 97.3 F

## 2025-07-02 PROCEDURE — G0159 HHC PT MAINT EA 15 MIN: HCPCS

## 2025-07-07 ENCOUNTER — HOME CARE VISIT (OUTPATIENT)
Dept: HOME HEALTH SERVICES | Facility: HOME HEALTHCARE | Age: OVER 89
End: 2025-07-07
Payer: MEDICARE

## 2025-07-07 VITALS
RESPIRATION RATE: 17 BRPM | TEMPERATURE: 97.5 F | HEART RATE: 66 BPM | OXYGEN SATURATION: 95 % | SYSTOLIC BLOOD PRESSURE: 112 MMHG | DIASTOLIC BLOOD PRESSURE: 64 MMHG

## 2025-07-07 PROCEDURE — G0159 HHC PT MAINT EA 15 MIN: HCPCS

## 2025-07-07 ASSESSMENT — ENCOUNTER SYMPTOMS
PAIN LOCATION - PAIN QUALITY: ACHY, SORE
PAIN LOCATION: ARMS
PAIN LOCATION - RELIEVING FACTORS: REST
PAIN LOCATION - PAIN FREQUENCY: INTERMITTENT
DENIES PAIN: 1
PAIN: 1
PAIN LOCATION - PAIN DURATION: DAYS
PAIN SEVERITY GOAL: 0/10
PAIN LOCATION - PAIN SEVERITY: 1/10
SUBJECTIVE PAIN PROGRESSION: GRADUALLY IMPROVING
LOWEST PAIN SEVERITY IN PAST 24 HOURS: 0/10
HIGHEST PAIN SEVERITY IN PAST 24 HOURS: 3/10

## 2025-07-07 ASSESSMENT — PATIENT HEALTH QUESTIONNAIRE - PHQ9
CLINICAL INTERPRETATION OF PHQ2 SCORE: 0
CLINICAL INTERPRETATION OF PHQ2 SCORE: 0

## 2025-07-07 ASSESSMENT — ACTIVITIES OF DAILY LIVING (ADL)
TRANSPORTATION COMMENTS: LOW VISION
TRANSPORTATION COMMENTS: LOW VISION

## 2025-07-09 ENCOUNTER — HOME CARE VISIT (OUTPATIENT)
Dept: HOME HEALTH SERVICES | Facility: HOME HEALTHCARE | Age: OVER 89
End: 2025-07-09
Payer: MEDICARE

## 2025-07-09 VITALS
OXYGEN SATURATION: 97 % | RESPIRATION RATE: 18 BRPM | SYSTOLIC BLOOD PRESSURE: 106 MMHG | TEMPERATURE: 97.7 F | DIASTOLIC BLOOD PRESSURE: 60 MMHG | HEART RATE: 65 BPM

## 2025-07-09 PROCEDURE — G0159 HHC PT MAINT EA 15 MIN: HCPCS

## 2025-07-10 ASSESSMENT — ACTIVITIES OF DAILY LIVING (ADL)
AMBULATION ASSISTANCE ON FLAT SURFACES: 1
TRANSPORTATION COMMENTS: LOW VISION

## 2025-07-10 ASSESSMENT — ENCOUNTER SYMPTOMS
PAIN LOCATION - PAIN SEVERITY: 0/10
SUBJECTIVE PAIN PROGRESSION: WAXING AND WANING
PAIN LOCATION - PAIN DURATION: CHRONIC
PAIN LOCATION - PAIN FREQUENCY: INTERMITTENT
PAIN LOCATION - EXACERBATING FACTORS: POSITIONAL, ACTIVITY
LOWEST PAIN SEVERITY IN PAST 24 HOURS: 0/10
PAIN SEVERITY GOAL: 2/10
PAIN LOCATION - RELIEVING FACTORS: MEDS, REST, REPOSITION
PAIN LOCATION: GENERAL
PAIN LOCATION - PAIN QUALITY: ACHY, SORE
HIGHEST PAIN SEVERITY IN PAST 24 HOURS: 3/10
PAIN: 1

## 2025-07-10 ASSESSMENT — PATIENT HEALTH QUESTIONNAIRE - PHQ9: CLINICAL INTERPRETATION OF PHQ2 SCORE: 0

## 2025-07-16 ENCOUNTER — HOME CARE VISIT (OUTPATIENT)
Dept: HOME HEALTH SERVICES | Facility: HOME HEALTHCARE | Age: OVER 89
End: 2025-07-16
Payer: MEDICARE

## 2025-07-18 ENCOUNTER — HOME CARE VISIT (OUTPATIENT)
Dept: HOME HEALTH SERVICES | Facility: HOME HEALTHCARE | Age: OVER 89
End: 2025-07-18
Payer: MEDICARE

## 2025-07-18 VITALS
RESPIRATION RATE: 18 BRPM | SYSTOLIC BLOOD PRESSURE: 110 MMHG | TEMPERATURE: 97.5 F | OXYGEN SATURATION: 96 % | DIASTOLIC BLOOD PRESSURE: 64 MMHG | HEART RATE: 67 BPM

## 2025-07-18 PROCEDURE — G0151 HHCP-SERV OF PT,EA 15 MIN: HCPCS

## 2025-07-20 ASSESSMENT — ACTIVITIES OF DAILY LIVING (ADL)
AMBULATION ASSISTANCE: 1
AMBULATION ASSISTANCE ON FLAT SURFACES: 1
AMBULATION ASSISTANCE: CONTACT GUARD ASSIST
TRANSPORTATION ASSESSED: 1
PHYSICAL TRANSFERS ASSESSED: 1
TRANSPORTATION: DEPENDENT
AMBULATION_DISTANCE/DURATION_TOLERATED: 80 FT

## 2025-07-20 ASSESSMENT — ENCOUNTER SYMPTOMS: DENIES PAIN: 1

## 2025-07-20 ASSESSMENT — PATIENT HEALTH QUESTIONNAIRE - PHQ9: CLINICAL INTERPRETATION OF PHQ2 SCORE: 0

## 2025-07-21 ENCOUNTER — HOME CARE VISIT (OUTPATIENT)
Dept: HOME HEALTH SERVICES | Facility: HOME HEALTHCARE | Age: OVER 89
End: 2025-07-21
Payer: MEDICARE

## 2025-07-21 VITALS
WEIGHT: 175 LBS | HEART RATE: 64 BPM | SYSTOLIC BLOOD PRESSURE: 118 MMHG | OXYGEN SATURATION: 94 % | RESPIRATION RATE: 18 BRPM | DIASTOLIC BLOOD PRESSURE: 64 MMHG | TEMPERATURE: 98.3 F | BODY MASS INDEX: 34.18 KG/M2

## 2025-07-21 PROCEDURE — G0159 HHC PT MAINT EA 15 MIN: HCPCS

## 2025-07-21 ASSESSMENT — ACTIVITIES OF DAILY LIVING (ADL): AMBULATION ASSISTANCE ON FLAT SURFACES: 1

## 2025-07-21 ASSESSMENT — PATIENT HEALTH QUESTIONNAIRE - PHQ9: CLINICAL INTERPRETATION OF PHQ2 SCORE: 0

## 2025-07-21 ASSESSMENT — FIBROSIS 4 INDEX: FIB4 SCORE: 3.45

## 2025-07-21 ASSESSMENT — ENCOUNTER SYMPTOMS: DENIES PAIN: 1

## 2025-07-23 ENCOUNTER — HOME CARE VISIT (OUTPATIENT)
Dept: HOME HEALTH SERVICES | Facility: HOME HEALTHCARE | Age: OVER 89
End: 2025-07-23
Payer: MEDICARE

## 2025-07-23 PROCEDURE — G0159 HHC PT MAINT EA 15 MIN: HCPCS

## 2025-07-27 ASSESSMENT — ACTIVITIES OF DAILY LIVING (ADL): TRANSPORTATION COMMENTS: LOW VISION

## 2025-07-28 ENCOUNTER — HOME CARE VISIT (OUTPATIENT)
Dept: HOME HEALTH SERVICES | Facility: HOME HEALTHCARE | Age: OVER 89
End: 2025-07-28
Payer: MEDICARE

## 2025-07-28 VITALS
RESPIRATION RATE: 18 BRPM | HEART RATE: 63 BPM | DIASTOLIC BLOOD PRESSURE: 68 MMHG | TEMPERATURE: 97.4 F | SYSTOLIC BLOOD PRESSURE: 118 MMHG | OXYGEN SATURATION: 94 %

## 2025-07-28 VITALS
HEART RATE: 73 BPM | DIASTOLIC BLOOD PRESSURE: 66 MMHG | RESPIRATION RATE: 18 BRPM | SYSTOLIC BLOOD PRESSURE: 114 MMHG | TEMPERATURE: 97 F | OXYGEN SATURATION: 96 %

## 2025-07-28 PROCEDURE — G0159 HHC PT MAINT EA 15 MIN: HCPCS

## 2025-07-28 ASSESSMENT — ENCOUNTER SYMPTOMS: DENIES PAIN: 1

## 2025-07-28 ASSESSMENT — PATIENT HEALTH QUESTIONNAIRE - PHQ9
SUM OF ALL RESPONSES TO PHQ QUESTIONS 1-9: 4
CLINICAL INTERPRETATION OF PHQ2 SCORE: 1
5. POOR APPETITE OR OVEREATING: 0 - NOT AT ALL

## 2025-07-28 ASSESSMENT — ACTIVITIES OF DAILY LIVING (ADL): AMBULATION ASSISTANCE ON FLAT SURFACES: 1

## 2025-07-30 ENCOUNTER — APPOINTMENT (OUTPATIENT)
Dept: URBAN - METROPOLITAN AREA CLINIC 22 | Facility: CLINIC | Age: OVER 89
Setting detail: DERMATOLOGY
End: 2025-07-30

## 2025-07-30 DIAGNOSIS — L30.4 ERYTHEMA INTERTRIGO: ICD-10-CM

## 2025-07-30 DIAGNOSIS — L21.8 OTHER SEBORRHEIC DERMATITIS: ICD-10-CM

## 2025-07-30 DIAGNOSIS — L81.4 OTHER MELANIN HYPERPIGMENTATION: ICD-10-CM

## 2025-07-30 PROBLEM — D48.5 NEOPLASM OF UNCERTAIN BEHAVIOR OF SKIN: Status: ACTIVE | Noted: 2025-07-30

## 2025-07-30 PROCEDURE — ? BIOPSY BY SHAVE METHOD

## 2025-07-30 PROCEDURE — ? PRESCRIPTION

## 2025-07-30 PROCEDURE — ? COUNSELING

## 2025-07-30 PROCEDURE — ? TREATMENT REGIMEN

## 2025-07-30 RX ORDER — NYSTATIN OINTMENT 100000 [USP'U]/G
OINTMENT TOPICAL BID
Qty: 30 | Refills: 2 | Status: ERX | COMMUNITY
Start: 2025-07-30

## 2025-07-30 RX ADMIN — NYSTATIN OINTMENT: 100000 OINTMENT TOPICAL at 00:00

## 2025-07-30 ASSESSMENT — LOCATION SIMPLE DESCRIPTION DERM
LOCATION SIMPLE: GROIN
LOCATION SIMPLE: RIGHT FOREHEAD
LOCATION SIMPLE: CHIN
LOCATION SIMPLE: RIGHT BREAST
LOCATION SIMPLE: LEFT BREAST

## 2025-07-30 ASSESSMENT — SEVERITY ASSESSMENT: SEVERITY: MODERATE TO SEVERE

## 2025-07-30 ASSESSMENT — LOCATION DETAILED DESCRIPTION DERM
LOCATION DETAILED: RIGHT FOREHEAD
LOCATION DETAILED: LEFT INFRAMAMMARY CREASE (OUTER QUADRANT)
LOCATION DETAILED: LEFT SUPRAPUBIC SKIN
LOCATION DETAILED: LEFT CHIN
LOCATION DETAILED: RIGHT LATERAL BREAST 6-7:00 REGION

## 2025-07-30 ASSESSMENT — LOCATION ZONE DERM
LOCATION ZONE: TRUNK
LOCATION ZONE: FACE

## 2025-08-01 ENCOUNTER — HOME CARE VISIT (OUTPATIENT)
Dept: HOME HEALTH SERVICES | Facility: HOME HEALTHCARE | Age: OVER 89
End: 2025-08-01
Payer: MEDICARE

## 2025-08-03 ASSESSMENT — ACTIVITIES OF DAILY LIVING (ADL)
TRANSPORTATION COMMENTS: LOW VISION
AMBULATION ASSISTANCE ON FLAT SURFACES: 1

## 2025-08-03 ASSESSMENT — PATIENT HEALTH QUESTIONNAIRE - PHQ9: CLINICAL INTERPRETATION OF PHQ2 SCORE: 0

## 2025-08-03 ASSESSMENT — ENCOUNTER SYMPTOMS: DENIES PAIN: 1

## 2025-08-04 ENCOUNTER — HOME CARE VISIT (OUTPATIENT)
Dept: HOME HEALTH SERVICES | Facility: HOME HEALTHCARE | Age: OVER 89
End: 2025-08-04
Payer: MEDICARE

## 2025-08-04 VITALS
OXYGEN SATURATION: 91 % | RESPIRATION RATE: 16 BRPM | TEMPERATURE: 97.6 F | HEART RATE: 74 BPM | SYSTOLIC BLOOD PRESSURE: 110 MMHG | DIASTOLIC BLOOD PRESSURE: 64 MMHG

## 2025-08-04 PROCEDURE — G0159 HHC PT MAINT EA 15 MIN: HCPCS

## 2025-08-04 ASSESSMENT — PATIENT HEALTH QUESTIONNAIRE - PHQ9: CLINICAL INTERPRETATION OF PHQ2 SCORE: 0

## 2025-08-04 ASSESSMENT — ACTIVITIES OF DAILY LIVING (ADL)
AMBULATION ASSISTANCE ON FLAT SURFACES: 1
TRANSPORTATION COMMENTS: LOW VISION

## 2025-08-04 ASSESSMENT — ENCOUNTER SYMPTOMS: DENIES PAIN: 1

## 2025-08-06 ENCOUNTER — HOME CARE VISIT (OUTPATIENT)
Dept: HOME HEALTH SERVICES | Facility: HOME HEALTHCARE | Age: OVER 89
End: 2025-08-06
Payer: MEDICARE

## 2025-08-06 VITALS
SYSTOLIC BLOOD PRESSURE: 114 MMHG | OXYGEN SATURATION: 92 % | TEMPERATURE: 98 F | DIASTOLIC BLOOD PRESSURE: 66 MMHG | RESPIRATION RATE: 18 BRPM | HEART RATE: 71 BPM

## 2025-08-06 PROCEDURE — G0159 HHC PT MAINT EA 15 MIN: HCPCS

## 2025-08-11 ENCOUNTER — HOME CARE VISIT (OUTPATIENT)
Dept: HOME HEALTH SERVICES | Facility: HOME HEALTHCARE | Age: OVER 89
End: 2025-08-11
Payer: MEDICARE

## 2025-08-11 VITALS
OXYGEN SATURATION: 100 % | SYSTOLIC BLOOD PRESSURE: 126 MMHG | HEART RATE: 72 BPM | RESPIRATION RATE: 18 BRPM | TEMPERATURE: 97.3 F | DIASTOLIC BLOOD PRESSURE: 68 MMHG

## 2025-08-11 PROCEDURE — G0159 HHC PT MAINT EA 15 MIN: HCPCS

## 2025-08-11 ASSESSMENT — PATIENT HEALTH QUESTIONNAIRE - PHQ9
CLINICAL INTERPRETATION OF PHQ2 SCORE: 0
CLINICAL INTERPRETATION OF PHQ2 SCORE: 0

## 2025-08-11 ASSESSMENT — ACTIVITIES OF DAILY LIVING (ADL)
AMBULATION ASSISTANCE ON FLAT SURFACES: 1
TRANSPORTATION COMMENTS: LOW VISION
TRANSPORTATION COMMENTS: LOW VISION
AMBULATION_DISTANCE/DURATION_TOLERATED: 75 FT

## 2025-08-11 ASSESSMENT — ENCOUNTER SYMPTOMS
DENIES PAIN: 1
DENIES PAIN: 1

## 2025-08-13 ENCOUNTER — HOME CARE VISIT (OUTPATIENT)
Dept: HOME HEALTH SERVICES | Facility: HOME HEALTHCARE | Age: OVER 89
End: 2025-08-13
Payer: MEDICARE

## 2025-08-13 PROCEDURE — G0151 HHCP-SERV OF PT,EA 15 MIN: HCPCS

## 2025-08-13 ASSESSMENT — PATIENT HEALTH QUESTIONNAIRE - PHQ9: CLINICAL INTERPRETATION OF PHQ2 SCORE: 0

## 2025-08-13 ASSESSMENT — ENCOUNTER SYMPTOMS: DENIES PAIN: 1

## 2025-08-13 ASSESSMENT — ACTIVITIES OF DAILY LIVING (ADL)
TRANSPORTATION ASSESSED: 1
TRANSPORTATION: DEPENDENT
AMBULATION ASSISTANCE: 1
PHYSICAL TRANSFERS ASSESSED: 1

## 2025-08-17 PROCEDURE — 665998 HH PPS REVENUE CREDIT

## 2025-08-17 PROCEDURE — 665999 HH PPS REVENUE DEBIT

## 2025-08-18 VITALS
RESPIRATION RATE: 18 BRPM | HEART RATE: 70 BPM | OXYGEN SATURATION: 98 % | TEMPERATURE: 98.1 F | DIASTOLIC BLOOD PRESSURE: 64 MMHG | SYSTOLIC BLOOD PRESSURE: 116 MMHG

## 2025-08-18 PROCEDURE — 665998 HH PPS REVENUE CREDIT

## 2025-08-18 PROCEDURE — 665999 HH PPS REVENUE DEBIT

## 2025-08-18 ASSESSMENT — ACTIVITIES OF DAILY LIVING (ADL): OASIS_M1830: 06

## 2025-08-19 ENCOUNTER — APPOINTMENT (OUTPATIENT)
Dept: URBAN - METROPOLITAN AREA CLINIC 22 | Facility: CLINIC | Age: OVER 89
Setting detail: DERMATOLOGY
End: 2025-08-19

## 2025-08-19 DIAGNOSIS — L30.4 ERYTHEMA INTERTRIGO: ICD-10-CM

## 2025-08-19 DIAGNOSIS — L57.0 ACTINIC KERATOSIS: ICD-10-CM

## 2025-08-19 PROCEDURE — ? PRESCRIPTION

## 2025-08-19 PROCEDURE — ? ADDITIONAL NOTES

## 2025-08-19 PROCEDURE — ? DIAGNOSIS COMMENT

## 2025-08-19 PROCEDURE — 665998 HH PPS REVENUE CREDIT

## 2025-08-19 PROCEDURE — ? COUNSELING

## 2025-08-19 PROCEDURE — 665999 HH PPS REVENUE DEBIT

## 2025-08-19 PROCEDURE — ? LIQUID NITROGEN

## 2025-08-19 RX ORDER — NYSTATIN OINTMENT 100000 [USP'U]/G
OINTMENT TOPICAL BID
Qty: 30 | Refills: 2 | Status: ERX

## 2025-08-19 ASSESSMENT — LOCATION ZONE DERM
LOCATION ZONE: TRUNK
LOCATION ZONE: FACE

## 2025-08-19 ASSESSMENT — LOCATION DETAILED DESCRIPTION DERM
LOCATION DETAILED: RIGHT FOREHEAD
LOCATION DETAILED: RIGHT LATERAL BREAST 6-7:00 REGION

## 2025-08-19 ASSESSMENT — LOCATION SIMPLE DESCRIPTION DERM
LOCATION SIMPLE: RIGHT FOREHEAD
LOCATION SIMPLE: RIGHT BREAST

## 2025-08-20 ENCOUNTER — HOME CARE VISIT (OUTPATIENT)
Dept: HOME HEALTH SERVICES | Facility: HOME HEALTHCARE | Age: OVER 89
End: 2025-08-20
Payer: MEDICARE

## 2025-08-20 PROCEDURE — 665999 HH PPS REVENUE DEBIT

## 2025-08-20 PROCEDURE — 665003 FOLLOW UP-HOME HEALTH

## 2025-08-20 PROCEDURE — 665998 HH PPS REVENUE CREDIT

## 2025-08-20 PROCEDURE — G0159 HHC PT MAINT EA 15 MIN: HCPCS

## 2025-08-21 PROCEDURE — 665999 HH PPS REVENUE DEBIT

## 2025-08-21 PROCEDURE — 665998 HH PPS REVENUE CREDIT

## 2025-08-22 ENCOUNTER — HOME CARE VISIT (OUTPATIENT)
Dept: HOME HEALTH SERVICES | Facility: HOME HEALTHCARE | Age: OVER 89
End: 2025-08-22
Payer: MEDICARE

## 2025-08-22 VITALS
DIASTOLIC BLOOD PRESSURE: 70 MMHG | HEART RATE: 66 BPM | RESPIRATION RATE: 18 BRPM | TEMPERATURE: 97.1 F | OXYGEN SATURATION: 94 % | SYSTOLIC BLOOD PRESSURE: 114 MMHG

## 2025-08-22 PROCEDURE — 665998 HH PPS REVENUE CREDIT

## 2025-08-22 PROCEDURE — G0159 HHC PT MAINT EA 15 MIN: HCPCS

## 2025-08-22 PROCEDURE — 665999 HH PPS REVENUE DEBIT

## 2025-08-23 PROCEDURE — 665998 HH PPS REVENUE CREDIT

## 2025-08-23 PROCEDURE — 665999 HH PPS REVENUE DEBIT

## 2025-08-24 PROCEDURE — 665998 HH PPS REVENUE CREDIT

## 2025-08-24 PROCEDURE — 665999 HH PPS REVENUE DEBIT

## 2025-08-25 ENCOUNTER — HOME CARE VISIT (OUTPATIENT)
Dept: HOME HEALTH SERVICES | Facility: HOME HEALTHCARE | Age: OVER 89
End: 2025-08-25
Payer: MEDICARE

## 2025-08-25 PROCEDURE — 665999 HH PPS REVENUE DEBIT

## 2025-08-25 PROCEDURE — 665998 HH PPS REVENUE CREDIT

## 2025-08-26 PROCEDURE — 665999 HH PPS REVENUE DEBIT

## 2025-08-26 PROCEDURE — 665998 HH PPS REVENUE CREDIT

## 2025-08-27 VITALS
DIASTOLIC BLOOD PRESSURE: 68 MMHG | TEMPERATURE: 97.4 F | SYSTOLIC BLOOD PRESSURE: 114 MMHG | OXYGEN SATURATION: 95 % | HEART RATE: 70 BPM | RESPIRATION RATE: 16 BRPM

## 2025-08-27 PROCEDURE — 665999 HH PPS REVENUE DEBIT

## 2025-08-27 PROCEDURE — 665998 HH PPS REVENUE CREDIT

## 2025-08-27 SDOH — ECONOMIC STABILITY: HOUSING INSECURITY: EVIDENCE OF SMOKING MATERIAL: 0

## 2025-08-27 ASSESSMENT — PATIENT HEALTH QUESTIONNAIRE - PHQ9
CLINICAL INTERPRETATION OF PHQ2 SCORE: 0
CLINICAL INTERPRETATION OF PHQ2 SCORE: 0

## 2025-08-27 ASSESSMENT — ACTIVITIES OF DAILY LIVING (ADL)
AMBULATION_DISTANCE/DURATION_TOLERATED: 50 FT
AMBULATION ASSISTANCE ON FLAT SURFACES: 1
TRANSPORTATION COMMENTS: LOW VISION

## 2025-08-27 ASSESSMENT — ENCOUNTER SYMPTOMS
DENIES PAIN: 1
DENIES PAIN: 1

## 2025-08-28 PROCEDURE — 665999 HH PPS REVENUE DEBIT

## 2025-08-28 PROCEDURE — 665998 HH PPS REVENUE CREDIT

## 2025-08-28 ASSESSMENT — ACTIVITIES OF DAILY LIVING (ADL)
AMBULATION ASSISTANCE ON FLAT SURFACES: 1
AMBULATION_DISTANCE/DURATION_TOLERATED: 45 FT

## 2025-08-29 PROCEDURE — 665999 HH PPS REVENUE DEBIT

## 2025-08-29 PROCEDURE — 665998 HH PPS REVENUE CREDIT

## 2025-08-30 PROCEDURE — 665999 HH PPS REVENUE DEBIT

## 2025-08-30 PROCEDURE — 665998 HH PPS REVENUE CREDIT
